# Patient Record
Sex: MALE | Race: WHITE | NOT HISPANIC OR LATINO | URBAN - METROPOLITAN AREA
[De-identification: names, ages, dates, MRNs, and addresses within clinical notes are randomized per-mention and may not be internally consistent; named-entity substitution may affect disease eponyms.]

---

## 2021-05-17 ENCOUNTER — EMERGENCY (EMERGENCY)
Facility: HOSPITAL | Age: 71
LOS: 1 days | Discharge: HOME | End: 2021-05-17
Attending: EMERGENCY MEDICINE
Payer: MEDICARE

## 2021-05-17 VITALS
SYSTOLIC BLOOD PRESSURE: 144 MMHG | RESPIRATION RATE: 18 BRPM | DIASTOLIC BLOOD PRESSURE: 81 MMHG | HEART RATE: 66 BPM | OXYGEN SATURATION: 99 %

## 2021-05-17 VITALS
WEIGHT: 195.11 LBS | HEART RATE: 66 BPM | HEIGHT: 71 IN | DIASTOLIC BLOOD PRESSURE: 112 MMHG | TEMPERATURE: 98 F | SYSTOLIC BLOOD PRESSURE: 183 MMHG | RESPIRATION RATE: 18 BRPM | OXYGEN SATURATION: 99 %

## 2021-05-17 PROBLEM — Z00.00 ENCOUNTER FOR PREVENTIVE HEALTH EXAMINATION: Status: ACTIVE | Noted: 2021-05-17

## 2021-05-17 LAB
ALBUMIN SERPL ELPH-MCNC: 4.3 G/DL — SIGNIFICANT CHANGE UP (ref 3.3–5.2)
ALP SERPL-CCNC: 50 U/L — SIGNIFICANT CHANGE UP (ref 40–120)
ALT FLD-CCNC: 31 U/L — SIGNIFICANT CHANGE UP
ANION GAP SERPL CALC-SCNC: 11 MMOL/L — SIGNIFICANT CHANGE UP (ref 5–17)
AST SERPL-CCNC: 22 U/L — SIGNIFICANT CHANGE UP
BASOPHILS # BLD AUTO: 0.02 K/UL — SIGNIFICANT CHANGE UP (ref 0–0.2)
BASOPHILS NFR BLD AUTO: 0.3 % — SIGNIFICANT CHANGE UP (ref 0–2)
BILIRUB SERPL-MCNC: 1.6 MG/DL — SIGNIFICANT CHANGE UP (ref 0.4–2)
BUN SERPL-MCNC: 17 MG/DL — SIGNIFICANT CHANGE UP (ref 8–20)
CALCIUM SERPL-MCNC: 9.3 MG/DL — SIGNIFICANT CHANGE UP (ref 8.6–10.2)
CHLORIDE SERPL-SCNC: 106 MMOL/L — SIGNIFICANT CHANGE UP (ref 98–107)
CO2 SERPL-SCNC: 24 MMOL/L — SIGNIFICANT CHANGE UP (ref 22–29)
CREAT SERPL-MCNC: 0.61 MG/DL — SIGNIFICANT CHANGE UP (ref 0.5–1.3)
EOSINOPHIL # BLD AUTO: 0.05 K/UL — SIGNIFICANT CHANGE UP (ref 0–0.5)
EOSINOPHIL NFR BLD AUTO: 0.7 % — SIGNIFICANT CHANGE UP (ref 0–6)
GLUCOSE SERPL-MCNC: 107 MG/DL — HIGH (ref 70–99)
HCT VFR BLD CALC: 44.7 % — SIGNIFICANT CHANGE UP (ref 39–50)
HGB BLD-MCNC: 14.8 G/DL — SIGNIFICANT CHANGE UP (ref 13–17)
IMM GRANULOCYTES NFR BLD AUTO: 0.4 % — SIGNIFICANT CHANGE UP (ref 0–1.5)
LYMPHOCYTES # BLD AUTO: 1.32 K/UL — SIGNIFICANT CHANGE UP (ref 1–3.3)
LYMPHOCYTES # BLD AUTO: 18.4 % — SIGNIFICANT CHANGE UP (ref 13–44)
MCHC RBC-ENTMCNC: 30.1 PG — SIGNIFICANT CHANGE UP (ref 27–34)
MCHC RBC-ENTMCNC: 33.1 GM/DL — SIGNIFICANT CHANGE UP (ref 32–36)
MCV RBC AUTO: 90.9 FL — SIGNIFICANT CHANGE UP (ref 80–100)
MONOCYTES # BLD AUTO: 0.41 K/UL — SIGNIFICANT CHANGE UP (ref 0–0.9)
MONOCYTES NFR BLD AUTO: 5.7 % — SIGNIFICANT CHANGE UP (ref 2–14)
NEUTROPHILS # BLD AUTO: 5.36 K/UL — SIGNIFICANT CHANGE UP (ref 1.8–7.4)
NEUTROPHILS NFR BLD AUTO: 74.5 % — SIGNIFICANT CHANGE UP (ref 43–77)
PLATELET # BLD AUTO: 169 K/UL — SIGNIFICANT CHANGE UP (ref 150–400)
POTASSIUM SERPL-MCNC: 4.1 MMOL/L — SIGNIFICANT CHANGE UP (ref 3.5–5.3)
POTASSIUM SERPL-SCNC: 4.1 MMOL/L — SIGNIFICANT CHANGE UP (ref 3.5–5.3)
PROT SERPL-MCNC: 7.1 G/DL — SIGNIFICANT CHANGE UP (ref 6.6–8.7)
RBC # BLD: 4.92 M/UL — SIGNIFICANT CHANGE UP (ref 4.2–5.8)
RBC # FLD: 12.5 % — SIGNIFICANT CHANGE UP (ref 10.3–14.5)
SODIUM SERPL-SCNC: 141 MMOL/L — SIGNIFICANT CHANGE UP (ref 135–145)
TROPONIN T SERPL-MCNC: <0.01 NG/ML — SIGNIFICANT CHANGE UP (ref 0–0.06)
WBC # BLD: 7.19 K/UL — SIGNIFICANT CHANGE UP (ref 3.8–10.5)
WBC # FLD AUTO: 7.19 K/UL — SIGNIFICANT CHANGE UP (ref 3.8–10.5)

## 2021-05-17 PROCEDURE — 93306 TTE W/DOPPLER COMPLETE: CPT

## 2021-05-17 PROCEDURE — 93306 TTE W/DOPPLER COMPLETE: CPT | Mod: 26

## 2021-05-17 PROCEDURE — G1004: CPT

## 2021-05-17 PROCEDURE — 96374 THER/PROPH/DIAG INJ IV PUSH: CPT

## 2021-05-17 PROCEDURE — 99285 EMERGENCY DEPT VISIT HI MDM: CPT | Mod: GC

## 2021-05-17 PROCEDURE — 85025 COMPLETE CBC W/AUTO DIFF WBC: CPT

## 2021-05-17 PROCEDURE — 71046 X-RAY EXAM CHEST 2 VIEWS: CPT

## 2021-05-17 PROCEDURE — 70450 CT HEAD/BRAIN W/O DYE: CPT

## 2021-05-17 PROCEDURE — 99284 EMERGENCY DEPT VISIT MOD MDM: CPT

## 2021-05-17 PROCEDURE — 99284 EMERGENCY DEPT VISIT MOD MDM: CPT | Mod: 25

## 2021-05-17 PROCEDURE — 93005 ELECTROCARDIOGRAM TRACING: CPT

## 2021-05-17 PROCEDURE — 84484 ASSAY OF TROPONIN QUANT: CPT

## 2021-05-17 PROCEDURE — 93010 ELECTROCARDIOGRAM REPORT: CPT

## 2021-05-17 PROCEDURE — 82962 GLUCOSE BLOOD TEST: CPT

## 2021-05-17 PROCEDURE — 71046 X-RAY EXAM CHEST 2 VIEWS: CPT | Mod: 26

## 2021-05-17 PROCEDURE — 80053 COMPREHEN METABOLIC PANEL: CPT

## 2021-05-17 PROCEDURE — 96361 HYDRATE IV INFUSION ADD-ON: CPT

## 2021-05-17 PROCEDURE — 36415 COLL VENOUS BLD VENIPUNCTURE: CPT

## 2021-05-17 PROCEDURE — 70450 CT HEAD/BRAIN W/O DYE: CPT | Mod: 26,MG

## 2021-05-17 RX ORDER — ASPIRIN/CALCIUM CARB/MAGNESIUM 324 MG
325 TABLET ORAL DAILY
Refills: 0 | Status: DISCONTINUED | OUTPATIENT
Start: 2021-05-17 | End: 2021-05-21

## 2021-05-17 RX ORDER — HYDRALAZINE HCL 50 MG
10 TABLET ORAL ONCE
Refills: 0 | Status: COMPLETED | OUTPATIENT
Start: 2021-05-17 | End: 2021-05-17

## 2021-05-17 RX ORDER — SODIUM CHLORIDE 9 MG/ML
1000 INJECTION INTRAMUSCULAR; INTRAVENOUS; SUBCUTANEOUS ONCE
Refills: 0 | Status: COMPLETED | OUTPATIENT
Start: 2021-05-17 | End: 2021-05-17

## 2021-05-17 RX ADMIN — SODIUM CHLORIDE 1000 MILLILITER(S): 9 INJECTION INTRAMUSCULAR; INTRAVENOUS; SUBCUTANEOUS at 13:45

## 2021-05-17 RX ADMIN — SODIUM CHLORIDE 1000 MILLILITER(S): 9 INJECTION INTRAMUSCULAR; INTRAVENOUS; SUBCUTANEOUS at 12:10

## 2021-05-17 RX ADMIN — Medication 325 MILLIGRAM(S): at 12:10

## 2021-05-17 RX ADMIN — Medication 10 MILLIGRAM(S): at 12:10

## 2021-05-17 NOTE — ED ADULT TRIAGE NOTE - CHIEF COMPLAINT QUOTE
Pt was outside doing yard work this morning. As he was bending down he became very lightheaded. Symptoms lasted approx 45mins. Denies any chest pain, palpitations, weakness or numbness. Pt is hypertensive in triage, denies any h/o HTN.

## 2021-05-17 NOTE — ED PROVIDER NOTE - NSFOLLOWUPCLINICS_GEN_ALL_ED_FT
[facility]  Cardiology  [address] [place] [address]  Phone: [phone]  Fax:   Follow Up Time: 1-3 Days

## 2021-05-17 NOTE — CONSULT NOTE ADULT - ASSESSMENT
70 y/o M with PMH HLD, colitis, presents to ED with c/o lightheaded. Pt states lasting about 45 minutes, without chest pain, shortness of breath. Pt heavy coffee drinker, dose not drink water, non smoker, works outside gardening, and house hold chores, without symptoms. Orthostatics normal, IVF infusing in ED. tele- SR. troponin neg. EKG with bifascicular block.     lightheaded  - TTE ordered   - EKG abnl  - troponin neg x 1  - orthostatics normal  - IVF given by ED  - tele monitoring x 24 hours.   - attempted to call PCP to obtain prior EKG- office is closed today    HTN  - hydralazine given in ED  - follow up with primary care       Preliminary evaluation, please await complete evaluation by attending   70 y/o M with PMH HLD, colitis, presents to ED with c/o lightheaded. Pt states lasting about 45 minutes, without chest pain, shortness of breath. Pt heavy coffee drinker, dose not drink water, non smoker, works outside gardening, and house hold chores, without symptoms. Orthostatics normal, IVF infusing in ED. tele- SR. troponin neg. EKG with bifascicular block.     lightheaded  - TTE ordered   - EKG abnl  - troponin neg x 1  - orthostatics normal  - IVF given by ED  - tele monitoring x 24 hours.   - attempted to call PCP to obtain prior EKG- office is closed today  - out patient MCOT and  ischemic work up     HTN  - hydralazine given in ED  - follow up with primary care       Preliminary evaluation, please await complete evaluation by attending   70 y/o M with PMH HLD, colitis, presents to ED with c/o lightheaded. Pt states lasting about 45 minutes, without chest pain, shortness of breath. Pt heavy coffee drinker, dose not drink water, non smoker, works outside gardening, and house hold chores, without symptoms. Orthostatics normal, IVF infusing in ED. tele- SR. troponin neg. EKG with bifascicular block.     lightheaded  - TTE ordered   - EKG abnl  - troponin neg x 1  - orthostatics normal  - IVF given by ED  - tele monitoring x 24 hours.   - attempted to call PCP to obtain prior EKG- office is closed today  - out patient MCOT and  ischemic work up     HTN  - hydralazine given in ED  - follow up with primary care

## 2021-05-17 NOTE — ED PROVIDER NOTE - CLINICAL SUMMARY MEDICAL DECISION MAKING FREE TEXT BOX
70 yo male presents s/p episode of lightheadedness earlier today. Will obtain basic labs, troponin, EKG, CXR, CT head (priority). Hydralazine 10 mg to treat BP. Orthostatic vitals. Monitor and reassess.

## 2021-05-17 NOTE — ED ADULT NURSE NOTE - OBJECTIVE STATEMENT
pt with dizziness complaint  was out gardening  washing 5 gal pots 'I was up and down and up and down'   pt didn't have breakfast "I normally don't until I do the gardening'   "I feel better now but I don't know what that was'  pt had both COVID vaccines.  wife at bedside

## 2021-05-17 NOTE — CONSULT NOTE ADULT - SUBJECTIVE AND OBJECTIVE BOX
[facility]                                                               Office:  [address] [place] [address]                                                              Telephone: [phone]. Fax:[phone]                                                                        CARDIOLOGY CONSULTATION NOTE     History obtained by: Patient and medical record   obtained: No  Cardiologist: none    Chief complaint:    Patient is a 71y old  Male who presents with a chief complaint of lightheaded       HPI: 72 y/o M with PMH HLD, colitis, presents to ED with c/o lightheaded. Pt states lasting about 45 minutes, without chest pain, shortness of breath. Pt heavy coffee drinker, dose not drink water, non smoker, works outside gardening, and house hold chores, without symptoms. Orthostatics normal, IVF infusing in ED. tele- SR. troponin neg. EKG with bifascicular block.       REVIEW OF SYMPTOMS:   CONSTITUTIONAL: No fever, no weight loss, no fatigue, no weight gain   ENMT:  No difficulty hearing, tinnitus, vertigo; No sinus or throat pain  NECK: No pain or stiffness  CARDIOVASCULAR: No chest pain, no dyspnea, no syncope, no palpitations, no dizziness, no Orthopnea, no Paroxsymal nocturnal dyspnea  RESPIRATORY: No Dyspnea on exertion, no Shortness of breath, no cough, no wheezing  : No dysuria, no hematuria   GI: No dark color stool, no melena, no diarrhea, no constipation, no abdominal pain   NEURO: No headache, no dizziness, no slurred speech   MUSCULOSKELETAL: No joint pain or swelling; No muscle, back, or extremity pain  PSYCH: No agitation, no anxiety.    ALL OTHER REVIEW OF SYSTEMS ARE NEGATIVE.       ALLERGIES: Allergies  No Known Allergies  Intolerances        PAST MEDICAL HISTORY  High cholesterol  Colitis  Vertigo      PAST SURGICAL HISTORY      FAMILY HISTORY:      SOCIAL HISTORY:  Denies smoking/alcohol/drugs      CURRENT MEDICATIONS:   aspirin        HOME MEDICATIONS:      Vital Signs Last 24 Hrs  T(C): 36.5 (17 May 2021 10:28), Max: 36.5 (17 May 2021 10:28)  T(F): 97.7 (17 May 2021 10:28), Max: 97.7 (17 May 2021 10:28)  HR: 67 (17 May 2021 12:21) (61 - 68)  BP: 167/86 (17 May 2021 12:21) (156/74 - 183/112)  RR: 18 (17 May 2021 11:48) (18 - 18)  SpO2: 99% (17 May 2021 12:02) (99% - 99%)      PHYSICAL EXAM:  Constitutional: Comfortable . No acute distress.   HEENT: Atraumatic and normocephalic , neck is supple . no JVD.   CNS: A&Ox3. No focal deficits. EOMI.   Respiratory: CTAB, unlabored   Cardiovascular: S1S2 RRR. No murmur/rubs or gallop.  Gastrointestinal: Soft non-tender and non distended . +Bowel sounds  Extremities: No edema.   Psychiatric: Calm . no agitation.  Skin: No skin rash/ulcers visualized to face, hands or feet.      LABS:                        14.8   7.19  )-----------( 169      ( 17 May 2021 11:41 )             44.7     05-17    141  |  106  |  17.0  ----------------------------<  107<H>  4.1   |  24.0  |  0.61    Ca    9.3      17 May 2021 11:41    TPro  7.1  /  Alb  4.3  /  TBili  1.6  /  DBili  x   /  AST  22  /  ALT  31  /  AlkPhos  50  05-17    CARDIAC MARKERS ( 17 May 2021 11:41 )  x     / <0.01 ng/mL / x     / x     / x          INTERPRETATION OF TELEMETRY: SR   ECG: SR, bifascicular block

## 2021-05-17 NOTE — ED PROVIDER NOTE - PHYSICAL EXAMINATION
Const: Awake, alert and oriented. In no acute distress. Well appearing.  HEENT: NC/AT. Moist mucous membranes.  Eyes: No scleral icterus. EOMI.  Neck:. Soft and supple. Full ROM without pain.  Cardiac: Regular rate and regular rhythm. +S1/S2. Peripheral pulses 2+ and symmetric. No LE edema.  Resp: Speaking in full sentences. No evidence of respiratory distress. No wheezes, rales or rhonchi.  Abd: Soft, non-tender, non-distended. Normal bowel sounds in all 4 quadrants. No guarding or rebound.  Back: Spine midline and non-tender. No CVAT.  Skin: No rashes, abrasions or lacerations.  Lymph: No cervical lymphadenopathy.  Neuro: Awake, alert & oriented x 3. Moves all extremities symmetrically. CN II-XII intact. No focal neurologic deficits on exam.

## 2021-05-17 NOTE — ED PROVIDER NOTE - ATTENDING CONTRIBUTION TO CARE
I personally saw the patient with the resident, and completed the key components of the history and physical exam. I then discussed the management plan with the resident.    70 y/o M with PMH colitis, HLD presents following an episode of lightheadedness when he was working in the garden around 7 am (presented to the ED around 1030 am), felt pressure/pain in his throat, symptoms lasted 45 mins and resolved. Fells nothing like his previous episodes of vertigo. He now feels fine. BP has been elevated since, he has no history of blood pressure.  AP - well appearing, NAD, RRR, lungs CTA B/L, abd soft NT/ND, CN II-XII symmetrically intact, normal coordination, no ataxia, abd soft NT/ND, symmetrical peripheral pulses 2+.     Cardiology evaluated the patient, ordered TTE for now, MCOT for patient, if echo normal, patient can be d/c'd and follow up as outpatient in the office.

## 2021-05-17 NOTE — CONSULT NOTE ADULT - ATTENDING COMMENTS
71M with pre-syncope     - orthostatic vitals negative  - continue IVF hydration  - abnormal EKG, but unchanged from 2019 per review of records from PMD  - echo to assess biventricular structure and function  - continue tele monitoring  - will try to arrange 7d ambulatory heart monitoring in office today  - if able to arrange monitor and no significant abnormalities on echo, no additional inpatient cardiac testing is recommended at this time   - start amlodipine if additional BP control needed     Cardiology follow in office in 2-4 weeks; routine PMD follow up

## 2021-05-17 NOTE — ED PROVIDER NOTE - PROGRESS NOTE DETAILS
Mike: Patient complaining of neck fullness" when he felt nauseous earlier. Last time he saw a cardiologist was ten years ago for a stress test. Will consult cardiology for further recommendations. Mike: Cardiology recommending ECHO, outpatient followup. Will obtain TTE. If TTE normal, plan for d/c. Mike: Echo negative. will d/c the patient.

## 2021-05-17 NOTE — ED PROVIDER NOTE - OBJECTIVE STATEMENT
72 yo male history of Colitis, HLD presents after a 45 minute episode of nausea, lightheadedness earlier today. Per the patient, he was outside earlier today gardening when he suddenly became lightheaded. He reports that he was nauseous during this time as well. Patient reports entire episode lasted 45 minutes, he did not throw up nor lose consciousness. No vision changes, blurry vision, double vision, loss of vision. He does have a history of vertigo, does not feel this earlier episode today reminds him of his vertigo. He denies any headache during the episode or now. No history of high blood pressure. He denies fever/chills, cp, sob, abd pain, n/v/d, dysuria, hematuria, focal weakness/numbness/tingling in his extremities.

## 2021-06-08 ENCOUNTER — APPOINTMENT (OUTPATIENT)
Dept: CARDIOLOGY | Facility: CLINIC | Age: 71
End: 2021-06-08
Payer: MEDICARE

## 2021-06-08 ENCOUNTER — NON-APPOINTMENT (OUTPATIENT)
Age: 71
End: 2021-06-08

## 2021-06-08 VITALS
TEMPERATURE: 98.1 F | HEIGHT: 71 IN | SYSTOLIC BLOOD PRESSURE: 122 MMHG | WEIGHT: 195 LBS | OXYGEN SATURATION: 96 % | BODY MASS INDEX: 27.3 KG/M2 | HEART RATE: 77 BPM | RESPIRATION RATE: 18 BRPM | DIASTOLIC BLOOD PRESSURE: 80 MMHG

## 2021-06-08 VITALS — DIASTOLIC BLOOD PRESSURE: 80 MMHG | SYSTOLIC BLOOD PRESSURE: 128 MMHG

## 2021-06-08 DIAGNOSIS — Z78.9 OTHER SPECIFIED HEALTH STATUS: ICD-10-CM

## 2021-06-08 DIAGNOSIS — R42 DIZZINESS AND GIDDINESS: ICD-10-CM

## 2021-06-08 DIAGNOSIS — Z82.0 FAMILY HISTORY OF EPILEPSY AND OTHER DISEASES OF THE NERVOUS SYSTEM: ICD-10-CM

## 2021-06-08 DIAGNOSIS — Z80.1 FAMILY HISTORY OF MALIGNANT NEOPLASM OF TRACHEA, BRONCHUS AND LUNG: ICD-10-CM

## 2021-06-08 DIAGNOSIS — K52.9 NONINFECTIVE GASTROENTERITIS AND COLITIS, UNSPECIFIED: ICD-10-CM

## 2021-06-08 DIAGNOSIS — Z86.79 PERSONAL HISTORY OF OTHER DISEASES OF THE CIRCULATORY SYSTEM: ICD-10-CM

## 2021-06-08 DIAGNOSIS — Z87.898 PERSONAL HISTORY OF OTHER SPECIFIED CONDITIONS: ICD-10-CM

## 2021-06-08 DIAGNOSIS — Z82.49 FAMILY HISTORY OF ISCHEMIC HEART DISEASE AND OTHER DISEASES OF THE CIRCULATORY SYSTEM: ICD-10-CM

## 2021-06-08 DIAGNOSIS — F17.290 NICOTINE DEPENDENCE, OTHER TOBACCO PRODUCT, UNCOMPLICATED: ICD-10-CM

## 2021-06-08 PROCEDURE — 99213 OFFICE O/P EST LOW 20 MIN: CPT

## 2021-06-08 PROCEDURE — 93000 ELECTROCARDIOGRAM COMPLETE: CPT

## 2021-06-08 RX ORDER — MESALAMINE 400 MG/1
400 CAPSULE, DELAYED RELEASE ORAL
Refills: 0 | Status: ACTIVE | COMMUNITY

## 2021-06-08 RX ORDER — SIMVASTATIN 10 MG/1
10 TABLET, FILM COATED ORAL
Qty: 90 | Refills: 3 | Status: ACTIVE | COMMUNITY

## 2021-06-08 NOTE — CARDIOLOGY SUMMARY
[de-identified] : 6/8/21: NSR, RBBB, unchanged from prior  [de-identified] : 5/17/21: EF65-70%, trace MR, mild AI

## 2021-06-08 NOTE — HISTORY OF PRESENT ILLNESS
[FreeTextEntry1] : Mr. DORIE OROSCO  is a 71 year male  who presents to Cardiology for follow up evaluation of a presyncopal episode one month ago that was evaluated in the [facility] ED.  At the time the etiology of his symptoms were unclear and cardiac testing was unrevealing.  He was discharged with a 7d heart monitor, which he did complete and mail back last week.\par \par Seen in office today, he  feels well. Specifically He denies chest pain, dyspnea, exertional symptoms, orthopnea, lower extremity edema, palpitations, or syncope. No new issues since leaving the ED.  Was not discharged with BP medications, and his BP at home has been -130s.

## 2021-10-06 ENCOUNTER — RESULT REVIEW (OUTPATIENT)
Age: 71
End: 2021-10-06

## 2021-10-06 ENCOUNTER — INPATIENT (INPATIENT)
Facility: HOSPITAL | Age: 71
LOS: 16 days | Discharge: HOME | DRG: 28 | End: 2021-10-23
Attending: NEUROLOGICAL SURGERY | Admitting: NEUROLOGICAL SURGERY
Payer: MEDICARE

## 2021-10-06 ENCOUNTER — TRANSCRIPTION ENCOUNTER (OUTPATIENT)
Age: 71
End: 2021-10-06

## 2021-10-06 VITALS
HEIGHT: 71 IN | HEART RATE: 64 BPM | DIASTOLIC BLOOD PRESSURE: 92 MMHG | OXYGEN SATURATION: 96 % | TEMPERATURE: 98 F | WEIGHT: 190.04 LBS | SYSTOLIC BLOOD PRESSURE: 183 MMHG | RESPIRATION RATE: 18 BRPM

## 2021-10-06 DIAGNOSIS — G95.89 OTHER SPECIFIED DISEASES OF SPINAL CORD: ICD-10-CM

## 2021-10-06 PROBLEM — K52.9 NONINFECTIVE GASTROENTERITIS AND COLITIS, UNSPECIFIED: Chronic | Status: ACTIVE | Noted: 2021-05-17

## 2021-10-06 PROBLEM — R42 DIZZINESS AND GIDDINESS: Chronic | Status: ACTIVE | Noted: 2021-05-17

## 2021-10-06 PROBLEM — E78.00 PURE HYPERCHOLESTEROLEMIA, UNSPECIFIED: Chronic | Status: ACTIVE | Noted: 2021-05-17

## 2021-10-06 LAB
ALBUMIN SERPL ELPH-MCNC: 4.7 G/DL — SIGNIFICANT CHANGE UP (ref 3.3–5.2)
ALP SERPL-CCNC: 60 U/L — SIGNIFICANT CHANGE UP (ref 40–120)
ALT FLD-CCNC: 32 U/L — SIGNIFICANT CHANGE UP
ANION GAP SERPL CALC-SCNC: 20 MMOL/L — HIGH (ref 5–17)
APTT BLD: 34.7 SEC — SIGNIFICANT CHANGE UP (ref 27.5–35.5)
AST SERPL-CCNC: 26 U/L — SIGNIFICANT CHANGE UP
BASOPHILS # BLD AUTO: 0.02 K/UL — SIGNIFICANT CHANGE UP (ref 0–0.2)
BASOPHILS NFR BLD AUTO: 0.2 % — SIGNIFICANT CHANGE UP (ref 0–2)
BILIRUB SERPL-MCNC: 2.1 MG/DL — HIGH (ref 0.4–2)
BUN SERPL-MCNC: 16.8 MG/DL — SIGNIFICANT CHANGE UP (ref 8–20)
CALCIUM SERPL-MCNC: 9.9 MG/DL — SIGNIFICANT CHANGE UP (ref 8.6–10.2)
CHLORIDE SERPL-SCNC: 100 MMOL/L — SIGNIFICANT CHANGE UP (ref 98–107)
CO2 SERPL-SCNC: 20 MMOL/L — LOW (ref 22–29)
CREAT SERPL-MCNC: 0.66 MG/DL — SIGNIFICANT CHANGE UP (ref 0.5–1.3)
EOSINOPHIL # BLD AUTO: 0 K/UL — SIGNIFICANT CHANGE UP (ref 0–0.5)
EOSINOPHIL NFR BLD AUTO: 0 % — SIGNIFICANT CHANGE UP (ref 0–6)
GLUCOSE SERPL-MCNC: 118 MG/DL — HIGH (ref 70–99)
HCT VFR BLD CALC: 44.6 % — SIGNIFICANT CHANGE UP (ref 39–50)
HGB BLD-MCNC: 15.5 G/DL — SIGNIFICANT CHANGE UP (ref 13–17)
IMM GRANULOCYTES NFR BLD AUTO: 0.5 % — SIGNIFICANT CHANGE UP (ref 0–1.5)
INR BLD: 1.1 RATIO — SIGNIFICANT CHANGE UP (ref 0.88–1.16)
LYMPHOCYTES # BLD AUTO: 1 K/UL — SIGNIFICANT CHANGE UP (ref 1–3.3)
LYMPHOCYTES # BLD AUTO: 9.9 % — LOW (ref 13–44)
MCHC RBC-ENTMCNC: 30.5 PG — SIGNIFICANT CHANGE UP (ref 27–34)
MCHC RBC-ENTMCNC: 34.8 GM/DL — SIGNIFICANT CHANGE UP (ref 32–36)
MCV RBC AUTO: 87.8 FL — SIGNIFICANT CHANGE UP (ref 80–100)
MONOCYTES # BLD AUTO: 0.46 K/UL — SIGNIFICANT CHANGE UP (ref 0–0.9)
MONOCYTES NFR BLD AUTO: 4.5 % — SIGNIFICANT CHANGE UP (ref 2–14)
NEUTROPHILS # BLD AUTO: 8.59 K/UL — HIGH (ref 1.8–7.4)
NEUTROPHILS NFR BLD AUTO: 84.9 % — HIGH (ref 43–77)
PLATELET # BLD AUTO: 185 K/UL — SIGNIFICANT CHANGE UP (ref 150–400)
POTASSIUM SERPL-MCNC: 3.9 MMOL/L — SIGNIFICANT CHANGE UP (ref 3.5–5.3)
POTASSIUM SERPL-SCNC: 3.9 MMOL/L — SIGNIFICANT CHANGE UP (ref 3.5–5.3)
PROT SERPL-MCNC: 7.5 G/DL — SIGNIFICANT CHANGE UP (ref 6.6–8.7)
PROTHROM AB SERPL-ACNC: 12.7 SEC — SIGNIFICANT CHANGE UP (ref 10.6–13.6)
RAPID RVP RESULT: SIGNIFICANT CHANGE UP
RBC # BLD: 5.08 M/UL — SIGNIFICANT CHANGE UP (ref 4.2–5.8)
RBC # FLD: 12.9 % — SIGNIFICANT CHANGE UP (ref 10.3–14.5)
SARS-COV-2 RNA SPEC QL NAA+PROBE: SIGNIFICANT CHANGE UP
SARS-COV-2 RNA SPEC QL NAA+PROBE: SIGNIFICANT CHANGE UP
SODIUM SERPL-SCNC: 140 MMOL/L — SIGNIFICANT CHANGE UP (ref 135–145)
WBC # BLD: 10.12 K/UL — SIGNIFICANT CHANGE UP (ref 3.8–10.5)
WBC # FLD AUTO: 10.12 K/UL — SIGNIFICANT CHANGE UP (ref 3.8–10.5)

## 2021-10-06 PROCEDURE — 99285 EMERGENCY DEPT VISIT HI MDM: CPT

## 2021-10-06 PROCEDURE — 72157 MRI CHEST SPINE W/O & W/DYE: CPT | Mod: 26,MG

## 2021-10-06 PROCEDURE — 72158 MRI LUMBAR SPINE W/O & W/DYE: CPT | Mod: 26,MG

## 2021-10-06 PROCEDURE — G1004: CPT

## 2021-10-06 PROCEDURE — 99024 POSTOP FOLLOW-UP VISIT: CPT

## 2021-10-06 PROCEDURE — 72156 MRI NECK SPINE W/O & W/DYE: CPT | Mod: 26,MG

## 2021-10-06 PROCEDURE — 72128 CT CHEST SPINE W/O DYE: CPT | Mod: 26,MG

## 2021-10-06 PROCEDURE — 88304 TISSUE EXAM BY PATHOLOGIST: CPT | Mod: 26

## 2021-10-06 PROCEDURE — 99221 1ST HOSP IP/OBS SF/LOW 40: CPT

## 2021-10-06 PROCEDURE — 72131 CT LUMBAR SPINE W/O DYE: CPT | Mod: 26,MG

## 2021-10-06 RX ORDER — DEXAMETHASONE 0.5 MG/5ML
10 ELIXIR ORAL ONCE
Refills: 0 | Status: COMPLETED | OUTPATIENT
Start: 2021-10-06 | End: 2021-10-06

## 2021-10-06 RX ORDER — HYDROMORPHONE HYDROCHLORIDE 2 MG/ML
1 INJECTION INTRAMUSCULAR; INTRAVENOUS; SUBCUTANEOUS ONCE
Refills: 0 | Status: DISCONTINUED | OUTPATIENT
Start: 2021-10-06 | End: 2021-10-06

## 2021-10-06 RX ORDER — MORPHINE SULFATE 50 MG/1
4 CAPSULE, EXTENDED RELEASE ORAL ONCE
Refills: 0 | Status: DISCONTINUED | OUTPATIENT
Start: 2021-10-06 | End: 2021-10-06

## 2021-10-06 RX ORDER — MORPHINE SULFATE 50 MG/1
2 CAPSULE, EXTENDED RELEASE ORAL ONCE
Refills: 0 | Status: DISCONTINUED | OUTPATIENT
Start: 2021-10-06 | End: 2021-10-06

## 2021-10-06 RX ADMIN — MORPHINE SULFATE 2 MILLIGRAM(S): 50 CAPSULE, EXTENDED RELEASE ORAL at 17:02

## 2021-10-06 RX ADMIN — MORPHINE SULFATE 4 MILLIGRAM(S): 50 CAPSULE, EXTENDED RELEASE ORAL at 17:17

## 2021-10-06 RX ADMIN — MORPHINE SULFATE 2 MILLIGRAM(S): 50 CAPSULE, EXTENDED RELEASE ORAL at 16:47

## 2021-10-06 RX ADMIN — HYDROMORPHONE HYDROCHLORIDE 1 MILLIGRAM(S): 2 INJECTION INTRAMUSCULAR; INTRAVENOUS; SUBCUTANEOUS at 17:46

## 2021-10-06 RX ADMIN — Medication 10 MILLIGRAM(S): at 16:47

## 2021-10-06 RX ADMIN — HYDROMORPHONE HYDROCHLORIDE 1 MILLIGRAM(S): 2 INJECTION INTRAMUSCULAR; INTRAVENOUS; SUBCUTANEOUS at 23:03

## 2021-10-06 NOTE — ED PROVIDER NOTE - SHIFT CHANGE DETAILS
pt with back pain , numbness to both legs and inability to move legs after sudden onset of what he felt was his usual back pain.   Eval by NS rec MR and will follow. R/O GB, transverse myelitis.   Needs LP after MRs  Monitor resp status. Pt and wife instructed to immediately report any increased weakness/numbness/ diff breathing

## 2021-10-06 NOTE — CHART NOTE - NSCHARTNOTEFT_GEN_A_CORE
NCCU Evaluation Note:    Called by the ER to evaluate a patient with possible GBS.  Patient seen and examined.  The history elicited: acute onset of low back pain with bilateral numbness and weakness starting in the upper thigh and radiating down the legs is atypical of luma barre syndrome, but does not rule it out.  The patient is having no bulbar symptoms, no dysautonomia (HTN on presentation easily explained by 10/10 back pain), the patient reports no short ness of breath- on room air, speaking full sentences and maintains his airway.    Constitutional: NAD    Neuro  * Mental Status:  GCS 15:  E(4), V(5), M(6).  Awake, alert, oriented to conversation.  * Cranial Nerves: Cnii-Cnxii grossly intact. PERRL, EOMI, tongue midline, no gaze deviation  * Motor: RUE 5/5, LUE 5/5, RLE 1/5, LLE 2/5  * Sensory: Right leg no sensation to light touch or to sharp, has cold temp sensation at midshin down to foot.  Left leg has intact light touch and sharp differentiation and temperature sensation  * Reflexes: A-reflexic on the right leg, 2+ plantar reflex.  No clonus, no babinksi bilateral  * Gait: Not assessed    Cardiovascular:  S1, S2 no murmurs appreciated.  Regular rate and rhythm.  Eyes: See neurologic examination with detailed examination of eyes.  ENT: No JVD, Trachea Midline.  No accessory muscle use  Respiratory: Clear to auscultation.  Can count to 30 out loud with a single breath  Gastrointestinal: Soft, nontender, nondistended.  Genitourinary: [ ] Vazquez, [ x ] No Vazquez.   Musculoskeletal: No muscle wasting noted, (See neuorlogic assessment for full muscle strength assessment) No pretibial edema appreciated, no appreciable calf tenderness.  Skin:  No pathologic skin lesions  Hematologic / Lymph / Immunologic: No bleeding from IV sites or wounds, No lymphadenopathy, No Hives or allergic type skin lesions      Vitals:  Vital Signs Last 24 Hrs  T(C): 36.6 (06 Oct 2021 15:43), Max: 36.6 (06 Oct 2021 15:43)  T(F): 97.9 (06 Oct 2021 15:43), Max: 97.9 (06 Oct 2021 15:43)  HR: 64 (06 Oct 2021 15:43) (64 - 64)  BP: 183/92 (06 Oct 2021 15:43) (183/92 - 183/92)  BP(mean): --  RR: 18 (06 Oct 2021 15:43) (18 - 18)  SpO2: 96% (06 Oct 2021 15:43) (96% - 96%)    I&O:  I&O's Summary      Labs:                         15.5   10.12 )-----------( 185      ( 06 Oct 2021 17:13 )             44.6       10-06    140  |  100  |  16.8  ----------------------------<  118<H>  3.9   |  20.0<L>  |  0.66    Ca    9.9      06 Oct 2021 17:13    TPro  7.5  /  Alb  4.7  /  TBili  2.1<H>  /  DBili  x   /  AST  26  /  ALT  32  /  AlkPhos  60  10-06      LIVER FUNCTIONS - ( 06 Oct 2021 17:13 )  Alb: 4.7 g/dL / Pro: 7.5 g/dL / ALK PHOS: 60 U/L / ALT: 32 U/L / AST: 26 U/L / GGT: x             PT/INR - ( 06 Oct 2021 17:13 )   PT: 12.7 sec;   INR: 1.10 ratio         PTT - ( 06 Oct 2021 17:13 )  PTT:34.7 sec        MRI Spine: Pending  LP: Pending        A/P:  71m 911  with chronic low back pain who developed acute worsening of low back pain followed by bilateral weakness and numbness.  Recently got flu shot on Monday.  NCCU called to evaluate the patient for higher level of care    - The patient at this time does not exhibit worrisome signs consistent with a rapidly deteriorating GBS: no bulbar weakness, no shortness of breath / dyspnea, rapidly progressing symptoms  - Should obtain baseline niff and vital capacity  - MRI pending, will be required as part of the workup  - Lumbar puncture is reasonable pending the results of the MRI  - We will follow up on the results  - Discussed with MD Ayala

## 2021-10-06 NOTE — CONSULT NOTE ADULT - ASSESSMENT
72 Y/O male w/ PMHx of HLD and chronic back pain p/w c/o lower back pain and B/L LE weakness. Pt states that he was fishing on a boat around 9AM when he began having progressive back pain and B/L LE weakness and numbness. Pt states that he required assistance off the boat and presently is unable to move B/L LE. Pt denies fall, trauma or recent injury. Pt reports having Flu shot 10/4/21. Pt denies loss of bowel or bladder function. Pt admits to decreased sensation in both LE R>L, denies HA, N/V, dizziness.        -Pt seen and case discussed w/ Dr. Uriarte  -images reviewed  -recommend MRI C/T/L spine  -recommend neurology evaluation  -recommend bladder scan  -pain control as needed, avoid over sedation  -recommend Q1hr neuro checks   -continue to follow  70 Y/O male w/ PMHx of HLD and chronic back pain p/w c/o lower back pain and B/L LE weakness. Pt states that he was fishing on a boat around 9AM when he began having progressive back pain and B/L LE weakness and numbness. Pt states that he required assistance off the boat and presently is unable to move B/L LE. Pt denies fall, trauma or recent injury. Pt reports having Flu shot 10/4/21. Pt denies loss of bowel or bladder function. Pt admits to decreased sensation in both LE R>L, denies HA, N/V, dizziness.        -Pt seen and case discussed w/ Dr. Uriarte  -images reviewed  -recommend MRI C/T/L spine STAT  -recommend neurology evaluation  -recommend bladder scan  -pain control as needed, avoid over sedation  -recommend Q1hr neuro checks   -continue to follow

## 2021-10-06 NOTE — ED ADULT NURSE NOTE - NSIMPLEMENTINTERV_GEN_ALL_ED
Implemented All Fall Risk Interventions:  Bend to call system. Call bell, personal items and telephone within reach. Instruct patient to call for assistance. Room bathroom lighting operational. Non-slip footwear when patient is off stretcher. Physically safe environment: no spills, clutter or unnecessary equipment. Stretcher in lowest position, wheels locked, appropriate side rails in place. Provide visual cue, wrist band, yellow gown, etc. Monitor gait and stability. Monitor for mental status changes and reorient to person, place, and time. Review medications for side effects contributing to fall risk. Reinforce activity limits and safety measures with patient and family.

## 2021-10-06 NOTE — ED ADULT NURSE REASSESSMENT NOTE - NS ED NURSE REASSESS COMMENT FT1
Neuro at bedside for pt. evaluation Neuro at bedside for pt. evaluation.  pt. endorsing relief of pain with dilaudid 1mg.  Pt. endorsing ability to move left foot and toes but still unable to lift LLE off bed. Neuro at bedside for pt. evaluation.  pt. endorsing relief of pain with dilaudid 1mg.  Pt. endorsing ability to move left foot and toes but still unable to lift LLE off bed.  Bladder scan performed with 606ml noted in the bladder; AARTI Ernst made aware

## 2021-10-06 NOTE — CONSULT NOTE ADULT - ATTENDING COMMENTS
I personally evaluated the pt. I recommended STAT MRI L/T/C SPINE to rule out spinal cord compression.

## 2021-10-06 NOTE — ED PROVIDER NOTE - ATTENDING CONTRIBUTION TO CARE
Pt with hx back pain - intermittent, also HLD.  Pt went out fishing early this morning around 2 am with friends. While out on water developed "his usual" back pain. Pain escalated and he developed numbness to the RLE. When they returned to shore, he was unable to get out of the boat by himself and needed an assist. His friend drove him home and when he got out of the car he collapsed. By that time he could not move RLE or LLE and both legs were numb. On PE pt had decreased sensation RLE compared to LLE. He is not able to move BLE. Rectal tone normal. No incontinence. Stat labs meds CT and MR ordered. CT non diagnostic. Neuro surg called. Concern expressed for GB /transverse myelitis. Pt had flu shot Monday. Neurology and Neurosurg icu ACP called to consult. MR C, T, LS +/- contrast ordered. Neuro wants LP after MR so there would not be dural enhancement from procedure. Agree with care/eval thus far

## 2021-10-06 NOTE — ED PROVIDER NOTE - PROGRESS NOTE DETAILS
Patient with improved pain. Still with b/l leg weakness and decrease b/l leg sensation. Neurosurgery consulted and MRI pending. Ct without evidence of cord compression. Neurosurgery noted patient received flu vaccine this past week. Neurology consulted for concern for Guillian [place] syndrome MRI reordered w/&w/o to also evaluate for transverse myelitis. Per Neuro, MRI to be done before LP if possible. Per Neurosurg and MRI tech, patient to go for MRI tonight. Consult placed with NSICU PA. Patient still with good respiratory effort and 5/5 strength in UEs.. Niurka Campbell. PGY-1 Patient with improved pain. Still with b/l leg weakness and decrease b/l leg sensation. Neurosurgery consulted and MRI pending. Ct without evidence of cord compression. Neurosurgery noted patient received flu vaccine this past week. Neurology consulted for concern for Guillian [place] syndrome Niurka Campbell. PGY-1

## 2021-10-06 NOTE — ED PROVIDER NOTE - PHYSICAL EXAMINATION
troponin 6.460 Gen: moving around in pain in bed  Head: normocephalic, atraumatic  EENT: EOMI  Lung: no increased work of breathing  CV: normal s1/s2, rrr, 2+ radial pulses b/l  Abd: soft, non-tender, non-distended, no rebound tenderness or guarding  MSK:   Neuro:  Skin: No rash   Psych: normal affect Gen: moving around in pain in bed  Head: normocephalic, atraumatic  EENT: EOMI  Lung: no increased work of breathing  CV: normal s1/s2, rrr, 2+ radial pulses b/l  Abd: soft, non-tender, non-distended, no rebound tenderness or guarding  MSK: 0/5 strength b/l lower extremities; no spina tenderness  Neuro: 0/5 strength b/l lower extremities; decreased sensation to pain R>L up to T10; achilles and patellar reflexes absent; good rectal tone  Skin: No rash   Psych: normal affect Gen: moving around in pain in bed  Head: normocephalic, atraumatic  EENT: EOMI  Lung: no increased work of breathing  CV: normal s1/s2, rrr, 2+ radial pulses b/l  Abd: soft, non-tender, non-distended, no rebound tenderness or guarding  MSK: 0/5 strength b/l lower extremities; no spinal tenderness  Neuro: 5/5 strength U/Es; general sensation intact in UEs b/l; 0/5 strength b/l lower extremities; decreased sensation to pain R>L up to T10; achilles and patellar reflexes absent; good rectal tone  Skin: No rash   Psych: normal affect

## 2021-10-06 NOTE — ED ADULT TRIAGE NOTE - CHIEF COMPLAINT QUOTE
pt was out fishing today and started having back pain and rt leg pain pt says he has back problems. c/o lower back pain and rt leg pain

## 2021-10-06 NOTE — ED PROVIDER NOTE - EMPLOYMENT
Patient left  regarding her Jardiance that just got sent in by her provider and how much that would cost her and how much would be taken out of the $600 she earned. Called back and left  for call back.        Triston Bridges, [address] [place]   Department, toll free: [phone], option 7 N/A

## 2021-10-06 NOTE — ED ADULT NURSE NOTE - OBJECTIVE STATEMENT
Pt. c/o low back pain and with right leg paralysis, lower left leg paralysis and diminished and absent sensation to Bilateral LE.  Pt. states he had a history of chronic lower back pain but nothing this severe.   Pt states that he was fishing on a boat around 9AM when he began having progressive back pain and B/L LE weakness and numbness. Pt states that he required assistance off the boat, got in the car and had 2 hour car ride from [place] where his bilateral LE became numb.  Pt. went to get out of car and immediately fell to knees r/t numbness and weakness.  Pt. presently is unable to move B/L LE, has absent sensation to RLE and LLE below the knee. Pt denies fall, trauma or recent injury besides fall out of car. Pt reports having Flu shot 10/4/21. Pt denies loss of bowel or bladder function; pt. does endorse lack of urination throughout the day and lack of sensation to urinate presently.  denies HA, N/V, dizziness.

## 2021-10-06 NOTE — CONSULT NOTE ADULT - SUBJECTIVE AND OBJECTIVE BOX
HPI:  72 Y/O male w/ PMHx of HLD and chronic back pain p/w c/o lower back pain and B/L LE weakness. Pt states that he was fishing on a boat around 9AM when he began having progressive back pain and B/L LE weakness and numbness. Pt states that he required assistance off the boat and presently is unable to move B/L LE. Pt denies fall, trauma or recent injury. Pt reports having Flu shot 10/4/21. Pt denies loss of bowel or bladder function. Pt admits to decreased sensation in both LE R>L, denies HA, N/V, dizziness.        Allergies    No Known Allergies    Intolerances      MEDICATIONS  (STANDING):    MEDICATIONS  (PRN):      Daily Height in cm: 180.34 (06 Oct 2021 15:43)    Daily     Drug Dosing Weight  Height (cm): 180.3 (06 Oct 2021 15:43)  Weight (kg): 86.2 (06 Oct 2021 15:43)  BMI (kg/m2): 26.5 (06 Oct 2021 15:43)  BSA (m2): 2.06 (06 Oct 2021 15:43)    PAST MEDICAL & SURGICAL HISTORY:  High cholesterol    Colitis    Vertigo    SOCIAL HISTORY:  Tobacco Use: denies  EtOH use: denies  Substance: denies    REVIEW OF SYSTEMS:    CONSTITUTIONAL: No fever, weight loss, or fatigue  EYES: No eye pain, visual disturbances, or discharge  ENMT:  No difficulty hearing, tinnitus, vertigo; No sinus or throat pain  NECK: No pain or stiffness  RESPIRATORY: No cough, wheezing, chills or hemoptysis; No shortness of breath  CARDIOVASCULAR: No chest pain, palpitations, dizziness, or leg swelling  GASTROINTESTINAL: No abdominal or epigastric pain. No nausea, vomiting, or hematemesis; No diarrhea or constipation. No melena or hematochezia.  GENITOURINARY: No dysuria, frequency, hematuria, or incontinence  NEUROLOGICAL: No headaches, memory loss, +loss of strength B/L LE, + numbness B/L LE, no tremors  SKIN: No itching, burning, rashes, or lesions   LYMPH NODES: No enlarged glands  ENDOCRINE: No heat or cold intolerance; No hair loss  MUSCULOSKELETAL: No joint pain or swelling; No muscle, extremity pain, +back pain  PSYCHIATRIC: No depression, anxiety, mood swings, or difficulty sleeping  HEME/LYMPH: No easy bruising, or bleeding gums  ALLERGY AND IMMUNOLOGIC: No hives or eczema      ICU Vital Signs Last 24 Hrs  T(C): 36.6 (06 Oct 2021 15:43), Max: 36.6 (06 Oct 2021 15:43)  T(F): 97.9 (06 Oct 2021 15:43), Max: 97.9 (06 Oct 2021 15:43)  HR: 64 (06 Oct 2021 15:43) (64 - 64)  BP: 183/92 (06 Oct 2021 15:43) (183/92 - 183/92)  BP(mean): --  ABP: --  ABP(mean): --  RR: 18 (06 Oct 2021 15:43) (18 - 18)  SpO2: 96% (06 Oct 2021 15:43) (96% - 96%)      I&O's Detail      PHYSICAL EXAM:  GENERAL: NAD, well-groomed, well-developed  HEAD:  Atraumatic, normocephalic  EYES: Conjunctiva and sclera clear  ENMT: No tonsillar erythema, exudates, or enlargement; moist mucous membranes  NECK: Supple, no JVD, Normal thyroid  TREVOR COMA SCORE: E-4 V-5 M-6 =15       E: 4= opens eyes spontaneously 3= to voice 2= to noxious 1= no opening       V: 5= oriented 4= confused 3= inappropriate words 2= incomprehensible sounds 1= nonverbal 1T= intubated       M: 6= follows commands 5= localizes 4= withdraws 3= flexor posturing 2= extensor posturing 1= no movement  MENTAL STATUS: AAO x3; Awake; Opens eyes spontaneously, Appropriately conversant without aphasia; following simple commands  CRANIAL NERVES: KAILASH. EOMI without nystagmus. Facial sensation intact V1-3 distribution b/l. Face symmetric w/ normal eye closure and smile, tongue midline. Hearing grossly intact. Speech clear  REFLEXES: No pronator drift; no upper extremity dysmetria, +rectal tone   MOTOR:   Uppers     Delt     Bicep     Tricep     HG  R                5/5       5/5         5/5         5/5  L                5/5       5/5         5/5         5/5  Lowers      HF     KF      KE     PF     DF     EHL  R             0/5     0/5     0/5    0/5   0/5    0/5  L              0/5    0/5      5/5    0/5   0/5    0/5  SENSATION: decreased sensation B/L LE R>L beginning ~LEVEL t12   CHEST/LUNG: + breath sounds bilaterally; no rales, rhonchi, wheezing, appreciated   HEART: +S1/+S2; Regular rate and rhythm; no murmurs, rubs  ABDOMEN: Soft, nontender, nondistended; bowel sounds present   EXTREMITIES:  no clubbing, cyanosis, or edema  LYMPH: No lymphadenopathy noted  SKIN: Warm, dry    LABS:  CBC Full  -  ( 06 Oct 2021 17:13 )  WBC Count : 10.12 K/uL  RBC Count : 5.08 M/uL  Hemoglobin : 15.5 g/dL  Hematocrit : 44.6 %  Platelet Count - Automated : 185 K/uL  Mean Cell Volume : 87.8 fl  Mean Cell Hemoglobin : 30.5 pg  Mean Cell Hemoglobin Concentration : 34.8 gm/dL  Auto Neutrophil # : 8.59 K/uL  Auto Lymphocyte # : 1.00 K/uL  Auto Monocyte # : 0.46 K/uL  Auto Eosinophil # : 0.00 K/uL  Auto Basophil # : 0.02 K/uL  Auto Neutrophil % : 84.9 %  Auto Lymphocyte % : 9.9 %  Auto Monocyte % : 4.5 %  Auto Eosinophil % : 0.0 %  Auto Basophil % : 0.2 %    10-06    140  |  100  |  16.8  ----------------------------<  118<H>  3.9   |  20.0<L>  |  0.66    Ca    9.9      06 Oct 2021 17:13    TPro  7.5  /  Alb  4.7  /  TBili  2.1<H>  /  DBili  x   /  AST  26  /  ALT  32  /  AlkPhos  60  10-06    CAPILLARY BLOOD GLUCOSE        PT/INR - ( 06 Oct 2021 17:13 )   PT: 12.7 sec;   INR: 1.10 ratio         PTT - ( 06 Oct 2021 17:13 )  PTT:34.7 sec      RADIOLOGY:     < from: CT Thoracic Spine No Cont (10.06.21 @ 16:47) >  EXAM:  CT THORACIC SPINE                         EXAM:  CT LUMBAR SPINE                          PROCEDURE DATE:  10/06/2021          INTERPRETATION:  STUDY: CT OF THE THORACIC AND LUMBAR SPINE WITHOUT CONTRAST.    CLINICAL INDICATION: Weakness and numbness in the legs    TECHNIQUE: Thin section CT images were obtained through the thoracic and lumbar spine with overlapping reconstructions. Sagittal and coronal reformats were then generated off this initial set. Dose optimization techniques were utilized including kVp/mA modulation (along with iterative reconstructions).    COMPARISON: None currently available.    FINDINGS:    There is maintenance of usual lumbar lordosis. There is maintenance of the usual thoracic kyphosis. There is anterolisthesis of L4 with respect to L5 measuring 6 mm. There is no scoliosis. Vertebral body height is preserved throughout the lumbar and thoracic spine. There is mild multilevel loss of intervertebral disc height throughout the lumbar spine with associated vacuum phenomenon. There is no significant loss of intervertebral disc height throughout the thoracic spine.    Streak artifact limits evaluation of the central canal.    There is no significant disc bulge or herniation throughout the thoracic spine. There is no definite significant central canal stenosis throughout the thoracic spine.    There are multilevel disc herniations and ligamentum flavum thickening throughout the lumbar spine which contribute to central canal narrowing to varying degrees. No definite high-grade central canal stenosis.    The paravertebral soft tissues are unremarkable.    IMPRESSION:  THORACIC SPINE: No fracture or acute traumatic malalignment. Streak artifact limits evaluation of the central canal. No definite high-grade central canal stenosis.  LUMBAR SPINE: No fracture or acute traumatic malalignment. Degenerative changes as described. Streak artifact limits evaluation of the central canal. No definite high-grade central canal stenosis.    --- End of Report ---          < end of copied text >

## 2021-10-06 NOTE — ED PROVIDER NOTE - OBJECTIVE STATEMENT
71yM with pmh hld and chronic back pain presenting with 1 day of back pain and lower leg weakness. Patient endorses gradual worsening back pan while on fishing trip in [place] around 9am this morning. Began to feel numbness and weakness in legs when coming from dock after cutting trip short. Currently with weakness and numbness in both legs with right worse then left. Denies leaking urine or stool. Last went to bathroom prior to fishing trip. Reports numbness in right groin area. Denies trauma, recent trauma, or anything that precipitated pain/numbness/weakness. Denies history of herniated discs. Denies chest pain, shortness of breath, blood thinner use, fevers, chills, or injections to the back.

## 2021-10-06 NOTE — ED PROVIDER NOTE - CLINICAL SUMMARY MEDICAL DECISION MAKING FREE TEXT BOX
71yM with pmh hld and chronic back pain presenting with 1 day of back pain and lower leg weakness. Concern for cord compression. CT spine, MRI spine, labs, Neurosurg consult 71yM with pmh hld and chronic back pain presenting with 1 day of back pain and lower leg weakness. Concern for cord compression. CT spine, MRI spine, labs, Neurosurg consult  Neuro and NS ICU consults  pain mngt

## 2021-10-07 ENCOUNTER — APPOINTMENT (OUTPATIENT)
Dept: NEUROSURGERY | Facility: HOSPITAL | Age: 71
End: 2021-10-07
Payer: MEDICARE

## 2021-10-07 LAB
ABO RH CONFIRMATION: SIGNIFICANT CHANGE UP
BLD GP AB SCN SERPL QL: SIGNIFICANT CHANGE UP
GAS PNL BLDA: SIGNIFICANT CHANGE UP
GAS PNL BLDA: SIGNIFICANT CHANGE UP
GLUCOSE BLDC GLUCOMTR-MCNC: 142 MG/DL — HIGH (ref 70–99)
GLUCOSE BLDC GLUCOMTR-MCNC: 143 MG/DL — HIGH (ref 70–99)

## 2021-10-07 PROCEDURE — 36215 PLACE CATHETER IN ARTERY: CPT | Mod: 59

## 2021-10-07 PROCEDURE — 36224 PLACE CATH CAROTD ART: CPT | Mod: 50

## 2021-10-07 PROCEDURE — 93010 ELECTROCARDIOGRAM REPORT: CPT

## 2021-10-07 PROCEDURE — 36245 INS CATH ABD/L-EXT ART 1ST: CPT

## 2021-10-07 PROCEDURE — 99232 SBSQ HOSP IP/OBS MODERATE 35: CPT

## 2021-10-07 PROCEDURE — 75705 ARTERY X-RAYS SPINE: CPT | Mod: 26

## 2021-10-07 PROCEDURE — 36227 PLACE CATH XTRNL CAROTID: CPT

## 2021-10-07 PROCEDURE — 63271 EXCISE INTRSPINL LESION THRC: CPT

## 2021-10-07 PROCEDURE — 99291 CRITICAL CARE FIRST HOUR: CPT

## 2021-10-07 PROCEDURE — 36226 PLACE CATH VERTEBRAL ART: CPT | Mod: 50

## 2021-10-07 RX ORDER — DEXTROSE 50 % IN WATER 50 %
12.5 SYRINGE (ML) INTRAVENOUS ONCE
Refills: 0 | Status: DISCONTINUED | OUTPATIENT
Start: 2021-10-07 | End: 2021-10-23

## 2021-10-07 RX ORDER — HYDROMORPHONE HYDROCHLORIDE 2 MG/ML
1 INJECTION INTRAMUSCULAR; INTRAVENOUS; SUBCUTANEOUS
Refills: 0 | Status: DISCONTINUED | OUTPATIENT
Start: 2021-10-07 | End: 2021-10-08

## 2021-10-07 RX ORDER — SODIUM CHLORIDE 9 MG/ML
1000 INJECTION INTRAMUSCULAR; INTRAVENOUS; SUBCUTANEOUS
Refills: 0 | Status: DISCONTINUED | OUTPATIENT
Start: 2021-10-07 | End: 2021-10-08

## 2021-10-07 RX ORDER — DEXAMETHASONE 0.5 MG/5ML
4 ELIXIR ORAL EVERY 6 HOURS
Refills: 0 | Status: DISCONTINUED | OUTPATIENT
Start: 2021-10-07 | End: 2021-10-10

## 2021-10-07 RX ORDER — DIAZEPAM 5 MG
5 TABLET ORAL ONCE
Refills: 0 | Status: DISCONTINUED | OUTPATIENT
Start: 2021-10-07 | End: 2021-10-08

## 2021-10-07 RX ORDER — GABAPENTIN 400 MG/1
300 CAPSULE ORAL EVERY 8 HOURS
Refills: 0 | Status: DISCONTINUED | OUTPATIENT
Start: 2021-10-07 | End: 2021-10-18

## 2021-10-07 RX ORDER — HYDROMORPHONE HYDROCHLORIDE 2 MG/ML
0.5 INJECTION INTRAMUSCULAR; INTRAVENOUS; SUBCUTANEOUS
Refills: 0 | Status: DISCONTINUED | OUTPATIENT
Start: 2021-10-07 | End: 2021-10-07

## 2021-10-07 RX ORDER — GLUCAGON INJECTION, SOLUTION 0.5 MG/.1ML
1 INJECTION, SOLUTION SUBCUTANEOUS ONCE
Refills: 0 | Status: DISCONTINUED | OUTPATIENT
Start: 2021-10-07 | End: 2021-10-23

## 2021-10-07 RX ORDER — METHOCARBAMOL 500 MG/1
500 TABLET, FILM COATED ORAL EVERY 6 HOURS
Refills: 0 | Status: DISCONTINUED | OUTPATIENT
Start: 2021-10-07 | End: 2021-10-08

## 2021-10-07 RX ORDER — DEXTROSE 50 % IN WATER 50 %
15 SYRINGE (ML) INTRAVENOUS ONCE
Refills: 0 | Status: DISCONTINUED | OUTPATIENT
Start: 2021-10-07 | End: 2021-10-23

## 2021-10-07 RX ORDER — DEXTROSE 50 % IN WATER 50 %
25 SYRINGE (ML) INTRAVENOUS ONCE
Refills: 0 | Status: DISCONTINUED | OUTPATIENT
Start: 2021-10-07 | End: 2021-10-23

## 2021-10-07 RX ORDER — HYDROMORPHONE HYDROCHLORIDE 2 MG/ML
1 INJECTION INTRAMUSCULAR; INTRAVENOUS; SUBCUTANEOUS ONCE
Refills: 0 | Status: DISCONTINUED | OUTPATIENT
Start: 2021-10-07 | End: 2021-10-08

## 2021-10-07 RX ORDER — ONDANSETRON 8 MG/1
4 TABLET, FILM COATED ORAL EVERY 6 HOURS
Refills: 0 | Status: DISCONTINUED | OUTPATIENT
Start: 2021-10-07 | End: 2021-10-23

## 2021-10-07 RX ORDER — SODIUM CHLORIDE 9 MG/ML
1000 INJECTION, SOLUTION INTRAVENOUS
Refills: 0 | Status: DISCONTINUED | OUTPATIENT
Start: 2021-10-07 | End: 2021-10-19

## 2021-10-07 RX ORDER — CEFAZOLIN SODIUM 1 G
1000 VIAL (EA) INJECTION EVERY 8 HOURS
Refills: 0 | Status: COMPLETED | OUTPATIENT
Start: 2021-10-07 | End: 2021-10-08

## 2021-10-07 RX ORDER — INSULIN LISPRO 100/ML
VIAL (ML) SUBCUTANEOUS EVERY 6 HOURS
Refills: 0 | Status: DISCONTINUED | OUTPATIENT
Start: 2021-10-07 | End: 2021-10-08

## 2021-10-07 RX ORDER — SODIUM CHLORIDE 9 MG/ML
1000 INJECTION INTRAMUSCULAR; INTRAVENOUS; SUBCUTANEOUS
Refills: 0 | Status: DISCONTINUED | OUTPATIENT
Start: 2021-10-07 | End: 2021-10-07

## 2021-10-07 RX ORDER — FENTANYL CITRATE 50 UG/ML
25 INJECTION INTRAVENOUS
Refills: 0 | Status: DISCONTINUED | OUTPATIENT
Start: 2021-10-07 | End: 2021-10-08

## 2021-10-07 RX ORDER — PANTOPRAZOLE SODIUM 20 MG/1
40 TABLET, DELAYED RELEASE ORAL
Refills: 0 | Status: DISCONTINUED | OUTPATIENT
Start: 2021-10-07 | End: 2021-10-23

## 2021-10-07 RX ORDER — FENTANYL CITRATE 50 UG/ML
25 INJECTION INTRAVENOUS ONCE
Refills: 0 | Status: DISCONTINUED | OUTPATIENT
Start: 2021-10-07 | End: 2021-10-07

## 2021-10-07 RX ORDER — ACETAMINOPHEN 500 MG
1000 TABLET ORAL EVERY 8 HOURS
Refills: 0 | Status: COMPLETED | OUTPATIENT
Start: 2021-10-07 | End: 2021-10-07

## 2021-10-07 RX ORDER — MORPHINE SULFATE 50 MG/1
2 CAPSULE, EXTENDED RELEASE ORAL ONCE
Refills: 0 | Status: DISCONTINUED | OUTPATIENT
Start: 2021-10-07 | End: 2021-10-07

## 2021-10-07 RX ORDER — FENTANYL CITRATE 50 UG/ML
50 INJECTION INTRAVENOUS
Refills: 0 | Status: DISCONTINUED | OUTPATIENT
Start: 2021-10-07 | End: 2021-10-08

## 2021-10-07 RX ADMIN — MORPHINE SULFATE 2 MILLIGRAM(S): 50 CAPSULE, EXTENDED RELEASE ORAL at 09:30

## 2021-10-07 RX ADMIN — Medication 4 MILLIGRAM(S): at 18:12

## 2021-10-07 RX ADMIN — FENTANYL CITRATE 25 MICROGRAM(S): 50 INJECTION INTRAVENOUS at 10:05

## 2021-10-07 RX ADMIN — Medication 1000 MILLIGRAM(S): at 07:10

## 2021-10-07 RX ADMIN — GABAPENTIN 300 MILLIGRAM(S): 400 CAPSULE ORAL at 21:47

## 2021-10-07 RX ADMIN — Medication 100 MILLIGRAM(S): at 21:46

## 2021-10-07 RX ADMIN — HYDROMORPHONE HYDROCHLORIDE 0.5 MILLIGRAM(S): 2 INJECTION INTRAMUSCULAR; INTRAVENOUS; SUBCUTANEOUS at 21:46

## 2021-10-07 RX ADMIN — Medication 100 MILLIGRAM(S): at 10:26

## 2021-10-07 RX ADMIN — FENTANYL CITRATE 25 MICROGRAM(S): 50 INJECTION INTRAVENOUS at 11:35

## 2021-10-07 RX ADMIN — PANTOPRAZOLE SODIUM 40 MILLIGRAM(S): 20 TABLET, DELAYED RELEASE ORAL at 06:34

## 2021-10-07 RX ADMIN — MORPHINE SULFATE 2 MILLIGRAM(S): 50 CAPSULE, EXTENDED RELEASE ORAL at 09:15

## 2021-10-07 RX ADMIN — METHOCARBAMOL 500 MILLIGRAM(S): 500 TABLET, FILM COATED ORAL at 06:34

## 2021-10-07 RX ADMIN — FENTANYL CITRATE 25 MICROGRAM(S): 50 INJECTION INTRAVENOUS at 20:00

## 2021-10-07 RX ADMIN — FENTANYL CITRATE 50 MICROGRAM(S): 50 INJECTION INTRAVENOUS at 22:05

## 2021-10-07 RX ADMIN — HYDROMORPHONE HYDROCHLORIDE 0.5 MILLIGRAM(S): 2 INJECTION INTRAMUSCULAR; INTRAVENOUS; SUBCUTANEOUS at 07:50

## 2021-10-07 RX ADMIN — FENTANYL CITRATE 25 MICROGRAM(S): 50 INJECTION INTRAVENOUS at 09:51

## 2021-10-07 RX ADMIN — Medication 400 MILLIGRAM(S): at 06:33

## 2021-10-07 RX ADMIN — Medication 4 MILLIGRAM(S): at 11:34

## 2021-10-07 RX ADMIN — FENTANYL CITRATE 50 MICROGRAM(S): 50 INJECTION INTRAVENOUS at 21:49

## 2021-10-07 RX ADMIN — FENTANYL CITRATE 25 MICROGRAM(S): 50 INJECTION INTRAVENOUS at 19:48

## 2021-10-07 RX ADMIN — SODIUM CHLORIDE 75 MILLILITER(S): 9 INJECTION INTRAMUSCULAR; INTRAVENOUS; SUBCUTANEOUS at 06:32

## 2021-10-07 RX ADMIN — HYDROMORPHONE HYDROCHLORIDE 0.5 MILLIGRAM(S): 2 INJECTION INTRAMUSCULAR; INTRAVENOUS; SUBCUTANEOUS at 21:33

## 2021-10-07 RX ADMIN — METHOCARBAMOL 500 MILLIGRAM(S): 500 TABLET, FILM COATED ORAL at 18:12

## 2021-10-07 RX ADMIN — Medication 4 MILLIGRAM(S): at 06:33

## 2021-10-07 RX ADMIN — HYDROMORPHONE HYDROCHLORIDE 0.5 MILLIGRAM(S): 2 INJECTION INTRAMUSCULAR; INTRAVENOUS; SUBCUTANEOUS at 08:08

## 2021-10-07 RX ADMIN — FENTANYL CITRATE 25 MICROGRAM(S): 50 INJECTION INTRAVENOUS at 11:50

## 2021-10-07 NOTE — H&P ADULT - NSHPLABSRESULTS_GEN_ALL_CORE
Vitals:  Vital Signs Last 24 Hrs  T(C): 36.6 (06 Oct 2021 23:04), Max: 36.6 (06 Oct 2021 15:43)  T(F): 97.8 (06 Oct 2021 23:04), Max: 97.9 (06 Oct 2021 15:43)  HR: 83 (06 Oct 2021 23:04) (64 - 83)  BP: 167/90 (06 Oct 2021 23:04) (167/90 - 183/92)  BP(mean): --  RR: 18 (06 Oct 2021 23:04) (18 - 18)  SpO2: 96% (06 Oct 2021 23:04) (96% - 96%)    I&O:  I&O's Summary      Labs:                         15.5   10.12 )-----------( 185      ( 06 Oct 2021 17:13 )             44.6       10-06    140  |  100  |  16.8  ----------------------------<  118<H>  3.9   |  20.0<L>  |  0.66    Ca    9.9      06 Oct 2021 17:13    TPro  7.5  /  Alb  4.7  /  TBili  2.1<H>  /  DBili  x   /  AST  26  /  ALT  32  /  AlkPhos  60  10-06      LIVER FUNCTIONS - ( 06 Oct 2021 17:13 )  Alb: 4.7 g/dL / Pro: 7.5 g/dL / ALK PHOS: 60 U/L / ALT: 32 U/L / AST: 26 U/L / GGT: x             PT/INR - ( 06 Oct 2021 17:13 )   PT: 12.7 sec;   INR: 1.10 ratio         PTT - ( 06 Oct 2021 17:13 )  PTT:34.7 sec      m< from: MR Thoracic Spine w/wo IV Cont (10.06.21 @ 22:36) >    Mixed signal intensity intradural, extramedullary lesion in the dorsal spinal canal extending from around T11 through the sacrum, with suggestion of T2 hypointense hemosiderin cap, most concerning for a hematoma comprised of blood of differing ages. This lesion exerts significant mass effect on the terminal spinal cord and conus medullaris, and although there is no evidence for solid or nodular masslike enhancement, there is a serpentine enhancing vessel situated within this lesion at the level of T12-L1, which appears to track into the right T12-L1 neural foramen and into the paravertebral space, most concerning for a spinal vascular malformation such as a dural arteriovenous fistula, especially given the location of findings described above.    Although no flow voids are seen in the cord surface, these may be compressed by mass effect due to hematoma and not detectable by MRI. Further workup with contrast-enhanced MRI versus CTA versus DSA of the spine is recommended to confirm diagnosis and identify site of fistula.    Less likely, these findings may represent atypical manifestation of a bleeding intradural neoplasm such as a cystic schwannoma.    On images through the cervical spine, there are thin subdural collections concerning for subdural effusions and/or subdural hematomas in the posterior fossa along the cerebellar hemispheres and tentorial leaflets, and associated dural enhancement, with dural enhancement extending into the cervical spine, concerning for intracranial hypotension. Additionally, there is suggestion of thin subdural collections/hematomas in the cervical spine with associateddiffuse dural enhancement.    Further workup with CT of the head followed by CTA of the head and neck is suggested.    Recommend emergent neuroIR consultation.    Full report to follow.    Findings discussed at length with AARTI Ernst and Dr. Uriarte on 10/6/2021 at 11:30 PM.    < end of copied text >

## 2021-10-07 NOTE — PROGRESS NOTE ADULT - ASSESSMENT
72 yo M with intradural extramedullary T1-Sacrum hematoma, s/p emergent decompressive thoracolumbar laminectomy 10/7/2021.  PMH of OVIDIO GATICA.    Plan:  neurochecks  MRI neuroaxis w/wo contrast + MRA  angio today  maintain MAP >65, -150  pain control: Dilaudid and Fentanyl PRN per regimen for now while NPO + muscle relaxants  cont Dexa per NSGy  NPO for angio   TTE, EKG  BM regimen as on opiates for pain  cont IV hydration while NPO  SCDs, hold chemoppx as fresh postop + intradural hematoma      Pt is critically ill and at high risk of rapid deterioration/death due to abovementioned conditions.   Still requires critical care interventions - ongoing frequent evaluations, interventions and management adjustment by the Attending and ICU team, - and included review of relevant history, clinical examination, review of data and images, discussion of treatment with the multidisciplinary team and any consultants involved in this patient’s care as well as family discussion.      72 yo M with intradural extramedullary T1-Sacrum hematoma, s/p emergent decompressive thoracolumbar laminectomy 10/7/2021.  PMH of .  GAVI.  H/o RBBB.    Plan:  neurochecks  MRI neuroaxis w/wo contrast + MRA  angio today  maintain MAP >65, -150  pain control: Dilaudid and Fentanyl PRN per regimen for now while NPO + muscle relaxants  cont Dexa per NSGy  NPO for angio   TTE, EKG  BM regimen as on opiates for pain  cont IV hydration while NPO  SCDs, hold chemoppx as fresh postop + intradural hematoma      Pt is critically ill and at high risk of rapid deterioration/death due to abovementioned conditions.   Still requires critical care interventions - ongoing frequent evaluations, interventions and management adjustment by the Attending and ICU team, - and included review of relevant history, clinical examination, review of data and images, discussion of treatment with the multidisciplinary team and any consultants involved in this patient’s care as well as family discussion.

## 2021-10-07 NOTE — H&P ADULT - ASSESSMENT
71m 911  with acute worsening of back pain while fishing.  Acute bilateral leg weakness and numbness.  RLE 1/5, LLE 2/5 after decadron in ER.  MRI revealed Intradural, extramedulary lesion T1-Sacrum concerning for hematoma causing spinal cord compression.    Serpentine vessel at T12-L1.  Concerning for AVM.      HPI:      Neuro:  	Q1 hour Neuro checks, Q1 hour Vitals  	HOB 30 degrees, Neck midline position  	Maintain normothermia, PO acetaminophen for temp>38 C or pain        	Neurosurgical Imaging Reviewed Requires angiogram of neuroaxis in Am.  Dr. Uriarte and Neurointerventional had a private conversation to discuss the patient  	Monitor wound  	Steroids: Consider steroids if concern for cord edema, will f/u with Nsx attending postoperatively   	Pain management & Sedation:  	Turn and Position Q2 - Bedrest  	  CV:  	SBP Goal to be established posteorpatively  	BP regimen: Labetalol PRN, Hydralazine PRN, nicardipine      Pulm:  	Supplemental O2 PRN to maintain Spo2>92%  	Chest PT, OOB, Pulmonary Toilet    GI:  	Nutrition: NPO for procedure  		  Gu:  	Vazquez   	I&O Q1 hour  	Monitor Electrolytes & Renal Function    Heme:  	Monitor H&H  	Chemical DVT prophylaxis:   		* Chemical DVT prophylaxis is contraindicated due to risk of bleeding  	Mechanical DVT Prophylaxis: Maintain B/L LE sequential compression devices  	  ID:  	Monitor WBC and Temperature      Endo  	Monitor BGL, maintain <180  	TIM   		100-150 no correction  		150-200  2units  		200-250  4units  		250-300	 6units  		300-350  8units  		350-400  10units  		400+	12units       Above plan to be discussed with Dr. Uriarte in Am      I have spent a total of [_60___] mins of nonconsecutive critical care time managing this patient with[ _Spinal Arteriovenous Malformation_____, _Spinal Cord Compression, _____, and ______].  This included review of relevant history, clinical examination, review of data and images, discussion of treatment with the multidisciplinary team and any consultants involved in this patient’s care as well as family discussion.     I affirm that this patient is critically ill and at high risk for sudden, fatal deterioration due to one or more of the above stated active issues. I managed/supervised life or organ support interventions that required frequent assessment. This time does not include bedside procedures that are documented separately.

## 2021-10-07 NOTE — H&P ADULT - NSHPPHYSICALEXAM_GEN_ALL_CORE
Neuro  * Mental Status:  GCS 15:  E(4), V(5), M(6).  Awake, alert, oriented to conversation.  * Cranial Nerves: Cnii-Cnxii grossly intact. PERRL, EOMI, tongue midline, no gaze deviation  * Motor: RUE 5/5, LUE 5/5, RLE 1/5, LLE 2/5  * Sensory: Right leg no sensation to light touch or to sharp, has cold temp sensation at midshin down to foot.  Left leg has intact light touch and sharp differentiation and temperature sensation  * Reflexes: A-reflexic on the right leg, 2+ plantar reflex.  No clonus, no babinksi bilateral  * Gait: Not assessed    Cardiovascular:  S1, S2 no murmurs appreciated.  Regular rate and rhythm.  Eyes: See neurologic examination with detailed examination of eyes.  ENT: No JVD, Trachea Midline.  No accessory muscle use  Respiratory: Clear to auscultation.  Can count to 30 out loud with a single breath  Gastrointestinal: Soft, nontender, nondistended.  Genitourinary: [ ] Vazquez, [ x ] No Vazquez.   Musculoskeletal: No muscle wasting noted, (See neuorlogic assessment for full muscle strength assessment) No pretibial edema appreciated, no appreciable calf tenderness.  Skin:  No pathologic skin lesions  Hematologic / Lymph / Immunologic: No bleeding from IV sites or wounds, No lymphadenopathy, No Hives or allergic type skin lesions

## 2021-10-07 NOTE — CHART NOTE - NSCHARTNOTEFT_GEN_A_CORE
Neurointerventional Surgery Post Procedure Note    Procedure: Selective Cerebral Angiography     Pre- Procedure Diagnosis: Post thoracolumbar laminectomy T10-L2 with evacuation of hematoma  Post- Procedure Diagnosis: Diagnostic Cerebral Angiogram and Spinal Angiogram from the level of T7-L2 with no vascular abnormalities    : Dr. Francis MD  Nurse Practitioner: Mirian Kevin AGACNP-BC  Nurse: CARRIE Carroll, CARRIE Oneal  Anesthesiologist: Dr. Leonel Garzon                                    Radiology Tech: Keanu Gonzalez RT, Lee Armenta RT    Sheath:  - 5 [place] long Sheath    I/Os: estimated blood loss less than 10cc,  IV fluids    1500cc,   Urine out put    cc,   Contrast: Omnipaque 240    366 cc    Vitals:BP  137/77 HR 68  Spo2 100 %    Preliminary Report:  Under  a  5 [place] long sheath via the right groin under MAC sedation  via left vertebral artery,  left interntal carotid artery, left external carotid artey, right vertebral artery, right internal carotid artery,  right external carotid artery, a selective cerebral angiography  was perofrmed and reveals  no vascular abnormality. A spinal angiogram was performed from the level of T7-L5 and revealed no vasular abnormalities . ( Official note to follow).    Patient tolerated procedure well.  Patient remains hemodynamically stable, no change in neurological status compared to baseline.  Results were discussed with patient, patient's family and Neurosurgery and neuro ICU.  Right groin sheath  was discontinued.   Hemostasis was obtained with approximately 15 minutes of manual compression.     No active bleeding, no hematoma, no ecchymosis.   DSD, quick clot and safeguard balloon dressing applied at 1625.  Patient transferred to ICU in stable condition.
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
## 2021-10-07 NOTE — H&P ADULT - NSHPREVIEWOFSYSTEMS_GEN_ALL_CORE
Constitutional:  Pain well controlled with PRNs, no fevers, chills or recent sick contact  Eyes: No double vision, no change in visual acuity, no blurry vision, no occular discharge  Neurologic:  no seizure reported, pain well controlled with PRN medications  Ears, nose, mouth throat:  No ottorrhea. No change in hearing, No anosmia, No oral lesions, No sore throat  Cardiovascular: No palpitations, no chest pain, no nocturnal or positional dyspnea.  Respiratory: No shortness of breath, No Cough  Gastrointestinal: No change in bowel habits, no change in appetite  Genitourinary: No Frequency, No Dysuria, no Hematuria  Musculoskeletal: No muscle wasting,   Psych: No changes in mood, Denies drug use, Denies history of psyciatric illness  Integumentary: Denies new skin lesions  Endocrine: Denies history of DM, denies history of thyroid disease, No heat or cold intolerance  Heme/Lymph: takes daily aspirin for heart health  Allergic / Immune: No active allergies unless otherwise specified in medical chart    All other systems reviewed and are negative

## 2021-10-07 NOTE — PROGRESS NOTE ADULT - SUBJECTIVE AND OBJECTIVE BOX
HPI:  71m 911  with acute worsening of back pain while fishing with friends this morning. Acute bilateral leg weakness and numbness.  RLE 1/5, LLE 2/5 after decadron in ER.  Vazquez placed in Er for urinary retention, 700cc in bladder  Initially there was concern for GBS given recent flu vaccination, but the clinical history of pain followed by acute weakness was not consistent.  MRI spine performed and evaluated while patient in Er- there was concern for intradural, extramedulary lesion from T1-Sacrum c/w hematoma.  Serpentine vessel seen at T12 to L1 concerning for vascular lesion.  Patient taken for emergent thoracolumbar laminectomy with Dr. Uriarte.  (07 Oct 2021 01:03)  PM of  on PO Mesalamine, HLD.    O/n events: as above. This am c/o severe back pain, dressing dry with minimal staining, no visible hematoma, acceptable drain output; responded to pain meds adjustment.  VS, labs, imaging reviewed.    Exam: cooperative, NAD.  AAOx4, FC, EO, face symmetric, EOMI, motor - BL UE 5/5, LLE 4/5 limited by back pain, RLE 1/5 - wiggles toes, sensation to LT and pain - hypesthesia below umbilical level on the R, anesthesia below groin level on the R.  CHest CTAB.  S1S2 present.  Abd soft, NT.  No peripheral swelling.  Back surgical wound under dressing - dry with minimal staining, no visible hematoma, acceptable drain output.71

## 2021-10-07 NOTE — BRIEF OPERATIVE NOTE - NSICDXBRIEFPROCEDURE_GEN_ALL_CORE_FT
PROCEDURES:  Laminectomy, spine, thoracolumbar, 3 or more levels 07-Oct-2021 05:58:02  Jailyn Shultz

## 2021-10-07 NOTE — H&P ADULT - HISTORY OF PRESENT ILLNESS
71m 911  with acute worsening of back pain while fishing with friends this morning. Acute bilateral leg weakness and numbness.  RLE 1/5, LLE 2/5 after decadron in ER.  Vazquez placed in Er for urinary retention, 700cc in bladder    Initially there was concern for GBS given recent flu vaccination, but the clinical history of pain followed by acute weakness was not consistent.  MRI spine performed and evaluated while patient in Er- there was concern for intradural, extramedulary lesion from T1-Sacrum c/w hematoma.  Serpentine vessel seen at T12 to L1 concerning for vascular lesion.  Patient taken for emergent thoracolumbar laminectomy with Dr. Uriarte.

## 2021-10-08 LAB
A1C WITH ESTIMATED AVERAGE GLUCOSE RESULT: 5.3 % — SIGNIFICANT CHANGE UP (ref 4–5.6)
ANION GAP SERPL CALC-SCNC: 14 MMOL/L — SIGNIFICANT CHANGE UP (ref 5–17)
BUN SERPL-MCNC: 23.7 MG/DL — HIGH (ref 8–20)
CALCIUM SERPL-MCNC: 8.1 MG/DL — LOW (ref 8.6–10.2)
CHLORIDE SERPL-SCNC: 105 MMOL/L — SIGNIFICANT CHANGE UP (ref 98–107)
CO2 SERPL-SCNC: 21 MMOL/L — LOW (ref 22–29)
COVID-19 SPIKE DOMAIN AB INTERP: POSITIVE
COVID-19 SPIKE DOMAIN ANTIBODY RESULT: >250 U/ML — HIGH
CREAT SERPL-MCNC: 0.9 MG/DL — SIGNIFICANT CHANGE UP (ref 0.5–1.3)
ESTIMATED AVERAGE GLUCOSE: 105 MG/DL — SIGNIFICANT CHANGE UP (ref 68–114)
GLUCOSE BLDC GLUCOMTR-MCNC: 145 MG/DL — HIGH (ref 70–99)
GLUCOSE BLDC GLUCOMTR-MCNC: 189 MG/DL — HIGH (ref 70–99)
GLUCOSE BLDC GLUCOMTR-MCNC: 192 MG/DL — HIGH (ref 70–99)
GLUCOSE SERPL-MCNC: 213 MG/DL — HIGH (ref 70–99)
HCT VFR BLD CALC: 35.1 % — LOW (ref 39–50)
HCV AB S/CO SERPL IA: 0.08 S/CO — SIGNIFICANT CHANGE UP (ref 0–0.99)
HCV AB SERPL-IMP: SIGNIFICANT CHANGE UP
HGB BLD-MCNC: 12 G/DL — LOW (ref 13–17)
MAGNESIUM SERPL-MCNC: 2.2 MG/DL — SIGNIFICANT CHANGE UP (ref 1.6–2.6)
MCHC RBC-ENTMCNC: 30.6 PG — SIGNIFICANT CHANGE UP (ref 27–34)
MCHC RBC-ENTMCNC: 34.2 GM/DL — SIGNIFICANT CHANGE UP (ref 32–36)
MCV RBC AUTO: 89.5 FL — SIGNIFICANT CHANGE UP (ref 80–100)
PHOSPHATE SERPL-MCNC: 2.6 MG/DL — SIGNIFICANT CHANGE UP (ref 2.4–4.7)
PLATELET # BLD AUTO: 267 K/UL — SIGNIFICANT CHANGE UP (ref 150–400)
POTASSIUM SERPL-MCNC: 4.1 MMOL/L — SIGNIFICANT CHANGE UP (ref 3.5–5.3)
POTASSIUM SERPL-SCNC: 4.1 MMOL/L — SIGNIFICANT CHANGE UP (ref 3.5–5.3)
RBC # BLD: 3.92 M/UL — LOW (ref 4.2–5.8)
RBC # FLD: 13.6 % — SIGNIFICANT CHANGE UP (ref 10.3–14.5)
SARS-COV-2 IGG+IGM SERPL QL IA: >250 U/ML — HIGH
SARS-COV-2 IGG+IGM SERPL QL IA: POSITIVE
SODIUM SERPL-SCNC: 140 MMOL/L — SIGNIFICANT CHANGE UP (ref 135–145)
SURGICAL PATHOLOGY STUDY: SIGNIFICANT CHANGE UP
WBC # BLD: 16.97 K/UL — HIGH (ref 3.8–10.5)
WBC # FLD AUTO: 16.97 K/UL — HIGH (ref 3.8–10.5)

## 2021-10-08 PROCEDURE — 72157 MRI CHEST SPINE W/O & W/DYE: CPT | Mod: 26

## 2021-10-08 PROCEDURE — 72158 MRI LUMBAR SPINE W/O & W/DYE: CPT | Mod: 26

## 2021-10-08 PROCEDURE — 70553 MRI BRAIN STEM W/O & W/DYE: CPT | Mod: 26

## 2021-10-08 PROCEDURE — 99291 CRITICAL CARE FIRST HOUR: CPT

## 2021-10-08 PROCEDURE — 70544 MR ANGIOGRAPHY HEAD W/O DYE: CPT | Mod: 26,59

## 2021-10-08 PROCEDURE — 70450 CT HEAD/BRAIN W/O DYE: CPT | Mod: 26

## 2021-10-08 RX ORDER — ENOXAPARIN SODIUM 100 MG/ML
40 INJECTION SUBCUTANEOUS DAILY
Refills: 0 | Status: DISCONTINUED | OUTPATIENT
Start: 2021-10-08 | End: 2021-10-08

## 2021-10-08 RX ORDER — ASCORBIC ACID 60 MG
500 TABLET,CHEWABLE ORAL DAILY
Refills: 0 | Status: DISCONTINUED | OUTPATIENT
Start: 2021-10-08 | End: 2021-10-23

## 2021-10-08 RX ORDER — INSULIN LISPRO 100/ML
VIAL (ML) SUBCUTANEOUS
Refills: 0 | Status: DISCONTINUED | OUTPATIENT
Start: 2021-10-08 | End: 2021-10-23

## 2021-10-08 RX ORDER — INFLUENZA VIRUS VACCINE 15; 15; 15; 15 UG/.5ML; UG/.5ML; UG/.5ML; UG/.5ML
0.5 SUSPENSION INTRAMUSCULAR ONCE
Refills: 0 | Status: COMPLETED | OUTPATIENT
Start: 2021-10-08 | End: 2021-10-08

## 2021-10-08 RX ORDER — DIPHENHYDRAMINE HCL 50 MG
25 CAPSULE ORAL EVERY 4 HOURS
Refills: 0 | Status: DISCONTINUED | OUTPATIENT
Start: 2021-10-08 | End: 2021-10-10

## 2021-10-08 RX ORDER — FENTANYL CITRATE 50 UG/ML
25 INJECTION INTRAVENOUS
Refills: 0 | Status: DISCONTINUED | OUTPATIENT
Start: 2021-10-08 | End: 2021-10-09

## 2021-10-08 RX ORDER — FENTANYL CITRATE 50 UG/ML
50 INJECTION INTRAVENOUS
Refills: 0 | Status: DISCONTINUED | OUTPATIENT
Start: 2021-10-08 | End: 2021-10-09

## 2021-10-08 RX ORDER — HYDROMORPHONE HYDROCHLORIDE 2 MG/ML
1 INJECTION INTRAMUSCULAR; INTRAVENOUS; SUBCUTANEOUS EVERY 4 HOURS
Refills: 0 | Status: DISCONTINUED | OUTPATIENT
Start: 2021-10-08 | End: 2021-10-09

## 2021-10-08 RX ORDER — METOPROLOL TARTRATE 50 MG
5 TABLET ORAL EVERY 4 HOURS
Refills: 0 | Status: DISCONTINUED | OUTPATIENT
Start: 2021-10-08 | End: 2021-10-08

## 2021-10-08 RX ORDER — SENNA PLUS 8.6 MG/1
2 TABLET ORAL AT BEDTIME
Refills: 0 | Status: DISCONTINUED | OUTPATIENT
Start: 2021-10-08 | End: 2021-10-14

## 2021-10-08 RX ORDER — DIAZEPAM 5 MG
5 TABLET ORAL ONCE
Refills: 0 | Status: DISCONTINUED | OUTPATIENT
Start: 2021-10-08 | End: 2021-10-08

## 2021-10-08 RX ORDER — METHOCARBAMOL 500 MG/1
750 TABLET, FILM COATED ORAL EVERY 8 HOURS
Refills: 0 | Status: DISCONTINUED | OUTPATIENT
Start: 2021-10-08 | End: 2021-10-23

## 2021-10-08 RX ORDER — DIAZEPAM 5 MG
2 TABLET ORAL EVERY 4 HOURS
Refills: 0 | Status: DISCONTINUED | OUTPATIENT
Start: 2021-10-08 | End: 2021-10-10

## 2021-10-08 RX ORDER — NICARDIPINE HYDROCHLORIDE 30 MG/1
5 CAPSULE, EXTENDED RELEASE ORAL
Qty: 40 | Refills: 0 | Status: DISCONTINUED | OUTPATIENT
Start: 2021-10-08 | End: 2021-10-10

## 2021-10-08 RX ADMIN — FENTANYL CITRATE 25 MICROGRAM(S): 50 INJECTION INTRAVENOUS at 10:55

## 2021-10-08 RX ADMIN — SENNA PLUS 2 TABLET(S): 8.6 TABLET ORAL at 21:48

## 2021-10-08 RX ADMIN — Medication 4 MILLIGRAM(S): at 00:12

## 2021-10-08 RX ADMIN — HYDROMORPHONE HYDROCHLORIDE 1 MILLIGRAM(S): 2 INJECTION INTRAMUSCULAR; INTRAVENOUS; SUBCUTANEOUS at 21:46

## 2021-10-08 RX ADMIN — HYDROMORPHONE HYDROCHLORIDE 1 MILLIGRAM(S): 2 INJECTION INTRAMUSCULAR; INTRAVENOUS; SUBCUTANEOUS at 05:05

## 2021-10-08 RX ADMIN — Medication 2: at 08:24

## 2021-10-08 RX ADMIN — FENTANYL CITRATE 25 MICROGRAM(S): 50 INJECTION INTRAVENOUS at 00:26

## 2021-10-08 RX ADMIN — METHOCARBAMOL 500 MILLIGRAM(S): 500 TABLET, FILM COATED ORAL at 06:10

## 2021-10-08 RX ADMIN — Medication 100 MILLIGRAM(S): at 06:10

## 2021-10-08 RX ADMIN — Medication 2: at 11:36

## 2021-10-08 RX ADMIN — METHOCARBAMOL 500 MILLIGRAM(S): 500 TABLET, FILM COATED ORAL at 11:36

## 2021-10-08 RX ADMIN — HYDROMORPHONE HYDROCHLORIDE 1 MILLIGRAM(S): 2 INJECTION INTRAMUSCULAR; INTRAVENOUS; SUBCUTANEOUS at 04:40

## 2021-10-08 RX ADMIN — METHOCARBAMOL 750 MILLIGRAM(S): 500 TABLET, FILM COATED ORAL at 21:45

## 2021-10-08 RX ADMIN — GABAPENTIN 300 MILLIGRAM(S): 400 CAPSULE ORAL at 21:45

## 2021-10-08 RX ADMIN — Medication 5 MILLIGRAM(S): at 04:27

## 2021-10-08 RX ADMIN — Medication 5 MILLIGRAM(S): at 13:24

## 2021-10-08 RX ADMIN — Medication 25 MILLIGRAM(S): at 21:46

## 2021-10-08 RX ADMIN — Medication 4 MILLIGRAM(S): at 17:57

## 2021-10-08 RX ADMIN — NICARDIPINE HYDROCHLORIDE 25 MG/HR: 30 CAPSULE, EXTENDED RELEASE ORAL at 02:36

## 2021-10-08 RX ADMIN — HYDROMORPHONE HYDROCHLORIDE 1 MILLIGRAM(S): 2 INJECTION INTRAMUSCULAR; INTRAVENOUS; SUBCUTANEOUS at 13:45

## 2021-10-08 RX ADMIN — Medication 4 MILLIGRAM(S): at 06:11

## 2021-10-08 RX ADMIN — ENOXAPARIN SODIUM 40 MILLIGRAM(S): 100 INJECTION SUBCUTANEOUS at 11:37

## 2021-10-08 RX ADMIN — PANTOPRAZOLE SODIUM 40 MILLIGRAM(S): 20 TABLET, DELAYED RELEASE ORAL at 06:09

## 2021-10-08 RX ADMIN — Medication 4 MILLIGRAM(S): at 11:38

## 2021-10-08 RX ADMIN — FENTANYL CITRATE 25 MICROGRAM(S): 50 INJECTION INTRAVENOUS at 10:40

## 2021-10-08 RX ADMIN — GABAPENTIN 300 MILLIGRAM(S): 400 CAPSULE ORAL at 06:09

## 2021-10-08 RX ADMIN — METHOCARBAMOL 500 MILLIGRAM(S): 500 TABLET, FILM COATED ORAL at 00:17

## 2021-10-08 RX ADMIN — SODIUM CHLORIDE 75 MILLILITER(S): 9 INJECTION INTRAMUSCULAR; INTRAVENOUS; SUBCUTANEOUS at 02:36

## 2021-10-08 RX ADMIN — Medication 25 MILLIGRAM(S): at 12:28

## 2021-10-08 RX ADMIN — FENTANYL CITRATE 25 MICROGRAM(S): 50 INJECTION INTRAVENOUS at 00:43

## 2021-10-08 RX ADMIN — HYDROMORPHONE HYDROCHLORIDE 1 MILLIGRAM(S): 2 INJECTION INTRAMUSCULAR; INTRAVENOUS; SUBCUTANEOUS at 13:25

## 2021-10-08 RX ADMIN — GABAPENTIN 300 MILLIGRAM(S): 400 CAPSULE ORAL at 12:03

## 2021-10-08 RX ADMIN — HYDROMORPHONE HYDROCHLORIDE 1 MILLIGRAM(S): 2 INJECTION INTRAMUSCULAR; INTRAVENOUS; SUBCUTANEOUS at 22:00

## 2021-10-08 NOTE — DIETITIAN INITIAL EVALUATION ADULT. - PERTINENT MEDS FT
MEDICATIONS  (STANDING):  dexAMETHasone  Injectable 4 milliGRAM(s) IV Push every 6 hours  dextrose 40% Gel 15 Gram(s) Oral once  dextrose 5%. 1000 milliLiter(s) (50 mL/Hr) IV Continuous <Continuous>  dextrose 5%. 1000 milliLiter(s) (100 mL/Hr) IV Continuous <Continuous>  dextrose 50% Injectable 25 Gram(s) IV Push once  dextrose 50% Injectable 12.5 Gram(s) IV Push once  dextrose 50% Injectable 25 Gram(s) IV Push once  enoxaparin Injectable 40 milliGRAM(s) SubCutaneous daily  gabapentin 300 milliGRAM(s) Oral every 8 hours  glucagon  Injectable 1 milliGRAM(s) IntraMuscular once  influenza   Vaccine 0.5 milliLiter(s) IntraMuscular once  insulin lispro (ADMELOG) corrective regimen sliding scale   SubCutaneous three times a day before meals  methocarbamol 750 milliGRAM(s) Oral every 8 hours  niCARdipine Infusion 5 mG/Hr (25 mL/Hr) IV Continuous <Continuous>  pantoprazole    Tablet 40 milliGRAM(s) Oral before breakfast  sodium chloride 0.9%. 1000 milliLiter(s) (75 mL/Hr) IV Continuous <Continuous>    MEDICATIONS  (PRN):  diazepam  Injectable 2 milliGRAM(s) IV Push every 4 hours PRN severe spasm  diphenhydrAMINE 25 milliGRAM(s) Oral every 4 hours PRN Rash and/or Itching  fentaNYL    Injectable 25 MICROGram(s) IV Push every 2 hours PRN Severe Pain (7 - 10)  fentaNYL    Injectable 50 MICROGram(s) IV Push every 2 hours PRN Breakthrough pain  HYDROmorphone  Injectable 1 milliGRAM(s) IV Push every 4 hours PRN Moderate Pain (4 - 6)  ondansetron Injectable 4 milliGRAM(s) IV Push every 6 hours PRN Nausea and/or Vomiting

## 2021-10-08 NOTE — PROGRESS NOTE ADULT - SUBJECTIVE AND OBJECTIVE BOX
HPI:  71m 911  with acute worsening of back pain while fishing with friends this morning. Acute bilateral leg weakness and numbness.  RLE 1/5, LLE 2/5 after decadron in ER.  Vazquez placed in Er for urinary retention, 700cc in bladder  Initially there was concern for GBS given recent flu vaccination, but the clinical history of pain followed by acute weakness was not consistent.  MRI spine performed and evaluated while patient in Er- there was concern for intradural, extramedulary lesion from T1-Sacrum c/w hematoma.  Serpentine vessel seen at T12 to L1 concerning for vascular lesion.  Patient taken for emergent thoracolumbar laminectomy with Dr. Uriarte.  (07 Oct 2021 01:03)  PM of  on PO Mesalamine, HLD.    O/n events: episode of severe back pain, responded to ordered pain regimen.  VS, labs, imaging reviewed.    Exam: cooperative, NAD.  AAOx4, FC, EO, face symmetric, EOMI, motor - BL UE 5/5, LLE 4/5 limited by back pain, improved in RLE 2/5 - minimally bends knee, sensation to LT and pain - hypesthesia below umbilical level on the R, anesthesia below groin level on the R.  CHest CTAB.  S1S2 present.  Abd soft, NT.  No peripheral swelling.  Back surgical wound under dressing - dry with minimal staining, no visible hematoma, acceptable drain output.

## 2021-10-08 NOTE — PROGRESS NOTE ADULT - ASSESSMENT
Assessment   72 y/o male found to have intradural hemorrhage s/p t10-l12 laminectomy, evac. of hemorrhage pod 2, s/p negative cerebral angiogram pod 1.    Plan  - primary care per NSICU team  - q1 neuro checks  - MAP >80  - SCDs for dvt ppx  - repeat spinal angiogram in 10-14 days.  - signing off for now

## 2021-10-08 NOTE — PROGRESS NOTE ADULT - ASSESSMENT
72 yo M with intradural extramedullary T1-Sacrum hematoma, s/p emergent decompressive thoracolumbar laminectomy 10/7/2021. DSA negative for vascular pathology.  PMH of .  GAVI.  H/o RBBB.    Plan:  neurochecks  MRI neuroaxis w/wo contrast + MRA, pending  maintain MAP  for 3 more days  pain control: Dilaudid and Fentanyl PRN per regimen + muscle relaxants; start switching to PO in am  cont Dexa per NSGy  diet as tolerated   BM regimen as on opiates for pain  cont IV hydration while poor PO intake  SCDs, start SQL  PT      Pt is critically ill and at high risk of rapid deterioration/death due to abovementioned conditions.   Still requires critical care interventions - ongoing frequent evaluations, interventions and management adjustment by the Attending and ICU team, - and included review of relevant history, clinical examination, review of data and images, discussion of treatment with the multidisciplinary team and any consultants involved in this patient’s care as well as family discussion.      72 yo M with intradural extramedullary T1-Sacrum hematoma, s/p emergent decompressive thoracolumbar laminectomy 10/7/2021. DSA negative for vascular pathology.  PMH of .  HLD.  H/o RBBB.    Plan:  neurochecks  MRI neuroaxis w/wo contrast + MRA, pending  maintain MAP  for 3 more days  pain control: Dilaudid and Fentanyl PRN per regimen + muscle relaxants; start switching to PO in am  cont Dexa per NSGy  diet as tolerated   BM regimen as on opiates for pain  GI involvement for UC maintenance therapy choice  cont IV hydration while poor PO intake  SCDs, start SQL  PT      Pt is critically ill and at high risk of rapid deterioration/death due to abovementioned conditions.   Still requires critical care interventions - ongoing frequent evaluations, interventions and management adjustment by the Attending and ICU team, - and included review of relevant history, clinical examination, review of data and images, discussion of treatment with the multidisciplinary team and any consultants involved in this patient’s care as well as family discussion.

## 2021-10-08 NOTE — DIETITIAN INITIAL EVALUATION ADULT. - PERTINENT LABORATORY DATA
10-08 Na140 mmol/L Glu 213 mg/dL<H> K+ 4.1 mmol/L Cr  0.90 mg/dL BUN 23.7 mg/dL<H> Phos 2.6 mg/dL Alb n/a   PAB n/a

## 2021-10-08 NOTE — DIETITIAN INITIAL EVALUATION ADULT. - OTHER INFO
71 year old male PMH of colitis, vertigo, and high cholesterol presents with acute worsening of back pain while fishing. Acute bilateral leg weakness and numbness. MRI revealed Intradural, extramedullary lesion T1-Sacrum concerning for hematoma. Now s/p T10-L2 laminectomy. Cerebral angiogram negative. Attempted to interview, pt sleeping. Currently on a regular diet, noted with fair po intake today. Aware multiple MRIs pending at this time. RD to follow up.

## 2021-10-08 NOTE — PROGRESS NOTE ADULT - SUBJECTIVE AND OBJECTIVE BOX
HPI:  71m 911  with acute worsening of back pain while fishing with friends this morning. Acute bilateral leg weakness and numbness.  RLE 1/5, LLE 2/5 after decadron in ER.  Vazquez placed in Er for urinary retention, 700cc in bladder    Initially there was concern for GBS given recent flu vaccination, but the clinical history of pain followed by acute weakness was not consistent.  MRI spine performed and evaluated while patient in Er- there was concern for intradural, extramedulary lesion from T1-Sacrum c/w hematoma.  Serpentine vessel seen at T12 to L1 concerning for vascular lesion.  Patient taken for emergent thoracolumbar laminectomy with Dr. Uriarte.   (07 Oct 2021 01:03)      Interval History:  24 Hour Events: Angio negative    1. Patient's Neurologic Exam improved.  Right leg 2/5 in all muscle groups,  Left leg 4/5 in all muscle groups.    2. Patient's Hemodynamic's maintained within stated goal from neurosurgery MAP >80, periods of htn which responded to nicardipine, tachycardia responded to pain medications and lopressor    3. Patient's Respiratory status is: adequate    4. Patient is pending: Patient is pending further neurologic recovery, further neurologic, respiratory, and hemodynamic monitoring & management in the ICU.     5. Dispo: ICU for now, downgrade when clinically stable.         Physical Exam:  Constitutional: NAD    * Mental Status:  GCS 15:  E(4), V(5), M(6).  Awake, alert, oriented to conversation.  * Cranial Nerves: Cnii-Cnxii grossly intact. PERRL, EOMI, tongue midline, no gaze deviation  * Motor: RUE 5/5, LUE 5/5, RLE 2/5, LLE 4/5  * Sensory: Right leg no sensation to light touch or to sharp, has cold temp sensation at midshin down to foot.  Left leg has intact light touch and sharp differentiation and temperature sensation  * Reflexes: A-reflexic on the right leg, 2+ plantar reflex.  No clonus, no babinksi bilateral  * Gait: Not assessed    Vitals:  Vital Signs Last 24 Hrs  T(C): 37.4 (08 Oct 2021 04:40), Max: 37.7 (07 Oct 2021 19:30)  T(F): 99.3 (08 Oct 2021 04:40), Max: 99.9 (07 Oct 2021 19:30)  HR: 120 (08 Oct 2021 03:15) (59 - 120)  BP: 153/72 (08 Oct 2021 03:15) (133/70 - 206/91)  BP(mean): 94 (08 Oct 2021 03:15) (86 - 124)  RR: 14 (08 Oct 2021 03:15) (6 - 25)  SpO2: 97% (08 Oct 2021 03:15) (94% - 100%)    I&O:  I&O's Summary    06 Oct 2021 07:01  -  07 Oct 2021 07:00  --------------------------------------------------------  IN: 250 mL / OUT: 150 mL / NET: 100 mL    07 Oct 2021 07:01  -  08 Oct 2021 05:30  --------------------------------------------------------  IN: 1337.5 mL / OUT: 1505 mL / NET: -167.5 mL        Labs & Radiology:                        15.5   10.12 )-----------( 185      ( 06 Oct 2021 17:13 )             44.6       10-08    140  |  105  |  23.7<H>  ----------------------------<  213<H>  4.1   |  21.0<L>  |  0.90    Ca    8.1<L>      08 Oct 2021 04:54  Phos  2.6     10-08  Mg     2.2     10-08    TPro  7.5  /  Alb  4.7  /  TBili  2.1<H>  /  DBili  x   /  AST  26  /  ALT  32  /  AlkPhos  60  10-06      LIVER FUNCTIONS - ( 06 Oct 2021 17:13 )  Alb: 4.7 g/dL / Pro: 7.5 g/dL / ALK PHOS: 60 U/L / ALT: 32 U/L / AST: 26 U/L / GGT: x                   PT/INR - ( 06 Oct 2021 17:13 )   PT: 12.7 sec;   INR: 1.10 ratio         PTT - ( 06 Oct 2021 17:13 )  PTT:34.7 sec        ABG - ( 07 Oct 2021 13:55 )  pH, Arterial: 7.330 pH, Blood: x     /  pCO2: 45    /  pO2: 396   / HCO3: 24    / Base Excess: -2.2  /  SaO2: 100.0               CAPILLARY BLOOD GLUCOSE      POCT Blood Glucose.: 145 mg/dL (08 Oct 2021 00:12)  POCT Blood Glucose.: 143 mg/dL (07 Oct 2021 11:27)  POCT Blood Glucose.: 142 mg/dL (07 Oct 2021 07:53)      Neurosurgery Imaging:      Medications:  MEDICATIONS  (STANDING):  ceFAZolin   IVPB 1000 milliGRAM(s) IV Intermittent every 8 hours  dexAMETHasone  Injectable 4 milliGRAM(s) IV Push every 6 hours  dextrose 40% Gel 15 Gram(s) Oral once  dextrose 5%. 1000 milliLiter(s) (50 mL/Hr) IV Continuous <Continuous>  dextrose 5%. 1000 milliLiter(s) (100 mL/Hr) IV Continuous <Continuous>  dextrose 50% Injectable 25 Gram(s) IV Push once  dextrose 50% Injectable 12.5 Gram(s) IV Push once  dextrose 50% Injectable 25 Gram(s) IV Push once  gabapentin 300 milliGRAM(s) Oral every 8 hours  glucagon  Injectable 1 milliGRAM(s) IntraMuscular once  insulin lispro (ADMELOG) corrective regimen sliding scale   SubCutaneous every 6 hours  methocarbamol 500 milliGRAM(s) Oral every 6 hours  niCARdipine Infusion 5 mG/Hr (25 mL/Hr) IV Continuous <Continuous>  pantoprazole    Tablet 40 milliGRAM(s) Oral before breakfast  sodium chloride 0.9%. 1000 milliLiter(s) (75 mL/Hr) IV Continuous <Continuous>    MEDICATIONS  (PRN):  fentaNYL    Injectable 25 MICROGram(s) IV Push every 2 hours PRN Severe Pain (7 - 10)  fentaNYL    Injectable 50 MICROGram(s) IV Push every 2 hours PRN breakthrough pain  HYDROmorphone  Injectable 1 milliGRAM(s) IV Push every 3 hours PRN Severe Pain (7 - 10)  metoprolol tartrate Injectable 5 milliGRAM(s) IV Push every 4 hours PRN Heart Rate >120  ondansetron Injectable 4 milliGRAM(s) IV Push every 6 hours PRN Nausea and/or Vomiting      Assessment:  71m 911  with acute worsening of back pain while fishing.  Acute bilateral leg weakness and numbness.  RLE 1/5, LLE 2/5 after decadron in ER.  MRI revealed Intradural, extramedulary lesion T1-Sacrum concerning for hematoma.  POD#1 s/p T10-L2 laminectomy.  Cerebral angiogram negative    Continue Neuro Checks  Maintain BP within desired range MAP>80, clarify with NCCU in AM- goals in documentation are discordant   MRI in AM  Continue diet  Medical care per Neuro ICU  No acute neurosurgical intervention is indicated at this time, will continue to follow

## 2021-10-08 NOTE — PROGRESS NOTE ADULT - SUBJECTIVE AND OBJECTIVE BOX
HPI:  71m 911  with acute worsening of back pain while fishing with friends this morning. Acute bilateral leg weakness and numbness.  RLE 1/5, LLE 2/5 after decadron in ER.  Vazquez placed in Er for urinary retention, 700cc in bladder    Initially there was concern for GBS given recent flu vaccination, but the clinical history of pain followed by acute weakness was not consistent.  MRI spine performed and evaluated while patient in Er- there was concern for intradural, extramedulary lesion from T1-Sacrum c/w hematoma.  Serpentine vessel seen at T12 to L1 concerning for vascular lesion.  Patient taken for emergent thoracolumbar laminectomy with Dr. Uriarte.   (07 Oct 2021 01:03)      INTERVAL HPI/OVERNIGHT EVENTS:  Patient in NSICU stable, complaining of back pain.    Vital Signs Last 24 Hrs  T(C): 36.9 (08 Oct 2021 15:39), Max: 37.7 (07 Oct 2021 19:30)  T(F): 98.4 (08 Oct 2021 15:39), Max: 99.9 (07 Oct 2021 19:30)  HR: 102 (08 Oct 2021 13:00) (76 - 127)  BP: 153/82 (08 Oct 2021 13:00) (124/67 - 206/91)  BP(mean): 101 (08 Oct 2021 13:00) (85 - 124)  RR: 17 (08 Oct 2021 13:00) (0 - 25)  SpO2: 97% (08 Oct 2021 13:00) (94% - 100%)    PHYSICAL EXAM:  GENERAL: NAD, well-groomed, well-developed  TREVOR COMA SCORE: E4 V5 M6 =15  MENTAL STATUS: AAO x3; Awake, Opens eyes spontaneously. Appropriately conversant without aphasia. following simple commands  CRANIAL NERVES: PEERL Visual acuity normal for age, visual fields full to confrontation, PERRL. EOMI without nystagmus.   MOTOR: RUE LUE 5/5, LLE 4/5 RLE 2/5  SENSATION: no sensation from R groin down.    LABS:                        12.0   16.97 )-----------( 267      ( 08 Oct 2021 04:54 )             35.1     10-08    140  |  105  |  23.7<H>  ----------------------------<  213<H>  4.1   |  21.0<L>  |  0.90    Ca    8.1<L>      08 Oct 2021 04:54  Phos  2.6     10-08  Mg     2.2     10-08    TPro  7.5  /  Alb  4.7  /  TBili  2.1<H>  /  DBili  x   /  AST  26  /  ALT  32  /  AlkPhos  60  10-06    PT/INR - ( 06 Oct 2021 17:13 )   PT: 12.7 sec;   INR: 1.10 ratio         PTT - ( 06 Oct 2021 17:13 )  PTT:34.7 sec      10-07 @ 07:01  -  10-08 @ 07:00  --------------------------------------------------------  IN: 1712.5 mL / OUT: 1665 mL / NET: 47.5 mL    10-08 @ 07:01  -  10-08 @ 15:57  --------------------------------------------------------  IN: 775 mL / OUT: 305 mL / NET: 470 mL        RADIOLOGY & ADDITIONAL TESTS:  < from: MR Cervical Spine w/wo IV Cont (10.06.21 @ 22:36) >    Impression:    1) Mixed signal intensity intradural, extramedullary lesion in the dorsal spinal canal extending from around T11 through the sacrum, with suggestion of T2 hypointense hemosiderin cap, most concerning for a hematoma comprised of blood of differing ages.    2) Diffuse subarachnoid hemorrhage throughout the thoracic spine.    3) Ventral epidural enhancement along the posterior clivus and upper cervical spine. Circumferential epidural enhancement throughout the cervical spine C1/C2-C7/T1. Subdural effusions and/or subdural hematomas in the posterior fossa along the cerebellar hemispheres and tentorial leaflets, and associated dural enhancement, with dural enhancement extending into the cervical spine, concerning for intracranial hypotension.    Preliminary report provided by MALIHA JARRELL MD; Attending Radiologist. who discussed at length with AARTI Ernst and Dr. Uriarte on 10/6/2021 at 11:30 PM.    --- End of Report ---    < end of copied text >          CAPRINI SCORE [CLOT]:  Patient has an estimated Caprini score of greater than 5.  However, the patient's unique clinical situation will be addressed in an individual manner to determine appropriate anticoagulation treatment, if any. HPI:  71m 911  with acute worsening of back pain while fishing with friends this morning. Acute bilateral leg weakness and numbness.  RLE 1/5, LLE 2/5 after decadron in ER.  Vazquez placed in Er for urinary retention, 700cc in bladder    Initially there was concern for GBS given recent flu vaccination, but the clinical history of pain followed by acute weakness was not consistent.  MRI spine performed and evaluated while patient in Er- there was concern for intradural, extramedulary lesion from T1-Sacrum c/w hematoma.  Serpentine vessel seen at T12 to L1 concerning for vascular lesion.  Patient taken for emergent thoracolumbar laminectomy with Dr. Uriarte.   (07 Oct 2021 01:03)      INTERVAL HPI/OVERNIGHT EVENTS:  Patient in NSICU stable, complaining of back pain.    Vital Signs Last 24 Hrs  T(C): 36.9 (08 Oct 2021 15:39), Max: 37.7 (07 Oct 2021 19:30)  T(F): 98.4 (08 Oct 2021 15:39), Max: 99.9 (07 Oct 2021 19:30)  HR: 102 (08 Oct 2021 13:00) (76 - 127)  BP: 153/82 (08 Oct 2021 13:00) (124/67 - 206/91)  BP(mean): 101 (08 Oct 2021 13:00) (85 - 124)  RR: 17 (08 Oct 2021 13:00) (0 - 25)  SpO2: 97% (08 Oct 2021 13:00) (94% - 100%)    PHYSICAL EXAM:  GENERAL: NAD, well-groomed, well-developed  WOUND: R groin access site C/D/I without hematoma, ecchymosis, tenderness on palpable  TREVOR COMA SCORE: E4 V5 M6 =15  MENTAL STATUS: AAO x3; Awake, Opens eyes spontaneously. Appropriately conversant without aphasia. following simple commands  CRANIAL NERVES: PEERL Visual acuity normal for age, visual fields full to confrontation, PERRL. EOMI without nystagmus.   MOTOR: RUE LUE 5/5, LLE 4/5 RLE 2/5  SENSATION: no sensation from R groin down.    LABS:                        12.0   16.97 )-----------( 267      ( 08 Oct 2021 04:54 )             35.1     10-08    140  |  105  |  23.7<H>  ----------------------------<  213<H>  4.1   |  21.0<L>  |  0.90    Ca    8.1<L>      08 Oct 2021 04:54  Phos  2.6     10-08  Mg     2.2     10-08    TPro  7.5  /  Alb  4.7  /  TBili  2.1<H>  /  DBili  x   /  AST  26  /  ALT  32  /  AlkPhos  60  10-06    PT/INR - ( 06 Oct 2021 17:13 )   PT: 12.7 sec;   INR: 1.10 ratio         PTT - ( 06 Oct 2021 17:13 )  PTT:34.7 sec      10-07 @ 07:01  -  10-08 @ 07:00  --------------------------------------------------------  IN: 1712.5 mL / OUT: 1665 mL / NET: 47.5 mL    10-08 @ 07:01  -  10-08 @ 15:57  --------------------------------------------------------  IN: 775 mL / OUT: 305 mL / NET: 470 mL        RADIOLOGY & ADDITIONAL TESTS:  < from: MR Cervical Spine w/wo IV Cont (10.06.21 @ 22:36) >    Impression:    1) Mixed signal intensity intradural, extramedullary lesion in the dorsal spinal canal extending from around T11 through the sacrum, with suggestion of T2 hypointense hemosiderin cap, most concerning for a hematoma comprised of blood of differing ages.    2) Diffuse subarachnoid hemorrhage throughout the thoracic spine.    3) Ventral epidural enhancement along the posterior clivus and upper cervical spine. Circumferential epidural enhancement throughout the cervical spine C1/C2-C7/T1. Subdural effusions and/or subdural hematomas in the posterior fossa along the cerebellar hemispheres and tentorial leaflets, and associated dural enhancement, with dural enhancement extending into the cervical spine, concerning for intracranial hypotension.    Preliminary report provided by MALIHA JARRELL MD; Attending Radiologist. who discussed at length with AARTI Ernst and Dr. Uriarte on 10/6/2021 at 11:30 PM.    --- End of Report ---    < end of copied text >          CAPRINI SCORE [CLOT]:  Patient has an estimated Caprini score of greater than 5.  However, the patient's unique clinical situation will be addressed in an individual manner to determine appropriate anticoagulation treatment, if any.

## 2021-10-09 LAB
ANION GAP SERPL CALC-SCNC: 15 MMOL/L — SIGNIFICANT CHANGE UP (ref 5–17)
BUN SERPL-MCNC: 29.3 MG/DL — HIGH (ref 8–20)
CALCIUM SERPL-MCNC: 8.4 MG/DL — LOW (ref 8.6–10.2)
CHLORIDE SERPL-SCNC: 104 MMOL/L — SIGNIFICANT CHANGE UP (ref 98–107)
CO2 SERPL-SCNC: 21 MMOL/L — LOW (ref 22–29)
CREAT SERPL-MCNC: 0.6 MG/DL — SIGNIFICANT CHANGE UP (ref 0.5–1.3)
CRP SERPL-MCNC: <4 MG/L — SIGNIFICANT CHANGE UP
ERYTHROCYTE [SEDIMENTATION RATE] IN BLOOD: 23 MM/HR — HIGH (ref 0–20)
GLUCOSE BLDC GLUCOMTR-MCNC: 162 MG/DL — HIGH (ref 70–99)
GLUCOSE BLDC GLUCOMTR-MCNC: 169 MG/DL — HIGH (ref 70–99)
GLUCOSE BLDC GLUCOMTR-MCNC: 173 MG/DL — HIGH (ref 70–99)
GLUCOSE BLDC GLUCOMTR-MCNC: 180 MG/DL — HIGH (ref 70–99)
GLUCOSE SERPL-MCNC: 181 MG/DL — HIGH (ref 70–99)
HAV IGM SER-ACNC: SIGNIFICANT CHANGE UP
HBV CORE IGM SER-ACNC: SIGNIFICANT CHANGE UP
HBV SURFACE AG SER-ACNC: SIGNIFICANT CHANGE UP
HCT VFR BLD CALC: 28.5 % — LOW (ref 39–50)
HCT VFR BLD CALC: 29.7 % — LOW (ref 39–50)
HCV AB S/CO SERPL IA: 0.07 S/CO — SIGNIFICANT CHANGE UP (ref 0–0.99)
HCV AB SERPL-IMP: SIGNIFICANT CHANGE UP
HCYS SERPL-MCNC: 12.2 UMOL/L — SIGNIFICANT CHANGE UP
HGB BLD-MCNC: 10 G/DL — LOW (ref 13–17)
HGB BLD-MCNC: 9.6 G/DL — LOW (ref 13–17)
MAGNESIUM SERPL-MCNC: 2.5 MG/DL — SIGNIFICANT CHANGE UP (ref 1.6–2.6)
MCHC RBC-ENTMCNC: 29.9 PG — SIGNIFICANT CHANGE UP (ref 27–34)
MCHC RBC-ENTMCNC: 30.3 PG — SIGNIFICANT CHANGE UP (ref 27–34)
MCHC RBC-ENTMCNC: 33.7 GM/DL — SIGNIFICANT CHANGE UP (ref 32–36)
MCHC RBC-ENTMCNC: 33.7 GM/DL — SIGNIFICANT CHANGE UP (ref 32–36)
MCV RBC AUTO: 88.7 FL — SIGNIFICANT CHANGE UP (ref 80–100)
MCV RBC AUTO: 89.9 FL — SIGNIFICANT CHANGE UP (ref 80–100)
PHOSPHATE SERPL-MCNC: 2.6 MG/DL — SIGNIFICANT CHANGE UP (ref 2.4–4.7)
PLATELET # BLD AUTO: 163 K/UL — SIGNIFICANT CHANGE UP (ref 150–400)
PLATELET # BLD AUTO: 201 K/UL — SIGNIFICANT CHANGE UP (ref 150–400)
POTASSIUM SERPL-MCNC: 4.4 MMOL/L — SIGNIFICANT CHANGE UP (ref 3.5–5.3)
POTASSIUM SERPL-SCNC: 4.4 MMOL/L — SIGNIFICANT CHANGE UP (ref 3.5–5.3)
RBC # BLD: 3.17 M/UL — LOW (ref 4.2–5.8)
RBC # BLD: 3.35 M/UL — LOW (ref 4.2–5.8)
RBC # FLD: 13.5 % — SIGNIFICANT CHANGE UP (ref 10.3–14.5)
RBC # FLD: 13.6 % — SIGNIFICANT CHANGE UP (ref 10.3–14.5)
SODIUM SERPL-SCNC: 140 MMOL/L — SIGNIFICANT CHANGE UP (ref 135–145)
WBC # BLD: 12.43 K/UL — HIGH (ref 3.8–10.5)
WBC # BLD: 14.73 K/UL — HIGH (ref 3.8–10.5)
WBC # FLD AUTO: 12.43 K/UL — HIGH (ref 3.8–10.5)
WBC # FLD AUTO: 14.73 K/UL — HIGH (ref 3.8–10.5)

## 2021-10-09 PROCEDURE — 93970 EXTREMITY STUDY: CPT | Mod: 26

## 2021-10-09 PROCEDURE — 99291 CRITICAL CARE FIRST HOUR: CPT

## 2021-10-09 RX ORDER — OXYCODONE HYDROCHLORIDE 5 MG/1
10 TABLET ORAL EVERY 4 HOURS
Refills: 0 | Status: DISCONTINUED | OUTPATIENT
Start: 2021-10-09 | End: 2021-10-16

## 2021-10-09 RX ORDER — OXYCODONE HYDROCHLORIDE 5 MG/1
5 TABLET ORAL EVERY 4 HOURS
Refills: 0 | Status: DISCONTINUED | OUTPATIENT
Start: 2021-10-09 | End: 2021-10-16

## 2021-10-09 RX ORDER — DOXAZOSIN MESYLATE 4 MG
4 TABLET ORAL AT BEDTIME
Refills: 0 | Status: DISCONTINUED | OUTPATIENT
Start: 2021-10-09 | End: 2021-10-23

## 2021-10-09 RX ORDER — ENOXAPARIN SODIUM 100 MG/ML
40 INJECTION SUBCUTANEOUS DAILY
Refills: 0 | Status: DISCONTINUED | OUTPATIENT
Start: 2021-10-09 | End: 2021-10-10

## 2021-10-09 RX ORDER — FENTANYL CITRATE 50 UG/ML
25 INJECTION INTRAVENOUS
Refills: 0 | Status: DISCONTINUED | OUTPATIENT
Start: 2021-10-09 | End: 2021-10-15

## 2021-10-09 RX ADMIN — Medication 4 MILLIGRAM(S): at 17:07

## 2021-10-09 RX ADMIN — PANTOPRAZOLE SODIUM 40 MILLIGRAM(S): 20 TABLET, DELAYED RELEASE ORAL at 06:22

## 2021-10-09 RX ADMIN — Medication 4 MILLIGRAM(S): at 00:18

## 2021-10-09 RX ADMIN — Medication 500 MILLIGRAM(S): at 11:16

## 2021-10-09 RX ADMIN — Medication 4 MILLIGRAM(S): at 15:55

## 2021-10-09 RX ADMIN — GABAPENTIN 300 MILLIGRAM(S): 400 CAPSULE ORAL at 22:03

## 2021-10-09 RX ADMIN — OXYCODONE HYDROCHLORIDE 5 MILLIGRAM(S): 5 TABLET ORAL at 20:15

## 2021-10-09 RX ADMIN — Medication 1 TABLET(S): at 11:16

## 2021-10-09 RX ADMIN — HYDROMORPHONE HYDROCHLORIDE 1 MILLIGRAM(S): 2 INJECTION INTRAMUSCULAR; INTRAVENOUS; SUBCUTANEOUS at 06:50

## 2021-10-09 RX ADMIN — METHOCARBAMOL 750 MILLIGRAM(S): 500 TABLET, FILM COATED ORAL at 13:05

## 2021-10-09 RX ADMIN — Medication 4 MILLIGRAM(S): at 06:23

## 2021-10-09 RX ADMIN — Medication 2: at 11:22

## 2021-10-09 RX ADMIN — METHOCARBAMOL 750 MILLIGRAM(S): 500 TABLET, FILM COATED ORAL at 22:03

## 2021-10-09 RX ADMIN — Medication 2: at 16:18

## 2021-10-09 RX ADMIN — Medication 4 MILLIGRAM(S): at 11:16

## 2021-10-09 RX ADMIN — GABAPENTIN 300 MILLIGRAM(S): 400 CAPSULE ORAL at 13:05

## 2021-10-09 RX ADMIN — Medication 2: at 08:12

## 2021-10-09 RX ADMIN — HYDROMORPHONE HYDROCHLORIDE 1 MILLIGRAM(S): 2 INJECTION INTRAMUSCULAR; INTRAVENOUS; SUBCUTANEOUS at 02:35

## 2021-10-09 RX ADMIN — GABAPENTIN 300 MILLIGRAM(S): 400 CAPSULE ORAL at 06:22

## 2021-10-09 RX ADMIN — SENNA PLUS 2 TABLET(S): 8.6 TABLET ORAL at 22:04

## 2021-10-09 RX ADMIN — METHOCARBAMOL 750 MILLIGRAM(S): 500 TABLET, FILM COATED ORAL at 06:22

## 2021-10-09 RX ADMIN — Medication 85 MILLIMOLE(S): at 09:12

## 2021-10-09 RX ADMIN — HYDROMORPHONE HYDROCHLORIDE 1 MILLIGRAM(S): 2 INJECTION INTRAMUSCULAR; INTRAVENOUS; SUBCUTANEOUS at 06:22

## 2021-10-09 RX ADMIN — ENOXAPARIN SODIUM 40 MILLIGRAM(S): 100 INJECTION SUBCUTANEOUS at 13:05

## 2021-10-09 RX ADMIN — HYDROMORPHONE HYDROCHLORIDE 1 MILLIGRAM(S): 2 INJECTION INTRAMUSCULAR; INTRAVENOUS; SUBCUTANEOUS at 02:50

## 2021-10-09 RX ADMIN — OXYCODONE HYDROCHLORIDE 5 MILLIGRAM(S): 5 TABLET ORAL at 19:44

## 2021-10-09 NOTE — PROGRESS NOTE ADULT - SUBJECTIVE AND OBJECTIVE BOX
HPI: 71m 911  with acute worsening of back pain while fishing with friends this morning. Acute bilateral leg weakness and numbness.  RLE 1/5, LLE 2/5 after decadron in ER.  Vazquez placed in Er for urinary retention, 700cc in bladder. Initially there was concern for GBS given recent flu vaccination, but the clinical history of pain followed by acute weakness was not consistent.  MRI spine performed and evaluated while patient in Er- there was concern for intradural, extramedulary lesion from T1-Sacrum c/w hematoma.  Serpentine vessel seen at T12 to L1 concerning for vascular lesion.  Patient taken for emergent thoracolumbar laminectomy with Dr. Uriarte.     INTERVAL HPI/OVERNIGHT EVENTS: No acute events overnight       Vital Signs Last 24 Hrs  T(C): 37.1 (09 Oct 2021 00:00), Max: 37.8 (08 Oct 2021 19:43)  T(F): 98.8 (09 Oct 2021 00:00), Max: 100 (08 Oct 2021 19:43)  HR: 67 (09 Oct 2021 01:00) (67 - 127)  BP: 143/77 (09 Oct 2021 01:00) (124/67 - 206/91)  BP(mean): 96 (09 Oct 2021 01:00) (85 - 124)  RR: 13 (09 Oct 2021 01:00) (0 - 23)  SpO2: 98% (09 Oct 2021 01:00) (91% - 100%)      PHYSICAL EXAM:  GENERAL: NAD, well-groomed, well-developed  HEAD:  Atraumatic, normocephalic  DRAINS: Interfacial dran to 1/2 suction from operative site   WOUND: Dressing clean dry intact  MENTAL STATUS: AAO x3; following commands and conversing appropriately   CRANIAL NERVES: PERRL; EOMI;  MOTOR: strength 5/5 B/L UE, RLE 3 to 4-/5; RLE decreased sensation   CHEST/LUNG: Symmetric chest rise, non-labored breathing   HEART: +S1/+S2; RRR  ABDOMEN: Soft, nontender, nondistended  EXTREMITIES:  no clubbing, cyanosis, or edema  SKIN: Warm, dry; no rashes or lesions      LABS:                        12.0   16.97 )-----------( 267      ( 08 Oct 2021 04:54 )             35.1     10-08    140  |  105  |  23.7<H>  ----------------------------<  213<H>  4.1   |  21.0<L>  |  0.90    Ca    8.1<L>      08 Oct 2021 04:54  Phos  2.6     10-08  Mg     2.2     10-08            10-07 @ 07:01  -  10-08 @ 07:00  --------------------------------------------------------  IN: 1712.5 mL / OUT: 1665 mL / NET: 47.5 mL    10-08 @ 07:01  -  10-09 @ 02:18  --------------------------------------------------------  IN: 1165 mL / OUT: 1050 mL / NET: 115 mL        RADIOLOGY & ADDITIONAL TESTS:  < from: CT Head No Cont (10.08.21 @ 22:16) >  IMPRESSION:    Redemonstration diffuse subarachnoid hemorrhage in cerebral and cerebellar hemispheres, volume loss, microvascular disease, maxillary periapical lucency with adjacent mucosal thickening maxillary sinuses concerning for odontogenic sinusitis (3, 4). No new midline shift. If symptoms persist consider follow-up head CT or MR if no contraindications.    --- End of Report ---    EBONY JANE MD; Attending Radiologist  This document has been electronically signed. Oct  8 2021 10:39PM    < end of copied text >    < from: MR Lumbar Spine w/wo IV Cont (10.08.21 @ 16:12) >  IMPRESSION:    -Postsurgical changes status post intraspinal hemorrhage evacuation. Residual subdural hemorrhage and postoperative air as described above.    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    -Previously seen linear enhancement within the lower thoracic levels is no longer seen. This may have represented active contrast extravasation.    --- End of Report ---      ROBERTA JOHNSON MD; Attending Radiologist  This document has been electronically signed. Oct  8 2021  7:20PM    < end of copied text >

## 2021-10-09 NOTE — PROGRESS NOTE ADULT - ASSESSMENT
72 yo M with intradural extramedullary T1-Sacrum hematoma, s/p emergent decompressive thoracolumbar laminectomy 10/7/2021. Etiology unclear, broad differential for now. MRI non-contributory, DSA negative for vascular pathology.  SAH, likely distribution from spine.  PMH of UC.  HLD.  H/o RBBB.  Acute postop anemia.  Leukocytosis, likely due to steroids.    Plan:  neurochecks  vasculitis/autoimmune and hypocoag w/up pending  maintain MAP  till Monday per NSGy  but avoid HTN, maintain -160; anti-hypertensives PRN  pain control: PO Oxy5 and 10 with Fentanyl IVP for breakthrough PRN per regimen + muscle relaxants  cont Dexa per NSGy, plan taper starting g 10/11  diet as tolerated   BM regimen as on opiates for pain  GI involvement for UC maintenance therapy choice  send hemolysis w/up, follow CBC in am  add CRP, ESR, C-ANCA  SCDs, re-start SQL  PT as tolerated      Pt is critically ill and at high risk of rapid deterioration/death due to abovementioned conditions.   Still requires critical care interventions - ongoing frequent evaluations, interventions and management adjustment by the Attending and ICU team, - and included review of relevant history, clinical examination, review of data and images, discussion of treatment with the multidisciplinary team and any consultants involved in this patient’s care as well as family discussion.

## 2021-10-09 NOTE — PROGRESS NOTE ADULT - SUBJECTIVE AND OBJECTIVE BOX
HPI:  71m 911  with acute worsening of back pain while fishing with friends this morning. Acute bilateral leg weakness and numbness.  RLE 1/5, LLE 2/5 after decadron in ER.  Vazquez placed in Er for urinary retention, 700cc in bladder  Initially there was concern for GBS given recent flu vaccination, but the clinical history of pain followed by acute weakness was not consistent.  MRI spine performed and evaluated while patient in Er- there was concern for intradural, extramedulary lesion from T1-Sacrum c/w hematoma.  Serpentine vessel seen at T12 to L1 concerning for vascular lesion.  Patient taken for emergent thoracolumbar laminectomy with Dr. Uriarte.  (07 Oct 2021 01:03)  PM of  on PO Mesalamine, HLD.    O/n events: MRI with SAH noted.  VS, labs, imaging reviewed.    Exam: cooperative, NAD.  AAOx4, FC, EO, face symmetric, EOMI, motor - BL UE 5/5, LLE 4/5 limited by back pain, improved in RLE 2/5 prox - better bends knee 3/5 with support, distally 4-/5, sensation to LT and pain - hypesthesia below umbilical level on the R and below the knee, anesthesia below groin level to the knee on the R.  Chest CTAB.  S1S2 present.  Abd soft, NT.  No peripheral swelling.  Back surgical wound under dressing - dry with minimal staining, no visible hematoma, acceptable drain output, serosang fluid.

## 2021-10-09 NOTE — PROGRESS NOTE ADULT - ASSESSMENT
Assessment:  74M post op day 2 from T10-L2 laminectomy for decompression of intradural/extramedullary hematoma. Patient has been clinically improving regarding sensation deficits and weakness of the RLE     Plan  - MRB and CTH showing diffuse SAH; findings are appreciated and there are no clinical symptoms associated with radiographic finding. Process thought to be secondary to spinal hematoma operation with blood circulating through CSF. Plan to be further discussed during morning rounds   - MRI thoracic and Lumbar spine showing post operative changes within normal limits   - Continue dexamethasone 4q6 for cord edema secondary to compression   - Pain control as needed, robaxin to reduce spasm of paraspinal muscles adjacent to incisional site   - Maintain MAPs above 80 to maximize spinal cord perfusion   - Diet as tolerated   - Monitor [place] drainage output and quality   - PT/OT   - Lovenox for dvt ppx  - medical management as per NSICU attending

## 2021-10-10 LAB
ALBUMIN SERPL ELPH-MCNC: 3.3 G/DL — SIGNIFICANT CHANGE UP (ref 3.3–5.2)
ALP SERPL-CCNC: 38 U/L — LOW (ref 40–120)
ALT FLD-CCNC: 21 U/L — SIGNIFICANT CHANGE UP
ANA TITR SER: NEGATIVE — SIGNIFICANT CHANGE UP
ANION GAP SERPL CALC-SCNC: 13 MMOL/L — SIGNIFICANT CHANGE UP (ref 5–17)
AST SERPL-CCNC: 19 U/L — SIGNIFICANT CHANGE UP
BILIRUB DIRECT SERPL-MCNC: 0.2 MG/DL — SIGNIFICANT CHANGE UP (ref 0–0.3)
BILIRUB INDIRECT FLD-MCNC: 0.6 MG/DL — SIGNIFICANT CHANGE UP (ref 0.2–1)
BILIRUB SERPL-MCNC: 0.8 MG/DL — SIGNIFICANT CHANGE UP (ref 0.4–2)
BUN SERPL-MCNC: 24.7 MG/DL — HIGH (ref 8–20)
C3 SERPL-MCNC: 111 MG/DL — SIGNIFICANT CHANGE UP (ref 81–157)
C4 SERPL-MCNC: 24 MG/DL — SIGNIFICANT CHANGE UP (ref 13–39)
CALCIUM SERPL-MCNC: 8.5 MG/DL — LOW (ref 8.6–10.2)
CHLORIDE SERPL-SCNC: 105 MMOL/L — SIGNIFICANT CHANGE UP (ref 98–107)
CO2 SERPL-SCNC: 24 MMOL/L — SIGNIFICANT CHANGE UP (ref 22–29)
CREAT SERPL-MCNC: 0.54 MG/DL — SIGNIFICANT CHANGE UP (ref 0.5–1.3)
GLUCOSE BLDC GLUCOMTR-MCNC: 136 MG/DL — HIGH (ref 70–99)
GLUCOSE BLDC GLUCOMTR-MCNC: 144 MG/DL — HIGH (ref 70–99)
GLUCOSE BLDC GLUCOMTR-MCNC: 168 MG/DL — HIGH (ref 70–99)
GLUCOSE SERPL-MCNC: 155 MG/DL — HIGH (ref 70–99)
HCT VFR BLD CALC: 26.3 % — LOW (ref 39–50)
HGB BLD-MCNC: 8.9 G/DL — LOW (ref 13–17)
LDH SERPL L TO P-CCNC: 202 U/L — HIGH (ref 98–192)
MAGNESIUM SERPL-MCNC: 2.4 MG/DL — SIGNIFICANT CHANGE UP (ref 1.6–2.6)
MCHC RBC-ENTMCNC: 30.5 PG — SIGNIFICANT CHANGE UP (ref 27–34)
MCHC RBC-ENTMCNC: 33.8 GM/DL — SIGNIFICANT CHANGE UP (ref 32–36)
MCV RBC AUTO: 90.1 FL — SIGNIFICANT CHANGE UP (ref 80–100)
PHOSPHATE SERPL-MCNC: 2.8 MG/DL — SIGNIFICANT CHANGE UP (ref 2.4–4.7)
PLATELET # BLD AUTO: 163 K/UL — SIGNIFICANT CHANGE UP (ref 150–400)
POTASSIUM SERPL-MCNC: 4.2 MMOL/L — SIGNIFICANT CHANGE UP (ref 3.5–5.3)
POTASSIUM SERPL-SCNC: 4.2 MMOL/L — SIGNIFICANT CHANGE UP (ref 3.5–5.3)
PROT SERPL-MCNC: 5.2 G/DL — LOW (ref 6.6–8.7)
RBC # BLD: 2.92 M/UL — LOW (ref 4.2–5.8)
RBC # FLD: 13.4 % — SIGNIFICANT CHANGE UP (ref 10.3–14.5)
SODIUM SERPL-SCNC: 142 MMOL/L — SIGNIFICANT CHANGE UP (ref 135–145)
WBC # BLD: 7.52 K/UL — SIGNIFICANT CHANGE UP (ref 3.8–10.5)
WBC # FLD AUTO: 7.52 K/UL — SIGNIFICANT CHANGE UP (ref 3.8–10.5)

## 2021-10-10 PROCEDURE — 99291 CRITICAL CARE FIRST HOUR: CPT

## 2021-10-10 RX ORDER — POLYETHYLENE GLYCOL 3350 17 G/17G
17 POWDER, FOR SOLUTION ORAL
Refills: 0 | Status: DISCONTINUED | OUTPATIENT
Start: 2021-10-10 | End: 2021-10-23

## 2021-10-10 RX ORDER — DEXAMETHASONE 0.5 MG/5ML
ELIXIR ORAL
Refills: 0 | Status: DISCONTINUED | OUTPATIENT
Start: 2021-10-10 | End: 2021-10-11

## 2021-10-10 RX ORDER — DEXAMETHASONE 0.5 MG/5ML
4 ELIXIR ORAL EVERY 8 HOURS
Refills: 0 | Status: DISCONTINUED | OUTPATIENT
Start: 2021-10-10 | End: 2021-10-10

## 2021-10-10 RX ORDER — DEXAMETHASONE 0.5 MG/5ML
4 ELIXIR ORAL EVERY 8 HOURS
Refills: 0 | Status: DISCONTINUED | OUTPATIENT
Start: 2021-10-10 | End: 2021-10-11

## 2021-10-10 RX ADMIN — SENNA PLUS 2 TABLET(S): 8.6 TABLET ORAL at 21:55

## 2021-10-10 RX ADMIN — Medication 4 MILLIGRAM(S): at 21:54

## 2021-10-10 RX ADMIN — Medication 4 MILLIGRAM(S): at 00:12

## 2021-10-10 RX ADMIN — Medication 2: at 12:36

## 2021-10-10 RX ADMIN — GABAPENTIN 300 MILLIGRAM(S): 400 CAPSULE ORAL at 06:11

## 2021-10-10 RX ADMIN — OXYCODONE HYDROCHLORIDE 5 MILLIGRAM(S): 5 TABLET ORAL at 18:14

## 2021-10-10 RX ADMIN — Medication 500 MILLIGRAM(S): at 12:38

## 2021-10-10 RX ADMIN — GABAPENTIN 300 MILLIGRAM(S): 400 CAPSULE ORAL at 21:54

## 2021-10-10 RX ADMIN — METHOCARBAMOL 750 MILLIGRAM(S): 500 TABLET, FILM COATED ORAL at 06:11

## 2021-10-10 RX ADMIN — OXYCODONE HYDROCHLORIDE 5 MILLIGRAM(S): 5 TABLET ORAL at 18:50

## 2021-10-10 RX ADMIN — Medication 4 MILLIGRAM(S): at 13:28

## 2021-10-10 RX ADMIN — METHOCARBAMOL 750 MILLIGRAM(S): 500 TABLET, FILM COATED ORAL at 21:54

## 2021-10-10 RX ADMIN — Medication 1 TABLET(S): at 12:37

## 2021-10-10 RX ADMIN — METHOCARBAMOL 750 MILLIGRAM(S): 500 TABLET, FILM COATED ORAL at 14:52

## 2021-10-10 RX ADMIN — PANTOPRAZOLE SODIUM 40 MILLIGRAM(S): 20 TABLET, DELAYED RELEASE ORAL at 06:11

## 2021-10-10 RX ADMIN — Medication 4 MILLIGRAM(S): at 06:11

## 2021-10-10 RX ADMIN — GABAPENTIN 300 MILLIGRAM(S): 400 CAPSULE ORAL at 13:28

## 2021-10-10 NOTE — PROGRESS NOTE ADULT - ASSESSMENT
70 yo M with intradural extramedullary T1-Sacrum hematoma, s/p emergent decompressive thoracolumbar laminectomy 10/7/2021. Etiology unclear, broad differential for now. MRI non-contributory, DSA negative for vascular pathology.  SAH, likely distribution from spine.  PMH of UC.  HLD.  H/o RBBB.  Acute postop anemia.  Leukocytosis, likely due to steroids.    Plan:  neurochecks  vasculitis/autoimmune and hypocoag w/up pending  maintain MAP  till Monday per NSGy  but avoid HTN, maintain -160; anti-hypertensives PRN  pain control: PO Oxy5 and 10 with Fentanyl IVP for breakthrough PRN per regimen + muscle relaxants  cont Dexa per NSGy, plan taper starting 10/11  diet as tolerated   BM regimen as on opiates for pain  GI involvement for UC maintenance therapy choice  send hemolysis w/up, follow CBC in am  follow up CRP, ESR, C-ANCA  SCDs, SQL  PT as tolerated      Pt is critically ill and at high risk of rapid deterioration/death due to abovementioned conditions.   Still requires critical care interventions - ongoing frequent evaluations, interventions and management adjustment by the Attending and ICU team, - and included review of relevant history, clinical examination, review of data and images, discussion of treatment with the multidisciplinary team and any consultants involved in this patient’s care as well as family discussion.      72 yo M with intradural extramedullary T1-Sacrum hematoma, s/p emergent decompressive thoracolumbar laminectomy 10/7/2021. Etiology unclear, broad differential for now. MRI non-contributory, DSA negative for vascular pathology.  SAH, likely distribution from spine.  PMH of UC.  HLD.  H/o RBBB.  Acute postop anemia.  Leukocytosis, likely due to steroids.    Plan: POD 3  neurochecks q2hr  vasculitis/autoimmune and hypocoag w/up pending  maintain MAP  till Monday per NSGy  but avoid HTN, maintain -160; anti-hypertensives PRN  pain control: PO Oxy5 and 10 with Fentanyl IVP for breakthrough PRN per regimen + muscle relaxants  cont Dexa per NSGy, start taper    diet as tolerated   BM regimen as on opiates for pain- add miralax  GI involvement for UC maintenance therapy choice  send hemolysis w/up  voiding trial, continue cardura  follow up CRP, ESR, C-ANCA  SCDs, SQL  PT as tolerated      Pt is critically ill and at high risk of rapid deterioration/death due to abovementioned conditions.   Still requires critical care interventions - ongoing frequent evaluations, interventions and management adjustment by the Attending and ICU team, - and included review of relevant history, clinical examination, review of data and images, discussion of treatment with the multidisciplinary team and any consultants involved in this patient’s care as well as family discussion.

## 2021-10-10 NOTE — PROGRESS NOTE ADULT - ASSESSMENT
Assessment:  74M POD 3 from T10-L2 laminectomy for decompression of intradural/extramedullary hematoma. Patient has been clinically improving regarding sensation deficits and weakness of the RLE     Plan  - MRB and CTH showing diffuse SAH; findings are appreciated and there are no clinical symptoms associated with radiographic finding. Process thought to be secondary to spinal hematoma operation with blood circulating through CSF  - MRI thoracic and Lumbar spine showing post operative changes within normal limits   - Continue dexamethasone 4q6 for cord edema secondary to compression   - Pain control as needed, robaxin to reduce spasm of paraspinal muscles adjacent to incisional site   - Maintain MAPs above 80 to maximize spinal cord perfusion   - Diet as tolerated   - Monitor [place] drainage output and quality   - PT/OT   - Lovenox for dvt ppx  - medical management as per NSICU attending   - Plan to be further discussed during morning rounds

## 2021-10-10 NOTE — PROGRESS NOTE ADULT - SUBJECTIVE AND OBJECTIVE BOX
Chief complaint:   Patient is a 71y old  Male who presents with a chief complaint of Back Pain, Leg Weakness (10 Oct 2021 00:12)    HPI:  71m 911  with acute worsening of back pain while fishing with friends this morning. Acute bilateral leg weakness and numbness.  RLE 1/5, LLE 2/5 after decadron in ER.  Vazquez placed in Er for urinary retention, 700cc in bladder    Initially there was concern for GBS given recent flu vaccination, but the clinical history of pain followed by acute weakness was not consistent.  MRI spine performed and evaluated while patient in Er- there was concern for intradural, extramedulary lesion from T1-Sacrum c/w hematoma.  Serpentine vessel seen at T12 to L1 concerning for vascular lesion.  Patient taken for emergent thoracolumbar laminectomy with Dr. Uriarte.   (07 Oct 2021 01:03)        24hr EVENTS:      ROS: [ ]  Unable to assess due to mental status   All other systems negative    -----------------------------------------------------------------------------------------------------------------------------------------------------------------------------------  ICU Vital Signs Last 24 Hrs  T(C): 36.8 (10 Oct 2021 07:21), Max: 37.7 (09 Oct 2021 16:15)  T(F): 98.2 (10 Oct 2021 07:21), Max: 99.8 (09 Oct 2021 16:15)  HR: 87 (10 Oct 2021 08:00) (63 - 102)  BP: 134/80 (10 Oct 2021 08:00) (133/74 - 168/95)  BP(mean): 95 (10 Oct 2021 08:00) (85 - 116)  ABP: 160/75 (10 Oct 2021 08:00) (74/69 - 198/67)  ABP(mean): 103 (10 Oct 2021 08:00) (71 - 138)  RR: 13 (10 Oct 2021 08:00) (11 - 27)  SpO2: 92% (10 Oct 2021 08:00) (91% - 97%)      I&O's Summary    09 Oct 2021 07:01  -  10 Oct 2021 07:00  --------------------------------------------------------  IN: 739.8 mL / OUT: 115 mL / NET: 624.8 mL        MEDICATIONS  (STANDING):  ascorbic acid 500 milliGRAM(s) Oral daily  dexAMETHasone  Injectable 4 milliGRAM(s) IV Push every 6 hours  dextrose 40% Gel 15 Gram(s) Oral once  dextrose 5%. 1000 milliLiter(s) (50 mL/Hr) IV Continuous <Continuous>  dextrose 5%. 1000 milliLiter(s) (100 mL/Hr) IV Continuous <Continuous>  dextrose 50% Injectable 25 Gram(s) IV Push once  dextrose 50% Injectable 12.5 Gram(s) IV Push once  dextrose 50% Injectable 25 Gram(s) IV Push once  doxazosin 4 milliGRAM(s) Oral at bedtime  gabapentin 300 milliGRAM(s) Oral every 8 hours  glucagon  Injectable 1 milliGRAM(s) IntraMuscular once  influenza   Vaccine 0.5 milliLiter(s) IntraMuscular once  insulin lispro (ADMELOG) corrective regimen sliding scale   SubCutaneous three times a day before meals  methocarbamol 750 milliGRAM(s) Oral every 8 hours  multivitamin 1 Tablet(s) Oral daily  niCARdipine Infusion 5 mG/Hr (25 mL/Hr) IV Continuous <Continuous>  pantoprazole    Tablet 40 milliGRAM(s) Oral before breakfast  senna 2 Tablet(s) Oral at bedtime        IMAGING:   Recent imaging studies were reviewed.    LAB RESULTS:                          8.9    7.52  )-----------( 163      ( 10 Oct 2021 06:26 )             26.3           10-10    142  |  105  |  24.7<H>  ----------------------------<  155<H>  4.2   |  24.0  |  0.54    Ca    8.5<L>      10 Oct 2021 06:26  Phos  2.8     10-10  Mg     2.4     10-10    TPro  5.2<L>  /  Alb  3.3  /  TBili  0.8  /  DBili  0.2  /  AST  19  /  ALT  21  /  AlkPhos  38<L>  10-10      -----------------------------------------------------------------------------------------------------------------------------------------------------------------------------------  PHYSICAL EXAM:  General: Calm, laying in bed  HEENT: MMM  Neuro:  -Mental status- No acute distress, AOx3, conversational, following commands  -CN- PERRL 3mm, EOMI, tongue midline, face symmetric  -Motor- full strength in all ext  -Sensation- intact to LT   -Coordination- no dysmetria noted    CV: RRR  Pulm: Clear to auscultation  Abd: Soft, nontender, nondistended  Ext: No edema  Skin: warm, dry     Chief complaint:   Patient is a 71y old  Male who presents with a chief complaint of Back Pain, Leg Weakness (10 Oct 2021 00:12)    HPI:  71m 911  with acute worsening of back pain while fishing with friends this morning. Acute bilateral leg weakness and numbness.  RLE 1/5, LLE 2/5 after decadron in ER.  Vazquez placed in Er for urinary retention, 700cc in bladder    Initially there was concern for GBS given recent flu vaccination, but the clinical history of pain followed by acute weakness was not consistent.  MRI spine performed and evaluated while patient in Er- there was concern for intradural, extramedulary lesion from T1-Sacrum c/w hematoma.  Serpentine vessel seen at T12 to L1 concerning for vascular lesion.  Patient taken for emergent thoracolumbar laminectomy with Dr. Uriarte.   (07 Oct 2021 01:03)    24hr EVENTS:  str cath yesterday    ROS:   All other systems negative    -----------------------------------------------------------------------------------------------------------------------------------------------------------------------------------  ICU Vital Signs Last 24 Hrs  T(C): 36.8 (10 Oct 2021 07:21), Max: 37.7 (09 Oct 2021 16:15)  T(F): 98.2 (10 Oct 2021 07:21), Max: 99.8 (09 Oct 2021 16:15)  HR: 87 (10 Oct 2021 08:00) (63 - 102)  BP: 134/80 (10 Oct 2021 08:00) (133/74 - 168/95)  BP(mean): 95 (10 Oct 2021 08:00) (85 - 116)  ABP: 160/75 (10 Oct 2021 08:00) (74/69 - 198/67)  ABP(mean): 103 (10 Oct 2021 08:00) (71 - 138)  RR: 13 (10 Oct 2021 08:00) (11 - 27)  SpO2: 92% (10 Oct 2021 08:00) (91% - 97%)      I&O's Summary    09 Oct 2021 07:01  -  10 Oct 2021 07:00  --------------------------------------------------------  IN: 739.8 mL / OUT: 115 mL / NET: 624.8 mL      MEDICATIONS  (STANDING):  ascorbic acid 500 milliGRAM(s) Oral daily  dexAMETHasone  Injectable 4 milliGRAM(s) IV Push every 6 hours  dextrose 40% Gel 15 Gram(s) Oral once  dextrose 5%. 1000 milliLiter(s) (50 mL/Hr) IV Continuous <Continuous>  dextrose 5%. 1000 milliLiter(s) (100 mL/Hr) IV Continuous <Continuous>  dextrose 50% Injectable 25 Gram(s) IV Push once  dextrose 50% Injectable 12.5 Gram(s) IV Push once  dextrose 50% Injectable 25 Gram(s) IV Push once  doxazosin 4 milliGRAM(s) Oral at bedtime  gabapentin 300 milliGRAM(s) Oral every 8 hours  glucagon  Injectable 1 milliGRAM(s) IntraMuscular once  influenza   Vaccine 0.5 milliLiter(s) IntraMuscular once  insulin lispro (ADMELOG) corrective regimen sliding scale   SubCutaneous three times a day before meals  methocarbamol 750 milliGRAM(s) Oral every 8 hours  multivitamin 1 Tablet(s) Oral daily  niCARdipine Infusion 5 mG/Hr (25 mL/Hr) IV Continuous <Continuous>  pantoprazole    Tablet 40 milliGRAM(s) Oral before breakfast  senna 2 Tablet(s) Oral at bedtime    IMAGING:   Recent imaging studies were reviewed.    LAB RESULTS:                          8.9    7.52  )-----------( 163      ( 10 Oct 2021 06:26 )             26.3       10-10    142  |  105  |  24.7<H>  ----------------------------<  155<H>  4.2   |  24.0  |  0.54    Ca    8.5<L>      10 Oct 2021 06:26  Phos  2.8     10-10  Mg     2.4     10-10    TPro  5.2<L>  /  Alb  3.3  /  TBili  0.8  /  DBili  0.2  /  AST  19  /  ALT  21  /  AlkPhos  38<L>  10-10      -----------------------------------------------------------------------------------------------------------------------------------------------------------------------------------  PHYSICAL EXAM:  General: Calm, laying in bed  HEENT: MMM  Neuro:  -Mental status- No acute distress, AOx3, conversational, following commands  -CN- PERRL 3mm, EOMI, tongue midline, face symmetric  -Motor- full strength in upper ext  RLE HF 2 KF/KE 3 PF/DF 4  LLE HF 3 KF/KE 4- PF/DF 4  -Sensation- decreased sensation T10-L2  paresthesias in bilateral distal LE  -Coordination- no dysmetria noted    CV: RRR  Pulm: Clear to auscultation  Abd: Soft, nontender, nondistended  Ext: No edema  Skin: warm, dry

## 2021-10-10 NOTE — PROGRESS NOTE ADULT - SUBJECTIVE AND OBJECTIVE BOX
HPI: 71m 911  with acute worsening of back pain while fishing with friends this morning. Acute bilateral leg weakness and numbness.  RLE 1/5, LLE 2/5 after decadron in ER.  Vazquez placed in Er for urinary retention, 700cc in bladder. Initially there was concern for GBS given recent flu vaccination, but the clinical history of pain followed by acute weakness was not consistent.  MRI spine performed and evaluated while patient in Er- there was concern for intradural, extramedulary lesion from T1-Sacrum c/w hematoma.  Serpentine vessel seen at T12 to L1 concerning for vascular lesion.  Patient taken for emergent thoracolumbar laminectomy with Dr. Uriarte. (07 Oct 2021 01:03)        INTERVAL HPI/OVERNIGHT EVENTS: No acute overnight events         Vital Signs Last 24 Hrs  T(C): 37 (10 Oct 2021 00:05), Max: 37.7 (09 Oct 2021 16:15)  T(F): 98.6 (10 Oct 2021 00:05), Max: 99.8 (09 Oct 2021 16:15)  HR: 89 (09 Oct 2021 23:00) (66 - 102)  BP: 143/82 (09 Oct 2021 23:00) (132/73 - 168/95)  BP(mean): 98 (09 Oct 2021 23:00) (85 - 116)  RR: 19 (09 Oct 2021 23:00) (11 - 27)  SpO2: 94% (09 Oct 2021 23:00) (91% - 98%)      PHYSICAL EXAM:  GENERAL: NAD, well-groomed, well-developed  HEAD:  Atraumatic, normocephalic  DRAINS: Interfacial dran to 1/2 suction from operative site   WOUND: Dressing clean dry intact  MENTAL STATUS: AAO x3; following commands and conversing appropriately   CRANIAL NERVES: PERRL; EOMI;  MOTOR: strength 5/5 B/L UE, RLE 3 to 4-/5; RLE decreased sensation   CHEST/LUNG: Symmetric chest rise, non-labored breathing   HEART: +S1/+S2; RRR  ABDOMEN: Soft, nontender, nondistended  EXTREMITIES:  no clubbing, cyanosis, or edema  SKIN: Warm, dry; no rashes or lesions        LABS:                        10.0   14.73 )-----------( 201      ( 09 Oct 2021 11:54 )             29.7     10-09    140  |  104  |  29.3<H>  ----------------------------<  181<H>  4.4   |  21.0<L>  |  0.60    Ca    8.4<L>      09 Oct 2021 05:02  Phos  2.6     10-09  Mg     2.5     10-09            10-08 @ 07:01  -  10-09 @ 07:00  --------------------------------------------------------  IN: 1165 mL / OUT: 1370 mL / NET: -205 mL    10-09 @ 07:01  -  10-10 @ 00:13  --------------------------------------------------------  IN: 499.8 mL / OUT: 85 mL / NET: 414.8 mL        RADIOLOGY & ADDITIONAL TESTS:  < from: MR Lumbar Spine w/wo IV Cont (10.08.21 @ 16:12) >  IMPRESSION:    -Postsurgical changes status post intraspinal hemorrhage evacuation. Residual subdural hemorrhage and postoperative air as described above.    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    -Previously seen linear enhancement within the lower thoracic levels is no longer seen. This may have represented active contrast extravasation.    --- End of Report ---            ROBERTA JOHNSON MD; Attending Radiologist  This document has been electronically signed. Oct  8 2021  7:20PM    < end of copied text >

## 2021-10-11 LAB
ANION GAP SERPL CALC-SCNC: 16 MMOL/L — SIGNIFICANT CHANGE UP (ref 5–17)
B2 GLYCOPROT1 AB SER QL: NEGATIVE — SIGNIFICANT CHANGE UP
BUN SERPL-MCNC: 26.6 MG/DL — HIGH (ref 8–20)
CALCIUM SERPL-MCNC: 9 MG/DL — SIGNIFICANT CHANGE UP (ref 8.6–10.2)
CHLORIDE SERPL-SCNC: 103 MMOL/L — SIGNIFICANT CHANGE UP (ref 98–107)
CO2 SERPL-SCNC: 22 MMOL/L — SIGNIFICANT CHANGE UP (ref 22–29)
CREAT SERPL-MCNC: 0.62 MG/DL — SIGNIFICANT CHANGE UP (ref 0.5–1.3)
GLUCOSE BLDC GLUCOMTR-MCNC: 156 MG/DL — HIGH (ref 70–99)
GLUCOSE BLDC GLUCOMTR-MCNC: 158 MG/DL — HIGH (ref 70–99)
GLUCOSE SERPL-MCNC: 149 MG/DL — HIGH (ref 70–99)
HCT VFR BLD CALC: 28.8 % — LOW (ref 39–50)
HGB BLD-MCNC: 9.9 G/DL — LOW (ref 13–17)
MAGNESIUM SERPL-MCNC: 2.2 MG/DL — SIGNIFICANT CHANGE UP (ref 1.6–2.6)
MCHC RBC-ENTMCNC: 30.8 PG — SIGNIFICANT CHANGE UP (ref 27–34)
MCHC RBC-ENTMCNC: 34.4 GM/DL — SIGNIFICANT CHANGE UP (ref 32–36)
MCV RBC AUTO: 89.7 FL — SIGNIFICANT CHANGE UP (ref 80–100)
PHOSPHATE SERPL-MCNC: 3.8 MG/DL — SIGNIFICANT CHANGE UP (ref 2.4–4.7)
PLATELET # BLD AUTO: 183 K/UL — SIGNIFICANT CHANGE UP (ref 150–400)
POTASSIUM SERPL-MCNC: 4.3 MMOL/L — SIGNIFICANT CHANGE UP (ref 3.5–5.3)
POTASSIUM SERPL-SCNC: 4.3 MMOL/L — SIGNIFICANT CHANGE UP (ref 3.5–5.3)
PROT C ACT/NOR PPP: 98 % — SIGNIFICANT CHANGE UP (ref 74–150)
PROT S FREE PPP-ACNC: 54 % — LOW (ref 63–140)
RBC # BLD: 3.21 M/UL — LOW (ref 4.2–5.8)
RBC # FLD: 13.2 % — SIGNIFICANT CHANGE UP (ref 10.3–14.5)
SODIUM SERPL-SCNC: 141 MMOL/L — SIGNIFICANT CHANGE UP (ref 135–145)
WBC # BLD: 8.09 K/UL — SIGNIFICANT CHANGE UP (ref 3.8–10.5)
WBC # FLD AUTO: 8.09 K/UL — SIGNIFICANT CHANGE UP (ref 3.8–10.5)

## 2021-10-11 PROCEDURE — 99233 SBSQ HOSP IP/OBS HIGH 50: CPT

## 2021-10-11 PROCEDURE — 99223 1ST HOSP IP/OBS HIGH 75: CPT

## 2021-10-11 RX ORDER — ACETAMINOPHEN 500 MG
650 TABLET ORAL EVERY 6 HOURS
Refills: 0 | Status: DISCONTINUED | OUTPATIENT
Start: 2021-10-11 | End: 2021-10-23

## 2021-10-11 RX ORDER — DEXAMETHASONE 0.5 MG/5ML
2 ELIXIR ORAL EVERY 12 HOURS
Refills: 0 | Status: COMPLETED | OUTPATIENT
Start: 2021-10-12 | End: 2021-10-13

## 2021-10-11 RX ORDER — ENOXAPARIN SODIUM 100 MG/ML
40 INJECTION SUBCUTANEOUS AT BEDTIME
Refills: 0 | Status: DISCONTINUED | OUTPATIENT
Start: 2021-10-11 | End: 2021-10-19

## 2021-10-11 RX ORDER — DEXAMETHASONE 0.5 MG/5ML
ELIXIR ORAL
Refills: 0 | Status: COMPLETED | OUTPATIENT
Start: 2021-10-11 | End: 2021-10-13

## 2021-10-11 RX ORDER — DEXAMETHASONE 0.5 MG/5ML
4 ELIXIR ORAL EVERY 12 HOURS
Refills: 0 | Status: COMPLETED | OUTPATIENT
Start: 2021-10-11 | End: 2021-10-12

## 2021-10-11 RX ADMIN — POLYETHYLENE GLYCOL 3350 17 GRAM(S): 17 POWDER, FOR SOLUTION ORAL at 17:04

## 2021-10-11 RX ADMIN — Medication 4 MILLIGRAM(S): at 06:13

## 2021-10-11 RX ADMIN — OXYCODONE HYDROCHLORIDE 5 MILLIGRAM(S): 5 TABLET ORAL at 04:19

## 2021-10-11 RX ADMIN — METHOCARBAMOL 750 MILLIGRAM(S): 500 TABLET, FILM COATED ORAL at 13:56

## 2021-10-11 RX ADMIN — GABAPENTIN 300 MILLIGRAM(S): 400 CAPSULE ORAL at 13:56

## 2021-10-11 RX ADMIN — METHOCARBAMOL 750 MILLIGRAM(S): 500 TABLET, FILM COATED ORAL at 06:14

## 2021-10-11 RX ADMIN — Medication 2: at 11:25

## 2021-10-11 RX ADMIN — GABAPENTIN 300 MILLIGRAM(S): 400 CAPSULE ORAL at 21:33

## 2021-10-11 RX ADMIN — Medication 2: at 17:04

## 2021-10-11 RX ADMIN — SENNA PLUS 2 TABLET(S): 8.6 TABLET ORAL at 21:33

## 2021-10-11 RX ADMIN — Medication 1 TABLET(S): at 11:25

## 2021-10-11 RX ADMIN — Medication 4 MILLIGRAM(S): at 21:34

## 2021-10-11 RX ADMIN — PANTOPRAZOLE SODIUM 40 MILLIGRAM(S): 20 TABLET, DELAYED RELEASE ORAL at 06:15

## 2021-10-11 RX ADMIN — ENOXAPARIN SODIUM 40 MILLIGRAM(S): 100 INJECTION SUBCUTANEOUS at 21:34

## 2021-10-11 RX ADMIN — OXYCODONE HYDROCHLORIDE 5 MILLIGRAM(S): 5 TABLET ORAL at 05:00

## 2021-10-11 RX ADMIN — Medication 500 MILLIGRAM(S): at 11:25

## 2021-10-11 RX ADMIN — Medication 4 MILLIGRAM(S): at 17:03

## 2021-10-11 RX ADMIN — FENTANYL CITRATE 25 MICROGRAM(S): 50 INJECTION INTRAVENOUS at 04:39

## 2021-10-11 RX ADMIN — METHOCARBAMOL 750 MILLIGRAM(S): 500 TABLET, FILM COATED ORAL at 21:34

## 2021-10-11 RX ADMIN — GABAPENTIN 300 MILLIGRAM(S): 400 CAPSULE ORAL at 06:14

## 2021-10-11 RX ADMIN — FENTANYL CITRATE 25 MICROGRAM(S): 50 INJECTION INTRAVENOUS at 05:00

## 2021-10-11 NOTE — PROGRESS NOTE ADULT - SUBJECTIVE AND OBJECTIVE BOX
Chief complaint:   Patient is a 71y old  Male who presents with a chief complaint of Back Pain, Leg Weakness (11 Oct 2021 00:18)    HPI:  71m 911  with acute worsening of back pain while fishing with friends this morning. Acute bilateral leg weakness and numbness.  RLE 1/5, LLE 2/5 after decadron in ER.  Vazquez placed in Er for urinary retention, 700cc in bladder    Initially there was concern for GBS given recent flu vaccination, but the clinical history of pain followed by acute weakness was not consistent.  MRI spine performed and evaluated while patient in Er- there was concern for intradural, extramedulary lesion from T1-Sacrum c/w hematoma.  Serpentine vessel seen at T12 to L1 concerning for vascular lesion.  Patient taken for emergent thoracolumbar laminectomy with Dr. Uriarte.   (07 Oct 2021 01:03)        24hr EVENTS:      ROS: [ ]  Unable to assess due to mental status   All other systems negative    -----------------------------------------------------------------------------------------------------------------------------------------------------------------------------------  ICU Vital Signs Last 24 Hrs  T(C): 37 (11 Oct 2021 07:37), Max: 37.6 (10 Oct 2021 16:55)  T(F): 98.6 (11 Oct 2021 07:37), Max: 99.6 (10 Oct 2021 16:55)  HR: 73 (11 Oct 2021 07:00) (61 - 98)  BP: 123/67 (11 Oct 2021 07:00) (115/76 - 157/124)  BP(mean): 82 (11 Oct 2021 07:00) (82 - 131)  ABP: 139/56 (11 Oct 2021 06:00) (116/49 - 180/72)  ABP(mean): 82 (11 Oct 2021 06:00) (70 - 107)  RR: 15 (11 Oct 2021 07:00) (14 - 22)  SpO2: 92% (11 Oct 2021 07:00) (91% - 100%)      I&O's Summary    10 Oct 2021 07:01  -  11 Oct 2021 07:00  --------------------------------------------------------  IN: 740 mL / OUT: 2400 mL / NET: -1660 mL        MEDICATIONS  (STANDING):  ascorbic acid 500 milliGRAM(s) Oral daily  dexAMETHasone     Tablet   Oral   dexAMETHasone     Tablet 4 milliGRAM(s) Oral every 8 hours  dextrose 40% Gel 15 Gram(s) Oral once  dextrose 5%. 1000 milliLiter(s) (50 mL/Hr) IV Continuous <Continuous>  dextrose 5%. 1000 milliLiter(s) (100 mL/Hr) IV Continuous <Continuous>  dextrose 50% Injectable 25 Gram(s) IV Push once  dextrose 50% Injectable 12.5 Gram(s) IV Push once  dextrose 50% Injectable 25 Gram(s) IV Push once  doxazosin 4 milliGRAM(s) Oral at bedtime  gabapentin 300 milliGRAM(s) Oral every 8 hours  glucagon  Injectable 1 milliGRAM(s) IntraMuscular once  insulin lispro (ADMELOG) corrective regimen sliding scale   SubCutaneous three times a day before meals  methocarbamol 750 milliGRAM(s) Oral every 8 hours  multivitamin 1 Tablet(s) Oral daily  pantoprazole    Tablet 40 milliGRAM(s) Oral before breakfast  polyethylene glycol 3350 17 Gram(s) Oral two times a day  senna 2 Tablet(s) Oral at bedtime      RESPIRATORY:        IMAGING:   Recent imaging studies were reviewed.    LAB RESULTS:                          9.9    8.09  )-----------( 183      ( 11 Oct 2021 05:05 )             28.8           10-11    141  |  103  |  26.6<H>  ----------------------------<  149<H>  4.3   |  22.0  |  0.62    Ca    9.0      11 Oct 2021 05:05  Phos  3.8     10-11  Mg     2.2     10-11    TPro  5.2<L>  /  Alb  3.3  /  TBili  0.8  /  DBili  0.2  /  AST  19  /  ALT  21  /  AlkPhos  38<L>  10-10                -----------------------------------------------------------------------------------------------------------------------------------------------------------------------------------    PHYSICAL EXAM:  General: Calm, intubated  HEENT: MMM  Neuro:  -Mental status- No acute distress  -CN- PERRL 3mm, EOMI, tongue midline, face symmetric    CV: RRR  Pulm: clear to auscultation  Abd: Soft, nontender, nondistended  Ext: no noted edema in lower ext  Skin: warm, dry       Chief complaint:   Patient is a 71y old  Male who presents with a chief complaint of Back Pain, Leg Weakness (11 Oct 2021 00:18)    HPI:  71m 911  with acute worsening of back pain while fishing with friends this morning. Acute bilateral leg weakness and numbness.  RLE 1/5, LLE 2/5 after decadron in ER.  Kate placed in Er for urinary retention, 700cc in bladder    Initially there was concern for GBS given recent flu vaccination, but the clinical history of pain followed by acute weakness was not consistent.  MRI spine performed and evaluated while patient in Er- there was concern for intradural, extramedulary lesion from T1-Sacrum c/w hematoma.  Serpentine vessel seen at T12 to L1 concerning for vascular lesion.  Patient taken for emergent thoracolumbar laminectomy with Dr. Uriarte.   (07 Oct 2021 01:03)      24hr EVENTS:  drain removed   kate reinserted 10/10 due to retention, cardura started    ROS: R leg pain  All other systems negative    -----------------------------------------------------------------------------------------------------------------------------------------------------------------------------------  ICU Vital Signs Last 24 Hrs  T(C): 37 (11 Oct 2021 07:37), Max: 37.6 (10 Oct 2021 16:55)  T(F): 98.6 (11 Oct 2021 07:37), Max: 99.6 (10 Oct 2021 16:55)  HR: 73 (11 Oct 2021 07:00) (61 - 98)  BP: 123/67 (11 Oct 2021 07:00) (115/76 - 157/124)  BP(mean): 82 (11 Oct 2021 07:00) (82 - 131)  ABP: 139/56 (11 Oct 2021 06:00) (116/49 - 180/72)  ABP(mean): 82 (11 Oct 2021 06:00) (70 - 107)  RR: 15 (11 Oct 2021 07:00) (14 - 22)  SpO2: 92% (11 Oct 2021 07:00) (91% - 100%)      I&O's Summary    10 Oct 2021 07:01  -  11 Oct 2021 07:00  --------------------------------------------------------  IN: 740 mL / OUT: 2400 mL / NET: -1660 mL        MEDICATIONS  (STANDING):  ascorbic acid 500 milliGRAM(s) Oral daily  dexAMETHasone     Tablet   Oral   dexAMETHasone     Tablet 4 milliGRAM(s) Oral every 8 hours  dextrose 40% Gel 15 Gram(s) Oral once  dextrose 5%. 1000 milliLiter(s) (50 mL/Hr) IV Continuous <Continuous>  dextrose 5%. 1000 milliLiter(s) (100 mL/Hr) IV Continuous <Continuous>  dextrose 50% Injectable 25 Gram(s) IV Push once  dextrose 50% Injectable 12.5 Gram(s) IV Push once  dextrose 50% Injectable 25 Gram(s) IV Push once  doxazosin 4 milliGRAM(s) Oral at bedtime  gabapentin 300 milliGRAM(s) Oral every 8 hours  glucagon  Injectable 1 milliGRAM(s) IntraMuscular once  insulin lispro (ADMELOG) corrective regimen sliding scale   SubCutaneous three times a day before meals  methocarbamol 750 milliGRAM(s) Oral every 8 hours  multivitamin 1 Tablet(s) Oral daily  pantoprazole    Tablet 40 milliGRAM(s) Oral before breakfast  polyethylene glycol 3350 17 Gram(s) Oral two times a day  senna 2 Tablet(s) Oral at bedtime    IMAGING:   Recent imaging studies were reviewed.    LAB RESULTS:                          9.9    8.09  )-----------( 183      ( 11 Oct 2021 05:05 )             28.8     10-11    141  |  103  |  26.6<H>  ----------------------------<  149<H>  4.3   |  22.0  |  0.62    Ca    9.0      11 Oct 2021 05:05  Phos  3.8     10-11  Mg     2.2     10-11    TPro  5.2<L>  /  Alb  3.3  /  TBili  0.8  /  DBili  0.2  /  AST  19  /  ALT  21  /  AlkPhos  38<L>  10-10                -----------------------------------------------------------------------------------------------------------------------------------------------------------------------------------    PHYSICAL EXAM:  General: Calm, laying in bed  HEENT: MMM  Neuro:  -Mental status- No acute distress, AOx3, conversational, following commands  -CN- PERRL 3mm, EOMI, tongue midline, face symmetric  -Motor- full strength in upper ext  RLE HF 2 KF/KE 3 PF/DF 4  LLE HF 3 KF/KE 4- PF/DF 4  -Sensation- decreased sensation T10-L2  paresthesias in bilateral distal LE  -Coordination- no dysmetria noted    CV: RRR  Pulm: Clear to auscultation  Abd: Soft, nontender, nondistended  Ext: No edema  Skin: warm, dry

## 2021-10-11 NOTE — OCCUPATIONAL THERAPY INITIAL EVALUATION ADULT - NS ASR OT EQUIP NEEDS DISCH
RW, commode, grab bar by toilet, shower seat, grab bar in shower, dressing devices pending functional progress.

## 2021-10-11 NOTE — OCCUPATIONAL THERAPY INITIAL EVALUATION ADULT - PERTINENT HX OF CURRENT PROBLEM, REHAB EVAL
bilateral leg weakness. Found to have an intradural extramedullary T1-Sacral hematoma s/p emergent decompressive thoracolumbar laminectomy on 10/7. Post-op course complicated by diffused SAH

## 2021-10-11 NOTE — CHART NOTE - NSCHARTNOTEFT_GEN_A_CORE
HPI:  71m 911  with acute worsening of back pain while fishing with friends this morning. Acute bilateral leg weakness and numbness.  RLE 1/5, LLE 2/5 after decadron in ER.  Vazquez placed in Er for urinary retention, 700cc in bladder. Initially there was concern for GBS given recent flu vaccination, but the clinical history of pain followed by acute weakness was not consistent.  MRI spine performed and evaluated while patient in Er- there was concern for intradural, extramedulary lesion from T1-Sacrum c/w hematoma.  Serpentine vessel seen at T12 to L1 concerning for vascular lesion.  Patient taken for emergent thoracolumbar laminectomy on 10/7.     INTERVAL HPI:  10/7/21: T10-L2 laminectomy with SDH evacuation  10/7/21: Angio negative  10/10/21: Vazquez replaced for retention   10/11/21: [place] drain removal, Lovenox to be started at bedtime, patient to be downgraded to NSG SDU    Vital Signs Last 24 Hrs  T(C): 37 (11 Oct 2021 07:37), Max: 37.6 (10 Oct 2021 16:55)  T(F): 98.6 (11 Oct 2021 07:37), Max: 99.6 (10 Oct 2021 16:55)  HR: 92 (11 Oct 2021 10:00) (61 - 100)  BP: 144/67 (11 Oct 2021 10:00) (123/67 - 159/84)  BP(mean): 87 (11 Oct 2021 10:00) (82 - 131)  RR: 24 (11 Oct 2021 10:00) (14 - 24)  SpO2: 96% (11 Oct 2021 10:00) (91% - 100%)    PHYSICAL EXAM:  Constitutional: WN/WD in NAD    Neuro  * Mental Status:  GCS 15: Awake, alert, oriented to conversation. Speech is clear, face symmetric.  * Cranial Nerves: Cnii-Cnxii grossly intact. PERRL, EOMI, tongue midline, no gaze deviation  * Motor: b/l UE 5/5, RLE HF 2/5 KE 3/5 PF/DF 4/5, LLE HF 3/5 KE 4/5 PF/DF 4/5  * Sensory: Sensory decrease in B/L LE, R>L, R lateral thigh extending through lower leg and B/L plantar surfaces.   * Reflexes: Not assessed  * Gait: Not assessed    Cardiovascular:  S1, S2 no murmurs appreciated.  Regular rate and rhythm.  Eyes: See neurologic examination with detailed examination of eyes.  ENT: No JVD, Trachea Midline.  Respiratory: Clear to auscultation.  Gastrointestinal: Soft, nontender, nondistended.  Genitourinary: [x ] Vazquez, [ ] No Vazuqez.   Musculoskeletal: No muscle wasting noted, (See neurologic assessment for full muscle strength assessment) No pretibial edema appreciated, no appreciable calf tenderness.  Skin:  Wound c/d/i  Musculoskeletal: See detailed muscle strength examination, listed under neurologic examination.  Hematologic / Lymph / Immunologic: No bleeding from IV sites or wounds, No lymphadenopathy, No hives or allergic type skin lesions    LABS:                        9.9    8.09  )-----------( 183      ( 11 Oct 2021 05:05 )             28.8     10-11    141  |  103  |  26.6<H>  ----------------------------<  149<H>  4.3   |  22.0  |  0.62    Ca    9.0      11 Oct 2021 05:05  Phos  3.8     10-11  Mg     2.2     10-11    TPro  5.2<L>  /  Alb  3.3  /  TBili  0.8  /  DBili  0.2  /  AST  19  /  ALT  21  /  AlkPhos  38<L>  10-10          10-10 @ 07:01  -  10-11 @ 07:00  --------------------------------------------------------  IN: 740 mL / OUT: 2400 mL / NET: -1660 mL        RADIOLOGY & ADDITIONAL TESTS:  < from: MR Lumbar Spine w/wo IV Cont (10.08.21 @ 16:12) >    IMPRESSION:    -Postsurgical changes status post intraspinal hemorrhage evacuation. Residual subdural hemorrhage and postoperative air as described above.    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    -Previously seen linear enhancement within the lower thoracic levels is no longer seen. This may have represented active contrast extravasation.    --- End of Report ---            ROBERTA JOHNSON MD; Attending Radiologist  This document has been electronically signed. Oct  8 2021  7:20PM    < end of copied text >    Assessment:  71m 911  with acute worsening of back pain while fishing with friends. MRI spine performed and evaluated while patient in Er- there was concern for intradural, extramedulary lesion from T1-Sacrum c/w hematoma.  Serpentine vessel seen at T12 to L1 concerning for vascular lesion.  Patient taken for emergent thoracolumbar laminectomy on 10/7.   10/7/21: T10-L2 laminectomy with SDH evacuation  10/7/21: Angio negative  10/10/21: Vazquez replaced for retention   10/11/21: [place] drain removal, Lovenox to start at bedtime    Plan:  -Patient to be downgraded to the neurosurgery SDU under Dr. Uriarte  -Q2h neuro checks  -Decadron taper ordered (to off over 2 days)  -Neurontin 300 TID  -Robaxin 750 q8h  -Pain control PRN; Tylenol, Oxycodone, Fentanyl for breakthrough  -Vazquez (placed on 10/10) & Cardura 4 mg for urinary retention  -Lovenox 40 QHS to start this evening  -PT/OT/PMNR recommending acute rehab

## 2021-10-11 NOTE — PHYSICAL THERAPY INITIAL EVALUATION ADULT - IMPAIRMENTS CONTRIBUTING TO GAIT DEVIATIONS, PT EVAL
right LE foot placement difficulty due to decreased sensation and coordination, max verbal cues for sequencing and quad contraction right LE to prevent knee buckling/impaired balance/impaired coordination/decreased flexibility/decreased strength

## 2021-10-11 NOTE — OCCUPATIONAL THERAPY INITIAL EVALUATION ADULT - GROSSLY INTACT, SENSORY
see PT eval for assessment of LEs.  Significant impairment right LE./Left UE/Right UE/Grossly Intact

## 2021-10-11 NOTE — CONSULT NOTE ADULT - SUBJECTIVE AND OBJECTIVE BOX
71yM was admitted on 10-06 with bilateral leg weakness. Found to have an intradural extramedullary T1-Sacral hematoma s/p emergent decompressive thoracolumbar laminectomy on 10/7. Post-op course complicated by diffused SAH.     Imaging performed:  THORACIC SPINE 10/6 -  No fracture or acute traumatic malalignment. Streak artifact limits evaluation of the central canal. No definite high-grade central canal stenosis.    LUMBAR SPINE 10/6 - No fracture or acute traumatic malalignment. Degenerative changes as described. Streak artifact limits evaluation of the central canal. No definite high-grade central canal stenosis.    MRI C, T, L SPINE 10/6 - 1) Mixed signal intensity intradural, extramedullary lesion in the dorsal spinal canal extending from around T11 through the sacrum, with suggestion of T2 hypointense hemosiderin cap, most concerning for a hematoma comprised of blood of differing ages. 2) Diffuse subarachnoid hemorrhage throughout the thoracic spine. 3) Ventral epidural enhancement along the posterior clivus and upper cervical spine. Circumferential epidural enhancement throughout the cervical spine C1/C2-C7/T1. Subdural effusions and/or subdural hematomas in the posterior fossa along the cerebellar hemispheres and tentorial leaflets, and associated dural enhancement, with dural enhancement extending into the cervical spine, concerning for intracranial hypotension.    Patient has noted improved strength and numbness.  Discussed options for DC and wants home.  Discussed that would benefit AR pending therapy evals.     REVIEW OF SYSTEMS  Constitutional - No fever, No weight loss, No fatigue  HEENT - No eye pain, No visual disturbances, No difficulty hearing, No tinnitus, No vertigo, No neck pain  Respiratory - No cough, No wheezing, No shortness of breath  Cardiovascular - No chest pain, No palpitations  Gastrointestinal - No abdominal pain, No nausea, No vomiting, No diarrhea, No constipation  Genitourinary - No dysuria, No frequency, No hematuria, No incontinence  Neurological - No headaches, No memory loss, +loss of strength, +numbness, No tremors  Skin - No itching, No rashes, No lesions   Endocrine - No temperature intolerance  Musculoskeletal - +joint pain, No joint swelling, +muscle pain  Psychiatric - No depression, No anxiety    VITALS  T(C): 37 (10-11-21 @ 07:37), Max: 37.6 (10-10-21 @ 16:55)  HR: 91 (10-11-21 @ 09:00) (61 - 100)  BP: 147/82 (10-11-21 @ 09:00) (123/67 - 159/84)  RR: 17 (10-11-21 @ 09:00) (14 - 22)  SpO2: 93% (10-11-21 @ 09:00) (91% - 100%)  Wt(kg): --    PAST MEDICAL & SURGICAL HISTORY  High cholesterol    Colitis    Vertigo        FUNCTIONAL HISTORY  Lives with wife, 4-5 NUNU  Independent    CURRENT FUNCTIONAL STATUS      SOCIAL HISTORY - as per documentation/history  Smoking - None  EtOH - None  Drugs - None    FAMILY HISTORY   None in primary    RECENT LABS - Reviewed  CBC Full  -  ( 11 Oct 2021 05:05 )  WBC Count : 8.09 K/uL  RBC Count : 3.21 M/uL  Hemoglobin : 9.9 g/dL  Hematocrit : 28.8 %  Platelet Count - Automated : 183 K/uL  Mean Cell Volume : 89.7 fl  Mean Cell Hemoglobin : 30.8 pg  Mean Cell Hemoglobin Concentration : 34.4 gm/dL  Auto Neutrophil # : x  Auto Lymphocyte # : x  Auto Monocyte # : x  Auto Eosinophil # : x  Auto Basophil # : x  Auto Neutrophil % : x  Auto Lymphocyte % : x  Auto Monocyte % : x  Auto Eosinophil % : x  Auto Basophil % : x    10-11    141  |  103  |  26.6<H>  ----------------------------<  149<H>  4.3   |  22.0  |  0.62    Ca    9.0      11 Oct 2021 05:05  Phos  3.8     10-11  Mg     2.2     10-11    TPro  5.2<L>  /  Alb  3.3  /  TBili  0.8  /  DBili  0.2  /  AST  19  /  ALT  21  /  AlkPhos  38<L>  10-10        ALLERGIES  No Known Allergies      MEDICATIONS   ascorbic acid 500 milliGRAM(s) Oral daily  dexAMETHasone     Tablet   Oral   dexAMETHasone     Tablet 4 milliGRAM(s) Oral every 8 hours  dextrose 40% Gel 15 Gram(s) Oral once  dextrose 5%. 1000 milliLiter(s) IV Continuous <Continuous>  dextrose 5%. 1000 milliLiter(s) IV Continuous <Continuous>  dextrose 50% Injectable 25 Gram(s) IV Push once  dextrose 50% Injectable 12.5 Gram(s) IV Push once  dextrose 50% Injectable 25 Gram(s) IV Push once  doxazosin 4 milliGRAM(s) Oral at bedtime  fentaNYL    Injectable 25 MICROGram(s) IV Push every 2 hours PRN  gabapentin 300 milliGRAM(s) Oral every 8 hours  glucagon  Injectable 1 milliGRAM(s) IntraMuscular once  insulin lispro (ADMELOG) corrective regimen sliding scale   SubCutaneous three times a day before meals  methocarbamol 750 milliGRAM(s) Oral every 8 hours  multivitamin 1 Tablet(s) Oral daily  ondansetron Injectable 4 milliGRAM(s) IV Push every 6 hours PRN  oxyCODONE    IR 5 milliGRAM(s) Oral every 4 hours PRN  oxyCODONE    IR 10 milliGRAM(s) Oral every 4 hours PRN  pantoprazole    Tablet 40 milliGRAM(s) Oral before breakfast  polyethylene glycol 3350 17 Gram(s) Oral two times a day  senna 2 Tablet(s) Oral at bedtime      ----------------------------------------------------------------------------------------  PHYSICAL EXAM  Constitutional - NAD, Comfortable  HEENT - NCAT, EOMI  Neck - Supple, No limited ROM  Chest - Breathing comfortably, No wheezing  Cardiovascular - S1S2   Abdomen - Soft   Extremities - Mild BLE swelling   Neurologic Exam -                    Cognitive - AAO to self, place, date, year, situation     Motor -                      LEFT    UE - C5 5/5, C6 5/5, C7 5/5, C8 5/5, T1 5/5                    RIGHT UE - C5 5/5, C6 5/5, C7 5/5, C8 5/5, T1 5/5                    LEFT    LE - L2 3/5, L3 2/5, L4 2/5, L5 2/5, S1 4/5                    RIGHT LE - L2 2/5, L3 2/5, L4 3/5, L5 3/5, S1 4/5      Sensory - Decreased to the right lateral thigh, lower leg and bilateral soles  Psychiatric - Mood stable, Affect WNL  ----------------------------------------------------------------------------------------  ASSESSMENT/PLAN  71yMale with functional deficits after having BLE weakness related to thoracolumbosacral hematoma  Intradural extramedullary T1-Sacral hematoma s/p thoracolumbar laminectomy - [place]  Pain - Fentanyl, Oxycodone, Robaxin, Neurontin  DVT PPX - SCDs  Rehab - PT/OR pending. Although patient may want home and has ramp planned to enter, recommend ACUTE inpatient rehabilitation for the functional deficits consisting of 3 hours of therapy/day & 24 hour RN/daily PMR physician for comorbid medical management. Will continue to follow for ongoing rehab needs and recommendations. Patient will be able to tolerate 3 hours a day.    Continue bedside therapy as well as OOB throughout the day with mobilization throughout the day with staff to maintain cardiopulmonary function and prevention of secondary complications related to debility.     Discussed with rehab clinical team.

## 2021-10-11 NOTE — OCCUPATIONAL THERAPY INITIAL EVALUATION ADULT - LEVEL OF INDEPENDENCE: BED TO CHAIR, REHAB EVAL
chair to chair, no turns (chair follow behind pt during mobility with RW)/minimum assist (75% patients effort)

## 2021-10-11 NOTE — OCCUPATIONAL THERAPY INITIAL EVALUATION ADULT - IMPAIRED TRANSFERS: SIT/STAND, REHAB EVAL
impaired balance/impaired coordination/impaired motor control/pain/decreased ROM/decreased sensation/impaired sensory feedback/decreased strength

## 2021-10-11 NOTE — PROGRESS NOTE ADULT - SUBJECTIVE AND OBJECTIVE BOX
HPI: 71m 911  with acute worsening of back pain while fishing with friends this morning. Acute bilateral leg weakness and numbness.  RLE 1/5, LLE 2/5 after decadron in ER.  Vazquez placed in Er for urinary retention, 700cc in bladder. Initially there was concern for GBS given recent flu vaccination, but the clinical history of pain followed by acute weakness was not consistent.  MRI spine performed and evaluated while patient in Er- there was concern for intradural, extramedulary lesion from T1-Sacrum c/w hematoma.  [place] vessel seen at T12 to L1 concerning for vascular lesion.  Patient taken for emergent thoracolumbar laminectomy on 10/7.     24hr Events: Patient had neg angio, MRI showed post-op change. vasculitis w/u neg.     Physical Exam  Constitutional: WN/WD in NAD    Neuro  * Mental Status:  GCS 15: Awake, alert, oriented to conversation. Speech is clear, face symmetric.  * Cranial Nerves: Cnii-Cnxii grossly intact. PERRL, EOMI, tongue midline, no gaze deviation  * Motor: b/l UE & Left LE 5/5, Right LE antigravity 3/5,   * Sensory: Sensory decrease on the right below umbilicus down.   * Reflexes: Not assessed  * Gait: Not assessed    Cardiovascular:  S1, S2 no murmurs appreciated.  Regular rate and rhythm.  Eyes: See neurologic examination with detailed examination of eyes.  ENT: No JVD, Trachea Midline.  Respiratory: Clear to auscultation.  Gastrointestinal: Soft, nontender, nondistended.  Genitourinary: [x ] Vazquez, [ ] No Vazquez.   Musculoskeletal: No muscle wasting noted, (See neuorlogic assessment for full muscle strength assessment) No pretibial edema appreciated, no appreciable calf tenderness.  Skin:  Wound c/d/i  Musculoskeletal: See detailed muscle strength examination, listed under neurologic examination.  Hematologic / Lymph / Immunologic: No bleeding from IV sites or wounds, No lymphadenopathy, No HIves or allergic type skin lesions    Vital Signs Last 24 Hrs  T(C): 37.4 (10 Oct 2021 20:41), Max: 37.6 (10 Oct 2021 16:55)  T(F): 99.3 (10 Oct 2021 20:41), Max: 99.6 (10 Oct 2021 16:55)  HR: 78 (10 Oct 2021 22:00) (63 - 98)  BP: 157/124 (10 Oct 2021 22:00) (115/76 - 157/124)  BP(mean): 131 (10 Oct 2021 22:00) (87 - 131)  RR: 18 (10 Oct 2021 22:00) (11 - 22)  SpO2: 96% (10 Oct 2021 22:00) (92% - 99%)    I&O's Summary    09 Oct 2021 07:01  -  10 Oct 2021 07:00  --------------------------------------------------------  IN: 739.8 mL / OUT: 115 mL / NET: 624.8 mL    10 Oct 2021 07:01  -  11 Oct 2021 00:24  --------------------------------------------------------  IN: 740 mL / OUT: 1625 mL / NET: -885 mL    Labs:                     8.9    7.52  )-----------( 163      ( 10 Oct 2021 06:26 )             26.3     142  |  105  |  24.7<H>  ----------------------------<  155<H>  4.2   |  24.0  |  0.54    Ca    8.5<L>      10 Oct 2021 06:26  Phos  2.8     10-10  Mg     2.4     10-10  TPro  5.2<L>  /  Alb  3.3  /  TBili  0.8  /  DBili  0.2  /  AST  19  /  ALT  21  /  AlkPhos  38<L>  10-10  LIVER FUNCTIONS - ( 10 Oct 2021 06:26 )  Alb: 3.3 g/dL / Pro: 5.2 g/dL / ALK PHOS: 38 U/L / ALT: 21 U/L / AST: 19 U/L / GGT: x           RADIOLOGY & ADDITIONAL TESTS:  MR Lumbar Spine w/wo IV Cont (10.08.21 @ 16:12) >  IMPRESSION:  -Postsurgical changes status post intraspinal hemorrhage evacuation. Residual subdural hemorrhage and postoperative air as described above.  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  -Previously seen linear enhancement within the lower thoracic levels is no longer seen. This may have represented active contrast extravasation.    MR Angio Head No Cont (10.08.21 @ 14:42) >  IMPRESSION:  MRI brain:  -Diffuse subarachnoid hemorrhage.  -No enhancing mass identified.    MRA BRAIN:  -No vaso-occlusive disease or vascular lesion identified.    MR Thoracic Spine w/wo IV Cont (10.06.21 @ 22:36) >  Impression:  1) Mixed signal intensity intradural, extramedullary lesion in the dorsal spinal canal extending from around T11 through the sacrum, with suggestion of T2 hypointense hemosiderin cap, most concerning for a hematoma comprised of blood of differing ages.  2) Diffuse subarachnoid hemorrhage throughout the thoracic spine.  3) Ventral epidural enhancement along the posterior clivus and upper cervical spine. Circumferential epidural enhancement throughout the cervical spine C1/C2-C7/T1. Subdural effusions and/or subdural hematomas in the posterior fossa along the cerebellar hemispheres and tentorial leaflets, and associated dural enhancement, with dural enhancement extending into the cervical spine, concerning for intracranial hypotension.    CT Thoracic Spine No Cont (10.06.21 @ 16:47) >  IMPRESSION:  THORACIC SPINE: No fracture or acute traumatic malalignment. Streak artifact limits evaluation of the central canal. No definite high-grade central canal stenosis.  LUMBAR SPINE: No fracture or acute traumatic malalignment. Degenerative changes as described. Streak artifact limits evaluation of the central canal. No definite high-grade central canal stenosis.

## 2021-10-11 NOTE — PROGRESS NOTE ADULT - ASSESSMENT
72 yo M with intradural extramedullary T1-Sacrum hematoma, s/p emergent decompressive thoracolumbar laminectomy 10/7/2021. Etiology unclear, broad differential for now. MRI non-contributory, DSA negative for vascular pathology.  SAH, likely distribution from spine.  PMH of UC.  HLD.  H/o RBBB.  Acute postop anemia.  Leukocytosis, likely due to steroids.    Plan: POD 4  DC drain today  neurochecks q2hr  vasculitis/autoimmune and hypocoag w/up pending  maintain MAP  till Monday per NSGy  but avoid HTN, maintain -160; anti-hypertensives PRN  pain control: PO Oxy5 and 10 with Fentanyl IVP for breakthrough PRN per regimen + muscle relaxants  cont Dexa per NSGy, start taper    diet as tolerated   BM regimen as on opiates for pain-  miralax  GI involvement for UC maintenance therapy choice  send hemolysis w/up  voiding trial, continue cardura  follow up CRP, ESR, C-ANCA  SCDs, SQL  PT as tolerated      Pt is critically ill and at high risk of rapid deterioration/death due to abovementioned conditions.   Still requires critical care interventions - ongoing frequent evaluations, interventions and management adjustment by the Attending and ICU team, - and included review of relevant history, clinical examination, review of data and images, discussion of treatment with the multidisciplinary team and any consultants involved in this patient’s care as well as family discussion.      72 yo M with intradural extramedullary T1-Sacrum hematoma, s/p emergent decompressive thoracolumbar laminectomy 10/7/2021. Etiology unclear, broad differential for now. MRI non-contributory, DSA negative for vascular pathology.  SAH, likely distribution from spine.  PMH of UC.  HLD.  H/o RBBB.  Acute postop anemia.  Leukocytosis, likely due to steroids.    Plan: POD 4  DC drain today  neurochecks q2hr  vasculitis/autoimmune and hypocoag w/up pending  maintain -160; anti-hypertensives PRN  pain control: PO Oxy5 and 10 with Fentanyl IVP for breakthrough PRN per regimen + muscle relaxants  cont Dexa per NSGy, taper    diet as tolerated   BM regimen as on opiates for pain-  miralax  GI involvement for UC maintenance therapy choice  voiding trial, continue cardura  SCDs, SQL held for drain removal  PT as tolerated  PMNR- acute rehab      Pt is critically ill and at high risk of rapid deterioration/death due to abovementioned conditions.   Still requires critical care interventions - ongoing frequent evaluations, interventions and management adjustment by the Attending and ICU team, - and included review of relevant history, clinical examination, review of data and images, discussion of treatment with the multidisciplinary team and any consultants involved in this patient’s care as well as family discussion.

## 2021-10-11 NOTE — OCCUPATIONAL THERAPY INITIAL EVALUATION ADULT - BATHING, PREVIOUS LEVEL OF FUNCTION, OT EVAL
Pt has tub on main level, shower stall on upper level.  Pt reports spouse is currently renovating home for easier accessible bathroom/shower./independent

## 2021-10-11 NOTE — PHYSICAL THERAPY INITIAL EVALUATION ADULT - ADDITIONAL COMMENTS
Pt. lives with his wife who is retired and able to assist as needed. 5 NUNU with rail and 0 stairs inside. Having ramp installed. Pt. was independent PTA and does not own DME.

## 2021-10-11 NOTE — PROGRESS NOTE ADULT - ASSESSMENT
Assessment:  74M s/p from T10-L2 laminectomy for decompression of intradural/extramedullary hematoma on 10/7. Patient has been clinically improving regarding sensation deficits and weakness of the RLE     Plan  - cont neuro checks q2hr with protective sleep  - Continue dexamethasone taper for cord edema secondary to compression   - Pain control with oxy, tylenol & robaxin for muscle spasm  - Maintain MAPs above 80 to maximize spinal cord perfusion until today. liberalize today  - Diet as tolerated   - Monitor [place] drainage output   - PT/OT & rehab consults. R LE brace ordered for ambulation  - Lovenox for dvt ppx on hold for drain removal today  - medical management as per NSICU attending   - Plan to be further discussed during morning rounds

## 2021-10-11 NOTE — OCCUPATIONAL THERAPY INITIAL EVALUATION ADULT - MODIFIED CLINICAL TEST OF SENSORY INTEGRATION IN BALANCE TEST
static standing balance F-.  Pt needs to lock right knee for support due to decreased MS and sensation right LE.  Verbal cues to adapt to sensation and MS impairment.

## 2021-10-11 NOTE — PHYSICAL THERAPY INITIAL EVALUATION ADULT - MANUAL MUSCLE TESTING RESULTS, REHAB EVAL
5/5 bilateral UEs throughout, right LE: hip flexion 1/5, knee ext 3/5, right ankle PF and DF 3/5, left LE: hip flexion 3/5, knee ext 4/5, ankle PF and DF 5/5

## 2021-10-11 NOTE — CHART NOTE - NSCHARTNOTEFT_GEN_A_CORE
Patient was laid flat. Drain was taken off suction. Sterile dressing removed with dried blood noted. Incision clean, dry, intact without drainage or dehiscence noted. [place] drain removed slowly with minimal drainage from site. 2 staples placed to close drain site. Patient tolerated procedure well. RN aware.

## 2021-10-11 NOTE — OCCUPATIONAL THERAPY INITIAL EVALUATION ADULT - ADDITIONAL COMMENTS
Pt lives in private home with  5 steps to enter, bilateral wideset HR.  Once inside he doesn't need to manage stairs unless he wants stall shower.  But spouse is planning renovations for easier accessiblity.    Pt was driving prior to admission.   He has no medical equipment, but spouse is in process of getting ramp to enter, easily accessible bathroom/shower.

## 2021-10-12 LAB
ANION GAP SERPL CALC-SCNC: 9 MMOL/L — SIGNIFICANT CHANGE UP (ref 5–17)
BUN SERPL-MCNC: 25.1 MG/DL — HIGH (ref 8–20)
CALCIUM SERPL-MCNC: 8.3 MG/DL — LOW (ref 8.6–10.2)
CARDIOLIPIN AB SER-ACNC: NEGATIVE — SIGNIFICANT CHANGE UP
CHLORIDE SERPL-SCNC: 101 MMOL/L — SIGNIFICANT CHANGE UP (ref 98–107)
CO2 SERPL-SCNC: 26 MMOL/L — SIGNIFICANT CHANGE UP (ref 22–29)
CREAT SERPL-MCNC: 0.56 MG/DL — SIGNIFICANT CHANGE UP (ref 0.5–1.3)
CRYOGLOB SERPL-MCNC: NEGATIVE — SIGNIFICANT CHANGE UP
GLUCOSE BLDC GLUCOMTR-MCNC: 112 MG/DL — HIGH (ref 70–99)
GLUCOSE BLDC GLUCOMTR-MCNC: 119 MG/DL — HIGH (ref 70–99)
GLUCOSE BLDC GLUCOMTR-MCNC: 134 MG/DL — HIGH (ref 70–99)
GLUCOSE BLDC GLUCOMTR-MCNC: 153 MG/DL — HIGH (ref 70–99)
GLUCOSE SERPL-MCNC: 116 MG/DL — HIGH (ref 70–99)
HCT VFR BLD CALC: 27.8 % — LOW (ref 39–50)
HGB BLD-MCNC: 9.5 G/DL — LOW (ref 13–17)
MAGNESIUM SERPL-MCNC: 2.1 MG/DL — SIGNIFICANT CHANGE UP (ref 1.6–2.6)
MCHC RBC-ENTMCNC: 30.9 PG — SIGNIFICANT CHANGE UP (ref 27–34)
MCHC RBC-ENTMCNC: 34.2 GM/DL — SIGNIFICANT CHANGE UP (ref 32–36)
MCV RBC AUTO: 90.6 FL — SIGNIFICANT CHANGE UP (ref 80–100)
PHOSPHATE SERPL-MCNC: 3.9 MG/DL — SIGNIFICANT CHANGE UP (ref 2.4–4.7)
PLATELET # BLD AUTO: 187 K/UL — SIGNIFICANT CHANGE UP (ref 150–400)
POTASSIUM SERPL-MCNC: 4.4 MMOL/L — SIGNIFICANT CHANGE UP (ref 3.5–5.3)
POTASSIUM SERPL-SCNC: 4.4 MMOL/L — SIGNIFICANT CHANGE UP (ref 3.5–5.3)
RBC # BLD: 3.07 M/UL — LOW (ref 4.2–5.8)
RBC # FLD: 13.2 % — SIGNIFICANT CHANGE UP (ref 10.3–14.5)
SODIUM SERPL-SCNC: 136 MMOL/L — SIGNIFICANT CHANGE UP (ref 135–145)
WBC # BLD: 7.2 K/UL — SIGNIFICANT CHANGE UP (ref 3.8–10.5)
WBC # FLD AUTO: 7.2 K/UL — SIGNIFICANT CHANGE UP (ref 3.8–10.5)

## 2021-10-12 PROCEDURE — 99233 SBSQ HOSP IP/OBS HIGH 50: CPT

## 2021-10-12 RX ADMIN — Medication 4 MILLIGRAM(S): at 22:01

## 2021-10-12 RX ADMIN — Medication 650 MILLIGRAM(S): at 15:35

## 2021-10-12 RX ADMIN — PANTOPRAZOLE SODIUM 40 MILLIGRAM(S): 20 TABLET, DELAYED RELEASE ORAL at 05:36

## 2021-10-12 RX ADMIN — Medication 4 MILLIGRAM(S): at 05:35

## 2021-10-12 RX ADMIN — SENNA PLUS 2 TABLET(S): 8.6 TABLET ORAL at 22:01

## 2021-10-12 RX ADMIN — METHOCARBAMOL 750 MILLIGRAM(S): 500 TABLET, FILM COATED ORAL at 05:35

## 2021-10-12 RX ADMIN — ENOXAPARIN SODIUM 40 MILLIGRAM(S): 100 INJECTION SUBCUTANEOUS at 22:02

## 2021-10-12 RX ADMIN — METHOCARBAMOL 750 MILLIGRAM(S): 500 TABLET, FILM COATED ORAL at 14:46

## 2021-10-12 RX ADMIN — Medication 2: at 08:47

## 2021-10-12 RX ADMIN — GABAPENTIN 300 MILLIGRAM(S): 400 CAPSULE ORAL at 14:46

## 2021-10-12 RX ADMIN — Medication 2 MILLIGRAM(S): at 17:53

## 2021-10-12 RX ADMIN — GABAPENTIN 300 MILLIGRAM(S): 400 CAPSULE ORAL at 22:02

## 2021-10-12 RX ADMIN — Medication 650 MILLIGRAM(S): at 14:45

## 2021-10-12 RX ADMIN — METHOCARBAMOL 750 MILLIGRAM(S): 500 TABLET, FILM COATED ORAL at 22:01

## 2021-10-12 RX ADMIN — Medication 1 TABLET(S): at 12:39

## 2021-10-12 RX ADMIN — Medication 500 MILLIGRAM(S): at 12:39

## 2021-10-12 RX ADMIN — GABAPENTIN 300 MILLIGRAM(S): 400 CAPSULE ORAL at 05:35

## 2021-10-12 RX ADMIN — Medication 650 MILLIGRAM(S): at 04:30

## 2021-10-12 RX ADMIN — POLYETHYLENE GLYCOL 3350 17 GRAM(S): 17 POWDER, FOR SOLUTION ORAL at 14:51

## 2021-10-12 RX ADMIN — Medication 650 MILLIGRAM(S): at 03:45

## 2021-10-12 NOTE — PROGRESS NOTE ADULT - SUBJECTIVE AND OBJECTIVE BOX
HPI: 71m 911  with acute worsening of back pain while fishing with friends this morning. Acute bilateral leg weakness and numbness.  RLE 1/5, LLE 2/5 after decadron in ER.  Vazquez placed in Er for urinary retention, 700cc in bladder    Initially there was concern for GBS given recent flu vaccination, but the clinical history of pain followed by acute weakness was not consistent.  MRI spine performed and evaluated while patient in Er- there was concern for intradural, extramedulary lesion from T1-Sacrum c/w hematoma.  [place] vessel seen at T12 to L1 concerning for vascular lesion.  Patient taken for emergent thoracolumbar laminectomy with Dr. Uriaret.   (07 Oct 2021 01:03)      INTERVAL OVERNIGHT EVENTS: 10/12 POD # 5 s/p T10-L2 lami/decompression  for spontaneous hemorrhage  71y Male seen lying comfortably in bed. Tolerating diet. OOB in recliner.     Vital Signs Last 24 Hrs  T(C): 37 (12 Oct 2021 15:39), Max: 37.2 (12 Oct 2021 00:00)  T(F): 98.6 (12 Oct 2021 15:39), Max: 98.9 (12 Oct 2021 00:00)  HR: 81 (12 Oct 2021 17:00) (65 - 82)  BP: 145/78 (12 Oct 2021 17:00) (108/81 - 167/87)  BP(mean): 96 (12 Oct 2021 17:00) (57 - 105)  RR: 21 (12 Oct 2021 17:00) (15 - 21)  SpO2: 98% (12 Oct 2021 17:00) (93% - 100%)    PHYSICAL EXAM:  GENERAL: NAD, well-groomed, well-developed  WOUND: Intact  MENTAL STATUS: AAO x3,  Appropriately conversant without aphasia,  following simple commands  CRANIAL NERVES:  EOMI without nystagmus. Facial sensation intact V1-3 distribution b/l. Face symmetric w/ normal eye closure and smile, tongue midline. Hearing grossly intact. Speech clear. Head turning and shoulder shrug intact.   MOTOR: 5/5 in bilateral upper extremities  Lowers      HF(L1/L2)       KE (L3)     DF (L4)     EHL (L5)     PF (S1)      R                     2/5             4/5           5/5           4/5            5/5  L                     4/5               5/5          5/5            4/5            5/5  SENSATION: + decreased sensation on right leg, down to the foot      LABS:                        9.5    7.20  )-----------( 187      ( 12 Oct 2021 06:06 )             27.8     10-12    136  |  101  |  25.1<H>  ----------------------------<  116<H>  4.4   |  26.0  |  0.56    Ca    8.3<L>      12 Oct 2021 06:06  Phos  3.9     10-12  Mg     2.1     10-12            10-11 @ 07:01  -  10-12 @ 07:00  --------------------------------------------------------  IN: 1440 mL / OUT: 2400 mL / NET: -960 mL        RADIOLOGY & ADDITIONAL TESTS:          CAPRINI SCORE [CLOT]:  Patient has an estimated Caprini score of greater than 5.  However, the patient's unique clinical situation will be addressed in an individual manner to determine appropriate anticoagulation treatment, if any.

## 2021-10-12 NOTE — PROGRESS NOTE ADULT - SUBJECTIVE AND OBJECTIVE BOX
Patient reports he continues to have right leg weakness.  Required Vazquez to go back in.  Reports that has been having difficulty with controlling bowel as well.    REVIEW OF SYSTEMS  Constitutional - No fever,  No fatigue  Neurological - No headaches, No memory loss, +loss of strength, +numbness, No tremors  Musculoskeletal - +joint pain, No joint swelling, +muscle pain  Psychiatric - No depression, No anxiety    FUNCTIONAL PROGRESS  10/12 PT  Bed Mobility  Bed Mobility Training Supine-to-Sit: minimum assist (75% patient effort);  1 person assist;  bed rails  Bed Mobility Training Limitations: decreased ability to use arms for pushing/pulling;  decreased ability to use legs for bridging/pushing;  decreased flexibility;  decreased ROM    Sit-Stand Transfer Training  Transfer Training Sit-to-Stand Transfer: minimum assist (75% patient effort);  1 person assist;  full weight-bearing   rolling walker  Transfer Training Stand-to-Sit Transfer: minimum assist (75% patient effort);  1 person assist;  full weight-bearing   rolling walker  Sit-to-Stand Transfer Training Transfer Safety Analysis: decreased balance;  decreased flexibility;  decreased strength;  pain;  impaired coordination    Gait Training  Gait Training: minimum assist (75% patient effort);  moderate assist (50% patient effort);  1 person assist;  chair follow;  full weight-bearing   rolling walker;  35 ft  Gait Analysis: decreased step length;  decreased stride length;  difficulty placing right foot due to decreased corrdination/motor control. verbal cues for right LE quad contraction to prevent buckling;  decreased ROM;  impaired coordination;  impaired motor control;  pain    10/11 OT  Bathing Training:     · Level of [place]	moderate assist (50% patients effort); sponge bathe supine    Upper Body Dressing Training:     · Level of [place]	minimum assist (75% patients effort)    Lower Body Dressing Training:     · Level of [place]	maximum assist (25% patients effort); supine    Toilet Hygiene Training:     · Level of [place]	moderate assist (50% patients effort)  · Physical Assist/Nonphysical Assist	2 person assist  · Assistive Device	bedside commode, RW    Grooming Training:     · Level of [place]	supervision  · Physical Assist/Nonphysical Assist	set-up required    Eating/Self-Feeding Training:     · Level of [place]	independent      VITALS  T(C): 36.4 (10-12-21 @ 07:40), Max: 37.2 (10-12-21 @ 00:00)  HR: 66 (10-12-21 @ 11:00) (65 - 90)  BP: 142/80 (10-12-21 @ 11:00) (108/81 - 167/87)  RR: 15 (10-12-21 @ 11:00) (14 - 25)  SpO2: 98% (10-12-21 @ 11:00) (92% - 98%)  Wt(kg): --    MEDICATIONS   acetaminophen   Tablet .. 650 milliGRAM(s) every 6 hours PRN  ascorbic acid 500 milliGRAM(s) daily  dexAMETHasone     Tablet     dexAMETHasone     Tablet 2 milliGRAM(s) every 12 hours  dextrose 40% Gel 15 Gram(s) once  dextrose 5%. 1000 milliLiter(s) <Continuous>  dextrose 5%. 1000 milliLiter(s) <Continuous>  dextrose 50% Injectable 25 Gram(s) once  dextrose 50% Injectable 12.5 Gram(s) once  dextrose 50% Injectable 25 Gram(s) once  doxazosin 4 milliGRAM(s) at bedtime  enoxaparin Injectable 40 milliGRAM(s) at bedtime  fentaNYL    Injectable 25 MICROGram(s) every 2 hours PRN  gabapentin 300 milliGRAM(s) every 8 hours  glucagon  Injectable 1 milliGRAM(s) once  insulin lispro (ADMELOG) corrective regimen sliding scale   three times a day before meals  methocarbamol 750 milliGRAM(s) every 8 hours  multivitamin 1 Tablet(s) daily  ondansetron Injectable 4 milliGRAM(s) every 6 hours PRN  oxyCODONE    IR 5 milliGRAM(s) every 4 hours PRN  oxyCODONE    IR 10 milliGRAM(s) every 4 hours PRN  pantoprazole    Tablet 40 milliGRAM(s) before breakfast  polyethylene glycol 3350 17 Gram(s) two times a day  senna 2 Tablet(s) at bedtime      RECENT LABS/IMAGING                          9.5    7.20  )-----------( 187      ( 12 Oct 2021 06:06 )             27.8     10-12    136  |  101  |  25.1<H>  ----------------------------<  116<H>  4.4   |  26.0  |  0.56    Ca    8.3<L>      12 Oct 2021 06:06  Phos  3.9     10-12  Mg     2.1     10-12                  THORACIC SPINE 10/6 -  No fracture or acute traumatic malalignment. Streak artifact limits evaluation of the central canal. No definite high-grade central canal stenosis.    LUMBAR SPINE 10/6 - No fracture or acute traumatic malalignment. Degenerative changes as described. Streak artifact limits evaluation of the central canal. No definite high-grade central canal stenosis.    MRI C, T, L SPINE 10/6 - 1) Mixed signal intensity intradural, extramedullary lesion in the dorsal spinal canal extending from around T11 through the sacrum, with suggestion of T2 hypointense hemosiderin cap, most concerning for a hematoma comprised of blood of differing ages. 2) Diffuse subarachnoid hemorrhage throughout the thoracic spine. 3) Ventral epidural enhancement along the posterior clivus and upper cervical spine. Circumferential epidural enhancement throughout the cervical spine C1/C2-C7/T1. Subdural effusions and/or subdural hematomas in the posterior fossa along the cerebellar hemispheres and tentorial leaflets, and associated dural enhancement, with dural enhancement extending into the cervical spine, concerning for intracranial hypotension.      ----------------------------------------------------------------------------------------  PHYSICAL EXAM  Constitutional - NAD, Comfortable  Extremities - Mild BLE swelling   Neurologic Exam -                    Cognitive - AAO to self, place, date, year, situation     Motor -                      LEFT    UE - C5 5/5, C6 5/5, C7 5/5, C8 5/5, T1 5/5                    RIGHT UE - C5 5/5, C6 5/5, C7 5/5, C8 5/5, T1 5/5                    LEFT    LE - L2 3/5, L3 2/5, L4 2/5, L5 2/5, S1 4/5                    RIGHT LE - L2 2/5, L3 2/5, L4 3/5, L5 3/5, S1 4/5      Sensory - Decreased to the right lateral thigh, lower leg and bilateral soles  Psychiatric - Mood stable, Affect WNL  ----------------------------------------------------------------------------------------  ASSESSMENT/PLAN  71yMale with functional deficits after having BLE weakness related to thoracolumbosacral hematoma  Intradural extramedullary T1-Sacral hematoma s/p thoracolumbar laminectomy - [place]  Pain - Fentanyl, Oxycodone, Robaxin, Neurontin  Neurogenic Bladder - Vazquez  Neurogenic Bowel - Miralax BID  DVT PPX - SCDs, Lovenox  Rehab - Although patient may want home and has ramp planned to enter, given his SCI, recommend ACUTE inpatient rehabilitation for the functional deficits consisting of 3 hours of therapy/day & 24 hour RN/daily PMR physician for comorbid medical management. Will continue to follow for ongoing rehab needs and recommendations. Patient will be able to tolerate 3 hours a day.    Continue bedside therapy as well as OOB throughout the day with mobilization throughout the day with staff to maintain cardiopulmonary function and prevention of secondary complications related to debility.     Discussed with rehab clinical team.

## 2021-10-12 NOTE — PROGRESS NOTE ADULT - ASSESSMENT
71yMale with BL lower extremity  weakness found to have a thoracolumbosacral hematoma, he is now POD # 5 from a T10 - L2 lami/decpompression     1. Downgraded to neurosurgery on 10/11  2. Cont physical therapy  3. PM&R following & recommends AR  4. Pain control as needed  5. Incentive spirometer  6. Mechanical DVT prophylaxis

## 2021-10-13 LAB
ANION GAP SERPL CALC-SCNC: 13 MMOL/L — SIGNIFICANT CHANGE UP (ref 5–17)
BUN SERPL-MCNC: 24.7 MG/DL — HIGH (ref 8–20)
CALCIUM SERPL-MCNC: 8.7 MG/DL — SIGNIFICANT CHANGE UP (ref 8.6–10.2)
CHLORIDE SERPL-SCNC: 99 MMOL/L — SIGNIFICANT CHANGE UP (ref 98–107)
CO2 SERPL-SCNC: 25 MMOL/L — SIGNIFICANT CHANGE UP (ref 22–29)
CREAT SERPL-MCNC: 0.62 MG/DL — SIGNIFICANT CHANGE UP (ref 0.5–1.3)
GLUCOSE BLDC GLUCOMTR-MCNC: 103 MG/DL — HIGH (ref 70–99)
GLUCOSE BLDC GLUCOMTR-MCNC: 110 MG/DL — HIGH (ref 70–99)
GLUCOSE BLDC GLUCOMTR-MCNC: 115 MG/DL — HIGH (ref 70–99)
GLUCOSE BLDC GLUCOMTR-MCNC: 158 MG/DL — HIGH (ref 70–99)
GLUCOSE SERPL-MCNC: 101 MG/DL — HIGH (ref 70–99)
HCT VFR BLD CALC: 29 % — LOW (ref 39–50)
HGB BLD-MCNC: 9.9 G/DL — LOW (ref 13–17)
MAGNESIUM SERPL-MCNC: 2.1 MG/DL — SIGNIFICANT CHANGE UP (ref 1.6–2.6)
MCHC RBC-ENTMCNC: 30.7 PG — SIGNIFICANT CHANGE UP (ref 27–34)
MCHC RBC-ENTMCNC: 34.1 GM/DL — SIGNIFICANT CHANGE UP (ref 32–36)
MCV RBC AUTO: 90.1 FL — SIGNIFICANT CHANGE UP (ref 80–100)
PHOSPHATE SERPL-MCNC: 4.5 MG/DL — SIGNIFICANT CHANGE UP (ref 2.4–4.7)
PLATELET # BLD AUTO: 187 K/UL — SIGNIFICANT CHANGE UP (ref 150–400)
POTASSIUM SERPL-MCNC: 4.3 MMOL/L — SIGNIFICANT CHANGE UP (ref 3.5–5.3)
POTASSIUM SERPL-SCNC: 4.3 MMOL/L — SIGNIFICANT CHANGE UP (ref 3.5–5.3)
RBC # BLD: 3.22 M/UL — LOW (ref 4.2–5.8)
RBC # FLD: 13.3 % — SIGNIFICANT CHANGE UP (ref 10.3–14.5)
SARS-COV-2 RNA SPEC QL NAA+PROBE: SIGNIFICANT CHANGE UP
SODIUM SERPL-SCNC: 137 MMOL/L — SIGNIFICANT CHANGE UP (ref 135–145)
WBC # BLD: 8.23 K/UL — SIGNIFICANT CHANGE UP (ref 3.8–10.5)
WBC # FLD AUTO: 8.23 K/UL — SIGNIFICANT CHANGE UP (ref 3.8–10.5)

## 2021-10-13 PROCEDURE — 99233 SBSQ HOSP IP/OBS HIGH 50: CPT

## 2021-10-13 RX ADMIN — PANTOPRAZOLE SODIUM 40 MILLIGRAM(S): 20 TABLET, DELAYED RELEASE ORAL at 05:57

## 2021-10-13 RX ADMIN — METHOCARBAMOL 750 MILLIGRAM(S): 500 TABLET, FILM COATED ORAL at 05:56

## 2021-10-13 RX ADMIN — Medication 650 MILLIGRAM(S): at 03:46

## 2021-10-13 RX ADMIN — METHOCARBAMOL 750 MILLIGRAM(S): 500 TABLET, FILM COATED ORAL at 13:38

## 2021-10-13 RX ADMIN — OXYCODONE HYDROCHLORIDE 5 MILLIGRAM(S): 5 TABLET ORAL at 23:41

## 2021-10-13 RX ADMIN — OXYCODONE HYDROCHLORIDE 5 MILLIGRAM(S): 5 TABLET ORAL at 22:41

## 2021-10-13 RX ADMIN — Medication 650 MILLIGRAM(S): at 12:20

## 2021-10-13 RX ADMIN — Medication 1 TABLET(S): at 12:18

## 2021-10-13 RX ADMIN — Medication 4 MILLIGRAM(S): at 21:13

## 2021-10-13 RX ADMIN — GABAPENTIN 300 MILLIGRAM(S): 400 CAPSULE ORAL at 05:56

## 2021-10-13 RX ADMIN — ENOXAPARIN SODIUM 40 MILLIGRAM(S): 100 INJECTION SUBCUTANEOUS at 21:13

## 2021-10-13 RX ADMIN — Medication 650 MILLIGRAM(S): at 12:40

## 2021-10-13 RX ADMIN — Medication 650 MILLIGRAM(S): at 04:17

## 2021-10-13 RX ADMIN — GABAPENTIN 300 MILLIGRAM(S): 400 CAPSULE ORAL at 21:12

## 2021-10-13 RX ADMIN — METHOCARBAMOL 750 MILLIGRAM(S): 500 TABLET, FILM COATED ORAL at 21:12

## 2021-10-13 RX ADMIN — GABAPENTIN 300 MILLIGRAM(S): 400 CAPSULE ORAL at 13:37

## 2021-10-13 RX ADMIN — Medication 650 MILLIGRAM(S): at 20:25

## 2021-10-13 RX ADMIN — POLYETHYLENE GLYCOL 3350 17 GRAM(S): 17 POWDER, FOR SOLUTION ORAL at 05:57

## 2021-10-13 RX ADMIN — Medication 650 MILLIGRAM(S): at 19:25

## 2021-10-13 RX ADMIN — Medication 2 MILLIGRAM(S): at 05:56

## 2021-10-13 RX ADMIN — Medication 2: at 12:21

## 2021-10-13 RX ADMIN — Medication 500 MILLIGRAM(S): at 12:18

## 2021-10-13 NOTE — PROGRESS NOTE ADULT - ASSESSMENT
71m 911  with acute worsening of back pain while fishing with friends. MRI spine performed and evaluated while patient in Er- there was concern for intradural, extramedulary lesion from T1-Sacrum c/w hematoma.  Serpentine vessel seen at T12 to L1 concerning for vascular lesion.  Patient taken for emergent thoracolumbar laminectomy on 10/7.   10/7/21: T10-L2 laminectomy with SDH evacuation  10/7/21: Angio negative  10/10/21: Kate replaced for retention   10/11/21: [place] drain removal, Lovenox started    Plan:  -D/w Dr. Uriarte  -Ok to downgrade to floor with q4h neuro checks  -Pain control PRN; Neurontin, Robaxin, Tylenol, Oxycodone, Fentanyl for breakthrough  -Continue Lovenox for DVT PPX  -D/C kate for Trial of void today  -Plan for repeat angiogram on 10/18 with IR   -PT/OT/OOB encouraged   -Dispo planning; PMNR recommending AR

## 2021-10-13 NOTE — PROGRESS NOTE ADULT - SUBJECTIVE AND OBJECTIVE BOX
Patient feels well.  Pain is controlled.  Does report ongoing difficulty with controlling BM.     REVIEW OF SYSTEMS  Constitutional - No fever,  No fatigue  HEENT - No vertigo, No neck pain  Neurological - No headaches, No memory loss, +loss of strength, +numbness, No tremors  Musculoskeletal - +joint pain, No joint swelling, +muscle pain  Psychiatric - No depression, No anxiety    FUNCTIONAL PROGRESS  10/12 PT  Bed Mobility  Bed Mobility Training Supine-to-Sit: minimum assist (75% patient effort);  1 person assist;  bed rails  Bed Mobility Training Limitations: decreased ability to use arms for pushing/pulling;  decreased ability to use legs for bridging/pushing;  decreased flexibility;  decreased ROM    Sit-Stand Transfer Training  Transfer Training Sit-to-Stand Transfer: minimum assist (75% patient effort);  1 person assist;  full weight-bearing   rolling walker  Transfer Training Stand-to-Sit Transfer: minimum assist (75% patient effort);  1 person assist;  full weight-bearing   rolling walker  Sit-to-Stand Transfer Training Transfer Safety Analysis: decreased balance;  decreased flexibility;  decreased strength;  pain;  impaired coordination    Gait Training  Gait Training: minimum assist (75% patient effort);  moderate assist (50% patient effort);  1 person assist;  chair follow;  full weight-bearing   rolling walker;  35 ft  Gait Analysis: decreased step length;  decreased stride length;  difficulty placing right foot due to decreased corrdination/motor control. verbal cues for right LE quad contraction to prevent buckling;  decreased ROM;  impaired coordination;  impaired motor control;  pain    Therapeutic Exercise  Therapeutic Exercise Detail: Patient educated on bilateral lower extremity therex to perform after rest period (ankle pumps, LAQ, and seated marching 3x10). Patient provided return demonstration.           VITALS  T(C): 36.8 (10-13-21 @ 08:00), Max: 37 (10-12-21 @ 11:57)  HR: 74 (10-13-21 @ 08:00) (64 - 87)  BP: 134/77 (10-13-21 @ 08:00) (120/49 - 166/83)  RR: 20 (10-13-21 @ 08:00) (14 - 24)  SpO2: 97% (10-13-21 @ 08:00) (96% - 100%)  Wt(kg): --    MEDICATIONS   acetaminophen   Tablet .. 650 milliGRAM(s) every 6 hours PRN  ascorbic acid 500 milliGRAM(s) daily  dextrose 40% Gel 15 Gram(s) once  dextrose 5%. 1000 milliLiter(s) <Continuous>  dextrose 5%. 1000 milliLiter(s) <Continuous>  dextrose 50% Injectable 25 Gram(s) once  dextrose 50% Injectable 12.5 Gram(s) once  dextrose 50% Injectable 25 Gram(s) once  doxazosin 4 milliGRAM(s) at bedtime  enoxaparin Injectable 40 milliGRAM(s) at bedtime  fentaNYL    Injectable 25 MICROGram(s) every 2 hours PRN  gabapentin 300 milliGRAM(s) every 8 hours  glucagon  Injectable 1 milliGRAM(s) once  insulin lispro (ADMELOG) corrective regimen sliding scale   three times a day before meals  methocarbamol 750 milliGRAM(s) every 8 hours  multivitamin 1 Tablet(s) daily  ondansetron Injectable 4 milliGRAM(s) every 6 hours PRN  oxyCODONE    IR 5 milliGRAM(s) every 4 hours PRN  oxyCODONE    IR 10 milliGRAM(s) every 4 hours PRN  pantoprazole    Tablet 40 milliGRAM(s) before breakfast  polyethylene glycol 3350 17 Gram(s) two times a day  senna 2 Tablet(s) at bedtime      RECENT LABS/IMAGING                          9.9    8.23  )-----------( 187      ( 13 Oct 2021 06:30 )             29.0     10-13    137  |  99  |  24.7<H>  ----------------------------<  101<H>  4.3   |  25.0  |  0.62    Ca    8.7      13 Oct 2021 06:30  Phos  4.5     10-13  Mg     2.1     10-13                  THORACIC SPINE 10/6 -  No fracture or acute traumatic malalignment. Streak artifact limits evaluation of the central canal. No definite high-grade central canal stenosis.    LUMBAR SPINE 10/6 - No fracture or acute traumatic malalignment. Degenerative changes as described. Streak artifact limits evaluation of the central canal. No definite high-grade central canal stenosis.    MRI C, T, L SPINE 10/6 - 1) Mixed signal intensity intradural, extramedullary lesion in the dorsal spinal canal extending from around T11 through the sacrum, with suggestion of T2 hypointense hemosiderin cap, most concerning for a hematoma comprised of blood of differing ages. 2) Diffuse subarachnoid hemorrhage throughout the thoracic spine. 3) Ventral epidural enhancement along the posterior clivus and upper cervical spine. Circumferential epidural enhancement throughout the cervical spine C1/C2-C7/T1. Subdural effusions and/or subdural hematomas in the posterior fossa along the cerebellar hemispheres and tentorial leaflets, and associated dural enhancement, with dural enhancement extending into the cervical spine, concerning for intracranial hypotension.      ----------------------------------------------------------------------------------------  PHYSICAL EXAM  Constitutional - NAD, Comfortable  Extremities - Mild BLE swelling   Neurologic Exam -                    Cognitive - AAO to self, place, date, year, situation     Motor -                      LEFT    UE - C5 5/5, C6 5/5, C7 5/5, C8 5/5, T1 5/5                    RIGHT UE - C5 5/5, C6 5/5, C7 5/5, C8 5/5, T1 5/5                    LEFT    LE - L2 3/5, L3 2/5, L4 2/5, L5 2/5, S1 4/5                    RIGHT LE - L2 3/5, L3 2/5, L4 3/5, L5 3/5, S1 4/5      Sensory - Decreased to the right lateral thigh, lower leg and bilateral soles  Psychiatric - Mood stable, Affect WNL  ----------------------------------------------------------------------------------------  ASSESSMENT/PLAN  71yMale with functional deficits after having BLE weakness related to thoracolumbosacral hematoma  Intradural extramedullary T1-Sacral hematoma s/p thoracolumbar laminectomy - [place]  Pain - Recommend DC Fentanyl IV, Oxycodone, Robaxin, Neurontin  Neurogenic Bladder - Vazquez  Neurogenic Bowel - Senna, Miralax BID  DVT PPX - SCDs, Lovenox  Rehab - Given SCI, recommend ACUTE inpatient rehabilitation for the functional deficits consisting of 3 hours of therapy/day & 24 hour RN/daily PMR physician for comorbid medical management. Will continue to follow for ongoing rehab needs and recommendations. Patient will be able to tolerate 3 hours a day.    Continue bedside therapy as well as OOB throughout the day with mobilization throughout the day with staff to maintain cardiopulmonary function and prevention of secondary complications related to debility.     Discussed with rehab clinical team.

## 2021-10-13 NOTE — PROGRESS NOTE ADULT - SUBJECTIVE AND OBJECTIVE BOX
HPI:  71m 911  with acute worsening of back pain while fishing with friends this morning. Acute bilateral leg weakness and numbness.  RLE 1/5, LLE 2/5 after decadron in ER.  Vazquez placed in Er for urinary retention, 700cc in bladder. Initially there was concern for GBS given recent flu vaccination, but the clinical history of pain followed by acute weakness was not consistent.  MRI spine performed and evaluated while patient in Er- there was concern for intradural, extramedulary lesion from T1-Sacrum c/w hematoma.  Serpentine vessel seen at T12 to L1 concerning for vascular lesion.  Patient taken for emergent thoracolumbar laminectomy on 10/7.     INTERVAL HPI:  10/7/21: T10-L2 laminectomy with SDH evacuation  10/7/21: Angio negative  10/10/21: Vazquez replaced for retention   10/11/21: [place] drain removal, Lovenox to be started at bedtime, patient to be downgraded to NSG SDU    INTERVAL HPI/OVERNIGHT EVENTS:  71y Male seen lying comfortably in bed. Patient continues to complain of numbness in RLE with R knee pain. States that he was able to walk yesterday with      Vital Signs Last 24 Hrs  T(C): 36.8 (13 Oct 2021 08:00), Max: 37 (12 Oct 2021 11:57)  T(F): 98.3 (13 Oct 2021 08:00), Max: 98.6 (12 Oct 2021 11:57)  HR: 74 (13 Oct 2021 08:00) (64 - 87)  BP: 134/77 (13 Oct 2021 08:00) (120/49 - 166/83)  BP(mean): 95 (13 Oct 2021 04:00) (57 - 114)  RR: 20 (13 Oct 2021 08:00) (14 - 24)  SpO2: 97% (13 Oct 2021 08:00) (97% - 100%)    PHYSICAL EXAM:  GENERAL: NAD, well-groomed, well-developed  HEAD:  Atraumatic, normocephalic  DRAINS:   WOUND: Dressing clean dry intact  TREVOR COMA SCORE: E- V- M- =       E: 4= opens eyes spontaneously 3= to voice 2= to noxious 1= no opening       V: 5= oriented 4= confused 3= inappropriate words 2= incomprehensible sounds 1= nonverbal 1T= intubated       M: 6= follows commands 5= localizes 4= withdraws 3= flexor posturing 2= extensor posturing 1= no movement  MENTAL STATUS: AAO x3; Awake/Comatose; Opens eyes spontaneously/to voice/to light touch/to noxious stimuli; Appropriately conversant without aphasia/Nonverbal; following simple commands/mimicking/not following commands  CRANIAL NERVES: Visual acuity normal for age, visual fields full to confrontation, PERRL. EOMI without nystagmus. Facial sensation intact V1-3 distribution b/l. Face symmetric w/ normal eye closure and smile, tongue midline. Hearing grossly intact. Speech clear. Head turning and shoulder shrug intact.   REFLEXES: PERRL. Corneals intact b/l. Gag intact. Cough intact. Oculocephalic reflex intact (Doll's eye). Negative De La Torre's b/l. Negative clonus b/l  MOTOR: strength 5/5 b/l upper and lower extremities  Uppers     Delt (C5/6)     Bicep (C5/6)     Wrist Extend (C6)     Tricep (C7)     HG (C8/T1)  R                     5/5                 5/5                         5/5                           5/5                   5/5  L                      5/5                 5/5                         5/5                           5/5                   5/5  Lowers      HF(L1/L2)     KE (L3)     DF (L4)     EHL (L5)     PF (S1)      R                     5/5              5/5           5/5           5/5            5/5  L                     5/5               5/5          5/5            5/5            5/5  SENSATION: grossly intact to light touch all extremities  COORDINATION: Gait intact; rapid alternating movements intact; heel to shin intact; no upper extremity dysmetria  CHEST/LUNG: Clear to auscultation bilaterally; no rales, rhonchi, wheezing, or rubs  HEART: +S1/+S2; Regular rate and rhythm; no murmurs, rubs, or gallops  ABDOMEN: Soft, nontender, nondistended; bowel sounds present all four quadrants  EXTREMITIES:  2+ peripheral pulses, no clubbing, cyanosis, or edema  SKIN: Warm, dry; no rashes or lesions    LABS:                        9.9    8.23  )-----------( 187      ( 13 Oct 2021 06:30 )             29.0     10-13    137  |  99  |  24.7<H>  ----------------------------<  101<H>  4.3   |  25.0  |  0.62    Ca    8.7      13 Oct 2021 06:30  Phos  4.5     10-13  Mg     2.1     10-13            10-12 @ 07:01  -  10-13 @ 07:00  --------------------------------------------------------  IN: 0 mL / OUT: 950 mL / NET: -950 mL        RADIOLOGY & ADDITIONAL TESTS:          CAPRINI SCORE [CLOT]:  Patient has an estimated Caprini score of greater than 5.  However, the patient's unique clinical situation will be addressed in an individual manner to determine appropriate anticoagulation treatment, if any. HPI:  71m 911  with acute worsening of back pain while fishing with friends this morning. Acute bilateral leg weakness and numbness.  RLE 1/5, LLE 2/5 after decadron in ER.  Vazquez placed in Er for urinary retention, 700cc in bladder. Initially there was concern for GBS given recent flu vaccination, but the clinical history of pain followed by acute weakness was not consistent.  MRI spine performed and evaluated while patient in Er- there was concern for intradural, extramedulary lesion from T1-Sacrum c/w hematoma.  Serpentine vessel seen at T12 to L1 concerning for vascular lesion.  Patient taken for emergent thoracolumbar laminectomy on 10/7.     INTERVAL HPI:  10/7/21: T10-L2 laminectomy with SDH evacuation  10/7/21: Angio negative  10/10/21: Vazquez replaced for retention   10/11/21: [place] drain removal, Lovenox to be started at bedtime, patient to be downgraded to NSG SDU    INTERVAL HPI/OVERNIGHT EVENTS:  71y Male seen lying comfortably in bed. Patient continues to complain of numbness in RLE with R knee pain. States that he was able to walk yesterday with PT. No overnight events reported.       Vital Signs Last 24 Hrs  T(C): 36.8 (13 Oct 2021 08:00), Max: 37 (12 Oct 2021 11:57)  T(F): 98.3 (13 Oct 2021 08:00), Max: 98.6 (12 Oct 2021 11:57)  HR: 74 (13 Oct 2021 08:00) (64 - 87)  BP: 134/77 (13 Oct 2021 08:00) (120/49 - 166/83)  BP(mean): 95 (13 Oct 2021 04:00) (57 - 114)  RR: 20 (13 Oct 2021 08:00) (14 - 24)  SpO2: 97% (13 Oct 2021 08:00) (97% - 100%)    PHYSICAL EXAM:  Constitutional: WN/WD in NAD    Neuro  * Mental Status:  GCS 15: Awake, alert, oriented to conversation. Speech is clear, face symmetric.  * Cranial Nerves: Cnii-Cnxii grossly intact. PERRL, EOMI, tongue midline, no gaze deviation  * Motor: b/l UE 5/5, RLE HF 2/5 KE 3/5 PF/DF 4/5, LLE HF 3/5 KE 4/5 PF/DF 4/5  * Sensory: Sensory decrease in B/L LE, R>L, R lateral thigh extending through lower leg and B/L plantar surfaces.   * Reflexes: Not assessed  * Gait: Not assessed    Cardiovascular:  S1, S2 no murmurs appreciated.  Regular rate and rhythm.  Eyes: See neurologic examination with detailed examination of eyes.  ENT: No JVD, Trachea Midline.  Respiratory: Clear to auscultation.  Gastrointestinal: Soft, nontender, nondistended.  Genitourinary: [x ] Vazquez, [ ] No Vazquez.   Musculoskeletal: No muscle wasting noted, (See neurologic assessment for full muscle strength assessment) No pretibial edema appreciated, no appreciable calf tenderness.  Skin:  Wound c/d/i  Musculoskeletal: See detailed muscle strength examination, listed under neurologic examination.  Hematologic / Lymph / Immunologic: No bleeding from IV sites or wounds, No lymphadenopathy, No hives or allergic type skin lesions    LABS:                        9.9    8.23  )-----------( 187      ( 13 Oct 2021 06:30 )             29.0     10-13    137  |  99  |  24.7<H>  ----------------------------<  101<H>  4.3   |  25.0  |  0.62    Ca    8.7      13 Oct 2021 06:30  Phos  4.5     10-13  Mg     2.1     10-13            10-12 @ 07:01  -  10-13 @ 07:00  --------------------------------------------------------  IN: 0 mL / OUT: 950 mL / NET: -950 mL        RADIOLOGY & ADDITIONAL TESTS:  < from: MR Lumbar Spine w/wo IV Cont (10.08.21 @ 16:12) >  IMPRESSION:    -Postsurgical changes status post intraspinal hemorrhage evacuation. Residual subdural hemorrhage and postoperative air as described above.    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    -Previously seen linear enhancement within the lower thoracic levels is no longer seen. This may have represented active contrast extravasation.    --- End of Report ---            ROBERTA JOHNSON MD; Attending Radiologist  This document has been electronically signed. Oct  8 2021  7:20PM    < end of copied text >

## 2021-10-14 LAB
ANION GAP SERPL CALC-SCNC: 8 MMOL/L — SIGNIFICANT CHANGE UP (ref 5–17)
BUN SERPL-MCNC: 26.5 MG/DL — HIGH (ref 8–20)
CALCIUM SERPL-MCNC: 7.8 MG/DL — LOW (ref 8.6–10.2)
CHLORIDE SERPL-SCNC: 100 MMOL/L — SIGNIFICANT CHANGE UP (ref 98–107)
CO2 SERPL-SCNC: 28 MMOL/L — SIGNIFICANT CHANGE UP (ref 22–29)
CREAT SERPL-MCNC: 0.67 MG/DL — SIGNIFICANT CHANGE UP (ref 0.5–1.3)
GLUCOSE BLDC GLUCOMTR-MCNC: 113 MG/DL — HIGH (ref 70–99)
GLUCOSE BLDC GLUCOMTR-MCNC: 128 MG/DL — HIGH (ref 70–99)
GLUCOSE BLDC GLUCOMTR-MCNC: 138 MG/DL — HIGH (ref 70–99)
GLUCOSE BLDC GLUCOMTR-MCNC: 174 MG/DL — HIGH (ref 70–99)
GLUCOSE SERPL-MCNC: 108 MG/DL — HIGH (ref 70–99)
HCT VFR BLD CALC: 28.9 % — LOW (ref 39–50)
HGB BLD-MCNC: 9.6 G/DL — LOW (ref 13–17)
MAGNESIUM SERPL-MCNC: 2.1 MG/DL — SIGNIFICANT CHANGE UP (ref 1.6–2.6)
MCHC RBC-ENTMCNC: 30.2 PG — SIGNIFICANT CHANGE UP (ref 27–34)
MCHC RBC-ENTMCNC: 33.2 GM/DL — SIGNIFICANT CHANGE UP (ref 32–36)
MCV RBC AUTO: 90.9 FL — SIGNIFICANT CHANGE UP (ref 80–100)
PHOSPHATE SERPL-MCNC: 3.6 MG/DL — SIGNIFICANT CHANGE UP (ref 2.4–4.7)
PLATELET # BLD AUTO: 190 K/UL — SIGNIFICANT CHANGE UP (ref 150–400)
POTASSIUM SERPL-MCNC: 4.1 MMOL/L — SIGNIFICANT CHANGE UP (ref 3.5–5.3)
POTASSIUM SERPL-SCNC: 4.1 MMOL/L — SIGNIFICANT CHANGE UP (ref 3.5–5.3)
RBC # BLD: 3.18 M/UL — LOW (ref 4.2–5.8)
RBC # FLD: 13.5 % — SIGNIFICANT CHANGE UP (ref 10.3–14.5)
SODIUM SERPL-SCNC: 136 MMOL/L — SIGNIFICANT CHANGE UP (ref 135–145)
WBC # BLD: 7.67 K/UL — SIGNIFICANT CHANGE UP (ref 3.8–10.5)
WBC # FLD AUTO: 7.67 K/UL — SIGNIFICANT CHANGE UP (ref 3.8–10.5)

## 2021-10-14 PROCEDURE — 99233 SBSQ HOSP IP/OBS HIGH 50: CPT

## 2021-10-14 RX ORDER — SENNA PLUS 8.6 MG/1
2 TABLET ORAL EVERY 12 HOURS
Refills: 0 | Status: DISCONTINUED | OUTPATIENT
Start: 2021-10-14 | End: 2021-10-23

## 2021-10-14 RX ADMIN — OXYCODONE HYDROCHLORIDE 10 MILLIGRAM(S): 5 TABLET ORAL at 09:30

## 2021-10-14 RX ADMIN — POLYETHYLENE GLYCOL 3350 17 GRAM(S): 17 POWDER, FOR SOLUTION ORAL at 17:24

## 2021-10-14 RX ADMIN — METHOCARBAMOL 750 MILLIGRAM(S): 500 TABLET, FILM COATED ORAL at 23:47

## 2021-10-14 RX ADMIN — OXYCODONE HYDROCHLORIDE 10 MILLIGRAM(S): 5 TABLET ORAL at 09:05

## 2021-10-14 RX ADMIN — GABAPENTIN 300 MILLIGRAM(S): 400 CAPSULE ORAL at 21:08

## 2021-10-14 RX ADMIN — Medication 500 MILLIGRAM(S): at 14:14

## 2021-10-14 RX ADMIN — Medication 1 TABLET(S): at 14:14

## 2021-10-14 RX ADMIN — GABAPENTIN 300 MILLIGRAM(S): 400 CAPSULE ORAL at 05:11

## 2021-10-14 RX ADMIN — Medication 650 MILLIGRAM(S): at 13:25

## 2021-10-14 RX ADMIN — GABAPENTIN 300 MILLIGRAM(S): 400 CAPSULE ORAL at 14:14

## 2021-10-14 RX ADMIN — SENNA PLUS 2 TABLET(S): 8.6 TABLET ORAL at 17:24

## 2021-10-14 RX ADMIN — Medication 4 MILLIGRAM(S): at 21:08

## 2021-10-14 RX ADMIN — OXYCODONE HYDROCHLORIDE 10 MILLIGRAM(S): 5 TABLET ORAL at 14:23

## 2021-10-14 RX ADMIN — PANTOPRAZOLE SODIUM 40 MILLIGRAM(S): 20 TABLET, DELAYED RELEASE ORAL at 05:15

## 2021-10-14 RX ADMIN — OXYCODONE HYDROCHLORIDE 10 MILLIGRAM(S): 5 TABLET ORAL at 21:03

## 2021-10-14 RX ADMIN — METHOCARBAMOL 750 MILLIGRAM(S): 500 TABLET, FILM COATED ORAL at 05:11

## 2021-10-14 RX ADMIN — Medication 650 MILLIGRAM(S): at 04:07

## 2021-10-14 RX ADMIN — METHOCARBAMOL 750 MILLIGRAM(S): 500 TABLET, FILM COATED ORAL at 17:24

## 2021-10-14 RX ADMIN — OXYCODONE HYDROCHLORIDE 10 MILLIGRAM(S): 5 TABLET ORAL at 14:13

## 2021-10-14 RX ADMIN — Medication 650 MILLIGRAM(S): at 14:15

## 2021-10-14 RX ADMIN — ENOXAPARIN SODIUM 40 MILLIGRAM(S): 100 INJECTION SUBCUTANEOUS at 21:08

## 2021-10-14 NOTE — PROGRESS NOTE ADULT - ASSESSMENT
72 Y/O male admitted w/ c/o lower back pain, B/L LE weakness and numbness which began while fishing on a boat, MRI concerning for T11 extramedullary hematoma, s/p B/L decompression laminectomies T10-L2, dural opening and evacuation of the intradural hematoma POD#7.   10/7/21 Diagnostic Cerebral Angiogram and Spinal Angiogram from the level of T7-L2 with no vascular abnormalities      -Pt seen and case discussed w/ Dr. Uriarte   -PT/OT, OOB  -pain control as needed, avoid over sedation  -Pt last BM documented as 10/11/21, senna increased to BID from daily  -plan for f/u angiogram to r/o spinal anomoly   -lovenox and SCDs for DVT prophylaxis   -PM&R following, recommends ACUTE inpatient rehabilitation  -STAT repeat imaging if change in neuro exam  -continue to follow

## 2021-10-14 NOTE — PROGRESS NOTE ADULT - SUBJECTIVE AND OBJECTIVE BOX
INTERVAL HPI/OVERNIGHT EVENTS:  72 Y/O male w/ PMHx of HLD and chronic back pain admitted w/ c/o lower back pain, B/L LE weakness and numbness which began while fishing on a boat, MRI concerning for T11 extramedullary hematoma, exam findings significant for 0/5 motor B/L LE and decreased sensation B/L LE R>L beginning ~LEVEL t12. Pt taken to the OR 10/7/21 for B/L decompression laminectomies T10-L2, dural opening and evacuation of the intradural hematoma. Pt seen and examined this morning w/ attending at bedside. Pt continues to report numbness in RLE, improvement w/ mobilization, +RLE pain however improved from admission, reports inability to sense when has to urinate, denies decreased sensation w/ BM.     Vital Signs Last 24 Hrs  T(C): 36.7 (14 Oct 2021 04:00), Max: 37.2 (13 Oct 2021 15:22)  T(F): 98.1 (14 Oct 2021 04:00), Max: 98.9 (13 Oct 2021 15:22)  HR: 78 (14 Oct 2021 04:00) (71 - 82)  BP: 118/70 (14 Oct 2021 04:00) (118/70 - 143/69)  BP(mean): 80 (14 Oct 2021 04:00) (78 - 86)  RR: 18 (14 Oct 2021 04:00) (18 - 18)  SpO2: 99% (14 Oct 2021 04:00) (96% - 99%)      PHYSICAL EXAM:  GENERAL: NAD, well-groomed, well-developed  HEAD:  Atraumatic, normocephalic  WOUND: incision clean dry intact, open to air, no erythema, no drainage   MENTAL STATUS: AAO x3; Awake, Opens eyes spontaneously; Appropriately conversant without aphasia; following simple commands  CRANIAL NERVES: PERRL; EOMI; no facial asymmetry; facial sensation grossly intact to light touch b/l;  tongue midline; palate rises symmetrically, decreased rectal tone  MOTOR: strength 5/5 B/L upper extremities; LLE 5/5, RLE HF 4/5  KF 4+/5   KE 4/5  PF/DF 4/5  EHL 3/5, decreased sensation R>L, denies saddle anesthesia   CHEST/LUNG: Clear to auscultation bilaterally; no rales, rhonchi, wheezing, or rubs  HEART: +S1/+S2; Regular rate and rhythm; no murmurs, rubs, or gallops  ABDOMEN: Soft, nontender, nondistended; bowel sounds present all four quadrants  EXTREMITIES:  2+ peripheral pulses, no clubbing, cyanosis, or edema  SKIN: Warm, dry; no rashes or lesions      LABS:                        9.6    7.67  )-----------( 190      ( 14 Oct 2021 06:44 )             28.9     10-14    136  |  100  |  26.5<H>  ----------------------------<  108<H>  4.1   |  28.0  |  0.67    Ca    7.8<L>      14 Oct 2021 06:44  Phos  3.6     10-14  Mg     2.1     10-14        10-13 @ 07:01  -  10-14 @ 07:00  --------------------------------------------------------  IN: 1130 mL / OUT: 1770 mL / NET: -640 mL        RADIOLOGY & ADDITIONAL TESTS:    EXAM:  MR SPINE LUMBAR WAW IC                        EXAM:  MR SPINE THORACIC WAW IC                        EXAM:  MR SPINE CERVICAL WAW IC                        PROCEDURE DATE:  10/06/2021    Impression:  1) Mixed signal intensity intradural, extramedullary lesion in the dorsal spinal canal extending from around T11 through the sacrum, with suggestion of T2 hypointense hemosiderin cap, most concerning for a hematoma comprised of blood of differing ages.  2) Diffuse subarachnoid hemorrhage throughout the thoracic spine.  3) Ventral epidural enhancement along the posterior clivus and upper cervical spine. Circumferential epidural enhancement throughout the cervical spine C1/C2-C7/T1. Subdural effusions and/or subdural hematomas in the posterior fossa along the cerebellar hemispheres and tentorial leaflets, and associated dural enhancement, with dural enhancement extending into the cervical spine, concerning for intracranial hypotension.      EXAM:  MR SPINE LUMBAR WAW IC                        EXAM:  MR SPINE THORACIC WAW IC                        PROCEDURE DATE:  10/08/2021    IMPRESSION:  -Postsurgical changes status post intraspinal hemorrhage evacuation. Residual subdural hemorrhage and postoperative air as described above.  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  -Previously seen linear enhancement within the lower thoracic levels is no longer seen. This may have represented active contrast extravasation.      EXAM:  CT BRAIN                        PROCEDURE DATE:  10/08/2021   IMPRESSION:  Redemonstration diffuse subarachnoid hemorrhage in cerebral and cerebellar hemispheres, volume loss, microvascular disease, maxillary periapical lucency with adjacent mucosal thickening maxillary sinuses concerning for odontogenic sinusitis (3, 4). No new midline shift. If symptoms persist consider follow-up head CT or MR if no contraindications.

## 2021-10-14 NOTE — PROGRESS NOTE ADULT - SUBJECTIVE AND OBJECTIVE BOX
Patient not feeling well and is requesting assist of aide.    REVIEW OF SYSTEMS  Constitutional - No fever,  +fatigue  Neurological - +loss of strength    FUNCTIONAL PROGRESS  10/12 PT  Bed Mobility  Bed Mobility Training Supine-to-Sit: minimum assist (75% patient effort);  1 person assist;  bed rails  Bed Mobility Training Limitations: decreased ability to use arms for pushing/pulling;  decreased ability to use legs for bridging/pushing;  decreased flexibility;  decreased ROM    Sit-Stand Transfer Training  Transfer Training Sit-to-Stand Transfer: minimum assist (75% patient effort);  1 person assist;  full weight-bearing   rolling walker  Transfer Training Stand-to-Sit Transfer: minimum assist (75% patient effort);  1 person assist;  full weight-bearing   rolling walker  Sit-to-Stand Transfer Training Transfer Safety Analysis: decreased balance;  decreased flexibility;  decreased strength;  pain;  impaired coordination    Gait Training  Gait Training: minimum assist (75% patient effort);  moderate assist (50% patient effort);  1 person assist;  chair follow;  full weight-bearing   rolling walker;  35 ft  Gait Analysis: decreased step length;  decreased stride length;  difficulty placing right foot due to decreased corrdination/motor control. verbal cues for right LE quad contraction to prevent buckling;  decreased ROM;  impaired coordination;  impaired motor control;  pain    Therapeutic Exercise  Therapeutic Exercise Detail: Patient educated on bilateral lower extremity therex to perform after rest period (ankle pumps, LAQ, and seated marching 3x10). Patient provided return demonstration.         VITALS  T(C): 36.7 (10-14-21 @ 04:00), Max: 37.2 (10-13-21 @ 15:22)  HR: 78 (10-14-21 @ 04:00) (71 - 82)  BP: 118/70 (10-14-21 @ 04:00) (118/70 - 143/69)  RR: 18 (10-14-21 @ 04:00) (18 - 18)  SpO2: 99% (10-14-21 @ 04:00) (96% - 99%)  Wt(kg): --    MEDICATIONS   acetaminophen   Tablet .. 650 milliGRAM(s) every 6 hours PRN  ascorbic acid 500 milliGRAM(s) daily  dextrose 40% Gel 15 Gram(s) once  dextrose 5%. 1000 milliLiter(s) <Continuous>  dextrose 5%. 1000 milliLiter(s) <Continuous>  dextrose 50% Injectable 25 Gram(s) once  dextrose 50% Injectable 12.5 Gram(s) once  dextrose 50% Injectable 25 Gram(s) once  doxazosin 4 milliGRAM(s) at bedtime  enoxaparin Injectable 40 milliGRAM(s) at bedtime  fentaNYL    Injectable 25 MICROGram(s) every 2 hours PRN  gabapentin 300 milliGRAM(s) every 8 hours  glucagon  Injectable 1 milliGRAM(s) once  insulin lispro (ADMELOG) corrective regimen sliding scale   three times a day before meals  methocarbamol 750 milliGRAM(s) every 8 hours  multivitamin 1 Tablet(s) daily  ondansetron Injectable 4 milliGRAM(s) every 6 hours PRN  oxyCODONE    IR 5 milliGRAM(s) every 4 hours PRN  oxyCODONE    IR 10 milliGRAM(s) every 4 hours PRN  pantoprazole    Tablet 40 milliGRAM(s) before breakfast  polyethylene glycol 3350 17 Gram(s) two times a day  senna 2 Tablet(s) at bedtime      RECENT LABS/IMAGING                          9.6    7.67  )-----------( 190      ( 14 Oct 2021 06:44 )             28.9     10-14    136  |  100  |  26.5<H>  ----------------------------<  108<H>  4.1   |  28.0  |  0.67    Ca    7.8<L>      14 Oct 2021 06:44  Phos  3.6     10-14  Mg     2.1     10-14                    THORACIC SPINE 10/6 -  No fracture or acute traumatic malalignment. Streak artifact limits evaluation of the central canal. No definite high-grade central canal stenosis.    LUMBAR SPINE 10/6 - No fracture or acute traumatic malalignment. Degenerative changes as described. Streak artifact limits evaluation of the central canal. No definite high-grade central canal stenosis.    MRI C, T, L SPINE 10/6 - 1) Mixed signal intensity intradural, extramedullary lesion in the dorsal spinal canal extending from around T11 through the sacrum, with suggestion of T2 hypointense hemosiderin cap, most concerning for a hematoma comprised of blood of differing ages. 2) Diffuse subarachnoid hemorrhage throughout the thoracic spine. 3) Ventral epidural enhancement along the posterior clivus and upper cervical spine. Circumferential epidural enhancement throughout the cervical spine C1/C2-C7/T1. Subdural effusions and/or subdural hematomas in the posterior fossa along the cerebellar hemispheres and tentorial leaflets, and associated dural enhancement, with dural enhancement extending into the cervical spine, concerning for intracranial hypotension.      ----------------------------------------------------------------------------------------  PHYSICAL EXAM  Constitutional - NAD, Comfortable  Extremities - Mild BLE swelling   Neurologic Exam -                    Cognitive - AAO to self, place, date, year, situation     Motor -                      LEFT    UE - C5 5/5, C6 5/5, C7 5/5, C8 5/5, T1 5/5                    RIGHT UE - C5 5/5, C6 5/5, C7 5/5, C8 5/5, T1 5/5                    LEFT    LE - L2 3/5, L3 2/5, L4 2/5, L5 2/5, S1 4/5                    RIGHT LE - L2 3/5, L3 2/5, L4 3/5, L5 3/5, S1 4/5      Sensory - Decreased to the right lateral thigh, lower leg and bilateral soles  Psychiatric - Mood stable, Affect WNL  ----------------------------------------------------------------------------------------  ASSESSMENT/PLAN  71yMale with functional deficits after having BLE weakness related to thoracolumbosacral hematoma  Intradural extramedullary T1-Sacral hematoma s/p thoracolumbar laminectomy - [place]  Pain - Recommend DC Fentanyl IV - will delay DC if administered, Oxycodone, Robaxin, Neurontin  Neurogenic Bladder - Vazquez  Neurogenic Bowel - Senna, Miralax BID  DVT PPX - SCDs, Lovenox  Rehab - Given SCI, continue to recommend ACUTE inpatient rehabilitation for the functional deficits consisting of 3 hours of therapy/day & 24 hour RN/daily PMR physician for comorbid medical management. Will continue to follow for ongoing rehab needs and recommendations. Patient will be able to tolerate 3 hours a day.    Continue bedside therapy as well as OOB throughout the day with mobilization throughout the day with staff to maintain cardiopulmonary function and prevention of secondary complications related to debility.     Discussed with rehab clinical team.

## 2021-10-15 LAB
ALBUMIN SERPL ELPH-MCNC: 3.4 G/DL — SIGNIFICANT CHANGE UP (ref 3.3–5.2)
ALP SERPL-CCNC: 47 U/L — SIGNIFICANT CHANGE UP (ref 40–120)
ALT FLD-CCNC: 31 U/L — SIGNIFICANT CHANGE UP
ANION GAP SERPL CALC-SCNC: 11 MMOL/L — SIGNIFICANT CHANGE UP (ref 5–17)
AST SERPL-CCNC: 14 U/L — SIGNIFICANT CHANGE UP
BASOPHILS # BLD AUTO: 0.02 K/UL — SIGNIFICANT CHANGE UP (ref 0–0.2)
BASOPHILS NFR BLD AUTO: 0.2 % — SIGNIFICANT CHANGE UP (ref 0–2)
BILIRUB SERPL-MCNC: 1.5 MG/DL — SIGNIFICANT CHANGE UP (ref 0.4–2)
BUN SERPL-MCNC: 20.8 MG/DL — HIGH (ref 8–20)
CALCIUM SERPL-MCNC: 8.4 MG/DL — LOW (ref 8.6–10.2)
CHLORIDE SERPL-SCNC: 95 MMOL/L — LOW (ref 98–107)
CO2 SERPL-SCNC: 27 MMOL/L — SIGNIFICANT CHANGE UP (ref 22–29)
CREAT SERPL-MCNC: 0.7 MG/DL — SIGNIFICANT CHANGE UP (ref 0.5–1.3)
EOSINOPHIL # BLD AUTO: 0.12 K/UL — SIGNIFICANT CHANGE UP (ref 0–0.5)
EOSINOPHIL NFR BLD AUTO: 1.5 % — SIGNIFICANT CHANGE UP (ref 0–6)
GLUCOSE BLDC GLUCOMTR-MCNC: 124 MG/DL — HIGH (ref 70–99)
GLUCOSE BLDC GLUCOMTR-MCNC: 124 MG/DL — HIGH (ref 70–99)
GLUCOSE BLDC GLUCOMTR-MCNC: 134 MG/DL — HIGH (ref 70–99)
GLUCOSE BLDC GLUCOMTR-MCNC: 194 MG/DL — HIGH (ref 70–99)
GLUCOSE SERPL-MCNC: 146 MG/DL — HIGH (ref 70–99)
HCT VFR BLD CALC: 28.8 % — LOW (ref 39–50)
HGB BLD-MCNC: 9.6 G/DL — LOW (ref 13–17)
IMM GRANULOCYTES NFR BLD AUTO: 4.3 % — HIGH (ref 0–1.5)
LYMPHOCYTES # BLD AUTO: 0.98 K/UL — LOW (ref 1–3.3)
LYMPHOCYTES # BLD AUTO: 12 % — LOW (ref 13–44)
MAGNESIUM SERPL-MCNC: 2.3 MG/DL — SIGNIFICANT CHANGE UP (ref 1.6–2.6)
MCHC RBC-ENTMCNC: 30.3 PG — SIGNIFICANT CHANGE UP (ref 27–34)
MCHC RBC-ENTMCNC: 33.3 GM/DL — SIGNIFICANT CHANGE UP (ref 32–36)
MCV RBC AUTO: 90.9 FL — SIGNIFICANT CHANGE UP (ref 80–100)
MONOCYTES # BLD AUTO: 0.73 K/UL — SIGNIFICANT CHANGE UP (ref 0–0.9)
MONOCYTES NFR BLD AUTO: 8.9 % — SIGNIFICANT CHANGE UP (ref 2–14)
NEUTROPHILS # BLD AUTO: 5.97 K/UL — SIGNIFICANT CHANGE UP (ref 1.8–7.4)
NEUTROPHILS NFR BLD AUTO: 73.1 % — SIGNIFICANT CHANGE UP (ref 43–77)
PHOSPHATE SERPL-MCNC: 2.8 MG/DL — SIGNIFICANT CHANGE UP (ref 2.4–4.7)
PLATELET # BLD AUTO: 202 K/UL — SIGNIFICANT CHANGE UP (ref 150–400)
POTASSIUM SERPL-MCNC: 4.9 MMOL/L — SIGNIFICANT CHANGE UP (ref 3.5–5.3)
POTASSIUM SERPL-SCNC: 4.9 MMOL/L — SIGNIFICANT CHANGE UP (ref 3.5–5.3)
PROT SERPL-MCNC: 5.3 G/DL — LOW (ref 6.6–8.7)
RBC # BLD: 3.17 M/UL — LOW (ref 4.2–5.8)
RBC # FLD: 13.6 % — SIGNIFICANT CHANGE UP (ref 10.3–14.5)
SODIUM SERPL-SCNC: 133 MMOL/L — LOW (ref 135–145)
WBC # BLD: 8.17 K/UL — SIGNIFICANT CHANGE UP (ref 3.8–10.5)
WBC # FLD AUTO: 8.17 K/UL — SIGNIFICANT CHANGE UP (ref 3.8–10.5)

## 2021-10-15 PROCEDURE — 99233 SBSQ HOSP IP/OBS HIGH 50: CPT

## 2021-10-15 RX ORDER — LACTULOSE 10 G/15ML
10 SOLUTION ORAL DAILY
Refills: 0 | Status: DISCONTINUED | OUTPATIENT
Start: 2021-10-15 | End: 2021-10-23

## 2021-10-15 RX ADMIN — OXYCODONE HYDROCHLORIDE 10 MILLIGRAM(S): 5 TABLET ORAL at 20:36

## 2021-10-15 RX ADMIN — METHOCARBAMOL 750 MILLIGRAM(S): 500 TABLET, FILM COATED ORAL at 21:58

## 2021-10-15 RX ADMIN — OXYCODONE HYDROCHLORIDE 10 MILLIGRAM(S): 5 TABLET ORAL at 05:49

## 2021-10-15 RX ADMIN — POLYETHYLENE GLYCOL 3350 17 GRAM(S): 17 POWDER, FOR SOLUTION ORAL at 05:45

## 2021-10-15 RX ADMIN — GABAPENTIN 300 MILLIGRAM(S): 400 CAPSULE ORAL at 05:44

## 2021-10-15 RX ADMIN — Medication 4 MILLIGRAM(S): at 21:58

## 2021-10-15 RX ADMIN — OXYCODONE HYDROCHLORIDE 10 MILLIGRAM(S): 5 TABLET ORAL at 21:25

## 2021-10-15 RX ADMIN — METHOCARBAMOL 750 MILLIGRAM(S): 500 TABLET, FILM COATED ORAL at 05:43

## 2021-10-15 RX ADMIN — POLYETHYLENE GLYCOL 3350 17 GRAM(S): 17 POWDER, FOR SOLUTION ORAL at 17:20

## 2021-10-15 RX ADMIN — SENNA PLUS 2 TABLET(S): 8.6 TABLET ORAL at 05:45

## 2021-10-15 RX ADMIN — LACTULOSE 10 GRAM(S): 10 SOLUTION ORAL at 13:29

## 2021-10-15 RX ADMIN — Medication 650 MILLIGRAM(S): at 23:45

## 2021-10-15 RX ADMIN — OXYCODONE HYDROCHLORIDE 10 MILLIGRAM(S): 5 TABLET ORAL at 14:44

## 2021-10-15 RX ADMIN — PANTOPRAZOLE SODIUM 40 MILLIGRAM(S): 20 TABLET, DELAYED RELEASE ORAL at 05:50

## 2021-10-15 RX ADMIN — Medication 500 MILLIGRAM(S): at 13:22

## 2021-10-15 RX ADMIN — Medication 1 TABLET(S): at 13:25

## 2021-10-15 RX ADMIN — SENNA PLUS 2 TABLET(S): 8.6 TABLET ORAL at 17:20

## 2021-10-15 RX ADMIN — OXYCODONE HYDROCHLORIDE 10 MILLIGRAM(S): 5 TABLET ORAL at 06:15

## 2021-10-15 RX ADMIN — OXYCODONE HYDROCHLORIDE 10 MILLIGRAM(S): 5 TABLET ORAL at 13:22

## 2021-10-15 RX ADMIN — ENOXAPARIN SODIUM 40 MILLIGRAM(S): 100 INJECTION SUBCUTANEOUS at 21:59

## 2021-10-15 RX ADMIN — Medication 650 MILLIGRAM(S): at 21:59

## 2021-10-15 RX ADMIN — GABAPENTIN 300 MILLIGRAM(S): 400 CAPSULE ORAL at 21:59

## 2021-10-15 RX ADMIN — Medication 2: at 13:25

## 2021-10-15 RX ADMIN — METHOCARBAMOL 750 MILLIGRAM(S): 500 TABLET, FILM COATED ORAL at 13:23

## 2021-10-15 RX ADMIN — GABAPENTIN 300 MILLIGRAM(S): 400 CAPSULE ORAL at 13:23

## 2021-10-15 NOTE — PROGRESS NOTE ADULT - SUBJECTIVE AND OBJECTIVE BOX
INTERVAL HPI/OVERNIGHT EVENTS:  70 Y/O male w/ PMHx of HLD and chronic back pain admitted w/ c/o lower back pain, B/L LE weakness and numbness which began while fishing on a boat, MRI concerning for T11 extramedullary hematoma, exam findings significant for 0/5 motor B/L LE and decreased sensation B/L LE R>L beginning ~LEVEL t12. Pt taken to the OR 10/7/21 for B/L decompression laminectomies T10-L2, dural opening and evacuation of the intradural hematoma. Pt seen and examined this morning w/ attending at bedside.    Vital Signs Last 24 Hrs  T(C): 37.7 (15 Oct 2021 17:30), Max: 37.7 (15 Oct 2021 17:30)  T(F): 99.9 (15 Oct 2021 17:30), Max: 99.9 (15 Oct 2021 17:30)  HR: 92 (15 Oct 2021 17:30) (75 - 96)  BP: 147/75 (15 Oct 2021 17:30) (128/68 - 147/75)  BP(mean): --  RR: 18 (15 Oct 2021 17:30) (18 - 18)  SpO2: 96% (15 Oct 2021 17:30) (92% - 99%)      PHYSICAL EXAM:  GENERAL: NAD, well-groomed, well-developed  HEAD:  Atraumatic, normocephalic  WOUND: incision clean dry intact, open to air, no erythema, no drainage   MENTAL STATUS: AAO x3; Awake, Opens eyes spontaneously; Appropriately conversant without aphasia; following simple commands  CRANIAL NERVES: PERRL; EOMI; no facial asymmetry; facial sensation grossly intact to light touch b/l;  tongue midline; palate rises symmetrically, decreased rectal tone  MOTOR: strength 5/5 B/L upper extremities; LLE 5/5, RLE HF 4/5  KF 4+/5   KE 4/5  PF/DF 4/5  EHL 3/5, decreased sensation R>L, denies saddle anesthesia   CHEST/LUNG: Clear to auscultation bilaterally; no rales, rhonchi, wheezing, or rubs  HEART: +S1/+S2; Regular rate and rhythm; no murmurs, rubs, or gallops  ABDOMEN: Soft, nontender, nondistended; bowel sounds present all four quadrants  EXTREMITIES:  2+ peripheral pulses, no clubbing, cyanosis, or edema  SKIN: Warm, dry; no rashes or lesions      LABS:                        9.6    8.17  )-----------( 202      ( 15 Oct 2021 07:25 )             28.8     10-15    133<L>  |  95<L>  |  20.8<H>  ----------------------------<  146<H>  4.9   |  27.0  |  0.70    Ca    8.4<L>      15 Oct 2021 07:25  Phos  2.8     10-15  Mg     2.3     10-15    TPro  5.3<L>  /  Alb  3.4  /  TBili  1.5  /  DBili  x   /  AST  14  /  ALT  31  /  AlkPhos  47  10-15          10-14 @ 07:01  -  10-15 @ 07:00  --------------------------------------------------------  IN: 360 mL / OUT: 1500 mL / NET: -1140 mL

## 2021-10-15 NOTE — PROGRESS NOTE ADULT - ASSESSMENT
70 Y/O male admitted w/ c/o lower back pain, B/L LE weakness and numbness which began while fishing on a boat, MRI concerning for T11 extramedullary hematoma, s/p B/L decompression laminectomies T10-L2, dural opening and evacuation of the intradural hematoma POD#8.   10/7/21 Diagnostic Cerebral Angiogram and Spinal Angiogram from the level of T7-L2 with no vascular abnormalities      -Pt seen and case discussed w/ Dr. Uriarte   -PT/OT, OOB  -pain control as needed, avoid over sedation  -plan for f/u angiogram to r/o spinal anomoly   -lovenox and SCDs for DVT prophylaxis   -PM&R following, recommends ACUTE inpatient rehabilitation  -STAT repeat imaging if change in neuro exam  -continue to follow

## 2021-10-15 NOTE — CHART NOTE - NSCHARTNOTEFT_GEN_A_CORE
Source: Patient [ ]  Family [ ]   other [x ]EMR    Current Diet: Regular with Ensure TID    PO intake:  < 50% [ ]   50-75%  [x ]   %  [ ]  other :    Source for PO intake [ ] Patient [ ] family [ x] chart [ ] staff [ ] other    Enteral /Parenteral Nutrition:     Current Weight: 10/6 190#, 188.4# 10/13  generalized right foot 1 +    % Weight Change     Pertinent Medications: MEDICATIONS  (STANDING):  ascorbic acid 500 milliGRAM(s) Oral daily  dextrose 40% Gel 15 Gram(s) Oral once  dextrose 5%. 1000 milliLiter(s) (50 mL/Hr) IV Continuous <Continuous>  dextrose 5%. 1000 milliLiter(s) (100 mL/Hr) IV Continuous <Continuous>  dextrose 50% Injectable 25 Gram(s) IV Push once  dextrose 50% Injectable 12.5 Gram(s) IV Push once  dextrose 50% Injectable 25 Gram(s) IV Push once  doxazosin 4 milliGRAM(s) Oral at bedtime  enoxaparin Injectable 40 milliGRAM(s) SubCutaneous at bedtime  gabapentin 300 milliGRAM(s) Oral every 8 hours  glucagon  Injectable 1 milliGRAM(s) IntraMuscular once  insulin lispro (ADMELOG) corrective regimen sliding scale   SubCutaneous three times a day before meals  methocarbamol 750 milliGRAM(s) Oral every 8 hours  multivitamin 1 Tablet(s) Oral daily  pantoprazole    Tablet 40 milliGRAM(s) Oral before breakfast  polyethylene glycol 3350 17 Gram(s) Oral two times a day  senna 2 Tablet(s) Oral every 12 hours    MEDICATIONS  (PRN):  acetaminophen   Tablet .. 650 milliGRAM(s) Oral every 6 hours PRN Mild Pain (1 - 3)  fentaNYL    Injectable 25 MICROGram(s) IV Push every 2 hours PRN Breakthrough pain  ondansetron Injectable 4 milliGRAM(s) IV Push every 6 hours PRN Nausea and/or Vomiting  oxyCODONE    IR 5 milliGRAM(s) Oral every 4 hours PRN Moderate Pain (4 - 6)  oxyCODONE    IR 10 milliGRAM(s) Oral every 4 hours PRN Severe Pain (7 - 10)    Pertinent Labs: CBC Full  -  ( 15 Oct 2021 07:25 )  WBC Count : 8.17 K/uL  RBC Count : 3.17 M/uL  Hemoglobin : 9.6 g/dL  Hematocrit : 28.8 %  Platelet Count - Automated : 202 K/uL  Mean Cell Volume : 90.9 fl  Mean Cell Hemoglobin : 30.3 pg  Mean Cell Hemoglobin Concentration : 33.3 gm/dL  Auto Neutrophil # : 5.97 K/uL  Auto Lymphocyte # : 0.98 K/uL  Auto Monocyte # : 0.73 K/uL  Auto Eosinophil # : 0.12 K/uL  Auto Basophil # : 0.02 K/uL  Auto Neutrophil % : 73.1 %  Auto Lymphocyte % : 12.0 %  Auto Monocyte % : 8.9 %  Auto Eosinophil % : 1.5 %  Auto Basophil % : 0.2 %    10-15 Na133 mmol/L<L> Glu 146 mg/dL<H> K+ 4.9 mmol/L Cr  0.70 mg/dL BUN 20.8 mg/dL<H> Phos 2.8 mg/dL Alb 3.4 g/dL PAB n/a             Skin:     Nutrition focused physical exam not conducted - found signs of malnutrition [ ]absent [ ]present    Subcutaneous fat loss: [ ] Orbital fat pads region, [ ]Buccal fat region, [ ]Triceps region,  [ ]Ribs region    Muscle wasting: [ ]Temples region, [ ]Clavicle region, [ ]Shoulder region, [ ]Scapula region, [ ]Interosseous region,  [ ]thigh region, [ ]Calf region    Estimated Needs:   [x ] no change since previous assessment  [ ] recalculated:     Current Nutrition Diagnosis: Increased Nutrient Needs related to increased physiological demand for nutrient as evidenced by T1 sacrum lesion concerning for hematoma now s/p T10-L2 laminectomy. Recommendation is acute rehab.       Recommendations: Continue Ensure supplement, MVI, Vit C    Monitoring and Evaluation:   [x ] PO intake [ x] Tolerance to diet prescription [X] Weights  [X] Follow up per protocol [X] Labs:

## 2021-10-15 NOTE — PROGRESS NOTE ADULT - SUBJECTIVE AND OBJECTIVE BOX
Patient continues to have numbness of the right leg.   Having constipation. No abdominal pain.   Having +flatus.     REVIEW OF SYSTEMS  Constitutional - No fever,  +fatigue  Neurological - No headaches, No memory loss, +loss of strength, +numbness, No tremors  Musculoskeletal - +joint pain, No joint swelling, +muscle pain  Psychiatric - No depression, No anxiety    FUNCTIONAL PROGRESS  10/12 PT  Bed Mobility  Bed Mobility Training Supine-to-Sit: minimum assist (75% patient effort);  1 person assist;  bed rails  Bed Mobility Training Limitations: decreased ability to use arms for pushing/pulling;  decreased ability to use legs for bridging/pushing;  decreased flexibility;  decreased ROM    Sit-Stand Transfer Training  Transfer Training Sit-to-Stand Transfer: minimum assist (75% patient effort);  1 person assist;  full weight-bearing   rolling walker  Transfer Training Stand-to-Sit Transfer: minimum assist (75% patient effort);  1 person assist;  full weight-bearing   rolling walker  Sit-to-Stand Transfer Training Transfer Safety Analysis: decreased balance;  decreased flexibility;  decreased strength;  pain;  impaired coordination    Gait Training  Gait Training: minimum assist (75% patient effort);  moderate assist (50% patient effort);  1 person assist;  chair follow;  full weight-bearing   rolling walker;  35 ft  Gait Analysis: decreased step length;  decreased stride length;  difficulty placing right foot due to decreased corrdination/motor control. verbal cues for right LE quad contraction to prevent buckling;  decreased ROM;  impaired coordination;  impaired motor control;  pain    Therapeutic Exercise  Therapeutic Exercise Detail: Patient educated on bilateral lower extremity therex to perform after rest period (ankle pumps, LAQ, and seated marching 3x10). Patient provided return demonstration.       VITALS  T(C): 36.8 (10-15-21 @ 10:23), Max: 37.2 (10-15-21 @ 04:14)  HR: 85 (10-15-21 @ 10:23) (72 - 96)  BP: 128/68 (10-15-21 @ 10:23) (128/68 - 136/76)  RR: 18 (10-15-21 @ 10:23) (15 - 18)  SpO2: 99% (10-15-21 @ 10:23) (92% - 99%)  Wt(kg): --    MEDICATIONS   acetaminophen   Tablet .. 650 milliGRAM(s) every 6 hours PRN  ascorbic acid 500 milliGRAM(s) daily  dextrose 40% Gel 15 Gram(s) once  dextrose 5%. 1000 milliLiter(s) <Continuous>  dextrose 5%. 1000 milliLiter(s) <Continuous>  dextrose 50% Injectable 25 Gram(s) once  dextrose 50% Injectable 12.5 Gram(s) once  dextrose 50% Injectable 25 Gram(s) once  doxazosin 4 milliGRAM(s) at bedtime  enoxaparin Injectable 40 milliGRAM(s) at bedtime  fentaNYL    Injectable 25 MICROGram(s) every 2 hours PRN  gabapentin 300 milliGRAM(s) every 8 hours  glucagon  Injectable 1 milliGRAM(s) once  insulin lispro (ADMELOG) corrective regimen sliding scale   three times a day before meals  methocarbamol 750 milliGRAM(s) every 8 hours  multivitamin 1 Tablet(s) daily  ondansetron Injectable 4 milliGRAM(s) every 6 hours PRN  oxyCODONE    IR 5 milliGRAM(s) every 4 hours PRN  oxyCODONE    IR 10 milliGRAM(s) every 4 hours PRN  pantoprazole    Tablet 40 milliGRAM(s) before breakfast  polyethylene glycol 3350 17 Gram(s) two times a day  senna 2 Tablet(s) every 12 hours      RECENT LABS/IMAGING                          9.6    8.17  )-----------( 202      ( 15 Oct 2021 07:25 )             28.8     10-15    133<L>  |  95<L>  |  20.8<H>  ----------------------------<  146<H>  4.9   |  27.0  |  0.70    Ca    8.4<L>      15 Oct 2021 07:25  Phos  2.8     10-15  Mg     2.3     10-15    TPro  5.3<L>  /  Alb  3.4  /  TBili  1.5  /  DBili  x   /  AST  14  /  ALT  31  /  AlkPhos  47  10-15                    THORACIC SPINE 10/6 -  No fracture or acute traumatic malalignment. Streak artifact limits evaluation of the central canal. No definite high-grade central canal stenosis.    LUMBAR SPINE 10/6 - No fracture or acute traumatic malalignment. Degenerative changes as described. Streak artifact limits evaluation of the central canal. No definite high-grade central canal stenosis.    MRI C, T, L SPINE 10/6 - 1) Mixed signal intensity intradural, extramedullary lesion in the dorsal spinal canal extending from around T11 through the sacrum, with suggestion of T2 hypointense hemosiderin cap, most concerning for a hematoma comprised of blood of differing ages. 2) Diffuse subarachnoid hemorrhage throughout the thoracic spine. 3) Ventral epidural enhancement along the posterior clivus and upper cervical spine. Circumferential epidural enhancement throughout the cervical spine C1/C2-C7/T1. Subdural effusions and/or subdural hematomas in the posterior fossa along the cerebellar hemispheres and tentorial leaflets, and associated dural enhancement, with dural enhancement extending into the cervical spine, concerning for intracranial hypotension.      ----------------------------------------------------------------------------------------  PHYSICAL EXAM  Constitutional - NAD, Comfortable  Extremities - Mild BLE swelling   Neurologic Exam -                    Cognitive - AAO to self, place, date, year, situation     Motor -                      LEFT    UE - C5 5/5, C6 5/5, C7 5/5, C8 5/5, T1 5/5                    RIGHT UE - C5 5/5, C6 5/5, C7 5/5, C8 5/5, T1 5/5                    LEFT    LE - L2 3/5, L3 2/5, L4 2/5, L5 3/5, S1 4/5                    RIGHT LE - L2 3/5, L3 2/5, L4 3/5, L5 2/5, S1 4/5      Sensory - Decreased to the right lateral thigh, lower leg and bilateral soles  Psychiatric - Mood stable, Affect WNL  ----------------------------------------------------------------------------------------  ASSESSMENT/PLAN  71yMale with functional deficits after having BLE weakness related to thoracolumbosacral hematoma  Intradural extramedullary T1-Sacral hematoma s/p thoracolumbar laminectomy - Decadron  Pain - Recommend DC Fentanyl IV - will delay DC if administered, Oxycodone, Robaxin, Neurontin  Neurogenic Bladder - Vazquez  Neurogenic Bowel - Senna, Miralax BID, Lactulose daily PRN (10/15)  DVT PPX - SCDs, Lovenox  Rehab - Given SCI, continue to recommend ACUTE inpatient rehabilitation for the functional deficits consisting of 3 hours of therapy/day & 24 hour RN/daily PMR physician for comorbid medical management. Will continue to follow for ongoing rehab needs and recommendations. Patient will be able to tolerate 3 hours a day.    Continue bedside therapy as well as OOB throughout the day with mobilization throughout the day with staff to maintain cardiopulmonary function and prevention of secondary complications related to debility.     Discussed with rehab clinical team.

## 2021-10-16 LAB
ANION GAP SERPL CALC-SCNC: 11 MMOL/L — SIGNIFICANT CHANGE UP (ref 5–17)
BUN SERPL-MCNC: 18.5 MG/DL — SIGNIFICANT CHANGE UP (ref 8–20)
CALCIUM SERPL-MCNC: 8.3 MG/DL — LOW (ref 8.6–10.2)
CHLORIDE SERPL-SCNC: 95 MMOL/L — LOW (ref 98–107)
CO2 SERPL-SCNC: 26 MMOL/L — SIGNIFICANT CHANGE UP (ref 22–29)
CREAT SERPL-MCNC: 0.59 MG/DL — SIGNIFICANT CHANGE UP (ref 0.5–1.3)
GLUCOSE BLDC GLUCOMTR-MCNC: 121 MG/DL — HIGH (ref 70–99)
GLUCOSE BLDC GLUCOMTR-MCNC: 126 MG/DL — HIGH (ref 70–99)
GLUCOSE BLDC GLUCOMTR-MCNC: 128 MG/DL — HIGH (ref 70–99)
GLUCOSE BLDC GLUCOMTR-MCNC: 146 MG/DL — HIGH (ref 70–99)
GLUCOSE SERPL-MCNC: 115 MG/DL — HIGH (ref 70–99)
HCT VFR BLD CALC: 28.1 % — LOW (ref 39–50)
HGB BLD-MCNC: 9.6 G/DL — LOW (ref 13–17)
MAGNESIUM SERPL-MCNC: 2.2 MG/DL — SIGNIFICANT CHANGE UP (ref 1.8–2.6)
MCHC RBC-ENTMCNC: 30.8 PG — SIGNIFICANT CHANGE UP (ref 27–34)
MCHC RBC-ENTMCNC: 34.2 GM/DL — SIGNIFICANT CHANGE UP (ref 32–36)
MCV RBC AUTO: 90.1 FL — SIGNIFICANT CHANGE UP (ref 80–100)
PHOSPHATE SERPL-MCNC: 3.4 MG/DL — SIGNIFICANT CHANGE UP (ref 2.4–4.7)
PLATELET # BLD AUTO: 202 K/UL — SIGNIFICANT CHANGE UP (ref 150–400)
POTASSIUM SERPL-MCNC: 4.4 MMOL/L — SIGNIFICANT CHANGE UP (ref 3.5–5.3)
POTASSIUM SERPL-SCNC: 4.4 MMOL/L — SIGNIFICANT CHANGE UP (ref 3.5–5.3)
RBC # BLD: 3.12 M/UL — LOW (ref 4.2–5.8)
RBC # FLD: 13.4 % — SIGNIFICANT CHANGE UP (ref 10.3–14.5)
SODIUM SERPL-SCNC: 132 MMOL/L — LOW (ref 135–145)
WBC # BLD: 6.74 K/UL — SIGNIFICANT CHANGE UP (ref 3.8–10.5)
WBC # FLD AUTO: 6.74 K/UL — SIGNIFICANT CHANGE UP (ref 3.8–10.5)

## 2021-10-16 RX ORDER — OXYCODONE HYDROCHLORIDE 5 MG/1
10 TABLET ORAL ONCE
Refills: 0 | Status: DISCONTINUED | OUTPATIENT
Start: 2021-10-16 | End: 2021-10-17

## 2021-10-16 RX ADMIN — SENNA PLUS 2 TABLET(S): 8.6 TABLET ORAL at 05:43

## 2021-10-16 RX ADMIN — POLYETHYLENE GLYCOL 3350 17 GRAM(S): 17 POWDER, FOR SOLUTION ORAL at 05:42

## 2021-10-16 RX ADMIN — GABAPENTIN 300 MILLIGRAM(S): 400 CAPSULE ORAL at 22:20

## 2021-10-16 RX ADMIN — LACTULOSE 10 GRAM(S): 10 SOLUTION ORAL at 14:32

## 2021-10-16 RX ADMIN — ENOXAPARIN SODIUM 40 MILLIGRAM(S): 100 INJECTION SUBCUTANEOUS at 22:20

## 2021-10-16 RX ADMIN — METHOCARBAMOL 750 MILLIGRAM(S): 500 TABLET, FILM COATED ORAL at 05:43

## 2021-10-16 RX ADMIN — SENNA PLUS 2 TABLET(S): 8.6 TABLET ORAL at 17:56

## 2021-10-16 RX ADMIN — OXYCODONE HYDROCHLORIDE 10 MILLIGRAM(S): 5 TABLET ORAL at 09:39

## 2021-10-16 RX ADMIN — OXYCODONE HYDROCHLORIDE 10 MILLIGRAM(S): 5 TABLET ORAL at 18:13

## 2021-10-16 RX ADMIN — Medication 4 MILLIGRAM(S): at 22:20

## 2021-10-16 RX ADMIN — OXYCODONE HYDROCHLORIDE 10 MILLIGRAM(S): 5 TABLET ORAL at 05:41

## 2021-10-16 RX ADMIN — OXYCODONE HYDROCHLORIDE 10 MILLIGRAM(S): 5 TABLET ORAL at 22:28

## 2021-10-16 RX ADMIN — Medication 500 MILLIGRAM(S): at 12:21

## 2021-10-16 RX ADMIN — Medication 1 TABLET(S): at 12:21

## 2021-10-16 RX ADMIN — METHOCARBAMOL 750 MILLIGRAM(S): 500 TABLET, FILM COATED ORAL at 22:20

## 2021-10-16 RX ADMIN — GABAPENTIN 300 MILLIGRAM(S): 400 CAPSULE ORAL at 12:38

## 2021-10-16 RX ADMIN — PANTOPRAZOLE SODIUM 40 MILLIGRAM(S): 20 TABLET, DELAYED RELEASE ORAL at 05:43

## 2021-10-16 RX ADMIN — METHOCARBAMOL 750 MILLIGRAM(S): 500 TABLET, FILM COATED ORAL at 12:22

## 2021-10-16 RX ADMIN — OXYCODONE HYDROCHLORIDE 10 MILLIGRAM(S): 5 TABLET ORAL at 10:23

## 2021-10-16 RX ADMIN — OXYCODONE HYDROCHLORIDE 5 MILLIGRAM(S): 5 TABLET ORAL at 15:53

## 2021-10-16 RX ADMIN — POLYETHYLENE GLYCOL 3350 17 GRAM(S): 17 POWDER, FOR SOLUTION ORAL at 17:57

## 2021-10-16 RX ADMIN — OXYCODONE HYDROCHLORIDE 5 MILLIGRAM(S): 5 TABLET ORAL at 14:31

## 2021-10-16 RX ADMIN — GABAPENTIN 300 MILLIGRAM(S): 400 CAPSULE ORAL at 05:43

## 2021-10-16 RX ADMIN — OXYCODONE HYDROCHLORIDE 10 MILLIGRAM(S): 5 TABLET ORAL at 04:21

## 2021-10-16 NOTE — CONSULT NOTE ADULT - SUBJECTIVE AND OBJECTIVE BOX
HPI: 71M PMHx HLD, Colitis, Vertigo who originally presented to the ED with b/l LE weakness found to have an MRI concerning for T11 extramedullary hematoma, exam findings significant for 0/5 motor B/L LE and decreased sensation B/L LE R>L beginning ~LEVEL t12. Pt taken to the OR 10/7/21 for B/L decompression laminectomies T10-L2, dural opening and evacuation of the intradural hematoma.  Post op course with slow recovery to normal function.  Unknown at this time if pt. will regain full function.  TOV done x 2 since being here and p. failed both times.  Claims no urge or sensation to urinate with catheter out.          MEDICATIONS  (STANDING):  ascorbic acid 500 milliGRAM(s) Oral daily  dextrose 40% Gel 15 Gram(s) Oral once  dextrose 5%. 1000 milliLiter(s) (50 mL/Hr) IV Continuous <Continuous>  dextrose 5%. 1000 milliLiter(s) (100 mL/Hr) IV Continuous <Continuous>  dextrose 50% Injectable 25 Gram(s) IV Push once  dextrose 50% Injectable 12.5 Gram(s) IV Push once  dextrose 50% Injectable 25 Gram(s) IV Push once  doxazosin 4 milliGRAM(s) Oral at bedtime  enoxaparin Injectable 40 milliGRAM(s) SubCutaneous at bedtime  gabapentin 300 milliGRAM(s) Oral every 8 hours  glucagon  Injectable 1 milliGRAM(s) IntraMuscular once  insulin lispro (ADMELOG) corrective regimen sliding scale   SubCutaneous three times a day before meals  methocarbamol 750 milliGRAM(s) Oral every 8 hours  multivitamin 1 Tablet(s) Oral daily  pantoprazole    Tablet 40 milliGRAM(s) Oral before breakfast  polyethylene glycol 3350 17 Gram(s) Oral two times a day  senna 2 Tablet(s) Oral every 12 hours    MEDICATIONS  (PRN):  acetaminophen   Tablet .. 650 milliGRAM(s) Oral every 6 hours PRN Mild Pain (1 - 3)  lactulose Syrup 10 Gram(s) Oral daily PRN no BM>1 day  ondansetron Injectable 4 milliGRAM(s) IV Push every 6 hours PRN Nausea and/or Vomiting  oxyCODONE    IR 5 milliGRAM(s) Oral every 4 hours PRN Moderate Pain (4 - 6)  oxyCODONE    IR 10 milliGRAM(s) Oral every 4 hours PRN Severe Pain (7 - 10)      Vital Signs Last 24 Hrs  T(C): 37 (16 Oct 2021 16:00), Max: 37.7 (15 Oct 2021 17:30)  T(F): 98.6 (16 Oct 2021 16:00), Max: 99.9 (15 Oct 2021 17:30)  HR: 82 (16 Oct 2021 16:00) (81 - 101)  BP: 173/82 (16 Oct 2021 16:00) (124/85 - 173/82)  BP(mean): --  RR: 18 (16 Oct 2021 16:00) (18 - 18)  SpO2: 95% (16 Oct 2021 16:00) (93% - 96%)    PHYSICAL EXAM:      Constitutional:    Eyes:    ENMT:    Neck:    Breasts:    Back:    Respiratory:    Cardiovascular:    Gastrointestinal:    Genitourinary:    Rectal:    Extremities:    Vascular:    Neurological:    Skin:    Lymph Nodes:    Musculoskeletal:    Psychiatric:        I&O's Detail    15 Oct 2021 07:01  -  16 Oct 2021 07:00  --------------------------------------------------------  IN:  Total IN: 0 mL    OUT:    Indwelling Catheter - Urethral (mL): 800 mL    Voided (mL): 1100 mL  Total OUT: 1900 mL    Total NET: -1900 mL          LABS:                        9.6    6.74  )-----------( 202      ( 16 Oct 2021 07:35 )             28.1     10-16    132<L>  |  95<L>  |  18.5  ----------------------------<  115<H>  4.4   |  26.0  |  0.59    Ca    8.3<L>      16 Oct 2021 07:35  Phos  3.4     10-16  Mg     2.2     10-16    TPro  5.3<L>  /  Alb  3.4  /  TBili  1.5  /  DBili  x   /  AST  14  /  ALT  31  /  AlkPhos  47  10-15          RADIOLOGY & ADDITIONAL STUDIES: HPI: 71M PMHx HLD, Colitis, Vertigo who originally presented to the ED with b/l LE weakness found to have an MRI concerning for T11 extramedullary hematoma, exam findings significant for 0/5 motor B/L LE and decreased sensation B/L LE R>L beginning ~LEVEL t12. Pt taken to the OR 10/7/21 for B/L decompression laminectomies T10-L2, dural opening and evacuation of the intradural hematoma.  Post op course with slow recovery to normal function in b/l LE.  Unknown at this time if pt. will regain full function.  TOV done x 2 since being here and pt. failed both times.  Lacks both sensation and ability to urinate.  No prior urological issues, no BPH, no nocturia, no urgency, no frequency, no UTI's, no hematuria, no force of stream issues.  Currently only c/o pain to R leg/knee and severe constipation, no BM in almost 1 week.  Also not ambulatory and "hasn't seen PT in 1 week".    ROS: negative except for HPI    PMHx: HLD, Colitis, Vertigo  PSHx: denies  FamHx: non-contributory  Allergies: denies        MEDICATIONS  (STANDING):  ascorbic acid 500 milliGRAM(s) Oral daily  dextrose 40% Gel 15 Gram(s) Oral once  dextrose 5%. 1000 milliLiter(s) (50 mL/Hr) IV Continuous <Continuous>  dextrose 5%. 1000 milliLiter(s) (100 mL/Hr) IV Continuous <Continuous>  dextrose 50% Injectable 25 Gram(s) IV Push once  dextrose 50% Injectable 12.5 Gram(s) IV Push once  dextrose 50% Injectable 25 Gram(s) IV Push once  doxazosin 4 milliGRAM(s) Oral at bedtime  enoxaparin Injectable 40 milliGRAM(s) SubCutaneous at bedtime  gabapentin 300 milliGRAM(s) Oral every 8 hours  glucagon  Injectable 1 milliGRAM(s) IntraMuscular once  insulin lispro (ADMELOG) corrective regimen sliding scale   SubCutaneous three times a day before meals  methocarbamol 750 milliGRAM(s) Oral every 8 hours  multivitamin 1 Tablet(s) Oral daily  pantoprazole    Tablet 40 milliGRAM(s) Oral before breakfast  polyethylene glycol 3350 17 Gram(s) Oral two times a day  senna 2 Tablet(s) Oral every 12 hours    MEDICATIONS  (PRN):  acetaminophen   Tablet .. 650 milliGRAM(s) Oral every 6 hours PRN Mild Pain (1 - 3)  lactulose Syrup 10 Gram(s) Oral daily PRN no BM>1 day  ondansetron Injectable 4 milliGRAM(s) IV Push every 6 hours PRN Nausea and/or Vomiting  oxyCODONE    IR 5 milliGRAM(s) Oral every 4 hours PRN Moderate Pain (4 - 6)  oxyCODONE    IR 10 milliGRAM(s) Oral every 4 hours PRN Severe Pain (7 - 10)      Vital Signs Last 24 Hrs  T(C): 37 (16 Oct 2021 16:00), Max: 37.7 (15 Oct 2021 17:30)  T(F): 98.6 (16 Oct 2021 16:00), Max: 99.9 (15 Oct 2021 17:30)  HR: 82 (16 Oct 2021 16:00) (81 - 101)  BP: 173/82 (16 Oct 2021 16:00) (124/85 - 173/82)  BP(mean): --  RR: 18 (16 Oct 2021 16:00) (18 - 18)  SpO2: 95% (16 Oct 2021 16:00) (93% - 96%)    PHYSICAL EXAM:      Constitutional: NAD    Respiratory: no accessory muscle use or conversational dyspnea    Cardiovascular: RRR    Gastrointestinal: soft, NT/ND    Genitourinary: kate in place draining clear, yellow urine          I&O's Detail    15 Oct 2021 07:01  -  16 Oct 2021 07:00  --------------------------------------------------------  IN:  Total IN: 0 mL    OUT:    Indwelling Catheter - Urethral (mL): 800 mL    Voided (mL): 1100 mL  Total OUT: 1900 mL    Total NET: -1900 mL          LABS:                        9.6    6.74  )-----------( 202      ( 16 Oct 2021 07:35 )             28.1     10-16    132<L>  |  95<L>  |  18.5  ----------------------------<  115<H>  4.4   |  26.0  |  0.59    Ca    8.3<L>      16 Oct 2021 07:35  Phos  3.4     10-16  Mg     2.2     10-16    TPro  5.3<L>  /  Alb  3.4  /  TBili  1.5  /  DBili  x   /  AST  14  /  ALT  31  /  AlkPhos  47  10-15          RADIOLOGY & ADDITIONAL STUDIES:

## 2021-10-16 NOTE — CONSULT NOTE ADULT - ASSESSMENT
71M s/p intradural hematoma s/p evacuation now unable to void on his own    - Retention most likely multifactorial: non-ambulatory, constipation and spinal cord disruption  - bowel regimen, amenable to suppository at this time  - PT to work with pt. to regain ambulation  - given he is still reduced overall function to LE, bladder is most likely affected as well.  As per PA who I spoke with, recovery is ongoing and slow at this point.  Would not push to remove kate as he is likely to fail again if no significant neurologic improvement is noticed in other areas currently lacking.  - If significant neurologic improvement noted, may try TOV the day before planned discharge otherwise can go home with leg bag and follow up with Dr. Gotti  as outpatient for possible urodynamics

## 2021-10-16 NOTE — PROGRESS NOTE ADULT - ASSESSMENT
70 Y/O male admitted w/ c/o lower back pain, B/L LE weakness and numbness which began while fishing on a boat, MRI concerning for T11 extramedullary hematoma, s/p B/L decompression laminectomies T10-L2, dural opening and evacuation of the intradural hematoma POD#9.   10/7/21 Diagnostic Cerebral Angiogram and Spinal Angiogram from the level of T7-L2 with no vascular abnormalities      -Pt seen and case discussed w/ Dr. Uriarte   -PT/OT, OOB  -pain control as needed, avoid over sedation  -plan for f/u angiogram to r/o spinal anomaly  -Urology evaluation appreciated, pt failed TOV, difficulty sensing urge to urinate   -lovenox and SCDs for DVT prophylaxis   -PM&R following, recommends ACUTE inpatient rehabilitation  -STAT repeat imaging if change in neuro exam  -continue to follow

## 2021-10-16 NOTE — PROGRESS NOTE ADULT - SUBJECTIVE AND OBJECTIVE BOX
INTERVAL HPI/OVERNIGHT EVENTS:  72 Y/O male w/ PMHx of HLD and chronic back pain admitted w/ c/o lower back pain, B/L LE weakness and numbness which began while fishing on a boat, MRI concerning for T11 extramedullary hematoma, exam findings significant for 0/5 motor B/L LE and decreased sensation B/L LE R>L beginning ~LEVEL t12. Pt taken to the OR 10/7/21 for B/L decompression laminectomies T10-L2, dural opening and evacuation of the intradural hematoma. Pt seen and examined this morning w/ attending at bedside.      Vital Signs Last 24 Hrs  T(C): 36.3 (16 Oct 2021 10:24), Max: 37.7 (15 Oct 2021 17:30)  T(F): 97.4 (16 Oct 2021 10:24), Max: 99.9 (15 Oct 2021 17:30)  HR: 88 (16 Oct 2021 10:24) (81 - 101)  BP: 130/72 (16 Oct 2021 10:24) (124/85 - 147/75)  BP(mean): --  RR: 18 (16 Oct 2021 10:24) (18 - 18)  SpO2: 93% (16 Oct 2021 10:24) (93% - 96%)      PHYSICAL EXAM:  GENERAL: NAD, well-groomed, well-developed  HEAD:  Atraumatic, normocephalic  WOUND: incision clean dry intact, open to air, no erythema, no drainage   MENTAL STATUS: AAO x3; Awake, Opens eyes spontaneously; Appropriately conversant without aphasia; following simple commands  CRANIAL NERVES: PERRL; EOMI; no facial asymmetry; facial sensation grossly intact to light touch b/l;  tongue midline; palate rises symmetrically, decreased rectal tone  MOTOR: strength 5/5 B/L upper extremities; LLE 5/5, RLE HF 4/5  KF 4+/5   KE 4/5  PF/DF 4/5  EHL 3/5, decreased sensation R>L, denies saddle anesthesia   CHEST/LUNG: Clear to auscultation bilaterally; no rales, rhonchi, wheezing, or rubs  HEART: +S1/+S2; Regular rate and rhythm; no murmurs, rubs, or gallops  ABDOMEN: Soft, nontender, nondistended; bowel sounds present all four quadrants  EXTREMITIES:  2+ peripheral pulses, no clubbing, cyanosis, or edema  SKIN: Warm, dry; no rashes or lesions      LABS:                        9.6    6.74  )-----------( 202      ( 16 Oct 2021 07:35 )             28.1     10-16    132<L>  |  95<L>  |  18.5  ----------------------------<  115<H>  4.4   |  26.0  |  0.59    Ca    8.3<L>      16 Oct 2021 07:35  Phos  3.4     10-16  Mg     2.2     10-16    TPro  5.3<L>  /  Alb  3.4  /  TBili  1.5  /  DBili  x   /  AST  14  /  ALT  31  /  AlkPhos  47  10-15          10-15 @ 07:01  -  10-16 @ 07:00  --------------------------------------------------------  IN: 0 mL / OUT: 1900 mL / NET: -1900 mL

## 2021-10-17 LAB
BLD GP AB SCN SERPL QL: SIGNIFICANT CHANGE UP
GLUCOSE BLDC GLUCOMTR-MCNC: 141 MG/DL — HIGH (ref 70–99)
GLUCOSE BLDC GLUCOMTR-MCNC: 150 MG/DL — HIGH (ref 70–99)
GLUCOSE BLDC GLUCOMTR-MCNC: 154 MG/DL — HIGH (ref 70–99)
GLUCOSE BLDC GLUCOMTR-MCNC: 156 MG/DL — HIGH (ref 70–99)
SARS-COV-2 RNA SPEC QL NAA+PROBE: SIGNIFICANT CHANGE UP

## 2021-10-17 RX ORDER — OXYCODONE HYDROCHLORIDE 5 MG/1
10 TABLET ORAL EVERY 4 HOURS
Refills: 0 | Status: DISCONTINUED | OUTPATIENT
Start: 2021-10-17 | End: 2021-10-23

## 2021-10-17 RX ADMIN — Medication 500 MILLIGRAM(S): at 11:35

## 2021-10-17 RX ADMIN — OXYCODONE HYDROCHLORIDE 10 MILLIGRAM(S): 5 TABLET ORAL at 02:41

## 2021-10-17 RX ADMIN — GABAPENTIN 300 MILLIGRAM(S): 400 CAPSULE ORAL at 14:01

## 2021-10-17 RX ADMIN — Medication 650 MILLIGRAM(S): at 11:34

## 2021-10-17 RX ADMIN — GABAPENTIN 300 MILLIGRAM(S): 400 CAPSULE ORAL at 21:31

## 2021-10-17 RX ADMIN — OXYCODONE HYDROCHLORIDE 10 MILLIGRAM(S): 5 TABLET ORAL at 17:44

## 2021-10-17 RX ADMIN — POLYETHYLENE GLYCOL 3350 17 GRAM(S): 17 POWDER, FOR SOLUTION ORAL at 17:44

## 2021-10-17 RX ADMIN — PANTOPRAZOLE SODIUM 40 MILLIGRAM(S): 20 TABLET, DELAYED RELEASE ORAL at 05:58

## 2021-10-17 RX ADMIN — Medication 650 MILLIGRAM(S): at 20:51

## 2021-10-17 RX ADMIN — Medication 1 TABLET(S): at 11:36

## 2021-10-17 RX ADMIN — LACTULOSE 10 GRAM(S): 10 SOLUTION ORAL at 14:00

## 2021-10-17 RX ADMIN — OXYCODONE HYDROCHLORIDE 10 MILLIGRAM(S): 5 TABLET ORAL at 03:16

## 2021-10-17 RX ADMIN — SENNA PLUS 2 TABLET(S): 8.6 TABLET ORAL at 17:44

## 2021-10-17 RX ADMIN — Medication 650 MILLIGRAM(S): at 19:51

## 2021-10-17 RX ADMIN — OXYCODONE HYDROCHLORIDE 10 MILLIGRAM(S): 5 TABLET ORAL at 12:21

## 2021-10-17 RX ADMIN — POLYETHYLENE GLYCOL 3350 17 GRAM(S): 17 POWDER, FOR SOLUTION ORAL at 06:14

## 2021-10-17 RX ADMIN — ENOXAPARIN SODIUM 40 MILLIGRAM(S): 100 INJECTION SUBCUTANEOUS at 21:31

## 2021-10-17 RX ADMIN — Medication 4 MILLIGRAM(S): at 21:31

## 2021-10-17 RX ADMIN — OXYCODONE HYDROCHLORIDE 10 MILLIGRAM(S): 5 TABLET ORAL at 06:51

## 2021-10-17 RX ADMIN — SENNA PLUS 2 TABLET(S): 8.6 TABLET ORAL at 05:58

## 2021-10-17 RX ADMIN — GABAPENTIN 300 MILLIGRAM(S): 400 CAPSULE ORAL at 05:58

## 2021-10-17 RX ADMIN — OXYCODONE HYDROCHLORIDE 10 MILLIGRAM(S): 5 TABLET ORAL at 23:44

## 2021-10-17 RX ADMIN — METHOCARBAMOL 750 MILLIGRAM(S): 500 TABLET, FILM COATED ORAL at 14:00

## 2021-10-17 RX ADMIN — METHOCARBAMOL 750 MILLIGRAM(S): 500 TABLET, FILM COATED ORAL at 05:58

## 2021-10-17 RX ADMIN — OXYCODONE HYDROCHLORIDE 10 MILLIGRAM(S): 5 TABLET ORAL at 00:05

## 2021-10-17 RX ADMIN — Medication 2: at 11:42

## 2021-10-17 RX ADMIN — METHOCARBAMOL 750 MILLIGRAM(S): 500 TABLET, FILM COATED ORAL at 21:31

## 2021-10-17 NOTE — PROGRESS NOTE ADULT - SUBJECTIVE AND OBJECTIVE BOX
HPI:  71m 911  with acute worsening of back pain while fishing with friends this morning. Acute bilateral leg weakness and numbness.  RLE 1/5, LLE 2/5 after decadron in ER.  Vazquez placed in Er for urinary retention, 700cc in bladder. Initially there was concern for GBS given recent flu vaccination, but the clinical history of pain followed by acute weakness was not consistent.  MRI spine performed and evaluated while patient in Er- there was concern for intradural, extramedulary lesion from T1-Sacrum c/w hematoma.  Serpentine vessel seen at T12 to L1 concerning for vascular lesion.  Patient taken for emergent thoracolumbar laminectomy on 10/7.     INTERVAL HPI:  10/7/21: T10-L2 laminectomy with SDH evacuation  10/7/21: Angio negative  10/10/21: Vazquez replaced for retention   10/11/21: [place] drain removal, Lovenox to be started at bedtime, patient to be downgraded to NSG SDU    INTERVAL HPI/OVERNIGHT EVENTS:  71y Male seen lying comfortably in bed. Patient with continued back pain and RLE pain. No acute overnight events reported. Patient states that PT did not come to see him yesterday so he did not walk.      Vital Signs Last 24 Hrs  T(C): 36.8 (17 Oct 2021 10:22), Max: 37.5 (16 Oct 2021 20:53)  T(F): 98.3 (17 Oct 2021 10:22), Max: 99.5 (16 Oct 2021 20:53)  HR: 87 (17 Oct 2021 10:22) (82 - 96)  BP: 126/76 (17 Oct 2021 10:22) (126/76 - 173/82)  BP(mean): --  RR: 18 (17 Oct 2021 10:22) (18 - 18)  SpO2: 94% (17 Oct 2021 10:22) (92% - 96%)    PHYSICAL EXAM:  GENERAL: NAD, well-groomed, well-developed  HEAD:  Atraumatic, normocephalic  WOUND: incision clean dry intact, open to air, no erythema, no drainage   MENTAL STATUS: AAO x3; Awake, Opens eyes spontaneously; Appropriately conversant without aphasia; following simple commands  CRANIAL NERVES: PERRL; EOMI; no facial asymmetry; facial sensation grossly intact to light touch b/l;  tongue midline; palate rises symmetrically, decreased rectal tone  MOTOR: strength 5/5 B/L upper extremities; LLE 5/5, RLE HF 4/5  KF 4+/5   KE 4/5  PF/DF 4/5  EHL 3/5, decreased sensation R>L, denies saddle anesthesia   CHEST/LUNG: Clear to auscultation bilaterally; no rales, rhonchi, wheezing, or rubs  HEART: +S1/+S2; Regular rate and rhythm; no murmurs, rubs, or gallops  ABDOMEN: Soft, nontender, nondistended; bowel sounds present all four quadrants  EXTREMITIES:  2+ peripheral pulses, no clubbing, cyanosis, or edema  SKIN: Warm, dry; no rashes or lesions    LABS:                        9.6    6.74  )-----------( 202      ( 16 Oct 2021 07:35 )             28.1     10-16    132<L>  |  95<L>  |  18.5  ----------------------------<  115<H>  4.4   |  26.0  |  0.59    Ca    8.3<L>      16 Oct 2021 07:35  Phos  3.4     10-16  Mg     2.2     10-16            10-16 @ 07:01  -  10-17 @ 07:00  --------------------------------------------------------  IN: 0 mL / OUT: 3100 mL / NET: -3100 mL        RADIOLOGY & ADDITIONAL TESTS:  < from: MR Lumbar Spine w/wo IV Cont (10.08.21 @ 16:12) >  IMPRESSION:    -Postsurgical changes status post intraspinal hemorrhage evacuation. Residual subdural hemorrhage and postoperative air as described above.    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    -Previously seen linear enhancement within the lower thoracic levels is no longer seen. This may have represented active contrast extravasation.    --- End of Report ---            ROBERTA JOHNSON MD; Attending Radiologist  This document has been electronically signed. Oct  8 2021  7:20PM    < end of copied text >

## 2021-10-17 NOTE — PROGRESS NOTE ADULT - ASSESSMENT
71m 911  with acute worsening of back pain while fishing with friends. MRI spine performed and evaluated while patient in Er- there was concern for intradural, extramedulary lesion from T1-Sacrum c/w hematoma.  Serpentine vessel seen at T12 to L1 concerning for vascular lesion.  Patient taken for emergent thoracolumbar laminectomy on 10/7.   10/7/21: T10-L2 laminectomy with SDH evacuation  10/7/21: Angio negative  10/10/21: Kate replaced for retention   10/11/21: [place] drain removal, Lovenox started    Plan:  -D/w Dr. Uriarte  -Continue q4h neuro checks   -Pain control PRN  -Continue Lovenox for DVT PPX  -Urology following for kate management; continue with Kate at present time   -Plan for repeat angiogram tomorrow AM to R/O spinal anomaly   -NPO after midnight; f/u COVID PCR and AM labs   -PT/OT/OOB encouraged   -Dispo planning; PMNR recommending [place]   71m 911  with acute worsening of back pain while fishing with friends. MRI spine performed and evaluated while patient in Er- there was concern for intradural, extramedulary lesion from T1-Sacrum c/w hematoma.  Serpentine vessel seen at T12 to L1 concerning for vascular lesion.  Patient taken for emergent thoracolumbar laminectomy on 10/7.   10/7/21: T10-L2 laminectomy with SDH evacuation  10/7/21: Angio negative  10/10/21: Kate replaced for retention   10/11/21: [place] drain removal, Lovenox started    Plan:  -D/w Dr. Uriarte  -Continue q4h neuro checks   -MRI T/L spine +/-   -Pain control PRN  -Continue Lovenox for DVT PPX  -Urology following for kate management; continue with Kate at present time   -Plan for repeat angiogram tomorrow AM to R/O spinal anomaly   -NPO after midnight; f/u COVID PCR and AM labs   -PT/OT/OOB encouraged   -Dispo planning; PMNR recommending [place]

## 2021-10-18 LAB
ANION GAP SERPL CALC-SCNC: 10 MMOL/L — SIGNIFICANT CHANGE UP (ref 5–17)
APTT BLD: 33.5 SEC — SIGNIFICANT CHANGE UP (ref 27.5–35.5)
BUN SERPL-MCNC: 15.9 MG/DL — SIGNIFICANT CHANGE UP (ref 8–20)
CALCIUM SERPL-MCNC: 8.5 MG/DL — LOW (ref 8.6–10.2)
CHLORIDE SERPL-SCNC: 92 MMOL/L — LOW (ref 98–107)
CO2 SERPL-SCNC: 27 MMOL/L — SIGNIFICANT CHANGE UP (ref 22–29)
CREAT SERPL-MCNC: 0.57 MG/DL — SIGNIFICANT CHANGE UP (ref 0.5–1.3)
GLUCOSE BLDC GLUCOMTR-MCNC: 132 MG/DL — HIGH (ref 70–99)
GLUCOSE BLDC GLUCOMTR-MCNC: 144 MG/DL — HIGH (ref 70–99)
GLUCOSE BLDC GLUCOMTR-MCNC: 150 MG/DL — HIGH (ref 70–99)
GLUCOSE BLDC GLUCOMTR-MCNC: 190 MG/DL — HIGH (ref 70–99)
GLUCOSE SERPL-MCNC: 124 MG/DL — HIGH (ref 70–99)
HCT VFR BLD CALC: 29.4 % — LOW (ref 39–50)
HGB BLD-MCNC: 10.2 G/DL — LOW (ref 13–17)
INR BLD: 1.07 RATIO — SIGNIFICANT CHANGE UP (ref 0.88–1.16)
MCHC RBC-ENTMCNC: 31.1 PG — SIGNIFICANT CHANGE UP (ref 27–34)
MCHC RBC-ENTMCNC: 34.7 GM/DL — SIGNIFICANT CHANGE UP (ref 32–36)
MCV RBC AUTO: 89.6 FL — SIGNIFICANT CHANGE UP (ref 80–100)
PLATELET # BLD AUTO: 214 K/UL — SIGNIFICANT CHANGE UP (ref 150–400)
POTASSIUM SERPL-MCNC: 4.6 MMOL/L — SIGNIFICANT CHANGE UP (ref 3.5–5.3)
POTASSIUM SERPL-SCNC: 4.6 MMOL/L — SIGNIFICANT CHANGE UP (ref 3.5–5.3)
PROTHROM AB SERPL-ACNC: 12.4 SEC — SIGNIFICANT CHANGE UP (ref 10.6–13.6)
RBC # BLD: 3.28 M/UL — LOW (ref 4.2–5.8)
RBC # FLD: 13.4 % — SIGNIFICANT CHANGE UP (ref 10.3–14.5)
SODIUM SERPL-SCNC: 129 MMOL/L — LOW (ref 135–145)
WBC # BLD: 8.52 K/UL — SIGNIFICANT CHANGE UP (ref 3.8–10.5)
WBC # FLD AUTO: 8.52 K/UL — SIGNIFICANT CHANGE UP (ref 3.8–10.5)

## 2021-10-18 PROCEDURE — 72157 MRI CHEST SPINE W/O & W/DYE: CPT | Mod: 26

## 2021-10-18 PROCEDURE — 99233 SBSQ HOSP IP/OBS HIGH 50: CPT

## 2021-10-18 PROCEDURE — 72158 MRI LUMBAR SPINE W/O & W/DYE: CPT | Mod: 26

## 2021-10-18 RX ORDER — GABAPENTIN 400 MG/1
600 CAPSULE ORAL EVERY 8 HOURS
Refills: 0 | Status: DISCONTINUED | OUTPATIENT
Start: 2021-10-18 | End: 2021-10-23

## 2021-10-18 RX ORDER — DULOXETINE HYDROCHLORIDE 30 MG/1
30 CAPSULE, DELAYED RELEASE ORAL AT BEDTIME
Refills: 0 | Status: DISCONTINUED | OUTPATIENT
Start: 2021-10-18 | End: 2021-10-23

## 2021-10-18 RX ORDER — GABAPENTIN 400 MG/1
600 CAPSULE ORAL EVERY 8 HOURS
Refills: 0 | Status: DISCONTINUED | OUTPATIENT
Start: 2021-10-18 | End: 2021-10-18

## 2021-10-18 RX ADMIN — OXYCODONE HYDROCHLORIDE 10 MILLIGRAM(S): 5 TABLET ORAL at 00:14

## 2021-10-18 RX ADMIN — ENOXAPARIN SODIUM 40 MILLIGRAM(S): 100 INJECTION SUBCUTANEOUS at 22:20

## 2021-10-18 RX ADMIN — Medication 2: at 18:04

## 2021-10-18 RX ADMIN — POLYETHYLENE GLYCOL 3350 17 GRAM(S): 17 POWDER, FOR SOLUTION ORAL at 05:28

## 2021-10-18 RX ADMIN — OXYCODONE HYDROCHLORIDE 10 MILLIGRAM(S): 5 TABLET ORAL at 06:13

## 2021-10-18 RX ADMIN — METHOCARBAMOL 750 MILLIGRAM(S): 500 TABLET, FILM COATED ORAL at 05:28

## 2021-10-18 RX ADMIN — OXYCODONE HYDROCHLORIDE 10 MILLIGRAM(S): 5 TABLET ORAL at 11:55

## 2021-10-18 RX ADMIN — GABAPENTIN 600 MILLIGRAM(S): 400 CAPSULE ORAL at 22:20

## 2021-10-18 RX ADMIN — Medication 650 MILLIGRAM(S): at 15:27

## 2021-10-18 RX ADMIN — Medication 4 MILLIGRAM(S): at 22:14

## 2021-10-18 RX ADMIN — POLYETHYLENE GLYCOL 3350 17 GRAM(S): 17 POWDER, FOR SOLUTION ORAL at 18:01

## 2021-10-18 RX ADMIN — METHOCARBAMOL 750 MILLIGRAM(S): 500 TABLET, FILM COATED ORAL at 22:13

## 2021-10-18 RX ADMIN — METHOCARBAMOL 750 MILLIGRAM(S): 500 TABLET, FILM COATED ORAL at 11:55

## 2021-10-18 RX ADMIN — GABAPENTIN 300 MILLIGRAM(S): 400 CAPSULE ORAL at 05:27

## 2021-10-18 RX ADMIN — PANTOPRAZOLE SODIUM 40 MILLIGRAM(S): 20 TABLET, DELAYED RELEASE ORAL at 05:28

## 2021-10-18 RX ADMIN — Medication 1 TABLET(S): at 11:56

## 2021-10-18 RX ADMIN — GABAPENTIN 300 MILLIGRAM(S): 400 CAPSULE ORAL at 11:55

## 2021-10-18 RX ADMIN — DULOXETINE HYDROCHLORIDE 30 MILLIGRAM(S): 30 CAPSULE, DELAYED RELEASE ORAL at 22:13

## 2021-10-18 RX ADMIN — SENNA PLUS 2 TABLET(S): 8.6 TABLET ORAL at 05:28

## 2021-10-18 RX ADMIN — OXYCODONE HYDROCHLORIDE 10 MILLIGRAM(S): 5 TABLET ORAL at 18:01

## 2021-10-18 RX ADMIN — Medication 500 MILLIGRAM(S): at 11:55

## 2021-10-18 RX ADMIN — SENNA PLUS 2 TABLET(S): 8.6 TABLET ORAL at 18:01

## 2021-10-18 RX ADMIN — Medication 650 MILLIGRAM(S): at 04:19

## 2021-10-18 RX ADMIN — Medication 650 MILLIGRAM(S): at 05:19

## 2021-10-18 RX ADMIN — Medication 650 MILLIGRAM(S): at 22:14

## 2021-10-18 NOTE — CONSULT NOTE ADULT - ASSESSMENT
71m 911  with acute worsening of back pain while fishing with friends. MRI spine performed and evaluated while patient in Er- there was concern for intradural, extramedulary lesion from T1-Sacrum c/w hematoma.  Serpentine vessel seen at T12 to L1 concerning for vascular lesion.  Patient taken for emergent thoracolumbar laminectomy on 10/7.     med Recs:    - Increase gabapentin to 600mg TID HOLD for sedation   - Recommend starting cymbalta 30mg qhs. Hold if pt taking any other serotonergic meds - none seen in list. This medication will help with muscloskeletal pain.

## 2021-10-18 NOTE — CONSULT NOTE ADULT - SUBJECTIVE AND OBJECTIVE BOX
CC: back pain     HPI: per chart review:   71m 911  with acute worsening of back pain while fishing with friends. MRI spine performed and evaluated while patient in Er- there was concern for intradural, extramedulary lesion from T1-Sacrum c/w hematoma.  Serpentine vessel seen at T12 to L1 concerning for vascular lesion.  Patient taken for emergent thoracolumbar laminectomy on 10/7.     .seen   T(C): 36.9 (10-18-21 @ 04:14), Max: 37.3 (10-17-21 @ 21:30)  HR: 82 (10-18-21 @ 04:14) (82 - 95)  BP: 149/68 (10-18-21 @ 04:14) (126/76 - 151/77)  RR: 18 (10-18-21 @ 04:14) (18 - 18)  SpO2: 94% (10-18-21 @ 04:14) (94% - 97%)      10-17-21 @ 07:01  -  10-18-21 @ 07:00  --------------------------------------------------------  IN: 0 mL / OUT: 2100 mL / NET: -2100 mL        acetaminophen   Tablet .. 650 milliGRAM(s) Oral every 6 hours PRN  ascorbic acid 500 milliGRAM(s) Oral daily  dextrose 40% Gel 15 Gram(s) Oral once  dextrose 5%. 1000 milliLiter(s) IV Continuous <Continuous>  dextrose 5%. 1000 milliLiter(s) IV Continuous <Continuous>  dextrose 50% Injectable 25 Gram(s) IV Push once  dextrose 50% Injectable 25 Gram(s) IV Push once  dextrose 50% Injectable 12.5 Gram(s) IV Push once  doxazosin 4 milliGRAM(s) Oral at bedtime  enoxaparin Injectable 40 milliGRAM(s) SubCutaneous at bedtime  gabapentin 300 milliGRAM(s) Oral every 8 hours  glucagon  Injectable 1 milliGRAM(s) IntraMuscular once  insulin lispro (ADMELOG) corrective regimen sliding scale   SubCutaneous three times a day before meals  lactulose Syrup 10 Gram(s) Oral daily PRN  methocarbamol 750 milliGRAM(s) Oral every 8 hours  multivitamin 1 Tablet(s) Oral daily  ondansetron Injectable 4 milliGRAM(s) IV Push every 6 hours PRN  oxyCODONE    IR 10 milliGRAM(s) Oral every 4 hours PRN  pantoprazole    Tablet 40 milliGRAM(s) Oral before breakfast  polyethylene glycol 3350 17 Gram(s) Oral two times a day  saline laxative (FLEET) Rectal Enema 1 Enema Rectal once  senna 2 Tablet(s) Oral every 12 hours                          9.6    6.74  )-----------( 202      ( 16 Oct 2021 07:35 )             28.1     10-16    132<L>  |  95<L>  |  18.5  ----------------------------<  115<H>  4.4   |  26.0  |  0.59    Ca    8.3<L>      16 Oct 2021 07:35  Phos  3.4     10-16  Mg     2.2     10-16            Pain Service   [phone] CC: leg pain     HPI: per chart review:   71m 911  with acute worsening of back pain while fishing with friends. MRI spine performed and evaluated while patient in Er- there was concern for intradural, extramedulary lesion from T1-Sacrum c/w hematoma.  Serpentine vessel seen at T12 to L1 concerning for vascular lesion.  Patient taken for emergent thoracolumbar laminectomy on 10/7.     Interval Hx:  Patient seen during rounds  c/o leg pain  states there is a burning/electrical/shooting quality to the pain   will recommend increasing neuropathic pain meds  Patient denies sedation with medications     T(C): 36.9 (10-18-21 @ 04:14), Max: 37.3 (10-17-21 @ 21:30)  HR: 82 (10-18-21 @ 04:14) (82 - 95)  BP: 149/68 (10-18-21 @ 04:14) (126/76 - 151/77)  RR: 18 (10-18-21 @ 04:14) (18 - 18)  SpO2: 94% (10-18-21 @ 04:14) (94% - 97%)      10-17-21 @ 07:01  -  10-18-21 @ 07:00  --------------------------------------------------------  IN: 0 mL / OUT: 2100 mL / NET: -2100 mL        acetaminophen   Tablet .. 650 milliGRAM(s) Oral every 6 hours PRN  ascorbic acid 500 milliGRAM(s) Oral daily  dextrose 40% Gel 15 Gram(s) Oral once  dextrose 5%. 1000 milliLiter(s) IV Continuous <Continuous>  dextrose 5%. 1000 milliLiter(s) IV Continuous <Continuous>  dextrose 50% Injectable 25 Gram(s) IV Push once  dextrose 50% Injectable 25 Gram(s) IV Push once  dextrose 50% Injectable 12.5 Gram(s) IV Push once  doxazosin 4 milliGRAM(s) Oral at bedtime  enoxaparin Injectable 40 milliGRAM(s) SubCutaneous at bedtime  gabapentin 300 milliGRAM(s) Oral every 8 hours  glucagon  Injectable 1 milliGRAM(s) IntraMuscular once  insulin lispro (ADMELOG) corrective regimen sliding scale   SubCutaneous three times a day before meals  lactulose Syrup 10 Gram(s) Oral daily PRN  methocarbamol 750 milliGRAM(s) Oral every 8 hours  multivitamin 1 Tablet(s) Oral daily  ondansetron Injectable 4 milliGRAM(s) IV Push every 6 hours PRN  oxyCODONE    IR 10 milliGRAM(s) Oral every 4 hours PRN  pantoprazole    Tablet 40 milliGRAM(s) Oral before breakfast  polyethylene glycol 3350 17 Gram(s) Oral two times a day  saline laxative (FLEET) Rectal Enema 1 Enema Rectal once  senna 2 Tablet(s) Oral every 12 hours                          9.6    6.74  )-----------( 202      ( 16 Oct 2021 07:35 )             28.1     10-16    132<L>  |  95<L>  |  18.5  ----------------------------<  115<H>  4.4   |  26.0  |  0.59    Ca    8.3<L>      16 Oct 2021 07:35  Phos  3.4     10-16  Mg     2.2     10-16            Pain Service   [phone]

## 2021-10-18 NOTE — PROGRESS NOTE ADULT - SUBJECTIVE AND OBJECTIVE BOX
HPI:  71m 911  with acute worsening of back pain while fishing with friends this morning. Acute bilateral leg weakness and numbness.  RLE 1/5, LLE 2/5 after decadron in ER.  Vazquez placed in Er for urinary retention, 700cc in bladder. Initially there was concern for GBS given recent flu vaccination, but the clinical history of pain followed by acute weakness was not consistent.  MRI spine performed and evaluated while patient in Er- there was concern for intradural, extramedulary lesion from T1-Sacrum c/w hematoma.  Serpentine vessel seen at T12 to L1 concerning for vascular lesion.  Patient taken for emergent thoracolumbar laminectomy on 10/7.     INTERVAL HPI:  10/7/21: T10-L2 laminectomy with SDH evacuation  10/7/21: Angio negative  10/10/21: Vazquez replaced for retention   10/11/21: [place] drain removal, Lovenox to be started at bedtime, patient to be downgraded to NSG SDU    INTERVAL HPI/OVERNIGHT EVENTS:  71y Male seen lying in bed. Patient states that he was not up to working with PT this AM d/t RLE pain after going down for his MRI.     Vital Signs Last 24 Hrs  T(C): 36.7 (18 Oct 2021 10:29), Max: 37.3 (17 Oct 2021 21:30)  T(F): 98 (18 Oct 2021 10:29), Max: 99.1 (17 Oct 2021 21:30)  HR: 82 (18 Oct 2021 10:29) (82 - 95)  BP: 141/78 (18 Oct 2021 10:29) (129/75 - 151/77)  BP(mean): --  RR: 18 (18 Oct 2021 04:14) (18 - 18)  SpO2: 96% (18 Oct 2021 10:29) (94% - 97%)    PHYSICAL EXAM:  GENERAL: NAD, well-groomed, well-developed  HEAD:  Atraumatic, normocephalic  WOUND: incision clean dry intact, open to air, no erythema, no drainage   MENTAL STATUS: AAO x3; Awake, Opens eyes spontaneously; Appropriately conversant without aphasia; following simple commands  CRANIAL NERVES: PERRL; EOMI; no facial asymmetry; facial sensation grossly intact to light touch b/l;  tongue midline; palate rises symmetrically, decreased rectal tone  MOTOR: strength 5/5 B/L upper extremities; LLE 5/5, RLE HF 4/5  KF 4+/5   KE 4/5  PF/DF 4/5  EHL 3/5, decreased sensation R>L, denies saddle anesthesia   CHEST/LUNG: Clear to auscultation bilaterally; no rales, rhonchi, wheezing, or rubs  HEART: +S1/+S2; Regular rate and rhythm; no murmurs, rubs, or gallops  ABDOMEN: Soft, nontender, nondistended; bowel sounds present all four quadrants  EXTREMITIES:  2+ peripheral pulses, no clubbing, cyanosis, or edema  SKIN: Warm, dry; no rashes or lesions    LABS:                        10.2   8.52  )-----------( 214      ( 18 Oct 2021 09:12 )             29.4     10-18    129<L>  |  92<L>  |  15.9  ----------------------------<  124<H>  4.6   |  27.0  |  0.57    Ca    8.5<L>      18 Oct 2021 09:12      PT/INR - ( 18 Oct 2021 09:12 )   PT: 12.4 sec;   INR: 1.07 ratio         PTT - ( 18 Oct 2021 09:12 )  PTT:33.5 sec      10-17 @ 07:01  -  10-18 @ 07:00  --------------------------------------------------------  IN: 0 mL / OUT: 2100 mL / NET: -2100 mL    10-18 @ 07:01  -  10-18 @ 13:24  --------------------------------------------------------  IN: 0 mL / OUT: 400 mL / NET: -400 mL        RADIOLOGY & ADDITIONAL TESTS:  < from: MR Lumbar Spine w/wo IV Cont (10.18.21 @ 09:07) >  IMPRESSION:    Compared to previous studies,    There are postsurgical changes related to T10-L2 laminectomy with interval removal of the epidural catheter as described.    Therehas been interval decrease in T2/STIR hyperintensity at T10-T11 consistent with interval improvement of distal cord edema. No new cord edema is seen.    Layering of ventral subdural fluid collection from T4 to T10 may represent residual subdural hematoma, conspicuous in this examination may be secondary to expected evolution of blood products now in the early subacute stage.          --- End of Report ---            LISA DALAL MD; Attending Radiologist  This document has been electronically signed. Oct 18 2021 10:07AM    < end of copied text >

## 2021-10-18 NOTE — PROGRESS NOTE ADULT - SUBJECTIVE AND OBJECTIVE BOX
Patient reports returned from MRI due to ongoing right hip/leg pain for which he is immobilized by.    REVIEW OF SYSTEMS  Constitutional - No fever,  +fatigue  HEENT - No vertigo, No neck pain  Neurological - No headaches, No memory loss, +loss of strength, +numbness, No tremors  Musculoskeletal - No joint pain, No joint swelling, +muscle pain  Psychiatric - No depression, No anxiety    FUNCTIONAL PROGRESS  10/12 PT  Bed Mobility  Bed Mobility Training Supine-to-Sit: minimum assist (75% patient effort);  1 person assist;  bed rails  Bed Mobility Training Limitations: decreased ability to use arms for pushing/pulling;  decreased ability to use legs for bridging/pushing;  decreased flexibility;  decreased ROM    Sit-Stand Transfer Training  Transfer Training Sit-to-Stand Transfer: minimum assist (75% patient effort);  1 person assist;  full weight-bearing   rolling walker  Transfer Training Stand-to-Sit Transfer: minimum assist (75% patient effort);  1 person assist;  full weight-bearing   rolling walker  Sit-to-Stand Transfer Training Transfer Safety Analysis: decreased balance;  decreased flexibility;  decreased strength;  pain;  impaired coordination    Gait Training  Gait Training: minimum assist (75% patient effort);  moderate assist (50% patient effort);  1 person assist;  chair follow;  full weight-bearing   rolling walker;  35 ft  Gait Analysis: decreased step length;  decreased stride length;  difficulty placing right foot due to decreased corrdination/motor control. verbal cues for right LE quad contraction to prevent buckling;  decreased ROM;  impaired coordination;  impaired motor control;  pain    Therapeutic Exercise  Therapeutic Exercise Detail: Patient educated on bilateral lower extremity therex to perform after rest period (ankle pumps, LAQ, and seated marching 3x10). Patient provided return demonstration.       VITALS  T(C): 36.7 (10-18-21 @ 10:29), Max: 37.3 (10-17-21 @ 21:30)  HR: 82 (10-18-21 @ 10:29) (82 - 95)  BP: 141/78 (10-18-21 @ 10:29) (129/75 - 151/77)  RR: 18 (10-18-21 @ 04:14) (18 - 18)  SpO2: 96% (10-18-21 @ 10:29) (94% - 97%)  Wt(kg): --    MEDICATIONS   acetaminophen   Tablet .. 650 milliGRAM(s) every 6 hours PRN  ascorbic acid 500 milliGRAM(s) daily  dextrose 40% Gel 15 Gram(s) once  dextrose 5%. 1000 milliLiter(s) <Continuous>  dextrose 5%. 1000 milliLiter(s) <Continuous>  dextrose 50% Injectable 25 Gram(s) once  dextrose 50% Injectable 25 Gram(s) once  dextrose 50% Injectable 12.5 Gram(s) once  doxazosin 4 milliGRAM(s) at bedtime  enoxaparin Injectable 40 milliGRAM(s) at bedtime  gabapentin 300 milliGRAM(s) every 8 hours  glucagon  Injectable 1 milliGRAM(s) once  insulin lispro (ADMELOG) corrective regimen sliding scale   three times a day before meals  lactulose Syrup 10 Gram(s) daily PRN  methocarbamol 750 milliGRAM(s) every 8 hours  multivitamin 1 Tablet(s) daily  ondansetron Injectable 4 milliGRAM(s) every 6 hours PRN  oxyCODONE    IR 10 milliGRAM(s) every 4 hours PRN  pantoprazole    Tablet 40 milliGRAM(s) before breakfast  polyethylene glycol 3350 17 Gram(s) two times a day  saline laxative (FLEET) Rectal Enema 1 Enema once  senna 2 Tablet(s) every 12 hours      RECENT LABS/IMAGING                          10.2   8.52  )-----------( 214      ( 18 Oct 2021 09:12 )             29.4     10-18    129<L>  |  92<L>  |  15.9  ----------------------------<  124<H>  4.6   |  27.0  |  0.57    Ca    8.5<L>      18 Oct 2021 09:12      PT/INR - ( 18 Oct 2021 09:12 )   PT: 12.4 sec;   INR: 1.07 ratio         PTT - ( 18 Oct 2021 09:12 )  PTT:33.5 sec              THORACIC SPINE 10/6 -  No fracture or acute traumatic malalignment. Streak artifact limits evaluation of the central canal. No definite high-grade central canal stenosis.    LUMBAR SPINE 10/6 - No fracture or acute traumatic malalignment. Degenerative changes as described. Streak artifact limits evaluation of the central canal. No definite high-grade central canal stenosis.    MRI C, T, L SPINE 10/6 - 1) Mixed signal intensity intradural, extramedullary lesion in the dorsal spinal canal extending from around T11 through the sacrum, with suggestion of T2 hypointense hemosiderin cap, most concerning for a hematoma comprised of blood of differing ages. 2) Diffuse subarachnoid hemorrhage throughout the thoracic spine. 3) Ventral epidural enhancement along the posterior clivus and upper cervical spine. Circumferential epidural enhancement throughout the cervical spine C1/C2-C7/T1. Subdural effusions and/or subdural hematomas in the posterior fossa along the cerebellar hemispheres and tentorial leaflets, and associated dural enhancement, with dural enhancement extending into the cervical spine, concerning for intracranial hypotension.    MRI T & L SPINE 10/18 - Compared to previous studies, There are postsurgical changes related to T10-L2 laminectomy with interval removal of the epidural catheter as described. There has been interval decrease in T2/STIR hyperintensity at T10-T11 consistent with interval improvement of distal cord edema. No new cord edema is seen. Layering of ventral subdural fluid collection from T4 to T10 may represent residual subdural hematoma, conspicuous in this examination may be secondary to expected evolution of blood products now in the early subacute stage.  ----------------------------------------------------------------------------------------  PHYSICAL EXAM  Constitutional - NAD, Comfortable  Extremities - Mild BLE swelling   Neurologic Exam -                    Cognitive - AAO to self, place, date, year, situation     Motor -                      LEFT    UE - C5 5/5, C6 5/5, C7 5/5, C8 5/5, T1 5/5                    RIGHT UE - C5 5/5, C6 5/5, C7 5/5, C8 5/5, T1 5/5                    LEFT    LE - L2 3/5, L3 2/5, L4 2/5, L5 2/5, S1 4/5                    RIGHT LE - L2 -/5, L3 -/5, L4 3/5, L5 3/5, S1 4/5 - refuses to move leg      Sensory - Decreased to the right lateral thigh, lower leg and bilateral soles  Psychiatric - Mood stable, Affect WNL  ----------------------------------------------------------------------------------------  ASSESSMENT/PLAN  71yMale with functional deficits after having BLE weakness related to thoracolumbosacral hematoma  Intradural extramedullary T1-Sacral hematoma s/p thoracolumbar laminectomy - [place]  Pain - As per pain management  Neurogenic Bladder - Vazquez  Neurogenic Bowel - Senna, Miralax BID  DVT PPX - SCDs, Lovenox  Rehab - Medically being optimized. Given SCI, continue to recommend ACUTE inpatient rehabilitation for the functional deficits consisting of 3 hours of therapy/day & 24 hour RN/daily PMR physician for comorbid medical management. Will continue to follow for ongoing rehab needs and recommendations. Patient will be able to tolerate 3 hours a day.    Continue bedside therapy as well as OOB throughout the day with mobilization throughout the day with staff to maintain cardiopulmonary function and prevention of secondary complications related to debility.     Discussed with rehab clinical team.

## 2021-10-18 NOTE — PROGRESS NOTE ADULT - ASSESSMENT
71m 911  with acute worsening of back pain while fishing with friends. MRI spine performed and evaluated while patient in Er- there was concern for intradural, extramedulary lesion from T1-Sacrum c/w hematoma.  Serpentine vessel seen at T12 to L1 concerning for vascular lesion.  Patient taken for emergent thoracolumbar laminectomy on 10/7.   10/7/21: T10-L2 laminectomy with SDH evacuation  10/7/21: Angio negative  10/10/21: Kate replaced for retention   10/11/21: [place] drain removal, Lovenox started    Plan:  -D/w Dr. Uriarte  -Continue q4h neuro checks   -Imaging reviewed  -Pain control PRN  -Pain management following; Increased Gabapentin to 600 mg TID and added Cymbalta 30 QHS   -Continue Lovenox for DVT PPX  -Urology following for kate management; continue with Kate at present time   -Plan for repeat angiogram this Thursday (10/21)  -PT/OT/OOB encouraged   -Dispo planning; PMNR recommending [facility]

## 2021-10-19 LAB
GLUCOSE BLDC GLUCOMTR-MCNC: 122 MG/DL — HIGH (ref 70–99)
GLUCOSE BLDC GLUCOMTR-MCNC: 124 MG/DL — HIGH (ref 70–99)
GLUCOSE BLDC GLUCOMTR-MCNC: 132 MG/DL — HIGH (ref 70–99)
GLUCOSE BLDC GLUCOMTR-MCNC: 137 MG/DL — HIGH (ref 70–99)

## 2021-10-19 PROCEDURE — 99233 SBSQ HOSP IP/OBS HIGH 50: CPT

## 2021-10-19 RX ORDER — SODIUM CHLORIDE 5 G/100ML
1000 INJECTION, SOLUTION INTRAVENOUS
Refills: 0 | Status: DISCONTINUED | OUTPATIENT
Start: 2021-10-19 | End: 2021-10-20

## 2021-10-19 RX ADMIN — Medication 650 MILLIGRAM(S): at 00:23

## 2021-10-19 RX ADMIN — SENNA PLUS 2 TABLET(S): 8.6 TABLET ORAL at 17:06

## 2021-10-19 RX ADMIN — OXYCODONE HYDROCHLORIDE 10 MILLIGRAM(S): 5 TABLET ORAL at 10:20

## 2021-10-19 RX ADMIN — OXYCODONE HYDROCHLORIDE 10 MILLIGRAM(S): 5 TABLET ORAL at 21:56

## 2021-10-19 RX ADMIN — OXYCODONE HYDROCHLORIDE 10 MILLIGRAM(S): 5 TABLET ORAL at 02:30

## 2021-10-19 RX ADMIN — SODIUM CHLORIDE 30 MILLILITER(S): 5 INJECTION, SOLUTION INTRAVENOUS at 17:05

## 2021-10-19 RX ADMIN — Medication 1 TABLET(S): at 13:38

## 2021-10-19 RX ADMIN — Medication 650 MILLIGRAM(S): at 13:41

## 2021-10-19 RX ADMIN — POLYETHYLENE GLYCOL 3350 17 GRAM(S): 17 POWDER, FOR SOLUTION ORAL at 17:06

## 2021-10-19 RX ADMIN — GABAPENTIN 600 MILLIGRAM(S): 400 CAPSULE ORAL at 13:39

## 2021-10-19 RX ADMIN — Medication 650 MILLIGRAM(S): at 14:41

## 2021-10-19 RX ADMIN — OXYCODONE HYDROCHLORIDE 10 MILLIGRAM(S): 5 TABLET ORAL at 18:05

## 2021-10-19 RX ADMIN — PANTOPRAZOLE SODIUM 40 MILLIGRAM(S): 20 TABLET, DELAYED RELEASE ORAL at 05:54

## 2021-10-19 RX ADMIN — OXYCODONE HYDROCHLORIDE 10 MILLIGRAM(S): 5 TABLET ORAL at 09:20

## 2021-10-19 RX ADMIN — OXYCODONE HYDROCHLORIDE 10 MILLIGRAM(S): 5 TABLET ORAL at 17:05

## 2021-10-19 RX ADMIN — METHOCARBAMOL 750 MILLIGRAM(S): 500 TABLET, FILM COATED ORAL at 05:54

## 2021-10-19 RX ADMIN — SENNA PLUS 2 TABLET(S): 8.6 TABLET ORAL at 05:54

## 2021-10-19 RX ADMIN — POLYETHYLENE GLYCOL 3350 17 GRAM(S): 17 POWDER, FOR SOLUTION ORAL at 05:55

## 2021-10-19 RX ADMIN — GABAPENTIN 600 MILLIGRAM(S): 400 CAPSULE ORAL at 21:12

## 2021-10-19 RX ADMIN — OXYCODONE HYDROCHLORIDE 10 MILLIGRAM(S): 5 TABLET ORAL at 23:03

## 2021-10-19 RX ADMIN — Medication 4 MILLIGRAM(S): at 21:12

## 2021-10-19 RX ADMIN — METHOCARBAMOL 750 MILLIGRAM(S): 500 TABLET, FILM COATED ORAL at 21:12

## 2021-10-19 RX ADMIN — Medication 650 MILLIGRAM(S): at 05:54

## 2021-10-19 RX ADMIN — METHOCARBAMOL 750 MILLIGRAM(S): 500 TABLET, FILM COATED ORAL at 13:39

## 2021-10-19 RX ADMIN — DULOXETINE HYDROCHLORIDE 30 MILLIGRAM(S): 30 CAPSULE, DELAYED RELEASE ORAL at 21:12

## 2021-10-19 RX ADMIN — GABAPENTIN 600 MILLIGRAM(S): 400 CAPSULE ORAL at 05:54

## 2021-10-19 RX ADMIN — OXYCODONE HYDROCHLORIDE 10 MILLIGRAM(S): 5 TABLET ORAL at 01:40

## 2021-10-19 RX ADMIN — Medication 500 MILLIGRAM(S): at 13:38

## 2021-10-19 NOTE — PROGRESS NOTE ADULT - ASSESSMENT
71m 911  with acute worsening of back pain while fishing with friends. MRI spine performed and evaluated while patient in Er- there was concern for intradural, extramedulary lesion from T1-Sacrum c/w hematoma.  Serpentine vessel seen at T12 to L1 concerning for vascular lesion.  Patient taken for emergent thoracolumbar laminectomy on 10/7.     Pain controlled  Pain service will sign off  Please reconsult as needed

## 2021-10-19 NOTE — PROGRESS NOTE ADULT - SUBJECTIVE AND OBJECTIVE BOX
INTERVAL HPI/OVERNIGHT EVENTS:  71m 911  with acute worsening of back pain while fishing with friends this morning. Acute bilateral leg weakness and numbness.  RLE 1/5, LLE 2/5 after decadron in ER.  Vazquez placed in Er for urinary retention, 700cc in bladder. Initially there was concern for GBS given recent flu vaccination, but the clinical history of pain followed by acute weakness was not consistent.  MRI spine performed and evaluated while patient in Er- there was concern for intradural, extramedulary lesion from T1-Sacrum c/w hematoma.  Serpentine vessel seen at T12 to L1 concerning for vascular lesion.  Patient taken for emergent thoracolumbar laminectomy on 10/7.   Pt seen this am pt states he has been moving well today the right lower extrem seems a little slower.     MEDICATIONS  (STANDING):  ascorbic acid 500 milliGRAM(s) Oral daily  dextrose 40% Gel 15 Gram(s) Oral once  dextrose 5%. 1000 milliLiter(s) (50 mL/Hr) IV Continuous <Continuous>  dextrose 5%. 1000 milliLiter(s) (100 mL/Hr) IV Continuous <Continuous>  dextrose 50% Injectable 12.5 Gram(s) IV Push once  dextrose 50% Injectable 25 Gram(s) IV Push once  dextrose 50% Injectable 25 Gram(s) IV Push once  doxazosin 4 milliGRAM(s) Oral at bedtime  DULoxetine 30 milliGRAM(s) Oral at bedtime  enoxaparin Injectable 40 milliGRAM(s) SubCutaneous at bedtime  gabapentin 600 milliGRAM(s) Oral every 8 hours  glucagon  Injectable 1 milliGRAM(s) IntraMuscular once  insulin lispro (ADMELOG) corrective regimen sliding scale   SubCutaneous three times a day before meals  methocarbamol 750 milliGRAM(s) Oral every 8 hours  multivitamin 1 Tablet(s) Oral daily  pantoprazole    Tablet 40 milliGRAM(s) Oral before breakfast  polyethylene glycol 3350 17 Gram(s) Oral two times a day  saline laxative (FLEET) Rectal Enema 1 Enema Rectal once  senna 2 Tablet(s) Oral every 12 hours    MEDICATIONS  (PRN):  acetaminophen   Tablet .. 650 milliGRAM(s) Oral every 6 hours PRN Mild Pain (1 - 3)  lactulose Syrup 10 Gram(s) Oral daily PRN no BM>1 day  ondansetron Injectable 4 milliGRAM(s) IV Push every 6 hours PRN Nausea and/or Vomiting  oxyCODONE    IR 10 milliGRAM(s) Oral every 4 hours PRN Severe Pain (7 - 10)      Allergies  No Known Allergies  Intolerances      Vital Signs Last 24 Hrs  T(C): 36.8 (19 Oct 2021 10:34), Max: 36.8 (18 Oct 2021 16:52)  T(F): 98.3 (19 Oct 2021 10:34), Max: 98.3 (18 Oct 2021 16:52)  HR: 82 (19 Oct 2021 10:34) (82 - 100)  BP: 124/79 (19 Oct 2021 10:34) (124/79 - 155/77)  BP(mean): --  RR: 18 (19 Oct 2021 10:34) (18 - 18)  SpO2: 97% (19 Oct 2021 10:34) (94% - 97%) BMI (kg/m2): 26.5 (10-06-21 @ 15:43)      PHYSICAL EXAM  GENERAL: NAD, well-groomed, well-developed  HEAD:  Atraumatic, Normocephalic  EYES: EOMI, PERRLA, conjunctiva and sclera clear  ENMT: No tonsillar erythema, exudates, or enlargement; Moist mucous membranes, Good dentition, No lesions  NECK: Supple,   NERVOUS SYSTEM:  Alert & Oriented X3, Motor Strength right hip 4/5 knee 5/5 df/pf/ehl 4/5, left sided 5/5 B/L upper and lower extremities; DTRs 2+ intact and symmetric  EXTREMITIES:  2+ Peripheral Pulses, No edema  Incision sutures intact and clean, dry    LABS:                          10.2   8.52  )-----------( 214      ( 18 Oct 2021 09:12 )             29.4     10-18    129<L>  |  92<L>  |  15.9  ----------------------------<  124<H>  4.6   |  27.0  |  0.57    Ca    8.5<L>      18 Oct 2021 09:12      PT/INR - ( 18 Oct 2021 09:12 )   PT: 12.4 sec;   INR: 1.07 ratio         PTT - ( 18 Oct 2021 09:12 )  PTT:33.5 sec    I&O's Detail    18 Oct 2021 07:01  -  19 Oct 2021 07:00  --------------------------------------------------------  IN:  Total IN: 0 mL    OUT:    Indwelling Catheter - Urethral (mL): 900 mL    Voided (mL): 400 mL  Total OUT: 1300 mL    Total NET: -1300 mL      19 Oct 2021 07:01  -  19 Oct 2021 14:29  --------------------------------------------------------  IN:  Total IN: 0 mL    OUT:    Indwelling Catheter - Urethral (mL): 450 mL  Total OUT: 450 mL    Total NET: -450 mL      RADIOLOGY & ADDITIONAL TESTS:  < from: MR Lumbar Spine w/wo IV Cont (10.18.21 @ 09:07) >   EXAM:  MR SPINE LUMBAR WAW IC                         EXAM:  MR SPINE THORACIC WAW IC                        PROCEDURE DATE:  10/18/2021    IMPRESSION:  Compared to previous studies,  There are postsurgical changes related to T10-L2 laminectomy with interval removal of the epidural catheter as described.  Therehas been interval decrease in T2/STIR hyperintensity at T10-T11 consistent with interval improvement of distal cord edema. No new cord edema is seen.    Layering of ventral subdural fluid collection from T4 to T10 may represent residual subdural hematoma, conspicuous in this examination may be secondary to expected evolution of blood products now in the early subacute stage.  --- End of Report ---  < end of copied text >   INTERVAL HPI/OVERNIGHT EVENTS:  71m 911  with acute worsening of back pain while fishing with friends this morning. Acute bilateral leg weakness and numbness.  RLE 1/5, LLE 2/5 after decadron in ER.  Vazquez placed in Er for urinary retention, 700cc in bladder. Initially there was concern for GBS given recent flu vaccination, but the clinical history of pain followed by acute weakness was not consistent.  MRI spine performed and evaluated while patient in Er- there was concern for intradural, extramedulary lesion from T1-Sacrum c/w hematoma.  Serpentine vessel seen at T12 to L1 concerning for vascular lesion.  Patient taken for emergent thoracolumbar laminectomy on 10/7.   Pt seen this am pt states he has been moving well today the right lower extrem seems a little slower.     MEDICATIONS  (STANDING):  ascorbic acid 500 milliGRAM(s) Oral daily  dextrose 40% Gel 15 Gram(s) Oral once  dextrose 5%. 1000 milliLiter(s) (50 mL/Hr) IV Continuous <Continuous>  dextrose 5%. 1000 milliLiter(s) (100 mL/Hr) IV Continuous <Continuous>  dextrose 50% Injectable 12.5 Gram(s) IV Push once  dextrose 50% Injectable 25 Gram(s) IV Push once  dextrose 50% Injectable 25 Gram(s) IV Push once  doxazosin 4 milliGRAM(s) Oral at bedtime  DULoxetine 30 milliGRAM(s) Oral at bedtime  enoxaparin Injectable 40 milliGRAM(s) SubCutaneous at bedtime  gabapentin 600 milliGRAM(s) Oral every 8 hours  glucagon  Injectable 1 milliGRAM(s) IntraMuscular once  insulin lispro (ADMELOG) corrective regimen sliding scale   SubCutaneous three times a day before meals  methocarbamol 750 milliGRAM(s) Oral every 8 hours  multivitamin 1 Tablet(s) Oral daily  pantoprazole    Tablet 40 milliGRAM(s) Oral before breakfast  polyethylene glycol 3350 17 Gram(s) Oral two times a day  saline laxative (FLEET) Rectal Enema 1 Enema Rectal once  senna 2 Tablet(s) Oral every 12 hours    MEDICATIONS  (PRN):  acetaminophen   Tablet .. 650 milliGRAM(s) Oral every 6 hours PRN Mild Pain (1 - 3)  lactulose Syrup 10 Gram(s) Oral daily PRN no BM>1 day  ondansetron Injectable 4 milliGRAM(s) IV Push every 6 hours PRN Nausea and/or Vomiting  oxyCODONE    IR 10 milliGRAM(s) Oral every 4 hours PRN Severe Pain (7 - 10)      Allergies  No Known Allergies  Intolerances      Vital Signs Last 24 Hrs  T(C): 36.8 (19 Oct 2021 10:34), Max: 36.8 (18 Oct 2021 16:52)  T(F): 98.3 (19 Oct 2021 10:34), Max: 98.3 (18 Oct 2021 16:52)  HR: 82 (19 Oct 2021 10:34) (82 - 100)  BP: 124/79 (19 Oct 2021 10:34) (124/79 - 155/77)  BP(mean): --  RR: 18 (19 Oct 2021 10:34) (18 - 18)  SpO2: 97% (19 Oct 2021 10:34) (94% - 97%) BMI (kg/m2): 26.5 (10-06-21 @ 15:43)      PHYSICAL EXAM  GENERAL: NAD, well-groomed, well-developed  HEAD:  Atraumatic, Normocephalic  EYES: EOMI, PERRLA, conjunctiva and sclera clear  ENMT: No tonsillar erythema, exudates, or enlargement; Moist mucous membranes, Good dentition, No lesions  NECK: Supple,   NERVOUS SYSTEM:  Alert & Oriented X3, Motor Strength right hip 4/5 knee 5/5 df/pf/ehl 4/5, left sided 5/5 B/L upper and lower extremities; DTRs 2+ intact and symmetric  EXTREMITIES:  2+ Peripheral Pulses, No edema  Incision sutures intact and clean, dry    LABS:                          10.2   8.52  )-----------( 214      ( 18 Oct 2021 09:12 )             29.4     10-18    129<L>  |  92<L>  |  15.9  ----------------------------<  124<H>  4.6   |  27.0  |  0.57    Ca    8.5<L>      18 Oct 2021 09:12      PT/INR - ( 18 Oct 2021 09:12 )   PT: 12.4 sec;   INR: 1.07 ratio         PTT - ( 18 Oct 2021 09:12 )  PTT:33.5 sec    I&O's Detail    18 Oct 2021 07:01  -  19 Oct 2021 07:00  --------------------------------------------------------  IN:  Total IN: 0 mL    OUT:    Indwelling Catheter - Urethral (mL): 900 mL    Voided (mL): 400 mL  Total OUT: 1300 mL    Total NET: -1300 mL      19 Oct 2021 07:01  -  19 Oct 2021 14:29  --------------------------------------------------------  IN:  Total IN: 0 mL    OUT:    Indwelling Catheter - Urethral (mL): 450 mL  Total OUT: 450 mL    Total NET: -450 mL      RADIOLOGY & ADDITIONAL TESTS:  < from: MR Lumbar Spine w/wo IV Cont (10.18.21 @ 09:07) >   EXAM:  MR SPINE LUMBAR WAW IC                         EXAM:  MR SPINE THORACIC WAW IC                        PROCEDURE DATE:  10/18/2021    IMPRESSION:  Compared to previous studies,  There are postsurgical changes related to T10-L2 laminectomy with interval removal of the epidural catheter as described.  Therehas been interval decrease in T2/STIR hyperintensity at T10-T11 consistent with interval improvement of distal cord edema. No new cord edema is seen.    Layering of ventral subdural fluid collection from T4 to T10 may represent residual subdural hematoma, conspicuous in this examination may be secondary to expected evolution of blood products now in the early subacute stage.  --- End of Report ---  < end of copied text >    < from: MR Lumbar Spine w/wo IV Cont (10.06.21 @ 22:20) >   EXAM:  MR SPINE LUMBAR WAW IC                         EXAM:  MR SPINE THORACIC WAW IC                         EXAM:  MR SPINE CERVICAL WAW IC                        PROCEDURE DATE:  10/06/2021    Impression:  1) Mixed signal intensity intradural, extramedullary lesion in the dorsal spinal canal extending from around T11 through the sacrum, with suggestion of T2 hypointense hemosiderin cap, most concerning for a hematoma comprised of blood of differing ages.  2) Diffuse subarachnoid hemorrhage throughout the thoracic spine.  3) Ventral epidural enhancement along the posterior clivus and upper cervical spine. Circumferential epidural enhancement throughout the cervical spine C1/C2-C7/T1. Subdural effusions and/or subdural hematomas in the posterior fossa along the cerebellar hemispheres and tentorial leaflets, and associated dural enhancement, with dural enhancement extending into the cervical spine, concerning for intracranial hypotension.  < end of copied text >

## 2021-10-19 NOTE — PROGRESS NOTE ADULT - SUBJECTIVE AND OBJECTIVE BOX
Patient's pain is improved in the right leg.  Now can move it significantly more.    REVIEW OF SYSTEMS  Constitutional - No fever,  No fatigue  HEENT - No vertigo, No neck pain  Neurological - No headaches, No memory loss, +loss of strength, +numbness, No tremors  Musculoskeletal - No joint pain, No joint swelling, +muscle pain  Psychiatric - No depression, No anxiety    FUNCTIONAL PROGRESS  10/18 PT  Bed Mobility  Bed Mobility Training Supine-to-Sit: minimum assist (75% patient effort);  1 person assist;  verbal cues;  assist for right LE  Bed Mobility Training Limitations: decreased strength;  pain;  impaired motor control;  impaired coordination    Sit-Stand Transfer Training  Transfer Training Sit-to-Stand Transfer: moderate assist (50% patient effort);  1 person assist;  verbal cues;  full weight-bearing   rolling walker  Transfer Training Stand-to-Sit Transfer: moderate assist (50% patient effort);  1 person assist;  verbal cues;  full weight-bearing   rolling walker  Sit-to-Stand Transfer Training Transfer Safety Analysis: decreased strength;  impaired coordination;  impaired motor control;  pain;  rolling walker    Gait Training  Gait Training: moderate assist (50% patient effort);  1 person assist;  verbal cues;  full weight-bearing   rolling walker;  5 feet  Gait Analysis: 3-point gait   decreased heavenly;  increased time in double stance;  decreased strength;  impaired balance;  impaired coordination;  impaired motor control;  5 feet;  rolling walker          VITALS  T(C): 36.6 (10-19-21 @ 05:51), Max: 36.8 (10-18-21 @ 16:52)  HR: 84 (10-19-21 @ 05:51) (82 - 100)  BP: 130/70 (10-19-21 @ 05:51) (129/72 - 155/77)  RR: 18 (10-19-21 @ 05:51) (18 - 18)  SpO2: 94% (10-19-21 @ 05:51) (94% - 97%)  Wt(kg): --    MEDICATIONS   acetaminophen   Tablet .. 650 milliGRAM(s) every 6 hours PRN  ascorbic acid 500 milliGRAM(s) daily  dextrose 40% Gel 15 Gram(s) once  dextrose 5%. 1000 milliLiter(s) <Continuous>  dextrose 5%. 1000 milliLiter(s) <Continuous>  dextrose 50% Injectable 12.5 Gram(s) once  dextrose 50% Injectable 25 Gram(s) once  dextrose 50% Injectable 25 Gram(s) once  doxazosin 4 milliGRAM(s) at bedtime  DULoxetine 30 milliGRAM(s) at bedtime  enoxaparin Injectable 40 milliGRAM(s) at bedtime  gabapentin 600 milliGRAM(s) every 8 hours  glucagon  Injectable 1 milliGRAM(s) once  insulin lispro (ADMELOG) corrective regimen sliding scale   three times a day before meals  lactulose Syrup 10 Gram(s) daily PRN  methocarbamol 750 milliGRAM(s) every 8 hours  multivitamin 1 Tablet(s) daily  ondansetron Injectable 4 milliGRAM(s) every 6 hours PRN  oxyCODONE    IR 10 milliGRAM(s) every 4 hours PRN  pantoprazole    Tablet 40 milliGRAM(s) before breakfast  polyethylene glycol 3350 17 Gram(s) two times a day  saline laxative (FLEET) Rectal Enema 1 Enema once  senna 2 Tablet(s) every 12 hours      RECENT LABS/IMAGING                          10.2   8.52  )-----------( 214      ( 18 Oct 2021 09:12 )             29.4     10-18    129<L>  |  92<L>  |  15.9  ----------------------------<  124<H>  4.6   |  27.0  |  0.57    Ca    8.5<L>      18 Oct 2021 09:12      PT/INR - ( 18 Oct 2021 09:12 )   PT: 12.4 sec;   INR: 1.07 ratio         PTT - ( 18 Oct 2021 09:12 )  PTT:33.5 sec              THORACIC SPINE 10/6 -  No fracture or acute traumatic malalignment. Streak artifact limits evaluation of the central canal. No definite high-grade central canal stenosis.    LUMBAR SPINE 10/6 - No fracture or acute traumatic malalignment. Degenerative changes as described. Streak artifact limits evaluation of the central canal. No definite high-grade central canal stenosis.    MRI C, T, L SPINE 10/6 - 1) Mixed signal intensity intradural, extramedullary lesion in the dorsal spinal canal extending from around T11 through the sacrum, with suggestion of T2 hypointense hemosiderin cap, most concerning for a hematoma comprised of blood of differing ages. 2) Diffuse subarachnoid hemorrhage throughout the thoracic spine. 3) Ventral epidural enhancement along the posterior clivus and upper cervical spine. Circumferential epidural enhancement throughout the cervical spine C1/C2-C7/T1. Subdural effusions and/or subdural hematomas in the posterior fossa along the cerebellar hemispheres and tentorial leaflets, and associated dural enhancement, with dural enhancement extending into the cervical spine, concerning for intracranial hypotension.    MRI T & L SPINE 10/18 - Compared to previous studies, There are postsurgical changes related to T10-L2 laminectomy with interval removal of the epidural catheter as described. There has been interval decrease in T2/STIR hyperintensity at T10-T11 consistent with interval improvement of distal cord edema. No new cord edema is seen. Layering of ventral subdural fluid collection from T4 to T10 may represent residual subdural hematoma, conspicuous in this examination may be secondary to expected evolution of blood products now in the early subacute stage.  ----------------------------------------------------------------------------------------  PHYSICAL EXAM  Constitutional - NAD, Comfortable  Extremities - Mild BLE swelling   Neurologic Exam -                    Cognitive - AAO to self, place, date, year, situation     Motor -                      LEFT    UE - C5 5/5, C6 5/5, C7 5/5, C8 5/5, T1 5/5                    RIGHT UE - C5 5/5, C6 5/5, C7 5/5, C8 5/5, T1 5/5                    LEFT    LE - L2 3/5, L3 2/5, L4 2/5, L5 2/5, S1 4/5                    RIGHT LE - L2 4/5, L3 5/5, L4 2/5, L5 3/5, S1 4/5       Sensory - Decreased to the right lateral thigh, lower leg and bilateral soles  Psychiatric - Mood stable, Affect WNL  ----------------------------------------------------------------------------------------  ASSESSMENT/PLAN  71yMale with functional deficits after having BLE weakness related to thoracolumbosacral hematoma  Intradural extramedullary T1-Sacral hematoma s/p thoracolumbar laminectomy - [facility]  Pain - As per pain management  Neurogenic Bladder - Vazquez, Cardura  Neurogenic Bowel - Lactulose (10/15), Senna, Miralax BID  DVT PPX - SCDs, Lovenox  Rehab - Continue to recommend ACUTE inpatient rehabilitation for the functional deficits consisting of 3 hours of therapy/day & 24 hour RN/daily PMR physician for comorbid medical management. Will continue to follow for ongoing rehab needs and recommendations. Patient will be able to tolerate 3 hours a day.    Continue bedside therapy as well as OOB throughout the day with mobilization throughout the day with staff to maintain cardiopulmonary function and prevention of secondary complications related to debility.     Discussed with rehab clinical team.

## 2021-10-19 NOTE — PROGRESS NOTE ADULT - ASSESSMENT
71ym s/p thoracic lami for evacuation of subdural hematoma POD # 7    Plan  1 pt noted to be hyponatremia- pt  ivf switched to nacl 2% @30cc/hr rpt labs in the am.   2. Pt is scheduled for a spinal angiogram in the am 11 am on 10/20  3. Pt is recommendation to be a rehab candidate. Continue PT.   71ym s/p thoracic lami for evacuation of subdural hematoma POD # 7 Pt presented with  acute worsening of back pain while fishing with friends.   Serpentine vessel seen at T12 to L1 concerning for vascular lesion.     Plan  1 pt noted to be hyponatremia- pt  ivf switched to nacl 2% @30cc/hr rpt labs in the am.   2. Pt is scheduled for a spinal angiogram in the am 11 am on 10/20  3. Pt is recommendation to be a rehab candidate. Continue PT.  4. Lovenox will be held tonight due to the preop spinal angio

## 2021-10-19 NOTE — PROGRESS NOTE ADULT - SUBJECTIVE AND OBJECTIVE BOX
Interval Hx:  Patient seen during rounds  pain controlled today  Patient denies sedation with medications       T(C): 36.6 (10-19-21 @ 05:51), Max: 36.8 (10-18-21 @ 16:52)  HR: 84 (10-19-21 @ 05:51) (84 - 100)  BP: 130/70 (10-19-21 @ 05:51) (129/72 - 155/77)  RR: 18 (10-19-21 @ 05:51) (18 - 18)  SpO2: 94% (10-19-21 @ 05:51) (94% - 97%)      10-18-21 @ 07:01  -  10-19-21 @ 07:00  --------------------------------------------------------  IN: 0 mL / OUT: 1300 mL / NET: -1300 mL        acetaminophen   Tablet .. 650 milliGRAM(s) Oral every 6 hours PRN  ascorbic acid 500 milliGRAM(s) Oral daily  dextrose 40% Gel 15 Gram(s) Oral once  dextrose 5%. 1000 milliLiter(s) IV Continuous <Continuous>  dextrose 5%. 1000 milliLiter(s) IV Continuous <Continuous>  dextrose 50% Injectable 12.5 Gram(s) IV Push once  dextrose 50% Injectable 25 Gram(s) IV Push once  dextrose 50% Injectable 25 Gram(s) IV Push once  doxazosin 4 milliGRAM(s) Oral at bedtime  DULoxetine 30 milliGRAM(s) Oral at bedtime  enoxaparin Injectable 40 milliGRAM(s) SubCutaneous at bedtime  gabapentin 600 milliGRAM(s) Oral every 8 hours  glucagon  Injectable 1 milliGRAM(s) IntraMuscular once  insulin lispro (ADMELOG) corrective regimen sliding scale   SubCutaneous three times a day before meals  lactulose Syrup 10 Gram(s) Oral daily PRN  methocarbamol 750 milliGRAM(s) Oral every 8 hours  multivitamin 1 Tablet(s) Oral daily  ondansetron Injectable 4 milliGRAM(s) IV Push every 6 hours PRN  oxyCODONE    IR 10 milliGRAM(s) Oral every 4 hours PRN  pantoprazole    Tablet 40 milliGRAM(s) Oral before breakfast  polyethylene glycol 3350 17 Gram(s) Oral two times a day  saline laxative (FLEET) Rectal Enema 1 Enema Rectal once  senna 2 Tablet(s) Oral every 12 hours                          10.2   8.52  )-----------( 214      ( 18 Oct 2021 09:12 )             29.4     10-18    129<L>  |  92<L>  |  15.9  ----------------------------<  124<H>  4.6   |  27.0  |  0.57    Ca    8.5<L>      18 Oct 2021 09:12      PT/INR - ( 18 Oct 2021 09:12 )   PT: 12.4 sec;   INR: 1.07 ratio         PTT - ( 18 Oct 2021 09:12 )  PTT:33.5 sec      Pain Service   [phone]

## 2021-10-20 LAB
ALBUMIN SERPL ELPH-MCNC: 3.3 G/DL — SIGNIFICANT CHANGE UP (ref 3.3–5.2)
ALP SERPL-CCNC: 58 U/L — SIGNIFICANT CHANGE UP (ref 40–120)
ALT FLD-CCNC: 25 U/L — SIGNIFICANT CHANGE UP
ANION GAP SERPL CALC-SCNC: 13 MMOL/L — SIGNIFICANT CHANGE UP (ref 5–17)
APTT BLD: 33.9 SEC — SIGNIFICANT CHANGE UP (ref 27.5–35.5)
AST SERPL-CCNC: 16 U/L — SIGNIFICANT CHANGE UP
BILIRUB SERPL-MCNC: 0.8 MG/DL — SIGNIFICANT CHANGE UP (ref 0.4–2)
BUN SERPL-MCNC: 17 MG/DL — SIGNIFICANT CHANGE UP (ref 8–20)
CALCIUM SERPL-MCNC: 7.9 MG/DL — LOW (ref 8.6–10.2)
CHLORIDE SERPL-SCNC: 94 MMOL/L — LOW (ref 98–107)
CO2 SERPL-SCNC: 25 MMOL/L — SIGNIFICANT CHANGE UP (ref 22–29)
CREAT SERPL-MCNC: 0.47 MG/DL — LOW (ref 0.5–1.3)
GLUCOSE BLDC GLUCOMTR-MCNC: 125 MG/DL — HIGH (ref 70–99)
GLUCOSE BLDC GLUCOMTR-MCNC: 133 MG/DL — HIGH (ref 70–99)
GLUCOSE BLDC GLUCOMTR-MCNC: 162 MG/DL — HIGH (ref 70–99)
GLUCOSE BLDC GLUCOMTR-MCNC: 184 MG/DL — HIGH (ref 70–99)
GLUCOSE SERPL-MCNC: 123 MG/DL — HIGH (ref 70–99)
HCT VFR BLD CALC: 28 % — LOW (ref 39–50)
HGB BLD-MCNC: 9.6 G/DL — LOW (ref 13–17)
INR BLD: 1.07 RATIO — SIGNIFICANT CHANGE UP (ref 0.88–1.16)
MCHC RBC-ENTMCNC: 30.4 PG — SIGNIFICANT CHANGE UP (ref 27–34)
MCHC RBC-ENTMCNC: 34.3 GM/DL — SIGNIFICANT CHANGE UP (ref 32–36)
MCV RBC AUTO: 88.6 FL — SIGNIFICANT CHANGE UP (ref 80–100)
PLATELET # BLD AUTO: 231 K/UL — SIGNIFICANT CHANGE UP (ref 150–400)
POTASSIUM SERPL-MCNC: 4.6 MMOL/L — SIGNIFICANT CHANGE UP (ref 3.5–5.3)
POTASSIUM SERPL-SCNC: 4.6 MMOL/L — SIGNIFICANT CHANGE UP (ref 3.5–5.3)
PROT SERPL-MCNC: 5.4 G/DL — LOW (ref 6.6–8.7)
PROTHROM AB SERPL-ACNC: 12.4 SEC — SIGNIFICANT CHANGE UP (ref 10.6–13.6)
RBC # BLD: 3.16 M/UL — LOW (ref 4.2–5.8)
RBC # FLD: 13.4 % — SIGNIFICANT CHANGE UP (ref 10.3–14.5)
SARS-COV-2 RNA SPEC QL NAA+PROBE: SIGNIFICANT CHANGE UP
SODIUM SERPL-SCNC: 132 MMOL/L — LOW (ref 135–145)
WBC # BLD: 7.28 K/UL — SIGNIFICANT CHANGE UP (ref 3.8–10.5)
WBC # FLD AUTO: 7.28 K/UL — SIGNIFICANT CHANGE UP (ref 3.8–10.5)

## 2021-10-20 PROCEDURE — 99233 SBSQ HOSP IP/OBS HIGH 50: CPT

## 2021-10-20 RX ADMIN — Medication 2: at 12:34

## 2021-10-20 RX ADMIN — OXYCODONE HYDROCHLORIDE 10 MILLIGRAM(S): 5 TABLET ORAL at 17:16

## 2021-10-20 RX ADMIN — Medication 650 MILLIGRAM(S): at 22:23

## 2021-10-20 RX ADMIN — Medication 650 MILLIGRAM(S): at 13:31

## 2021-10-20 RX ADMIN — Medication 2: at 16:21

## 2021-10-20 RX ADMIN — Medication 650 MILLIGRAM(S): at 12:31

## 2021-10-20 RX ADMIN — OXYCODONE HYDROCHLORIDE 10 MILLIGRAM(S): 5 TABLET ORAL at 16:16

## 2021-10-20 RX ADMIN — Medication 4 MILLIGRAM(S): at 21:24

## 2021-10-20 RX ADMIN — SENNA PLUS 2 TABLET(S): 8.6 TABLET ORAL at 16:21

## 2021-10-20 RX ADMIN — POLYETHYLENE GLYCOL 3350 17 GRAM(S): 17 POWDER, FOR SOLUTION ORAL at 16:20

## 2021-10-20 RX ADMIN — Medication 1 TABLET(S): at 12:31

## 2021-10-20 RX ADMIN — OXYCODONE HYDROCHLORIDE 10 MILLIGRAM(S): 5 TABLET ORAL at 06:39

## 2021-10-20 RX ADMIN — GABAPENTIN 600 MILLIGRAM(S): 400 CAPSULE ORAL at 21:24

## 2021-10-20 RX ADMIN — METHOCARBAMOL 750 MILLIGRAM(S): 500 TABLET, FILM COATED ORAL at 21:24

## 2021-10-20 RX ADMIN — METHOCARBAMOL 750 MILLIGRAM(S): 500 TABLET, FILM COATED ORAL at 06:38

## 2021-10-20 RX ADMIN — PANTOPRAZOLE SODIUM 40 MILLIGRAM(S): 20 TABLET, DELAYED RELEASE ORAL at 06:39

## 2021-10-20 RX ADMIN — Medication 650 MILLIGRAM(S): at 01:04

## 2021-10-20 RX ADMIN — GABAPENTIN 600 MILLIGRAM(S): 400 CAPSULE ORAL at 06:38

## 2021-10-20 RX ADMIN — OXYCODONE HYDROCHLORIDE 10 MILLIGRAM(S): 5 TABLET ORAL at 07:39

## 2021-10-20 RX ADMIN — Medication 650 MILLIGRAM(S): at 21:24

## 2021-10-20 RX ADMIN — GABAPENTIN 600 MILLIGRAM(S): 400 CAPSULE ORAL at 12:30

## 2021-10-20 RX ADMIN — Medication 500 MILLIGRAM(S): at 12:31

## 2021-10-20 RX ADMIN — DULOXETINE HYDROCHLORIDE 30 MILLIGRAM(S): 30 CAPSULE, DELAYED RELEASE ORAL at 21:24

## 2021-10-20 RX ADMIN — METHOCARBAMOL 750 MILLIGRAM(S): 500 TABLET, FILM COATED ORAL at 12:31

## 2021-10-20 NOTE — PROGRESS NOTE ADULT - SUBJECTIVE AND OBJECTIVE BOX
INTERVAL HPI/OVERNIGHT EVENTS:  71y Male s/p s/p lT10-L2 lami for SDH w/o any new complaints   Reports improvement in sensation to lower abdomen; still with significant numbness in genital/perianal numbness  Radicular pain controlled     Vital Signs Last 24 Hrs  T(C): 36.7 (20 Oct 2021 05:19), Max: 36.8 (19 Oct 2021 10:34)  T(F): 98.1 (20 Oct 2021 05:19), Max: 98.3 (19 Oct 2021 10:34)  HR: 85 (20 Oct 2021 05:19) (80 - 89)  BP: 103/63 (20 Oct 2021 05:19) (103/63 - 139/71)  BP(mean): --  RR: 17 (20 Oct 2021 05:19) (17 - 18)  SpO2: 95% (20 Oct 2021 05:19) (94% - 97%)    PHYSICAL EXAM:  Awake, alert and oriented x3   HOWARD x4   LLE Psoas 4+/5, Quad 4+/5, AT 4+/5, EHL/Gastroc 4/5  RLE Psoas 4/5; Quad 2/5; AT 4-/5; EHL/Gastroc 4-/5   Sensory level L1; No clonus   following commands briskly   Incision C/D/I with sutures in place     LABS:                        9.6    7.28  )-----------( 231      ( 20 Oct 2021 08:13 )             28.0     10-20    132<L>  |  94<L>  |  17.0  ----------------------------<  123<H>  4.6   |  25.0  |  0.47<L>    Ca    7.9<L>      20 Oct 2021 08:13    TPro  5.4<L>  /  Alb  3.3  /  TBili  0.8  /  DBili  x   /  AST  16  /  ALT  25  /  AlkPhos  58  10-20    PT/INR - ( 20 Oct 2021 08:13 )   PT: 12.4 sec;   INR: 1.07 ratio         PTT - ( 20 Oct 2021 08:13 )  PTT:33.9 sec      10-19 @ 07:01  -  10-20 @ 07:00  --------------------------------------------------------  IN: 0 mL / OUT: 1450 mL / NET: -1450 mL      CAPRINI SCORE [CLOT]:  Patient has an estimated Caprini score of greater than 5.  However, the patient's unique clinical situation will be addressed in an individual manner to determine appropriate anticoagulation treatment, if any.

## 2021-10-20 NOTE — PROGRESS NOTE ADULT - SUBJECTIVE AND OBJECTIVE BOX
Right leg a bit worse than yesterday, but not as bad as Monday.  Planned for angio today.   Eager to get to rehab.     REVIEW OF SYSTEMS  Constitutional - No fever,  +fatigue  HEENT - No vertigo, No neck pain  Neurological - No headaches, No memory loss, +loss of strength, +numbness, No tremors  Musculoskeletal - +joint pain, No joint swelling, +muscle pain  Psychiatric - No depression, +anxiety    FUNCTIONAL PROGRESS  10/18 PT  Bed Mobility  Bed Mobility Training Supine-to-Sit: minimum assist (75% patient effort);  1 person assist;  verbal cues;  assist for right LE  Bed Mobility Training Limitations: decreased strength;  pain;  impaired motor control;  impaired coordination    Sit-Stand Transfer Training  Transfer Training Sit-to-Stand Transfer: moderate assist (50% patient effort);  1 person assist;  verbal cues;  full weight-bearing   rolling walker  Transfer Training Stand-to-Sit Transfer: moderate assist (50% patient effort);  1 person assist;  verbal cues;  full weight-bearing   rolling walker  Sit-to-Stand Transfer Training Transfer Safety Analysis: decreased strength;  impaired coordination;  impaired motor control;  pain;  rolling walker    Gait Training  Gait Training: moderate assist (50% patient effort);  1 person assist;  verbal cues;  full weight-bearing   rolling walker;  5 feet  Gait Analysis: 3-point gait   decreased heavenly;  increased time in double stance;  decreased strength;  impaired balance;  impaired coordination;  impaired motor control;  5 feet;  rolling walker        VITALS  T(C): 36.4 (10-20-21 @ 09:54), Max: 36.8 (10-19-21 @ 10:34)  HR: 84 (10-20-21 @ 09:54) (80 - 89)  BP: 114/64 (10-20-21 @ 09:54) (103/63 - 139/71)  RR: 18 (10-20-21 @ 09:54) (17 - 18)  SpO2: 96% (10-20-21 @ 09:54) (94% - 97%)  Wt(kg): --    MEDICATIONS   acetaminophen   Tablet .. 650 milliGRAM(s) every 6 hours PRN  ascorbic acid 500 milliGRAM(s) daily  dextrose 40% Gel 15 Gram(s) once  dextrose 50% Injectable 25 Gram(s) once  dextrose 50% Injectable 12.5 Gram(s) once  dextrose 50% Injectable 25 Gram(s) once  doxazosin 4 milliGRAM(s) at bedtime  DULoxetine 30 milliGRAM(s) at bedtime  gabapentin 600 milliGRAM(s) every 8 hours  glucagon  Injectable 1 milliGRAM(s) once  insulin lispro (ADMELOG) corrective regimen sliding scale   three times a day before meals  lactulose Syrup 10 Gram(s) daily PRN  methocarbamol 750 milliGRAM(s) every 8 hours  multivitamin 1 Tablet(s) daily  ondansetron Injectable 4 milliGRAM(s) every 6 hours PRN  oxyCODONE    IR 10 milliGRAM(s) every 4 hours PRN  pantoprazole    Tablet 40 milliGRAM(s) before breakfast  polyethylene glycol 3350 17 Gram(s) two times a day  saline laxative (FLEET) Rectal Enema 1 Enema once  senna 2 Tablet(s) every 12 hours      RECENT LABS/IMAGING                          9.6    7.28  )-----------( 231      ( 20 Oct 2021 08:13 )             28.0     10-20    132<L>  |  94<L>  |  17.0  ----------------------------<  123<H>  4.6   |  25.0  |  0.47<L>    Ca    7.9<L>      20 Oct 2021 08:13    TPro  5.4<L>  /  Alb  3.3  /  TBili  0.8  /  DBili  x   /  AST  16  /  ALT  25  /  AlkPhos  58  10-20    PT/INR - ( 20 Oct 2021 08:13 )   PT: 12.4 sec;   INR: 1.07 ratio         PTT - ( 20 Oct 2021 08:13 )  PTT:33.9 sec                THORACIC SPINE 10/6 -  No fracture or acute traumatic malalignment. Streak artifact limits evaluation of the central canal. No definite high-grade central canal stenosis.    LUMBAR SPINE 10/6 - No fracture or acute traumatic malalignment. Degenerative changes as described. Streak artifact limits evaluation of the central canal. No definite high-grade central canal stenosis.    MRI C, T, L SPINE 10/6 - 1) Mixed signal intensity intradural, extramedullary lesion in the dorsal spinal canal extending from around T11 through the sacrum, with suggestion of T2 hypointense hemosiderin cap, most concerning for a hematoma comprised of blood of differing ages. 2) Diffuse subarachnoid hemorrhage throughout the thoracic spine. 3) Ventral epidural enhancement along the posterior clivus and upper cervical spine. Circumferential epidural enhancement throughout the cervical spine C1/C2-C7/T1. Subdural effusions and/or subdural hematomas in the posterior fossa along the cerebellar hemispheres and tentorial leaflets, and associated dural enhancement, with dural enhancement extending into the cervical spine, concerning for intracranial hypotension.    MRI T & L SPINE 10/18 - Compared to previous studies, There are postsurgical changes related to T10-L2 laminectomy with interval removal of the epidural catheter as described. There has been interval decrease in T2/STIR hyperintensity at T10-T11 consistent with interval improvement of distal cord edema. No new cord edema is seen. Layering of ventral subdural fluid collection from T4 to T10 may represent residual subdural hematoma, conspicuous in this examination may be secondary to expected evolution of blood products now in the early subacute stage.  ----------------------------------------------------------------------------------------  PHYSICAL EXAM  Constitutional - NAD, Comfortable  Extremities - Mild BLE swelling   Neurologic Exam -                    Cognitive - AAO to self, place, date, year, situation     Motor -                      LEFT    UE - C5 5/5, C6 5/5, C7 5/5, C8 5/5, T1 5/5                    RIGHT UE - C5 5/5, C6 5/5, C7 5/5, C8 5/5, T1 5/5                    LEFT    LE - L2 3/5, L3 2/5, L4 2/5, L5 2/5, S1 4/5                    RIGHT LE - L2 3/5, L3 5/5, L4 2/5, L5 3/5, S1 4/5       Sensory - Decreased to the right lateral thigh, lower leg and bilateral soles  Psychiatric - Mood stable, Affect WNL  ----------------------------------------------------------------------------------------  ASSESSMENT/PLAN  71yMale with functional deficits after having BLE weakness related to thoracolumbosacral hematoma  Intradural extramedullary T1-Sacral hematoma s/p thoracolumbar laminectomy - [place]  Pain - As per pain management  Neurogenic Bladder - Vazquez, Cardura  Neurogenic Bowel - Lactulose (10/15), Senna, Miralax BID, Fleet  DVT PPX - SCDs  Rehab - Medically being optimized/workup pending for eventual AR.     Continue bedside therapy as well as OOB throughout the day with mobilization throughout the day with staff to maintain cardiopulmonary function and prevention of secondary complications related to debility.     Discussed with rehab clinical team.

## 2021-10-20 NOTE — PROGRESS NOTE ADULT - ASSESSMENT
70 y/o male POD#13 T10-L2 lami/ decompression   - OOB/PT  - d/c sutures tomorrow   - rehab planning   - NPO after midnight for spinal angio in am

## 2021-10-21 ENCOUNTER — APPOINTMENT (OUTPATIENT)
Dept: NEUROSURGERY | Facility: HOSPITAL | Age: 71
End: 2021-10-21
Payer: MEDICARE

## 2021-10-21 LAB
BLD GP AB SCN SERPL QL: SIGNIFICANT CHANGE UP
GLUCOSE BLDC GLUCOMTR-MCNC: 123 MG/DL — HIGH (ref 70–99)
GLUCOSE BLDC GLUCOMTR-MCNC: 125 MG/DL — HIGH (ref 70–99)
GLUCOSE BLDC GLUCOMTR-MCNC: 130 MG/DL — HIGH (ref 70–99)
GLUCOSE BLDC GLUCOMTR-MCNC: 131 MG/DL — HIGH (ref 70–99)
GLUCOSE BLDC GLUCOMTR-MCNC: 161 MG/DL — HIGH (ref 70–99)

## 2021-10-21 PROCEDURE — 75625 CONTRAST EXAM ABDOMINL AORTA: CPT | Mod: 26

## 2021-10-21 PROCEDURE — 36215 PLACE CATHETER IN ARTERY: CPT | Mod: 58

## 2021-10-21 PROCEDURE — 99233 SBSQ HOSP IP/OBS HIGH 50: CPT

## 2021-10-21 PROCEDURE — 36245 INS CATH ABD/L-EXT ART 1ST: CPT | Mod: 58

## 2021-10-21 PROCEDURE — 75705 ARTERY X-RAYS SPINE: CPT | Mod: 26

## 2021-10-21 RX ORDER — SODIUM CHLORIDE 9 MG/ML
1000 INJECTION INTRAMUSCULAR; INTRAVENOUS; SUBCUTANEOUS
Refills: 0 | Status: DISCONTINUED | OUTPATIENT
Start: 2021-10-21 | End: 2021-10-22

## 2021-10-21 RX ADMIN — DULOXETINE HYDROCHLORIDE 30 MILLIGRAM(S): 30 CAPSULE, DELAYED RELEASE ORAL at 22:27

## 2021-10-21 RX ADMIN — PANTOPRAZOLE SODIUM 40 MILLIGRAM(S): 20 TABLET, DELAYED RELEASE ORAL at 05:12

## 2021-10-21 RX ADMIN — Medication 1 TABLET(S): at 17:55

## 2021-10-21 RX ADMIN — OXYCODONE HYDROCHLORIDE 10 MILLIGRAM(S): 5 TABLET ORAL at 08:02

## 2021-10-21 RX ADMIN — METHOCARBAMOL 750 MILLIGRAM(S): 500 TABLET, FILM COATED ORAL at 14:35

## 2021-10-21 RX ADMIN — Medication 650 MILLIGRAM(S): at 22:28

## 2021-10-21 RX ADMIN — GABAPENTIN 600 MILLIGRAM(S): 400 CAPSULE ORAL at 05:12

## 2021-10-21 RX ADMIN — OXYCODONE HYDROCHLORIDE 10 MILLIGRAM(S): 5 TABLET ORAL at 00:28

## 2021-10-21 RX ADMIN — Medication 4 MILLIGRAM(S): at 22:31

## 2021-10-21 RX ADMIN — Medication 650 MILLIGRAM(S): at 23:17

## 2021-10-21 RX ADMIN — GABAPENTIN 600 MILLIGRAM(S): 400 CAPSULE ORAL at 22:32

## 2021-10-21 RX ADMIN — POLYETHYLENE GLYCOL 3350 17 GRAM(S): 17 POWDER, FOR SOLUTION ORAL at 17:57

## 2021-10-21 RX ADMIN — SODIUM CHLORIDE 100 MILLILITER(S): 9 INJECTION INTRAMUSCULAR; INTRAVENOUS; SUBCUTANEOUS at 22:27

## 2021-10-21 RX ADMIN — GABAPENTIN 600 MILLIGRAM(S): 400 CAPSULE ORAL at 14:35

## 2021-10-21 RX ADMIN — METHOCARBAMOL 750 MILLIGRAM(S): 500 TABLET, FILM COATED ORAL at 22:32

## 2021-10-21 RX ADMIN — METHOCARBAMOL 750 MILLIGRAM(S): 500 TABLET, FILM COATED ORAL at 05:37

## 2021-10-21 RX ADMIN — SODIUM CHLORIDE 100 MILLILITER(S): 9 INJECTION INTRAMUSCULAR; INTRAVENOUS; SUBCUTANEOUS at 12:00

## 2021-10-21 RX ADMIN — SENNA PLUS 2 TABLET(S): 8.6 TABLET ORAL at 05:12

## 2021-10-21 RX ADMIN — OXYCODONE HYDROCHLORIDE 10 MILLIGRAM(S): 5 TABLET ORAL at 01:27

## 2021-10-21 RX ADMIN — Medication 2: at 17:54

## 2021-10-21 RX ADMIN — SENNA PLUS 2 TABLET(S): 8.6 TABLET ORAL at 17:58

## 2021-10-21 RX ADMIN — Medication 500 MILLIGRAM(S): at 14:35

## 2021-10-21 NOTE — CHART NOTE - NSCHARTNOTEFT_GEN_A_CORE
Neurointerventional Surgery Post Procedure Note    Procedure: Spinal Angiogram    Pre- Procedure Diagnosis: Intradural Extramedullary spinal hematoma  Post- Procedure Diagnosis: Normal Spinal angiogram      : Dr. Francis MD  Physician Assistant: Liliane BROUSSARD, Rachna BROUSSARD  Nurse: Sheree Beverly RN  Anesthesiologist: Dr. Esthela BISHOP                                           Radiology Tech: Adonay Childs RT, Mayo Gonzalez RT    Sheath: 5 Iranian Short Sheath 10cm  I/Os: estimated blood loss less than 10cc, IV fluids 600cc, Urine output 0cc, Contrast: Omnipaque 240 229cc    Vitals: BP 99/69  HR 71   Spo2 100%    Preliminary Report:  Under a 5 Iranian short sheath via the right groin under MAC sedation via T6-T12 b/l, L1 b/l, L3-5 b/l.    Patient tolerated procedure well. Patient remains hemodynamically stable, no change in neurological status compared to baseline.  Results were discussed with patient, patient's family and Neurosurgery.  Right groin sheath was discontinued. Hemostasis was obtained with approximately 20 minutes of manual compression.     No active bleeding, no hematoma, no ecchymosis. Quick clot and safeguard balloon dressing applied at 10:56am.  Patient transferred to recovery room in stable condition.

## 2021-10-21 NOTE — PROGRESS NOTE ADULT - SUBJECTIVE AND OBJECTIVE BOX
Patient does not feel well with the pain, not sleeping and waiting for tests.  Emotional support provided.  He does feel hopeful of getting to rehab soon.     REVIEW OF SYSTEMS  Constitutional - No fever,  +fatigue  Neurological - No headaches, No memory loss, +loss of strength, +numbness, No tremors  Musculoskeletal - +joint pain, +muscle pain  Psychiatric - +depression, No anxiety    FUNCTIONAL PROGRESS  10/20 PT  Bed Mobility  Bed Mobility Training Supine-to-Sit: minimum assist (75% patient effort);  1 person assist  Bed Mobility Training Limitations: decreased ability to use legs for bridging/pushing;  decreased strength;  impaired balance;  decreased sensation;  impaired motor control    Sit-Stand Transfer Training  Transfer Training Sit-to-Stand Transfer: moderate assist (50% patient effort);  1 person assist;  full weight-bearing   rolling walker  Transfer Training Stand-to-Sit Transfer: moderate assist (50% patient effort);  1 person assist;  full weight-bearing   rolling walker  Sit-to-Stand Transfer Training Transfer Safety Analysis: decreased balance;  decreased weight-shifting ability;  decreased sensation;  decreased strength;  impaired balance;  impaired motor control;  impaired postural control;  rolling walker    Gait Training  Gait Training: moderate assist (50% patient effort);  1 person assist;  full weight-bearing   rolling walker;  5 feet;  bed to chair  Gait Analysis: 3-point gait   decreased heavenly;  decreased step length;  decreased stride length;  decreased weight-shifting ability;  decreased sensation;  decreased strength;  impaired balance;  impaired motor control;  Bed to Chair;  5 feet;  rolling walker  Gait Number of Times:: x 2  Type of Rest Type of Rest: sitting  Duration of Rest Duration of Rest: approx 5 mins     Stair Training  Physical Assist/Nonphysical Assist: pt to have ramp installed      VITALS  T(C): 36.7 (10-21-21 @ 04:00), Max: 36.8 (10-20-21 @ 21:21)  HR: 88 (10-21-21 @ 04:00) (76 - 88)  BP: 145/83 (10-21-21 @ 04:00) (114/64 - 148/83)  RR: 18 (10-21-21 @ 04:00) (18 - 18)  SpO2: 96% (10-21-21 @ 04:00) (96% - 98%)  Wt(kg): --    MEDICATIONS   acetaminophen   Tablet .. 650 milliGRAM(s) every 6 hours PRN  ascorbic acid 500 milliGRAM(s) daily  dextrose 40% Gel 15 Gram(s) once  dextrose 50% Injectable 25 Gram(s) once  dextrose 50% Injectable 12.5 Gram(s) once  dextrose 50% Injectable 25 Gram(s) once  doxazosin 4 milliGRAM(s) at bedtime  DULoxetine 30 milliGRAM(s) at bedtime  gabapentin 600 milliGRAM(s) every 8 hours  glucagon  Injectable 1 milliGRAM(s) once  insulin lispro (ADMELOG) corrective regimen sliding scale   three times a day before meals  lactulose Syrup 10 Gram(s) daily PRN  methocarbamol 750 milliGRAM(s) every 8 hours  multivitamin 1 Tablet(s) daily  ondansetron Injectable 4 milliGRAM(s) every 6 hours PRN  oxyCODONE    IR 10 milliGRAM(s) every 4 hours PRN  pantoprazole    Tablet 40 milliGRAM(s) before breakfast  polyethylene glycol 3350 17 Gram(s) two times a day  saline laxative (FLEET) Rectal Enema 1 Enema once  senna 2 Tablet(s) every 12 hours      RECENT LABS/IMAGING                          9.6    7.28  )-----------( 231      ( 20 Oct 2021 08:13 )             28.0     10-20    132<L>  |  94<L>  |  17.0  ----------------------------<  123<H>  4.6   |  25.0  |  0.47<L>    Ca    7.9<L>      20 Oct 2021 08:13    TPro  5.4<L>  /  Alb  3.3  /  TBili  0.8  /  DBili  x   /  AST  16  /  ALT  25  /  AlkPhos  58  10-20    PT/INR - ( 20 Oct 2021 08:13 )   PT: 12.4 sec;   INR: 1.07 ratio         PTT - ( 20 Oct 2021 08:13 )  PTT:33.9 sec              THORACIC SPINE 10/6 -  No fracture or acute traumatic malalignment. Streak artifact limits evaluation of the central canal. No definite high-grade central canal stenosis.    LUMBAR SPINE 10/6 - No fracture or acute traumatic malalignment. Degenerative changes as described. Streak artifact limits evaluation of the central canal. No definite high-grade central canal stenosis.    MRI C, T, L SPINE 10/6 - 1) Mixed signal intensity intradural, extramedullary lesion in the dorsal spinal canal extending from around T11 through the sacrum, with suggestion of T2 hypointense hemosiderin cap, most concerning for a hematoma comprised of blood of differing ages. 2) Diffuse subarachnoid hemorrhage throughout the thoracic spine. 3) Ventral epidural enhancement along the posterior clivus and upper cervical spine. Circumferential epidural enhancement throughout the cervical spine C1/C2-C7/T1. Subdural effusions and/or subdural hematomas in the posterior fossa along the cerebellar hemispheres and tentorial leaflets, and associated dural enhancement, with dural enhancement extending into the cervical spine, concerning for intracranial hypotension.    MRI T & L SPINE 10/18 - Compared to previous studies, There are postsurgical changes related to T10-L2 laminectomy with interval removal of the epidural catheter as described. There has been interval decrease in T2/STIR hyperintensity at T10-T11 consistent with interval improvement of distal cord edema. No new cord edema is seen. Layering of ventral subdural fluid collection from T4 to T10 may represent residual subdural hematoma, conspicuous in this examination may be secondary to expected evolution of blood products now in the early subacute stage.  ----------------------------------------------------------------------------------------  PHYSICAL EXAM  Constitutional - NAD, Comfortable  Extremities - Mild BLE swelling   Neurologic Exam -                    Cognitive - AAO to self, place, date, year, situation     Motor -                      LEFT    UE - C5 5/5, C6 5/5, C7 5/5, C8 5/5, T1 5/5                    RIGHT UE - C5 5/5, C6 5/5, C7 5/5, C8 5/5, T1 5/5                    LEFT    LE - L2 3/5, L3 2/5, L4 2/5, L5 2/5, S1 4/5                    RIGHT LE - L2 4/5, L3 5/5, L4 2/5, L5 3/5, S1 4/5       Sensory - Decreased to the right lateral thigh, lower leg and bilateral soles  Psychiatric - Mood stable, Affect WNL  ----------------------------------------------------------------------------------------  ASSESSMENT/PLAN  71yMale with functional deficits after having BLE weakness related to thoracolumbosacral hematoma  Intradural extramedullary T1-Sacral hematoma s/p thoracolumbar laminectomy - Decadron, Spinal angiogram 10/21  Pain - As per pain management  Neurogenic Bladder - Vazquez, Cardura  Neurogenic Bowel - Lactulose (10/15), Senna, Miralax BID  DVT PPX - SCDs  Rehab - Pending spinal angio today and eventual DC to AR.     Continue bedside therapy as well as OOB throughout the day with mobilization throughout the day with staff to maintain cardiopulmonary function and prevention of secondary complications related to debility.     Discussed with rehab clinical team.

## 2021-10-21 NOTE — CHART NOTE - NSCHARTNOTEFT_GEN_A_CORE
Neurointerventional Surgery  Pre-Procedure Note     HPI:  71m 911  with acute worsening of back pain while fishing with friends this morning. Acute bilateral leg weakness and numbness. RLE 1/5, LLE 2/5 after decadron in ER.  Vazquez placed in ER for urinary retention, 700cc in bladder    Initially there was concern for GBS given recent flu vaccination, but the clinical history of pain followed by acute weakness was not consistent.  MRI spine performed and evaluated while patient in ER- there was concern for intradural, extramedulary lesion from T1-Sacrum c/w hematoma.  Serpentine vessel seen at T12 to L1 concerning for vascular lesion.  Patient taken for emergent thoracolumbar laminectomy with Dr. Uriarte.   (07 Oct 2021 01:03)      Interval:     Allergies: No Known Allergies      PMH/PSH:  PAST MEDICAL & SURGICAL HISTORY:  High cholesterol    Colitis    Vertigo      Social History:  Lives at home with wife in [place].  Is a retired  and a  at the [employer] national tragedy.  He is baseline mRS-0.  Nonsmoker nondrinker and denies toxic habits (07 Oct 2021 01:03)    FAMILY HISTORY:      Current Medications:   acetaminophen   Tablet .. 650 milliGRAM(s) Oral every 6 hours PRN  ascorbic acid 500 milliGRAM(s) Oral daily  dextrose 40% Gel 15 Gram(s) Oral once  dextrose 50% Injectable 25 Gram(s) IV Push once  dextrose 50% Injectable 12.5 Gram(s) IV Push once  dextrose 50% Injectable 25 Gram(s) IV Push once  doxazosin 4 milliGRAM(s) Oral at bedtime  DULoxetine 30 milliGRAM(s) Oral at bedtime  gabapentin 600 milliGRAM(s) Oral every 8 hours  glucagon  Injectable 1 milliGRAM(s) IntraMuscular once  insulin lispro (ADMELOG) corrective regimen sliding scale   SubCutaneous three times a day before meals  lactulose Syrup 10 Gram(s) Oral daily PRN  methocarbamol 750 milliGRAM(s) Oral every 8 hours  multivitamin 1 Tablet(s) Oral daily  ondansetron Injectable 4 milliGRAM(s) IV Push every 6 hours PRN  oxyCODONE    IR 10 milliGRAM(s) Oral every 4 hours PRN  pantoprazole    Tablet 40 milliGRAM(s) Oral before breakfast  polyethylene glycol 3350 17 Gram(s) Oral two times a day  saline laxative (FLEET) Rectal Enema 1 Enema Rectal once  senna 2 Tablet(s) Oral every 12 hours      Physical Exam:  Constitutional: NAD    Neuro  * Mental Status:  GCS 15:  E(4), V(5), M(6).  Awake, alert, oriented to conversation.  * Cranial Nerves: Cnii-Cnxii grossly intact. PERRL, EOMI, tongue midline, no gaze deviation  * Motor: RUE 5/5, LUE 5/5, RLE 5/5, LLE 5/5  * Sensory: Sensation intact to light touch  * Reflexes: Not assessed  * Gait: Not assessed    Cardiovascular:  S1, S2 no murmurs appreciated.  Regular rate and rhythm.  Eyes: See neurologic examination with detailed examination of eyes.  ENT: No JVD, Trachea Midline.  Respiratory: Clear to auscultation.  Gastrointestinal: Soft, nontender, nondistended.  Genitourinary: [ ] Vazquez, [ x ] No Vazquez.   Musculoskeletal: No muscle wasting noted, (See neuorlogic assessment for full muscle strength assessment) No pretibial edema appreciated, no appreciable calf tenderness.  Skin:  Wound inspected, no redness, bleeding or drainage noted.    Hematologic / Lymph / Immunologic: No bleeding from IV sites or wounds, No lymphadenopathy, No Hives or allergic type skin lesions      [facility] SS:   DATE:  TIME:  1A: Level of consciousness (0-3): 0  1B: Questions (0-2): 0    1C: Commands (0-2): 0  2: Gaze (0-2): 0  3: Visual fields (0-3): 0  4: Facial palsy (0-3): 0  MOTOR:  5A: Left arm motor drift (0-4): 0  5B: Right arm motor drift (0-4): 0  6A: Left leg motor drift (0-4): 0  6B: Right leg motor drift (0-4): 0  7: Limb ataxia (0-2): 0  SENSORY:  8: Sensation (0-2): 0  SPEECH:  9: Language (0-3): 0  10: Dysarthria (0-2): 0  EXTINCTION:  11: Extinction/inattention (0-2): 0    TOTAL SCORE:     Labs:                         9.6    7.28  )-----------( 231      ( 20 Oct 2021 08:13 )             28.0       10-20    132<L>  |  94<L>  |  17.0  ----------------------------<  123<H>  4.6   |  25.0  |  0.47<L>    Ca    7.9<L>      20 Oct 2021 08:13    TPro  5.4<L>  /  Alb  3.3  /  TBili  0.8  /  DBili  x   /  AST  16  /  ALT  25  /  AlkPhos  58  10-20          PT/INR - ( 20 Oct 2021 08:13 )   PT: 12.4 sec;   INR: 1.07 ratio         PTT - ( 20 Oct 2021 08:13 )  PTT:33.9 sec    Blood Bank:         Assessment/Plan:   This is a 71y  year old right / left hand dominant Male  presents with   Patient presents to neuro-IR for selective cerebral angiography.     Procedure, goals, risks, benefits and alternatives  were discussed with patient and (patient's family).  All questions were answered.  Risks include but are not limited to stroke, vessel injury, hemorrhage, and or groin hematoma.  Patient demonstrates understanding  of all risks involved with this procedure and wishes to continue.   Appropriate  content was obtained from patient and consent is in the patient's chart. Neurointerventional Surgery  Pre-Procedure Note     HPI:  71m 911  with acute worsening of back pain while fishing with friends this morning. Acute bilateral leg weakness and numbness. RLE 1/5, LLE 2/5 after decadron in ER.  Vazquez placed in ER for urinary retention, 700cc in bladder    Initially there was concern for GBS given recent flu vaccination, but the clinical history of pain followed by acute weakness was not consistent.  MRI spine performed and evaluated while patient in ER- there was concern for intradural, extramedulary lesion from T1-Sacrum c/w hematoma.  Serpentine vessel seen at T12 to L1 concerning for vascular lesion.  Patient taken for emergent thoracolumbar laminectomy with Dr. Uriarte.   (07 Oct 2021 01:03)      Interval: Patient is now POD#14 T10-L2 lami/ decompression. Patient reports he is feeling better each day, feels his strength and numbness are improving.        Allergies: No Known Allergies      PMH/PSH:  PAST MEDICAL & SURGICAL HISTORY:  High cholesterol    Colitis    Vertigo      Social History:  Lives at home with wife in [place].  Is a retired  and a  at the [employer].  He is baseline mRS-0.  Nonsmoker nondrinker and denies toxic habits (07 Oct 2021 01:03)        Current Medications:   acetaminophen   Tablet .. 650 milliGRAM(s) Oral every 6 hours PRN  ascorbic acid 500 milliGRAM(s) Oral daily  dextrose 40% Gel 15 Gram(s) Oral once  dextrose 50% Injectable 25 Gram(s) IV Push once  dextrose 50% Injectable 12.5 Gram(s) IV Push once  dextrose 50% Injectable 25 Gram(s) IV Push once  doxazosin 4 milliGRAM(s) Oral at bedtime  DULoxetine 30 milliGRAM(s) Oral at bedtime  gabapentin 600 milliGRAM(s) Oral every 8 hours  glucagon  Injectable 1 milliGRAM(s) IntraMuscular once  insulin lispro (ADMELOG) corrective regimen sliding scale   SubCutaneous three times a day before meals  lactulose Syrup 10 Gram(s) Oral daily PRN  methocarbamol 750 milliGRAM(s) Oral every 8 hours  multivitamin 1 Tablet(s) Oral daily  ondansetron Injectable 4 milliGRAM(s) IV Push every 6 hours PRN  oxyCODONE    IR 10 milliGRAM(s) Oral every 4 hours PRN  pantoprazole    Tablet 40 milliGRAM(s) Oral before breakfast  polyethylene glycol 3350 17 Gram(s) Oral two times a day  saline laxative (FLEET) Rectal Enema 1 Enema Rectal once  senna 2 Tablet(s) Oral every 12 hours      Physical Exam:  Constitutional: NAD    Neuro  * Mental Status:  GCS 15:  E(4), V(5), M(6).  Awake, alert, oriented to conversation.  * Cranial Nerves: Cnii-Cnxii grossly intact. PERRL, EOMI, tongue midline, no gaze deviation  * Motor: RUE 5/5, LUE 5/5, RLE 2/5, LLE 4/5  * Sensory: Sensation intact to light touch other than LLE which has sensory loss  * Reflexes: Not assessed  * Gait: Not assessed    Cardiovascular:  S1, S2 no murmurs appreciated.  Regular rate and rhythm.  Eyes: See neurologic examination with detailed examination of eyes.  ENT: No JVD, Trachea Midline  Respiratory: Clear to auscultation.  Gastrointestinal: Soft, nontender, nondistended.  Genitourinary: [ ] Vazquez, [ x ] No Vazquez.   Musculoskeletal: No muscle wasting noted, (See neurologic assessment for full muscle strength assessment) No pretibial edema appreciated, no appreciable calf tenderness.  Skin:  Wound inspected, no redness, bleeding or drainage noted.    Hematologic / Lymph / Immunologic: No bleeding from IV sites or wounds, No lymphadenopathy, No Hives or allergic type skin lesions      [facility] SS:   DATE: 10/21/21  TIME: 8:30  1A: Level of consciousness (0-3): 0  1B: Questions (0-2): 0    1C: Commands (0-2): 0  2: Gaze (0-2): 0  3: Visual fields (0-3): 0  4: Facial palsy (0-3): 0  MOTOR:  5A: Left arm motor drift (0-4): 0  5B: Right arm motor drift (0-4): 0  6A: Left leg motor drift (0-4): 0  6B: Right leg motor drift (0-4): 2  7: Limb ataxia (0-2): 0  SENSORY:  8: Sensation (0-2): 1  SPEECH:  9: Language (0-3): 0  10: Dysarthria (0-2): 0  EXTINCTION:  11: Extinction/inattention (0-2): 0    TOTAL SCORE: 3    Labs:                         9.6    7.28  )-----------( 231      ( 20 Oct 2021 08:13 )             28.0       10-20    132<L>  |  94<L>  |  17.0  ----------------------------<  123<H>  4.6   |  25.0  |  0.47<L>    Ca    7.9<L>      20 Oct 2021 08:13    TPro  5.4<L>  /  Alb  3.3  /  TBili  0.8  /  DBili  x   /  AST  16  /  ALT  25  /  AlkPhos  58  10-20      PT/INR - ( 20 Oct 2021 08:13 )   PT: 12.4 sec;   INR: 1.07 ratio         PTT - ( 20 Oct 2021 08:13 )  PTT:33.9 sec    Assessment/Plan:   Patient presents to neuro-IR for selective spinal angiography.     Procedure, goals, risks, benefits and alternatives  were discussed with patient and (patient's family).  All questions were answered.  Risks include but are not limited to stroke, vessel injury, hemorrhage, and or groin hematoma. Patient demonstrates understanding of all risks involved with this procedure and wishes to continue.  Appropriate consent was obtained from patient and consent is in the patient's chart. Neurointerventional Surgery  Pre-Procedure Note     HPI:  71m 911  with acute worsening of back pain while fishing with friends this morning. Acute bilateral leg weakness and numbness. RLE 1/5, LLE 2/5 after decadron in ER.  Vazqeuz placed in ER for urinary retention, 700cc in bladder    Initially there was concern for GBS given recent flu vaccination, but the clinical history of pain followed by acute weakness was not consistent.  MRI spine performed and evaluated while patient in ER- there was concern for intradural, extramedulary lesion from T1-Sacrum c/w hematoma.  Serpentine vessel seen at T12 to L1 concerning for vascular lesion.  Patient taken for emergent thoracolumbar laminectomy with Dr. Uriarte.   (07 Oct 2021 01:03)      Interval: Patient is now POD#14 T10-L2 lami/ decompression. Patient reports he is feeling better each day, feels his strength and numbness are improving.        Allergies: No Known Allergies      PMH/PSH:  PAST MEDICAL & SURGICAL HISTORY:  High cholesterol    Colitis    Vertigo      Social History:  Lives at home with wife in [place].  Is a retired  and a  at the [employer] tragedy.  He is baseline mRS-0.  Nonsmoker nondrinker and denies toxic habits (07 Oct 2021 01:03)        Current Medications:   acetaminophen   Tablet .. 650 milliGRAM(s) Oral every 6 hours PRN  ascorbic acid 500 milliGRAM(s) Oral daily  dextrose 40% Gel 15 Gram(s) Oral once  dextrose 50% Injectable 25 Gram(s) IV Push once  dextrose 50% Injectable 12.5 Gram(s) IV Push once  dextrose 50% Injectable 25 Gram(s) IV Push once  doxazosin 4 milliGRAM(s) Oral at bedtime  DULoxetine 30 milliGRAM(s) Oral at bedtime  gabapentin 600 milliGRAM(s) Oral every 8 hours  glucagon  Injectable 1 milliGRAM(s) IntraMuscular once  insulin lispro (ADMELOG) corrective regimen sliding scale   SubCutaneous three times a day before meals  lactulose Syrup 10 Gram(s) Oral daily PRN  methocarbamol 750 milliGRAM(s) Oral every 8 hours  multivitamin 1 Tablet(s) Oral daily  ondansetron Injectable 4 milliGRAM(s) IV Push every 6 hours PRN  oxyCODONE    IR 10 milliGRAM(s) Oral every 4 hours PRN  pantoprazole    Tablet 40 milliGRAM(s) Oral before breakfast  polyethylene glycol 3350 17 Gram(s) Oral two times a day  saline laxative (FLEET) Rectal Enema 1 Enema Rectal once  senna 2 Tablet(s) Oral every 12 hours      Physical Exam:  Constitutional: NAD    Neuro  * Mental Status:  GCS 15:  E(4), V(5), M(6).  Awake, alert, oriented to conversation.  * Cranial Nerves: Cnii-Cnxii grossly intact. PERRL, EOMI, tongue midline, no gaze deviation  * Motor: RUE 5/5, LUE 5/5, RLE 2/5, LLE 4/5  * Sensory: Sensation intact to light touch other than LLE which has sensory loss  * Reflexes: Not assessed  * Gait: Not assessed    Cardiovascular:  S1, S2 no murmurs appreciated.  Regular rate and rhythm.  Eyes: See neurologic examination with detailed examination of eyes.  ENT: No JVD, Trachea Midline  Respiratory: Clear to auscultation.  Gastrointestinal: Soft, nontender, nondistended.  Genitourinary: [ ] Vazquez, [ x ] No Vazquez.   Musculoskeletal: No muscle wasting noted, (See neurologic assessment for full muscle strength assessment) No pretibial edema appreciated, no appreciable calf tenderness.  Skin:  Wound inspected, no redness, bleeding or drainage noted.    Hematologic / Lymph / Immunologic: No bleeding from IV sites or wounds, No lymphadenopathy, No Hives or allergic type skin lesions      [facility] SS:   DATE: 10/21/21  TIME: 8:30  1A: Level of consciousness (0-3): 0  1B: Questions (0-2): 0    1C: Commands (0-2): 0  2: Gaze (0-2): 0  3: Visual fields (0-3): 0  4: Facial palsy (0-3): 0  MOTOR:  5A: Left arm motor drift (0-4): 0  5B: Right arm motor drift (0-4): 0  6A: Left leg motor drift (0-4): 0  6B: Right leg motor drift (0-4): 2  7: Limb ataxia (0-2): 0  SENSORY:  8: Sensation (0-2): 1  SPEECH:  9: Language (0-3): 0  10: Dysarthria (0-2): 0  EXTINCTION:  11: Extinction/inattention (0-2): 0    TOTAL SCORE: 3    Labs:                         9.6    7.28  )-----------( 231      ( 20 Oct 2021 08:13 )             28.0       10-20    132<L>  |  94<L>  |  17.0  ----------------------------<  123<H>  4.6   |  25.0  |  0.47<L>    Ca    7.9<L>      20 Oct 2021 08:13    TPro  5.4<L>  /  Alb  3.3  /  TBili  0.8  /  DBili  x   /  AST  16  /  ALT  25  /  AlkPhos  58  10-20      PT/INR - ( 20 Oct 2021 08:13 )   PT: 12.4 sec;   INR: 1.07 ratio         PTT - ( 20 Oct 2021 08:13 )  PTT:33.9 sec    Assessment/Plan:   Patient presents to neuro-IR for selective spinal angiography.     Procedure, goals, risks, benefits and alternatives  were discussed with patient. All questions were answered.  Risks include but are not limited to stroke, vessel injury, hemorrhage, and or groin hematoma. Patient demonstrates understanding of all risks involved with this procedure and wishes to continue. Appropriate consent was obtained from patient and consent is in the patient's chart. No

## 2021-10-21 NOTE — PROGRESS NOTE ADULT - SUBJECTIVE AND OBJECTIVE BOX
71y Male s/p s/p lT10-L2 lami for SDH w/o any new complaints   Reports improvement in sensation to lower abdomen; still with significant numbness in genital/perianal numbness  Radicular pain controlled     INTERVAL HPI/OVERNIGHT EVENTS:  Angio done negative for AVF     Vital Signs Last 24 Hrs  T(C): 36.5 (21 Oct 2021 08:30), Max: 36.8 (20 Oct 2021 21:21)  T(F): 97.7 (21 Oct 2021 08:30), Max: 98.3 (20 Oct 2021 21:21)  HR: 89 (21 Oct 2021 13:50) (76 - 94)  BP: 140/73 (21 Oct 2021 13:50) (140/73 - 173/88)  BP(mean): --  RR: 19 (21 Oct 2021 13:50) (16 - 20)  SpO2: 94% (21 Oct 2021 13:50) (92% - 98%)    PHYSICAL EXAM:  PHYSICAL EXAM:  Awake, alert and oriented x3   HOWARD x4   LLE Psoas 4+/5, Quad 4+/5, AT 4+/5, EHL/Gastroc 4/5  RLE Psoas 4/5; Quad 2/5; AT 4-/5; EHL/Gastroc 4-/5   Sensory level L1; No clonus   following commands briskly   Incision C/D/I with sutures in place       LABS:                        9.6    7.28  )-----------( 231      ( 20 Oct 2021 08:13 )             28.0     10-20    132<L>  |  94<L>  |  17.0  ----------------------------<  123<H>  4.6   |  25.0  |  0.47<L>    Ca    7.9<L>      20 Oct 2021 08:13    TPro  5.4<L>  /  Alb  3.3  /  TBili  0.8  /  DBili  x   /  AST  16  /  ALT  25  /  AlkPhos  58  10-20    PT/INR - ( 20 Oct 2021 08:13 )   PT: 12.4 sec;   INR: 1.07 ratio         PTT - ( 20 Oct 2021 08:13 )  PTT:33.9 sec      10-20 @ 07:01  -  10-21 @ 07:00  --------------------------------------------------------  IN: 0 mL / OUT: 550 mL / NET: -550 mL    10-21 @ 07:01  -  10-21 @ 14:57  --------------------------------------------------------  IN: 540 mL / OUT: 850 mL / NET: -310 mL      CAPRINI SCORE [CLOT]:  Patient has an estimated Caprini score of greater than 5.  However, the patient's unique clinical situation will be addressed in an individual manner to determine appropriate anticoagulation treatment, if any.

## 2021-10-22 LAB
ANION GAP SERPL CALC-SCNC: 7 MMOL/L — SIGNIFICANT CHANGE UP (ref 5–17)
BUN SERPL-MCNC: 11.5 MG/DL — SIGNIFICANT CHANGE UP (ref 8–20)
CALCIUM SERPL-MCNC: 7.8 MG/DL — LOW (ref 8.6–10.2)
CHLORIDE SERPL-SCNC: 97 MMOL/L — LOW (ref 98–107)
CO2 SERPL-SCNC: 27 MMOL/L — SIGNIFICANT CHANGE UP (ref 22–29)
CREAT SERPL-MCNC: 0.41 MG/DL — LOW (ref 0.5–1.3)
GLUCOSE BLDC GLUCOMTR-MCNC: 121 MG/DL — HIGH (ref 70–99)
GLUCOSE BLDC GLUCOMTR-MCNC: 121 MG/DL — HIGH (ref 70–99)
GLUCOSE BLDC GLUCOMTR-MCNC: 204 MG/DL — HIGH (ref 70–99)
GLUCOSE BLDC GLUCOMTR-MCNC: 97 MG/DL — SIGNIFICANT CHANGE UP (ref 70–99)
GLUCOSE SERPL-MCNC: 124 MG/DL — HIGH (ref 70–99)
HCT VFR BLD CALC: 26.5 % — LOW (ref 39–50)
HGB BLD-MCNC: 9.1 G/DL — LOW (ref 13–17)
MAGNESIUM SERPL-MCNC: 2.1 MG/DL — SIGNIFICANT CHANGE UP (ref 1.6–2.6)
MCHC RBC-ENTMCNC: 30.6 PG — SIGNIFICANT CHANGE UP (ref 27–34)
MCHC RBC-ENTMCNC: 34.3 GM/DL — SIGNIFICANT CHANGE UP (ref 32–36)
MCV RBC AUTO: 89.2 FL — SIGNIFICANT CHANGE UP (ref 80–100)
PHOSPHATE SERPL-MCNC: 2.7 MG/DL — SIGNIFICANT CHANGE UP (ref 2.4–4.7)
PLATELET # BLD AUTO: 199 K/UL — SIGNIFICANT CHANGE UP (ref 150–400)
POTASSIUM SERPL-MCNC: 4.4 MMOL/L — SIGNIFICANT CHANGE UP (ref 3.5–5.3)
POTASSIUM SERPL-SCNC: 4.4 MMOL/L — SIGNIFICANT CHANGE UP (ref 3.5–5.3)
RBC # BLD: 2.97 M/UL — LOW (ref 4.2–5.8)
RBC # FLD: 13.6 % — SIGNIFICANT CHANGE UP (ref 10.3–14.5)
SODIUM SERPL-SCNC: 131 MMOL/L — LOW (ref 135–145)
WBC # BLD: 5.73 K/UL — SIGNIFICANT CHANGE UP (ref 3.8–10.5)
WBC # FLD AUTO: 5.73 K/UL — SIGNIFICANT CHANGE UP (ref 3.8–10.5)

## 2021-10-22 PROCEDURE — 99233 SBSQ HOSP IP/OBS HIGH 50: CPT

## 2021-10-22 RX ADMIN — METHOCARBAMOL 750 MILLIGRAM(S): 500 TABLET, FILM COATED ORAL at 22:28

## 2021-10-22 RX ADMIN — Medication 500 MILLIGRAM(S): at 12:17

## 2021-10-22 RX ADMIN — OXYCODONE HYDROCHLORIDE 10 MILLIGRAM(S): 5 TABLET ORAL at 20:20

## 2021-10-22 RX ADMIN — METHOCARBAMOL 750 MILLIGRAM(S): 500 TABLET, FILM COATED ORAL at 17:37

## 2021-10-22 RX ADMIN — Medication 4 MILLIGRAM(S): at 22:28

## 2021-10-22 RX ADMIN — GABAPENTIN 600 MILLIGRAM(S): 400 CAPSULE ORAL at 06:17

## 2021-10-22 RX ADMIN — SENNA PLUS 2 TABLET(S): 8.6 TABLET ORAL at 17:37

## 2021-10-22 RX ADMIN — POLYETHYLENE GLYCOL 3350 17 GRAM(S): 17 POWDER, FOR SOLUTION ORAL at 12:17

## 2021-10-22 RX ADMIN — POLYETHYLENE GLYCOL 3350 17 GRAM(S): 17 POWDER, FOR SOLUTION ORAL at 17:34

## 2021-10-22 RX ADMIN — Medication 650 MILLIGRAM(S): at 16:20

## 2021-10-22 RX ADMIN — OXYCODONE HYDROCHLORIDE 10 MILLIGRAM(S): 5 TABLET ORAL at 20:08

## 2021-10-22 RX ADMIN — PANTOPRAZOLE SODIUM 40 MILLIGRAM(S): 20 TABLET, DELAYED RELEASE ORAL at 06:20

## 2021-10-22 RX ADMIN — GABAPENTIN 600 MILLIGRAM(S): 400 CAPSULE ORAL at 22:28

## 2021-10-22 RX ADMIN — METHOCARBAMOL 750 MILLIGRAM(S): 500 TABLET, FILM COATED ORAL at 06:17

## 2021-10-22 RX ADMIN — OXYCODONE HYDROCHLORIDE 10 MILLIGRAM(S): 5 TABLET ORAL at 12:21

## 2021-10-22 RX ADMIN — Medication 650 MILLIGRAM(S): at 07:44

## 2021-10-22 RX ADMIN — Medication 650 MILLIGRAM(S): at 04:11

## 2021-10-22 RX ADMIN — SODIUM CHLORIDE 100 MILLILITER(S): 9 INJECTION INTRAMUSCULAR; INTRAVENOUS; SUBCUTANEOUS at 06:18

## 2021-10-22 RX ADMIN — Medication 4: at 10:42

## 2021-10-22 RX ADMIN — GABAPENTIN 600 MILLIGRAM(S): 400 CAPSULE ORAL at 16:23

## 2021-10-22 RX ADMIN — DULOXETINE HYDROCHLORIDE 30 MILLIGRAM(S): 30 CAPSULE, DELAYED RELEASE ORAL at 22:28

## 2021-10-22 RX ADMIN — SENNA PLUS 2 TABLET(S): 8.6 TABLET ORAL at 06:18

## 2021-10-22 RX ADMIN — Medication 1 TABLET(S): at 12:17

## 2021-10-22 NOTE — CHART NOTE - NSCHARTNOTEFT_GEN_A_CORE
Post angio groin check performed. Right groin is noted without hematoma, swelling, drainage, bleeding. There is mild ecchymosis, the area is soft without induration. Pedal pulses intact. Post angio groin check performed. Right groin is noted without hematoma, swelling, drainage, bleeding. There is mild ecchymosis, the area is soft without induration. Pedal pulses intact. There are no further neurointerventional needs, please recall as needed.

## 2021-10-22 NOTE — PROGRESS NOTE ADULT - SUBJECTIVE AND OBJECTIVE BOX
Patient feels fatigued.  Developing back pain from being in bed.  Has therapy planned.   AS per note, spinal angiogram was normal.     REVIEW OF SYSTEMS  Constitutional - No fever,  No fatigue  HEENT - No vertigo, No neck pain  Neurological - No headaches, No memory loss, No loss of strength, No numbness, No tremors  Musculoskeletal - No joint pain, No joint swelling, No muscle pain  Psychiatric - No depression, No anxiety    FUNCTIONAL PROGRESS  10/20 PT  Bed Mobility  Bed Mobility Training Supine-to-Sit: minimum assist (75% patient effort);  1 person assist  Bed Mobility Training Limitations: decreased ability to use legs for bridging/pushing;  decreased strength;  impaired balance;  decreased sensation;  impaired motor control    Sit-Stand Transfer Training  Transfer Training Sit-to-Stand Transfer: moderate assist (50% patient effort);  1 person assist;  full weight-bearing   rolling walker  Transfer Training Stand-to-Sit Transfer: moderate assist (50% patient effort);  1 person assist;  full weight-bearing   rolling walker  Sit-to-Stand Transfer Training Transfer Safety Analysis: decreased balance;  decreased weight-shifting ability;  decreased sensation;  decreased strength;  impaired balance;  impaired motor control;  impaired postural control;  rolling walker    Gait Training  Gait Training: moderate assist (50% patient effort);  1 person assist;  full weight-bearing   rolling walker;  5 feet;  bed to chair  Gait Analysis: 3-point gait   decreased heavenly;  decreased step length;  decreased stride length;  decreased weight-shifting ability;  decreased sensation;  decreased strength;  impaired balance;  impaired motor control;  Bed to Chair;  5 feet;  rolling walker  Gait Number of Times:: x 2  Type of Rest Type of Rest: sitting  Duration of Rest Duration of Rest: approx 5 mins     Stair Training  Physical Assist/Nonphysical Assist: pt to have ramp installed      VITALS  T(C): 36.5 (10-22-21 @ 04:09), Max: 36.9 (10-21-21 @ 16:26)  HR: 97 (10-22-21 @ 04:09) (77 - 97)  BP: 119/65 (10-22-21 @ 04:09) (119/65 - 166/88)  RR: 18 (10-22-21 @ 04:09) (16 - 19)  SpO2: 94% (10-22-21 @ 04:09) (92% - 97%)  Wt(kg): --    MEDICATIONS   acetaminophen   Tablet .. 650 milliGRAM(s) every 6 hours PRN  ascorbic acid 500 milliGRAM(s) daily  dextrose 40% Gel 15 Gram(s) once  dextrose 50% Injectable 25 Gram(s) once  dextrose 50% Injectable 12.5 Gram(s) once  dextrose 50% Injectable 25 Gram(s) once  doxazosin 4 milliGRAM(s) at bedtime  DULoxetine 30 milliGRAM(s) at bedtime  gabapentin 600 milliGRAM(s) every 8 hours  glucagon  Injectable 1 milliGRAM(s) once  insulin lispro (ADMELOG) corrective regimen sliding scale   three times a day before meals  lactulose Syrup 10 Gram(s) daily PRN  methocarbamol 750 milliGRAM(s) every 8 hours  multivitamin 1 Tablet(s) daily  ondansetron Injectable 4 milliGRAM(s) every 6 hours PRN  oxyCODONE    IR 10 milliGRAM(s) every 4 hours PRN  pantoprazole    Tablet 40 milliGRAM(s) before breakfast  polyethylene glycol 3350 17 Gram(s) two times a day  saline laxative (FLEET) Rectal Enema 1 Enema once  senna 2 Tablet(s) every 12 hours  sodium chloride 0.9%. 1000 milliLiter(s) <Continuous>      RECENT LABS/IMAGING                          9.6    7.28  )-----------( 231      ( 20 Oct 2021 08:13 )             28.0     10-20    132<L>  |  94<L>  |  17.0  ----------------------------<  123<H>  4.6   |  25.0  |  0.47<L>    Ca    7.9<L>      20 Oct 2021 08:13    TPro  5.4<L>  /  Alb  3.3  /  TBili  0.8  /  DBili  x   /  AST  16  /  ALT  25  /  AlkPhos  58  10-20    PT/INR - ( 20 Oct 2021 08:13 )   PT: 12.4 sec;   INR: 1.07 ratio         PTT - ( 20 Oct 2021 08:13 )  PTT:33.9 sec              THORACIC SPINE 10/6 -  No fracture or acute traumatic malalignment. Streak artifact limits evaluation of the central canal. No definite high-grade central canal stenosis.    LUMBAR SPINE 10/6 - No fracture or acute traumatic malalignment. Degenerative changes as described. Streak artifact limits evaluation of the central canal. No definite high-grade central canal stenosis.    MRI C, T, L SPINE 10/6 - 1) Mixed signal intensity intradural, extramedullary lesion in the dorsal spinal canal extending from around T11 through the sacrum, with suggestion of T2 hypointense hemosiderin cap, most concerning for a hematoma comprised of blood of differing ages. 2) Diffuse subarachnoid hemorrhage throughout the thoracic spine. 3) Ventral epidural enhancement along the posterior clivus and upper cervical spine. Circumferential epidural enhancement throughout the cervical spine C1/C2-C7/T1. Subdural effusions and/or subdural hematomas in the posterior fossa along the cerebellar hemispheres and tentorial leaflets, and associated dural enhancement, with dural enhancement extending into the cervical spine, concerning for intracranial hypotension.    MRI T & L SPINE 10/18 - Compared to previous studies, There are postsurgical changes related to T10-L2 laminectomy with interval removal of the epidural catheter as described. There has been interval decrease in T2/STIR hyperintensity at T10-T11 consistent with interval improvement of distal cord edema. No new cord edema is seen. Layering of ventral subdural fluid collection from T4 to T10 may represent residual subdural hematoma, conspicuous in this examination may be secondary to expected evolution of blood products now in the early subacute stage.  ----------------------------------------------------------------------------------------  PHYSICAL EXAM  Constitutional - NAD, Comfortable  Extremities - Mild BLE swelling   Neurologic Exam -                    Cognitive - AAO to self, place, date, year, situation     Motor -                      LEFT    UE - C5 5/5, C6 5/5, C7 5/5, C8 5/5, T1 5/5                    RIGHT UE - C5 5/5, C6 5/5, C7 5/5, C8 5/5, T1 5/5                    LEFT    LE - L2 4/5, L3 4/5, L4 4/5, L5 4/5, S1 4/5                    RIGHT LE - L2 4/5, L3 5/5, L4 2/5, L5 3/5, S1 4/5       Sensory - Decreased to the right lateral thigh, lower leg and bilateral soles  Psychiatric - Mood stable, Affect WNL  ----------------------------------------------------------------------------------------  ASSESSMENT/PLAN  71yMale with functional deficits after having BLE weakness related to thoracolumbosacral hematoma  Intradural extramedullary T1-Sacral hematoma s/p thoracolumbar laminectomy - Decadron, Spinal angiogram 10/21  Pain - As per pain management  Neurogenic Bladder - Vazquez Cardura  Neurogenic Bowel - Lactulose (10/15), Senna, Miralax BID  DVT PPX - SCDs  Rehab - PT plans to see patient today. Patient provided with bedside exercises and stretching techniques. Continue to recommend ACUTE inpatient rehabilitation for the functional deficits consisting of 3 hours of therapy/day & 24 hour RN/daily PMR physician for comorbid medical management. Will continue to follow for ongoing rehab needs and recommendations. Patient will be able to tolerate 3 hours a day.    Continue bedside therapy as well as OOB throughout the day with mobilization throughout the day with staff to maintain cardiopulmonary function and prevention of secondary complications related to debility.     Discussed with rehab clinical team.

## 2021-10-22 NOTE — PROGRESS NOTE ADULT - ASSESSMENT
71m 911  with acute worsening of back pain while fishing with friends. MRI spine performed and evaluated while patient in Er- there was concern for intradural, extramedulary lesion from T1-Sacrum c/w hematoma.  Serpentine vessel seen at T12 to L1 concerning for vascular lesion.  Patient taken for emergent thoracolumbar laminectomy on 10/7.   10/7/21: T10-L2 laminectomy with SDH evacuation  10/7/21: Angio negative  10/10/21: Kate replaced for retention   10/11/21: [place] drain removal, Lovenox started  10/21/21: Spinal angio negative     Plan:  -D/w Dr. Uriarte  -Continue q4h neuro checks   -Pain control PRN  -Mild hyponatremia; pt. asymptomatic; ok to monitor; fluids D/C'd; will avoid overhydration  -Continue Lovenox for DVT PPX  -Urology following for kate management; continue with Kate at present time   -PT/OT/OOB encouraged   -Dispo planning; Rehab placement pending [Annual Wellness Visit] : an annual wellness visit

## 2021-10-22 NOTE — PROGRESS NOTE ADULT - SUBJECTIVE AND OBJECTIVE BOX
HPI:  71m 911  with acute worsening of back pain while fishing with friends this morning. Acute bilateral leg weakness and numbness.  RLE 1/5, LLE 2/5 after decadron in ER.  Vazquez placed in Er for urinary retention, 700cc in bladder. Initially there was concern for GBS given recent flu vaccination, but the clinical history of pain followed by acute weakness was not consistent.  MRI spine performed and evaluated while patient in Er- there was concern for intradural, extramedulary lesion from T1-Sacrum c/w hematoma.  Serpentine vessel seen at T12 to L1 concerning for vascular lesion.  Patient taken for emergent thoracolumbar laminectomy on 10/7.     INTERVAL HPI:  10/7/21: T10-L2 laminectomy with SDH evacuation  10/7/21: Angio negative  10/10/21: Vazquez replaced for retention   10/11/21: [place] drain removal, Lovenox to be started at bedtime, patient to be downgraded to NSG SDU  10/21/21: Spinal angio negative     INTERVAL HPI/OVERNIGHT EVENTS:  71y Male seen lying comfortably in bed. Patient continues to complain of RLE pain.     Vital Signs Last 24 Hrs  T(C): 36.4 (22 Oct 2021 17:13), Max: 36.8 (21 Oct 2021 20:30)  T(F): 97.6 (22 Oct 2021 17:13), Max: 98.3 (21 Oct 2021 20:30)  HR: 87 (22 Oct 2021 17:13) (75 - 97)  BP: 131/79 (22 Oct 2021 17:13) (109/64 - 148/85)  BP(mean): --  RR: 18 (22 Oct 2021 17:13) (18 - 18)  SpO2: 98% (22 Oct 2021 17:13) (94% - 98%)    PHYSICAL EXAM:  GENERAL: NAD, well-groomed, well-developed  HEAD:  Atraumatic, normocephalic  WOUND: incision clean dry intact, open to air, no erythema, no drainage   MENTAL STATUS: AAO x3; Awake, Opens eyes spontaneously; Appropriately conversant without aphasia; following simple commands  CRANIAL NERVES: PERRL; EOMI; no facial asymmetry; facial sensation grossly intact to light touch b/l;  tongue midline; palate rises symmetrically, decreased rectal tone  MOTOR: strength 5/5 B/L upper extremities; LLE 5/5, RLE HF 4/5  KF 4+/5   KE 4/5  PF/DF 4/5  EHL 4/5, decreased sensation R>L, denies saddle anesthesia   CHEST/LUNG: Clear to auscultation bilaterally; no rales, rhonchi, wheezing, or rubs  HEART: +S1/+S2; Regular rate and rhythm; no murmurs, rubs, or gallops  ABDOMEN: Soft, nontender, nondistended; bowel sounds present all four quadrants  EXTREMITIES:  2+ peripheral pulses, no clubbing, cyanosis, or edema  SKIN: Warm, dry; no rashes or lesions    LABS:                        9.1    5.73  )-----------( 199      ( 22 Oct 2021 09:39 )             26.5     10-22    131<L>  |  97<L>  |  11.5  ----------------------------<  124<H>  4.4   |  27.0  |  0.41<L>    Ca    7.8<L>      22 Oct 2021 09:39  Phos  2.7     10-22  Mg     2.1     10-22            10-21 @ 07:01  -  10-22 @ 07:00  --------------------------------------------------------  IN: 660 mL / OUT: 2375 mL / NET: -1715 mL        RADIOLOGY & ADDITIONAL TESTS:  < from: MR Lumbar Spine w/wo IV Cont (10.18.21 @ 09:07) >    IMPRESSION:    Compared to previous studies,    There are postsurgical changes related to T10-L2 laminectomy with interval removal of the epidural catheter as described.    Therehas been interval decrease in T2/STIR hyperintensity at T10-T11 consistent with interval improvement of distal cord edema. No new cord edema is seen.    Layering of ventral subdural fluid collection from T4 to T10 may represent residual subdural hematoma, conspicuous in this examination may be secondary to expected evolution of blood products now in the early subacute stage.          --- End of Report ---            LISA DALAL MD; Attending Radiologist  This document has been electronically signed. Oct 18 2021 10:07AM    < end of copied text >

## 2021-10-22 NOTE — PROGRESS NOTE ADULT - PROVIDER SPECIALTY LIST ADULT
Intervent Radiology
Neurosurgery
Rehab Medicine
NSICU
Neurosurgery
Rehab Medicine
Neurosurgery
Pain Medicine
NSICU
Neurosurgery
NSICU

## 2021-10-22 NOTE — CHART NOTE - NSCHARTNOTESELECT_GEN_ALL_CORE
Groin Check/Event Note
Nutrition Services
[place] drain removal/Event Note
Neuro Interventional Surgery/Event Note
Neuro Interventional Surgery/Event Note
NeuroInterventional Surgery/Event Note
Neurocritical Care Evaluation/Event Note
Neurointerventional Surgery/Event Note
Transfer Note/Event Note

## 2021-10-22 NOTE — PROGRESS NOTE ADULT - REASON FOR ADMISSION
Back Pain, Leg Weakness

## 2021-10-23 ENCOUNTER — TRANSCRIPTION ENCOUNTER (OUTPATIENT)
Age: 71
End: 2021-10-23

## 2021-10-23 ENCOUNTER — INPATIENT (INPATIENT)
Facility: HOSPITAL | Age: 71
LOS: 17 days | Discharge: HOME HEALTH | DRG: 949 | End: 2021-11-10
Attending: INTERNAL MEDICINE | Admitting: PHYSICAL MEDICINE & REHABILITATION
Payer: MEDICARE

## 2021-10-23 VITALS
TEMPERATURE: 98 F | OXYGEN SATURATION: 97 % | DIASTOLIC BLOOD PRESSURE: 62 MMHG | SYSTOLIC BLOOD PRESSURE: 145 MMHG | RESPIRATION RATE: 16 BRPM | HEART RATE: 84 BPM

## 2021-10-23 VITALS
RESPIRATION RATE: 16 BRPM | DIASTOLIC BLOOD PRESSURE: 103 MMHG | HEART RATE: 88 BPM | TEMPERATURE: 99 F | WEIGHT: 177.25 LBS | HEIGHT: 71 IN | SYSTOLIC BLOOD PRESSURE: 154 MMHG | OXYGEN SATURATION: 100 %

## 2021-10-23 DIAGNOSIS — M79.661 PAIN IN RIGHT LOWER LEG: ICD-10-CM

## 2021-10-23 DIAGNOSIS — E22.2 SYNDROME OF INAPPROPRIATE SECRETION OF ANTIDIURETIC HORMONE: ICD-10-CM

## 2021-10-23 DIAGNOSIS — Z51.89 ENCOUNTER FOR OTHER SPECIFIED AFTERCARE: ICD-10-CM

## 2021-10-23 DIAGNOSIS — E87.6 HYPOKALEMIA: ICD-10-CM

## 2021-10-23 DIAGNOSIS — R33.9 RETENTION OF URINE, UNSPECIFIED: ICD-10-CM

## 2021-10-23 DIAGNOSIS — G95.89 OTHER SPECIFIED DISEASES OF SPINAL CORD: ICD-10-CM

## 2021-10-23 DIAGNOSIS — K59.2 NEUROGENIC BOWEL, NOT ELSEWHERE CLASSIFIED: ICD-10-CM

## 2021-10-23 DIAGNOSIS — R63.0 ANOREXIA: ICD-10-CM

## 2021-10-23 DIAGNOSIS — E78.5 HYPERLIPIDEMIA, UNSPECIFIED: ICD-10-CM

## 2021-10-23 DIAGNOSIS — D64.9 ANEMIA, UNSPECIFIED: ICD-10-CM

## 2021-10-23 DIAGNOSIS — G82.20 PARAPLEGIA, UNSPECIFIED: ICD-10-CM

## 2021-10-23 DIAGNOSIS — R11.2 NAUSEA WITH VOMITING, UNSPECIFIED: ICD-10-CM

## 2021-10-23 DIAGNOSIS — K59.00 CONSTIPATION, UNSPECIFIED: ICD-10-CM

## 2021-10-23 DIAGNOSIS — E88.09 OTHER DISORDERS OF PLASMA-PROTEIN METABOLISM, NOT ELSEWHERE CLASSIFIED: ICD-10-CM

## 2021-10-23 DIAGNOSIS — R31.21 ASYMPTOMATIC MICROSCOPIC HEMATURIA: ICD-10-CM

## 2021-10-23 DIAGNOSIS — N31.9 NEUROMUSCULAR DYSFUNCTION OF BLADDER, UNSPECIFIED: ICD-10-CM

## 2021-10-23 DIAGNOSIS — G82.22 PARAPLEGIA, INCOMPLETE: ICD-10-CM

## 2021-10-23 DIAGNOSIS — Z47.89 ENCOUNTER FOR OTHER ORTHOPEDIC AFTERCARE: ICD-10-CM

## 2021-10-23 DIAGNOSIS — E43 UNSPECIFIED SEVERE PROTEIN-CALORIE MALNUTRITION: ICD-10-CM

## 2021-10-23 DIAGNOSIS — T38.0X5D ADVERSE EFFECT OF GLUCOCORTICOIDS AND SYNTHETIC ANALOGUES, SUBSEQUENT ENCOUNTER: ICD-10-CM

## 2021-10-23 DIAGNOSIS — R73.9 HYPERGLYCEMIA, UNSPECIFIED: ICD-10-CM

## 2021-10-23 LAB
ANION GAP SERPL CALC-SCNC: 7 MMOL/L — SIGNIFICANT CHANGE UP (ref 5–17)
APPEARANCE UR: CLEAR — SIGNIFICANT CHANGE UP
BILIRUB UR-MCNC: NEGATIVE — SIGNIFICANT CHANGE UP
BUN SERPL-MCNC: 8.8 MG/DL — SIGNIFICANT CHANGE UP (ref 8–20)
CALCIUM SERPL-MCNC: 8.3 MG/DL — LOW (ref 8.6–10.2)
CHLORIDE SERPL-SCNC: 94 MMOL/L — LOW (ref 98–107)
CO2 SERPL-SCNC: 28 MMOL/L — SIGNIFICANT CHANGE UP (ref 22–29)
COLOR SPEC: YELLOW — SIGNIFICANT CHANGE UP
CREAT SERPL-MCNC: 0.41 MG/DL — LOW (ref 0.5–1.3)
DIFF PNL FLD: ABNORMAL
GLUCOSE BLDC GLUCOMTR-MCNC: 106 MG/DL — HIGH (ref 70–99)
GLUCOSE BLDC GLUCOMTR-MCNC: 127 MG/DL — HIGH (ref 70–99)
GLUCOSE BLDC GLUCOMTR-MCNC: 140 MG/DL — HIGH (ref 70–99)
GLUCOSE SERPL-MCNC: 119 MG/DL — HIGH (ref 70–99)
GLUCOSE UR QL: NEGATIVE — SIGNIFICANT CHANGE UP
HCT VFR BLD CALC: 28.5 % — LOW (ref 39–50)
HGB BLD-MCNC: 10.1 G/DL — LOW (ref 13–17)
KETONES UR-MCNC: NEGATIVE — SIGNIFICANT CHANGE UP
LEUKOCYTE ESTERASE UR-ACNC: ABNORMAL
MAGNESIUM SERPL-MCNC: 2 MG/DL — SIGNIFICANT CHANGE UP (ref 1.6–2.6)
MCHC RBC-ENTMCNC: 31.4 PG — SIGNIFICANT CHANGE UP (ref 27–34)
MCHC RBC-ENTMCNC: 35.4 GM/DL — SIGNIFICANT CHANGE UP (ref 32–36)
MCV RBC AUTO: 88.5 FL — SIGNIFICANT CHANGE UP (ref 80–100)
NITRITE UR-MCNC: NEGATIVE — SIGNIFICANT CHANGE UP
PH UR: 6 — SIGNIFICANT CHANGE UP (ref 5–8)
PHOSPHATE SERPL-MCNC: 2.9 MG/DL — SIGNIFICANT CHANGE UP (ref 2.4–4.7)
PLATELET # BLD AUTO: 202 K/UL — SIGNIFICANT CHANGE UP (ref 150–400)
POTASSIUM SERPL-MCNC: 4.1 MMOL/L — SIGNIFICANT CHANGE UP (ref 3.5–5.3)
POTASSIUM SERPL-SCNC: 4.1 MMOL/L — SIGNIFICANT CHANGE UP (ref 3.5–5.3)
PROT UR-MCNC: 15
RBC # BLD: 3.22 M/UL — LOW (ref 4.2–5.8)
RBC # FLD: 13.4 % — SIGNIFICANT CHANGE UP (ref 10.3–14.5)
SODIUM SERPL-SCNC: 129 MMOL/L — LOW (ref 135–145)
SP GR SPEC: 1.02 — SIGNIFICANT CHANGE UP (ref 1.01–1.02)
UROBILINOGEN FLD QL: 1
WBC # BLD: 5.3 K/UL — SIGNIFICANT CHANGE UP (ref 3.8–10.5)
WBC # FLD AUTO: 5.3 K/UL — SIGNIFICANT CHANGE UP (ref 3.8–10.5)

## 2021-10-23 RX ORDER — DOXAZOSIN MESYLATE 4 MG
4 TABLET ORAL AT BEDTIME
Refills: 0 | Status: DISCONTINUED | OUTPATIENT
Start: 2021-10-23 | End: 2021-10-23

## 2021-10-23 RX ORDER — DEXTROSE 50 % IN WATER 50 %
25 SYRINGE (ML) INTRAVENOUS ONCE
Refills: 0 | Status: DISCONTINUED | OUTPATIENT
Start: 2021-10-23 | End: 2021-11-10

## 2021-10-23 RX ORDER — DEXTROSE 50 % IN WATER 50 %
12.5 SYRINGE (ML) INTRAVENOUS ONCE
Refills: 0 | Status: DISCONTINUED | OUTPATIENT
Start: 2021-10-23 | End: 2021-11-10

## 2021-10-23 RX ORDER — DOXAZOSIN MESYLATE 4 MG
1 TABLET ORAL
Qty: 0 | Refills: 0 | DISCHARGE
Start: 2021-10-23

## 2021-10-23 RX ORDER — METHOCARBAMOL 500 MG/1
750 TABLET, FILM COATED ORAL EVERY 8 HOURS
Refills: 0 | Status: DISCONTINUED | OUTPATIENT
Start: 2021-10-23 | End: 2021-11-10

## 2021-10-23 RX ORDER — ASCORBIC ACID 60 MG
500 TABLET,CHEWABLE ORAL DAILY
Refills: 0 | Status: DISCONTINUED | OUTPATIENT
Start: 2021-10-23 | End: 2021-11-10

## 2021-10-23 RX ORDER — OXYCODONE HYDROCHLORIDE 5 MG/1
10 TABLET ORAL EVERY 4 HOURS
Refills: 0 | Status: DISCONTINUED | OUTPATIENT
Start: 2021-10-23 | End: 2021-10-29

## 2021-10-23 RX ORDER — SODIUM CHLORIDE 9 MG/ML
1000 INJECTION, SOLUTION INTRAVENOUS
Refills: 0 | Status: DISCONTINUED | OUTPATIENT
Start: 2021-10-23 | End: 2021-11-10

## 2021-10-23 RX ORDER — ACETAMINOPHEN 500 MG
650 TABLET ORAL EVERY 6 HOURS
Refills: 0 | Status: DISCONTINUED | OUTPATIENT
Start: 2021-10-23 | End: 2021-11-10

## 2021-10-23 RX ORDER — METHOCARBAMOL 500 MG/1
1 TABLET, FILM COATED ORAL
Qty: 0 | Refills: 0 | DISCHARGE
Start: 2021-10-23

## 2021-10-23 RX ORDER — DULOXETINE HYDROCHLORIDE 30 MG/1
30 CAPSULE, DELAYED RELEASE ORAL AT BEDTIME
Refills: 0 | Status: DISCONTINUED | OUTPATIENT
Start: 2021-10-23 | End: 2021-11-10

## 2021-10-23 RX ORDER — SENNA PLUS 8.6 MG/1
2 TABLET ORAL
Qty: 0 | Refills: 0 | DISCHARGE
Start: 2021-10-23

## 2021-10-23 RX ORDER — ASCORBIC ACID 60 MG
1 TABLET,CHEWABLE ORAL
Qty: 0 | Refills: 0 | DISCHARGE
Start: 2021-10-23

## 2021-10-23 RX ORDER — DOXAZOSIN MESYLATE 4 MG
4 TABLET ORAL AT BEDTIME
Refills: 0 | Status: DISCONTINUED | OUTPATIENT
Start: 2021-10-23 | End: 2021-10-29

## 2021-10-23 RX ORDER — ENOXAPARIN SODIUM 100 MG/ML
40 INJECTION SUBCUTANEOUS
Refills: 0 | Status: DISCONTINUED | OUTPATIENT
Start: 2021-10-23 | End: 2021-11-10

## 2021-10-23 RX ORDER — INSULIN LISPRO 100/ML
VIAL (ML) SUBCUTANEOUS AT BEDTIME
Refills: 0 | Status: DISCONTINUED | OUTPATIENT
Start: 2021-10-23 | End: 2021-10-24

## 2021-10-23 RX ORDER — SENNA PLUS 8.6 MG/1
2 TABLET ORAL EVERY 12 HOURS
Refills: 0 | Status: DISCONTINUED | OUTPATIENT
Start: 2021-10-23 | End: 2021-11-10

## 2021-10-23 RX ORDER — INFLUENZA VIRUS VACCINE 15; 15; 15; 15 UG/.5ML; UG/.5ML; UG/.5ML; UG/.5ML
0.5 SUSPENSION INTRAMUSCULAR ONCE
Refills: 0 | Status: COMPLETED | OUTPATIENT
Start: 2021-10-23 | End: 2021-10-23

## 2021-10-23 RX ORDER — POLYETHYLENE GLYCOL 3350 17 G/17G
17 POWDER, FOR SOLUTION ORAL
Refills: 0 | Status: DISCONTINUED | OUTPATIENT
Start: 2021-10-23 | End: 2021-11-03

## 2021-10-23 RX ORDER — LACTULOSE 10 G/15ML
15 SOLUTION ORAL
Qty: 0 | Refills: 0 | DISCHARGE
Start: 2021-10-23

## 2021-10-23 RX ORDER — GLUCAGON INJECTION, SOLUTION 0.5 MG/.1ML
1 INJECTION, SOLUTION SUBCUTANEOUS ONCE
Refills: 0 | Status: DISCONTINUED | OUTPATIENT
Start: 2021-10-23 | End: 2021-11-10

## 2021-10-23 RX ORDER — ONDANSETRON 8 MG/1
4 TABLET, FILM COATED ORAL EVERY 6 HOURS
Refills: 0 | Status: DISCONTINUED | OUTPATIENT
Start: 2021-10-23 | End: 2021-10-23

## 2021-10-23 RX ORDER — LACTULOSE 10 G/15ML
10 SOLUTION ORAL DAILY
Refills: 0 | Status: DISCONTINUED | OUTPATIENT
Start: 2021-10-23 | End: 2021-11-10

## 2021-10-23 RX ORDER — POLYETHYLENE GLYCOL 3350 17 G/17G
17 POWDER, FOR SOLUTION ORAL
Qty: 0 | Refills: 0 | DISCHARGE
Start: 2021-10-23

## 2021-10-23 RX ORDER — ACETAMINOPHEN 500 MG
2 TABLET ORAL
Qty: 0 | Refills: 0 | DISCHARGE
Start: 2021-10-23

## 2021-10-23 RX ORDER — DEXTROSE 50 % IN WATER 50 %
15 SYRINGE (ML) INTRAVENOUS ONCE
Refills: 0 | Status: DISCONTINUED | OUTPATIENT
Start: 2021-10-23 | End: 2021-11-10

## 2021-10-23 RX ORDER — PANTOPRAZOLE SODIUM 20 MG/1
1 TABLET, DELAYED RELEASE ORAL
Qty: 0 | Refills: 0 | DISCHARGE
Start: 2021-10-23

## 2021-10-23 RX ORDER — MESALAMINE 400 MG
2 TABLET, DELAYED RELEASE (ENTERIC COATED) ORAL
Qty: 0 | Refills: 0 | DISCHARGE

## 2021-10-23 RX ORDER — ENOXAPARIN SODIUM 100 MG/ML
0 INJECTION SUBCUTANEOUS
Qty: 0 | Refills: 0 | DISCHARGE
Start: 2021-10-23

## 2021-10-23 RX ORDER — INSULIN LISPRO 100/ML
VIAL (ML) SUBCUTANEOUS
Refills: 0 | Status: DISCONTINUED | OUTPATIENT
Start: 2021-10-23 | End: 2021-10-24

## 2021-10-23 RX ORDER — OXYCODONE HYDROCHLORIDE 5 MG/1
5 TABLET ORAL EVERY 4 HOURS
Refills: 0 | Status: DISCONTINUED | OUTPATIENT
Start: 2021-10-23 | End: 2021-10-29

## 2021-10-23 RX ORDER — GABAPENTIN 400 MG/1
600 CAPSULE ORAL EVERY 8 HOURS
Refills: 0 | Status: DISCONTINUED | OUTPATIENT
Start: 2021-10-23 | End: 2021-11-10

## 2021-10-23 RX ORDER — GABAPENTIN 400 MG/1
2 CAPSULE ORAL
Qty: 0 | Refills: 0 | DISCHARGE
Start: 2021-10-23

## 2021-10-23 RX ORDER — PANTOPRAZOLE SODIUM 20 MG/1
40 TABLET, DELAYED RELEASE ORAL
Refills: 0 | Status: DISCONTINUED | OUTPATIENT
Start: 2021-10-23 | End: 2021-11-10

## 2021-10-23 RX ORDER — ENOXAPARIN SODIUM 100 MG/ML
40 INJECTION SUBCUTANEOUS
Refills: 0 | Status: DISCONTINUED | OUTPATIENT
Start: 2021-10-23 | End: 2021-10-23

## 2021-10-23 RX ORDER — OXYCODONE HYDROCHLORIDE 5 MG/1
1 TABLET ORAL
Qty: 0 | Refills: 0 | DISCHARGE
Start: 2021-10-23

## 2021-10-23 RX ORDER — DULOXETINE HYDROCHLORIDE 30 MG/1
1 CAPSULE, DELAYED RELEASE ORAL
Qty: 0 | Refills: 0 | DISCHARGE
Start: 2021-10-23

## 2021-10-23 RX ADMIN — SENNA PLUS 2 TABLET(S): 8.6 TABLET ORAL at 18:23

## 2021-10-23 RX ADMIN — Medication 650 MILLIGRAM(S): at 09:15

## 2021-10-23 RX ADMIN — OXYCODONE HYDROCHLORIDE 10 MILLIGRAM(S): 5 TABLET ORAL at 13:52

## 2021-10-23 RX ADMIN — OXYCODONE HYDROCHLORIDE 10 MILLIGRAM(S): 5 TABLET ORAL at 02:06

## 2021-10-23 RX ADMIN — METHOCARBAMOL 750 MILLIGRAM(S): 500 TABLET, FILM COATED ORAL at 06:05

## 2021-10-23 RX ADMIN — POLYETHYLENE GLYCOL 3350 17 GRAM(S): 17 POWDER, FOR SOLUTION ORAL at 18:23

## 2021-10-23 RX ADMIN — Medication 4 MILLIGRAM(S): at 22:10

## 2021-10-23 RX ADMIN — Medication 650 MILLIGRAM(S): at 00:36

## 2021-10-23 RX ADMIN — METHOCARBAMOL 750 MILLIGRAM(S): 500 TABLET, FILM COATED ORAL at 22:10

## 2021-10-23 RX ADMIN — POLYETHYLENE GLYCOL 3350 17 GRAM(S): 17 POWDER, FOR SOLUTION ORAL at 06:06

## 2021-10-23 RX ADMIN — Medication 500 MILLIGRAM(S): at 12:25

## 2021-10-23 RX ADMIN — OXYCODONE HYDROCHLORIDE 10 MILLIGRAM(S): 5 TABLET ORAL at 20:15

## 2021-10-23 RX ADMIN — ENOXAPARIN SODIUM 40 MILLIGRAM(S): 100 INJECTION SUBCUTANEOUS at 18:23

## 2021-10-23 RX ADMIN — SENNA PLUS 2 TABLET(S): 8.6 TABLET ORAL at 06:05

## 2021-10-23 RX ADMIN — Medication 650 MILLIGRAM(S): at 02:27

## 2021-10-23 RX ADMIN — GABAPENTIN 600 MILLIGRAM(S): 400 CAPSULE ORAL at 22:10

## 2021-10-23 RX ADMIN — GABAPENTIN 600 MILLIGRAM(S): 400 CAPSULE ORAL at 13:52

## 2021-10-23 RX ADMIN — METHOCARBAMOL 750 MILLIGRAM(S): 500 TABLET, FILM COATED ORAL at 13:52

## 2021-10-23 RX ADMIN — PANTOPRAZOLE SODIUM 40 MILLIGRAM(S): 20 TABLET, DELAYED RELEASE ORAL at 06:05

## 2021-10-23 RX ADMIN — OXYCODONE HYDROCHLORIDE 10 MILLIGRAM(S): 5 TABLET ORAL at 02:28

## 2021-10-23 RX ADMIN — DULOXETINE HYDROCHLORIDE 30 MILLIGRAM(S): 30 CAPSULE, DELAYED RELEASE ORAL at 22:10

## 2021-10-23 RX ADMIN — GABAPENTIN 600 MILLIGRAM(S): 400 CAPSULE ORAL at 06:05

## 2021-10-23 RX ADMIN — Medication 1 TABLET(S): at 12:21

## 2021-10-23 RX ADMIN — OXYCODONE HYDROCHLORIDE 10 MILLIGRAM(S): 5 TABLET ORAL at 19:29

## 2021-10-23 NOTE — PATIENT PROFILE ADULT - TOBACCO USE
Never smoker Griseofulvin Pregnancy And Lactation Text: This medication is Pregnancy Category X and is known to cause serious birth defects. It is unknown if this medication is excreted in breast milk but breast feeding should be avoided.

## 2021-10-23 NOTE — H&P ADULT - NSHPPHYSICALEXAM_GEN_ALL_CORE
Vital Signs  T(C): 37 (10-23-21 @ 17:01), Max: 37 (10-23-21 @ 17:01)  HR: 88 (10-23-21 @ 17:01) (76 - 88)  BP: 154/103 (10-23-21 @ 17:01) (129/70 - 158/88)  RR: 16 (10-23-21 @ 17:01) (16 - 18)  SpO2: 100% (10-23-21 @ 17:01) (95% - 100%)    Gen - NAD, Comfortable  HEENT - NCAT, EOMI, MMM  Neck - Supple, No limited ROM  Pulm - CTAB, No wheeze, No rhonchi, No crackles  Cardiovascular - RRR, S1S2, No m/r/g  Abdomen - Soft, NT/ND, +BS  Extremities - No C/C/E, No calf tenderness  Neuro-     Cognitive - AAOx3     Communication - Fluent, No dysarthria     Attention: Intact      Judgement: Good evidence of judgement     Memory: Recall 3 objects immediate and 3 min later         Cranial Nerves - CN 2-12 intact     Motor -                    LEFT    UE - ShAB 5/5, EF 5/5, EE 5/5, WE 5/5,  5/5                    RIGHT UE - ShAB 5/5, EF 5/5, EE 5/5, WE 5/5,  5/5                    LEFT    LE - HF 5/5, KE 5/5, DF 5/5, PF 5/5                    RIGHT LE - HF 5/5, KE 5/5, DF 5/5, PF 5/5        Sensory - Intact to LT     Reflexes - DTR Intact, No primitive reflex     Coordination - FTN intact     Tone - normal  Psychiatric - Mood stable, Affect WNL  Skin: Intact  Wounds: None Present Vital Signs  T(C): 37 (10-23-21 @ 17:01), Max: 37 (10-23-21 @ 17:01)  HR: 88 (10-23-21 @ 17:01) (76 - 88)  BP: 154/103 (10-23-21 @ 17:01) (129/70 - 158/88)  RR: 16 (10-23-21 @ 17:01) (16 - 18)  SpO2: 100% (10-23-21 @ 17:01) (95% - 100%)    Gen - NAD, Comfortable  HEENT - NCAT, EOMI, MMM  Neck - Supple, No limited ROM  Pulm - CTAB, No wheeze, No rhonchi, No crackles  Cardiovascular - RRR, S1S2, No m/r/g  Abdomen - Soft, NT/ND, +BS  Extremities - No C/C/E, No calf tenderness  Neuro-     Cognitive - AAOx3     Communication - Fluent, No dysarthria     Attention: Intact      Judgement: Good evidence of judgement     Cranial Nerves - CN 2-12 intact     Motor -                    LEFT    UE - ShAB 5/5, EF 5/5, EE 5/5, WE 5/5,  5/5                    RIGHT UE - ShAB 5/5, EF 5/5, EE 5/5, WE 5/5,  5/5                    LEFT    LE - HF 5/5, KE 5/5, DF 5/5, PF 5/5                    RIGHT LE - HF 4/5, KE 2/5, DF 3/5, PF 4/5        Sensory - Intact to LT     Reflexes - DTR Intact     Tone - normal  Psychiatric - Mood stable, Affect WNL  Skin: spine incision c/d/i Vital Signs  T(C): 37 (10-23-21 @ 17:01), Max: 37 (10-23-21 @ 17:01)  HR: 88 (10-23-21 @ 17:01) (76 - 88)  BP: 154/103 (10-23-21 @ 17:01) (129/70 - 158/88)  RR: 16 (10-23-21 @ 17:01) (16 - 18)  SpO2: 100% (10-23-21 @ 17:01) (95% - 100%)    Gen - NAD, Comfortable  HEENT - NCAT, EOMI, MMM  Neck - Supple, No limited ROM  Pulm - CTAB, No wheeze, No rhonchi, No crackles  Cardiovascular - RRR, S1S2, No m/r/g  Abdomen - Soft, NT/ND, +BS  Extremities - No C/C/E, No calf tenderness  Neuro-     Cognitive - AAOx3     Communication - Fluent, No dysarthria     Attention: Intact      Judgement: Good evidence of judgement     Cranial Nerves - CN 2-12 intact     Motor -                    LEFT    UE - ShAB 5/5, EF 5/5, EE 5/5, WE 5/5,  5/5                    RIGHT UE - ShAB 5/5, EF 5/5, EE 5/5, WE 5/5,  5/5                    LEFT    LE - HF 5/5, KE 5/5, DF 5/5, PF 5/5                    RIGHT LE - HF 4/5, KE 2/5, DF 3/5, PF 4/5        Sensory - decreased sensation in the right leg     Reflexes - DTR Intact     Tone - normal  Psychiatric - Mood stable, Affect WNL  Skin: spine incision c/d/i

## 2021-10-23 NOTE — DISCHARGE NOTE PROVIDER - CARE PROVIDERS DIRECT ADDRESSES
,DirectAddress_Unknown,[email or web address]

## 2021-10-23 NOTE — DISCHARGE NOTE PROVIDER - NSDCMRMEDTOKEN_GEN_ALL_CORE_FT
acetaminophen 325 mg oral tablet: 2 tab(s) orally every 6 hours, As needed, Mild Pain (1 - 3)  ascorbic acid 500 mg oral tablet: 1 tab(s) orally once a day  doxazosin 4 mg oral tablet: 1 tab(s) orally once a day (at bedtime)  DULoxetine 30 mg oral delayed release capsule: 1 cap(s) orally once a day (at bedtime)  enoxaparin:   gabapentin 300 mg oral capsule: 2 cap(s) orally every 8 hours  lactulose 10 g/15 mL oral syrup: 15 milliliter(s) orally once a day, As needed, no BM&gt;1 day  methocarbamol 750 mg oral tablet: 1 tab(s) orally every 8 hours  Multiple Vitamins oral tablet: 1 tab(s) orally once a day  oxyCODONE 10 mg oral tablet: 1 tab(s) orally every 4 hours, As needed, Severe Pain (7 - 10)  pantoprazole 40 mg oral delayed release tablet: 1 tab(s) orally once a day (before a meal)  polyethylene glycol 3350 oral powder for reconstitution: 17 gram(s) orally 2 times a day  senna oral tablet: 2 tab(s) orally every 12 hours  simvastatin 10 mg oral tablet: tab(s) orally once a day

## 2021-10-23 NOTE — DISCHARGE NOTE PROVIDER - CARE PROVIDER_API CALL
Horacio Uriarte; PhD)  Neurosurgery  [address] [place] [address]  Phone: [phone]  Fax: [phone]  Follow Up Time:     Meng Gotti  UROLOGY  [address] [place] [address]  Phone: [phone]  Fax: [phone]  Follow Up Time:

## 2021-10-23 NOTE — H&P ADULT - NSHPLABSRESULTS_GEN_ALL_CORE
RECENT LABS                        10.1   5.30  )-----------( 202      ( 23 Oct 2021 11:27 )             28.5     10-23    129<L>  |  94<L>  |  8.8  ----------------------------<  119<H>  4.1   |  28.0  |  0.41<L>    Ca    8.3<L>      23 Oct 2021 11:27  Phos  2.9     10-23  Mg     2.0     10-23          CAPILLARY BLOOD GLUCOSE      POCT Blood Glucose.: 140 mg/dL (23 Oct 2021 12:18)  POCT Blood Glucose.: 127 mg/dL (23 Oct 2021 08:43)  POCT Blood Glucose.: 121 mg/dL (22 Oct 2021 22:26)      IMAGING    < from: MR Lumbar Spine w/wo IV Cont (10.06.21 @ 22:20) >    Impression:    1) Mixed signal intensity intradural, extramedullary lesion in the dorsal spinal canal extending from around T11 through the sacrum, with suggestion of T2 hypointense hemosiderin cap, most concerning for a hematoma comprised of blood of differing ages.    2) Diffuse subarachnoid hemorrhage throughout the thoracic spine.    3) Ventral epidural enhancement along the posterior clivus and upper cervical spine. Circumferential epidural enhancement throughout the cervical spine C1/C2-C7/T1. Subdural effusions and/or subdural hematomas in the posterior fossa along the cerebellar hemispheres and tentorial leaflets, and associated dural enhancement, with dural enhancement extending into the cervical spine, concerning for intracranial hypotension.    < end of copied text >    < from: MR Angio Head No Cont (10.08.21 @ 14:42) >    IMPRESSION:    MRI brain:  -Diffuse subarachnoid hemorrhage.  -No enhancing mass identified.    MRA BRAIN:  -No vaso-occlusive disease or vascular lesion identified.    < end of copied text >    < from: US Duplex Venous Lower Ext Complete, Bilateral (10.09.21 @ 12:04) >      IMPRESSION:  No evidence of deep venous thrombosis in either lower extremity.    < end of copied text >    < from: MR Lumbar Spine w/wo IV Cont (10.18.21 @ 09:07) >    IMPRESSION:    Compared to previous studies,    There are postsurgical changes related to T10-L2 laminectomy with interval removal of the epidural catheter as described.    Therehas been interval decrease in T2/STIR hyperintensity at T10-T11 consistent with interval improvement of distal cord edema. No new cord edema is seen.    Layering of ventral subdural fluid collection from T4 to T10 may represent residual subdural hematoma, conspicuous in this examination may be secondary to expected evolution of blood products now in the early subacute stage.    < end of copied text >

## 2021-10-23 NOTE — H&P ADULT - NSHPSOCIALHISTORY_GEN_ALL_CORE
SOCIAL HISTORY  Smoking - Denied  EtOH - Denied   Drugs - Denied    FUNCTIONAL HISTORY  Pt. lives with his wife who is retired and able to assist as needed. 5 NUNU with rail and 0 stairs inside. Having ramp installed.  But spouse is planning renovations for easier accessiblity. Pt was driving prior to admission. He has no medical equipment, but spouse is in process of getting ramp to enter, easily accessible bathroom/shower. Pt. was independent PTA and does not own DME.    CURRENT FUNCTIONAL STATUS  - Bed Mobility: modA  - Transfers: modA  - Gait: modA, 5' RW  - Eating: independent  - Grooming: supervision  - UBD: [place]  - LBD: maxA  - Toilet: modA

## 2021-10-23 NOTE — DISCHARGE NOTE PROVIDER - NSDCCPCAREPLAN_GEN_ALL_CORE_FT
PRINCIPAL DISCHARGE DIAGNOSIS  Diagnosis: Subdural hematoma  Assessment and Plan of Treatment: Intradurally in spine      SECONDARY DISCHARGE DIAGNOSES  Diagnosis: Lower back pain  Assessment and Plan of Treatment:

## 2021-10-23 NOTE — DISCHARGE NOTE PROVIDER - HOSPITAL COURSE
71 year old male with past medical history of hyperlipidemia, chronic back pain, presented to [facility] with acute onset back pain and progressively worsening bilateral lower extremity weakness 10/6/21. Urgent MR imaging of the spine revealed intradural extramedullary hematoma T12-L1  - 10/6/21 into 10/7/21 patient brought to the operating room for T10 through L2 laminectomy with hematoma evacuation. Estimated blood loss was 400 cc and 1 unit of platelets was given intraoperatively. Postoperatively patient was transferred to the neurosurgical intensive care unit with MAP goals greater than 80 for optimal spinal perfusion. Lower extremity weakness was improved immediately postoperatively although right lower extremity remained weaker than left lower extremity.   - 10/7/21 Spinal angiogram performed with neurointerventional radiology and no vascular abnormalities were found.   - 10/8/21--10/10/21 Remained stable in ICU and pain control was optimized. Repeat MRI of the brain and spine without abnormal enhancement or vascular abnormality. MRI brain found with diffuse subarachnoid hemorrhage, thought to be secondary to spinal hematoma. Failed trial of void, kate replaced   - 10/11/21 Epidural Eliseo Tapia drain discontinued. Lovenox started for DVT prophylaxis. Patient downgraded to step down unit to the neurosurgical service.  - 10/12/21 Stable  - 10/13/21 Downgraded to floor. Failed trial of void  - 10/14/21-10/15/21: stable  - 10/16/21 Urology evaluated for urinary retention- likely multifactorial due to limited mobilization, constipation, spinal cord compression from hematoma. Plan for outpatient follow up with Dr. Gotti for possible urodynamics  - 10/17/21-10/20/21 Repeat MRIs performed, found with no new cord edema, mild residual subdural fluid collection, no enhancement. Stable  - 10/21/21 Repeat spinal angiogram performed, without vascular abnormalities. Staples discontinued.       Patient evaluated the PT/OT and physiatry and is a candidate for acute rehab. Patient is stable for discharge.

## 2021-10-23 NOTE — H&P ADULT - ASSESSMENT
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 MRI brain showed diffuse SAH likely 2/2 spinal hematoma. Hospital course c/b urinary retention, failed TOV, with kate.      Intradural Extramedullary Hematoma T12-L1  - S/P T10-L2 laminectomy  - Start comprehensive rehab program, PT/OT 3 hours a day, 5 days a week.  - Continue Gabapentin 600mg Q8hr, Methocarbamol 750mg Q8hr, Oxycodone PRN for pain, Duloxetine  - Precautions: falls, spine  - F/u neurosurgery outpatient    #Neurogenic Bowel  - Continue Lactulose, Senna, Miralax    #Neurogenic Bladder  - Continue kate  - Continue Cardura    Skin/Pressure Injury:   - At risk for pressure injury due to neurologic diagnosis and relative immobility.  - Skin assessment on admission: ***  - Monitor Incisions: T12-L2 laminectomy  - Turn every 2 hours while in bed, air mattress  - Soft heel protectors  - Skin barrier cream as needed  - Nursing to monitor skin Qshift    DVT ppx:  - Lovenox  - SCDs  - Last Doppler on 10/9/21  ---------------  Outpatient Follow-up (Specialty/Name of physician):  Horacio Uriarte; PhD)  Neurosurgery  [address] [place] [address]  Phone: [phone]  Fax: [phone]  Follow Up Time:     Meng Gotti  UROLOGY  [address] [place] [address]  Phone: [phone]  Fax: [phone]  --------------  Goals: Safe discharge to home  Estimated Length of Stay: 10-14 days  Rehab Potential: Good  Medical Prognosis: Good  Estimated Disposition: Home with Home Care  ---------------    PRESCREEN COMPARISON:  I have reviewed the prescreen information and I have found no relevant changes between the preadmission screening and my post admission evaluation.    RATIONALE FOR INPATIENT ADMISSION: Patient demonstrates the following:  [X] Medically appropriate for rehabilitation admission  [X] Has attainable rehab goals with an appropriate initial discharge plan  [X]Has rehabilitation potential (expected to make a significant improvement within a reasonable period of time)  [X] Requires close medical management by a rehab physician, rehab nursing care, Hospitalist and comprehensive interdisciplinary team (including PT, OT and/or SLP, Prosthetics and Orthotics)       Assessment/Plan:  DORIE OROSCO is a 71y with PMHx hyperlipidemia, chronic back pain, presented to [facility] 10/6/21 with acute onset back pain and progressively worsening bilateral lower extremity weakness, 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 MRI brain showed diffuse SAH likely 2/2 spinal hematoma. Hospital course c/b urinary retention, failed TOV, with kate.      Intradural Extramedullary Hematoma T12-L1  - S/P T10-L2 laminectomy  - Start comprehensive rehab program, PT/OT 3 hours a day, 5 days a week.  - Continue Gabapentin 600mg Q8hr, Methocarbamol 750mg Q8hr, Oxycodone PRN for pain, Duloxetine  - Precautions: falls, spine  - Can consider decreasing FS and ISS if blood sugars remain well controlled. Patient has not been on Decadron since 10/12/21  - F/u neurosurgery outpatient    #Neurogenic Bowel  - Continue Lactulose, Senna, Miralax    #Neurogenic Bladder  - Continue kate  - Continue Cardura    Skin/Pressure Injury:   - At risk for pressure injury due to neurologic diagnosis and relative immobility.  - Monitor Incisions: T12-L2 laminectomy  - Turn every 2 hours while in bed, air mattress  - Soft heel protectors  - Skin barrier cream as needed  - Nursing to monitor skin Qshift    DVT ppx:  - Lovenox  - SCDs  - Last Doppler on 10/9/21  ---------------  Outpatient Follow-up (Specialty/Name of physician):  Horacio Uriarte; PhD)  Neurosurgery  [address] [place] [address]  Phone: [phone]  Fax: [phone]  Follow Up Time:     Meng Gotti  UROLOGY  [address] [place] [address]  Phone: [phone]  Fax: [phone]  --------------  Goals: Safe discharge to home  Estimated Length of Stay: 10-14 days  Rehab Potential: Good  Medical Prognosis: Good  Estimated Disposition: Home with Home Care  ---------------    PRESCREEN COMPARISON:  I have reviewed the prescreen information and I have found no relevant changes between the preadmission screening and my post admission evaluation.    RATIONALE FOR INPATIENT ADMISSION: Patient demonstrates the following:  [X] Medically appropriate for rehabilitation admission  [X] Has attainable rehab goals with an appropriate initial discharge plan  [X]Has rehabilitation potential (expected to make a significant improvement within a reasonable period of time)  [X] Requires close medical management by a rehab physician, rehab nursing care, Hospitalist and comprehensive interdisciplinary team (including PT, OT and/or SLP, Prosthetics and Orthotics)

## 2021-10-23 NOTE — DISCHARGE NOTE PROVIDER - NSDCCPTREATMENT_GEN_ALL_CORE_FT
PRINCIPAL PROCEDURE  Procedure: Laminectomy, spine, thoracolumbar, 3 or more levels  Findings and Treatment:

## 2021-10-23 NOTE — H&P ADULT - HISTORY OF PRESENT ILLNESS
71 year old male with past medical history of hyperlipidemia, chronic back pain, presented to [facility] 10/6/21 with acute onset back pain and progressively worsening bilateral lower extremity weakness. Urgent MR imaging of the spine revealed intradural extramedullary hematoma T12-L1, S/P T10-L2 laminectomy with 1U pRBCs given intraop 10/6/21. Spinal angiogram 10/7/21 showed no vascular abnormalities. MRI brain showed diffuse SAH thought to be secondary to spinal hematoma. Vazquez was placed for urinary retention, failed TOV x2. Epidural Eliseo Tapia drain was removed 10/11/21. Repeat MRI showed stable spine and repeat spinal angiogram was without vascular abnormalities. Patient is medically stable for discharge to  for AR 10/23/21. 71 year old male with past medical history of hyperlipidemia, chronic back pain, presented to [facility] 10/6/21 with acute onset back pain and progressively worsening bilateral lower extremity weakness. Urgent MR imaging of the spine revealed intradural extramedullary hematoma T12-L1, S/P T10-L2 laminectomy with 1U pRBCs given intraop 10/6/21. Spinal angiogram 10/7/21 showed no vascular abnormalities. MRI brain showed diffuse SAH thought to be secondary to spinal hematoma. Vazquez was placed for urinary retention, failed TOV x2. Epidural Eliseo Tapia drain was removed 10/11/21. Repeat MRI showed stable spine and repeat spinal angiogram was without vascular abnormalities. Spine sutures and right groin suture removed 10/22/21. Patient is medically stable for discharge to  for AR 10/23/21.

## 2021-10-23 NOTE — DISCHARGE NOTE NURSING/CASE MANAGEMENT/SOCIAL WORK - PATIENT PORTAL LINK FT
You can access the FollowMyHealth Patient Portal offered by [facility] by registering at the following website: [email or web address]. By joining [employer]’s FollowMyHealth portal, you will also be able to view your health information using other applications (apps) compatible with our system.

## 2021-10-23 NOTE — H&P ADULT - NSHPREVIEWOFSYSTEMS_GEN_ALL_CORE
REVIEW OF SYSTEMS  Constitutional: No fever, No Chills, No fatigue  HEENT: No eye pain, No visual disturbances, No difficulty hearing, No Dysphagia   Pulm: No cough,  No shortness of breath  Cardio: No chest pain, No palpitations  GI:  No abdominal pain, No nausea, No vomiting, No diarrhea, No constipation, No incontinence   : No dysuria, No frequency, No hematuria, No incontinence  Neuro: No headaches, No memory loss, +loss of strength, No numbness, No tremors  Skin: No itching, No rashes, No lesions   Endo: No temperature intolerance  MSK: +back pain  Psych:  No depression, No anxiety

## 2021-10-24 LAB
ALBUMIN SERPL ELPH-MCNC: 2.9 G/DL — LOW (ref 3.3–5)
ALP SERPL-CCNC: 63 U/L — SIGNIFICANT CHANGE UP (ref 40–120)
ALT FLD-CCNC: 28 U/L — SIGNIFICANT CHANGE UP (ref 10–45)
ANION GAP SERPL CALC-SCNC: 7 MMOL/L — SIGNIFICANT CHANGE UP (ref 5–17)
AST SERPL-CCNC: 11 U/L — SIGNIFICANT CHANGE UP (ref 10–40)
BASOPHILS # BLD AUTO: 0.03 K/UL — SIGNIFICANT CHANGE UP (ref 0–0.2)
BASOPHILS NFR BLD AUTO: 0.5 % — SIGNIFICANT CHANGE UP (ref 0–2)
BILIRUB SERPL-MCNC: 1 MG/DL — SIGNIFICANT CHANGE UP (ref 0.2–1.2)
BUN SERPL-MCNC: 13 MG/DL — SIGNIFICANT CHANGE UP (ref 7–23)
CALCIUM SERPL-MCNC: 8.7 MG/DL — SIGNIFICANT CHANGE UP (ref 8.4–10.5)
CHLORIDE SERPL-SCNC: 96 MMOL/L — SIGNIFICANT CHANGE UP (ref 96–108)
CO2 SERPL-SCNC: 28 MMOL/L — SIGNIFICANT CHANGE UP (ref 22–31)
COVID-19 SPIKE DOMAIN AB INTERP: POSITIVE
COVID-19 SPIKE DOMAIN ANTIBODY RESULT: >250 U/ML — HIGH
CREAT SERPL-MCNC: 0.71 MG/DL — SIGNIFICANT CHANGE UP (ref 0.5–1.3)
EOSINOPHIL # BLD AUTO: 0.06 K/UL — SIGNIFICANT CHANGE UP (ref 0–0.5)
EOSINOPHIL NFR BLD AUTO: 1 % — SIGNIFICANT CHANGE UP (ref 0–6)
GLUCOSE BLDC GLUCOMTR-MCNC: 118 MG/DL — HIGH (ref 70–99)
GLUCOSE BLDC GLUCOMTR-MCNC: 128 MG/DL — HIGH (ref 70–99)
GLUCOSE SERPL-MCNC: 126 MG/DL — HIGH (ref 70–99)
HCT VFR BLD CALC: 31.8 % — LOW (ref 39–50)
HGB BLD-MCNC: 10.8 G/DL — LOW (ref 13–17)
IMM GRANULOCYTES NFR BLD AUTO: 0.7 % — SIGNIFICANT CHANGE UP (ref 0–1.5)
LYMPHOCYTES # BLD AUTO: 0.62 K/UL — LOW (ref 1–3.3)
LYMPHOCYTES # BLD AUTO: 10.6 % — LOW (ref 13–44)
MCHC RBC-ENTMCNC: 30.5 PG — SIGNIFICANT CHANGE UP (ref 27–34)
MCHC RBC-ENTMCNC: 34 GM/DL — SIGNIFICANT CHANGE UP (ref 32–36)
MCV RBC AUTO: 89.8 FL — SIGNIFICANT CHANGE UP (ref 80–100)
MONOCYTES # BLD AUTO: 0.5 K/UL — SIGNIFICANT CHANGE UP (ref 0–0.9)
MONOCYTES NFR BLD AUTO: 8.5 % — SIGNIFICANT CHANGE UP (ref 2–14)
NEUTROPHILS # BLD AUTO: 4.61 K/UL — SIGNIFICANT CHANGE UP (ref 1.8–7.4)
NEUTROPHILS NFR BLD AUTO: 78.7 % — HIGH (ref 43–77)
NRBC # BLD: 0 /100 WBCS — SIGNIFICANT CHANGE UP (ref 0–0)
PLATELET # BLD AUTO: 220 K/UL — SIGNIFICANT CHANGE UP (ref 150–400)
POTASSIUM SERPL-MCNC: 4.2 MMOL/L — SIGNIFICANT CHANGE UP (ref 3.5–5.3)
POTASSIUM SERPL-SCNC: 4.2 MMOL/L — SIGNIFICANT CHANGE UP (ref 3.5–5.3)
PROT SERPL-MCNC: 6.4 G/DL — SIGNIFICANT CHANGE UP (ref 6–8.3)
RBC # BLD: 3.54 M/UL — LOW (ref 4.2–5.8)
RBC # FLD: 13.3 % — SIGNIFICANT CHANGE UP (ref 10.3–14.5)
SARS-COV-2 IGG+IGM SERPL QL IA: >250 U/ML — HIGH
SARS-COV-2 IGG+IGM SERPL QL IA: POSITIVE
SARS-COV-2 RNA SPEC QL NAA+PROBE: SIGNIFICANT CHANGE UP
SODIUM SERPL-SCNC: 131 MMOL/L — LOW (ref 135–145)
WBC # BLD: 5.86 K/UL — SIGNIFICANT CHANGE UP (ref 3.8–10.5)
WBC # FLD AUTO: 5.86 K/UL — SIGNIFICANT CHANGE UP (ref 3.8–10.5)

## 2021-10-24 PROCEDURE — 99232 SBSQ HOSP IP/OBS MODERATE 35: CPT

## 2021-10-24 PROCEDURE — 99222 1ST HOSP IP/OBS MODERATE 55: CPT

## 2021-10-24 RX ORDER — ONDANSETRON 8 MG/1
4 TABLET, FILM COATED ORAL EVERY 8 HOURS
Refills: 0 | Status: DISCONTINUED | OUTPATIENT
Start: 2021-10-24 | End: 2021-11-10

## 2021-10-24 RX ADMIN — SENNA PLUS 2 TABLET(S): 8.6 TABLET ORAL at 05:39

## 2021-10-24 RX ADMIN — Medication 500 MILLIGRAM(S): at 11:35

## 2021-10-24 RX ADMIN — Medication 1 TABLET(S): at 11:35

## 2021-10-24 RX ADMIN — METHOCARBAMOL 750 MILLIGRAM(S): 500 TABLET, FILM COATED ORAL at 21:12

## 2021-10-24 RX ADMIN — PANTOPRAZOLE SODIUM 40 MILLIGRAM(S): 20 TABLET, DELAYED RELEASE ORAL at 05:39

## 2021-10-24 RX ADMIN — OXYCODONE HYDROCHLORIDE 5 MILLIGRAM(S): 5 TABLET ORAL at 22:00

## 2021-10-24 RX ADMIN — POLYETHYLENE GLYCOL 3350 17 GRAM(S): 17 POWDER, FOR SOLUTION ORAL at 17:33

## 2021-10-24 RX ADMIN — SENNA PLUS 2 TABLET(S): 8.6 TABLET ORAL at 21:12

## 2021-10-24 RX ADMIN — Medication 650 MILLIGRAM(S): at 14:43

## 2021-10-24 RX ADMIN — OXYCODONE HYDROCHLORIDE 10 MILLIGRAM(S): 5 TABLET ORAL at 02:56

## 2021-10-24 RX ADMIN — GABAPENTIN 600 MILLIGRAM(S): 400 CAPSULE ORAL at 13:53

## 2021-10-24 RX ADMIN — OXYCODONE HYDROCHLORIDE 10 MILLIGRAM(S): 5 TABLET ORAL at 03:50

## 2021-10-24 RX ADMIN — OXYCODONE HYDROCHLORIDE 5 MILLIGRAM(S): 5 TABLET ORAL at 21:12

## 2021-10-24 RX ADMIN — GABAPENTIN 600 MILLIGRAM(S): 400 CAPSULE ORAL at 05:39

## 2021-10-24 RX ADMIN — POLYETHYLENE GLYCOL 3350 17 GRAM(S): 17 POWDER, FOR SOLUTION ORAL at 05:39

## 2021-10-24 RX ADMIN — DULOXETINE HYDROCHLORIDE 30 MILLIGRAM(S): 30 CAPSULE, DELAYED RELEASE ORAL at 21:12

## 2021-10-24 RX ADMIN — GABAPENTIN 600 MILLIGRAM(S): 400 CAPSULE ORAL at 21:12

## 2021-10-24 RX ADMIN — ONDANSETRON 4 MILLIGRAM(S): 8 TABLET, FILM COATED ORAL at 17:33

## 2021-10-24 RX ADMIN — Medication 4 MILLIGRAM(S): at 21:12

## 2021-10-24 RX ADMIN — METHOCARBAMOL 750 MILLIGRAM(S): 500 TABLET, FILM COATED ORAL at 13:53

## 2021-10-24 RX ADMIN — Medication 650 MILLIGRAM(S): at 15:10

## 2021-10-24 RX ADMIN — METHOCARBAMOL 750 MILLIGRAM(S): 500 TABLET, FILM COATED ORAL at 05:39

## 2021-10-24 RX ADMIN — ENOXAPARIN SODIUM 40 MILLIGRAM(S): 100 INJECTION SUBCUTANEOUS at 17:33

## 2021-10-24 NOTE — CONSULT NOTE ADULT - SUBJECTIVE AND OBJECTIVE BOX
HPI:  71 year old male with past medical history of hyperlipidemia, chronic back pain, presented to [facility] 10/6/21 with acute onset back pain and progressively worsening bilateral lower extremity weakness. Urgent MR imaging of the spine revealed intradural extramedullary hematoma T12-L1, S/P T10-L2 laminectomy with 1U pRBCs given intraop 10/6/21. Spinal angiogram 10/7/21 showed no vascular abnormalities. MRI brain showed diffuse SAH thought to be secondary to spinal hematoma. Vazquez was placed for urinary retention, failed TOV x2. Epidural Eliseo Tapia drain was removed 10/11/21. Repeat MRI showed stable spine and repeat spinal angiogram was without vascular abnormalities. Spine sutures and right groin suture removed 10/22/21. Patient is medically stable for discharge to  for AR 10/23/21. (23 Oct 2021 17:44)    CC: No events overnight.     PAST MEDICAL & SURGICAL HISTORY:  High cholesterol    Colitis    Vertigo        FAMILY HISTORY:  Non pertinent   SOCIAL HISTORY  Smoking - Denied  EtOH - Denied   Drugs - Denied      MEDICATIONS  (STANDING):  ascorbic acid 500 milliGRAM(s) Oral daily  dextrose 40% Gel 15 Gram(s) Oral once  dextrose 5%. 1000 milliLiter(s) (50 mL/Hr) IV Continuous <Continuous>  dextrose 5%. 1000 milliLiter(s) (100 mL/Hr) IV Continuous <Continuous>  dextrose 50% Injectable 25 Gram(s) IV Push once  dextrose 50% Injectable 12.5 Gram(s) IV Push once  dextrose 50% Injectable 25 Gram(s) IV Push once  doxazosin 4 milliGRAM(s) Oral at bedtime  DULoxetine 30 milliGRAM(s) Oral at bedtime  enoxaparin Injectable 40 milliGRAM(s) SubCutaneous <User Schedule>  gabapentin 600 milliGRAM(s) Oral every 8 hours  glucagon  Injectable 1 milliGRAM(s) IntraMuscular once  influenza   Vaccine 0.5 milliLiter(s) IntraMuscular once  insulin lispro (ADMELOG) corrective regimen sliding scale   SubCutaneous three times a day before meals  insulin lispro (ADMELOG) corrective regimen sliding scale   SubCutaneous at bedtime  methocarbamol 750 milliGRAM(s) Oral every 8 hours  multivitamin 1 Tablet(s) Oral daily  pantoprazole    Tablet 40 milliGRAM(s) Oral before breakfast  polyethylene glycol 3350 17 Gram(s) Oral two times a day  senna 2 Tablet(s) Oral every 12 hours    MEDICATIONS  (PRN):  acetaminophen     Tablet .. 650 milliGRAM(s) Oral every 6 hours PRN Mild Pain (1 - 3)  lactulose Syrup 10 Gram(s) Oral daily PRN no BM>1 day  oxyCODONE    IR 5 milliGRAM(s) Oral every 4 hours PRN Moderate Pain (4 - 6)  oxyCODONE    IR 10 milliGRAM(s) Oral every 4 hours PRN Severe Pain (7 - 10)    ROS:  Constitutional: No fever, No Chills, No fatigue  HEENT: No eye pain, No visual disturbances, No difficulty hearing, No Dysphagia   Pulm: No cough,  No shortness of breath  Cardio: No chest pain, No palpitations  GI:  No abdominal pain, No nausea, No vomiting, No diarrhea, No constipation, No incontinence   : No dysuria, No frequency, No hematuria, No incontinence  Neuro: No headaches, No memory loss, +loss of strength, No numbness, No tremors  Skin: No itching, No rashes, No lesions   Endo: No temperature intolerance  MSK: +back pain  Psych:  No depression, No anxiety    Vital Signs Last 24 Hrs  T(C): 36.6 (24 Oct 2021 07:51), Max: 37.2 (23 Oct 2021 20:10)  T(F): 97.9 (24 Oct 2021 07:51), Max: 99 (23 Oct 2021 20:10)  HR: 88 (24 Oct 2021 07:51) (84 - 92)  BP: 159/86 (24 Oct 2021 07:51) (145/62 - 159/86)  BP(mean): --  RR: 16 (24 Oct 2021 07:51) (15 - 16)  SpO2: 97% (24 Oct 2021 07:51) (96% - 100%)    PHYSICAL EXAM:  GENERAL: NAD  NECK: Supple  NERVOUS SYSTEM:  awake  HEART: S1s2 NL , RRR  CHEST/LUNG: Clear to percussion bilaterally  ABDOMEN: Soft, Nontender, Nondistended; Bowel sounds present  EXTREMITIES:  No edema    CBC Full  -  ( 24 Oct 2021 05:11 )  WBC Count : 5.86 K/uL  RBC Count : 3.54 M/uL  Hemoglobin : 10.8 g/dL  Hematocrit : 31.8 %  Platelet Count - Automated : 220 K/uL  Mean Cell Volume : 89.8 fl  Mean Cell Hemoglobin : 30.5 pg  Mean Cell Hemoglobin Concentration : 34.0 gm/dL  Auto Neutrophil # : 4.61 K/uL  Auto Lymphocyte # : 0.62 K/uL  Auto Monocyte # : 0.50 K/uL  Auto Eosinophil # : 0.06 K/uL  Auto Basophil # : 0.03 K/uL  Auto Neutrophil % : 78.7 %  Auto Lymphocyte % : 10.6 %  Auto Monocyte % : 8.5 %  Auto Eosinophil % : 1.0 %  Auto Basophil % : 0.5 %    10-24    131<L>  |  96  |  13  ----------------------------<  126<H>  4.2   |  28  |  0.71    Ca    8.7      24 Oct 2021 05:11  Phos  2.9     10-23  Mg     2.0     10-23    TPro  6.4  /  Alb  2.9<L>  /  TBili  1.0  /  DBili  x   /  AST  11  /  ALT  28  /  AlkPhos  63  10-24    CAPILLARY BLOOD GLUCOSE      POCT Blood Glucose.: 118 mg/dL (24 Oct 2021 11:32)  POCT Blood Glucose.: 128 mg/dL (24 Oct 2021 07:38)  POCT Blood Glucose.: 106 mg/dL (23 Oct 2021 22:09)    LIVER FUNCTIONS - ( 24 Oct 2021 05:11 )  Alb: 2.9 g/dL / Pro: 6.4 g/dL / ALK PHOS: 63 U/L / ALT: 28 U/L / AST: 11 U/L / GGT: x             Urinalysis Basic - ( 23 Oct 2021 19:15 )    Color: Yellow / Appearance: Clear / S.020 / pH: x  Gluc: x / Ketone: Negative  / Bili: Negative / Urobili: 1   Blood: x / Protein: 15 / Nitrite: Negative   Leuk Esterase: Small / RBC: 26-50 /HPF / WBC 6-10 /HPF   Sq Epi: x / Non Sq Epi: Neg.-Few / Bacteria: Many /HPF        HEALTH ISSUES - PROBLEM Dx:

## 2021-10-24 NOTE — PROGRESS NOTE ADULT - SUBJECTIVE AND OBJECTIVE BOX
Pt. seen and examined at bedside.  No overnight events.      REVIEW OF SYSTEMS  Constitutional - No fever,  No fatigue  Neurological - No headaches, ++ loss of strength  Musculoskeletal - No joint pain, No joint swelling, ++ muscle pain    VITALS  T(C): 36.6 (10-24-21 @ 07:51), Max: 37.2 (10-23-21 @ 20:10)  HR: 88 (10-24-21 @ 07:51) (84 - 92)  BP: 159/86 (10-24-21 @ 07:51) (145/62 - 159/86)  RR: 16 (10-24-21 @ 07:51) (15 - 18)  SpO2: 97% (10-24-21 @ 07:51) (95% - 100%)  Wt(kg): --       MEDICATIONS   acetaminophen     Tablet .. 650 milliGRAM(s) every 6 hours PRN  ascorbic acid 500 milliGRAM(s) daily  dextrose 40% Gel 15 Gram(s) once  dextrose 5%. 1000 milliLiter(s) <Continuous>  dextrose 5%. 1000 milliLiter(s) <Continuous>  dextrose 50% Injectable 25 Gram(s) once  dextrose 50% Injectable 12.5 Gram(s) once  dextrose 50% Injectable 25 Gram(s) once  doxazosin 4 milliGRAM(s) at bedtime  DULoxetine 30 milliGRAM(s) at bedtime  enoxaparin Injectable 40 milliGRAM(s) <User Schedule>  gabapentin 600 milliGRAM(s) every 8 hours  glucagon  Injectable 1 milliGRAM(s) once  influenza   Vaccine 0.5 milliLiter(s) once  insulin lispro (ADMELOG) corrective regimen sliding scale   three times a day before meals  insulin lispro (ADMELOG) corrective regimen sliding scale   at bedtime  lactulose Syrup 10 Gram(s) daily PRN  methocarbamol 750 milliGRAM(s) every 8 hours  multivitamin 1 Tablet(s) daily  oxyCODONE    IR 5 milliGRAM(s) every 4 hours PRN  oxyCODONE    IR 10 milliGRAM(s) every 4 hours PRN  pantoprazole    Tablet 40 milliGRAM(s) before breakfast  polyethylene glycol 3350 17 Gram(s) two times a day  senna 2 Tablet(s) every 12 hours      RECENT LABS/IMAGING                        10.8   5.86  )-----------( 220      ( 24 Oct 2021 05:11 )             31.8     10    131<L>  |  96  |  13  ----------------------------<  126<H>  4.2   |  28  |  0.71    Ca    8.7      24 Oct 2021 05:11  Phos  2.9     10-23  Mg     2.0     10-23    TPro  6.4  /  Alb  2.9<L>  /  TBili  1.0  /  DBili  x   /  AST  11  /  ALT  28  /  AlkPhos  63  10-24      Urinalysis Basic - ( 23 Oct 2021 19:15 )    Color: Yellow / Appearance: Clear / S.020 / pH: x  Gluc: x / Ketone: Negative  / Bili: Negative / Urobili: 1   Blood: x / Protein: 15 / Nitrite: Negative   Leuk Esterase: Small / RBC: 26-50 /HPF / WBC 6-10 /HPF   Sq Epi: x / Non Sq Epi: Neg.-Few / Bacteria: Many /HPF              POCT Blood Glucose.: 128 mg/dL (10-24-21 @ 07:38)  POCT Blood Glucose.: 106 mg/dL (10-23-21 @ 22:09)  POCT Blood Glucose.: 140 mg/dL (10-23-21 @ 12:18)    ---------  PHYSICAL EXAM  Constitutional - NAD, Comfortable  Pulm - Breathing comfortably, No wheezing  Abd - Soft, NTND  Extremities - No edema, No calf tenderness  Neurologic Exam -                    Cognitive - Awake, Alert     Communication - Fluent     Motor - RIGHT LEG WEAK     Sensory - Intact to LT  Psychiatric - Mood WNL, Affect WNL    ASSESSMENT/PLAN  71y Male with functional deficits s/p INTRADURAL/EXTRAMEDULLARY HEMATOMA EVACUATION -> RIGHT LE WEAKNESS  Continue current medical management  Pain - Tylenol PRN; DULOXETINE; GABAPENTIN; OXYCODONE; METHOCARBAMOL  DVT PPX - enoxaparin Injectable 40 milliGRAM(s)  Active issues - LABS REVIEWED  Continue 3hrs a day of comprehensive rehab program.

## 2021-10-25 LAB
ALBUMIN SERPL ELPH-MCNC: 2.9 G/DL — LOW (ref 3.3–5)
ALP SERPL-CCNC: 68 U/L — SIGNIFICANT CHANGE UP (ref 40–120)
ALT FLD-CCNC: 29 U/L — SIGNIFICANT CHANGE UP (ref 10–45)
ANION GAP SERPL CALC-SCNC: 7 MMOL/L — SIGNIFICANT CHANGE UP (ref 5–17)
AST SERPL-CCNC: 13 U/L — SIGNIFICANT CHANGE UP (ref 10–40)
BASOPHILS # BLD AUTO: 0.01 K/UL — SIGNIFICANT CHANGE UP (ref 0–0.2)
BASOPHILS NFR BLD AUTO: 0.2 % — SIGNIFICANT CHANGE UP (ref 0–2)
BILIRUB SERPL-MCNC: 1.3 MG/DL — HIGH (ref 0.2–1.2)
BUN SERPL-MCNC: 17 MG/DL — SIGNIFICANT CHANGE UP (ref 7–23)
CALCIUM SERPL-MCNC: 8.8 MG/DL — SIGNIFICANT CHANGE UP (ref 8.4–10.5)
CHLORIDE SERPL-SCNC: 96 MMOL/L — SIGNIFICANT CHANGE UP (ref 96–108)
CO2 SERPL-SCNC: 29 MMOL/L — SIGNIFICANT CHANGE UP (ref 22–31)
CREAT SERPL-MCNC: 0.64 MG/DL — SIGNIFICANT CHANGE UP (ref 0.5–1.3)
EOSINOPHIL # BLD AUTO: 0.06 K/UL — SIGNIFICANT CHANGE UP (ref 0–0.5)
EOSINOPHIL NFR BLD AUTO: 0.9 % — SIGNIFICANT CHANGE UP (ref 0–6)
GLUCOSE SERPL-MCNC: 116 MG/DL — HIGH (ref 70–99)
HCT VFR BLD CALC: 31.4 % — LOW (ref 39–50)
HGB BLD-MCNC: 10.7 G/DL — LOW (ref 13–17)
IMM GRANULOCYTES NFR BLD AUTO: 0.5 % — SIGNIFICANT CHANGE UP (ref 0–1.5)
LYMPHOCYTES # BLD AUTO: 0.5 K/UL — LOW (ref 1–3.3)
LYMPHOCYTES # BLD AUTO: 7.8 % — LOW (ref 13–44)
MCHC RBC-ENTMCNC: 30.9 PG — SIGNIFICANT CHANGE UP (ref 27–34)
MCHC RBC-ENTMCNC: 34.1 GM/DL — SIGNIFICANT CHANGE UP (ref 32–36)
MCV RBC AUTO: 90.8 FL — SIGNIFICANT CHANGE UP (ref 80–100)
MONOCYTES # BLD AUTO: 0.47 K/UL — SIGNIFICANT CHANGE UP (ref 0–0.9)
MONOCYTES NFR BLD AUTO: 7.3 % — SIGNIFICANT CHANGE UP (ref 2–14)
NEUTROPHILS # BLD AUTO: 5.33 K/UL — SIGNIFICANT CHANGE UP (ref 1.8–7.4)
NEUTROPHILS NFR BLD AUTO: 83.3 % — HIGH (ref 43–77)
NRBC # BLD: 0 /100 WBCS — SIGNIFICANT CHANGE UP (ref 0–0)
PLATELET # BLD AUTO: 199 K/UL — SIGNIFICANT CHANGE UP (ref 150–400)
POTASSIUM SERPL-MCNC: 4.1 MMOL/L — SIGNIFICANT CHANGE UP (ref 3.5–5.3)
POTASSIUM SERPL-SCNC: 4.1 MMOL/L — SIGNIFICANT CHANGE UP (ref 3.5–5.3)
PROT SERPL-MCNC: 6.3 G/DL — SIGNIFICANT CHANGE UP (ref 6–8.3)
RBC # BLD: 3.46 M/UL — LOW (ref 4.2–5.8)
RBC # FLD: 13.9 % — SIGNIFICANT CHANGE UP (ref 10.3–14.5)
SODIUM SERPL-SCNC: 132 MMOL/L — LOW (ref 135–145)
WBC # BLD: 6.4 K/UL — SIGNIFICANT CHANGE UP (ref 3.8–10.5)
WBC # FLD AUTO: 6.4 K/UL — SIGNIFICANT CHANGE UP (ref 3.8–10.5)

## 2021-10-25 PROCEDURE — 99232 SBSQ HOSP IP/OBS MODERATE 35: CPT | Mod: GC

## 2021-10-25 PROCEDURE — 99232 SBSQ HOSP IP/OBS MODERATE 35: CPT

## 2021-10-25 RX ORDER — LANOLIN ALCOHOL/MO/W.PET/CERES
6 CREAM (GRAM) TOPICAL AT BEDTIME
Refills: 0 | Status: DISCONTINUED | OUTPATIENT
Start: 2021-10-25 | End: 2021-11-10

## 2021-10-25 RX ADMIN — Medication 650 MILLIGRAM(S): at 02:01

## 2021-10-25 RX ADMIN — OXYCODONE HYDROCHLORIDE 10 MILLIGRAM(S): 5 TABLET ORAL at 07:25

## 2021-10-25 RX ADMIN — Medication 500 MILLIGRAM(S): at 12:11

## 2021-10-25 RX ADMIN — GABAPENTIN 600 MILLIGRAM(S): 400 CAPSULE ORAL at 05:23

## 2021-10-25 RX ADMIN — DULOXETINE HYDROCHLORIDE 30 MILLIGRAM(S): 30 CAPSULE, DELAYED RELEASE ORAL at 21:40

## 2021-10-25 RX ADMIN — Medication 4 MILLIGRAM(S): at 21:40

## 2021-10-25 RX ADMIN — GABAPENTIN 600 MILLIGRAM(S): 400 CAPSULE ORAL at 21:41

## 2021-10-25 RX ADMIN — Medication 650 MILLIGRAM(S): at 16:17

## 2021-10-25 RX ADMIN — POLYETHYLENE GLYCOL 3350 17 GRAM(S): 17 POWDER, FOR SOLUTION ORAL at 05:23

## 2021-10-25 RX ADMIN — SENNA PLUS 2 TABLET(S): 8.6 TABLET ORAL at 05:23

## 2021-10-25 RX ADMIN — OXYCODONE HYDROCHLORIDE 10 MILLIGRAM(S): 5 TABLET ORAL at 08:30

## 2021-10-25 RX ADMIN — METHOCARBAMOL 750 MILLIGRAM(S): 500 TABLET, FILM COATED ORAL at 13:17

## 2021-10-25 RX ADMIN — ONDANSETRON 4 MILLIGRAM(S): 8 TABLET, FILM COATED ORAL at 16:13

## 2021-10-25 RX ADMIN — PANTOPRAZOLE SODIUM 40 MILLIGRAM(S): 20 TABLET, DELAYED RELEASE ORAL at 05:23

## 2021-10-25 RX ADMIN — Medication 650 MILLIGRAM(S): at 03:00

## 2021-10-25 RX ADMIN — Medication 1 TABLET(S): at 12:11

## 2021-10-25 RX ADMIN — METHOCARBAMOL 750 MILLIGRAM(S): 500 TABLET, FILM COATED ORAL at 21:40

## 2021-10-25 RX ADMIN — ENOXAPARIN SODIUM 40 MILLIGRAM(S): 100 INJECTION SUBCUTANEOUS at 17:56

## 2021-10-25 RX ADMIN — METHOCARBAMOL 750 MILLIGRAM(S): 500 TABLET, FILM COATED ORAL at 05:23

## 2021-10-25 RX ADMIN — GABAPENTIN 600 MILLIGRAM(S): 400 CAPSULE ORAL at 13:17

## 2021-10-25 NOTE — PROGRESS NOTE ADULT - SUBJECTIVE AND OBJECTIVE BOX
CHIEF COMPLAINT:    Patient is a 71y old  Male who presents with a chief complaint of B/L LE weakness after intradural extramedullary hematoma S/P evacuation (25 Oct 2021 08:09)    HPI:  71 year old male with past medical history of hyperlipidemia, chronic back pain, presented to [facility] 10/6/21 with acute onset back pain and progressively worsening bilateral lower extremity weakness. Urgent MR imaging of the spine revealed intradural extramedullary hematoma T12-L1, S/P T10-L2 laminectomy with 1U pRBCs given intraop 10/6/21. Spinal angiogram 10/7/21 showed no vascular abnormalities. MRI brain showed diffuse SAH thought to be secondary to spinal hematoma. Vazquez was placed for urinary retention, failed TOV x2. Epidural Eliseo Tapia drain was removed 10/11/21. Repeat MRI showed stable spine and repeat spinal angiogram was without vascular abnormalities. Spine sutures and right groin suture removed 10/22/21. Patient is medically stable for discharge to  for AR 10/23/21. (23 Oct 2021 17:44)      PAST MEDICAL & SURGICAL HISTORY:  High cholesterol    Colitis    Vertigo        Allergies    Celebrex (Other)    Intolerances        PHYSICAL EXAM  71y  Vital Signs Last 24 Hrs  T(C): 37.2 (24 Oct 2021 20:49), Max: 37.2 (24 Oct 2021 20:49)  T(F): 98.9 (24 Oct 2021 20:49), Max: 98.9 (24 Oct 2021 20:49)  HR: 92 (25 Oct 2021 07:26) (84 - 92)  BP: 107/85 (25 Oct 2021 07:26) (107/85 - 153/90)  BP(mean): --  RR: 14 (25 Oct 2021 07:26) (14 - 15)  SpO2: 97% (25 Oct 2021 07:26) (97% - 98%)  Daily       Gen - NAD, Comfortable  HEENT - NCAT, EOMI, MMM  Neck - Supple, No limited ROM  Pulm - CTAB, No wheeze, No rhonchi, No crackles  Cardiovascular - RRR, S1S2, No m/r/g  Abdomen - Soft, NT/ND, +BS  Extremities - No C/C/E, No calf tenderness  Neuro     Motor -                    LEFT    UE - ShAB 5/5, EF 5/5, EE 5/5, WE 5/5,  5/5                    RIGHT UE - ShAB 5/5, EF 5/5, EE 5/5, WE 5/5,  5/5                    LEFT    LE - HF 5/5, KE 5/5, DF 5/5, PF 5/5                    RIGHT LE - HF 4/5, KE 2/5, DF 3/5, PF 4/5        Sensory - decreased sensation/tingling in the right leg     Reflexes - DTR Intact  Psychiatric - Mood stable, Affect WNL  Skin: spine incision c/d/i    ---------------------------------------------------------------------------------------------------------------------    RECENT LABS:                          10.7   6.40  )-----------( 199      ( 25 Oct 2021 07:39 )             31.4     10-25    132<L>  |  96  |  17  ----------------------------<  116<H>  4.1   |  29  |  0.64    Ca    8.8      25 Oct 2021 07:39  Phos  2.9     10-23  Mg     2.0     10-23    TPro  6.3  /  Alb  2.9<L>  /  TBili  1.3<H>  /  DBili  x   /  AST  13  /  ALT  29  /  AlkPhos  68  10-25    LIVER FUNCTIONS - ( 25 Oct 2021 07:39 )  Alb: 2.9 g/dL / Pro: 6.3 g/dL / ALK PHOS: 68 U/L / ALT: 29 U/L / AST: 13 U/L / GGT: x             Urinalysis Basic - ( 23 Oct 2021 19:15 )    Color: Yellow / Appearance: Clear / S.020 / pH: x  Gluc: x / Ketone: Negative  / Bili: Negative / Urobili: 1   Blood: x / Protein: 15 / Nitrite: Negative   Leuk Esterase: Small / RBC: 26-50 /HPF / WBC 6-10 /HPF   Sq Epi: x / Non Sq Epi: Neg.-Few / Bacteria: Many /HPF    CAPILLARY BLOOD GLUCOSE  POCT Blood Glucose.: 118 mg/dL (24 Oct 2021 11:32)    ---------------------------------------------------------------------------------------------------------------  MEDICATIONS  (STANDING):  ascorbic acid 500 milliGRAM(s) Oral daily  dextrose 40% Gel 15 Gram(s) Oral once  dextrose 5%. 1000 milliLiter(s) (50 mL/Hr) IV Continuous <Continuous>  dextrose 5%. 1000 milliLiter(s) (100 mL/Hr) IV Continuous <Continuous>  dextrose 50% Injectable 25 Gram(s) IV Push once  dextrose 50% Injectable 12.5 Gram(s) IV Push once  dextrose 50% Injectable 25 Gram(s) IV Push once  doxazosin 4 milliGRAM(s) Oral at bedtime  DULoxetine 30 milliGRAM(s) Oral at bedtime  enoxaparin Injectable 40 milliGRAM(s) SubCutaneous <User Schedule>  gabapentin 600 milliGRAM(s) Oral every 8 hours  glucagon  Injectable 1 milliGRAM(s) IntraMuscular once  influenza   Vaccine 0.5 milliLiter(s) IntraMuscular once  methocarbamol 750 milliGRAM(s) Oral every 8 hours  multivitamin 1 Tablet(s) Oral daily  pantoprazole    Tablet 40 milliGRAM(s) Oral before breakfast  polyethylene glycol 3350 17 Gram(s) Oral two times a day  senna 2 Tablet(s) Oral every 12 hours    MEDICATIONS  (PRN):  acetaminophen     Tablet .. 650 milliGRAM(s) Oral every 6 hours PRN Mild Pain (1 - 3)  lactulose Syrup 10 Gram(s) Oral daily PRN no BM>1 day  ondansetron   Disintegrating Tablet 4 milliGRAM(s) Oral every 8 hours PRN Nausea and/or Vomiting  oxyCODONE    IR 5 milliGRAM(s) Oral every 4 hours PRN Moderate Pain (4 - 6)  oxyCODONE    IR 10 milliGRAM(s) Oral every 4 hours PRN Severe Pain (7 - 10)

## 2021-10-25 NOTE — DIETITIAN INITIAL EVALUATION ADULT. - ADD RECOMMEND
1) Monitor Weights, Intake, Tolerance, Skin & Labwork   2) Ensure Clear 8oz PO TID 3) Education Provided on Proper Nutrition 4) Continue Nutrition Plan of Care

## 2021-10-25 NOTE — DIETITIAN INITIAL EVALUATION ADULT. - OTHER INFO
Initial Nutrition Assessment   71yr Old Male   Dx: Incomplete Paraplegia  Diet - Regular Diet w/ Thin Liquids & Ensure Enlive 8oz PO TID  (Recommend Change Supplement to Ensure Clear 8oz PO TID)  Denies Food Allergy/Intolerance - State Can Eat Cooked Shrimp  Tolerates Diet Well - No Chewing/Swallowing Complications (Per Patient)  Consumed 75% of 1st Meals (as Per Documentation)  No Pressure Ulcers (as Per Nursing Flow Sheets)  No Edema Noted (as Per Nursing Flow Sheets)  No Recent Nausea/Vomiting/Diarrhea/Constipation (as Per Patient)

## 2021-10-25 NOTE — DIETITIAN INITIAL EVALUATION ADULT. - PERTINENT LABORATORY DATA
10/24  Na-131L  K-4.2  BUN-13  Creat-0.71  Gluc-  126H  Hemoglobin A1c - 5.8(on 10/8)  POCT (over Last 3 Days) - Ranging from

## 2021-10-25 NOTE — DIETITIAN INITIAL EVALUATION ADULT. - ORAL INTAKE PTA/DIET HISTORY
Patient Does Not Follow Diet @Home, Consumes 3 Meals a Day & Does Take Vitamin/Supplements @Home (Vitamin C, Vitamin D)    on Regular Diet w/ Thin Liquids & Ensure Enlive 8oz PO TID  Recommend Change Supplement to Ensure Clear 8oz PO TID (Previous Supplement Not Available) - Education Provided on Proper Nutrition

## 2021-10-25 NOTE — DIETITIAN NUTRITION RISK NOTIFICATION - TREATMENT: THE FOLLOWING DIET HAS BEEN RECOMMENDED
Recommend Change Supplement to Ensure Clear 8oz PO TID (Provides 720kcal & 24grams of Protein) - Due to Availability of Previous Supplement

## 2021-10-25 NOTE — PROGRESS NOTE ADULT - SUBJECTIVE AND OBJECTIVE BOX
Patient is a 71y old  Male who presents with a chief complaint of B/L LE weakness after intradural extramedullary hematoma S/P evacuation (24 Oct 2021 12:23)      24 HOUR EVENTS:  No overnight events reported.     SUBJECTIVE:  Patient seen and examined at bedside.     ALLERGIES:  Celebrex (Other)    MEDICATIONS  (STANDING):  ascorbic acid 500 milliGRAM(s) Oral daily  dextrose 40% Gel 15 Gram(s) Oral once  dextrose 5%. 1000 milliLiter(s) (50 mL/Hr) IV Continuous <Continuous>  dextrose 5%. 1000 milliLiter(s) (100 mL/Hr) IV Continuous <Continuous>  dextrose 50% Injectable 25 Gram(s) IV Push once  dextrose 50% Injectable 12.5 Gram(s) IV Push once  dextrose 50% Injectable 25 Gram(s) IV Push once  doxazosin 4 milliGRAM(s) Oral at bedtime  DULoxetine 30 milliGRAM(s) Oral at bedtime  enoxaparin Injectable 40 milliGRAM(s) SubCutaneous <User Schedule>  gabapentin 600 milliGRAM(s) Oral every 8 hours  glucagon  Injectable 1 milliGRAM(s) IntraMuscular once  influenza   Vaccine 0.5 milliLiter(s) IntraMuscular once  methocarbamol 750 milliGRAM(s) Oral every 8 hours  multivitamin 1 Tablet(s) Oral daily  pantoprazole    Tablet 40 milliGRAM(s) Oral before breakfast  polyethylene glycol 3350 17 Gram(s) Oral two times a day  senna 2 Tablet(s) Oral every 12 hours    MEDICATIONS  (PRN):  acetaminophen     Tablet .. 650 milliGRAM(s) Oral every 6 hours PRN Mild Pain (1 - 3)  lactulose Syrup 10 Gram(s) Oral daily PRN no BM>1 day  ondansetron   Disintegrating Tablet 4 milliGRAM(s) Oral every 8 hours PRN Nausea and/or Vomiting  oxyCODONE    IR 5 milliGRAM(s) Oral every 4 hours PRN Moderate Pain (4 - 6)  oxyCODONE    IR 10 milliGRAM(s) Oral every 4 hours PRN Severe Pain (7 - 10)    Vital Signs Last 24 Hrs  T(F): 98.9 (24 Oct 2021 20:49), Max: 98.9 (24 Oct 2021 20:49)  HR: 92 (25 Oct 2021 07:26) (84 - 92)  BP: 107/85 (25 Oct 2021 07:26) (107/85 - 153/90)  RR: 14 (25 Oct 2021 07:26) (14 - 15)  SpO2: 97% (25 Oct 2021 07:26) (97% - 98%)  I&O's Summary    24 Oct 2021 07:01  -  25 Oct 2021 07:00  --------------------------------------------------------  IN: 0 mL / OUT: 2330 mL / NET: -2330 mL      PHYSICAL EXAM:  General: NAD, A/O x 3  ENT: Moist mucous membranes, no thrush  Neck: Supple, No JVD  Lungs: Clear to auscultation bilaterally, good air entry, non-labored breathing  Cardio: RRR, S1/S2, No murmur  Abdomen: Soft, Nontender, Nondistended; Bowel sounds present  Extremities: No calf tenderness, No pitting edema    LABS:                        10.7   6.40  )-----------( 199      ( 25 Oct 2021 07:39 )             31.4     10-24    131  |  96  |  13  ----------------------------<  126  4.2   |  28  |  0.71    Ca    8.7      24 Oct 2021 05:11  Phos  2.9     10-23  Mg     2.0     10-23    TPro  6.4  /  Alb  2.9  /  TBili  1.0  /  DBili  x   /  AST  11  /  ALT  28  /  AlkPhos  63  10-        eGFR if Non African American: 94 mL/min/1.73M2 (10-24-21 @ 05:11)  eGFR if : 108 mL/min/1.73M2 (10-24-21 @ 05:11)                            POCT Blood Glucose.: 118 mg/dL (24 Oct 2021 11:32)      Urinalysis Basic - ( 23 Oct 2021 19:15 )    Color: Yellow / Appearance: Clear / S.020 / pH: x  Gluc: x / Ketone: Negative  / Bili: Negative / Urobili: 1   Blood: x / Protein: 15 / Nitrite: Negative   Leuk Esterase: Small / RBC: 26-50 /HPF / WBC 6-10 /HPF   Sq Epi: x / Non Sq Epi: Neg.-Few / Bacteria: Many /HPF        COVID-19 PCR: NotDetec (10-23-21 @ 19:45)  COVID-19 PCR: NotDetec (10-20-21 @ 07:11)  COVID-19 PCR: NotDetec (10-17-21 @ 12:40)  COVID-19 PCR: NotDetec (10-13-21 @ 04:16)  COVID-19 PCR: NotDetec (10-06-21 @ 17:15)    RADIOLOGY & ADDITIONAL TESTS:    Care Discussed with Consultants/Other Providers:    Patient is a 71y old  Male who presents with a chief complaint of B/L LE weakness after intradural extramedullary hematoma S/P evacuation (24 Oct 2021 12:23)      24 HOUR EVENTS:  No overnight events reported.     SUBJECTIVE:  Patient seen and examined at bedside.   He endorses left leg pain.   Denies having any f/c/cp/palpitations/sob/cough/n/v/d.     ALLERGIES:  Celebrex (Other)    MEDICATIONS  (STANDING):  ascorbic acid 500 milliGRAM(s) Oral daily  dextrose 40% Gel 15 Gram(s) Oral once  dextrose 5%. 1000 milliLiter(s) (50 mL/Hr) IV Continuous <Continuous>  dextrose 5%. 1000 milliLiter(s) (100 mL/Hr) IV Continuous <Continuous>  dextrose 50% Injectable 25 Gram(s) IV Push once  dextrose 50% Injectable 12.5 Gram(s) IV Push once  dextrose 50% Injectable 25 Gram(s) IV Push once  doxazosin 4 milliGRAM(s) Oral at bedtime  DULoxetine 30 milliGRAM(s) Oral at bedtime  enoxaparin Injectable 40 milliGRAM(s) SubCutaneous <User Schedule>  gabapentin 600 milliGRAM(s) Oral every 8 hours  glucagon  Injectable 1 milliGRAM(s) IntraMuscular once  influenza   Vaccine 0.5 milliLiter(s) IntraMuscular once  methocarbamol 750 milliGRAM(s) Oral every 8 hours  multivitamin 1 Tablet(s) Oral daily  pantoprazole    Tablet 40 milliGRAM(s) Oral before breakfast  polyethylene glycol 3350 17 Gram(s) Oral two times a day  senna 2 Tablet(s) Oral every 12 hours    MEDICATIONS  (PRN):  acetaminophen     Tablet .. 650 milliGRAM(s) Oral every 6 hours PRN Mild Pain (1 - 3)  lactulose Syrup 10 Gram(s) Oral daily PRN no BM>1 day  ondansetron   Disintegrating Tablet 4 milliGRAM(s) Oral every 8 hours PRN Nausea and/or Vomiting  oxyCODONE    IR 5 milliGRAM(s) Oral every 4 hours PRN Moderate Pain (4 - 6)  oxyCODONE    IR 10 milliGRAM(s) Oral every 4 hours PRN Severe Pain (7 - 10)    Vital Signs Last 24 Hrs  T(F): 98.9 (24 Oct 2021 20:49), Max: 98.9 (24 Oct 2021 20:49)  HR: 92 (25 Oct 2021 07:26) (84 - 92)  BP: 107/85 (25 Oct 2021 07:26) (107/85 - 153/90)  RR: 14 (25 Oct 2021 07:26) (14 - 15)  SpO2: 97% (25 Oct 2021 07:26) (97% - 98%)  I&O's Summary    24 Oct 2021 07:01  -  25 Oct 2021 07:00  --------------------------------------------------------  IN: 0 mL / OUT: 2330 mL / NET: -2330 mL      PHYSICAL EXAM:  General: NAD, A/O   ENT: Moist mucous membranes, no thrush  Neck: Supple, No JVD  Lungs: Clear to auscultation bilaterally, good air entry, non-labored breathing  Cardio: RRR, S1/S2, No murmur  Abdomen: Soft, Nontender, Nondistended; Bowel sounds present  Extremities: No calf tenderness, No pitting edema    LABS:                        10.7   6.40  )-----------( 199      ( 25 Oct 2021 07:39 )             31.4     10-24    131  |  96  |  13  ----------------------------<  126  4.2   |  28  |  0.71    Ca    8.7      24 Oct 2021 05:11  Phos  2.9     10-23  Mg     2.0     10-23    TPro  6.4  /  Alb  2.9  /  TBili  1.0  /  DBili  x   /  AST  11  /  ALT  28  /  AlkPhos  63  10-        eGFR if Non African American: 94 mL/min/1.73M2 (10-24-21 @ 05:11)  eGFR if : 108 mL/min/1.73M2 (10-24-21 @ 05:11)          POCT Blood Glucose.: 118 mg/dL (24 Oct 2021 11:32)      Urinalysis Basic - ( 23 Oct 2021 19:15 )    Color: Yellow / Appearance: Clear / S.020 / pH: x  Gluc: x / Ketone: Negative  / Bili: Negative / Urobili: 1   Blood: x / Protein: 15 / Nitrite: Negative   Leuk Esterase: Small / RBC: 26-50 /HPF / WBC 6-10 /HPF   Sq Epi: x / Non Sq Epi: Neg.-Few / Bacteria: Many /HPF        COVID-19 PCR: NotDetec (10-23-21 @ 19:45)  COVID-19 PCR: NotDetec (10-20-21 @ 07:11)  COVID-19 PCR: NotDetec (10-17-21 @ 12:40)  COVID-19 PCR: NotDetec (10-13-21 @ 04:16)  COVID-19 PCR: NotDetec (10-06-21 @ 17:15)    RADIOLOGY & ADDITIONAL TESTS:  < from: MR Lumbar Spine w/wo IV Cont (10.18.21 @ 09:07) >  IMPRESSION:  Compared to previous studies,  There are postsurgical changes related to T10-L2 laminectomy with interval removal of the epidural catheter as described.  Therehas been interval decrease in T2/STIR hyperintensity at T10-T11 consistent with interval improvement of distal cord edema. No new cord edema is seen.  Layering of ventral subdural fluid collection from T4 to T10 may represent residual subdural hematoma, conspicuous in this examination may be secondary to expected evolution of blood products now in the early subacute stage.  < end of copied text >      Care Discussed with Consultants/Other Providers:   PMR, Dr. Sagastume   Patient is a 71y old  Male who presents with a chief complaint of B/L LE weakness after intradural extramedullary hematoma S/P evacuation (24 Oct 2021 12:23)      24 HOUR EVENTS:  No overnight events reported.     SUBJECTIVE:  Patient seen and examined at bedside.   He endorses left leg pain.   Denies having any f/c/cp/palpitations/sob/cough/n/v/d.     ALLERGIES:  Celebrex (Other)    MEDICATIONS  (STANDING):  ascorbic acid 500 milliGRAM(s) Oral daily  dextrose 40% Gel 15 Gram(s) Oral once  dextrose 5%. 1000 milliLiter(s) (50 mL/Hr) IV Continuous <Continuous>  dextrose 5%. 1000 milliLiter(s) (100 mL/Hr) IV Continuous <Continuous>  dextrose 50% Injectable 25 Gram(s) IV Push once  dextrose 50% Injectable 12.5 Gram(s) IV Push once  dextrose 50% Injectable 25 Gram(s) IV Push once  doxazosin 4 milliGRAM(s) Oral at bedtime  DULoxetine 30 milliGRAM(s) Oral at bedtime  enoxaparin Injectable 40 milliGRAM(s) SubCutaneous <User Schedule>  gabapentin 600 milliGRAM(s) Oral every 8 hours  glucagon  Injectable 1 milliGRAM(s) IntraMuscular once  influenza   Vaccine 0.5 milliLiter(s) IntraMuscular once  methocarbamol 750 milliGRAM(s) Oral every 8 hours  multivitamin 1 Tablet(s) Oral daily  pantoprazole    Tablet 40 milliGRAM(s) Oral before breakfast  polyethylene glycol 3350 17 Gram(s) Oral two times a day  senna 2 Tablet(s) Oral every 12 hours    MEDICATIONS  (PRN):  acetaminophen     Tablet .. 650 milliGRAM(s) Oral every 6 hours PRN Mild Pain (1 - 3)  lactulose Syrup 10 Gram(s) Oral daily PRN no BM>1 day  ondansetron   Disintegrating Tablet 4 milliGRAM(s) Oral every 8 hours PRN Nausea and/or Vomiting  oxyCODONE    IR 5 milliGRAM(s) Oral every 4 hours PRN Moderate Pain (4 - 6)  oxyCODONE    IR 10 milliGRAM(s) Oral every 4 hours PRN Severe Pain (7 - 10)    Vital Signs Last 24 Hrs  T(F): 98.9 (24 Oct 2021 20:49), Max: 98.9 (24 Oct 2021 20:49)  HR: 92 (25 Oct 2021 07:26) (84 - 92)  BP: 107/85 (25 Oct 2021 07:26) (107/85 - 153/90)  RR: 14 (25 Oct 2021 07:26) (14 - 15)  SpO2: 97% (25 Oct 2021 07:26) (97% - 98%)  I&O's Summary    24 Oct 2021 07:01  -  25 Oct 2021 07:00  --------------------------------------------------------  IN: 0 mL / OUT: 2330 mL / NET: -2330 mL      PHYSICAL EXAM:  General: NAD, A/O   ENT: Moist mucous membranes, no thrush  Neck: Supple, No JVD  Lungs: Clear to auscultation bilaterally, good air entry, non-labored breathing  Cardio: RRR, S1/S2, No murmur  Abdomen: Soft, Nontender, Nondistended; Bowel sounds present  Extremities: No calf tenderness, No pitting edema    LABS:                        10.7   6.40  )-----------( 199      ( 25 Oct 2021 07:39 )             31.4     10-24    131  |  96  |  13  ----------------------------<  126  4.2   |  28  |  0.71    Ca    8.7      24 Oct 2021 05:11  Phos  2.9     10-23  Mg     2.0     10-23    TPro  6.4  /  Alb  2.9  /  TBili  1.0  /  DBili  x   /  AST  11  /  ALT  28  /  AlkPhos  63  10-        eGFR if Non African American: 94 mL/min/1.73M2 (10-24-21 @ 05:11)  eGFR if : 108 mL/min/1.73M2 (10-24-21 @ 05:11)      POCT Blood Glucose.: 118 mg/dL (24 Oct 2021 11:32)      Urinalysis Basic - ( 23 Oct 2021 19:15 )    Color: Yellow / Appearance: Clear / S.020 / pH: x  Gluc: x / Ketone: Negative  / Bili: Negative / Urobili: 1   Blood: x / Protein: 15 / Nitrite: Negative   Leuk Esterase: Small / RBC: 26-50 /HPF / WBC 6-10 /HPF   Sq Epi: x / Non Sq Epi: Neg.-Few / Bacteria: Many /HPF        COVID-19 PCR: NotDetec (10-23-21 @ 19:45)  COVID-19 PCR: NotDetec (10-20-21 @ 07:11)  COVID-19 PCR: NotDetec (10-17-21 @ 12:40)  COVID-19 PCR: NotDetec (10-13-21 @ 04:16)  COVID-19 PCR: NotDetec (10-06-21 @ 17:15)    RADIOLOGY & ADDITIONAL TESTS:  < from: MR Lumbar Spine w/wo IV Cont (10.18.21 @ 09:07) >  IMPRESSION:  Compared to previous studies,  There are postsurgical changes related to T10-L2 laminectomy with interval removal of the epidural catheter as described.  Therehas been interval decrease in T2/STIR hyperintensity at T10-T11 consistent with interval improvement of distal cord edema. No new cord edema is seen.  Layering of ventral subdural fluid collection from T4 to T10 may represent residual subdural hematoma, conspicuous in this examination may be secondary to expected evolution of blood products now in the early subacute stage.  < end of copied text >      Care Discussed with Consultants/Other Providers:   PMR, Dr. Sagastume   Patient is a 71y old  Male who presents with a chief complaint of B/L LE weakness after intradural extramedullary hematoma S/P evacuation (24 Oct 2021 12:23)      24 HOUR EVENTS:  No overnight events reported.     SUBJECTIVE:  Patient seen and examined at bedside.   He endorses left leg pain.   Denies having any f/c/cp/palpitations/sob/cough/n/v/d.     ALLERGIES:  Celebrex (Other)    MEDICATIONS  (STANDING):  ascorbic acid 500 milliGRAM(s) Oral daily  dextrose 40% Gel 15 Gram(s) Oral once  dextrose 5%. 1000 milliLiter(s) (50 mL/Hr) IV Continuous <Continuous>  dextrose 5%. 1000 milliLiter(s) (100 mL/Hr) IV Continuous <Continuous>  dextrose 50% Injectable 25 Gram(s) IV Push once  dextrose 50% Injectable 12.5 Gram(s) IV Push once  dextrose 50% Injectable 25 Gram(s) IV Push once  doxazosin 4 milliGRAM(s) Oral at bedtime  DULoxetine 30 milliGRAM(s) Oral at bedtime  enoxaparin Injectable 40 milliGRAM(s) SubCutaneous <User Schedule>  gabapentin 600 milliGRAM(s) Oral every 8 hours  glucagon  Injectable 1 milliGRAM(s) IntraMuscular once  influenza   Vaccine 0.5 milliLiter(s) IntraMuscular once  methocarbamol 750 milliGRAM(s) Oral every 8 hours  multivitamin 1 Tablet(s) Oral daily  pantoprazole    Tablet 40 milliGRAM(s) Oral before breakfast  polyethylene glycol 3350 17 Gram(s) Oral two times a day  senna 2 Tablet(s) Oral every 12 hours    MEDICATIONS  (PRN):  acetaminophen     Tablet .. 650 milliGRAM(s) Oral every 6 hours PRN Mild Pain (1 - 3)  lactulose Syrup 10 Gram(s) Oral daily PRN no BM>1 day  ondansetron   Disintegrating Tablet 4 milliGRAM(s) Oral every 8 hours PRN Nausea and/or Vomiting  oxyCODONE    IR 5 milliGRAM(s) Oral every 4 hours PRN Moderate Pain (4 - 6)  oxyCODONE    IR 10 milliGRAM(s) Oral every 4 hours PRN Severe Pain (7 - 10)    Vital Signs Last 24 Hrs  T(F): 98.9 (24 Oct 2021 20:49), Max: 98.9 (24 Oct 2021 20:49)  HR: 92 (25 Oct 2021 07:26) (84 - 92)  BP: 107/85 (25 Oct 2021 07:26) (107/85 - 153/90)  RR: 14 (25 Oct 2021 07:26) (14 - 15)  SpO2: 97% (25 Oct 2021 07:26) (97% - 98%)  I&O's Summary    24 Oct 2021 07:01  -  25 Oct 2021 07:00  --------------------------------------------------------  IN: 0 mL / OUT: 2330 mL / NET: -2330 mL      PHYSICAL EXAM:  General: NAD, A/O   ENT: Moist mucous membranes, no thrush  Neck: Supple, No JVD  Lungs: Clear to auscultation bilaterally, good air entry, non-labored breathing  Cardio: RRR, S1/S2, No murmur  Abdomen: Soft, Nontender, Nondistended; Bowel sounds present  : indwelling catheter, no hematuria.   Extremities: No calf tenderness, No pitting edema    LABS:                        10.7   6.40  )-----------( 199      ( 25 Oct 2021 07:39 )             31.4     10-24    131  |  96  |  13  ----------------------------<  126  4.2   |  28  |  0.71    Ca    8.7      24 Oct 2021 05:11  Phos  2.9     10-23  Mg     2.0     10-23    TPro  6.4  /  Alb  2.9  /  TBili  1.0  /  DBili  x   /  AST  11  /  ALT  28  /  AlkPhos  63  10-24        eGFR if Non African American: 94 mL/min/1.73M2 (10-24-21 @ 05:11)  eGFR if : 108 mL/min/1.73M2 (10-24-21 @ 05:11)      POCT Blood Glucose.: 118 mg/dL (24 Oct 2021 11:32)      Urinalysis Basic - ( 23 Oct 2021 19:15 )    Color: Yellow / Appearance: Clear / S.020 / pH: x  Gluc: x / Ketone: Negative  / Bili: Negative / Urobili: 1   Blood: x / Protein: 15 / Nitrite: Negative   Leuk Esterase: Small / RBC: 26-50 /HPF / WBC 6-10 /HPF   Sq Epi: x / Non Sq Epi: Neg.-Few / Bacteria: Many /HPF        COVID-19 PCR: NotDetec (10-23-21 @ 19:45)  COVID-19 PCR: NotDetec (10-20-21 @ 07:11)  COVID-19 PCR: NotDetec (10-17-21 @ 12:40)  COVID-19 PCR: NotDetec (10-13-21 @ 04:16)  COVID- PCR: NotDetec (10-06-21 @ 17:15)    RADIOLOGY & ADDITIONAL TESTS:  < from: MR Lumbar Spine w/wo IV Cont (10.18.21 @ 09:07) >  IMPRESSION:  Compared to previous studies,  There are postsurgical changes related to T10-L2 laminectomy with interval removal of the epidural catheter as described.  Therehas been interval decrease in T2/STIR hyperintensity at T10-T11 consistent with interval improvement of distal cord edema. No new cord edema is seen.  Layering of ventral subdural fluid collection from T4 to T10 may represent residual subdural hematoma, conspicuous in this examination may be secondary to expected evolution of blood products now in the early subacute stage.  < end of copied text >      Care Discussed with Consultants/Other Providers:   PMR, Dr. Sagastume

## 2021-10-25 NOTE — PROGRESS NOTE ADULT - ASSESSMENT
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 MRI brain w/ diffuse SAH thought to be 2/2 spinal hematoma.     Now admitted to [facility] after for initiation of a multidisciplinary rehab program consisting focused on functional mobility, transfers and ADLs - PT/OT/DVT PPX PAIN MEDS    #B/L LE weakness 2/2  #Intradural extramedullary hematoma T12-L1 s/p T10-L2 laminectomy  - PT/OT per PMR  - bowel regimen and pain control per PMR.   - OP Neurosurgery f/u    # Neurogenic bladder w/ urinary retention  seen by urology at OSH. Likely multifactorial due to limited mobilization, constipation, spinal cord compression from hematoma.  - maintain kate catheter  - cont doxazosin  - OP f/u with Urology    # Hyponatremia     # Anemia     dvt ppx: lovenox 72 yo M with HLD, chronic back pain, presented to [facility] 10/6/2021 w/ acute onset back pain and progressively worsening b/l LE weakness, found to have intradural extramedullary thoracolumbar hematoma T12-L1, S/P T10-L2 laminectomy w/ hematoma evacuation (10/7/2021). MRI brain w/ diffuse SAH thought to be 2/2 spinal hematoma.     Now admitted to [facility] after for initiation of a multidisciplinary rehab program consisting focused on functional mobility, transfers and ADLs - PT/OT/DVT PPX PAIN MEDS    #B/L LE weakness 2/2  #Intradural extramedullary hematoma T12-L1 s/p T10-L2 laminectomy and decompression  # SAH 2/2 above.   - PT/OT per PMR  - bowel regimen and pain control per PMR.   - OP Neurosurgery f/u    # Right groin wound   Following spinal angiography. s/p suture removal  - will do regular wound checks.     # Neurogenic bladder w/ urinary retention  PT failed TOV and now has kate. He was seen by urology at OSH. Likely multifactorial due to limited mobilization, constipation, spinal cord compression from hematoma.  - maintain akte catheter. May attempt TOV at rehab if pt has significant neurologic improvement.   - cont doxazosin  - OP f/u with Urology, Dr. Boswell    # Hyponatremia - improving  - trend Na on BMP.     # Normocytic Anemia   -h/h stable  -will obtain iron studies, b12, folic acid w/ next labwork    dvt ppx: lovenox

## 2021-10-25 NOTE — PROGRESS NOTE ADULT - ASSESSMENT
71y with PMHx hyperlipidemia, chronic back pain, presented to [facility] 10/6/21 with acute onset back pain and progressively worsening bilateral lower extremity weakness, 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 MRI brain showed diffuse SAH likely 2/2 spinal hematoma. Hospital course c/b urinary retention, failed TOV, with kate.      Intradural Extramedullary Hematoma T12-L1  - S/P T10-L2 laminectomy  - Start comprehensive rehab program, PT/OT 3 hours a day, 5 days a week.  - Continue Gabapentin 600mg Q8hr, Methocarbamol 750mg Q8hr, Oxycodone PRN for pain, Duloxetine  - Precautions: falls, spine  - FS well controlled + off Decadron since 10/12/21 - FS d/c  - F/u neurosurgery outpatient    #Neurogenic Bowel  - Continue Lactulose, Senna, Miralax    #Neurogenic Bladder  - Continue kate  - Continue Cardura    Skin/Pressure Injury:   - At risk for pressure injury due to neurologic diagnosis and relative immobility.  - Monitor Incisions: T12-L2 laminectomy  - Turn every 2 hours while in bed, air mattress  - Soft heel protectors  - Skin barrier cream as needed  - Nursing to monitor skin Qshift    DVT ppx:  - Lovenox  - SCDs  - Last Doppler on 10/9/21 NEG  ---------------  Outpatient Follow-up (Specialty/Name of physician):  Horacio Uriarte (MD; PhD)  Neurosurgery  [address] [place] [address]  Phone: [phone]  Fax: [phone]  Follow Up Time:     Meng Gotti  UROLOGY  [address] [place] [id number]  Phone: [phone]  Fax: [phone]  --------------  Goals: Safe discharge to home  Estimated Length of Stay: 10-14 days  Rehab Potential: Good  Medical Prognosis: Good  Estimated Disposition: Home with Home Care  ---------------

## 2021-10-26 PROCEDURE — 99232 SBSQ HOSP IP/OBS MODERATE 35: CPT

## 2021-10-26 PROCEDURE — 99232 SBSQ HOSP IP/OBS MODERATE 35: CPT | Mod: GC

## 2021-10-26 RX ADMIN — Medication 6 MILLIGRAM(S): at 21:19

## 2021-10-26 RX ADMIN — Medication 6 MILLIGRAM(S): at 00:00

## 2021-10-26 RX ADMIN — ONDANSETRON 4 MILLIGRAM(S): 8 TABLET, FILM COATED ORAL at 16:53

## 2021-10-26 RX ADMIN — GABAPENTIN 600 MILLIGRAM(S): 400 CAPSULE ORAL at 21:18

## 2021-10-26 RX ADMIN — GABAPENTIN 600 MILLIGRAM(S): 400 CAPSULE ORAL at 05:40

## 2021-10-26 RX ADMIN — Medication 4 MILLIGRAM(S): at 21:18

## 2021-10-26 RX ADMIN — OXYCODONE HYDROCHLORIDE 10 MILLIGRAM(S): 5 TABLET ORAL at 08:45

## 2021-10-26 RX ADMIN — ENOXAPARIN SODIUM 40 MILLIGRAM(S): 100 INJECTION SUBCUTANEOUS at 16:53

## 2021-10-26 RX ADMIN — PANTOPRAZOLE SODIUM 40 MILLIGRAM(S): 20 TABLET, DELAYED RELEASE ORAL at 05:41

## 2021-10-26 RX ADMIN — METHOCARBAMOL 750 MILLIGRAM(S): 500 TABLET, FILM COATED ORAL at 21:18

## 2021-10-26 RX ADMIN — OXYCODONE HYDROCHLORIDE 10 MILLIGRAM(S): 5 TABLET ORAL at 15:22

## 2021-10-26 RX ADMIN — GABAPENTIN 600 MILLIGRAM(S): 400 CAPSULE ORAL at 13:36

## 2021-10-26 RX ADMIN — DULOXETINE HYDROCHLORIDE 30 MILLIGRAM(S): 30 CAPSULE, DELAYED RELEASE ORAL at 21:19

## 2021-10-26 RX ADMIN — METHOCARBAMOL 750 MILLIGRAM(S): 500 TABLET, FILM COATED ORAL at 13:37

## 2021-10-26 RX ADMIN — Medication 500 MILLIGRAM(S): at 12:10

## 2021-10-26 RX ADMIN — Medication 1 TABLET(S): at 12:10

## 2021-10-26 RX ADMIN — OXYCODONE HYDROCHLORIDE 10 MILLIGRAM(S): 5 TABLET ORAL at 07:53

## 2021-10-26 RX ADMIN — METHOCARBAMOL 750 MILLIGRAM(S): 500 TABLET, FILM COATED ORAL at 05:40

## 2021-10-26 RX ADMIN — OXYCODONE HYDROCHLORIDE 10 MILLIGRAM(S): 5 TABLET ORAL at 16:00

## 2021-10-26 NOTE — PROGRESS NOTE ADULT - ASSESSMENT
72 yo M with HLD, chronic back pain, presented to [facility] 10/6/2021 w/ acute onset back pain and progressively worsening b/l LE weakness, found to have intradural extramedullary thoracolumbar hematoma T12-L1, S/P T10-L2 laminectomy w/ hematoma evacuation (10/7/2021). MRI brain w/ diffuse SAH thought to be 2/2 spinal hematoma.     Now admitted to [facility] after for initiation of a multidisciplinary rehab program consisting focused on functional mobility, transfers and ADLs - PT/OT/DVT PPX PAIN MEDS    #B/L LE weakness 2/2  #Intradural extramedullary hematoma T12-L1 s/p T10-L2 laminectomy and decompression  # SAH 2/2 above.   - PT/OT per PMR  - bowel regimen and pain control per PMR.   - OP Neurosurgery f/u    # Right groin wound   Following spinal angiography. s/p suture removal  - will do regular wound checks - unable to see today because pt was in therapy    # Neurogenic bladder w/ urinary retention  PT failed TOV and now has kate. He was seen by urology at OSH. Likely multifactorial due to limited mobilization, constipation, spinal cord compression from hematoma.  - maintain kate catheter. May attempt TOV at rehab if pt has significant neurologic improvement.   - cont doxazosin  - OP f/u with Urology, Dr. 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   - obtain urine lytes and serum osm     # Normocytic Anemia   -h/h stable  -will obtain iron studies, b12, folic acid w/ next labwork    dvt ppx: lovenox

## 2021-10-26 NOTE — PROGRESS NOTE ADULT - SUBJECTIVE AND OBJECTIVE BOX
CHIEF COMPLAINT:    Patient is a 71y old  Male who presents with a chief complaint of B/L LE weakness after intradural extramedullary hematoma S/P evacuation (25 Oct 2021 08:09)    HPI:  71 year old male with past medical history of hyperlipidemia, chronic back pain, presented to [facility] 10/6/21 with acute onset back pain and progressively worsening bilateral lower extremity weakness. Urgent MR imaging of the spine revealed intradural extramedullary hematoma T12-L1, S/P T10-L2 laminectomy with 1U pRBCs given intraop 10/6/21. Spinal angiogram 10/7/21 showed no vascular abnormalities. MRI brain showed diffuse SAH thought to be secondary to spinal hematoma. Vazquez was placed for urinary retention, failed TOV x2. Epidural Eliseo Tapia drain was removed 10/11/21. Repeat MRI showed stable spine and repeat spinal angiogram was without vascular abnormalities. Spine sutures and right groin suture removed 10/22/21. Patient is medically stable for discharge to  for AR 10/23/21. (23 Oct 2021 17:44)    SUBJECTIVE:  Patient seen and evaluated this morning. No acute issues noted overnight. Plan to do TOV today.     PAST MEDICAL & SURGICAL HISTORY:  High cholesterol    Colitis    Vertigo        Allergies    Celebrex (Other)    Intolerances      Vital Signs Last 24 Hrs  T(C): 36.9 (26 Oct 2021 07:52), Max: 36.9 (26 Oct 2021 07:52)  T(F): 98.4 (26 Oct 2021 07:52), Max: 98.4 (26 Oct 2021 07:52)  HR: 91 (26 Oct 2021 07:52) (91 - 91)  BP: 129/76 (26 Oct 2021 07:52) (129/76 - 129/76)  BP(mean): --  RR: 14 (26 Oct 2021 07:52) (14 - 14)  SpO2: 97% (26 Oct 2021 07:52) (97% - 97%)    PHYSICAL EXAM  Gen - NAD, Comfortable  HEENT - NCAT, EOMI, MMM  Neck - Supple, No limited ROM  Pulm - CTAB  Cardiovascular - RRR, S1S2, No m/r/g  Abdomen - Soft, NT/ND, +BS  Extremities - No C/C/E, No calf tenderness  Neuro     Motor -    BUE 5/5                     LEFT    LE - HF 5/5, KE 5/5, DF 5/5, PF 5/5                    RIGHT LE - HF 4/5, KE 2/5, DF 3/5, PF 4/5        Sensory - decreased sensation/tingling in the right leg     Reflexes - DTR Intact  Psychiatric - Mood stable, Affect WNL  Skin: spine incision c/d/i    ---------------------------------------------------------------------------------------------------------------------    CAPILLARY BLOOD GLUCOSE    ---------------------------------------------------------------------------------------------------------------  MEDICATIONS  (STANDING):  ascorbic acid 500 milliGRAM(s) Oral daily  dextrose 40% Gel 15 Gram(s) Oral once  dextrose 5%. 1000 milliLiter(s) (50 mL/Hr) IV Continuous <Continuous>  dextrose 5%. 1000 milliLiter(s) (100 mL/Hr) IV Continuous <Continuous>  dextrose 50% Injectable 25 Gram(s) IV Push once  dextrose 50% Injectable 12.5 Gram(s) IV Push once  dextrose 50% Injectable 25 Gram(s) IV Push once  doxazosin 4 milliGRAM(s) Oral at bedtime  DULoxetine 30 milliGRAM(s) Oral at bedtime  enoxaparin Injectable 40 milliGRAM(s) SubCutaneous <User Schedule>  gabapentin 600 milliGRAM(s) Oral every 8 hours  glucagon  Injectable 1 milliGRAM(s) IntraMuscular once  influenza   Vaccine 0.5 milliLiter(s) IntraMuscular once  melatonin 6 milliGRAM(s) Oral at bedtime  methocarbamol 750 milliGRAM(s) Oral every 8 hours  multivitamin 1 Tablet(s) Oral daily  pantoprazole    Tablet 40 milliGRAM(s) Oral before breakfast  polyethylene glycol 3350 17 Gram(s) Oral two times a day  senna 2 Tablet(s) Oral every 12 hours    MEDICATIONS  (PRN):  acetaminophen     Tablet .. 650 milliGRAM(s) Oral every 6 hours PRN Mild Pain (1 - 3)  lactulose Syrup 10 Gram(s) Oral daily PRN no BM>1 day  ondansetron   Disintegrating Tablet 4 milliGRAM(s) Oral every 8 hours PRN Nausea and/or Vomiting  oxyCODONE    IR 5 milliGRAM(s) Oral every 4 hours PRN Moderate Pain (4 - 6)  oxyCODONE    IR 10 milliGRAM(s) Oral every 4 hours PRN Severe Pain (7 - 10)

## 2021-10-26 NOTE — PROGRESS NOTE ADULT - ASSESSMENT
71y with PMHx hyperlipidemia, chronic back pain, presented to [facility] 10/6/21 with acute onset back pain and progressively worsening bilateral lower extremity weakness, 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 MRI brain showed diffuse SAH likely 2/2 spinal hematoma. Hospital course c/b urinary retention, failed TOV, with kate.    Intradural Extramedullary Hematoma T12-L1  - S/P T10-L2 laminectomy  - Continue comprehensive rehab program, PT/OT 3 hours a day, 5 days a week.  - Continue Gabapentin 600mg Q8hr, Methocarbamol 750mg Q8hr, Oxycodone PRN for pain, Duloxetine  - Precautions: falls, spine  - FS well controlled + off Decadron since 10/12/21 - FS d/c  - F/u neurosurgery outpatient    #Neurogenic Bowel  - Continue Lactulose, Senna, Miralax    #Neurogenic Bladder  - Will dc kate today and do TOV 10/26   - Bladder scan q6hr   - Continue Cardura    Skin/Pressure Injury:   - At risk for pressure injury due to neurologic diagnosis and relative immobility.  - Monitor Incisions: T12-L2 laminectomy  - Turn every 2 hours while in bed, air mattress  - Soft heel protectors  - Skin barrier cream as needed  - Nursing to monitor skin Qshift    DVT ppx:  - Lovenox  - SCDs  - Last Doppler on 10/9/21 NEG  ---------------  Outpatient Follow-up (Specialty/Name of physician):  Horacio Uriarte; PhD)  Neurosurgery  [address] [place] [address]  Phone: [phone]  Fax: [phone]  Follow Up Time:     Meng Gotti  UROLOGY  [address] [place] [address]  Phone: [phone]  Fax: [phone]  --------------  Goals: Safe discharge to home  Estimated Length of Stay: 10-14 days  Rehab Potential: Good  Medical Prognosis: Good  Estimated Disposition: Home with Home Care  ---------------    Team meeting 10/26:  OT: goals: Mod I ADLs, supervision transfers, currently mod A for transfers and Mod A for LBD   PT: Goals: supervision transfers and TBD for ambulation. Currently Mod A for transfers   TDD: 11/10  71y with PMHx hyperlipidemia, chronic back pain, presented to [facility] 10/6/21 with acute onset back pain and progressively worsening bilateral lower extremity weakness, 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 MRI brain showed diffuse SAH likely 2/2 spinal hematoma and paraplegia. Hospital course c/b urinary retention, failed TOV, with kate.    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  - Continue Gabapentin 600mg Q8hr, Methocarbamol 750mg Q8hr, Oxycodone PRN for pain, Duloxetine  - Precautions: falls, spine  - FS well controlled + off Decadron since 10/12/21 - FS d/c  - F/u neurosurgery outpatient    #Neurogenic Bowel  - Continue Lactulose, Senna, Miralax    #Neurogenic Bladder  - Will dc kate today and do TOV 10/26   - Bladder scan q6hr   - Continue Cardura    Skin/Pressure Injury:   - At risk for pressure injury due to neurologic diagnosis and relative immobility.  - Monitor Incisions: T12-L2 laminectomy  - Turn every 2 hours while in bed, air mattress  - Soft heel protectors  - Skin barrier cream as needed  - Nursing to monitor skin Qshift    DVT ppx:  - Lovenox  - SCDs  - Last Doppler on 10/9/21 NEG  ---------------  Outpatient Follow-up (Specialty/Name of physician):  Horacio Uriarte (MD; PhD)  Neurosurgery  [address] [place] [address]  Phone: [phone]  Fax: [phone]  Follow Up Time:     Meng Gotti  UROLOGY  [address] [place] [address]  Phone: [phone]  Fax: [phone]  --------------  Goals: Safe discharge to home  Estimated Length of Stay: 10-14 days  Rehab Potential: Good  Medical Prognosis: Good  Estimated Disposition: Home with Home Care  ---------------    Team meeting 10/26:  OT: goals: Mod I ADLs, supervision transfers, currently mod A for transfers and Mod A for LBD   PT: Goals: supervision transfers and TBD for ambulation. Currently Mod A for transfers   TDD: 11/10

## 2021-10-26 NOTE — PROGRESS NOTE ADULT - SUBJECTIVE AND OBJECTIVE BOX
Patient is a 71y old  Male who presents with a chief complaint of B/L LE weakness after intradural extramedullary hematoma S/P evacuation (25 Oct 2021 10:59)      24 HOUR EVENTS:  No overnight events reported.     SUBJECTIVE:  Patient seen and examined while at therapy.   He reports feeling lightheaded w/ standing. No other acute complaints.     ALLERGIES:  Celebrex (Other)    MEDICATIONS  (STANDING):  ascorbic acid 500 milliGRAM(s) Oral daily  dextrose 40% Gel 15 Gram(s) Oral once  dextrose 5%. 1000 milliLiter(s) (50 mL/Hr) IV Continuous <Continuous>  dextrose 5%. 1000 milliLiter(s) (100 mL/Hr) IV Continuous <Continuous>  dextrose 50% Injectable 25 Gram(s) IV Push once  dextrose 50% Injectable 12.5 Gram(s) IV Push once  dextrose 50% Injectable 25 Gram(s) IV Push once  doxazosin 4 milliGRAM(s) Oral at bedtime  DULoxetine 30 milliGRAM(s) Oral at bedtime  enoxaparin Injectable 40 milliGRAM(s) SubCutaneous <User Schedule>  gabapentin 600 milliGRAM(s) Oral every 8 hours  glucagon  Injectable 1 milliGRAM(s) IntraMuscular once  influenza   Vaccine 0.5 milliLiter(s) IntraMuscular once  melatonin 6 milliGRAM(s) Oral at bedtime  methocarbamol 750 milliGRAM(s) Oral every 8 hours  multivitamin 1 Tablet(s) Oral daily  pantoprazole    Tablet 40 milliGRAM(s) Oral before breakfast  polyethylene glycol 3350 17 Gram(s) Oral two times a day  senna 2 Tablet(s) Oral every 12 hours    MEDICATIONS  (PRN):  acetaminophen     Tablet .. 650 milliGRAM(s) Oral every 6 hours PRN Mild Pain (1 - 3)  lactulose Syrup 10 Gram(s) Oral daily PRN no BM>1 day  ondansetron   Disintegrating Tablet 4 milliGRAM(s) Oral every 8 hours PRN Nausea and/or Vomiting  oxyCODONE    IR 5 milliGRAM(s) Oral every 4 hours PRN Moderate Pain (4 - 6)  oxyCODONE    IR 10 milliGRAM(s) Oral every 4 hours PRN Severe Pain (7 - 10)    Vital Signs Last 24 Hrs  T(F): 98.4 (26 Oct 2021 07:52), Max: 98.4 (26 Oct 2021 07:52)  HR: 91 (26 Oct 2021 07:52) (91 - 91)  BP: 129/76 (26 Oct 2021 07:52) (129/76 - 129/76)  RR: 14 (26 Oct 2021 07:52) (14 - 14)  SpO2: 97% (26 Oct 2021 07:52) (97% - 97%)  I&O's Summary    25 Oct 2021 07:01  -  26 Oct 2021 07:00  --------------------------------------------------------  IN: 0 mL / OUT: 2400 mL / NET: -2400 mL      PHYSICAL EXAM:  General: NAD, A/O x 3  ENT: Moist mucous membranes, no thrush  Neck: Supple, No JVD  Lungs: Clear to auscultation bilaterally, good air entry, non-labored breathing  Cardio: RRR, S1/S2, No murmur  Abdomen: Soft, Nontender, Nondistended; Bowel sounds present  Extremities: No calf tenderness, No pitting edema    LABS:                        10.7   6.40  )-----------( 199      ( 25 Oct 2021 07:39 )             31.4     10    132  |  96  |  17  ----------------------------<  116  4.1   |  29  |  0.64    Ca    8.8      25 Oct 2021 07:39    TPro  6.3  /  Alb  2.9  /  TBili  1.3  /  DBili  x   /  AST  13  /  ALT  29  /  AlkPhos  68  10-        eGFR if Non African American: 98 mL/min/1.73M2 (10-25-21 @ 07:39)  eGFR if : 113 mL/min/1.73M2 (10-25-21 @ 07:39)  Urinalysis Basic - ( 23 Oct 2021 19:15 )    Color: Yellow / Appearance: Clear / S.020 / pH: x  Gluc: x / Ketone: Negative  / Bili: Negative / Urobili: 1   Blood: x / Protein: 15 / Nitrite: Negative   Leuk Esterase: Small / RBC: 26-50 /HPF / WBC 6-10 /HPF   Sq Epi: x / Non Sq Epi: Neg.-Few / Bacteria: Many /HPF        COVID-19 PCR: NotDetec (10-23-21 @ 19:45)  COVID-19 PCR: NotDetec (10-20-21 @ 07:11)  COVID-19 PCR: NotDetec (10-17-21 @ 12:40)  COVID-19 PCR: NotDetec (10-13-21 @ 04:16)  COVID-19 PCR: NotDetec (10-06-21 @ 17:15)    RADIOLOGY & ADDITIONAL TESTS:    Care Discussed with Consultants/Other Providers: Dr. Sagastume PMR

## 2021-10-27 LAB
ANION GAP SERPL CALC-SCNC: 5 MMOL/L — SIGNIFICANT CHANGE UP (ref 5–17)
BUN SERPL-MCNC: 14 MG/DL — SIGNIFICANT CHANGE UP (ref 7–23)
CALCIUM SERPL-MCNC: 8.6 MG/DL — SIGNIFICANT CHANGE UP (ref 8.4–10.5)
CHLORIDE SERPL-SCNC: 97 MMOL/L — SIGNIFICANT CHANGE UP (ref 96–108)
CO2 SERPL-SCNC: 30 MMOL/L — SIGNIFICANT CHANGE UP (ref 22–31)
CREAT SERPL-MCNC: 0.62 MG/DL — SIGNIFICANT CHANGE UP (ref 0.5–1.3)
FERRITIN SERPL-MCNC: 351 NG/ML — SIGNIFICANT CHANGE UP (ref 30–400)
FOLATE SERPL-MCNC: 15.1 NG/ML — SIGNIFICANT CHANGE UP
GLUCOSE SERPL-MCNC: 121 MG/DL — HIGH (ref 70–99)
IRON SATN MFR SERPL: 15 % — LOW (ref 16–55)
IRON SATN MFR SERPL: 30 UG/DL — LOW (ref 45–165)
OSMOLALITY SERPL: 272 MOSMOL/KG — LOW (ref 280–301)
POTASSIUM SERPL-MCNC: 4 MMOL/L — SIGNIFICANT CHANGE UP (ref 3.5–5.3)
POTASSIUM SERPL-SCNC: 4 MMOL/L — SIGNIFICANT CHANGE UP (ref 3.5–5.3)
SODIUM SERPL-SCNC: 132 MMOL/L — LOW (ref 135–145)
TIBC SERPL-MCNC: 202 UG/DL — LOW (ref 220–430)
UIBC SERPL-MCNC: 172 UG/DL — SIGNIFICANT CHANGE UP (ref 110–370)
VIT B12 SERPL-MCNC: 341 PG/ML — SIGNIFICANT CHANGE UP (ref 232–1245)

## 2021-10-27 PROCEDURE — 99232 SBSQ HOSP IP/OBS MODERATE 35: CPT

## 2021-10-27 PROCEDURE — 99232 SBSQ HOSP IP/OBS MODERATE 35: CPT | Mod: GC

## 2021-10-27 RX ADMIN — GABAPENTIN 600 MILLIGRAM(S): 400 CAPSULE ORAL at 21:05

## 2021-10-27 RX ADMIN — METHOCARBAMOL 750 MILLIGRAM(S): 500 TABLET, FILM COATED ORAL at 05:38

## 2021-10-27 RX ADMIN — SENNA PLUS 2 TABLET(S): 8.6 TABLET ORAL at 05:39

## 2021-10-27 RX ADMIN — Medication 6 MILLIGRAM(S): at 21:05

## 2021-10-27 RX ADMIN — OXYCODONE HYDROCHLORIDE 10 MILLIGRAM(S): 5 TABLET ORAL at 12:51

## 2021-10-27 RX ADMIN — Medication 650 MILLIGRAM(S): at 21:06

## 2021-10-27 RX ADMIN — Medication 4 MILLIGRAM(S): at 21:05

## 2021-10-27 RX ADMIN — SENNA PLUS 2 TABLET(S): 8.6 TABLET ORAL at 18:20

## 2021-10-27 RX ADMIN — METHOCARBAMOL 750 MILLIGRAM(S): 500 TABLET, FILM COATED ORAL at 15:28

## 2021-10-27 RX ADMIN — Medication 1 TABLET(S): at 12:00

## 2021-10-27 RX ADMIN — POLYETHYLENE GLYCOL 3350 17 GRAM(S): 17 POWDER, FOR SOLUTION ORAL at 05:39

## 2021-10-27 RX ADMIN — POLYETHYLENE GLYCOL 3350 17 GRAM(S): 17 POWDER, FOR SOLUTION ORAL at 18:20

## 2021-10-27 RX ADMIN — DULOXETINE HYDROCHLORIDE 30 MILLIGRAM(S): 30 CAPSULE, DELAYED RELEASE ORAL at 21:05

## 2021-10-27 RX ADMIN — ENOXAPARIN SODIUM 40 MILLIGRAM(S): 100 INJECTION SUBCUTANEOUS at 18:20

## 2021-10-27 RX ADMIN — ONDANSETRON 4 MILLIGRAM(S): 8 TABLET, FILM COATED ORAL at 16:03

## 2021-10-27 RX ADMIN — GABAPENTIN 600 MILLIGRAM(S): 400 CAPSULE ORAL at 05:38

## 2021-10-27 RX ADMIN — Medication 650 MILLIGRAM(S): at 22:33

## 2021-10-27 RX ADMIN — PANTOPRAZOLE SODIUM 40 MILLIGRAM(S): 20 TABLET, DELAYED RELEASE ORAL at 06:28

## 2021-10-27 RX ADMIN — Medication 500 MILLIGRAM(S): at 12:00

## 2021-10-27 RX ADMIN — OXYCODONE HYDROCHLORIDE 10 MILLIGRAM(S): 5 TABLET ORAL at 11:57

## 2021-10-27 RX ADMIN — METHOCARBAMOL 750 MILLIGRAM(S): 500 TABLET, FILM COATED ORAL at 21:05

## 2021-10-27 RX ADMIN — GABAPENTIN 600 MILLIGRAM(S): 400 CAPSULE ORAL at 15:28

## 2021-10-27 NOTE — PROGRESS NOTE ADULT - ASSESSMENT
71y with PMHx hyperlipidemia, chronic back pain, presented to [facility] 10/6/21 with acute onset back pain and progressively worsening bilateral lower extremity weakness, 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 MRI brain showed diffuse SAH likely 2/2 spinal hematoma and paraplegia. Hospital course c/b urinary retention, failed TOV, with kate.    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  - Continue Gabapentin 600mg Q8hr, Methocarbamol 750mg Q8hr, Oxycodone PRN for pain, Duloxetine  - Precautions: falls, spine  - FS well controlled + off Decadron since 10/12/21 - FS d/c  - F/u neurosurgery outpatient    #Neurogenic Bowel  - Continue Lactulose, Senna, Miralax    #Neurogenic Bladder   TOV 10/26   - Bladder scan q6hr   - Continue Cardura    Skin/Pressure Injury:   - At risk for pressure injury due to neurologic diagnosis and relative immobility.  - Monitor Incisions: T12-L2 laminectomy  - Turn every 2 hours while in bed, air mattress  - Soft heel protectors  - Skin barrier cream as needed  - Nursing to monitor skin Qshift    DVT ppx:  - Lovenox  - SCDs  - Last Doppler on 10/9/21 NEG  ---------------  Outpatient Follow-up (Specialty/Name of physician):  Horacio Uriarte; PhD)  Neurosurgery  [address] [place] [address]  Phone: [phone]  Fax: [phone]  Follow Up Time:     Meng Gotti  UROLOGY  [address] [place] [address]  Phone: [phone]  Fax: [phone]  --------------  Goals: Safe discharge to home  Estimated Length of Stay: 10-14 days  Rehab Potential: Good  Medical Prognosis: Good  Estimated Disposition: Home with Home Care  ---------------    Team meeting 10/26:  OT: goals: Mod I ADLs, supervision transfers, currently mod A for transfers and Mod A for LBD   PT: Goals: supervision transfers and TBD for ambulation. Currently Mod A for transfers   TDD: 11/10

## 2021-10-27 NOTE — PROGRESS NOTE ADULT - ASSESSMENT
72 yo M with HLD, chronic back pain, presented to [facility] 10/6/2021 w/ acute onset back pain and progressively worsening b/l LE weakness, found to have intradural extramedullary thoracolumbar hematoma T12-L1, S/P T10-L2 laminectomy w/ hematoma evacuation (10/7/2021). MRI brain w/ diffuse SAH thought to be 2/2 spinal hematoma.     Now admitted to [facility] after for initiation of a multidisciplinary rehab program consisting focused on functional mobility, transfers and ADLs - PT/OT/DVT PPX PAIN MEDS    #B/L LE weakness 2/2  #Intradural extramedullary hematoma T12-L1 s/p T10-L2 laminectomy and decompression  # SAH 2/2 above.   - PT/OT per PMR  - bowel regimen and pain control per PMR.   - OP Neurosurgery f/u    # Right groin wound   Following spinal angiography. s/p suture removal  - healing well.     # Neurogenic bladder w/ urinary retention  PT failed TOV and now has kate. He was seen by urology at OSH. Likely multifactorial due to limited mobilization, constipation, spinal cord compression from hematoma.  -  TOV at rehab if pt has significant neurologic improvement. --> Kate catheter now removed.   - cont doxazosin  - OP f/u with Urology, Dr. 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   - obtain urine lytes and serum osm     # Normocytic Anemia   -h/h stable  -b12 and folate levels nl.  - will obtain retic count in AM.     dvt ppx: lovenox

## 2021-10-27 NOTE — PROGRESS NOTE ADULT - SUBJECTIVE AND OBJECTIVE BOX
Patient is a 71y old  Male who presents with a chief complaint of B/L LE weakness after intradural extramedullary hematoma S/P evacuation (26 Oct 2021 13:29)      24 HOUR EVENTS:  No overnight events reported.     SUBJECTIVE:  Patient seen and examined at bedside.   No complaints.   Feels like he does not have a good appetite.     ALLERGIES:  Celebrex (Other)    MEDICATIONS  (STANDING):  ascorbic acid 500 milliGRAM(s) Oral daily  dextrose 40% Gel 15 Gram(s) Oral once  dextrose 5%. 1000 milliLiter(s) (50 mL/Hr) IV Continuous <Continuous>  dextrose 5%. 1000 milliLiter(s) (100 mL/Hr) IV Continuous <Continuous>  dextrose 50% Injectable 25 Gram(s) IV Push once  dextrose 50% Injectable 12.5 Gram(s) IV Push once  dextrose 50% Injectable 25 Gram(s) IV Push once  doxazosin 4 milliGRAM(s) Oral at bedtime  DULoxetine 30 milliGRAM(s) Oral at bedtime  enoxaparin Injectable 40 milliGRAM(s) SubCutaneous <User Schedule>  gabapentin 600 milliGRAM(s) Oral every 8 hours  glucagon  Injectable 1 milliGRAM(s) IntraMuscular once  influenza   Vaccine 0.5 milliLiter(s) IntraMuscular once  melatonin 6 milliGRAM(s) Oral at bedtime  methocarbamol 750 milliGRAM(s) Oral every 8 hours  multivitamin 1 Tablet(s) Oral daily  pantoprazole    Tablet 40 milliGRAM(s) Oral before breakfast  polyethylene glycol 3350 17 Gram(s) Oral two times a day  senna 2 Tablet(s) Oral every 12 hours    MEDICATIONS  (PRN):  acetaminophen     Tablet .. 650 milliGRAM(s) Oral every 6 hours PRN Mild Pain (1 - 3)  lactulose Syrup 10 Gram(s) Oral daily PRN no BM>1 day  ondansetron   Disintegrating Tablet 4 milliGRAM(s) Oral every 8 hours PRN Nausea and/or Vomiting  oxyCODONE    IR 5 milliGRAM(s) Oral every 4 hours PRN Moderate Pain (4 - 6)  oxyCODONE    IR 10 milliGRAM(s) Oral every 4 hours PRN Severe Pain (7 - 10)    Vital Signs Last 24 Hrs  T(F): 97.8 (27 Oct 2021 08:23), Max: 98.6 (26 Oct 2021 20:04)  HR: 97 (27 Oct 2021 08:23) (97 - 100)  BP: 110/67 (27 Oct 2021 08:23) (110/67 - 145/78)  RR: 16 (27 Oct 2021 08:23) (15 - 16)  SpO2: 97% (27 Oct 2021 08:23) (97% - 97%)  I&O's Summary    26 Oct 2021 07:01  -  27 Oct 2021 07:00  --------------------------------------------------------  IN: 2 mL / OUT: 1150 mL / NET: -1148 mL      PHYSICAL EXAM:  General: NAD, A/O x 3  ENT: Moist mucous membranes, no thrush  Neck: Supple, No JVD  Lungs: Clear to auscultation bilaterally, good air entry, non-labored breathing  Cardio: RRR, S1/S2, No murmur  Abdomen: Soft, Nontender, Nondistended; Bowel sounds present  Extremities: No calf tenderness, No pitting edema.   Skin: right groin site w/ ecchymosis, in stage of healing.     LABS:                        10.7   6.40  )-----------( 199      ( 25 Oct 2021 07:39 )             31.4     10-27    132  |  97  |  14  ----------------------------<  121  4.0   |  30  |  0.62    Ca    8.6      27 Oct 2021 05:12    TPro  6.3  /  Alb  2.9  /  TBili  1.3  /  DBili  x   /  AST  13  /  ALT  29  /  AlkPhos  68  10-25        eGFR if Non African American: 99 mL/min/1.73M2 (10-27-21 @ 05:12)  eGFR if African American: 115 mL/min/1.73M2 (10-27-21 @ 05:12)        COVID-19 PCR: NotDetec (10-23-21 @ 19:45)  COVID-19 PCR: NotDetec (10-20-21 @ 07:11)  COVID-19 PCR: NotDetec (10-17-21 @ 12:40)  COVID-19 PCR: NotDetec (10-13-21 @ 04:16)  COVID-19 PCR: NotDetec (10-06-21 @ 17:15)    RADIOLOGY & ADDITIONAL TESTS:    Care Discussed with Consultants/Other Providers:   DR. Sagastume

## 2021-10-27 NOTE — PROGRESS NOTE ADULT - SUBJECTIVE AND OBJECTIVE BOX
CHIEF COMPLAINT:    Patient is a 71y old  Male who presents with a chief complaint of B/L LE weakness after intradural extramedullary hematoma S/P evacuation (25 Oct 2021 08:09)    HPI:  71 year old male with past medical history of hyperlipidemia, chronic back pain, presented to [facility] 10/6/21 with acute onset back pain and progressively worsening bilateral lower extremity weakness. Urgent MR imaging of the spine revealed intradural extramedullary hematoma T12-L1, S/P T10-L2 laminectomy with 1U pRBCs given intraop 10/6/21. Spinal angiogram 10/7/21 showed no vascular abnormalities. MRI brain showed diffuse SAH thought to be secondary to spinal hematoma. Vazquez was placed for urinary retention, failed TOV x2. Epidural Eliseo Tapia drain was removed 10/11/21. Repeat MRI showed stable spine and repeat spinal angiogram was without vascular abnormalities. Spine sutures and right groin suture removed 10/22/21. Patient is medically stable for discharge to  for AR 10/23/21. (23 Oct 2021 17:44)    SUBJECTIVE:  Patient seen and evaluated this morning. No acute issues noted overnight. TOV- was straight cathed .         ICU Vital Signs Last 24 Hrs  T(C): 36.6 (27 Oct 2021 08:23), Max: 37 (26 Oct 2021 20:04)  T(F): 97.8 (27 Oct 2021 08:23), Max: 98.6 (26 Oct 2021 20:04)  HR: 97 (27 Oct 2021 08:23) (97 - 100)  BP: 110/67 (27 Oct 2021 08:23) (110/67 - 145/78)  BP(mean): --  ABP: --  ABP(mean): --  RR: 16 (27 Oct 2021 08:23) (15 - 16)  SpO2: 97% (27 Oct 2021 08:23) (97% - 97%)    PHYSICAL EXAM  Gen - NAD, Comfortable  HEENT - NCAT, EOMI, MMM  Neck - Supple, No limited ROM  Pulm - CTAB  Cardiovascular - RRR, S1S2, No m/r/g  Abdomen - Soft, NT/ND, +BS  Extremities - No C/C/E, No calf tenderness  Neuro     Motor -    BUE 5/5                     LEFT    LE - HF 5/5, KE 5/5, DF 5/5, PF 5/5                    RIGHT LE - HF 4/5, KE 2/5, DF 3/5, PF 4/5        Sensory - decreased sensation/tingling in the right leg     Reflexes - DTR Intact  Psychiatric - Mood stable, Affect WNL      ---------------------------------------------------------------------------------------------------------------------    CAPILLARY BLOOD GLUCOSE    ---------------------------------------------------------------------------------------------------------------  MEDICATIONS  (STANDING):  ascorbic acid 500 milliGRAM(s) Oral daily  dextrose 40% Gel 15 Gram(s) Oral once  dextrose 5%. 1000 milliLiter(s) (50 mL/Hr) IV Continuous <Continuous>  dextrose 5%. 1000 milliLiter(s) (100 mL/Hr) IV Continuous <Continuous>  dextrose 50% Injectable 25 Gram(s) IV Push once  dextrose 50% Injectable 12.5 Gram(s) IV Push once  dextrose 50% Injectable 25 Gram(s) IV Push once  doxazosin 4 milliGRAM(s) Oral at bedtime  DULoxetine 30 milliGRAM(s) Oral at bedtime  enoxaparin Injectable 40 milliGRAM(s) SubCutaneous <User Schedule>  gabapentin 600 milliGRAM(s) Oral every 8 hours  glucagon  Injectable 1 milliGRAM(s) IntraMuscular once  influenza   Vaccine 0.5 milliLiter(s) IntraMuscular once  melatonin 6 milliGRAM(s) Oral at bedtime  methocarbamol 750 milliGRAM(s) Oral every 8 hours  multivitamin 1 Tablet(s) Oral daily  pantoprazole    Tablet 40 milliGRAM(s) Oral before breakfast  polyethylene glycol 3350 17 Gram(s) Oral two times a day  senna 2 Tablet(s) Oral every 12 hours    MEDICATIONS  (PRN):  acetaminophen     Tablet .. 650 milliGRAM(s) Oral every 6 hours PRN Mild Pain (1 - 3)  lactulose Syrup 10 Gram(s) Oral daily PRN no BM>1 day  ondansetron   Disintegrating Tablet 4 milliGRAM(s) Oral every 8 hours PRN Nausea and/or Vomiting  oxyCODONE    IR 5 milliGRAM(s) Oral every 4 hours PRN Moderate Pain (4 - 6)  oxyCODONE    IR 10 milliGRAM(s) Oral every 4 hours PRN Severe Pain (7 - 10)

## 2021-10-27 NOTE — CHART NOTE - NSCHARTNOTEFT_GEN_A_CORE
NUTRITION FOLLOW UP    SOURCE: Patient [X)     Medical Record (X)    DIET: Regular, Ensure Clear TID  Pt is tolerating diet well. Pt complains of fair appetite. Educated on importance of Nutrition Supplement. No chewing/swallowing difficulties reported. Pt offered foods high in fiber to decrease instances of diarrhea.     PATIENT REPORT: [X] diarrhea    PO INTAKE:  < 50% [X]       Enteral Nutrition : Not Applicable    CURRENT WEIGHT: Weight (lbs): 176 (10-23 @ 17:01)   168 lbs - (10/24)   Obtain new weight to confirm change.     PERTINENT MEDS:   Pertinent Medications: MEDICATIONS  (STANDING):  ascorbic acid 500 milliGRAM(s) Oral daily  dextrose 40% Gel 15 Gram(s) Oral once  dextrose 5%. 1000 milliLiter(s) (50 mL/Hr) IV Continuous <Continuous>  dextrose 5%. 1000 milliLiter(s) (100 mL/Hr) IV Continuous <Continuous>  dextrose 50% Injectable 25 Gram(s) IV Push once  dextrose 50% Injectable 12.5 Gram(s) IV Push once  dextrose 50% Injectable 25 Gram(s) IV Push once  doxazosin 4 milliGRAM(s) Oral at bedtime  DULoxetine 30 milliGRAM(s) Oral at bedtime  enoxaparin Injectable 40 milliGRAM(s) SubCutaneous <User Schedule>  gabapentin 600 milliGRAM(s) Oral every 8 hours  glucagon  Injectable 1 milliGRAM(s) IntraMuscular once  influenza   Vaccine 0.5 milliLiter(s) IntraMuscular once  melatonin 6 milliGRAM(s) Oral at bedtime  methocarbamol 750 milliGRAM(s) Oral every 8 hours  multivitamin 1 Tablet(s) Oral daily  pantoprazole    Tablet 40 milliGRAM(s) Oral before breakfast  polyethylene glycol 3350 17 Gram(s) Oral two times a day  senna 2 Tablet(s) Oral every 12 hours    MEDICATIONS  (PRN):  acetaminophen     Tablet .. 650 milliGRAM(s) Oral every 6 hours PRN Mild Pain (1 - 3)  lactulose Syrup 10 Gram(s) Oral daily PRN no BM>1 day  ondansetron   Disintegrating Tablet 4 milliGRAM(s) Oral every 8 hours PRN Nausea and/or Vomiting  oxyCODONE    IR 5 milliGRAM(s) Oral every 4 hours PRN Moderate Pain (4 - 6)  oxyCODONE    IR 10 milliGRAM(s) Oral every 4 hours PRN Severe Pain (7 - 10)      PERTINENT LABS:  10-27 Na132 mmol/L<L> Glu 121 mg/dL<H> K+ 4.0 mmol/L Cr  0.62 mg/dL BUN 14 mg/dL 10-23 Phos 2.9 mg/dL 10-25 Alb 2.9 g/dL<L>    SKIN: Surgical Incision     EDEMA: None noted.    LAST BM: on 10/27    ESTIMATED NEEDS:   [X] no change since previous assessment    PREVIOUS NUTRITION DIAGNOSIS:    Severe Malnutrition    NUTRITION DIAGNOSIS is :  (X)  Ongoing       NEW NUTRITION DIAGNOSIS: N/A     NUTRITION RECOMMENDATIONS:  Continue current Nutrition Care Plan     MONITORING AND EVALUATION:   1. Tolerance to diet prescription   2. PO intake  3. Weights  4. Labs  5. Follow Up per protocol     Kathie Sims  Dietetic Intern

## 2021-10-28 DIAGNOSIS — R33.9 RETENTION OF URINE, UNSPECIFIED: ICD-10-CM

## 2021-10-28 LAB
ALBUMIN SERPL ELPH-MCNC: 2.8 G/DL — LOW (ref 3.3–5)
ALP SERPL-CCNC: 66 U/L — SIGNIFICANT CHANGE UP (ref 40–120)
ALT FLD-CCNC: 30 U/L — SIGNIFICANT CHANGE UP (ref 10–45)
ANION GAP SERPL CALC-SCNC: 6 MMOL/L — SIGNIFICANT CHANGE UP (ref 5–17)
AST SERPL-CCNC: 14 U/L — SIGNIFICANT CHANGE UP (ref 10–40)
BASOPHILS # BLD AUTO: 0.02 K/UL — SIGNIFICANT CHANGE UP (ref 0–0.2)
BASOPHILS NFR BLD AUTO: 0.4 % — SIGNIFICANT CHANGE UP (ref 0–2)
BILIRUB SERPL-MCNC: 0.8 MG/DL — SIGNIFICANT CHANGE UP (ref 0.2–1.2)
BUN SERPL-MCNC: 13 MG/DL — SIGNIFICANT CHANGE UP (ref 7–23)
CALCIUM SERPL-MCNC: 9 MG/DL — SIGNIFICANT CHANGE UP (ref 8.4–10.5)
CHLORIDE SERPL-SCNC: 96 MMOL/L — SIGNIFICANT CHANGE UP (ref 96–108)
CO2 SERPL-SCNC: 31 MMOL/L — SIGNIFICANT CHANGE UP (ref 22–31)
CREAT SERPL-MCNC: 0.71 MG/DL — SIGNIFICANT CHANGE UP (ref 0.5–1.3)
EOSINOPHIL # BLD AUTO: 0.08 K/UL — SIGNIFICANT CHANGE UP (ref 0–0.5)
EOSINOPHIL NFR BLD AUTO: 1.7 % — SIGNIFICANT CHANGE UP (ref 0–6)
GLUCOSE SERPL-MCNC: 127 MG/DL — HIGH (ref 70–99)
HCT VFR BLD CALC: 32.7 % — LOW (ref 39–50)
HGB BLD-MCNC: 11.1 G/DL — LOW (ref 13–17)
IMM GRANULOCYTES NFR BLD AUTO: 0.4 % — SIGNIFICANT CHANGE UP (ref 0–1.5)
LYMPHOCYTES # BLD AUTO: 0.66 K/UL — LOW (ref 1–3.3)
LYMPHOCYTES # BLD AUTO: 14.3 % — SIGNIFICANT CHANGE UP (ref 13–44)
MCHC RBC-ENTMCNC: 30.7 PG — SIGNIFICANT CHANGE UP (ref 27–34)
MCHC RBC-ENTMCNC: 33.9 GM/DL — SIGNIFICANT CHANGE UP (ref 32–36)
MCV RBC AUTO: 90.3 FL — SIGNIFICANT CHANGE UP (ref 80–100)
MONOCYTES # BLD AUTO: 0.54 K/UL — SIGNIFICANT CHANGE UP (ref 0–0.9)
MONOCYTES NFR BLD AUTO: 11.7 % — SIGNIFICANT CHANGE UP (ref 2–14)
NEUTROPHILS # BLD AUTO: 3.29 K/UL — SIGNIFICANT CHANGE UP (ref 1.8–7.4)
NEUTROPHILS NFR BLD AUTO: 71.5 % — SIGNIFICANT CHANGE UP (ref 43–77)
NRBC # BLD: 0 /100 WBCS — SIGNIFICANT CHANGE UP (ref 0–0)
PLATELET # BLD AUTO: 210 K/UL — SIGNIFICANT CHANGE UP (ref 150–400)
POTASSIUM SERPL-MCNC: 4.3 MMOL/L — SIGNIFICANT CHANGE UP (ref 3.5–5.3)
POTASSIUM SERPL-SCNC: 4.3 MMOL/L — SIGNIFICANT CHANGE UP (ref 3.5–5.3)
PROT SERPL-MCNC: 6.4 G/DL — SIGNIFICANT CHANGE UP (ref 6–8.3)
RBC # BLD: 3.62 M/UL — LOW (ref 4.2–5.8)
RBC # BLD: 3.62 M/UL — LOW (ref 4.2–5.8)
RBC # FLD: 13.5 % — SIGNIFICANT CHANGE UP (ref 10.3–14.5)
RETICS #: 135 K/UL — HIGH (ref 25–125)
RETICS/RBC NFR: 3.7 % — HIGH (ref 0.5–2.5)
SODIUM SERPL-SCNC: 133 MMOL/L — LOW (ref 135–145)
SODIUM UR-SCNC: <20 MMOL/L — SIGNIFICANT CHANGE UP
WBC # BLD: 4.61 K/UL — SIGNIFICANT CHANGE UP (ref 3.8–10.5)
WBC # FLD AUTO: 4.61 K/UL — SIGNIFICANT CHANGE UP (ref 3.8–10.5)

## 2021-10-28 PROCEDURE — 99232 SBSQ HOSP IP/OBS MODERATE 35: CPT | Mod: GC

## 2021-10-28 PROCEDURE — 99232 SBSQ HOSP IP/OBS MODERATE 35: CPT

## 2021-10-28 RX ADMIN — Medication 6 MILLIGRAM(S): at 21:32

## 2021-10-28 RX ADMIN — METHOCARBAMOL 750 MILLIGRAM(S): 500 TABLET, FILM COATED ORAL at 05:30

## 2021-10-28 RX ADMIN — SENNA PLUS 2 TABLET(S): 8.6 TABLET ORAL at 17:22

## 2021-10-28 RX ADMIN — Medication 4 MILLIGRAM(S): at 21:32

## 2021-10-28 RX ADMIN — METHOCARBAMOL 750 MILLIGRAM(S): 500 TABLET, FILM COATED ORAL at 21:32

## 2021-10-28 RX ADMIN — OXYCODONE HYDROCHLORIDE 10 MILLIGRAM(S): 5 TABLET ORAL at 08:57

## 2021-10-28 RX ADMIN — PANTOPRAZOLE SODIUM 40 MILLIGRAM(S): 20 TABLET, DELAYED RELEASE ORAL at 05:30

## 2021-10-28 RX ADMIN — DULOXETINE HYDROCHLORIDE 30 MILLIGRAM(S): 30 CAPSULE, DELAYED RELEASE ORAL at 21:33

## 2021-10-28 RX ADMIN — GABAPENTIN 600 MILLIGRAM(S): 400 CAPSULE ORAL at 21:32

## 2021-10-28 RX ADMIN — OXYCODONE HYDROCHLORIDE 10 MILLIGRAM(S): 5 TABLET ORAL at 12:16

## 2021-10-28 RX ADMIN — METHOCARBAMOL 750 MILLIGRAM(S): 500 TABLET, FILM COATED ORAL at 13:39

## 2021-10-28 RX ADMIN — ENOXAPARIN SODIUM 40 MILLIGRAM(S): 100 INJECTION SUBCUTANEOUS at 17:22

## 2021-10-28 RX ADMIN — GABAPENTIN 600 MILLIGRAM(S): 400 CAPSULE ORAL at 13:39

## 2021-10-28 RX ADMIN — POLYETHYLENE GLYCOL 3350 17 GRAM(S): 17 POWDER, FOR SOLUTION ORAL at 17:22

## 2021-10-28 RX ADMIN — OXYCODONE HYDROCHLORIDE 10 MILLIGRAM(S): 5 TABLET ORAL at 07:33

## 2021-10-28 RX ADMIN — POLYETHYLENE GLYCOL 3350 17 GRAM(S): 17 POWDER, FOR SOLUTION ORAL at 05:30

## 2021-10-28 RX ADMIN — Medication 1 TABLET(S): at 11:51

## 2021-10-28 RX ADMIN — OXYCODONE HYDROCHLORIDE 10 MILLIGRAM(S): 5 TABLET ORAL at 11:55

## 2021-10-28 RX ADMIN — GABAPENTIN 600 MILLIGRAM(S): 400 CAPSULE ORAL at 05:29

## 2021-10-28 RX ADMIN — SENNA PLUS 2 TABLET(S): 8.6 TABLET ORAL at 05:30

## 2021-10-28 RX ADMIN — Medication 500 MILLIGRAM(S): at 11:51

## 2021-10-28 NOTE — PROGRESS NOTE ADULT - ATTENDING COMMENTS
Patient seen in therapy  He continues to have bowel incontinence. Will attempt bowel suppository tomorrow am to see if can decrease daytime accidents  Continue ISC

## 2021-10-28 NOTE — PROGRESS NOTE ADULT - ASSESSMENT
72 yo M with HLD, chronic back pain, presented to [facility] 10/6/2021 w/ acute onset back pain and progressively worsening b/l LE weakness, found to have intradural extramedullary thoracolumbar hematoma T12-L1, S/P T10-L2 laminectomy w/ hematoma evacuation (10/7/2021). MRI brain w/ diffuse SAH thought to be 2/2 spinal hematoma.     Now admitted to [facility] after for initiation of a multidisciplinary rehab program consisting focused on functional mobility, transfers and ADLs - PT/OT/DVT PPX PAIN MEDS    #B/L LE weakness 2/2  #Intradural extramedullary hematoma T12-L1 s/p T10-L2 laminectomy and decompression  # SAH 2/2 above.   - PT/OT per PMR  - bowel regimen and pain control per PMR.   - OP Neurosurgery f/u    # Right groin wound   Following spinal angiography. s/p suture removal  - healing well.     # Neurogenic bladder w/ urinary retention  PT failed TOV and now has kate. He was seen by urology at OSH. Likely multifactorial due to limited mobilization, constipation, spinal cord compression from hematoma.  -  TOV at rehab if pt has significant neurologic improvement. --> Kate catheter now removed.   - cont doxazosin  - OP f/u with Urology, Dr. 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   - obtain urine lytes and serum osm     # Normocytic Anemia   -h/h stable  -b12 and folate levels nl.  - will obtain retic count in AM.     dvt ppx: lovenox 72 yo M with HLD, chronic back pain, presented to [facility] 10/6/2021 w/ acute onset back pain and progressively worsening b/l LE weakness, found to have intradural extramedullary thoracolumbar hematoma T12-L1, S/P T10-L2 laminectomy w/ hematoma evacuation (10/7/2021). MRI brain w/ diffuse SAH thought to be 2/2 spinal hematoma.     Now admitted to [facility] after for initiation of a multidisciplinary rehab program consisting focused on functional mobility, transfers and ADLs - PT/OT/DVT PPX PAIN MEDS    #B/L LE weakness 2/2  #Intradural extramedullary hematoma T12-L1 s/p T10-L2 laminectomy and decompression  # SAH 2/2 above.   - PT/OT per PMR  - bowel regimen and pain control per PMR.   - OP Neurosurgery f/u    # Right groin wound   Following spinal angiography. s/p suture removal  - healing well.     # N/V/anorexia  - possibly d/t robaxin side effect? Consider wean.     # Neurogenic bladder w/ urinary retention  PT failed TOV and now has kate. He was seen by urology at OSH. Likely multifactorial due to limited mobilization, constipation, spinal cord compression from hematoma.  -  TOV at rehab if pt has significant neurologic improvement. --> Kate catheter now removed.   - cont doxazosin  - OP f/u with Urology, Dr. 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   - f/u urine lytes and serum osm     # Normocytic Anemia   -h/h stable  -b12 and folate levels nl.  - inc retic count.     dvt ppx: lovenox 70 yo M with HLD, chronic back pain, presented to [facility] 10/6/2021 w/ acute onset back pain and progressively worsening b/l LE weakness, found to have intradural extramedullary thoracolumbar hematoma T12-L1, S/P T10-L2 laminectomy w/ hematoma evacuation (10/7/2021). MRI brain w/ diffuse SAH thought to be 2/2 spinal hematoma.     Now admitted to [facility] after for initiation of a multidisciplinary rehab program consisting focused on functional mobility, transfers and ADLs - PT/OT/DVT PPX PAIN MEDS    #B/L LE weakness 2/2  #Intradural extramedullary hematoma T12-L1 s/p T10-L2 laminectomy and decompression  # SAH 2/2 above.   - PT/OT per PMR  - bowel regimen and pain control per PMR.   - OP Neurosurgery f/u    # Right groin wound   Following spinal angiography. s/p suture removal  - healing well.     # N/V/anorexia  - possibly d/t robaxin side effect? Consider wean as reports no longer having spasms.     # Neurogenic bladder w/ urinary retention  PT failed TOV and now has kate. He was seen by urology at OSH. Likely multifactorial due to limited mobilization, constipation, spinal cord compression from hematoma.  -  TOV at rehab if pt has significant neurologic improvement. --> Kate catheter now removed.   - cont doxazosin  - OP f/u with Urology, Dr. 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   - f/u urine lytes and serum osm     # Normocytic Anemia   -h/h stable  -b12 and folate levels nl.  - inc retic count.     dvt ppx: lovenox

## 2021-10-28 NOTE — CONSULT NOTE ADULT - ASSESSMENT
urinary retention    sterile technique  16 Fr 5ml coude cath placed    difficult due to anatomy  800 ml yellow urine drained, placed to BSB
Intradural Extramedullary Hematoma T12-L1, S/P T10-L2 laminectomy  PT/OT per rehab  Pain control per rehab    Hyperglycemia sec to steroids at previous hospital  pt not on steroids anymore  A1c 5.3  glucose 106-128  DC FGS and lispro    Neurogenic bladder  kate  Cardura    Anemia  h/h stable  Monitor for now    Hyponatremia  Monitor for now    DVT ppx  Lovenox

## 2021-10-28 NOTE — PROGRESS NOTE ADULT - ASSESSMENT
71y with PMHx hyperlipidemia, chronic back pain, presented to [facility] 10/6/21 with acute onset back pain and progressively worsening bilateral lower extremity weakness, 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 MRI brain showed diffuse SAH likely 2/2 spinal hematoma and paraplegia. Hospital course c/b urinary retention, failed TOV, with kate.    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  - Continue Gabapentin 600mg Q8hr, Methocarbamol 750mg Q8hr, Oxycodone PRN for pain, Duloxetine  - Precautions: falls, spine  - FS well controlled + off Decadron since 10/12/21 - FS d/c  - F/u neurosurgery outpatient    #Neurogenic Bowel  - Continue Lactulose, Senna, Miralax  - Dulcolax suppos at 6am daily to be started 10/29     #Neurogenic Bladder   TOV 10/26 -- has needed SC -- 800cc 12am, 400 cc 5am on 10/28   - Bladder scan q6hr   - Continue Cardura    Skin/Pressure Injury:   - At risk for pressure injury due to neurologic diagnosis and relative immobility.  - Monitor Incisions: T12-L2 laminectomy  - Turn every 2 hours while in bed, air mattress  - Soft heel protectors  - Skin barrier cream as needed  - Nursing to monitor skin Qshift    DVT ppx:  - Lovenox  - SCDs  - Last Doppler on 10/9/21 NEG  ---------------  Outpatient Follow-up (Specialty/Name of physician):  Horacio Uriarte; PhD)  Neurosurgery  [address] [place] [address]  Phone: [phone]  Fax: [phone]  Follow Up Time:     Meng Gotti  UROLOGY  [id number] [place] [id number]  Phone: [phone]  Fax: [phone]  --------------  Goals: Safe discharge to home  Estimated Length of Stay: 10-14 days  Rehab Potential: Good  Medical Prognosis: Good  Estimated Disposition: Home with Home Care  ---------------    Team meeting 10/26:  OT: goals: Mod I ADLs, supervision transfers, currently mod A for transfers and Mod A for LBD   PT: Goals: supervision transfers and TBD for ambulation. Currently Mod A for transfers   TDD: 11/10  71y with PMHx hyperlipidemia, chronic back pain, presented to [facility] 10/6/21 with acute onset back pain and progressively worsening bilateral lower extremity weakness, 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 MRI brain showed diffuse SAH likely 2/2 spinal hematoma and paraplegia. Hospital course c/b urinary retention, failed TOV, with kate.    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   - Na 133 (10/28)    # Normocytic Anemia   -h/h stable  -b12 and folate levels nl.  - Hb 11.1 (10/28)     Skin/Pressure Injury:   - At risk for pressure injury due to neurologic diagnosis and relative immobility.  - Monitor Incisions: T12-L2 laminectomy  - Turn every 2 hours while in bed, air mattress  - Soft heel protectors  - Skin barrier cream as needed  - Nursing to monitor skin Qshift    DVT ppx:  - Lovenox  - SCDs  - Last Doppler on 10/9/21 NEG  ---------------  Outpatient Follow-up (Specialty/Name of physician):  Horacio Uriarte; PhD)  Neurosurgery  [address] [place] [address]  Phone: [phone]  Fax: [phone]  Follow Up Time:     Meng Gotti  UROLOGY  [address] [address] [place] [address]  Phone: [phone]  Fax: [phone]  --------------  Goals: Safe discharge to home  Estimated Length of Stay: 10-14 days  Rehab Potential: Good  Medical Prognosis: Good  Estimated Disposition: Home with Home Care  ---------------    Team meeting 10/26:  OT: goals: Mod I ADLs, supervision transfers, currently mod A for transfers and Mod A for LBD   PT: Goals: supervision transfers and TBD for ambulation. Currently Mod A for transfers   TDD: 11/10

## 2021-10-28 NOTE — CONSULT NOTE ADULT - PROBLEM SELECTOR RECOMMENDATION 9
keep kate    increase cardura to 8mg if tolerated    bowel care, mobilize    ? alternative to cymbalta, delayed void trial

## 2021-10-28 NOTE — PROGRESS NOTE ADULT - SUBJECTIVE AND OBJECTIVE BOX
CHIEF COMPLAINT:    Patient is a 71y old  Male who presents with a chief complaint of B/L LE weakness after intradural extramedullary hematoma S/P evacuation (25 Oct 2021 08:09)    HPI:  71 year old male with past medical history of hyperlipidemia, chronic back pain, presented to [facility] 10/6/21 with acute onset back pain and progressively worsening bilateral lower extremity weakness. Urgent MR imaging of the spine revealed intradural extramedullary hematoma T12-L1, S/P T10-L2 laminectomy with 1U pRBCs given intraop 10/6/21. Spinal angiogram 10/7/21 showed no vascular abnormalities. MRI brain showed diffuse SAH thought to be secondary to spinal hematoma. Vazquez was placed for urinary retention, failed TOV x2. Epidural Eliseo Tapia drain was removed 10/11/21. Repeat MRI showed stable spine and repeat spinal angiogram was without vascular abnormalities. Spine sutures and right groin suture removed 10/22/21. Patient is medically stable for discharge to  for AR 10/23/21. (23 Oct 2021 17:44)    SUBJECTIVE:  Patient seen and evaluated this morning. Needed to be SC this morning 800cc at 12Am and 400cc at 5AM. No acute issues noted overnight.       Vital Signs Last 24 Hrs  T(C): 36.4 (28 Oct 2021 07:32), Max: 37.2 (27 Oct 2021 21:02)  T(F): 97.6 (28 Oct 2021 07:32), Max: 99 (27 Oct 2021 21:02)  HR: 96 (28 Oct 2021 07:32) (94 - 96)  BP: 103/69 (28 Oct 2021 07:32) (103/69 - 157/92)  BP(mean): --  RR: 16 (28 Oct 2021 07:32) (16 - 17)  SpO2: 100% (28 Oct 2021 07:32) (96% - 100%)    PHYSICAL EXAM  Gen - NAD, Comfortable  HEENT - NCAT, EOMI, MMM  Neck - Supple, No limited ROM  Pulm - CTAB  Cardiovascular - RRR, S1S2, No m/r/g  Abdomen - Soft, NT/ND, +BS  Extremities - No C/C/E, No calf tenderness  Neuro     Motor -    BUE 5/5                     LEFT    LE - HF 5/5, KE 5/5, DF 5/5, PF 5/5                    RIGHT LE - HF 4/5, KE 2/5, DF 3/5, PF 4/5        Sensory - decreased sensation/tingling in the right leg     Reflexes - DTR Intact  Psychiatric - Mood stable, Affect WNL      ---------------------------------------------------------------------------------------------------------------------  RECENT LABS/IMAGING                        11.1   4.61  )-----------( 210      ( 28 Oct 2021 06:22 )             32.7     10-28    133<L>  |  96  |  13  ----------------------------<  127<H>  4.3   |  31  |  0.71    Ca    9.0      28 Oct 2021 06:22    TPro  6.4  /  Alb  2.8<L>  /  TBili  0.8  /  DBili  x   /  AST  14  /  ALT  30  /  AlkPhos  66  10-28      ---------------------------------------------------------------------------------------------------------------  MEDICATIONS  (STANDING):  ascorbic acid 500 milliGRAM(s) Oral daily  dextrose 40% Gel 15 Gram(s) Oral once  dextrose 5%. 1000 milliLiter(s) (50 mL/Hr) IV Continuous <Continuous>  dextrose 5%. 1000 milliLiter(s) (100 mL/Hr) IV Continuous <Continuous>  dextrose 50% Injectable 25 Gram(s) IV Push once  dextrose 50% Injectable 12.5 Gram(s) IV Push once  dextrose 50% Injectable 25 Gram(s) IV Push once  doxazosin 4 milliGRAM(s) Oral at bedtime  DULoxetine 30 milliGRAM(s) Oral at bedtime  enoxaparin Injectable 40 milliGRAM(s) SubCutaneous <User Schedule>  gabapentin 600 milliGRAM(s) Oral every 8 hours  glucagon  Injectable 1 milliGRAM(s) IntraMuscular once  influenza   Vaccine 0.5 milliLiter(s) IntraMuscular once  melatonin 6 milliGRAM(s) Oral at bedtime  methocarbamol 750 milliGRAM(s) Oral every 8 hours  multivitamin 1 Tablet(s) Oral daily  pantoprazole    Tablet 40 milliGRAM(s) Oral before breakfast  polyethylene glycol 3350 17 Gram(s) Oral two times a day  senna 2 Tablet(s) Oral every 12 hours    MEDICATIONS  (PRN):  acetaminophen     Tablet .. 650 milliGRAM(s) Oral every 6 hours PRN Mild Pain (1 - 3)  lactulose Syrup 10 Gram(s) Oral daily PRN no BM>1 day  ondansetron   Disintegrating Tablet 4 milliGRAM(s) Oral every 8 hours PRN Nausea and/or Vomiting  oxyCODONE    IR 5 milliGRAM(s) Oral every 4 hours PRN Moderate Pain (4 - 6)  oxyCODONE    IR 10 milliGRAM(s) Oral every 4 hours PRN Severe Pain (7 - 10)

## 2021-10-28 NOTE — PROGRESS NOTE ADULT - SUBJECTIVE AND OBJECTIVE BOX
Patient is a 71y old  Male who presents with a chief complaint of B/L LE weakness after intradural extramedullary hematoma S/P evacuation (28 Oct 2021 11:18)      24 HOUR EVENTS:  No overnight events reported.     SUBJECTIVE:  Patient seen and examined at bedside.     ALLERGIES:  Celebrex (Other)    MEDICATIONS  (STANDING):  ascorbic acid 500 milliGRAM(s) Oral daily  bisacodyl Suppository 10 milliGRAM(s) Rectal <User Schedule>  dextrose 40% Gel 15 Gram(s) Oral once  dextrose 5%. 1000 milliLiter(s) (50 mL/Hr) IV Continuous <Continuous>  dextrose 5%. 1000 milliLiter(s) (100 mL/Hr) IV Continuous <Continuous>  dextrose 50% Injectable 25 Gram(s) IV Push once  dextrose 50% Injectable 12.5 Gram(s) IV Push once  dextrose 50% Injectable 25 Gram(s) IV Push once  doxazosin 4 milliGRAM(s) Oral at bedtime  DULoxetine 30 milliGRAM(s) Oral at bedtime  enoxaparin Injectable 40 milliGRAM(s) SubCutaneous <User Schedule>  gabapentin 600 milliGRAM(s) Oral every 8 hours  glucagon  Injectable 1 milliGRAM(s) IntraMuscular once  influenza   Vaccine 0.5 milliLiter(s) IntraMuscular once  melatonin 6 milliGRAM(s) Oral at bedtime  methocarbamol 750 milliGRAM(s) Oral every 8 hours  multivitamin 1 Tablet(s) Oral daily  pantoprazole    Tablet 40 milliGRAM(s) Oral before breakfast  polyethylene glycol 3350 17 Gram(s) Oral two times a day  senna 2 Tablet(s) Oral every 12 hours    MEDICATIONS  (PRN):  acetaminophen     Tablet .. 650 milliGRAM(s) Oral every 6 hours PRN Mild Pain (1 - 3)  lactulose Syrup 10 Gram(s) Oral daily PRN no BM>1 day  ondansetron   Disintegrating Tablet 4 milliGRAM(s) Oral every 8 hours PRN Nausea and/or Vomiting  oxyCODONE    IR 10 milliGRAM(s) Oral every 4 hours PRN Severe Pain (7 - 10)  oxyCODONE    IR 5 milliGRAM(s) Oral every 4 hours PRN Moderate Pain (4 - 6)    Vital Signs Last 24 Hrs  T(F): 97.6 (28 Oct 2021 07:32), Max: 99 (27 Oct 2021 21:02)  HR: 96 (28 Oct 2021 07:32) (94 - 96)  BP: 103/69 (28 Oct 2021 07:32) (103/69 - 157/92)  RR: 16 (28 Oct 2021 07:32) (16 - 17)  SpO2: 100% (28 Oct 2021 07:32) (96% - 100%)  I&O's Summary    27 Oct 2021 07:01  -  28 Oct 2021 07:00  --------------------------------------------------------  IN: 0 mL / OUT: 1900 mL / NET: -1900 mL      PHYSICAL EXAM:  General: NAD, A/O x 3  ENT: Moist mucous membranes, no thrush  Neck: Supple, No JVD  Lungs: Clear to auscultation bilaterally, good air entry, non-labored breathing  Cardio: RRR, S1/S2, No murmur  Abdomen: Soft, Nontender, Nondistended; Bowel sounds present  Extremities: No calf tenderness, No pitting edema    LABS:                        11.1   4.61  )-----------( 210      ( 28 Oct 2021 06:22 )             32.7     10-28    133  |  96  |  13  ----------------------------<  127  4.3   |  31  |  0.71    Ca    9.0      28 Oct 2021 06:22    TPro  6.4  /  Alb  2.8  /  TBili  0.8  /  DBili  x   /  AST  14  /  ALT  30  /  AlkPhos  66  10-28        eGFR if Non African American: 94 mL/min/1.73M2 (10-28-21 @ 06:22)  eGFR if : 109 mL/min/1.73M2 (10-28-21 @ 06:22)      COVID-19 PCR: NotDetec (10-23-21 @ 19:45)  COVID-19 PCR: NotDetec (10-20-21 @ 07:11)  COVID-19 PCR: NotDetec (10-17-21 @ 12:40)  COVID-19 PCR: NotDetec (10-13-21 @ 04:16)  COVID-19 PCR: NotDetec (10-06-21 @ 17:15)    RADIOLOGY & ADDITIONAL TESTS:    Care Discussed with Consultants/Other Providers:   DR. Yosvany RYAN   Patient is a 71y old  Male who presents with a chief complaint of B/L LE weakness after intradural extramedullary hematoma S/P evacuation (28 Oct 2021 11:18)      24 HOUR EVENTS:  No overnight events reported.     SUBJECTIVE:  Patient seen and examined at bedside.   Spoke to pt with wife on the phone.  He reports having nausea and no appetite. He thinks it could be medication related. He reports having less muscle spasms now. He reports stools have been less formed.     ALLERGIES:  Celebrex (Other)    MEDICATIONS  (STANDING):  ascorbic acid 500 milliGRAM(s) Oral daily  bisacodyl Suppository 10 milliGRAM(s) Rectal <User Schedule>  dextrose 40% Gel 15 Gram(s) Oral once  dextrose 5%. 1000 milliLiter(s) (50 mL/Hr) IV Continuous <Continuous>  dextrose 5%. 1000 milliLiter(s) (100 mL/Hr) IV Continuous <Continuous>  dextrose 50% Injectable 25 Gram(s) IV Push once  dextrose 50% Injectable 12.5 Gram(s) IV Push once  dextrose 50% Injectable 25 Gram(s) IV Push once  doxazosin 4 milliGRAM(s) Oral at bedtime  DULoxetine 30 milliGRAM(s) Oral at bedtime  enoxaparin Injectable 40 milliGRAM(s) SubCutaneous <User Schedule>  gabapentin 600 milliGRAM(s) Oral every 8 hours  glucagon  Injectable 1 milliGRAM(s) IntraMuscular once  influenza   Vaccine 0.5 milliLiter(s) IntraMuscular once  melatonin 6 milliGRAM(s) Oral at bedtime  methocarbamol 750 milliGRAM(s) Oral every 8 hours  multivitamin 1 Tablet(s) Oral daily  pantoprazole    Tablet 40 milliGRAM(s) Oral before breakfast  polyethylene glycol 3350 17 Gram(s) Oral two times a day  senna 2 Tablet(s) Oral every 12 hours    MEDICATIONS  (PRN):  acetaminophen     Tablet .. 650 milliGRAM(s) Oral every 6 hours PRN Mild Pain (1 - 3)  lactulose Syrup 10 Gram(s) Oral daily PRN no BM>1 day  ondansetron   Disintegrating Tablet 4 milliGRAM(s) Oral every 8 hours PRN Nausea and/or Vomiting  oxyCODONE    IR 10 milliGRAM(s) Oral every 4 hours PRN Severe Pain (7 - 10)  oxyCODONE    IR 5 milliGRAM(s) Oral every 4 hours PRN Moderate Pain (4 - 6)    Vital Signs Last 24 Hrs  T(F): 97.6 (28 Oct 2021 07:32), Max: 99 (27 Oct 2021 21:02)  HR: 96 (28 Oct 2021 07:32) (94 - 96)  BP: 103/69 (28 Oct 2021 07:32) (103/69 - 157/92)  RR: 16 (28 Oct 2021 07:32) (16 - 17)  SpO2: 100% (28 Oct 2021 07:32) (96% - 100%)  I&O's Summary    27 Oct 2021 07:01  -  28 Oct 2021 07:00  --------------------------------------------------------  IN: 0 mL / OUT: 1900 mL / NET: -1900 mL      PHYSICAL EXAM:  General: NAD, A/O x 3  ENT: Moist mucous membranes, no thrush  Neck: Supple, No JVD  Lungs: Clear to auscultation bilaterally, good air entry, non-labored breathing  Cardio: RRR, S1/S2, No murmur  Abdomen: Soft, Nontender, Nondistended; Bowel sounds present  Extremities: No calf tenderness, No pitting edema    LABS:                        11.1   4.61  )-----------( 210      ( 28 Oct 2021 06:22 )             32.7     10-28    133  |  96  |  13  ----------------------------<  127  4.3   |  31  |  0.71    Ca    9.0      28 Oct 2021 06:22    TPro  6.4  /  Alb  2.8  /  TBili  0.8  /  DBili  x   /  AST  14  /  ALT  30  /  AlkPhos  66  10-28        eGFR if Non African American: 94 mL/min/1.73M2 (10-28-21 @ 06:22)  eGFR if : 109 mL/min/1.73M2 (10-28-21 @ 06:22)      COVID-19 PCR: NotDetec (10-23-21 @ 19:45)  COVID-19 PCR: NotDetec (10-20-21 @ 07:11)  COVID-19 PCR: NotDetec (10-17-21 @ 12:40)  COVID-19 PCR: NotDetec (10-13-21 @ 04:16)  COVID-19 PCR: NotDetec (10-06-21 @ 17:15)    RADIOLOGY & ADDITIONAL TESTS:    Care Discussed with Consultants/Other Providers:   PMR, DR. Bar and DR. Sagastume.    Patient is a 71y old  Male who presents with a chief complaint of B/L LE weakness after intradural extramedullary hematoma S/P evacuation (28 Oct 2021 11:18)      24 HOUR EVENTS:  No overnight events reported.     SUBJECTIVE:  Patient seen and examined at bedside.   Spoke to pt with wife on the phone.  He reports having nausea and no appetite. He thinks it could be medication related. He reports having less muscle spasms now. His stools have been less formed. Wants his bowel regimen adjusted    ALLERGIES:  Celebrex (Other)    MEDICATIONS  (STANDING):  ascorbic acid 500 milliGRAM(s) Oral daily  bisacodyl Suppository 10 milliGRAM(s) Rectal <User Schedule>  dextrose 40% Gel 15 Gram(s) Oral once  dextrose 5%. 1000 milliLiter(s) (50 mL/Hr) IV Continuous <Continuous>  dextrose 5%. 1000 milliLiter(s) (100 mL/Hr) IV Continuous <Continuous>  dextrose 50% Injectable 25 Gram(s) IV Push once  dextrose 50% Injectable 12.5 Gram(s) IV Push once  dextrose 50% Injectable 25 Gram(s) IV Push once  doxazosin 4 milliGRAM(s) Oral at bedtime  DULoxetine 30 milliGRAM(s) Oral at bedtime  enoxaparin Injectable 40 milliGRAM(s) SubCutaneous <User Schedule>  gabapentin 600 milliGRAM(s) Oral every 8 hours  glucagon  Injectable 1 milliGRAM(s) IntraMuscular once  influenza   Vaccine 0.5 milliLiter(s) IntraMuscular once  melatonin 6 milliGRAM(s) Oral at bedtime  methocarbamol 750 milliGRAM(s) Oral every 8 hours  multivitamin 1 Tablet(s) Oral daily  pantoprazole    Tablet 40 milliGRAM(s) Oral before breakfast  polyethylene glycol 3350 17 Gram(s) Oral two times a day  senna 2 Tablet(s) Oral every 12 hours    MEDICATIONS  (PRN):  acetaminophen     Tablet .. 650 milliGRAM(s) Oral every 6 hours PRN Mild Pain (1 - 3)  lactulose Syrup 10 Gram(s) Oral daily PRN no BM>1 day  ondansetron   Disintegrating Tablet 4 milliGRAM(s) Oral every 8 hours PRN Nausea and/or Vomiting  oxyCODONE    IR 10 milliGRAM(s) Oral every 4 hours PRN Severe Pain (7 - 10)  oxyCODONE    IR 5 milliGRAM(s) Oral every 4 hours PRN Moderate Pain (4 - 6)    Vital Signs Last 24 Hrs  T(F): 97.6 (28 Oct 2021 07:32), Max: 99 (27 Oct 2021 21:02)  HR: 96 (28 Oct 2021 07:32) (94 - 96)  BP: 103/69 (28 Oct 2021 07:32) (103/69 - 157/92)  RR: 16 (28 Oct 2021 07:32) (16 - 17)  SpO2: 100% (28 Oct 2021 07:32) (96% - 100%)  I&O's Summary    27 Oct 2021 07:01  -  28 Oct 2021 07:00  --------------------------------------------------------  IN: 0 mL / OUT: 1900 mL / NET: -1900 mL      PHYSICAL EXAM:  General: NAD, A/O x 3  ENT: Moist mucous membranes, no thrush  Neck: Supple, No JVD  Lungs: Clear to auscultation bilaterally, good air entry, non-labored breathing  Cardio: RRR, S1/S2, No murmur  Abdomen: Soft, Nontender, Nondistended; Bowel sounds present  Extremities: No calf tenderness, No pitting edema    LABS:                        11.1   4.61  )-----------( 210      ( 28 Oct 2021 06:22 )             32.7     10-28    133  |  96  |  13  ----------------------------<  127  4.3   |  31  |  0.71    Ca    9.0      28 Oct 2021 06:22    TPro  6.4  /  Alb  2.8  /  TBili  0.8  /  DBili  x   /  AST  14  /  ALT  30  /  AlkPhos  66  10-28        eGFR if Non African American: 94 mL/min/1.73M2 (10-28-21 @ 06:22)  eGFR if : 109 mL/min/1.73M2 (10-28-21 @ 06:22)      COVID-19 PCR: NotDetec (10-23-21 @ 19:45)  COVID-19 PCR: NotDetec (10-20-21 @ 07:11)  COVID-19 PCR: NotDetec (10-17-21 @ 12:40)  COVID-19 PCR: NotDetec (10-13-21 @ 04:16)  COVID-19 PCR: NotDetec (10-06-21 @ 17:15)    RADIOLOGY & ADDITIONAL TESTS:    Care Discussed with Consultants/Other Providers:   Dr. maldonado, PMR  PMR, DR. Bar and DR. Maldonado.

## 2021-10-28 NOTE — CONSULT NOTE ADULT - SUBJECTIVE AND OBJECTIVE BOX
Patient is a 71y old  Male who presents with a chief complaint of B/L LE weakness after intradural extramedullary hematoma S/P evacuation (28 Oct 2021 14:51)      HPI:  71 year old male with past medical history of hyperlipidemia, chronic back pain, presented to [facility] 10/6/21 with acute onset back pain and progressively worsening bilateral lower extremity weakness. Urgent MR imaging of the spine revealed intradural extramedullary hematoma T12-L1, S/P T10-L2 laminectomy with 1U pRBCs given intraop 10/6/21. Spinal angiogram 10/7/21 showed no vascular abnormalities. MRI brain showed diffuse SAH thought to be secondary to spinal hematoma. Vazquez was placed for urinary retention, failed TOV x2. Epidural Eliseo Tapia drain was removed 10/11/21. Repeat MRI showed stable spine and repeat spinal angiogram was without vascular abnormalities. Spine sutures and right groin suture removed 10/22/21. Patient is medically stable for discharge to  for AR 10/23/21. (23 Oct 2021 17:44)    no prior  history, was on IC, multiple attempts at cath unsuccessful, bladder scan 800ml      PAST MEDICAL & SURGICAL HISTORY:  High cholesterol    Colitis    Vertigo        REVIEW OF SYSTEMS:    CONSTITUTIONAL:  no fever or chills  HEENT: No visual changes  ENDO: No sweating  NECK: No pain or stiffness  MUSCULOSKELETAL: No back pain, no joint pain  RESPIRATORY: No shortness of breath  CARDIOVASCULAR: No chest pain  GASTROINTESTINAL: No abdominal or epigastric pain. No nausea, vomiting,  No diarrhea or constipation.   NEUROLOGICAL: No mental status changes  PSYCH: No depression, no mood changes  SKIN: No itching      MEDICATIONS  (STANDING):  ascorbic acid 500 milliGRAM(s) Oral daily  bisacodyl Suppository 10 milliGRAM(s) Rectal <User Schedule>  dextrose 40% Gel 15 Gram(s) Oral once  dextrose 5%. 1000 milliLiter(s) (50 mL/Hr) IV Continuous <Continuous>  dextrose 5%. 1000 milliLiter(s) (100 mL/Hr) IV Continuous <Continuous>  dextrose 50% Injectable 25 Gram(s) IV Push once  dextrose 50% Injectable 12.5 Gram(s) IV Push once  dextrose 50% Injectable 25 Gram(s) IV Push once  doxazosin 4 milliGRAM(s) Oral at bedtime  DULoxetine 30 milliGRAM(s) Oral at bedtime  enoxaparin Injectable 40 milliGRAM(s) SubCutaneous <User Schedule>  gabapentin 600 milliGRAM(s) Oral every 8 hours  glucagon  Injectable 1 milliGRAM(s) IntraMuscular once  influenza   Vaccine 0.5 milliLiter(s) IntraMuscular once  melatonin 6 milliGRAM(s) Oral at bedtime  methocarbamol 750 milliGRAM(s) Oral every 8 hours  multivitamin 1 Tablet(s) Oral daily  pantoprazole    Tablet 40 milliGRAM(s) Oral before breakfast  polyethylene glycol 3350 17 Gram(s) Oral two times a day  senna 2 Tablet(s) Oral every 12 hours    MEDICATIONS  (PRN):  acetaminophen     Tablet .. 650 milliGRAM(s) Oral every 6 hours PRN Mild Pain (1 - 3)  lactulose Syrup 10 Gram(s) Oral daily PRN no BM>1 day  ondansetron   Disintegrating Tablet 4 milliGRAM(s) Oral every 8 hours PRN Nausea and/or Vomiting  oxyCODONE    IR 10 milliGRAM(s) Oral every 4 hours PRN Severe Pain (7 - 10)  oxyCODONE    IR 5 milliGRAM(s) Oral every 4 hours PRN Moderate Pain (4 - 6)      Allergies    Celebrex (Other)    Intolerances        SOCIAL HISTORY: No illicit drug use    FAMILY HISTORY:        T(C): 37.2 (10-28-21 @ 20:33), Max: 37.2 (10-27-21 @ 21:02)  T(F): 99 (10-28-21 @ 20:33), Max: 99 (10-27-21 @ 21:02)  HR: 99 (10-28-21 @ 20:33) (94 - 99)  BP: 135/80 (10-28-21 @ 20:33) (103/69 - 157/92)  RR: 17 (10-28-21 @ 20:33) (16 - 17)  SpO2: 98% (10-28-21 @ 20:33) (96% - 100%)      PHYSICAL EXAM:    Constitutional: alert, no acute distress  Back: No CVA tenderness  Respiratory: No accessory respiratory muscle use  Abd: Soft, NT/ND  no organomegaly  no hernia  : yes bladder distention, blood at meatus, testes benign  Extremities: no edema  Neurological: A/O x 3  Psychiatric: Normal mood, normal affect  Skin: No rashes      10-25-21 @ 07:01  -  10-26-21 @ 07:00  --------------------------------------------------------  IN:  Total IN: 0 mL    OUT:    Indwelling Catheter - Urethral (mL): 2400 mL  Total OUT: 2400 mL    Total NET: -2400 mL      10-26-21 @ 07:01  -  10-27-21 @ 07:00  --------------------------------------------------------  IN:  Total IN: 0 mL    OUT:    Intermittent Catheterization - Urethral (mL): 950 mL  Total OUT: 950 mL    Total NET: -950 mL      10-27-21 @ 07:01  -  10-28-21 @ 07:00  --------------------------------------------------------  IN:  Total IN: 0 mL    OUT:    Intermittent Catheterization - Urethral (mL): 1900 mL  Total OUT: 1900 mL    Total NET: -1900 mL        10-27-21 @ 07:01  -  10-28-21 @ 07:00  --------------------------------------------------------  IN: 0 mL / OUT: 1900 mL / NET: -1900 mL            LABS:                        11.1   4.61  )-----------( 210      ( 28 Oct 2021 06:22 )             32.7                 RADIOLOGY & ADDITIONAL STUDIES:

## 2021-10-29 LAB
GLUCOSE BLDC GLUCOMTR-MCNC: 142 MG/DL — HIGH (ref 70–99)
TSH SERPL-MCNC: 1.51 UIU/ML — SIGNIFICANT CHANGE UP (ref 0.36–3.74)

## 2021-10-29 PROCEDURE — 99232 SBSQ HOSP IP/OBS MODERATE 35: CPT | Mod: GC

## 2021-10-29 PROCEDURE — 99233 SBSQ HOSP IP/OBS HIGH 50: CPT

## 2021-10-29 RX ORDER — OXYCODONE HYDROCHLORIDE 5 MG/1
5 TABLET ORAL EVERY 4 HOURS
Refills: 0 | Status: DISCONTINUED | OUTPATIENT
Start: 2021-10-29 | End: 2021-11-03

## 2021-10-29 RX ORDER — DOXAZOSIN MESYLATE 4 MG
8 TABLET ORAL AT BEDTIME
Refills: 0 | Status: DISCONTINUED | OUTPATIENT
Start: 2021-10-29 | End: 2021-11-10

## 2021-10-29 RX ORDER — FINASTERIDE 5 MG/1
5 TABLET, FILM COATED ORAL DAILY
Refills: 0 | Status: DISCONTINUED | OUTPATIENT
Start: 2021-10-29 | End: 2021-11-10

## 2021-10-29 RX ORDER — OXYCODONE HYDROCHLORIDE 5 MG/1
10 TABLET ORAL EVERY 4 HOURS
Refills: 0 | Status: DISCONTINUED | OUTPATIENT
Start: 2021-10-29 | End: 2021-11-03

## 2021-10-29 RX ADMIN — Medication 1 TABLET(S): at 14:57

## 2021-10-29 RX ADMIN — METHOCARBAMOL 750 MILLIGRAM(S): 500 TABLET, FILM COATED ORAL at 21:51

## 2021-10-29 RX ADMIN — Medication 650 MILLIGRAM(S): at 16:01

## 2021-10-29 RX ADMIN — GABAPENTIN 600 MILLIGRAM(S): 400 CAPSULE ORAL at 15:08

## 2021-10-29 RX ADMIN — Medication 650 MILLIGRAM(S): at 17:01

## 2021-10-29 RX ADMIN — POLYETHYLENE GLYCOL 3350 17 GRAM(S): 17 POWDER, FOR SOLUTION ORAL at 17:37

## 2021-10-29 RX ADMIN — SENNA PLUS 2 TABLET(S): 8.6 TABLET ORAL at 06:15

## 2021-10-29 RX ADMIN — POLYETHYLENE GLYCOL 3350 17 GRAM(S): 17 POWDER, FOR SOLUTION ORAL at 06:15

## 2021-10-29 RX ADMIN — ENOXAPARIN SODIUM 40 MILLIGRAM(S): 100 INJECTION SUBCUTANEOUS at 17:37

## 2021-10-29 RX ADMIN — METHOCARBAMOL 750 MILLIGRAM(S): 500 TABLET, FILM COATED ORAL at 06:15

## 2021-10-29 RX ADMIN — OXYCODONE HYDROCHLORIDE 10 MILLIGRAM(S): 5 TABLET ORAL at 09:09

## 2021-10-29 RX ADMIN — Medication 10 MILLIGRAM(S): at 06:15

## 2021-10-29 RX ADMIN — PANTOPRAZOLE SODIUM 40 MILLIGRAM(S): 20 TABLET, DELAYED RELEASE ORAL at 06:15

## 2021-10-29 RX ADMIN — OXYCODONE HYDROCHLORIDE 10 MILLIGRAM(S): 5 TABLET ORAL at 08:07

## 2021-10-29 RX ADMIN — Medication 6 MILLIGRAM(S): at 21:51

## 2021-10-29 RX ADMIN — Medication 8 MILLIGRAM(S): at 21:51

## 2021-10-29 RX ADMIN — Medication 500 MILLIGRAM(S): at 14:57

## 2021-10-29 RX ADMIN — GABAPENTIN 600 MILLIGRAM(S): 400 CAPSULE ORAL at 06:15

## 2021-10-29 RX ADMIN — METHOCARBAMOL 750 MILLIGRAM(S): 500 TABLET, FILM COATED ORAL at 14:58

## 2021-10-29 RX ADMIN — GABAPENTIN 600 MILLIGRAM(S): 400 CAPSULE ORAL at 21:51

## 2021-10-29 RX ADMIN — SENNA PLUS 2 TABLET(S): 8.6 TABLET ORAL at 17:38

## 2021-10-29 RX ADMIN — DULOXETINE HYDROCHLORIDE 30 MILLIGRAM(S): 30 CAPSULE, DELAYED RELEASE ORAL at 21:51

## 2021-10-29 NOTE — PROGRESS NOTE ADULT - SUBJECTIVE AND OBJECTIVE BOX
Patient is a 71y old  Male who presents with a chief complaint of B/L LE weakness after intradural extramedullary hematoma S/P evacuation (29 Oct 2021 09:51)      24 HOUR EVENTS:    kate placed by urology for failed attempt to place kate on the floor by nursing staff.      SUBJECTIVE:  Patient seen and examined at bedside.   he reports having no appetite. No nausea or vomiting.   he reports marked improvement in LE strength.     ALLERGIES:  Celebrex (Other)    MEDICATIONS  (STANDING):  ascorbic acid 500 milliGRAM(s) Oral daily  bisacodyl Suppository 10 milliGRAM(s) Rectal <User Schedule>  dextrose 40% Gel 15 Gram(s) Oral once  dextrose 5%. 1000 milliLiter(s) (50 mL/Hr) IV Continuous <Continuous>  dextrose 5%. 1000 milliLiter(s) (100 mL/Hr) IV Continuous <Continuous>  dextrose 50% Injectable 25 Gram(s) IV Push once  dextrose 50% Injectable 12.5 Gram(s) IV Push once  dextrose 50% Injectable 25 Gram(s) IV Push once  doxazosin 8 milliGRAM(s) Oral at bedtime  DULoxetine 30 milliGRAM(s) Oral at bedtime  enoxaparin Injectable 40 milliGRAM(s) SubCutaneous <User Schedule>  gabapentin 600 milliGRAM(s) Oral every 8 hours  glucagon  Injectable 1 milliGRAM(s) IntraMuscular once  influenza   Vaccine 0.5 milliLiter(s) IntraMuscular once  melatonin 6 milliGRAM(s) Oral at bedtime  methocarbamol 750 milliGRAM(s) Oral every 8 hours  multivitamin 1 Tablet(s) Oral daily  pantoprazole    Tablet 40 milliGRAM(s) Oral before breakfast  polyethylene glycol 3350 17 Gram(s) Oral two times a day  senna 2 Tablet(s) Oral every 12 hours    MEDICATIONS  (PRN):  acetaminophen     Tablet .. 650 milliGRAM(s) Oral every 6 hours PRN Mild Pain (1 - 3)  lactulose Syrup 10 Gram(s) Oral daily PRN no BM>1 day  ondansetron   Disintegrating Tablet 4 milliGRAM(s) Oral every 8 hours PRN Nausea and/or Vomiting  oxyCODONE    IR 5 milliGRAM(s) Oral every 4 hours PRN Moderate Pain (4 - 6)  oxyCODONE    IR 10 milliGRAM(s) Oral every 4 hours PRN Severe Pain (7 - 10)    Vital Signs Last 24 Hrs  T(F): 97.4 (29 Oct 2021 10:26), Max: 99 (28 Oct 2021 20:33)  HR: 94 (29 Oct 2021 10:26) (94 - 99)  BP: 121/79 (29 Oct 2021 10:26) (121/79 - 135/80)  RR: 18 (29 Oct 2021 10:26) (17 - 18)  SpO2: 98% (29 Oct 2021 10:26) (98% - 98%)  I&O's Summary    PHYSICAL EXAM:  General: NAD, A/O x 3  ENT: Moist mucous membranes, no thrush  Neck: Supple, No JVD  Lungs: Clear to auscultation bilaterally, good air entry, non-labored breathing  Cardio: RRR, S1/S2, No murmur  Abdomen: Soft, Nontender, Nondistended; Bowel sounds present  Extremities: No calf tenderness, No pitting edema, improved strength in legs    LABS:                        11.1   4.61  )-----------( 210      ( 28 Oct 2021 06:22 )             32.7     10-28    133  |  96  |  13  ----------------------------<  127  4.3   |  31  |  0.71    Ca    9.0      28 Oct 2021 06:22    TPro  6.4  /  Alb  2.8  /  TBili  0.8  /  DBili  x   /  AST  14  /  ALT  30  /  AlkPhos  66  10-28        eGFR if Non African American: 94 mL/min/1.73M2 (10-28-21 @ 06:22)  eGFR if : 109 mL/min/1.73M2 (10-28-21 @ 06:22)                                    COVID-19 PCR: NotDetec (10-23-21 @ 19:45)  COVID-19 PCR: NotDetec (10-20-21 @ 07:11)  COVID-19 PCR: NotDetec (10-17-21 @ 12:40)  COVID-19 PCR: NotDetec (10-13-21 @ 04:16)  COVID-19 PCR: NotDetec (10-06-21 @ 17:15)    RADIOLOGY & ADDITIONAL TESTS:    Care Discussed with Consultants/Other Providers:   DR. maldonado, PMR

## 2021-10-29 NOTE — PROGRESS NOTE ADULT - ASSESSMENT
71y with PMHx hyperlipidemia, chronic back pain, presented to [facility] 10/6/21 with acute onset back pain and progressively worsening bilateral lower extremity weakness, 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 MRI brain showed diffuse SAH likely 2/2 spinal hematoma and paraplegia. Hospital course c/b urinary retention, failed TOV, with kate.    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  - Continue Gabapentin 600mg Q8hr, Methocarbamol 750mg Q8hr, Oxycodone PRN for pain, Duloxetine  - Precautions: falls, spine  - FS well controlled + off Decadron since 10/12/21 - FS d/c  - F/u neurosurgery outpatient    #Neurogenic Bowel  - Continue Lactulose PRN, Senna, Miralax  - Dulcolax suppository at 6am daily  started 10/29     #Neurogenic Bladder   TOV 10/26 --failed   -Was retaining overnight and could not be SC due to difficult anatomy -- urology was consulted and placed 16 Fr 5ml coude cath on 10/28.   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   - Na 133 (10/28)    # Normocytic Anemia   -h/h stable  -b12 and folate levels nl.  - Hb 11.1 (10/28)     Skin/Pressure Injury:   - At risk for pressure injury due to neurologic diagnosis and relative immobility.  - Monitor Incisions: T12-L2 laminectomy  - Turn every 2 hours while in bed, air mattress  - Soft heel protectors  - Skin barrier cream as needed  - Nursing to monitor skin Qshift    DVT ppx:  - Lovenox  - SCDs  - Last Doppler on 10/9/21 NEG  ---------------  Outpatient Follow-up (Specialty/Name of physician):  Horacio Uriarte (MD; PhD)  Neurosurgery  [address] [place] [id number]  Phone: [phone]  Fax: [phone]  Follow Up Time:     Meng Gotti  UROLOGY  [id number] [address] [place] [address]  Phone: [phone]  Fax: [phone]  --------------  Goals: Safe discharge to home  Estimated Length of Stay: 10-14 days  Rehab Potential: Good  Medical Prognosis: Good  Estimated Disposition: Home with Home Care  ---------------    Team meeting 10/26:  OT: goals: Mod I ADLs, supervision transfers, currently mod A for transfers and Mod A for LBD   PT: Goals: supervision transfers and TBD for ambulation. Currently Mod A for transfers   TDD: 11/10

## 2021-10-29 NOTE — PROGRESS NOTE ADULT - ASSESSMENT
Right knee with patella, 4 views, 3/20/2017: Fever



History: Fall, pain



No acute fracture or dislocation is identified. There are cystic and sclerotic

changes in the lateral femoral condyle with irregularity of its articular

surface. The appearance suggests an old osteochondral injury with secondary

degenerative change. There is mild spurring laterally at the knee joint.

Minimal degenerative change is present the patellofemoral articulation. No

joint effusion is evident.



IMPRESSION:

1. Moderate degenerative change.

2. No acute bony abnormality is detected. 70 yo M with HLD, chronic back pain, presented to [facility] 10/6/2021 w/ acute onset back pain and progressively worsening b/l LE weakness, found to have intradural extramedullary thoracolumbar hematoma T12-L1, S/P T10-L2 laminectomy w/ hematoma evacuation (10/7/2021). MRI brain w/ diffuse SAH thought to be 2/2 spinal hematoma.     Now admitted to [facility] after for initiation of a multidisciplinary rehab program consisting focused on functional mobility, transfers and ADLs - PT/OT/DVT PPX PAIN MEDS.    He continues to have urinary retention - indwelling kate catheter replaced on 10/28 overnight. His LE weakness is improving. Awaiting urine osm and TSH to finish working up his hyponatremia    #B/L LE weakness 2/2  #Intradural extramedullary hematoma T12-L1 s/p T10-L2 laminectomy and decompression  # SAH 2/2 above.   - PT/OT per PMR  - bowel regimen and pain control per PMR.   - OP Neurosurgery f/u    # Neurogenic bladder w/ urinary retention  PT failed TOV and had a kate upon admission to rehab. He was seen by urology at OSH. Likely multifactorial due to limited mobilization, constipation, spinal cord compression from hematoma.  -  He was been requiring Intermittent cath at rehab. Kate placed overnight by Urology for difficulty w/ catheter placement (10/28). maintain kate catheter. May attempt TOV prior to discharge or discharge w/ kate catheter w/ OP Urology f/u (Dr. Boswell).  - cont doxazosin. will start finasteride.     # Hyponatremia  - trend Na on BMP.   - urine osmolality is pending. Urine sodium <20 (low). Serum osm is 272.   - TSH ordered.     # Normocytic Anemia   -h/h stable  -b12 and folate levels nl.  - inc retic count.     dvt ppx: lovenox 70 yo M with HLD, chronic back pain, presented to [facility] 10/6/2021 w/ acute onset back pain and progressively worsening b/l LE weakness, found to have intradural extramedullary thoracolumbar hematoma T12-L1, S/P T10-L2 laminectomy w/ hematoma evacuation (10/7/2021). MRI brain w/ diffuse SAH thought to be 2/2 spinal hematoma.     Now admitted to [facility] after for initiation of a multidisciplinary rehab program consisting focused on functional mobility, transfers and ADLs - PT/OT/DVT PPX PAIN MEDS.    He continues to have urinary retention - indwelling kate catheter replaced on 10/28 overnight. His LE weakness is improving. Awaiting urine osm and TSH to finish working up his hyponatremia    #B/L LE weakness 2/2  #Intradural extramedullary hematoma T12-L1 s/p T10-L2 laminectomy and decompression  # SAH 2/2 above.   - PT/OT per PMR  - bowel regimen and pain control per PMR.   - OP Neurosurgery f/u    # Neurogenic bladder w/ urinary retention  PT failed TOV and had a kate upon admission to rehab. He was seen by urology at OSH. Likely multifactorial due to limited mobilization, constipation, spinal cord compression from hematoma.  -  He was been requiring Intermittent cath at rehab. Kate placed overnight by Urology for difficulty w/ catheter placement (10/28). maintain kate catheter. May attempt TOV prior to discharge or discharge w/ kate catheter w/ OP Urology f/u (Dr. Boswell).  - cont doxazosin,  now on inc dose. will start finasteride.     # Hyponatremia  - trend Na on BMP.   - urine osmolality is pending. Urine sodium <20 (low). Serum osm is 272.   - TSH ordered.     # Normocytic Anemia   -h/h stable  -b12 and folate levels nl.  - inc retic count.     dvt ppx: lovenox 70 yo M with HLD, chronic back pain, presented to [facility] 10/6/2021 w/ acute onset back pain and progressively worsening b/l LE weakness, found to have intradural extramedullary thoracolumbar hematoma T12-L1, S/P T10-L2 laminectomy w/ hematoma evacuation (10/7/2021). MRI brain w/ diffuse SAH thought to be 2/2 spinal hematoma.     Now admitted to [facility] after for initiation of a multidisciplinary rehab program consisting focused on functional mobility, transfers and ADLs - PT/OT/DVT PPX PAIN MEDS.    He continues to have urinary retention - indwelling kate catheter replaced on 10/28 overnight. His LE weakness is improving. Awaiting urine osm and TSH to finish working up his hyponatremia    #B/L LE weakness 2/2  #Intradural extramedullary hematoma T12-L1 s/p T10-L2 laminectomy and decompression  # SAH 2/2 above.   - PT/OT per PMR  - bowel regimen and pain control per PMR.   - OP Neurosurgery f/u    # Neurogenic bladder w/ urinary retention  PT failed TOV and had a kate upon admission to rehab. He was seen by urology at OSH. Likely multifactorial due to limited mobilization, constipation, spinal cord compression from hematoma.  -  He was been requiring Intermittent cath at rehab. Kate placed overnight by Urology for difficulty w/ catheter placement (10/28). maintain kate catheter. May attempt TOV prior to discharge or discharge w/ kate catheter w/ OP Urology f/u (Dr. Boswell).  - cont doxazosin,  now on inc dose. will start finasteride.     # Hyponatremia  - trend Na on BMP.   - urine osmolality is pending. Urine sodium <20 (low). Serum osm is 272.   - TSH ordered.     # Normocytic Anemia   -h/h stable  -b12 lower limits of normal.   - inc retic count.     dvt ppx: lovenox

## 2021-10-29 NOTE — PROGRESS NOTE ADULT - SUBJECTIVE AND OBJECTIVE BOX
CHIEF COMPLAINT:    Patient is a 71y old  Male who presents with a chief complaint of B/L LE weakness after intradural extramedullary hematoma S/P evacuation (25 Oct 2021 08:09)    HPI:  71 year old male with past medical history of hyperlipidemia, chronic back pain, presented to [facility] 10/6/21 with acute onset back pain and progressively worsening bilateral lower extremity weakness. Urgent MR imaging of the spine revealed intradural extramedullary hematoma T12-L1, S/P T10-L2 laminectomy with 1U pRBCs given intraop 10/6/21. Spinal angiogram 10/7/21 showed no vascular abnormalities. MRI brain showed diffuse SAH thought to be secondary to spinal hematoma. Vazquez was placed for urinary retention, failed TOV x2. Epidural Eliseo Tapia drain was removed 10/11/21. Repeat MRI showed stable spine and repeat spinal angiogram was without vascular abnormalities. Spine sutures and right groin suture removed 10/22/21. Patient is medically stable for discharge to  for AR 10/23/21. (23 Oct 2021 17:44)    SUBJECTIVE:  Patient seen and evaluated this morning. Overnight patient was retaining and was unable to be SC due to difficulty. Urology was consulted and placed 16 Fr 5ml coude cath with 800mL drained. Patient endorsed to immediate relief afterwards. He stated that he wa able to have a small BM this AM after given his suppository. Endorsed to decreased appetite -- was told to eat in some portions if possible. No issues at the moment.       Vital Signs Last 24 Hrs  T(C): 37.2 (28 Oct 2021 20:33), Max: 37.2 (28 Oct 2021 20:33)  T(F): 99 (28 Oct 2021 20:33), Max: 99 (28 Oct 2021 20:33)  HR: 99 (28 Oct 2021 20:33) (99 - 99)  BP: 135/80 (28 Oct 2021 20:33) (135/80 - 135/80)  BP(mean): --  RR: 17 (28 Oct 2021 20:33) (17 - 17)  SpO2: 98% (28 Oct 2021 20:33) (98% - 98%)    PHYSICAL EXAM  Gen - NAD, Comfortable  HEENT - NCAT, EOMI, MMM  Neck - Supple, No limited ROM  Pulm - CTAB  Cardiovascular - RRR, S1S2, No m/r/g  Abdomen - Soft, NT/ND, +BS  Extremities - No C/C/E, No calf tenderness, + Vazquez to leg bag   Neuro     Motor -    BUE 5/5                     LEFT    LE - 4/5                     RIGHT LE - HF 4/5, KE 3/5, DF 3+/5, PF 4/5        Sensory - decreased sensation/tingling in the right leg     Reflexes - DTR Intact  Psychiatric - Mood stable, Affect WNL      ---------------------------------------------------------------------------------------------------------------------  RECENT LABS/IMAGING      ---------------------------------------------------------------------------------------------------------------  MEDICATIONS  (STANDING):  ascorbic acid 500 milliGRAM(s) Oral daily  bisacodyl Suppository 10 milliGRAM(s) Rectal <User Schedule>  dextrose 40% Gel 15 Gram(s) Oral once  dextrose 5%. 1000 milliLiter(s) (50 mL/Hr) IV Continuous <Continuous>  dextrose 5%. 1000 milliLiter(s) (100 mL/Hr) IV Continuous <Continuous>  dextrose 50% Injectable 25 Gram(s) IV Push once  dextrose 50% Injectable 12.5 Gram(s) IV Push once  dextrose 50% Injectable 25 Gram(s) IV Push once  doxazosin 8 milliGRAM(s) Oral at bedtime  DULoxetine 30 milliGRAM(s) Oral at bedtime  enoxaparin Injectable 40 milliGRAM(s) SubCutaneous <User Schedule>  gabapentin 600 milliGRAM(s) Oral every 8 hours  glucagon  Injectable 1 milliGRAM(s) IntraMuscular once  influenza   Vaccine 0.5 milliLiter(s) IntraMuscular once  melatonin 6 milliGRAM(s) Oral at bedtime  methocarbamol 750 milliGRAM(s) Oral every 8 hours  multivitamin 1 Tablet(s) Oral daily  pantoprazole    Tablet 40 milliGRAM(s) Oral before breakfast  polyethylene glycol 3350 17 Gram(s) Oral two times a day  senna 2 Tablet(s) Oral every 12 hours    MEDICATIONS  (PRN):  acetaminophen     Tablet .. 650 milliGRAM(s) Oral every 6 hours PRN Mild Pain (1 - 3)  lactulose Syrup 10 Gram(s) Oral daily PRN no BM>1 day  ondansetron   Disintegrating Tablet 4 milliGRAM(s) Oral every 8 hours PRN Nausea and/or Vomiting  oxyCODONE    IR 5 milliGRAM(s) Oral every 4 hours PRN Moderate Pain (4 - 6)  oxyCODONE    IR 10 milliGRAM(s) Oral every 4 hours PRN Severe Pain (7 - 10)

## 2021-10-29 NOTE — PROGRESS NOTE ADULT - ATTENDING COMMENTS
Overnight events noted- currently with urinary retention due to neurogenic bladder. Vazquez catheter due to an anatomic anomaly.

## 2021-10-30 ENCOUNTER — TRANSCRIPTION ENCOUNTER (OUTPATIENT)
Age: 71
End: 2021-10-30

## 2021-10-30 LAB
OSMOLALITY UR: 187 MOSM/KG — SIGNIFICANT CHANGE UP (ref 50–1200)
SARS-COV-2 RNA SPEC QL NAA+PROBE: SIGNIFICANT CHANGE UP

## 2021-10-30 PROCEDURE — 99232 SBSQ HOSP IP/OBS MODERATE 35: CPT

## 2021-10-30 RX ADMIN — METHOCARBAMOL 750 MILLIGRAM(S): 500 TABLET, FILM COATED ORAL at 13:02

## 2021-10-30 RX ADMIN — POLYETHYLENE GLYCOL 3350 17 GRAM(S): 17 POWDER, FOR SOLUTION ORAL at 17:14

## 2021-10-30 RX ADMIN — Medication 8 MILLIGRAM(S): at 21:17

## 2021-10-30 RX ADMIN — SENNA PLUS 2 TABLET(S): 8.6 TABLET ORAL at 17:13

## 2021-10-30 RX ADMIN — ENOXAPARIN SODIUM 40 MILLIGRAM(S): 100 INJECTION SUBCUTANEOUS at 17:13

## 2021-10-30 RX ADMIN — FINASTERIDE 5 MILLIGRAM(S): 5 TABLET, FILM COATED ORAL at 11:18

## 2021-10-30 RX ADMIN — METHOCARBAMOL 750 MILLIGRAM(S): 500 TABLET, FILM COATED ORAL at 05:42

## 2021-10-30 RX ADMIN — PANTOPRAZOLE SODIUM 40 MILLIGRAM(S): 20 TABLET, DELAYED RELEASE ORAL at 05:42

## 2021-10-30 RX ADMIN — Medication 6 MILLIGRAM(S): at 21:17

## 2021-10-30 RX ADMIN — Medication 650 MILLIGRAM(S): at 16:07

## 2021-10-30 RX ADMIN — METHOCARBAMOL 750 MILLIGRAM(S): 500 TABLET, FILM COATED ORAL at 21:17

## 2021-10-30 RX ADMIN — Medication 1 TABLET(S): at 11:18

## 2021-10-30 RX ADMIN — GABAPENTIN 600 MILLIGRAM(S): 400 CAPSULE ORAL at 13:02

## 2021-10-30 RX ADMIN — GABAPENTIN 600 MILLIGRAM(S): 400 CAPSULE ORAL at 05:42

## 2021-10-30 RX ADMIN — GABAPENTIN 600 MILLIGRAM(S): 400 CAPSULE ORAL at 21:17

## 2021-10-30 RX ADMIN — Medication 650 MILLIGRAM(S): at 17:10

## 2021-10-30 RX ADMIN — Medication 10 MILLIGRAM(S): at 05:41

## 2021-10-30 RX ADMIN — Medication 500 MILLIGRAM(S): at 11:18

## 2021-10-30 RX ADMIN — DULOXETINE HYDROCHLORIDE 30 MILLIGRAM(S): 30 CAPSULE, DELAYED RELEASE ORAL at 21:17

## 2021-10-30 RX ADMIN — SENNA PLUS 2 TABLET(S): 8.6 TABLET ORAL at 05:42

## 2021-10-30 NOTE — DISCHARGE NOTE PROVIDER - PROVIDER TOKENS
PROVIDER:[TOKEN:[[id number]]],PROVIDER:[TOKEN:[[id number]]] PROVIDER:[TOKEN:[[id number]]],PROVIDER:[TOKEN:[[id number]]],PROVIDER:[TOKEN:[[id number]],FOLLOWUP:[1 month]]

## 2021-10-30 NOTE — PROGRESS NOTE ADULT - ASSESSMENT
Intradural Extramedullary Hematoma T12-L1, S/P T10-L2 laminectomy  PT/OT per rehab  Pain control per rehab    Neurogenic bladder  kate  Cardura/Finateride    Anemia  h/h stable  Monitor for now    Hyponatremia  slowly improving  monitor for now    DVT ppx  Lovenox

## 2021-10-30 NOTE — DISCHARGE NOTE PROVIDER - CARE PROVIDERS DIRECT ADDRESSES
,DirectAddress_Unknown,[email or web address] ,DirectAddress_Unknown,[email or web address],DirectAddress_Unknown

## 2021-10-30 NOTE — DISCHARGE NOTE PROVIDER - CARE PROVIDER_API CALL
Horacio Uriarte; PhD)  Neurosurgery  [address] [place] [address]  Phone: [phone]  Fax: [phone]  Follow Up Time:     Meng Gotti  UROLOGY  [address] [place] [address]  Phone: [phone]  Fax: [phone]  Follow Up Time:    Horacio Uriarte; PhD)  Neurosurgery  [address] [place] [address]  Phone: [phone]  Fax: [phone]  Follow Up Time:     Meng Gotti  UROLOGY  [address] [place] [id number]  Phone: [phone]  Fax: [phone]  Follow Up Time:     Mariam Mcknight; MPH)  Surgery  [facility]  [facility] [address] [place] [address]  Phone: [phone]  Fax: [phone]  Follow Up Time: 1 month

## 2021-10-30 NOTE — DISCHARGE NOTE PROVIDER - NSDCMRMEDTOKEN_GEN_ALL_CORE_FT
acetaminophen 325 mg oral tablet: 2 tab(s) orally every 6 hours, As needed, Mild Pain (1 - 3)  ascorbic acid 500 mg oral tablet: 1 tab(s) orally once a day  doxazosin 4 mg oral tablet: 1 tab(s) orally once a day (at bedtime)  DULoxetine 30 mg oral delayed release capsule: 1 cap(s) orally once a day (at bedtime)  enoxaparin:   gabapentin 300 mg oral capsule: 2 cap(s) orally every 8 hours  lactulose 10 g/15 mL oral syrup: 15 milliliter(s) orally once a day, As needed, no BM&gt;1 day  methocarbamol 750 mg oral tablet: 1 tab(s) orally every 8 hours  Multiple Vitamins oral tablet: 1 tab(s) orally once a day  oxyCODONE 10 mg oral tablet: 1 tab(s) orally every 4 hours, As needed, Severe Pain (7 - 10)  pantoprazole 40 mg oral delayed release tablet: 1 tab(s) orally once a day (before a meal)  polyethylene glycol 3350 oral powder for reconstitution: 17 gram(s) orally 2 times a day  senna oral tablet: 2 tab(s) orally every 12 hours  simvastatin 10 mg oral tablet: tab(s) orally once a day   acetaminophen 325 mg oral tablet: 2 tab(s) orally every 6 hours, As needed, Mild Pain (1 - 3)  ascorbic acid 500 mg oral tablet: 1 tab(s) orally once a day  docusate 283 mg rectal enema: 1 each rectal once a day  doxazosin 8 mg oral tablet: 1 tab(s) orally once a day (at bedtime)  DULoxetine 30 mg oral delayed release capsule: 1 cap(s) orally once a day (at bedtime)  finasteride 5 mg oral tablet: 1 tab(s) orally once a day  gabapentin 300 mg oral capsule: 2 cap(s) orally every 8 hours. Do not drive or operate heavy machinery  melatonin 3 mg oral tablet: 2 tab(s) orally once a day (at bedtime)  methocarbamol 750 mg oral tablet: 1 tab(s) orally every 8 hours  Multiple Vitamins oral tablet: 1 tab(s) orally once a day  pantoprazole 40 mg oral delayed release tablet: 1 tab(s) orally once a day (before a meal)  senna oral tablet: 2 tab(s) orally every 12 hours

## 2021-10-30 NOTE — DISCHARGE NOTE PROVIDER - NSDCCPCAREPLAN_GEN_ALL_CORE_FT
PRINCIPAL DISCHARGE DIAGNOSIS  Diagnosis: Subdural hematoma  Assessment and Plan of Treatment: You were found to have Intradural Extramedullary Hematoma T12-L1   and had T10-L2 laminectomy done at the prior hospital.  Continue your pain meds and follow up with your neurosurgeon after discharge         SECONDARY DISCHARGE DIAGNOSES  Diagnosis: Urinary retention  Assessment and Plan of Treatment: You failed voiding trial after your kate was removed. Urology had to place another kate due to multiple failed straight cath attempts.   Continue your  current prescribed meds  Follow up with your urologist after your discharge.

## 2021-10-30 NOTE — DISCHARGE NOTE PROVIDER - DETAILS OF MALNUTRITION DIAGNOSIS/DIAGNOSES
This patient has been assessed with a concern for Malnutrition and was treated during this hospitalization for the following Nutrition diagnosis/diagnoses:     -  10/25/2021: Severe protein-calorie malnutrition

## 2021-10-30 NOTE — PROGRESS NOTE ADULT - SUBJECTIVE AND OBJECTIVE BOX
HPI:  71 year old male with past medical history of hyperlipidemia, chronic back pain, presented to [facility] 10/6/21 with acute onset back pain and progressively worsening bilateral lower extremity weakness. Urgent MR imaging of the spine revealed intradural extramedullary hematoma T12-L1, S/P T10-L2 laminectomy with 1U pRBCs given intraop 10/6/21. Spinal angiogram 10/7/21 showed no vascular abnormalities. MRI brain showed diffuse SAH thought to be secondary to spinal hematoma. Vazquez was placed for urinary retention, failed TOV x2. Epidural Eliseo Tapia drain was removed 10/11/21. Repeat MRI showed stable spine and repeat spinal angiogram was without vascular abnormalities. Spine sutures and right groin suture removed 10/22/21. Patient is medically stable for discharge to  for AR 10/23/21. (23 Oct 2021 17:44)      Subjective    no new complaints.         PAST MEDICAL & SURGICAL HISTORY:  High cholesterol    Colitis    Vertigo        MedsMEDICATIONS  (STANDING):  ascorbic acid 500 milliGRAM(s) Oral daily  bisacodyl Suppository 10 milliGRAM(s) Rectal <User Schedule>  dextrose 40% Gel 15 Gram(s) Oral once  dextrose 5%. 1000 milliLiter(s) (50 mL/Hr) IV Continuous <Continuous>  dextrose 5%. 1000 milliLiter(s) (100 mL/Hr) IV Continuous <Continuous>  dextrose 50% Injectable 25 Gram(s) IV Push once  dextrose 50% Injectable 12.5 Gram(s) IV Push once  dextrose 50% Injectable 25 Gram(s) IV Push once  doxazosin 8 milliGRAM(s) Oral at bedtime  DULoxetine 30 milliGRAM(s) Oral at bedtime  enoxaparin Injectable 40 milliGRAM(s) SubCutaneous <User Schedule>  finasteride 5 milliGRAM(s) Oral daily  gabapentin 600 milliGRAM(s) Oral every 8 hours  glucagon  Injectable 1 milliGRAM(s) IntraMuscular once  influenza   Vaccine 0.5 milliLiter(s) IntraMuscular once  melatonin 6 milliGRAM(s) Oral at bedtime  methocarbamol 750 milliGRAM(s) Oral every 8 hours  multivitamin 1 Tablet(s) Oral daily  pantoprazole    Tablet 40 milliGRAM(s) Oral before breakfast  polyethylene glycol 3350 17 Gram(s) Oral two times a day  senna 2 Tablet(s) Oral every 12 hours    MEDICATIONS  (PRN):  acetaminophen     Tablet .. 650 milliGRAM(s) Oral every 6 hours PRN Mild Pain (1 - 3)  lactulose Syrup 10 Gram(s) Oral daily PRN no BM>1 day  ondansetron   Disintegrating Tablet 4 milliGRAM(s) Oral every 8 hours PRN Nausea and/or Vomiting  oxyCODONE    IR 10 milliGRAM(s) Oral every 4 hours PRN Severe Pain (7 - 10)  oxyCODONE    IR 5 milliGRAM(s) Oral every 4 hours PRN Moderate Pain (4 - 6)      Vital Signs Last 24 Hrs  T(C): 36.4 (30 Oct 2021 08:42), Max: 37.1 (29 Oct 2021 21:49)  T(F): 97.6 (30 Oct 2021 08:42), Max: 98.7 (29 Oct 2021 21:49)  HR: 92 (30 Oct 2021 08:42) (92 - 93)  BP: 144/90 (30 Oct 2021 08:42) (144/90 - 159/91)  BP(mean): --  RR: 15 (30 Oct 2021 08:42) (15 - 17)  SpO2: 99% (30 Oct 2021 08:42) (99% - 99%)  I&O's Summary    29 Oct 2021 07:01  -  30 Oct 2021 07:00  --------------------------------------------------------  IN: 0 mL / OUT: 2500 mL / NET: -2500 mL    30 Oct 2021 07:01  -  30 Oct 2021 14:46  --------------------------------------------------------  IN: 0 mL / OUT: 350 mL / NET: -350 mL        PHYSICAL EXAM:  GENERAL: NAD  NECK: Supple  NERVOUS SYSTEM:  awake  HEART: S1s2 NL , RRR  CHEST/LUNG: Clear to percussion bilaterally  ABDOMEN: Soft, Nontender, Nondistended; Bowel sounds present  EXTREMITIES:  No edema      LABS:              RVP:          Tox:           CAPILLARY BLOOD GLUCOSE          Imaging Personally Reviewed:  [ ] YES  [ ] NO        Care Discussed with Consultants/Other Providers [ x] YES  [ ] NO

## 2021-10-30 NOTE — DISCHARGE NOTE PROVIDER - HOSPITAL COURSE
71 year old male with past medical history of hyperlipidemia, chronic back pain, presented to [facility] 10/6/21 with acute onset back pain and progressively worsening bilateral lower extremity weakness. Urgent MR imaging of the spine revealed intradural extramedullary hematoma T12-L1, S/P T10-L2 laminectomy with 1U pRBCs given intraop 10/6/21. Spinal angiogram 10/7/21 showed no vascular abnormalities. MRI brain showed diffuse SAH thought to be secondary to spinal hematoma. Kate was placed for urinary retention, failed TOV x2. Epidural Eliseo Tapia drain was removed 10/11/21. Repeat MRI showed stable spine and repeat spinal angiogram was without vascular abnormalities. Spine sutures and right groin suture removed 10/22/21. Patient is medically stable for discharge to  for AR     While in the inpatient rehab unit patient was started on TOV but failed due to persistent urinary retention. Urology was consulted due to multiple failed straight cath attempts and kate was placed by urology. His cardura was increased and he was started on finasteride. 71 year old male with past medical history of hyperlipidemia, chronic back pain, presented to [facility] 10/6/21 with acute onset back pain and progressively worsening bilateral lower extremity weakness. Urgent MR imaging of the spine revealed intradural extramedullary hematoma T12-L1, S/P T10-L2 laminectomy with 1U pRBCs given intraop 10/6/21. Spinal angiogram 10/7/21 showed no vascular abnormalities. MRI brain showed diffuse SAH thought to be secondary to spinal hematoma. Kate was placed for urinary retention, failed TOV x2. Epidural Eliseo Tapia drain was removed 10/11/21. Repeat MRI showed stable spine and repeat spinal angiogram was without vascular abnormalities. Spine sutures and right groin suture removed 10/22/21. Patient is medically stable for discharge to  for AR     While in the inpatient rehab unit patient was started on TOV but failed due to persistent urinary retention. Urology was consulted due to multiple failed straight cath attempts and kate was placed by urology. His cardura was increased and he was started on finasteride. Patient had BM on enemeez. TOV 71 year old male with past medical history of hyperlipidemia, chronic back pain, presented to [facility] 10/6/21 with acute onset back pain and progressively worsening bilateral lower extremity weakness. Urgent MR imaging of the spine revealed intradural extramedullary hematoma T12-L1, S/P T10-L2 laminectomy with 1U pRBCs given intraop 10/6/21. Spinal angiogram 10/7/21 showed no vascular abnormalities. MRI brain showed diffuse SAH thought to be secondary to spinal hematoma. Kate was placed for urinary retention, failed TOV x2. Epidural Eliseo Tapia drain was removed 10/11/21. Repeat MRI showed stable spine and repeat spinal angiogram was without vascular abnormalities. Spine sutures and right groin suture removed 10/22/21. Patient is medically stable for discharge to  for AR     While in the inpatient rehab unit patient was started on TOV but failed due to persistent urinary retention. Urology was consulted due to multiple failed straight cath attempts and kate was placed by urology. His cardura was increased and he was started on finasteride. Patient had BM on enemeez. TOV passed by Urology. PAtient cleared for d/c on 11/10/21. 71 year old male with past medical history of hyperlipidemia, chronic back pain, presented to [facility] 10/6/21 with acute onset back pain and progressively worsening bilateral lower extremity weakness. Urgent MR imaging of the spine revealed intradural extramedullary hematoma T12-L1, S/P T10-L2 laminectomy with 1U pRBCs given intraop 10/6/21. Spinal angiogram 10/7/21 showed no vascular abnormalities. MRI brain showed diffuse SAH thought to be secondary to spinal hematoma. Kate was placed for urinary retention, failed TOV x2. Epidural Eliseo Tapia drain was removed 10/11/21. Repeat MRI showed stable spine and repeat spinal angiogram was without vascular abnormalities. Spine sutures and right groin suture removed 10/22/21. Patient is medically stable for discharge to  for AR     While in the inpatient rehab unit patient was started on TOV but failed due to persistent urinary retention. Urology was consulted due to multiple failed straight cath attempts and kate was placed by urology.  They managed it till successful removal on 11/9 and patient voided appropriately with post void residuals below 350cc, for which Urology agreed for DC and f/u with his Private Urologist   His cardura was increased and he was started on finasteride. Patient had BM on enemeez. He made functional gains with therapy.

## 2021-10-30 NOTE — PROGRESS NOTE ADULT - SUBJECTIVE AND OBJECTIVE BOX
Cc: Gait dysfunction    HPI: Patient with no new medical issues today.  Pain controlled, no chest pain, no N/V, no Fevers/Chills. No other new ROS  Has been tolerating rehabilitation program.    acetaminophen     Tablet .. 650 milliGRAM(s) Oral every 6 hours PRN  ascorbic acid 500 milliGRAM(s) Oral daily  bisacodyl Suppository 10 milliGRAM(s) Rectal <User Schedule>  dextrose 40% Gel 15 Gram(s) Oral once  dextrose 5%. 1000 milliLiter(s) IV Continuous <Continuous>  dextrose 5%. 1000 milliLiter(s) IV Continuous <Continuous>  dextrose 50% Injectable 25 Gram(s) IV Push once  dextrose 50% Injectable 12.5 Gram(s) IV Push once  dextrose 50% Injectable 25 Gram(s) IV Push once  doxazosin 8 milliGRAM(s) Oral at bedtime  DULoxetine 30 milliGRAM(s) Oral at bedtime  enoxaparin Injectable 40 milliGRAM(s) SubCutaneous <User Schedule>  finasteride 5 milliGRAM(s) Oral daily  gabapentin 600 milliGRAM(s) Oral every 8 hours  glucagon  Injectable 1 milliGRAM(s) IntraMuscular once  influenza   Vaccine 0.5 milliLiter(s) IntraMuscular once  lactulose Syrup 10 Gram(s) Oral daily PRN  melatonin 6 milliGRAM(s) Oral at bedtime  methocarbamol 750 milliGRAM(s) Oral every 8 hours  multivitamin 1 Tablet(s) Oral daily  ondansetron   Disintegrating Tablet 4 milliGRAM(s) Oral every 8 hours PRN  oxyCODONE    IR 10 milliGRAM(s) Oral every 4 hours PRN  oxyCODONE    IR 5 milliGRAM(s) Oral every 4 hours PRN  pantoprazole    Tablet 40 milliGRAM(s) Oral before breakfast  polyethylene glycol 3350 17 Gram(s) Oral two times a day  senna 2 Tablet(s) Oral every 12 hours      T(C): 36.4 (10-30-21 @ 08:42), Max: 37.1 (10-29-21 @ 21:49)  HR: 92 (10-30-21 @ 08:42) (92 - 93)  BP: 144/90 (10-30-21 @ 08:42) (144/90 - 159/91)  RR: 15 (10-30-21 @ 08:42) (15 - 17)  SpO2: 99% (10-30-21 @ 08:42) (99% - 99%)    In NAD  HEENT- EOMI  Heart- RRR, S1S2  Lungs- CTA bl.  Abd- + BS, NT  Ext- No calf pain  Neuro- Exam unchanged    Imp: Patient with diagnosis of extramedullary hematoma s/p T10-L2 laminectomy admitted for comprehensive acute rehabilitation.    Plan:  - Continue PT/OT/SLP  - DVT prophylaxis - Lovenox   - Skin- Turn q2h, check skin daily  - Continue current medications; patient medically stable.   -Active issues- Continue miralax and senna for bowel program.    - Patient is stable to continue current rehabilitation program.

## 2021-10-30 NOTE — DISCHARGE NOTE PROVIDER - NSDCACTIVITY_GEN_ALL_CORE
Sex allowed/Do not drive or operate machinery/Showering allowed/No heavy lifting/straining Do not drive or operate machinery/Showering allowed/No heavy lifting/straining

## 2021-10-31 PROCEDURE — 99232 SBSQ HOSP IP/OBS MODERATE 35: CPT

## 2021-10-31 RX ADMIN — GABAPENTIN 600 MILLIGRAM(S): 400 CAPSULE ORAL at 21:58

## 2021-10-31 RX ADMIN — Medication 500 MILLIGRAM(S): at 11:52

## 2021-10-31 RX ADMIN — GABAPENTIN 600 MILLIGRAM(S): 400 CAPSULE ORAL at 13:02

## 2021-10-31 RX ADMIN — Medication 6 MILLIGRAM(S): at 21:58

## 2021-10-31 RX ADMIN — OXYCODONE HYDROCHLORIDE 5 MILLIGRAM(S): 5 TABLET ORAL at 08:14

## 2021-10-31 RX ADMIN — SENNA PLUS 2 TABLET(S): 8.6 TABLET ORAL at 06:35

## 2021-10-31 RX ADMIN — Medication 8 MILLIGRAM(S): at 21:57

## 2021-10-31 RX ADMIN — PANTOPRAZOLE SODIUM 40 MILLIGRAM(S): 20 TABLET, DELAYED RELEASE ORAL at 06:36

## 2021-10-31 RX ADMIN — METHOCARBAMOL 750 MILLIGRAM(S): 500 TABLET, FILM COATED ORAL at 13:02

## 2021-10-31 RX ADMIN — Medication 10 MILLIGRAM(S): at 06:35

## 2021-10-31 RX ADMIN — METHOCARBAMOL 750 MILLIGRAM(S): 500 TABLET, FILM COATED ORAL at 21:57

## 2021-10-31 RX ADMIN — FINASTERIDE 5 MILLIGRAM(S): 5 TABLET, FILM COATED ORAL at 11:52

## 2021-10-31 RX ADMIN — ONDANSETRON 4 MILLIGRAM(S): 8 TABLET, FILM COATED ORAL at 16:26

## 2021-10-31 RX ADMIN — METHOCARBAMOL 750 MILLIGRAM(S): 500 TABLET, FILM COATED ORAL at 06:36

## 2021-10-31 RX ADMIN — DULOXETINE HYDROCHLORIDE 30 MILLIGRAM(S): 30 CAPSULE, DELAYED RELEASE ORAL at 21:57

## 2021-10-31 RX ADMIN — Medication 1 TABLET(S): at 11:52

## 2021-10-31 RX ADMIN — ENOXAPARIN SODIUM 40 MILLIGRAM(S): 100 INJECTION SUBCUTANEOUS at 17:16

## 2021-10-31 RX ADMIN — GABAPENTIN 600 MILLIGRAM(S): 400 CAPSULE ORAL at 06:35

## 2021-10-31 NOTE — PROGRESS NOTE ADULT - ASSESSMENT
Intradural Extramedullary Hematoma T12-L1, S/P T10-L2 laminectomy  PT/OT per rehab  Pain control per rehab    Neurogenic bladder  kate  Cardura/Finasteride    Anemia  h/h stable  Monitor for now  CBC in AM    Hyponatremia  slowly improving  monitor for now  Na in AM    DVT ppx  Lovenox

## 2021-10-31 NOTE — PROGRESS NOTE ADULT - SUBJECTIVE AND OBJECTIVE BOX
Cc: Gait dysfunction    HPI: Patient with no new medical issues today.  Pain controlled, no chest pain, no N/V, no Fevers/Chills. No other new ROS  Has been tolerating rehabilitation program.    acetaminophen     Tablet .. 650 milliGRAM(s) Oral every 6 hours PRN  ascorbic acid 500 milliGRAM(s) Oral daily  bisacodyl Suppository 10 milliGRAM(s) Rectal <User Schedule>  dextrose 40% Gel 15 Gram(s) Oral once  dextrose 5%. 1000 milliLiter(s) IV Continuous <Continuous>  dextrose 5%. 1000 milliLiter(s) IV Continuous <Continuous>  dextrose 50% Injectable 25 Gram(s) IV Push once  dextrose 50% Injectable 12.5 Gram(s) IV Push once  dextrose 50% Injectable 25 Gram(s) IV Push once  doxazosin 8 milliGRAM(s) Oral at bedtime  DULoxetine 30 milliGRAM(s) Oral at bedtime  enoxaparin Injectable 40 milliGRAM(s) SubCutaneous <User Schedule>  finasteride 5 milliGRAM(s) Oral daily  gabapentin 600 milliGRAM(s) Oral every 8 hours  glucagon  Injectable 1 milliGRAM(s) IntraMuscular once  influenza   Vaccine 0.5 milliLiter(s) IntraMuscular once  lactulose Syrup 10 Gram(s) Oral daily PRN  melatonin 6 milliGRAM(s) Oral at bedtime  methocarbamol 750 milliGRAM(s) Oral every 8 hours  multivitamin 1 Tablet(s) Oral daily  ondansetron   Disintegrating Tablet 4 milliGRAM(s) Oral every 8 hours PRN  oxyCODONE    IR 10 milliGRAM(s) Oral every 4 hours PRN  oxyCODONE    IR 5 milliGRAM(s) Oral every 4 hours PRN  pantoprazole    Tablet 40 milliGRAM(s) Oral before breakfast  polyethylene glycol 3350 17 Gram(s) Oral two times a day  senna 2 Tablet(s) Oral every 12 hours      T(C): 37.6 (10-31-21 @ 08:10), Max: 37.6 (10-31-21 @ 08:10)  HR: 97 (10-31-21 @ 08:10) (96 - 97)  BP: 116/68 (10-31-21 @ 08:10) (116/68 - 123/80)  RR: 16 (10-31-21 @ 08:10) (16 - 17)  SpO2: 95% (10-31-21 @ 08:10) (95% - 97%)    In NAD  HEENT- EOMI  Heart- RRR, S1S2  Lungs- CTA bl.  Abd- + BS, NT  Ext- No calf pain  Neuro- Exam unchanged      Imp: Patient with diagnosis of extramedullary hematoma s/p T10-L2 laminectomy admitted for comprehensive acute rehabilitation.    Plan:  - Continue PT/OT/SLP  - DVT prophylaxis - Lovenox   - Skin- Turn q2h, check skin daily  - Continue current medications; patient medically stable.   -Active issues- Continue miralax, senna and bisacodyl for bowel program.  Reports having bowel movement today.     - Patient is stable to continue current rehabilitation program.

## 2021-10-31 NOTE — PROGRESS NOTE ADULT - SUBJECTIVE AND OBJECTIVE BOX
HPI:  71 year old male with past medical history of hyperlipidemia, chronic back pain, presented to [facility] 10/6/21 with acute onset back pain and progressively worsening bilateral lower extremity weakness. Urgent MR imaging of the spine revealed intradural extramedullary hematoma T12-L1, S/P T10-L2 laminectomy with 1U pRBCs given intraop 10/6/21. Spinal angiogram 10/7/21 showed no vascular abnormalities. MRI brain showed diffuse SAH thought to be secondary to spinal hematoma. Vazquez was placed for urinary retention, failed TOV x2. Epidural Eliseo Tapia drain was removed 10/11/21. Repeat MRI showed stable spine and repeat spinal angiogram was without vascular abnormalities. Spine sutures and right groin suture removed 10/22/21. Patient is medically stable for discharge to  for AR 10/23/21. (23 Oct 2021 17:44)      Subjective    no new complaints.         PAST MEDICAL & SURGICAL HISTORY:  High cholesterol    Colitis    Vertigo        MedsMEDICATIONS  (STANDING):  ascorbic acid 500 milliGRAM(s) Oral daily  bisacodyl Suppository 10 milliGRAM(s) Rectal <User Schedule>  dextrose 40% Gel 15 Gram(s) Oral once  dextrose 5%. 1000 milliLiter(s) (50 mL/Hr) IV Continuous <Continuous>  dextrose 5%. 1000 milliLiter(s) (100 mL/Hr) IV Continuous <Continuous>  dextrose 50% Injectable 25 Gram(s) IV Push once  dextrose 50% Injectable 12.5 Gram(s) IV Push once  dextrose 50% Injectable 25 Gram(s) IV Push once  doxazosin 8 milliGRAM(s) Oral at bedtime  DULoxetine 30 milliGRAM(s) Oral at bedtime  enoxaparin Injectable 40 milliGRAM(s) SubCutaneous <User Schedule>  finasteride 5 milliGRAM(s) Oral daily  gabapentin 600 milliGRAM(s) Oral every 8 hours  glucagon  Injectable 1 milliGRAM(s) IntraMuscular once  influenza   Vaccine 0.5 milliLiter(s) IntraMuscular once  melatonin 6 milliGRAM(s) Oral at bedtime  methocarbamol 750 milliGRAM(s) Oral every 8 hours  multivitamin 1 Tablet(s) Oral daily  pantoprazole    Tablet 40 milliGRAM(s) Oral before breakfast  polyethylene glycol 3350 17 Gram(s) Oral two times a day  senna 2 Tablet(s) Oral every 12 hours    MEDICATIONS  (PRN):  acetaminophen     Tablet .. 650 milliGRAM(s) Oral every 6 hours PRN Mild Pain (1 - 3)  lactulose Syrup 10 Gram(s) Oral daily PRN no BM>1 day  ondansetron   Disintegrating Tablet 4 milliGRAM(s) Oral every 8 hours PRN Nausea and/or Vomiting  oxyCODONE    IR 10 milliGRAM(s) Oral every 4 hours PRN Severe Pain (7 - 10)  oxyCODONE    IR 5 milliGRAM(s) Oral every 4 hours PRN Moderate Pain (4 - 6)      Vital Signs Last 24 Hrs  T(C): 37.6 (31 Oct 2021 08:10), Max: 37.6 (31 Oct 2021 08:10)  T(F): 99.6 (31 Oct 2021 08:10), Max: 99.6 (31 Oct 2021 08:10)  HR: 97 (31 Oct 2021 08:10) (96 - 97)  BP: 116/68 (31 Oct 2021 08:10) (116/68 - 123/80)  BP(mean): --  RR: 16 (31 Oct 2021 08:10) (16 - 17)  SpO2: 95% (31 Oct 2021 08:10) (95% - 97%)  I&O's Summary    30 Oct 2021 07:01  -  31 Oct 2021 07:00  --------------------------------------------------------  IN: 0 mL / OUT: 1050 mL / NET: -1050 mL        PHYSICAL EXAM:  GENERAL: NAD  NECK: Supple  NERVOUS SYSTEM:  awake and alert  HEART: S1s2 NL , RRR  CHEST/LUNG: Clear to percussion bilaterally  ABDOMEN: Soft, Nontender, Nondistended; Bowel sounds present  EXTREMITIES:  No edema      LABS:              RVP:          Tox:           CAPILLARY BLOOD GLUCOSE          Imaging Personally Reviewed:  [ ] YES  [ ] NO        Care Discussed with Consultants/Other Providers [ x] YES  [ ] NO

## 2021-11-01 LAB
ALBUMIN SERPL ELPH-MCNC: 2.6 G/DL — LOW (ref 3.3–5)
ALP SERPL-CCNC: 67 U/L — SIGNIFICANT CHANGE UP (ref 40–120)
ALT FLD-CCNC: 41 U/L — SIGNIFICANT CHANGE UP (ref 10–45)
ANION GAP SERPL CALC-SCNC: 7 MMOL/L — SIGNIFICANT CHANGE UP (ref 5–17)
AST SERPL-CCNC: 21 U/L — SIGNIFICANT CHANGE UP (ref 10–40)
BASOPHILS # BLD AUTO: 0.04 K/UL — SIGNIFICANT CHANGE UP (ref 0–0.2)
BASOPHILS NFR BLD AUTO: 0.9 % — SIGNIFICANT CHANGE UP (ref 0–2)
BILIRUB SERPL-MCNC: 0.6 MG/DL — SIGNIFICANT CHANGE UP (ref 0.2–1.2)
BUN SERPL-MCNC: 15 MG/DL — SIGNIFICANT CHANGE UP (ref 7–23)
CALCIUM SERPL-MCNC: 8.6 MG/DL — SIGNIFICANT CHANGE UP (ref 8.4–10.5)
CHLORIDE SERPL-SCNC: 98 MMOL/L — SIGNIFICANT CHANGE UP (ref 96–108)
CO2 SERPL-SCNC: 29 MMOL/L — SIGNIFICANT CHANGE UP (ref 22–31)
CREAT SERPL-MCNC: 0.7 MG/DL — SIGNIFICANT CHANGE UP (ref 0.5–1.3)
EOSINOPHIL # BLD AUTO: 0.19 K/UL — SIGNIFICANT CHANGE UP (ref 0–0.5)
EOSINOPHIL NFR BLD AUTO: 4.1 % — SIGNIFICANT CHANGE UP (ref 0–6)
GLUCOSE SERPL-MCNC: 133 MG/DL — HIGH (ref 70–99)
HCT VFR BLD CALC: 30.8 % — LOW (ref 39–50)
HGB BLD-MCNC: 10.3 G/DL — LOW (ref 13–17)
IMM GRANULOCYTES NFR BLD AUTO: 1.1 % — SIGNIFICANT CHANGE UP (ref 0–1.5)
LYMPHOCYTES # BLD AUTO: 0.93 K/UL — LOW (ref 1–3.3)
LYMPHOCYTES # BLD AUTO: 20 % — SIGNIFICANT CHANGE UP (ref 13–44)
MCHC RBC-ENTMCNC: 29.9 PG — SIGNIFICANT CHANGE UP (ref 27–34)
MCHC RBC-ENTMCNC: 33.4 GM/DL — SIGNIFICANT CHANGE UP (ref 32–36)
MCV RBC AUTO: 89.3 FL — SIGNIFICANT CHANGE UP (ref 80–100)
MONOCYTES # BLD AUTO: 0.59 K/UL — SIGNIFICANT CHANGE UP (ref 0–0.9)
MONOCYTES NFR BLD AUTO: 12.7 % — SIGNIFICANT CHANGE UP (ref 2–14)
NEUTROPHILS # BLD AUTO: 2.86 K/UL — SIGNIFICANT CHANGE UP (ref 1.8–7.4)
NEUTROPHILS NFR BLD AUTO: 61.2 % — SIGNIFICANT CHANGE UP (ref 43–77)
NRBC # BLD: 0 /100 WBCS — SIGNIFICANT CHANGE UP (ref 0–0)
PLATELET # BLD AUTO: 259 K/UL — SIGNIFICANT CHANGE UP (ref 150–400)
POTASSIUM SERPL-MCNC: 3.4 MMOL/L — LOW (ref 3.5–5.3)
POTASSIUM SERPL-SCNC: 3.4 MMOL/L — LOW (ref 3.5–5.3)
PROT SERPL-MCNC: 5.9 G/DL — LOW (ref 6–8.3)
RBC # BLD: 3.45 M/UL — LOW (ref 4.2–5.8)
RBC # FLD: 13.5 % — SIGNIFICANT CHANGE UP (ref 10.3–14.5)
SODIUM SERPL-SCNC: 134 MMOL/L — LOW (ref 135–145)
WBC # BLD: 4.66 K/UL — SIGNIFICANT CHANGE UP (ref 3.8–10.5)
WBC # FLD AUTO: 4.66 K/UL — SIGNIFICANT CHANGE UP (ref 3.8–10.5)

## 2021-11-01 PROCEDURE — 99232 SBSQ HOSP IP/OBS MODERATE 35: CPT | Mod: GC

## 2021-11-01 RX ORDER — POTASSIUM CHLORIDE 20 MEQ
20 PACKET (EA) ORAL
Refills: 0 | Status: COMPLETED | OUTPATIENT
Start: 2021-11-01 | End: 2021-11-01

## 2021-11-01 RX ADMIN — Medication 6 MILLIGRAM(S): at 21:22

## 2021-11-01 RX ADMIN — Medication 20 MILLIEQUIVALENT(S): at 12:19

## 2021-11-01 RX ADMIN — OXYCODONE HYDROCHLORIDE 5 MILLIGRAM(S): 5 TABLET ORAL at 09:36

## 2021-11-01 RX ADMIN — DULOXETINE HYDROCHLORIDE 30 MILLIGRAM(S): 30 CAPSULE, DELAYED RELEASE ORAL at 21:22

## 2021-11-01 RX ADMIN — PANTOPRAZOLE SODIUM 40 MILLIGRAM(S): 20 TABLET, DELAYED RELEASE ORAL at 05:30

## 2021-11-01 RX ADMIN — METHOCARBAMOL 750 MILLIGRAM(S): 500 TABLET, FILM COATED ORAL at 21:22

## 2021-11-01 RX ADMIN — Medication 1 TABLET(S): at 11:33

## 2021-11-01 RX ADMIN — FINASTERIDE 5 MILLIGRAM(S): 5 TABLET, FILM COATED ORAL at 11:33

## 2021-11-01 RX ADMIN — GABAPENTIN 600 MILLIGRAM(S): 400 CAPSULE ORAL at 13:23

## 2021-11-01 RX ADMIN — ENOXAPARIN SODIUM 40 MILLIGRAM(S): 100 INJECTION SUBCUTANEOUS at 17:06

## 2021-11-01 RX ADMIN — OXYCODONE HYDROCHLORIDE 5 MILLIGRAM(S): 5 TABLET ORAL at 08:32

## 2021-11-01 RX ADMIN — OXYCODONE HYDROCHLORIDE 5 MILLIGRAM(S): 5 TABLET ORAL at 13:37

## 2021-11-01 RX ADMIN — Medication 8 MILLIGRAM(S): at 21:23

## 2021-11-01 RX ADMIN — GABAPENTIN 600 MILLIGRAM(S): 400 CAPSULE ORAL at 21:22

## 2021-11-01 RX ADMIN — METHOCARBAMOL 750 MILLIGRAM(S): 500 TABLET, FILM COATED ORAL at 05:31

## 2021-11-01 RX ADMIN — METHOCARBAMOL 750 MILLIGRAM(S): 500 TABLET, FILM COATED ORAL at 13:23

## 2021-11-01 RX ADMIN — GABAPENTIN 600 MILLIGRAM(S): 400 CAPSULE ORAL at 05:31

## 2021-11-01 RX ADMIN — Medication 20 MILLIEQUIVALENT(S): at 15:34

## 2021-11-01 RX ADMIN — Medication 10 MILLIGRAM(S): at 05:30

## 2021-11-01 RX ADMIN — Medication 500 MILLIGRAM(S): at 11:33

## 2021-11-01 RX ADMIN — ONDANSETRON 4 MILLIGRAM(S): 8 TABLET, FILM COATED ORAL at 16:40

## 2021-11-01 NOTE — PROGRESS NOTE ADULT - SUBJECTIVE AND OBJECTIVE BOX
CHIEF COMPLAINT:    Patient is a 71y old  Male who presents with a chief complaint of B/L LE weakness after intradural extramedullary hematoma S/P evacuation (25 Oct 2021 08:09)    HPI:  71 year old male with past medical history of hyperlipidemia, chronic back pain, presented to [facility] 10/6/21 with acute onset back pain and progressively worsening bilateral lower extremity weakness. Urgent MR imaging of the spine revealed intradural extramedullary hematoma T12-L1, S/P T10-L2 laminectomy with 1U pRBCs given intraop 10/6/21. Spinal angiogram 10/7/21 showed no vascular abnormalities. MRI brain showed diffuse SAH thought to be secondary to spinal hematoma. Vazquez was placed for urinary retention, failed TOV x2. Epidural Eliseo Tapia drain was removed 10/11/21. Repeat MRI showed stable spine and repeat spinal angiogram was without vascular abnormalities. Spine sutures and right groin suture removed 10/22/21. Patient is medically stable for discharge to  for AR 10/23/21. (23 Oct 2021 17:44)    SUBJECTIVE:  Patient seen and evaluated this morning.  bowel accident 2 hours after suppository     Vital Signs Last 24 Hrs  T(C): 37 (01 Nov 2021 08:13), Max: 37.1 (31 Oct 2021 20:01)  T(F): 98.6 (01 Nov 2021 08:13), Max: 98.7 (31 Oct 2021 20:01)  HR: 98 (01 Nov 2021 08:13) (98 - 98)  BP: 117/79 (01 Nov 2021 08:13) (117/79 - 145/85)  BP(mean): --  RR: 17 (01 Nov 2021 08:13) (17 - 17)  SpO2: 96% (01 Nov 2021 08:13) (96% - 97%)    PHYSICAL EXAM  Gen - NAD, Comfortable  HEENT - NCAT, EOMI, MMM  Neck - Supple, No limited ROM  Pulm - CTAB  Cardiovascular - RRR, S1S2, No m/r/g  Abdomen - Soft, NT/ND, +BS  Extremities - No C/C/E, No calf tenderness, + Vazquez to leg bag   Neuro     Motor -    BUE 5/5                     LEFT    LE - 4/5                     RIGHT LE - HF 4/5, KE 3/5, DF 3+/5, PF 4/5        Sensory - decreased sensation/tingling in the right leg     Reflexes - DTR Intact  Psychiatric - Mood stable, Affect WNL      ---------------------------------------------------------------------------------------------------------------------  RECENT LABS/IMAGING      ---------------------------------------------------------------------------------------------------------------  MEDICATIONS  (STANDING):  ascorbic acid 500 milliGRAM(s) Oral daily  bisacodyl Suppository 10 milliGRAM(s) Rectal <User Schedule>  dextrose 40% Gel 15 Gram(s) Oral once  dextrose 5%. 1000 milliLiter(s) (50 mL/Hr) IV Continuous <Continuous>  dextrose 5%. 1000 milliLiter(s) (100 mL/Hr) IV Continuous <Continuous>  dextrose 50% Injectable 25 Gram(s) IV Push once  dextrose 50% Injectable 12.5 Gram(s) IV Push once  dextrose 50% Injectable 25 Gram(s) IV Push once  doxazosin 8 milliGRAM(s) Oral at bedtime  DULoxetine 30 milliGRAM(s) Oral at bedtime  enoxaparin Injectable 40 milliGRAM(s) SubCutaneous <User Schedule>  gabapentin 600 milliGRAM(s) Oral every 8 hours  glucagon  Injectable 1 milliGRAM(s) IntraMuscular once  influenza   Vaccine 0.5 milliLiter(s) IntraMuscular once  melatonin 6 milliGRAM(s) Oral at bedtime  methocarbamol 750 milliGRAM(s) Oral every 8 hours  multivitamin 1 Tablet(s) Oral daily  pantoprazole    Tablet 40 milliGRAM(s) Oral before breakfast  polyethylene glycol 3350 17 Gram(s) Oral two times a day  senna 2 Tablet(s) Oral every 12 hours    MEDICATIONS  (PRN):  acetaminophen     Tablet .. 650 milliGRAM(s) Oral every 6 hours PRN Mild Pain (1 - 3)  lactulose Syrup 10 Gram(s) Oral daily PRN no BM>1 day  ondansetron   Disintegrating Tablet 4 milliGRAM(s) Oral every 8 hours PRN Nausea and/or Vomiting  oxyCODONE    IR 5 milliGRAM(s) Oral every 4 hours PRN Moderate Pain (4 - 6)  oxyCODONE    IR 10 milliGRAM(s) Oral every 4 hours PRN Severe Pain (7 - 10)

## 2021-11-01 NOTE — CHART NOTE - NSCHARTNOTEFT_GEN_A_CORE
Nutrition Follow Up Note  Hospital Course   (Per Electronic Medical Record)    Source:  Patient [X]  Medical Record [X]      Diet:   Regular Diet w/ Thin Liquids  Tolerates Diet Consistency Well  No Chewing/Swallowing Difficulties  No Recent Nausea, Vomiting, Diarrhea or Constipation (as Per Patient)  Consumes % of Meals (as Per Documentation) - Intake Improving (Per Patient)   on Ensure Clear 8oz PO TID (Provides 720kcal & 24grams of Protein) - Patient Takes Nutrition Supplement   Education Provided on Proper Nutrition   Obtained Food Preferences from Patient    Enteral/Parenteral Nutrition: Not Applicable    Current Weight: 181.6lb on 10/31  Obtain New Weight  Obtain Weights Weekly     Pertinent Medications: MEDICATIONS  (STANDING):  ascorbic acid 500 milliGRAM(s) Oral daily  bisacodyl Suppository 10 milliGRAM(s) Rectal <User Schedule>  dextrose 40% Gel 15 Gram(s) Oral once  dextrose 5%. 1000 milliLiter(s) (50 mL/Hr) IV Continuous <Continuous>  dextrose 5%. 1000 milliLiter(s) (100 mL/Hr) IV Continuous <Continuous>  dextrose 50% Injectable 25 Gram(s) IV Push once  dextrose 50% Injectable 25 Gram(s) IV Push once  dextrose 50% Injectable 12.5 Gram(s) IV Push once  doxazosin 8 milliGRAM(s) Oral at bedtime  DULoxetine 30 milliGRAM(s) Oral at bedtime  enoxaparin Injectable 40 milliGRAM(s) SubCutaneous <User Schedule>  finasteride 5 milliGRAM(s) Oral daily  gabapentin 600 milliGRAM(s) Oral every 8 hours  glucagon  Injectable 1 milliGRAM(s) IntraMuscular once  influenza   Vaccine 0.5 milliLiter(s) IntraMuscular once  melatonin 6 milliGRAM(s) Oral at bedtime  methocarbamol 750 milliGRAM(s) Oral every 8 hours  multivitamin 1 Tablet(s) Oral daily  pantoprazole    Tablet 40 milliGRAM(s) Oral before breakfast  polyethylene glycol 3350 17 Gram(s) Oral two times a day  senna 2 Tablet(s) Oral every 12 hours    MEDICATIONS  (PRN):  acetaminophen     Tablet .. 650 milliGRAM(s) Oral every 6 hours PRN Mild Pain (1 - 3)  lactulose Syrup 10 Gram(s) Oral daily PRN no BM>1 day  ondansetron   Disintegrating Tablet 4 milliGRAM(s) Oral every 8 hours PRN Nausea and/or Vomiting  oxyCODONE    IR 10 milliGRAM(s) Oral every 4 hours PRN Severe Pain (7 - 10)  oxyCODONE    IR 5 milliGRAM(s) Oral every 4 hours PRN Moderate Pain (4 - 6)    Pertinent Labs:  11-01 Na134 mmol/L<L> Glu 133 mg/dL<H> K+ 3.4 mmol/L<L> Cr  0.70 mg/dL BUN 15 mg/dL 11-01 Alb 2.6 g/dL<L>    Skin: No Pressure Ulcers     Edema: None Noted (as Per Documentation)     Last Bowel Movement: on 10/31    Estimated Needs:   [X] No Change Since Previous Assessment    Previous Nutrition Diagnosis:   Severe Malnutrition     Nutrition Diagnosis is [X] Ongoing - Continues on Nutrition Supplement & Patient Takes Nutrition Supplement; Education Provided on Proper Nutrition     New Nutrition Diagnosis: [X] Not Applicable    Interventions:   1. Education Provided on Proper Nutrition   2. Recommend Continue Nutrition Plan of Care     Monitoring & Evaluation:   [X] Weights   [X] PO Intake   [X] Skin Integrity   [X] Follow Up (Per Protocol)  [X] Tolerance to Diet Prescription   [X] Other: Labs     Registered Dietitian/Nutritionist Remains Available.  Rakesh Wall RDN    Pager #[phone]  Phone# [phone]

## 2021-11-01 NOTE — PROGRESS NOTE ADULT - ASSESSMENT
71y with PMHx hyperlipidemia, chronic back pain, presented to [facility] 10/6/21 with acute onset back pain and progressively worsening bilateral lower extremity weakness, 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 MRI brain showed diffuse SAH likely 2/2 spinal hematoma and paraplegia. Hospital course c/b urinary retention, failed TOV, with kate.    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  - Continue Gabapentin 600mg Q8hr, Methocarbamol 750mg Q8hr, Oxycodone PRN for pain, Duloxetine  - Precautions: falls, spine  - FS well controlled + off Decadron since 10/12/21 - FS d/c  - F/u neurosurgery outpatient    #Neurogenic Bowel  - Continue Lactulose PRN, Senna, Miralax  - Dulcolax suppository at 6am daily  started 10/29, follow up how soon he had a bm     #Neurogenic Bladder   TOV 10/26 --failed   -Was retaining overnight and could not be SC due to difficult anatomy -- urology was consulted and placed 16 Fr 5ml coude cath on 10/28.   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   - Na 134 (11/1)  -replete K     # Normocytic Anemia   -h/h stable  -b12 and folate levels nl.  - Hb 10.3 11/1, asymptomatic. Can consider GI consult     Skin/Pressure Injury:   - At risk for pressure injury due to neurologic diagnosis and relative immobility.  - Monitor Incisions: T12-L2 laminectomy  - Turn every 2 hours while in bed, air mattress  - Soft heel protectors  - Skin barrier cream as needed  - Nursing to monitor skin Qshift    DVT ppx:  - Lovenox  - SCDs  - Last Doppler on 10/9/21 NEG  ---------------  Outpatient Follow-up (Specialty/Name of physician):  Horacio Uriarte; PhD)  Neurosurgery  [address] [place] [id number]  Phone: [phone]  Fax: [phone]  Follow Up Time:     Meng Gotti  UROLOGY  [address] [place] [address]  Phone: [phone]  Fax: [phone]  --------------  Goals: Safe discharge to home  Estimated Length of Stay: 10-14 days  Rehab Potential: Good  Medical Prognosis: Good  Estimated Disposition: Home with Home Care  ---------------    Team meeting 10/26:  OT: goals: Mod I ADLs, supervision transfers, currently mod A for transfers and Mod A for LBD   PT: Goals: supervision transfers and TBD for ambulation. Currently Mod A for transfers   TDD: 11/10

## 2021-11-02 DIAGNOSIS — R31.29 OTHER MICROSCOPIC HEMATURIA: ICD-10-CM

## 2021-11-02 PROCEDURE — 99232 SBSQ HOSP IP/OBS MODERATE 35: CPT

## 2021-11-02 PROCEDURE — 99232 SBSQ HOSP IP/OBS MODERATE 35: CPT | Mod: GC

## 2021-11-02 RX ORDER — DOCUSATE SODIUM 100 MG
283 CAPSULE ORAL DAILY
Refills: 0 | Status: DISCONTINUED | OUTPATIENT
Start: 2021-11-02 | End: 2021-11-10

## 2021-11-02 RX ADMIN — METHOCARBAMOL 750 MILLIGRAM(S): 500 TABLET, FILM COATED ORAL at 05:02

## 2021-11-02 RX ADMIN — OXYCODONE HYDROCHLORIDE 5 MILLIGRAM(S): 5 TABLET ORAL at 09:10

## 2021-11-02 RX ADMIN — FINASTERIDE 5 MILLIGRAM(S): 5 TABLET, FILM COATED ORAL at 12:11

## 2021-11-02 RX ADMIN — Medication 8 MILLIGRAM(S): at 22:13

## 2021-11-02 RX ADMIN — Medication 6 MILLIGRAM(S): at 22:13

## 2021-11-02 RX ADMIN — Medication 1 TABLET(S): at 12:11

## 2021-11-02 RX ADMIN — ENOXAPARIN SODIUM 40 MILLIGRAM(S): 100 INJECTION SUBCUTANEOUS at 17:02

## 2021-11-02 RX ADMIN — METHOCARBAMOL 750 MILLIGRAM(S): 500 TABLET, FILM COATED ORAL at 14:06

## 2021-11-02 RX ADMIN — OXYCODONE HYDROCHLORIDE 5 MILLIGRAM(S): 5 TABLET ORAL at 08:30

## 2021-11-02 RX ADMIN — PANTOPRAZOLE SODIUM 40 MILLIGRAM(S): 20 TABLET, DELAYED RELEASE ORAL at 05:02

## 2021-11-02 RX ADMIN — OXYCODONE HYDROCHLORIDE 5 MILLIGRAM(S): 5 TABLET ORAL at 13:44

## 2021-11-02 RX ADMIN — Medication 10 MILLIGRAM(S): at 05:03

## 2021-11-02 RX ADMIN — METHOCARBAMOL 750 MILLIGRAM(S): 500 TABLET, FILM COATED ORAL at 22:13

## 2021-11-02 RX ADMIN — Medication 500 MILLIGRAM(S): at 12:11

## 2021-11-02 RX ADMIN — OXYCODONE HYDROCHLORIDE 5 MILLIGRAM(S): 5 TABLET ORAL at 12:36

## 2021-11-02 RX ADMIN — GABAPENTIN 600 MILLIGRAM(S): 400 CAPSULE ORAL at 14:06

## 2021-11-02 RX ADMIN — GABAPENTIN 600 MILLIGRAM(S): 400 CAPSULE ORAL at 05:02

## 2021-11-02 RX ADMIN — GABAPENTIN 600 MILLIGRAM(S): 400 CAPSULE ORAL at 22:13

## 2021-11-02 RX ADMIN — DULOXETINE HYDROCHLORIDE 30 MILLIGRAM(S): 30 CAPSULE, DELAYED RELEASE ORAL at 22:13

## 2021-11-02 NOTE — PROGRESS NOTE ADULT - ASSESSMENT
70 yo M with HLD, chronic back pain, presented to [facility] 10/6/2021 w/ acute onset back pain and progressively worsening b/l LE weakness, found to have intradural extramedullary thoracolumbar hematoma T12-L1, S/P T10-L2 laminectomy w/ hematoma evacuation (10/7/2021). MRI brain w/ diffuse SAH thought to be 2/2 spinal hematoma.     #B/L LE weakness 2/2 Intradural extramedullary hematoma T12-L1 s/p T10-L2 laminectomy and decompression  # SAH 2/2 above.   - PT/OT per PMR  - bowel regimen and pain control per PMR.   - OP Neurosurgery f/u    # Neurogenic bladder w/ urinary retention  - PT failed TOV and had a kate upon admission to rehab. He was seen by urology at OSH. Likely multifactorial due to limited mobilization, constipation, spinal cord compression from hematoma.  - He was been requiring Intermittent cath at rehab. Kate placed overnight by Urology for difficulty w/ catheter placement (10/28). Maintain kate catheter. May attempt TOV prior to discharge or discharge w/ kate catheter w/ OP Urology f/u (Dr. Boswell).  - Cont doxazosin, and finasteride.     # Hyponatremia - resolving  - trend Na on BMP.   - Urine osmolality 187 Urine sodium <20 (low). Serum osm is 272.   - TSH WNL     # Normocytic Anemia   -h/h stable  -b12 lower limits of normal.     #Hypokalemia  - Repleted. Monitor electrolytes as necessry    #Hypoalbuminemia  - Nutrition eval on going    DVT ppx: lovenox

## 2021-11-02 NOTE — CHART NOTE - NSCHARTNOTEFT_GEN_A_CORE
Planned D/C Date: 11/10  Barriers: LE weakness  goal: W/C level ambulation; mod I    OT  LBD: min A  Tub/shower: supervision w/ tub transfers    PT  ambulation: min a mod a 50ft with assit x2  Transfers: min A

## 2021-11-02 NOTE — PROGRESS NOTE ADULT - SUBJECTIVE AND OBJECTIVE BOX
Patient is a 71y old  Male who presents with a chief complaint of B/L LE weakness after intradural extramedullary hematoma S/P evacuation (01 Nov 2021 11:21)    HPI:  71 year old male with past medical history of hyperlipidemia, chronic back pain, presented to [facility] 10/6/21 with acute onset back pain and progressively worsening bilateral lower extremity weakness. Urgent MR imaging of the spine revealed intradural extramedullary hematoma T12-L1, S/P T10-L2 laminectomy with 1U pRBCs given intraop 10/6/21. Spinal angiogram 10/7/21 showed no vascular abnormalities. MRI brain showed diffuse SAH thought to be secondary to spinal hematoma. Kate was placed for urinary retention, failed TOV x2. Epidural Eliseo Tapia drain was removed 10/11/21. Repeat MRI showed stable spine and repeat spinal angiogram was without vascular abnormalities. Spine sutures and right groin suture removed 10/22/21. Patient is medically stable for discharge to  for AR 10/23/21. (23 Oct 2021 17:44)    kate draining yellow    Interval Events:  Patient seen and examined at bedside.    MEDICATIONS:  MEDICATIONS  (STANDING):  ascorbic acid 500 milliGRAM(s) Oral daily  bisacodyl Suppository 10 milliGRAM(s) Rectal <User Schedule>  dextrose 40% Gel 15 Gram(s) Oral once  dextrose 5%. 1000 milliLiter(s) (50 mL/Hr) IV Continuous <Continuous>  dextrose 5%. 1000 milliLiter(s) (100 mL/Hr) IV Continuous <Continuous>  dextrose 50% Injectable 25 Gram(s) IV Push once  dextrose 50% Injectable 12.5 Gram(s) IV Push once  dextrose 50% Injectable 25 Gram(s) IV Push once  doxazosin 8 milliGRAM(s) Oral at bedtime  DULoxetine 30 milliGRAM(s) Oral at bedtime  enoxaparin Injectable 40 milliGRAM(s) SubCutaneous <User Schedule>  finasteride 5 milliGRAM(s) Oral daily  gabapentin 600 milliGRAM(s) Oral every 8 hours  glucagon  Injectable 1 milliGRAM(s) IntraMuscular once  influenza   Vaccine 0.5 milliLiter(s) IntraMuscular once  melatonin 6 milliGRAM(s) Oral at bedtime  methocarbamol 750 milliGRAM(s) Oral every 8 hours  multivitamin 1 Tablet(s) Oral daily  pantoprazole    Tablet 40 milliGRAM(s) Oral before breakfast  polyethylene glycol 3350 17 Gram(s) Oral two times a day  senna 2 Tablet(s) Oral every 12 hours    MEDICATIONS  (PRN):  acetaminophen     Tablet .. 650 milliGRAM(s) Oral every 6 hours PRN Mild Pain (1 - 3)  lactulose Syrup 10 Gram(s) Oral daily PRN no BM>1 day  ondansetron   Disintegrating Tablet 4 milliGRAM(s) Oral every 8 hours PRN Nausea and/or Vomiting  oxyCODONE    IR 5 milliGRAM(s) Oral every 4 hours PRN Moderate Pain (4 - 6)  oxyCODONE    IR 10 milliGRAM(s) Oral every 4 hours PRN Severe Pain (7 - 10)      Allergies    Celebrex (Other)    Intolerances        T(C): 36.9 (11-01-21 @ 20:43), Max: 37.1 (10-31-21 @ 20:01)  T(F): 98.4 (11-01-21 @ 20:43), Max: 98.7 (10-31-21 @ 20:01)  HR: 96 (11-01-21 @ 20:43) (96 - 98)  BP: 141/83 (11-01-21 @ 20:43) (117/79 - 145/85)  RR: 16 (11-01-21 @ 20:43) (16 - 17)  SpO2: 96% (11-01-21 @ 20:43) (96% - 97%)          10-31-21 @ 07:01  -  11-01-21 @ 07:00  --------------------------------------------------------  IN:  Total IN: 0 mL    OUT:    Indwelling Catheter - Urethral (mL): 2150 mL  Total OUT: 2150 mL    Total NET: -2150 mL      11-01-21 @ 07:01  -  11-02-21 @ 07:00  --------------------------------------------------------  IN:  Total IN: 0 mL    OUT:    Indwelling Catheter - Urethral (mL): 675 mL  Total OUT: 675 mL    Total NET: -675 mL          LABS:      CBC Full  -  ( 01 Nov 2021 06:15 )  WBC Count : 4.66 K/uL  RBC Count : 3.45 M/uL  Hemoglobin : 10.3 g/dL  Hematocrit : 30.8 %  Platelet Count - Automated : 259 K/uL  Mean Cell Volume : 89.3 fl  Mean Cell Hemoglobin : 29.9 pg  Mean Cell Hemoglobin Concentration : 33.4 gm/dL  Auto Neutrophil # : 2.86 K/uL  Auto Lymphocyte # : 0.93 K/uL  Auto Monocyte # : 0.59 K/uL  Auto Eosinophil # : 0.19 K/uL  Auto Basophil # : 0.04 K/uL  Auto Neutrophil % : 61.2 %  Auto Lymphocyte % : 20.0 %  Auto Monocyte % : 12.7 %  Auto Eosinophil % : 4.1 %  Auto Basophil % : 0.9 %    11-01    134<L>  |  98  |  15  ----------------------------<  133<H>  3.4<L>   |  29  |  0.70    Ca    8.6      01 Nov 2021 06:15    TPro  5.9<L>  /  Alb  2.6<L>  /  TBili  0.6  /  DBili  x   /  AST  21  /  ALT  41  /  AlkPhos  67  11-01                  Physical Exam    Constitutional: alert, no acute distress    Abdomen: soft, nontender, nondistended, no HSM    Genitourinary: no bladder distention, kate

## 2021-11-02 NOTE — PROGRESS NOTE ADULT - SUBJECTIVE AND OBJECTIVE BOX
Patient is a 71y old  Male who presents with a chief complaint of B/L LE weakness after intradural extramedullary hematoma S/P evacuation (01 Nov 2021 11:21)      Patient seen and examined at bedside.  No overnight events  No complaints this morning. Feels good this AM    ALLERGIES:  Celebrex (Other)    MEDICATIONS  (STANDING):  ascorbic acid 500 milliGRAM(s) Oral daily  bisacodyl Suppository 10 milliGRAM(s) Rectal <User Schedule>  dextrose 40% Gel 15 Gram(s) Oral once  dextrose 5%. 1000 milliLiter(s) (50 mL/Hr) IV Continuous <Continuous>  dextrose 5%. 1000 milliLiter(s) (100 mL/Hr) IV Continuous <Continuous>  dextrose 50% Injectable 25 Gram(s) IV Push once  dextrose 50% Injectable 12.5 Gram(s) IV Push once  dextrose 50% Injectable 25 Gram(s) IV Push once  doxazosin 8 milliGRAM(s) Oral at bedtime  DULoxetine 30 milliGRAM(s) Oral at bedtime  enoxaparin Injectable 40 milliGRAM(s) SubCutaneous <User Schedule>  finasteride 5 milliGRAM(s) Oral daily  gabapentin 600 milliGRAM(s) Oral every 8 hours  glucagon  Injectable 1 milliGRAM(s) IntraMuscular once  influenza   Vaccine 0.5 milliLiter(s) IntraMuscular once  melatonin 6 milliGRAM(s) Oral at bedtime  methocarbamol 750 milliGRAM(s) Oral every 8 hours  multivitamin 1 Tablet(s) Oral daily  pantoprazole    Tablet 40 milliGRAM(s) Oral before breakfast  polyethylene glycol 3350 17 Gram(s) Oral two times a day  senna 2 Tablet(s) Oral every 12 hours    MEDICATIONS  (PRN):  acetaminophen     Tablet .. 650 milliGRAM(s) Oral every 6 hours PRN Mild Pain (1 - 3)  lactulose Syrup 10 Gram(s) Oral daily PRN no BM>1 day  ondansetron   Disintegrating Tablet 4 milliGRAM(s) Oral every 8 hours PRN Nausea and/or Vomiting  oxyCODONE    IR 5 milliGRAM(s) Oral every 4 hours PRN Moderate Pain (4 - 6)  oxyCODONE    IR 10 milliGRAM(s) Oral every 4 hours PRN Severe Pain (7 - 10)    Vital Signs Last 24 Hrs  T(F): 98.4 (01 Nov 2021 20:43), Max: 98.4 (01 Nov 2021 20:43)  HR: 96 (01 Nov 2021 20:43) (96 - 96)  BP: 141/83 (01 Nov 2021 20:43) (141/83 - 141/83)  RR: 16 (01 Nov 2021 20:43) (16 - 16)  SpO2: 96% (01 Nov 2021 20:43) (96% - 96%)  I&O's Summary    01 Nov 2021 07:01  -  02 Nov 2021 07:00  --------------------------------------------------------  IN: 0 mL / OUT: 675 mL / NET: -675 mL    PHYSICAL EXAM:  GENERAL: NAD  HENT:  Atraumatic, Normocephalic; No tonsillar erythema, exudates, or enlargement; Moist mucous membranes;   EYES: EOMI, PERRLA, conjunctiva and sclera clear, no lid-lag  NECK: Supple, No JVD, Normal thyroid  CHEST/LUNG: Clear to percussion bilaterally; No rales, rhonchi, wheezing, or rubs; normal respiratory effort, no intercostal retractions  HEART: Regular rate and rhythm; No murmurs, rubs, or gallops; No pitting edema  ABDOMEN: Soft, Nontender, Nondistended; Bowel sounds present; No HSM  MUSCULOSKELETAL/EXTREMITIES:  2+ Peripheral Pulses, No clubbing or digital cyanosis  PSYCH: Appropriate affect, Alert & Awake    LABS:                        10.3   4.66  )-----------( 259      ( 01 Nov 2021 06:15 )             30.8       11-01    134  |  98  |  15  ----------------------------<  133  3.4   |  29  |  0.70    Ca    8.6      01 Nov 2021 06:15    TPro  5.9  /  Alb  2.6  /  TBili  0.6  /  DBili  x   /  AST  21  /  ALT  41  /  AlkPhos  67  11-01     eGFR if Non African American: 94 mL/min/1.73M2 (11-01-21 @ 06:15)  eGFR if African American: 110 mL/min/1.73M2 (11-01-21 @ 06:15)    COVID-19 PCR: NotDetec (10-30-21 @ 05:45)  COVID-19 PCR: NotDetec (10-23-21 @ 19:45)  COVID-19 PCR: NotDetec (10-20-21 @ 07:11)  COVID-19 PCR: NotDetec (10-17-21 @ 12:40)  COVID-19 PCR: NotDetec (10-13-21 @ 04:16)      Care Discussed with Rehab Attending and Other Providers

## 2021-11-02 NOTE — PROGRESS NOTE ADULT - ASSESSMENT
71y with PMHx hyperlipidemia, chronic back pain, presented to [facility] 10/6/21 with acute onset back pain and progressively worsening bilateral lower extremity weakness, 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 MRI brain showed diffuse SAH likely 2/2 spinal hematoma and paraplegia. Hospital course c/b urinary retention, failed TOV, with kate.    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  - Continue Gabapentin 600mg Q8hr, Methocarbamol 750mg Q8hr, Oxycodone PRN for pain, Duloxetine  - Precautions: falls, spine  - FS well controlled + off Decadron since 10/12/21 - FS d/c  - F/u neurosurgery outpatient    #Neurogenic Bowel  - Continue Lactulose PRN, Senna, Miralax  - Dulcolax suppository at 6am daily  changed to enemeez    #Neurogenic Bladder   TOV 10/26 --failed   -Was retaining overnight and could not be SC due to difficult anatomy -- urology was consulted and placed 16 Fr 5ml coude cath on 10/28.   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   - Na 134 (11/1)    # Normocytic Anemia   -h/h stable  -b12 and folate levels nl.  - Hb 10.3 11/1, asymptomatic.     Skin/Pressure Injury:   - At risk for pressure injury due to neurologic diagnosis and relative immobility.  - Monitor Incisions: T12-L2 laminectomy  - Turn every 2 hours while in bed, air mattress  - Soft heel protectors  - Skin barrier cream as needed  - Nursing to monitor skin Qshift    DVT ppx:  - Lovenox  - SCDs  - Last Doppler on 10/9/21 NEG  ---------------  Outpatient Follow-up (Specialty/Name of physician):  Horacio Uriarte; PhD)  Neurosurgery  [address] [place] [id number]  Phone: [phone]  Fax: [phone]  Follow Up Time:     Meng Gotti S  UROLOGY  [address] [place] [address]  Phone: [phone]  Fax: [phone]  --------------  Goals: Safe discharge to home  Estimated Length of Stay: 10-14 days  Rehab Potential: Good  Medical Prognosis: Good  Estimated Disposition: Home with Home Care  ---------------    Team meeting 10/26:  OT: goals: Mod I ADLs, supervision transfers, currently mod A for transfers and Mod A for LBD   PT: Goals: supervision transfers and TBD for ambulation. Currently Mod A for transfers   TDD: 11/10      71y with PMHx hyperlipidemia, chronic back pain, presented to [facility] 10/6/21 with acute onset back pain and progressively worsening bilateral lower extremity weakness, 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 MRI brain showed diffuse SAH likely 2/2 spinal hematoma and paraplegia. Hospital course c/b urinary retention, failed TOV, with kate.    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  - Continue Gabapentin 600mg Q8hr, Methocarbamol 750mg Q8hr, Oxycodone PRN for pain, Duloxetine  - Precautions: falls, spine  - FS well controlled + off Decadron since 10/12/21 - FS d/c  - F/u neurosurgery outpatient    #Neurogenic Bowel  - Continue Lactulose PRN, Senna, Miralax  - Dulcolax suppository at 6am daily  changed to enemeez    #Neurogenic Bladder   TOV 10/26 --failed   -Was retaining overnight and could not be SC due to difficult anatomy -- urology was consulted and placed 16 Fr 5ml coude cath on 10/28.   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   - Na 134 (11/1)    # Normocytic Anemia   -h/h stable  -b12 and folate levels nl.  - Hb 10.3 11/1, asymptomatic.     Skin/Pressure Injury:   - At risk for pressure injury due to neurologic diagnosis and relative immobility.  - Monitor Incisions: T12-L2 laminectomy  - Turn every 2 hours while in bed, air mattress  - Soft heel protectors  - Skin barrier cream as needed  - Nursing to monitor skin Qshift    DVT ppx:  - Lovenox  - SCDs  - Last Doppler on 10/9/21 NEG  ---------------  Outpatient Follow-up (Specialty/Name of physician):  Horacio Uriarte; PhD)  Neurosurgery  [address] [place] [address]  Phone: [phone]  Fax: [phone]  Follow Up Time:     ShenMeng  UROLOGY  [address] [place] [address]  Phone: [phone]  Fax: [phone]  --------------  Goals: Safe discharge to home  Estimated Length of Stay: 10-14 days  Rehab Potential: Good  Medical Prognosis: Good  Estimated Disposition: Home with Home Care  ---------------    Team meeting 11/2  Planned D/C Date: 11/10  Barriers: LE weakness  goal: W/C level ambulation; mod I/superviison  OT  LBD: min A  Tub/shower: supervision w/ tub transfers  PT  ambulation: min a mod a 50ft with assit x2  Transfers: min A.

## 2021-11-02 NOTE — PROGRESS NOTE ADULT - SUBJECTIVE AND OBJECTIVE BOX
HPI:  71 year old male with past medical history of hyperlipidemia, chronic back pain, presented to [facility] 10/6/21 with acute onset back pain and progressively worsening bilateral lower extremity weakness. Urgent MR imaging of the spine revealed intradural extramedullary hematoma T12-L1, S/P T10-L2 laminectomy with 1U pRBCs given intraop 10/6/21. Spinal angiogram 10/7/21 showed no vascular abnormalities. MRI brain showed diffuse SAH thought to be secondary to spinal hematoma. Vazquez was placed for urinary retention, failed TOV x2. Epidural Eliseo Tapia drain was removed 10/11/21. Repeat MRI showed stable spine and repeat spinal angiogram was without vascular abnormalities. Spine sutures and right groin suture removed 10/22/21. Patient is medically stable for discharge to  for AR 10/23/21. (23 Oct 2021 17:44)      INTERVAL HPI/OVERNIGHT EVENTS:  Chart Reviewed and patient seen at bedside.        ROS:  Denies fevers, chills, chest pain, shortness of breath, abdominal pain, headaches, nausea/vomiting    Vital Signs Last 24 Hrs  T(C): 36.9 (01 Nov 2021 20:43), Max: 36.9 (01 Nov 2021 20:43)  T(F): 98.4 (01 Nov 2021 20:43), Max: 98.4 (01 Nov 2021 20:43)  HR: 96 (01 Nov 2021 20:43) (96 - 96)  BP: 141/83 (01 Nov 2021 20:43) (141/83 - 141/83)  BP(mean): --  RR: 16 (01 Nov 2021 20:43) (16 - 16)  SpO2: 96% (01 Nov 2021 20:43) (96% - 96%)    Physical Exam:      LABS:  11-01    134<L>  |  98  |  15  ----------------------------<  133<H>  3.4<L>   |  29  |  0.70    Ca    8.6      01 Nov 2021 06:15    TPro  5.9<L>  /  Alb  2.6<L>  /  TBili  0.6  /  DBili  x   /  AST  21  /  ALT  41  /  AlkPhos  67  11-01                                     10.3   4.66  )-----------( 259      ( 01 Nov 2021 06:15 )             30.8     CAPILLARY BLOOD GLUCOSE          MEDICATIONS:  MEDICATIONS  (STANDING):  ascorbic acid 500 milliGRAM(s) Oral daily  dextrose 40% Gel 15 Gram(s) Oral once  dextrose 5%. 1000 milliLiter(s) (50 mL/Hr) IV Continuous <Continuous>  dextrose 5%. 1000 milliLiter(s) (100 mL/Hr) IV Continuous <Continuous>  dextrose 50% Injectable 25 Gram(s) IV Push once  dextrose 50% Injectable 12.5 Gram(s) IV Push once  dextrose 50% Injectable 25 Gram(s) IV Push once  docusate sodium Enema 283 milliGRAM(s) Rectal daily  doxazosin 8 milliGRAM(s) Oral at bedtime  DULoxetine 30 milliGRAM(s) Oral at bedtime  enoxaparin Injectable 40 milliGRAM(s) SubCutaneous <User Schedule>  finasteride 5 milliGRAM(s) Oral daily  gabapentin 600 milliGRAM(s) Oral every 8 hours  glucagon  Injectable 1 milliGRAM(s) IntraMuscular once  influenza   Vaccine 0.5 milliLiter(s) IntraMuscular once  melatonin 6 milliGRAM(s) Oral at bedtime  methocarbamol 750 milliGRAM(s) Oral every 8 hours  multivitamin 1 Tablet(s) Oral daily  pantoprazole    Tablet 40 milliGRAM(s) Oral before breakfast  polyethylene glycol 3350 17 Gram(s) Oral two times a day  senna 2 Tablet(s) Oral every 12 hours    MEDICATIONS  (PRN):  acetaminophen     Tablet .. 650 milliGRAM(s) Oral every 6 hours PRN Mild Pain (1 - 3)  lactulose Syrup 10 Gram(s) Oral daily PRN no BM>1 day  ondansetron   Disintegrating Tablet 4 milliGRAM(s) Oral every 8 hours PRN Nausea and/or Vomiting  oxyCODONE    IR 5 milliGRAM(s) Oral every 4 hours PRN Moderate Pain (4 - 6)  oxyCODONE    IR 10 milliGRAM(s) Oral every 4 hours PRN Severe Pain (7 - 10)         HPI:  71 year old male with past medical history of hyperlipidemia, chronic back pain, presented to [facility] 10/6/21 with acute onset back pain and progressively worsening bilateral lower extremity weakness. Urgent MR imaging of the spine revealed intradural extramedullary hematoma T12-L1, S/P T10-L2 laminectomy with 1U pRBCs given intraop 10/6/21. Spinal angiogram 10/7/21 showed no vascular abnormalities. MRI brain showed diffuse SAH thought to be secondary to spinal hematoma. Vazquez was placed for urinary retention, failed TOV x2. Epidural Eliseo Tapia drain was removed 10/11/21. Repeat MRI showed stable spine and repeat spinal angiogram was without vascular abnormalities. Spine sutures and right groin suture removed 10/22/21. Patient is medically stable for discharge to  for AR 10/23/21. (23 Oct 2021 17:44)      INTERVAL HPI/OVERNIGHT EVENTS:  Chart Reviewed and patient seen at bedside.  Patient still w/ difficulty moving bowel.   Also with increased RLE knee flexor tone      ROS:  Denies fevers, chills, chest pain, shortness of breath, abdominal pain, headaches, nausea/vomiting    Vital Signs Last 24 Hrs  T(C): 36.9 (01 Nov 2021 20:43), Max: 36.9 (01 Nov 2021 20:43)  T(F): 98.4 (01 Nov 2021 20:43), Max: 98.4 (01 Nov 2021 20:43)  HR: 96 (01 Nov 2021 20:43) (96 - 96)  BP: 141/83 (01 Nov 2021 20:43) (141/83 - 141/83)  BP(mean): --  RR: 16 (01 Nov 2021 20:43) (16 - 16)  SpO2: 96% (01 Nov 2021 20:43) (96% - 96%)    Physical Exam:    Gen - NAD, Comfortable  HEENT - NCAT, EOMI, MMM  Neck - Supple, No limited ROM  Pulm - CTAB  Cardiovascular - RRR, S1S2, No m/r/g  Abdomen - Soft, NT/ND, +BS  Extremities - No C/C/E, No calf tenderness, + Vazquez to leg bag   Neuro     Motor -    BUE 5/5                     LEFT    LE - 4/5                     RIGHT LE - HF 4/5, KE 3/5, DF 3+/5, PF 4/5        Sensory - decreased sensation/tingling in the right leg     Reflexes - DTR Intact  Psychiatric - Mood stable, Affect WNL      LABS:  11-01    134<L>  |  98  |  15  ----------------------------<  133<H>  3.4<L>   |  29  |  0.70    Ca    8.6      01 Nov 2021 06:15    TPro  5.9<L>  /  Alb  2.6<L>  /  TBili  0.6  /  DBili  x   /  AST  21  /  ALT  41  /  AlkPhos  67  11-01                                     10.3   4.66  )-----------( 259      ( 01 Nov 2021 06:15 )             30.8     CAPILLARY BLOOD GLUCOSE          MEDICATIONS:  MEDICATIONS  (STANDING):  ascorbic acid 500 milliGRAM(s) Oral daily  dextrose 40% Gel 15 Gram(s) Oral once  dextrose 5%. 1000 milliLiter(s) (50 mL/Hr) IV Continuous <Continuous>  dextrose 5%. 1000 milliLiter(s) (100 mL/Hr) IV Continuous <Continuous>  dextrose 50% Injectable 25 Gram(s) IV Push once  dextrose 50% Injectable 12.5 Gram(s) IV Push once  dextrose 50% Injectable 25 Gram(s) IV Push once  docusate sodium Enema 283 milliGRAM(s) Rectal daily  doxazosin 8 milliGRAM(s) Oral at bedtime  DULoxetine 30 milliGRAM(s) Oral at bedtime  enoxaparin Injectable 40 milliGRAM(s) SubCutaneous <User Schedule>  finasteride 5 milliGRAM(s) Oral daily  gabapentin 600 milliGRAM(s) Oral every 8 hours  glucagon  Injectable 1 milliGRAM(s) IntraMuscular once  influenza   Vaccine 0.5 milliLiter(s) IntraMuscular once  melatonin 6 milliGRAM(s) Oral at bedtime  methocarbamol 750 milliGRAM(s) Oral every 8 hours  multivitamin 1 Tablet(s) Oral daily  pantoprazole    Tablet 40 milliGRAM(s) Oral before breakfast  polyethylene glycol 3350 17 Gram(s) Oral two times a day  senna 2 Tablet(s) Oral every 12 hours    MEDICATIONS  (PRN):  acetaminophen     Tablet .. 650 milliGRAM(s) Oral every 6 hours PRN Mild Pain (1 - 3)  lactulose Syrup 10 Gram(s) Oral daily PRN no BM>1 day  ondansetron   Disintegrating Tablet 4 milliGRAM(s) Oral every 8 hours PRN Nausea and/or Vomiting  oxyCODONE    IR 5 milliGRAM(s) Oral every 4 hours PRN Moderate Pain (4 - 6)  oxyCODONE    IR 10 milliGRAM(s) Oral every 4 hours PRN Severe Pain (7 - 10)

## 2021-11-02 NOTE — PROGRESS NOTE ADULT - PROBLEM SELECTOR PROBLEM 2
LA ppwr has been completed and signed by Marisol Onofre. Forms have also have been faxed over to [facility] along with patient progress notes , per patient request. Received confirmation. FMLA forms placed in Dr.Varma murdock upcoming appt for the moment.  Patient Microhematuria

## 2021-11-03 PROCEDURE — 99232 SBSQ HOSP IP/OBS MODERATE 35: CPT

## 2021-11-03 PROCEDURE — 99232 SBSQ HOSP IP/OBS MODERATE 35: CPT | Mod: GC

## 2021-11-03 RX ORDER — OXYCODONE HYDROCHLORIDE 5 MG/1
5 TABLET ORAL EVERY 4 HOURS
Refills: 0 | Status: DISCONTINUED | OUTPATIENT
Start: 2021-11-03 | End: 2021-11-08

## 2021-11-03 RX ORDER — OXYCODONE HYDROCHLORIDE 5 MG/1
10 TABLET ORAL EVERY 4 HOURS
Refills: 0 | Status: DISCONTINUED | OUTPATIENT
Start: 2021-11-03 | End: 2021-11-08

## 2021-11-03 RX ADMIN — Medication 10 MILLIGRAM(S): at 09:23

## 2021-11-03 RX ADMIN — GABAPENTIN 600 MILLIGRAM(S): 400 CAPSULE ORAL at 05:15

## 2021-11-03 RX ADMIN — SENNA PLUS 2 TABLET(S): 8.6 TABLET ORAL at 05:14

## 2021-11-03 RX ADMIN — METHOCARBAMOL 750 MILLIGRAM(S): 500 TABLET, FILM COATED ORAL at 21:10

## 2021-11-03 RX ADMIN — METHOCARBAMOL 750 MILLIGRAM(S): 500 TABLET, FILM COATED ORAL at 05:15

## 2021-11-03 RX ADMIN — OXYCODONE HYDROCHLORIDE 5 MILLIGRAM(S): 5 TABLET ORAL at 09:33

## 2021-11-03 RX ADMIN — GABAPENTIN 600 MILLIGRAM(S): 400 CAPSULE ORAL at 21:11

## 2021-11-03 RX ADMIN — OXYCODONE HYDROCHLORIDE 5 MILLIGRAM(S): 5 TABLET ORAL at 15:15

## 2021-11-03 RX ADMIN — Medication 6 MILLIGRAM(S): at 21:11

## 2021-11-03 RX ADMIN — Medication 500 MILLIGRAM(S): at 12:03

## 2021-11-03 RX ADMIN — METHOCARBAMOL 750 MILLIGRAM(S): 500 TABLET, FILM COATED ORAL at 14:01

## 2021-11-03 RX ADMIN — DULOXETINE HYDROCHLORIDE 30 MILLIGRAM(S): 30 CAPSULE, DELAYED RELEASE ORAL at 21:10

## 2021-11-03 RX ADMIN — Medication 283 MILLIGRAM(S): at 05:14

## 2021-11-03 RX ADMIN — Medication 650 MILLIGRAM(S): at 05:15

## 2021-11-03 RX ADMIN — GABAPENTIN 600 MILLIGRAM(S): 400 CAPSULE ORAL at 14:01

## 2021-11-03 RX ADMIN — Medication 8 MILLIGRAM(S): at 21:10

## 2021-11-03 RX ADMIN — PANTOPRAZOLE SODIUM 40 MILLIGRAM(S): 20 TABLET, DELAYED RELEASE ORAL at 05:15

## 2021-11-03 RX ADMIN — SENNA PLUS 2 TABLET(S): 8.6 TABLET ORAL at 17:13

## 2021-11-03 RX ADMIN — ENOXAPARIN SODIUM 40 MILLIGRAM(S): 100 INJECTION SUBCUTANEOUS at 17:14

## 2021-11-03 RX ADMIN — Medication 1 TABLET(S): at 12:03

## 2021-11-03 RX ADMIN — Medication 650 MILLIGRAM(S): at 06:24

## 2021-11-03 RX ADMIN — OXYCODONE HYDROCHLORIDE 5 MILLIGRAM(S): 5 TABLET ORAL at 08:35

## 2021-11-03 RX ADMIN — FINASTERIDE 5 MILLIGRAM(S): 5 TABLET, FILM COATED ORAL at 12:04

## 2021-11-03 RX ADMIN — POLYETHYLENE GLYCOL 3350 17 GRAM(S): 17 POWDER, FOR SOLUTION ORAL at 05:15

## 2021-11-03 NOTE — PROGRESS NOTE ADULT - SUBJECTIVE AND OBJECTIVE BOX
HPI:  71 year old male with past medical history of hyperlipidemia, chronic back pain, presented to [facility] 10/6/21 with acute onset back pain and progressively worsening bilateral lower extremity weakness. Urgent MR imaging of the spine revealed intradural extramedullary hematoma T12-L1, S/P T10-L2 laminectomy with 1U pRBCs given intraop 10/6/21. Spinal angiogram 10/7/21 showed no vascular abnormalities. MRI brain showed diffuse SAH thought to be secondary to spinal hematoma. Vazquez was placed for urinary retention, failed TOV x2. Epidural Eliseo Tapia drain was removed 10/11/21. Repeat MRI showed stable spine and repeat spinal angiogram was without vascular abnormalities. Spine sutures and right groin suture removed 10/22/21. Patient is medically stable for discharge to  for AR 10/23/21. (23 Oct 2021 17:44)      INTERVAL HPI/OVERNIGHT EVENTS:  Patient seen at bedside  No success of BM with enemeez.  Open to try suppository today  Tolerating therapy and is on track to meet goals for discharge on 11/10 to home    ROS:  Denies fevers, chills, chest pain, shortness of breath, abdominal pain, headaches, nausea/vomiting    Vital Signs Last 24 Hrs  T(C): 36.4 (03 Nov 2021 07:38), Max: 36.9 (02 Nov 2021 20:45)  T(F): 97.6 (03 Nov 2021 07:38), Max: 98.5 (02 Nov 2021 20:45)  HR: 90 (03 Nov 2021 07:38) (90 - 101)  BP: 115/79 (03 Nov 2021 07:38) (110/65 - 153/76)  BP(mean): --  RR: 16 (03 Nov 2021 07:38) (16 - 17)  SpO2: 95% (03 Nov 2021 07:38) (95% - 98%)    Physical Exam:  Gen - NAD, Comfortable  HEENT - NCAT, EOMI, MMM  Neck - Supple, No limited ROM  Pulm - CTAB  Cardiovascular - RRR, S1S2, No m/r/g  Abdomen - Soft, NT/ND, +BS  Extremities - No C/C/E, No calf tenderness, + Vazquez to leg bag   Neuro     Motor -    BUE 5/5                     LEFT    LE - 4/5                     RIGHT LE - HF 4/5, KE 3/5, DF 3+/5, PF 4/5        Sensory - decreased sensation/tingling in the right leg     Reflexes - DTR Intact  Psychiatric - Mood stable, Affect WNL      LABS:  11-01    134<L>  |  98  |  15  ----------------------------<  133<H>  3.4<L>   |  29  |  0.70    Ca    8.6      01 Nov 2021 06:15    TPro  5.9<L>  /  Alb  2.6<L>  /  TBili  0.6  /  DBili  x   /  AST  21  /  ALT  41  /  AlkPhos  67  11-01                                     10.3   4.66  )-----------( 259      ( 01 Nov 2021 06:15 )             30.8         MEDICATIONS:  MEDICATIONS  (STANDING):  ascorbic acid 500 milliGRAM(s) Oral daily  dextrose 40% Gel 15 Gram(s) Oral once  dextrose 5%. 1000 milliLiter(s) (50 mL/Hr) IV Continuous <Continuous>  dextrose 5%. 1000 milliLiter(s) (100 mL/Hr) IV Continuous <Continuous>  dextrose 50% Injectable 25 Gram(s) IV Push once  dextrose 50% Injectable 12.5 Gram(s) IV Push once  dextrose 50% Injectable 25 Gram(s) IV Push once  docusate sodium Enema 283 milliGRAM(s) Rectal daily  doxazosin 8 milliGRAM(s) Oral at bedtime  DULoxetine 30 milliGRAM(s) Oral at bedtime  enoxaparin Injectable 40 milliGRAM(s) SubCutaneous <User Schedule>  finasteride 5 milliGRAM(s) Oral daily  gabapentin 600 milliGRAM(s) Oral every 8 hours  glucagon  Injectable 1 milliGRAM(s) IntraMuscular once  influenza   Vaccine 0.5 milliLiter(s) IntraMuscular once  melatonin 6 milliGRAM(s) Oral at bedtime  methocarbamol 750 milliGRAM(s) Oral every 8 hours  multivitamin 1 Tablet(s) Oral daily  pantoprazole    Tablet 40 milliGRAM(s) Oral before breakfast  senna 2 Tablet(s) Oral every 12 hours    MEDICATIONS  (PRN):  acetaminophen     Tablet .. 650 milliGRAM(s) Oral every 6 hours PRN Mild Pain (1 - 3)  lactulose Syrup 10 Gram(s) Oral daily PRN no BM>1 day  ondansetron   Disintegrating Tablet 4 milliGRAM(s) Oral every 8 hours PRN Nausea and/or Vomiting  oxyCODONE    IR 10 milliGRAM(s) Oral every 4 hours PRN Severe Pain (7 - 10)  oxyCODONE    IR 5 milliGRAM(s) Oral every 4 hours PRN Moderate Pain (4 - 6)

## 2021-11-03 NOTE — PROGRESS NOTE ADULT - ASSESSMENT
70 yo M with HLD, chronic back pain, presented to [facility] 10/6/2021 w/ acute onset back pain and progressively worsening b/l LE weakness, found to have intradural extramedullary thoracolumbar hematoma T12-L1, S/P T10-L2 laminectomy w/ hematoma evacuation (10/7/2021). MRI brain w/ diffuse SAH thought to be 2/2 spinal hematoma.     #B/L LE weakness 2/2 Intradural extramedullary hematoma T12-L1 s/p T10-L2 laminectomy and decompression  # SAH 2/2 above  - PT/OT per PMR  - OP Neurosurgery f/u    #Neurogenic bladder w/ urinary retention  - PT failed TOV and had a kate upon admission to rehab. He was seen by urology at OSH. Likely multifactorial due to limited mobilization, constipation, spinal cord compression from hematoma.  - He was been requiring Intermittent cath at rehab. Kate placed overnight by Urology for difficulty w/ catheter placement (10/28). Maintain kate catheter. May attempt TOV prior to discharge or discharge w/ kate catheter w/ OP Urology f/u (Dr. Boswell).  - Cont doxazosin, and finasteride.     # Hyponatremia - resolving  - trend Na on BMP    # Normocytic Anemia   -h/h stable  -b12 lower limits of normal.     #Hypokalemia  - Repleted. Monitor electrolytes as necessry    #Hypoalbuminemia  - Nutrition eval on going    DVT ppx: lovenox

## 2021-11-03 NOTE — PROGRESS NOTE ADULT - ASSESSMENT
71y with PMHx hyperlipidemia, chronic back pain, presented to [facility] 10/6/21 with acute onset back pain and progressively worsening bilateral lower extremity weakness, 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 MRI brain showed diffuse SAH likely 2/2 spinal hematoma and paraplegia. Hospital course c/b urinary retention, failed TOV, with kate.    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  - Continue Gabapentin 600mg Q8hr, Methocarbamol 750mg Q8hr, Oxycodone PRN for pain, Duloxetine  - Precautions: falls, spine  - FS well controlled + off Decadron since 10/12/21 - FS d/c  - F/u neurosurgery outpatient    #Neurogenic Bowel  - Continue Lactulose PRN, Senna, Miralax  - enemeez (11/2)  - Suppository x 1 today (11/3)    #Neurogenic Bladder   TOV 10/26 --failed   -Was retaining overnight and could not be SC due to difficult anatomy -- urology was consulted and placed 16 Fr 5ml coude cath on 10/28.   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   - Na 134 (11/1)    # Normocytic Anemia   -h/h stable  -b12 and folate levels nl.  - Hb 10.3 11/1, asymptomatic.     Skin/Pressure Injury:   - At risk for pressure injury due to neurologic diagnosis and relative immobility.  - Monitor Incisions: T12-L2 laminectomy  - Turn every 2 hours while in bed, air mattress  - Soft heel protectors  - Skin barrier cream as needed  - Nursing to monitor skin Qshift    DVT ppx:  - Lovenox  - SCDs  - Last Doppler on 10/9/21 NEG  ---------------  Outpatient Follow-up (Specialty/Name of physician):  Horacio Uriarte (MD; PhD)  Neurosurgery  [address] [place] [address]  Phone: [phone]  Fax: [phone]  Follow Up Time:     Meng Gotti S  UROLOGY  [address] [address] [place] [address]  Phone: [phone]  Fax: [phone]  --------------  Goals: Safe discharge to home  Estimated Length of Stay: 10-14 days  Rehab Potential: Good  Medical Prognosis: Good  Estimated Disposition: Home with Home Care  ---------------    Team meeting 11/2  Planned D/C Date: 11/10  Barriers: LE weakness  goal: W/C level ambulation; mod I/superviison  OT  LBD: min A  Tub/shower: supervision w/ tub transfers  PT  ambulation: min a mod a 50ft with assit x2  Transfers: min A.

## 2021-11-03 NOTE — PROGRESS NOTE ADULT - SUBJECTIVE AND OBJECTIVE BOX
Patient is a 71y old  Male who presents with a chief complaint of B/L LE weakness after intradural extramedullary hematoma S/P evacuation (03 Nov 2021 11:26)      SUBJECTIVE / OVERNIGHT EVENTS:  Pt seen and examined at bedside. No acute events overnight.  Pt denies cp, palpitations, sob, abd pain, N/V, fever, chills.    ROS:  All other review of systems negative    Allergies    Celebrex (Other)    Intolerances        MEDICATIONS  (STANDING):  ascorbic acid 500 milliGRAM(s) Oral daily  dextrose 40% Gel 15 Gram(s) Oral once  dextrose 5%. 1000 milliLiter(s) (50 mL/Hr) IV Continuous <Continuous>  dextrose 5%. 1000 milliLiter(s) (100 mL/Hr) IV Continuous <Continuous>  dextrose 50% Injectable 25 Gram(s) IV Push once  dextrose 50% Injectable 12.5 Gram(s) IV Push once  dextrose 50% Injectable 25 Gram(s) IV Push once  docusate sodium Enema 283 milliGRAM(s) Rectal daily  doxazosin 8 milliGRAM(s) Oral at bedtime  DULoxetine 30 milliGRAM(s) Oral at bedtime  enoxaparin Injectable 40 milliGRAM(s) SubCutaneous <User Schedule>  finasteride 5 milliGRAM(s) Oral daily  gabapentin 600 milliGRAM(s) Oral every 8 hours  glucagon  Injectable 1 milliGRAM(s) IntraMuscular once  influenza   Vaccine 0.5 milliLiter(s) IntraMuscular once  melatonin 6 milliGRAM(s) Oral at bedtime  methocarbamol 750 milliGRAM(s) Oral every 8 hours  multivitamin 1 Tablet(s) Oral daily  pantoprazole    Tablet 40 milliGRAM(s) Oral before breakfast  senna 2 Tablet(s) Oral every 12 hours    MEDICATIONS  (PRN):  acetaminophen     Tablet .. 650 milliGRAM(s) Oral every 6 hours PRN Mild Pain (1 - 3)  lactulose Syrup 10 Gram(s) Oral daily PRN no BM>1 day  ondansetron   Disintegrating Tablet 4 milliGRAM(s) Oral every 8 hours PRN Nausea and/or Vomiting  oxyCODONE    IR 10 milliGRAM(s) Oral every 4 hours PRN Severe Pain (7 - 10)  oxyCODONE    IR 5 milliGRAM(s) Oral every 4 hours PRN Moderate Pain (4 - 6)      Vital Signs Last 24 Hrs  T(C): 36.4 (03 Nov 2021 07:38), Max: 36.9 (02 Nov 2021 20:45)  T(F): 97.6 (03 Nov 2021 07:38), Max: 98.5 (02 Nov 2021 20:45)  HR: 90 (03 Nov 2021 07:38) (90 - 101)  BP: 115/79 (03 Nov 2021 07:38) (110/65 - 153/76)  BP(mean): --  RR: 16 (03 Nov 2021 07:38) (16 - 17)  SpO2: 95% (03 Nov 2021 07:38) (95% - 98%)  CAPILLARY BLOOD GLUCOSE        I&O's Summary    02 Nov 2021 07:01  -  03 Nov 2021 07:00  --------------------------------------------------------  IN: 0 mL / OUT: 1280 mL / NET: -1280 mL        PHYSICAL EXAM:  GENERAL: NAD, well-developed  CHEST/LUNG: Clear to auscultation bilaterally; No wheeze, nonlabored breathing  HEART: Regular rate and rhythm; No murmurs, rubs, or gallops  ABDOMEN: Soft, Nontender, Nondistended; Bowel sounds present  EXTREMITIES:  2+ Peripheral Pulses, No clubbing, cyanosis, or edema  NEUROLOGY: AAOx3  PSYCH: calm, appropriate mood    LABS:                    RADIOLOGY & ADDITIONAL TESTS:  Results Reviewed:   Imaging Personally Reviewed:  Electrocardiogram Personally Reviewed:    COORDINATION OF CARE:  Care Discussed with Consultants/Other Providers [Y/N]:  Prior or Outpatient Records Reviewed [Y/N]:

## 2021-11-04 LAB
ALBUMIN SERPL ELPH-MCNC: 2.7 G/DL — LOW (ref 3.3–5)
ALP SERPL-CCNC: 68 U/L — SIGNIFICANT CHANGE UP (ref 40–120)
ALT FLD-CCNC: 48 U/L — HIGH (ref 10–45)
ANION GAP SERPL CALC-SCNC: 7 MMOL/L — SIGNIFICANT CHANGE UP (ref 5–17)
AST SERPL-CCNC: 21 U/L — SIGNIFICANT CHANGE UP (ref 10–40)
BASOPHILS # BLD AUTO: 0.02 K/UL — SIGNIFICANT CHANGE UP (ref 0–0.2)
BASOPHILS NFR BLD AUTO: 0.5 % — SIGNIFICANT CHANGE UP (ref 0–2)
BILIRUB SERPL-MCNC: 0.5 MG/DL — SIGNIFICANT CHANGE UP (ref 0.2–1.2)
BUN SERPL-MCNC: 15 MG/DL — SIGNIFICANT CHANGE UP (ref 7–23)
CALCIUM SERPL-MCNC: 8.7 MG/DL — SIGNIFICANT CHANGE UP (ref 8.4–10.5)
CHLORIDE SERPL-SCNC: 101 MMOL/L — SIGNIFICANT CHANGE UP (ref 96–108)
CO2 SERPL-SCNC: 29 MMOL/L — SIGNIFICANT CHANGE UP (ref 22–31)
CREAT SERPL-MCNC: 0.66 MG/DL — SIGNIFICANT CHANGE UP (ref 0.5–1.3)
EOSINOPHIL # BLD AUTO: 0.21 K/UL — SIGNIFICANT CHANGE UP (ref 0–0.5)
EOSINOPHIL NFR BLD AUTO: 5.3 % — SIGNIFICANT CHANGE UP (ref 0–6)
GLUCOSE SERPL-MCNC: 108 MG/DL — HIGH (ref 70–99)
HCT VFR BLD CALC: 30.8 % — LOW (ref 39–50)
HGB BLD-MCNC: 10.4 G/DL — LOW (ref 13–17)
IMM GRANULOCYTES NFR BLD AUTO: 1.5 % — SIGNIFICANT CHANGE UP (ref 0–1.5)
LYMPHOCYTES # BLD AUTO: 0.77 K/UL — LOW (ref 1–3.3)
LYMPHOCYTES # BLD AUTO: 19.5 % — SIGNIFICANT CHANGE UP (ref 13–44)
MCHC RBC-ENTMCNC: 30.3 PG — SIGNIFICANT CHANGE UP (ref 27–34)
MCHC RBC-ENTMCNC: 33.8 GM/DL — SIGNIFICANT CHANGE UP (ref 32–36)
MCV RBC AUTO: 89.8 FL — SIGNIFICANT CHANGE UP (ref 80–100)
MONOCYTES # BLD AUTO: 0.44 K/UL — SIGNIFICANT CHANGE UP (ref 0–0.9)
MONOCYTES NFR BLD AUTO: 11.1 % — SIGNIFICANT CHANGE UP (ref 2–14)
NEUTROPHILS # BLD AUTO: 2.45 K/UL — SIGNIFICANT CHANGE UP (ref 1.8–7.4)
NEUTROPHILS NFR BLD AUTO: 62.1 % — SIGNIFICANT CHANGE UP (ref 43–77)
NRBC # BLD: 0 /100 WBCS — SIGNIFICANT CHANGE UP (ref 0–0)
PLATELET # BLD AUTO: 239 K/UL — SIGNIFICANT CHANGE UP (ref 150–400)
POTASSIUM SERPL-MCNC: 4 MMOL/L — SIGNIFICANT CHANGE UP (ref 3.5–5.3)
POTASSIUM SERPL-SCNC: 4 MMOL/L — SIGNIFICANT CHANGE UP (ref 3.5–5.3)
PROT SERPL-MCNC: 5.9 G/DL — LOW (ref 6–8.3)
RBC # BLD: 3.43 M/UL — LOW (ref 4.2–5.8)
RBC # FLD: 13.7 % — SIGNIFICANT CHANGE UP (ref 10.3–14.5)
SODIUM SERPL-SCNC: 137 MMOL/L — SIGNIFICANT CHANGE UP (ref 135–145)
WBC # BLD: 3.95 K/UL — SIGNIFICANT CHANGE UP (ref 3.8–10.5)
WBC # FLD AUTO: 3.95 K/UL — SIGNIFICANT CHANGE UP (ref 3.8–10.5)

## 2021-11-04 PROCEDURE — 99233 SBSQ HOSP IP/OBS HIGH 50: CPT

## 2021-11-04 PROCEDURE — 99232 SBSQ HOSP IP/OBS MODERATE 35: CPT

## 2021-11-04 RX ADMIN — SENNA PLUS 2 TABLET(S): 8.6 TABLET ORAL at 06:25

## 2021-11-04 RX ADMIN — GABAPENTIN 600 MILLIGRAM(S): 400 CAPSULE ORAL at 06:25

## 2021-11-04 RX ADMIN — Medication 650 MILLIGRAM(S): at 04:17

## 2021-11-04 RX ADMIN — Medication 650 MILLIGRAM(S): at 04:07

## 2021-11-04 RX ADMIN — Medication 650 MILLIGRAM(S): at 12:50

## 2021-11-04 RX ADMIN — METHOCARBAMOL 750 MILLIGRAM(S): 500 TABLET, FILM COATED ORAL at 21:38

## 2021-11-04 RX ADMIN — Medication 500 MILLIGRAM(S): at 11:56

## 2021-11-04 RX ADMIN — PANTOPRAZOLE SODIUM 40 MILLIGRAM(S): 20 TABLET, DELAYED RELEASE ORAL at 06:25

## 2021-11-04 RX ADMIN — OXYCODONE HYDROCHLORIDE 5 MILLIGRAM(S): 5 TABLET ORAL at 08:38

## 2021-11-04 RX ADMIN — GABAPENTIN 600 MILLIGRAM(S): 400 CAPSULE ORAL at 21:38

## 2021-11-04 RX ADMIN — ENOXAPARIN SODIUM 40 MILLIGRAM(S): 100 INJECTION SUBCUTANEOUS at 17:50

## 2021-11-04 RX ADMIN — METHOCARBAMOL 750 MILLIGRAM(S): 500 TABLET, FILM COATED ORAL at 15:06

## 2021-11-04 RX ADMIN — OXYCODONE HYDROCHLORIDE 5 MILLIGRAM(S): 5 TABLET ORAL at 14:03

## 2021-11-04 RX ADMIN — Medication 6 MILLIGRAM(S): at 21:38

## 2021-11-04 RX ADMIN — OXYCODONE HYDROCHLORIDE 5 MILLIGRAM(S): 5 TABLET ORAL at 13:04

## 2021-11-04 RX ADMIN — DULOXETINE HYDROCHLORIDE 30 MILLIGRAM(S): 30 CAPSULE, DELAYED RELEASE ORAL at 21:38

## 2021-11-04 RX ADMIN — METHOCARBAMOL 750 MILLIGRAM(S): 500 TABLET, FILM COATED ORAL at 06:25

## 2021-11-04 RX ADMIN — Medication 8 MILLIGRAM(S): at 21:38

## 2021-11-04 RX ADMIN — Medication 1 TABLET(S): at 11:56

## 2021-11-04 RX ADMIN — OXYCODONE HYDROCHLORIDE 5 MILLIGRAM(S): 5 TABLET ORAL at 00:00

## 2021-11-04 RX ADMIN — Medication 650 MILLIGRAM(S): at 11:58

## 2021-11-04 RX ADMIN — SENNA PLUS 2 TABLET(S): 8.6 TABLET ORAL at 17:50

## 2021-11-04 RX ADMIN — Medication 283 MILLIGRAM(S): at 06:25

## 2021-11-04 RX ADMIN — FINASTERIDE 5 MILLIGRAM(S): 5 TABLET, FILM COATED ORAL at 11:56

## 2021-11-04 RX ADMIN — GABAPENTIN 600 MILLIGRAM(S): 400 CAPSULE ORAL at 15:06

## 2021-11-04 NOTE — PROGRESS NOTE ADULT - ASSESSMENT
71y with PMHx hyperlipidemia, chronic back pain, presented to [facility] 10/6/21 with acute onset back pain and progressively worsening bilateral lower extremity weakness, 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 MRI brain showed diffuse SAH likely 2/2 spinal hematoma and paraplegia. Hospital course c/b urinary retention, failed TOV, with kate.    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  - Continue Gabapentin 600mg Q8hr, Methocarbamol 750mg Q8hr, Oxycodone PRN for pain, Duloxetine  - Precautions: falls, spine  - FS well controlled + off Decadron since 10/12/21 - FS d/c  - F/u neurosurgery outpatient    #Neurogenic Bowel  - Continue Lactulose PRN, Senna, Miralax  - enemeez (11/2)  - Suppository x 1  (11/3) - seem to have worked.    #Neurogenic Bladder   TOV 10/26 --failed   -Was retaining overnight and could not be SC due to difficult anatomy -- urology was consulted and placed 16 Fr 5ml coude cath on 10/28.   - Kate in place on 10/28 overnight   - Cardura increased to 8mg 10/29   - Urology rec to consider delayed void trial when clinically improved.    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   - Na 137 (11/4)    # Normocytic Anemia   -h/h stable  -b12 and folate levels nl.  - Hb 10.3 11/1, asymptomatic.     Skin/Pressure Injury:   - At risk for pressure injury due to neurologic diagnosis and relative immobility.  - Monitor Incisions: T12-L2 laminectomy  - Turn every 2 hours while in bed, air mattress  - Soft heel protectors  - Skin barrier cream as needed  - Nursing to monitor skin Qshift    DVT ppx:  - Lovenox  - SCDs  - Last Doppler on 10/9/21 NEG  ---------------  Outpatient Follow-up (Specialty/Name of physician):  Horacio Uriarte; PhD)  Neurosurgery  [address] [place] [address]  Phone: [phone]  Fax: [phone]  Follow Up Time:     Meng Gotti  UROLOGY  [address] [address] [place] [address]  Phone: [phone]  Fax: [phone]  --------------  Goals: Safe discharge to home  Estimated Length of Stay: 10-14 days  Rehab Potential: Good  Medical Prognosis: Good  Estimated Disposition: Home with Home Care  ---------------    Team meeting 11/2  Planned D/C Date: 11/10  Barriers: LE weakness  goal: W/C level ambulation; mod I/superviison  OT  LBD: min A  Tub/shower: supervision w/ tub transfers  PT  ambulation: min a mod a 50ft with assit x2  Transfers: min A.   71y with PMHx hyperlipidemia, chronic back pain, presented to [facility] 10/6/21 with acute onset back pain and progressively worsening bilateral lower extremity weakness, 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 MRI brain showed diffuse SAH likely 2/2 spinal hematoma and paraplegia. Hospital course c/b urinary retention, failed TOV, with kate.    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  - Continue Gabapentin 600mg Q8hr, Methocarbamol 750mg Q8hr, Oxycodone PRN for pain, Duloxetine  - Precautions: falls, spine  - FS well controlled + off Decadron since 10/12/21 - FS d/c  - F/u neurosurgery outpatient    #Neurogenic Bowel  - Continue Lactulose PRN, Senna, Miralax  - enemeez (11/2)  - Suppository prn    #Neurogenic Bladder   TOV 10/26 --failed   -Was retaining overnight and could not be SC due to difficult anatomy -- urology was consulted and placed 16 Fr 5ml coude cath on 10/28.   - Kate in place on 10/28 overnight   - Cardura increased to 8mg 10/29   - Urology rec to consider delayed void trial when clinically improved. 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   - Na 137 (11/4)    # Normocytic Anemia   -h/h stable  -b12 and folate levels nl.  - Hb 10.3 11/1, asymptomatic.     Skin/Pressure Injury:   - At risk for pressure injury due to neurologic diagnosis and relative immobility.  - Monitor Incisions: T12-L2 laminectomy  - Turn every 2 hours while in bed, air mattress  - Soft heel protectors  - Skin barrier cream as needed  - Nursing to monitor skin Qshift    DVT ppx:  - Lovenox  - SCDs  - Last Doppler on 10/9/21 NEG  ---------------  Outpatient Follow-up (Specialty/Name of physician):  Horacio Uriarte; PhD)  Neurosurgery  [address] [place] [address]  Phone: [phone]  Fax: [phone]  Follow Up Time:     Meng Gotti  UROLOGY  [address] [address] [place] [address]  Phone: [phone]  Fax: [phone]  --------------  Goals: Safe discharge to home  Estimated Length of Stay: 10-14 days  Rehab Potential: Good  Medical Prognosis: Good  Estimated Disposition: Home with Home Care  ---------------    Team meeting 11/2  Planned D/C Date: 11/10  Barriers: LE weakness  goal: W/C level ambulation; mod I/superviison  OT  LBD: min A  Tub/shower: supervision w/ tub transfers  PT  ambulation: min a mod a 50ft with assit x2  Transfers: min A.

## 2021-11-04 NOTE — PROGRESS NOTE ADULT - SUBJECTIVE AND OBJECTIVE BOX
Patient is a 71y old  Male who presents with a chief complaint of B/L LE weakness after intradural extramedullary hematoma S/P evacuation (04 Nov 2021 10:36)      SUBJECTIVE / OVERNIGHT EVENTS:  Pt seen and examined at bedside. No acute events overnight.  Pt denies cp, palpitations, sob, abd pain, N/V, fever, chills.    ROS:  All other review of systems negative    Allergies    Celebrex (Other)    Intolerances        MEDICATIONS  (STANDING):  ascorbic acid 500 milliGRAM(s) Oral daily  dextrose 40% Gel 15 Gram(s) Oral once  dextrose 5%. 1000 milliLiter(s) (50 mL/Hr) IV Continuous <Continuous>  dextrose 5%. 1000 milliLiter(s) (100 mL/Hr) IV Continuous <Continuous>  dextrose 50% Injectable 25 Gram(s) IV Push once  dextrose 50% Injectable 12.5 Gram(s) IV Push once  dextrose 50% Injectable 25 Gram(s) IV Push once  docusate sodium Enema 283 milliGRAM(s) Rectal daily  doxazosin 8 milliGRAM(s) Oral at bedtime  DULoxetine 30 milliGRAM(s) Oral at bedtime  enoxaparin Injectable 40 milliGRAM(s) SubCutaneous <User Schedule>  finasteride 5 milliGRAM(s) Oral daily  gabapentin 600 milliGRAM(s) Oral every 8 hours  glucagon  Injectable 1 milliGRAM(s) IntraMuscular once  influenza   Vaccine 0.5 milliLiter(s) IntraMuscular once  melatonin 6 milliGRAM(s) Oral at bedtime  methocarbamol 750 milliGRAM(s) Oral every 8 hours  multivitamin 1 Tablet(s) Oral daily  pantoprazole    Tablet 40 milliGRAM(s) Oral before breakfast  senna 2 Tablet(s) Oral every 12 hours    MEDICATIONS  (PRN):  acetaminophen     Tablet .. 650 milliGRAM(s) Oral every 6 hours PRN Mild Pain (1 - 3)  lactulose Syrup 10 Gram(s) Oral daily PRN no BM>1 day  ondansetron   Disintegrating Tablet 4 milliGRAM(s) Oral every 8 hours PRN Nausea and/or Vomiting  oxyCODONE    IR 10 milliGRAM(s) Oral every 4 hours PRN Severe Pain (7 - 10)  oxyCODONE    IR 5 milliGRAM(s) Oral every 4 hours PRN Moderate Pain (4 - 6)      Vital Signs Last 24 Hrs  T(C): 36.6 (04 Nov 2021 08:34), Max: 37.1 (03 Nov 2021 21:09)  T(F): 97.8 (04 Nov 2021 08:34), Max: 98.8 (03 Nov 2021 21:09)  HR: 93 (04 Nov 2021 08:34) (93 - 93)  BP: 140/79 (04 Nov 2021 08:34) (140/79 - 146/82)  BP(mean): --  RR: 16 (04 Nov 2021 08:34) (16 - 17)  SpO2: 98% (04 Nov 2021 08:34) (98% - 98%)  CAPILLARY BLOOD GLUCOSE        I&O's Summary    03 Nov 2021 07:01  -  04 Nov 2021 07:00  --------------------------------------------------------  IN: 0 mL / OUT: 1850 mL / NET: -1850 mL        PHYSICAL EXAM:  GENERAL: NAD, well-developed  CHEST/LUNG: Clear to auscultation bilaterally; No wheeze, nonlabored breathing  HEART: Regular rate and rhythm; No murmurs, rubs, or gallops  ABDOMEN: Soft, Nontender, Nondistended; Bowel sounds present  EXTREMITIES:  2+ Peripheral Pulses, No clubbing, cyanosis, or edema  NEUROLOGY: AAOx3  PSYCH: calm, appropriate mood    LABS:                        10.4   3.95  )-----------( 239      ( 04 Nov 2021 06:45 )             30.8     11-04    137  |  101  |  15  ----------------------------<  108<H>  4.0   |  29  |  0.66    Ca    8.7      04 Nov 2021 06:45    TPro  5.9<L>  /  Alb  2.7<L>  /  TBili  0.5  /  DBili  x   /  AST  21  /  ALT  48<H>  /  AlkPhos  68  11-04              RADIOLOGY & ADDITIONAL TESTS:  Results Reviewed:   Imaging Personally Reviewed:  Electrocardiogram Personally Reviewed:    COORDINATION OF CARE:  Care Discussed with Consultants/Other Providers [Y/N]:  Prior or Outpatient Records Reviewed [Y/N]:

## 2021-11-04 NOTE — PROGRESS NOTE ADULT - ATTENDING COMMENTS
Gait impairment, due to B/L LE weakness after intradural extramedullary hematoma S/P evacuation (04 Nov 2021 10:36)  Bladder dysfunction, catheter in situ for bladder decompression    No acute distress during review  Had good bowel movement after revision of bowel regimen  Lab results unremarkable  No overt cognitive deficits    Motivated and engaging in therapy, but LE weakness still apparent    Continue therapy  Liaise with Urology for bladder care    Seen with Dr Hills, resident, Ms Wall NP, Patient is stable to continue acute rehab treatment

## 2021-11-04 NOTE — PROGRESS NOTE ADULT - ASSESSMENT
72 yo M with HLD, chronic back pain, presented to [facility] 10/6/2021 w/ acute onset back pain and progressively worsening b/l LE weakness, found to have intradural extramedullary thoracolumbar hematoma T12-L1, S/P T10-L2 laminectomy w/ hematoma evacuation (10/7/2021). MRI brain w/ diffuse SAH thought to be 2/2 spinal hematoma.     #B/L LE weakness 2/2 Intradural extramedullary hematoma T12-L1 s/p T10-L2 laminectomy and decompression  # SAH 2/2 above  - PT/OT per PMR  - OP Neurosurgery f/u    #Neurogenic bladder w/ urinary retention  - PT failed TOV and had a kate upon admission to rehab. He was seen by urology at OSH. Likely multifactorial due to limited mobilization, constipation, spinal cord compression from hematoma.  - He was been requiring Intermittent cath at rehab. Kate placed overnight by Urology for difficulty w/ catheter placement (10/28). Maintain kate catheter. May attempt TOV prior to discharge or discharge w/ kate catheter w/ OP Urology f/u (Dr. Boswell).  - Cont doxazosin, and finasteride.     # Hyponatremia   -resolved    # Normocytic Anemia   -h/h stable  -b12 lower limits of normal    #Hypokalemia  -resolved    #Hypoalbuminemia  - Nutrition eval on going    DVT ppx: lovenox

## 2021-11-04 NOTE — PROGRESS NOTE ADULT - SUBJECTIVE AND OBJECTIVE BOX
HPI:  71 year old male with past medical history of hyperlipidemia, chronic back pain, presented to [facility] 10/6/21 with acute onset back pain and progressively worsening bilateral lower extremity weakness. Urgent MR imaging of the spine revealed intradural extramedullary hematoma T12-L1, S/P T10-L2 laminectomy with 1U pRBCs given intraop 10/6/21. Spinal angiogram 10/7/21 showed no vascular abnormalities. MRI brain showed diffuse SAH thought to be secondary to spinal hematoma. Vazquez was placed for urinary retention, failed TOV x2. Epidural Eliseo Tapia drain was removed 10/11/21. Repeat MRI showed stable spine and repeat spinal angiogram was without vascular abnormalities. Spine sutures and right groin suture removed 10/22/21. Patient is medically stable for discharge to  for AR 10/23/21. (23 Oct 2021 17:44)      INTERVAL HPI/OVERNIGHT EVENTS:  Chart Reviewed and patient seen at bedside.  Patient had BM this AM.   No issues nor complaints.     ROS:  Denies fevers, chills, chest pain, shortness of breath, abdominal pain, headaches, nausea/vomiting    Vital Signs Last 24 Hrs  T(C): 36.6 (04 Nov 2021 08:34), Max: 37.1 (03 Nov 2021 21:09)  T(F): 97.8 (04 Nov 2021 08:34), Max: 98.8 (03 Nov 2021 21:09)  HR: 93 (04 Nov 2021 08:34) (93 - 93)  BP: 140/79 (04 Nov 2021 08:34) (140/79 - 146/82)  BP(mean): --  RR: 16 (04 Nov 2021 08:34) (16 - 17)  SpO2: 98% (04 Nov 2021 08:34) (98% - 98%)    Physical Exam:  Gen - NAD, Comfortable  HEENT - NCAT, EOMI, MMM  Neck - Supple, No limited ROM  Pulm - CTAB  Cardiovascular - RRR, S1S2, No m/r/g  Abdomen - Soft, NT/ND, +BS  Extremities - No C/C/E, No calf tenderness, + Vazquez to leg bag   Neuro     Motor -    BUE 5/5                     LEFT    LE - 4/5                     RIGHT LE - HF 4/5, KE 3/5, DF 3+/5, PF 4/5        Sensory - decreased sensation/tingling in the right leg     Reflexes - DTR Intact  Psychiatric - Mood stable, Affect WNL    LABS:  11-04    137  |  101  |  15  ----------------------------<  108<H>  4.0   |  29  |  0.66    Ca    8.7      04 Nov 2021 06:45    TPro  5.9<L>  /  Alb  2.7<L>  /  TBili  0.5  /  DBili  x   /  AST  21  /  ALT  48<H>  /  AlkPhos  68  11-04                                              10.4   3.95  )-----------( 239      ( 04 Nov 2021 06:45 )             30.8     CAPILLARY BLOOD GLUCOSE          MEDICATIONS:  MEDICATIONS  (STANDING):  ascorbic acid 500 milliGRAM(s) Oral daily  dextrose 40% Gel 15 Gram(s) Oral once  dextrose 5%. 1000 milliLiter(s) (50 mL/Hr) IV Continuous <Continuous>  dextrose 5%. 1000 milliLiter(s) (100 mL/Hr) IV Continuous <Continuous>  dextrose 50% Injectable 25 Gram(s) IV Push once  dextrose 50% Injectable 12.5 Gram(s) IV Push once  dextrose 50% Injectable 25 Gram(s) IV Push once  docusate sodium Enema 283 milliGRAM(s) Rectal daily  doxazosin 8 milliGRAM(s) Oral at bedtime  DULoxetine 30 milliGRAM(s) Oral at bedtime  enoxaparin Injectable 40 milliGRAM(s) SubCutaneous <User Schedule>  finasteride 5 milliGRAM(s) Oral daily  gabapentin 600 milliGRAM(s) Oral every 8 hours  glucagon  Injectable 1 milliGRAM(s) IntraMuscular once  influenza   Vaccine 0.5 milliLiter(s) IntraMuscular once  melatonin 6 milliGRAM(s) Oral at bedtime  methocarbamol 750 milliGRAM(s) Oral every 8 hours  multivitamin 1 Tablet(s) Oral daily  pantoprazole    Tablet 40 milliGRAM(s) Oral before breakfast  senna 2 Tablet(s) Oral every 12 hours    MEDICATIONS  (PRN):  acetaminophen     Tablet .. 650 milliGRAM(s) Oral every 6 hours PRN Mild Pain (1 - 3)  lactulose Syrup 10 Gram(s) Oral daily PRN no BM>1 day  ondansetron   Disintegrating Tablet 4 milliGRAM(s) Oral every 8 hours PRN Nausea and/or Vomiting  oxyCODONE    IR 10 milliGRAM(s) Oral every 4 hours PRN Severe Pain (7 - 10)  oxyCODONE    IR 5 milliGRAM(s) Oral every 4 hours PRN Moderate Pain (4 - 6)         HPI:  71 year old male with past medical history of hyperlipidemia, chronic back pain, presented to [facility] 10/6/21 with acute onset back pain and progressively worsening bilateral lower extremity weakness. Urgent MR imaging of the spine revealed intradural extramedullary hematoma T12-L1, S/P T10-L2 laminectomy with 1U pRBCs given intraop 10/6/21. Spinal angiogram 10/7/21 showed no vascular abnormalities. MRI brain showed diffuse SAH thought to be secondary to spinal hematoma. Vzaquez was placed for urinary retention, failed TOV x2. Epidural Eliseo Tapia drain was removed 10/11/21. Repeat MRI showed stable spine and repeat spinal angiogram was without vascular abnormalities. Spine sutures and right groin suture removed 10/22/21. Patient is medically stable for discharge to  for AR 10/23/21. (23 Oct 2021 17:44)      INTERVAL HPI/OVERNIGHT EVENTS:  Chart Reviewed and patient seen at bedside.  No issues nor complaints.  reports normal bowel movement this AM  Clear urine in Vazquez, has previous failed TOV, Urology team managing bladder decompression    ROS:  Denies fevers, chills, chest pain, shortness of breath, abdominal pain, headaches, nausea/vomiting    Vital Signs Last 24 Hrs  T(C): 36.6 (04 Nov 2021 08:34), Max: 37.1 (03 Nov 2021 21:09)  T(F): 97.8 (04 Nov 2021 08:34), Max: 98.8 (03 Nov 2021 21:09)  HR: 93 (04 Nov 2021 08:34) (93 - 93)  BP: 140/79 (04 Nov 2021 08:34) (140/79 - 146/82)  BP(mean): --  RR: 16 (04 Nov 2021 08:34) (16 - 17)  SpO2: 98% (04 Nov 2021 08:34) (98% - 98%)    Physical Exam:  Gen - NAD, Comfortable  HEENT - NCAT, EOMI, MMM  Neck - Supple, No limited ROM  Pulm - CTAB  Cardiovascular - RRR, S1S2, No m/r/g  Abdomen - Soft, NT/ND, +BS  Extremities - No C/C/E, No calf tenderness, + Vazquez to leg bag   Neuro     Motor -    BUE 5/5                     LEFT    LE - 4/5                     RIGHT LE - HF 4/5, KE 3/5, DF 3+/5, PF 4/5        Sensory - decreased sensation/tingling in the right leg     Reflexes - DTR Intact  Psychiatric - Mood stable, Affect WNL    LABS:  11-04    137  |  101  |  15  ----------------------------<  108<H>  4.0   |  29  |  0.66    Ca    8.7      04 Nov 2021 06:45    TPro  5.9<L>  /  Alb  2.7<L>  /  TBili  0.5  /  DBili  x   /  AST  21  /  ALT  48<H>  /  AlkPhos  68  11-04                                              10.4   3.95  )-----------( 239      ( 04 Nov 2021 06:45 )             30.8     CAPILLARY BLOOD GLUCOSE          MEDICATIONS:  MEDICATIONS  (STANDING):  ascorbic acid 500 milliGRAM(s) Oral daily  dextrose 40% Gel 15 Gram(s) Oral once  dextrose 5%. 1000 milliLiter(s) (50 mL/Hr) IV Continuous <Continuous>  dextrose 5%. 1000 milliLiter(s) (100 mL/Hr) IV Continuous <Continuous>  dextrose 50% Injectable 25 Gram(s) IV Push once  dextrose 50% Injectable 12.5 Gram(s) IV Push once  dextrose 50% Injectable 25 Gram(s) IV Push once  docusate sodium Enema 283 milliGRAM(s) Rectal daily  doxazosin 8 milliGRAM(s) Oral at bedtime  DULoxetine 30 milliGRAM(s) Oral at bedtime  enoxaparin Injectable 40 milliGRAM(s) SubCutaneous <User Schedule>  finasteride 5 milliGRAM(s) Oral daily  gabapentin 600 milliGRAM(s) Oral every 8 hours  glucagon  Injectable 1 milliGRAM(s) IntraMuscular once  influenza   Vaccine 0.5 milliLiter(s) IntraMuscular once  melatonin 6 milliGRAM(s) Oral at bedtime  methocarbamol 750 milliGRAM(s) Oral every 8 hours  multivitamin 1 Tablet(s) Oral daily  pantoprazole    Tablet 40 milliGRAM(s) Oral before breakfast  senna 2 Tablet(s) Oral every 12 hours    MEDICATIONS  (PRN):  acetaminophen     Tablet .. 650 milliGRAM(s) Oral every 6 hours PRN Mild Pain (1 - 3)  lactulose Syrup 10 Gram(s) Oral daily PRN no BM>1 day  ondansetron   Disintegrating Tablet 4 milliGRAM(s) Oral every 8 hours PRN Nausea and/or Vomiting  oxyCODONE    IR 10 milliGRAM(s) Oral every 4 hours PRN Severe Pain (7 - 10)  oxyCODONE    IR 5 milliGRAM(s) Oral every 4 hours PRN Moderate Pain (4 - 6)    Function: motivated, engaging in therapy and gradually making functional gains

## 2021-11-04 NOTE — PROGRESS NOTE ADULT - SUBJECTIVE AND OBJECTIVE BOX
Patient is a 71y old  Male who presents with a chief complaint of B/L LE weakness after intradural extramedullary hematoma S/P evacuation (03 Nov 2021 13:58)    HPI:  71 year old male with past medical history of hyperlipidemia, chronic back pain, presented to [facility] 10/6/21 with acute onset back pain and progressively worsening bilateral lower extremity weakness. Urgent MR imaging of the spine revealed intradural extramedullary hematoma T12-L1, S/P T10-L2 laminectomy with 1U pRBCs given intraop 10/6/21. Spinal angiogram 10/7/21 showed no vascular abnormalities. MRI brain showed diffuse SAH thought to be secondary to spinal hematoma. Kate was placed for urinary retention, failed TOV x2. Epidural Eliseo Tapia drain was removed 10/11/21. Repeat MRI showed stable spine and repeat spinal angiogram was without vascular abnormalities. Spine sutures and right groin suture removed 10/22/21. Patient is medically stable for discharge to  for AR 10/23/21. (23 Oct 2021 17:44)    kate draining    Interval Events:  Patient seen and examined at bedside.    MEDICATIONS:  MEDICATIONS  (STANDING):  ascorbic acid 500 milliGRAM(s) Oral daily  dextrose 40% Gel 15 Gram(s) Oral once  dextrose 5%. 1000 milliLiter(s) (50 mL/Hr) IV Continuous <Continuous>  dextrose 5%. 1000 milliLiter(s) (100 mL/Hr) IV Continuous <Continuous>  dextrose 50% Injectable 25 Gram(s) IV Push once  dextrose 50% Injectable 12.5 Gram(s) IV Push once  dextrose 50% Injectable 25 Gram(s) IV Push once  docusate sodium Enema 283 milliGRAM(s) Rectal daily  doxazosin 8 milliGRAM(s) Oral at bedtime  DULoxetine 30 milliGRAM(s) Oral at bedtime  enoxaparin Injectable 40 milliGRAM(s) SubCutaneous <User Schedule>  finasteride 5 milliGRAM(s) Oral daily  gabapentin 600 milliGRAM(s) Oral every 8 hours  glucagon  Injectable 1 milliGRAM(s) IntraMuscular once  influenza   Vaccine 0.5 milliLiter(s) IntraMuscular once  melatonin 6 milliGRAM(s) Oral at bedtime  methocarbamol 750 milliGRAM(s) Oral every 8 hours  multivitamin 1 Tablet(s) Oral daily  pantoprazole    Tablet 40 milliGRAM(s) Oral before breakfast  senna 2 Tablet(s) Oral every 12 hours    MEDICATIONS  (PRN):  acetaminophen     Tablet .. 650 milliGRAM(s) Oral every 6 hours PRN Mild Pain (1 - 3)  lactulose Syrup 10 Gram(s) Oral daily PRN no BM>1 day  ondansetron   Disintegrating Tablet 4 milliGRAM(s) Oral every 8 hours PRN Nausea and/or Vomiting  oxyCODONE    IR 10 milliGRAM(s) Oral every 4 hours PRN Severe Pain (7 - 10)  oxyCODONE    IR 5 milliGRAM(s) Oral every 4 hours PRN Moderate Pain (4 - 6)      Allergies    Celebrex (Other)    Intolerances        T(C): 37.1 (11-03-21 @ 21:09), Max: 37.1 (11-03-21 @ 21:09)  T(F): 98.8 (11-03-21 @ 21:09), Max: 98.8 (11-03-21 @ 21:09)  HR: 93 (11-03-21 @ 21:09) (90 - 101)  BP: 146/82 (11-03-21 @ 21:09) (110/65 - 153/76)  RR: 17 (11-03-21 @ 21:09) (16 - 17)  SpO2: 98% (11-03-21 @ 21:09) (95% - 98%)          11-02-21 @ 07:01  -  11-03-21 @ 07:00  --------------------------------------------------------  IN:  Total IN: 0 mL    OUT:    Indwelling Catheter - Urethral (mL): 1080 mL    Intermittent Catheterization - Urethral (mL): 200 mL  Total OUT: 1280 mL    Total NET: -1280 mL      11-03-21 @ 07:01  -  11-04-21 @ 07:00  --------------------------------------------------------  IN:  Total IN: 0 mL    OUT:    Indwelling Catheter - Urethral (mL): 1850 mL  Total OUT: 1850 mL    Total NET: -1850 mL          LABS:      CBC Full  -  ( 04 Nov 2021 06:45 )  WBC Count : 3.95 K/uL  RBC Count : 3.43 M/uL  Hemoglobin : 10.4 g/dL  Hematocrit : 30.8 %  Platelet Count - Automated : 239 K/uL  Mean Cell Volume : 89.8 fl  Mean Cell Hemoglobin : 30.3 pg  Mean Cell Hemoglobin Concentration : 33.8 gm/dL  Auto Neutrophil # : 2.45 K/uL  Auto Lymphocyte # : 0.77 K/uL  Auto Monocyte # : 0.44 K/uL  Auto Eosinophil # : 0.21 K/uL  Auto Basophil # : 0.02 K/uL  Auto Neutrophil % : 62.1 %  Auto Lymphocyte % : 19.5 %  Auto Monocyte % : 11.1 %  Auto Eosinophil % : 5.3 %  Auto Basophil % : 0.5 %    11-04    137  |  101  |  15  ----------------------------<  108<H>  4.0   |  29  |  0.66    Ca    8.7      04 Nov 2021 06:45    TPro  5.9<L>  /  Alb  2.7<L>  /  TBili  0.5  /  DBili  x   /  AST  21  /  ALT  48<H>  /  AlkPhos  68  11-04                  Physical Exam    Constitutional: alert, no acute distress    Abdomen: soft, nontender, nondistended, no HSM    Genitourinary: no bladder distention, kate

## 2021-11-05 LAB — SARS-COV-2 RNA SPEC QL NAA+PROBE: SIGNIFICANT CHANGE UP

## 2021-11-05 PROCEDURE — 99233 SBSQ HOSP IP/OBS HIGH 50: CPT

## 2021-11-05 RX ADMIN — Medication 650 MILLIGRAM(S): at 15:59

## 2021-11-05 RX ADMIN — Medication 500 MILLIGRAM(S): at 12:16

## 2021-11-05 RX ADMIN — GABAPENTIN 600 MILLIGRAM(S): 400 CAPSULE ORAL at 13:59

## 2021-11-05 RX ADMIN — FINASTERIDE 5 MILLIGRAM(S): 5 TABLET, FILM COATED ORAL at 12:45

## 2021-11-05 RX ADMIN — DULOXETINE HYDROCHLORIDE 30 MILLIGRAM(S): 30 CAPSULE, DELAYED RELEASE ORAL at 21:03

## 2021-11-05 RX ADMIN — Medication 8 MILLIGRAM(S): at 21:03

## 2021-11-05 RX ADMIN — METHOCARBAMOL 750 MILLIGRAM(S): 500 TABLET, FILM COATED ORAL at 14:00

## 2021-11-05 RX ADMIN — METHOCARBAMOL 750 MILLIGRAM(S): 500 TABLET, FILM COATED ORAL at 21:03

## 2021-11-05 RX ADMIN — GABAPENTIN 600 MILLIGRAM(S): 400 CAPSULE ORAL at 06:50

## 2021-11-05 RX ADMIN — SENNA PLUS 2 TABLET(S): 8.6 TABLET ORAL at 17:35

## 2021-11-05 RX ADMIN — OXYCODONE HYDROCHLORIDE 5 MILLIGRAM(S): 5 TABLET ORAL at 08:11

## 2021-11-05 RX ADMIN — Medication 1 TABLET(S): at 12:16

## 2021-11-05 RX ADMIN — ENOXAPARIN SODIUM 40 MILLIGRAM(S): 100 INJECTION SUBCUTANEOUS at 17:35

## 2021-11-05 RX ADMIN — GABAPENTIN 600 MILLIGRAM(S): 400 CAPSULE ORAL at 21:03

## 2021-11-05 RX ADMIN — Medication 283 MILLIGRAM(S): at 05:50

## 2021-11-05 RX ADMIN — Medication 650 MILLIGRAM(S): at 14:00

## 2021-11-05 RX ADMIN — Medication 6 MILLIGRAM(S): at 21:03

## 2021-11-05 RX ADMIN — METHOCARBAMOL 750 MILLIGRAM(S): 500 TABLET, FILM COATED ORAL at 06:50

## 2021-11-05 RX ADMIN — PANTOPRAZOLE SODIUM 40 MILLIGRAM(S): 20 TABLET, DELAYED RELEASE ORAL at 06:50

## 2021-11-05 RX ADMIN — OXYCODONE HYDROCHLORIDE 5 MILLIGRAM(S): 5 TABLET ORAL at 09:40

## 2021-11-05 NOTE — PROGRESS NOTE ADULT - SUBJECTIVE AND OBJECTIVE BOX
HPI:  71 year old male with past medical history of hyperlipidemia, chronic back pain, presented to [facility] 10/6/21 with acute onset back pain and progressively worsening bilateral lower extremity weakness. Urgent MR imaging of the spine revealed intradural extramedullary hematoma T12-L1, S/P T10-L2 laminectomy with 1U pRBCs given intraop 10/6/21. Spinal angiogram 10/7/21 showed no vascular abnormalities. MRI brain showed diffuse SAH thought to be secondary to spinal hematoma. Vazquez was placed for urinary retention, failed TOV x2. Epidural Eliseo Tapia drain was removed 10/11/21. Repeat MRI showed stable spine and repeat spinal angiogram was without vascular abnormalities. Spine sutures and right groin suture removed 10/22/21. Patient is medically stable for discharge to  for AR 10/23/21. (23 Oct 2021 17:44)    INTERVAL HPI/OVERNIGHT EVENTS:  Patient seen and examined  No issues nor complaints.  reports normal bowel movement this AM  Tolerating oral diet. engaging during review not in any acute distress  Clear urine in Vazquez, has previous failed TOV, Urology team managing bladder decompression    ROS:  Denies fevers, chills, chest pain, shortness of breath, abdominal pain, headaches, nausea/vomiting    ICU Vital Signs Last 24 Hrs  T(C): 36.9 (05 Nov 2021 07:25), Max: 36.9 (04 Nov 2021 21:31)  T(F): 98.4 (05 Nov 2021 07:25), Max: 98.5 (04 Nov 2021 21:31)  HR: 91 (05 Nov 2021 07:25) (91 - 92)  BP: 129/86 (05 Nov 2021 07:25) (129/86 - 156/92)  RR: 16 (05 Nov 2021 07:25) (16 - 17)  SpO2: 96% (05 Nov 2021 07:25) (96% - 98%)    Physical Exam:  Gen - NAD, Comfortable  HEENT - unremarkable  Neck - Supple, No limited ROM  Pulm - CTAB  Cardiovascular - RRR, S1S2, No m/r/g  Abdomen - Soft, NT/ND, +BS  Extremities -, No calf tenderness, + Vazquez to leg bag, urine is clear    Neuro  Motor -    BUE 5/5   Lower extremity 3-4/5   Sensory - decreased sensation/tingling in the right leg  Reflexes - DTR Intact  Psychiatric - Mood stable, Affect WNL    LABS:  11-04    137  |  101  |  15  ----------------------------<  108<H>  4.0   |  29  |  0.66    Ca    8.7      04 Nov 2021 06:45    TPro  5.9<L>  /  Alb  2.7<L>  /  TBili  0.5  /  DBili  x   /  AST  21  /  ALT  48<H>  /  AlkPhos  68  11-04                                10.4   3.95  )-----------( 239      ( 04 Nov 2021 06:45 )             30.8     CAPILLARY BLOOD GLUCOSE    MEDICATIONS  (STANDING):  ascorbic acid 500 milliGRAM(s) Oral daily  dextrose 40% Gel 15 Gram(s) Oral once  dextrose 5%. 1000 milliLiter(s) (50 mL/Hr) IV Continuous <Continuous>  dextrose 5%. 1000 milliLiter(s) (100 mL/Hr) IV Continuous <Continuous>  dextrose 50% Injectable 25 Gram(s) IV Push once  dextrose 50% Injectable 12.5 Gram(s) IV Push once  dextrose 50% Injectable 25 Gram(s) IV Push once  docusate sodium Enema 283 milliGRAM(s) Rectal daily  doxazosin 8 milliGRAM(s) Oral at bedtime  DULoxetine 30 milliGRAM(s) Oral at bedtime  enoxaparin Injectable 40 milliGRAM(s) SubCutaneous <User Schedule>  finasteride 5 milliGRAM(s) Oral daily  gabapentin 600 milliGRAM(s) Oral every 8 hours  glucagon  Injectable 1 milliGRAM(s) IntraMuscular once  influenza   Vaccine 0.5 milliLiter(s) IntraMuscular once  melatonin 6 milliGRAM(s) Oral at bedtime  methocarbamol 750 milliGRAM(s) Oral every 8 hours  multivitamin 1 Tablet(s) Oral daily  pantoprazole    Tablet 40 milliGRAM(s) Oral before breakfast  senna 2 Tablet(s) Oral every 12 hours    MEDICATIONS  (PRN):  acetaminophen     Tablet .. 650 milliGRAM(s) Oral every 6 hours PRN Mild Pain (1 - 3)  lactulose Syrup 10 Gram(s) Oral daily PRN no BM>1 day  ondansetron   Disintegrating Tablet 4 milliGRAM(s) Oral every 8 hours PRN Nausea and/or Vomiting  oxyCODONE    IR 10 milliGRAM(s) Oral every 4 hours PRN Severe Pain (7 - 10)  oxyCODONE    IR 5 milliGRAM(s) Oral every 4 hours PRN Moderate Pain (4 - 6)      Function: motivated, engaging in therapy and gradually making functional gains, on UE, still has decreased motor strength on LE

## 2021-11-05 NOTE — PROGRESS NOTE ADULT - ASSESSMENT
71y with Hx Hyperlipidemia, chronic back pain, presented to [facility] 10/6/21 with acute onset back pain and progressively worsening bilateral lower extremity weakness, 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 MRI brain showed diffuse SAH likely 2/2 spinal hematoma and paraplegia. Hospital course c/b urinary retention, failed TOV, with kate.    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  - Continue Gabapentin 600mg Q8hr, Methocarbamol 750mg Q8hr, Oxycodone PRN for pain, Duloxetine  - Precautions: falls, spine  - FS well controlled + off Decadron since 10/12/21 - FS d/c  - F/u neurosurgery outpatient    #Neurogenic Bowel  - Continue Lactulose PRN, Senna, Miralax  - enemeez (11/2)  - Suppository prn    #Neurogenic Bladder   TOV 10/26 --failed   -Was retaining overnight and could not be SC due to difficult anatomy -- urology was consulted and placed 16 Fr 5ml coude cath on 10/28.   - Kate in place on 10/28 overnight   - Cardura increased to 8mg 10/29   - Urology rec to consider delayed void trial when clinically improved. 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    Skin/Pressure Injury:   - At risk for pressure injury due to neurologic diagnosis and relative immobility.  - Monitor Incisions: T12-L2 laminectomy  - Turn every 2 hours while in bed, air mattress  - Soft heel protectors  - Skin barrier cream as needed  - Nursing to monitor skin Qshift    DVT ppx:  - Lovenox  - SCDs  - Last Doppler on 10/9/21 NEG  ---------------  Outpatient Follow-up (Specialty/Name of physician):  Horacio Uriarte (MD; PhD)  Neurosurgery  [address] [place], [place] [address]  Phone: [phone]  Fax: [phone]  Follow Up Time:     Meng Gotti  UROLOGY  [address] [address] [place] [address]  Phone: [phone]  Fax: [phone]  --------------  Goals: Safe discharge to home  Estimated Length of Stay: 10-14 days  Rehab Potential: Good  Medical Prognosis: Good  Estimated Disposition: Home with Home Care  ---------------    Team meeting 11/2  Planned D/C Date: 11/10  Barriers: LE weakness  goal: W/C level ambulation; mod I/superviison  OT  LBD: min A  Tub/shower: supervision w/ tub transfers  PT  ambulation: min a mod a 50ft with assit x2  Transfers: min A.

## 2021-11-05 NOTE — PROGRESS NOTE ADULT - ATTENDING COMMENTS
Gait impairment, due to B/L LE weakness after intradural extramedullary hematoma S/P evacuation (04 Nov 2021 10:36)  Bladder dysfunction, catheter in situ for bladder decompression  Constipation responsive to current bowel regimen  Has antigravity strength both legs, but not full strength, will continue to address this in therapy    No acute distress  Lab results unremarkable 11/3  No overt cognitive deficits    Motivated and engaging in therapy, but LE weakness still apparent  Pain controlled, but will gradully wean off opioids  Continue therapy  Continue Liaion with Urology for bladder care    Expected DC date as stated during last IDT meeting  Seen with Dr Hills, resident, Patient is stable to continue acute rehab treatment Gait impairment, due to B/L LE weakness after intradural extramedullary hematoma S/P evacuation (04 Nov 2021 10:36)  Bladder dysfunction, catheter in situ for bladder decompression  Constipation responsive to current bowel regimen  Has antigravity strength both legs, but not full strength, will continue to address this in therapy    No acute distress  Lab results unremarkable 11/3  No overt cognitive deficits    Motivated and engaging in therapy, but LE weakness still apparent  Pain controlled, but will gradully wean off opioids  Continue therapy  Continue Liaion with Urology for bladder care    Expected DC date as stated during last IDT meeting  Seen with Dr Hills, resident, Patient is stable to continue acute rehab treatment    Time-based billing (NON-critical care).      25 minutes spent on total encounter; more than 50% of the visit was spent counseling and / or coordinating care by the attending physician.  The necessity of the time spent during the encounter on this date of service was due to:  as above.

## 2021-11-06 LAB
APPEARANCE UR: ABNORMAL
BASOPHILS # BLD AUTO: 0.04 K/UL — SIGNIFICANT CHANGE UP (ref 0–0.2)
BASOPHILS NFR BLD AUTO: 0.8 % — SIGNIFICANT CHANGE UP (ref 0–2)
BILIRUB UR-MCNC: NEGATIVE — SIGNIFICANT CHANGE UP
COLOR SPEC: ABNORMAL
DIFF PNL FLD: ABNORMAL
EOSINOPHIL # BLD AUTO: 0.17 K/UL — SIGNIFICANT CHANGE UP (ref 0–0.5)
EOSINOPHIL NFR BLD AUTO: 3.6 % — SIGNIFICANT CHANGE UP (ref 0–6)
GLUCOSE UR QL: NEGATIVE — SIGNIFICANT CHANGE UP
HCT VFR BLD CALC: 34.5 % — LOW (ref 39–50)
HGB BLD-MCNC: 11.4 G/DL — LOW (ref 13–17)
IMM GRANULOCYTES NFR BLD AUTO: 1.5 % — SIGNIFICANT CHANGE UP (ref 0–1.5)
KETONES UR-MCNC: NEGATIVE — SIGNIFICANT CHANGE UP
LEUKOCYTE ESTERASE UR-ACNC: ABNORMAL
LYMPHOCYTES # BLD AUTO: 0.73 K/UL — LOW (ref 1–3.3)
LYMPHOCYTES # BLD AUTO: 15.4 % — SIGNIFICANT CHANGE UP (ref 13–44)
MCHC RBC-ENTMCNC: 30.2 PG — SIGNIFICANT CHANGE UP (ref 27–34)
MCHC RBC-ENTMCNC: 33 GM/DL — SIGNIFICANT CHANGE UP (ref 32–36)
MCV RBC AUTO: 91.5 FL — SIGNIFICANT CHANGE UP (ref 80–100)
MONOCYTES # BLD AUTO: 0.46 K/UL — SIGNIFICANT CHANGE UP (ref 0–0.9)
MONOCYTES NFR BLD AUTO: 9.7 % — SIGNIFICANT CHANGE UP (ref 2–14)
NEUTROPHILS # BLD AUTO: 3.28 K/UL — SIGNIFICANT CHANGE UP (ref 1.8–7.4)
NEUTROPHILS NFR BLD AUTO: 69 % — SIGNIFICANT CHANGE UP (ref 43–77)
NITRITE UR-MCNC: POSITIVE
NRBC # BLD: 0 /100 WBCS — SIGNIFICANT CHANGE UP (ref 0–0)
PH UR: 6 — SIGNIFICANT CHANGE UP (ref 5–8)
PLATELET # BLD AUTO: 222 K/UL — SIGNIFICANT CHANGE UP (ref 150–400)
PROT UR-MCNC: 100
RBC # BLD: 3.77 M/UL — LOW (ref 4.2–5.8)
RBC # FLD: 13.6 % — SIGNIFICANT CHANGE UP (ref 10.3–14.5)
SP GR SPEC: 1.02 — SIGNIFICANT CHANGE UP (ref 1.01–1.02)
UROBILINOGEN FLD QL: NEGATIVE — SIGNIFICANT CHANGE UP
WBC # BLD: 4.75 K/UL — SIGNIFICANT CHANGE UP (ref 3.8–10.5)
WBC # FLD AUTO: 4.75 K/UL — SIGNIFICANT CHANGE UP (ref 3.8–10.5)

## 2021-11-06 PROCEDURE — 99231 SBSQ HOSP IP/OBS SF/LOW 25: CPT | Mod: GC

## 2021-11-06 PROCEDURE — 99232 SBSQ HOSP IP/OBS MODERATE 35: CPT

## 2021-11-06 RX ADMIN — METHOCARBAMOL 750 MILLIGRAM(S): 500 TABLET, FILM COATED ORAL at 22:07

## 2021-11-06 RX ADMIN — GABAPENTIN 600 MILLIGRAM(S): 400 CAPSULE ORAL at 14:07

## 2021-11-06 RX ADMIN — METHOCARBAMOL 750 MILLIGRAM(S): 500 TABLET, FILM COATED ORAL at 14:07

## 2021-11-06 RX ADMIN — PANTOPRAZOLE SODIUM 40 MILLIGRAM(S): 20 TABLET, DELAYED RELEASE ORAL at 06:53

## 2021-11-06 RX ADMIN — OXYCODONE HYDROCHLORIDE 5 MILLIGRAM(S): 5 TABLET ORAL at 12:31

## 2021-11-06 RX ADMIN — GABAPENTIN 600 MILLIGRAM(S): 400 CAPSULE ORAL at 06:53

## 2021-11-06 RX ADMIN — Medication 6 MILLIGRAM(S): at 22:07

## 2021-11-06 RX ADMIN — Medication 283 MILLIGRAM(S): at 06:53

## 2021-11-06 RX ADMIN — Medication 650 MILLIGRAM(S): at 15:41

## 2021-11-06 RX ADMIN — Medication 8 MILLIGRAM(S): at 22:07

## 2021-11-06 RX ADMIN — METHOCARBAMOL 750 MILLIGRAM(S): 500 TABLET, FILM COATED ORAL at 06:53

## 2021-11-06 RX ADMIN — Medication 1 TABLET(S): at 12:24

## 2021-11-06 RX ADMIN — FINASTERIDE 5 MILLIGRAM(S): 5 TABLET, FILM COATED ORAL at 12:24

## 2021-11-06 RX ADMIN — SENNA PLUS 2 TABLET(S): 8.6 TABLET ORAL at 17:10

## 2021-11-06 RX ADMIN — GABAPENTIN 600 MILLIGRAM(S): 400 CAPSULE ORAL at 22:07

## 2021-11-06 RX ADMIN — ENOXAPARIN SODIUM 40 MILLIGRAM(S): 100 INJECTION SUBCUTANEOUS at 17:10

## 2021-11-06 RX ADMIN — Medication 500 MILLIGRAM(S): at 12:24

## 2021-11-06 RX ADMIN — SENNA PLUS 2 TABLET(S): 8.6 TABLET ORAL at 06:54

## 2021-11-06 RX ADMIN — DULOXETINE HYDROCHLORIDE 30 MILLIGRAM(S): 30 CAPSULE, DELAYED RELEASE ORAL at 22:07

## 2021-11-06 NOTE — PROVIDER CONTACT NOTE (OTHER) - BACKGROUND
s/p Incomplete Paralegia   Spinal Cord Injury Lami T12-L1, T10-L2  Vazquez catheter for Urine Retention

## 2021-11-06 NOTE — PROGRESS NOTE ADULT - SUBJECTIVE AND OBJECTIVE BOX
Patient is a 71y old  Male who presents with a chief complaint of B/L LE weakness after intradural extramedullary hematoma S/P evacuation (2021 13:12)      SUBJECTIVE / OVERNIGHT EVENTS:  Pt seen and examined at bedside. No acute events overnight.  Pt denies cp, palpitations, sob, abd pain, N/V, fever, chills.    ROS:  All other review of systems negative    Allergies    Celebrex (Other)    Intolerances        MEDICATIONS  (STANDING):  ascorbic acid 500 milliGRAM(s) Oral daily  dextrose 40% Gel 15 Gram(s) Oral once  dextrose 5%. 1000 milliLiter(s) (50 mL/Hr) IV Continuous <Continuous>  dextrose 5%. 1000 milliLiter(s) (100 mL/Hr) IV Continuous <Continuous>  dextrose 50% Injectable 25 Gram(s) IV Push once  dextrose 50% Injectable 12.5 Gram(s) IV Push once  dextrose 50% Injectable 25 Gram(s) IV Push once  docusate sodium Enema 283 milliGRAM(s) Rectal daily  doxazosin 8 milliGRAM(s) Oral at bedtime  DULoxetine 30 milliGRAM(s) Oral at bedtime  enoxaparin Injectable 40 milliGRAM(s) SubCutaneous <User Schedule>  finasteride 5 milliGRAM(s) Oral daily  gabapentin 600 milliGRAM(s) Oral every 8 hours  glucagon  Injectable 1 milliGRAM(s) IntraMuscular once  influenza   Vaccine 0.5 milliLiter(s) IntraMuscular once  melatonin 6 milliGRAM(s) Oral at bedtime  methocarbamol 750 milliGRAM(s) Oral every 8 hours  multivitamin 1 Tablet(s) Oral daily  pantoprazole    Tablet 40 milliGRAM(s) Oral before breakfast  senna 2 Tablet(s) Oral every 12 hours    MEDICATIONS  (PRN):  acetaminophen     Tablet .. 650 milliGRAM(s) Oral every 6 hours PRN Mild Pain (1 - 3)  lactulose Syrup 10 Gram(s) Oral daily PRN no BM>1 day  ondansetron   Disintegrating Tablet 4 milliGRAM(s) Oral every 8 hours PRN Nausea and/or Vomiting  oxyCODONE    IR 10 milliGRAM(s) Oral every 4 hours PRN Severe Pain (7 - 10)  oxyCODONE    IR 5 milliGRAM(s) Oral every 4 hours PRN Moderate Pain (4 - 6)      Vital Signs Last 24 Hrs  T(C): 36.6 (2021 09:22), Max: 36.8 (2021 20:49)  T(F): 97.8 (2021 09:22), Max: 98.2 (2021 20:49)  HR: 98 (2021 09:22) (90 - 98)  BP: 123/66 (2021 09:22) (123/66 - 155/84)  BP(mean): --  RR: 16 (2021 09:22) (16 - 16)  SpO2: 98% (2021 09:22) (98% - 100%)  CAPILLARY BLOOD GLUCOSE        I&O's Summary    2021 07:01  -  2021 07:00  --------------------------------------------------------  IN: 0 mL / OUT: 1100 mL / NET: -1100 mL        PHYSICAL EXAM:  GENERAL: NAD, well-developed  CHEST/LUNG: Clear to auscultation bilaterally; No wheeze, nonlabored breathing  HEART: Regular rate and rhythm; No murmurs, rubs, or gallops  ABDOMEN: Soft, Nontender, Nondistended; Bowel sounds present  : + Vazquez catheter  EXTREMITIES:  2+ Peripheral Pulses, No clubbing, cyanosis, or edema  NEUROLOGY: AAOx3  PSYCH: calm, appropriate mood    LABS:                        11.4   4.75  )-----------( 222      ( 2021 07:21 )             34.5                 Urinalysis Basic - ( 2021 07:21 )    Color: Alicia / Appearance: very cloudy / S.020 / pH: x  Gluc: x / Ketone: Negative  / Bili: Negative / Urobili: Negative   Blood: x / Protein: 100 / Nitrite: Positive   Leuk Esterase: Small / RBC: >50 /HPF / WBC 11-25 /HPF   Sq Epi: x / Non Sq Epi: Neg.-Few / Bacteria: Moderate /HPF        RADIOLOGY & ADDITIONAL TESTS:  Results Reviewed:   Imaging Personally Reviewed:  Electrocardiogram Personally Reviewed:    COORDINATION OF CARE:  Care Discussed with Consultants/Other Providers [Y/N]:  Prior or Outpatient Records Reviewed [Y/N]:

## 2021-11-06 NOTE — PROGRESS NOTE ADULT - SUBJECTIVE AND OBJECTIVE BOX
Cc: Gait dysfunction    HPI: Patient CO right knee pain after therapy today  urine tea colored this AM - bag emptied- now clear yellow ? secondary to trauma to kate   no chest pain, no N/V, no Fevers/Chills. No other new ROS  Has been tolerating rehabilitation program.    MEDICATIONS  (STANDING):  ascorbic acid 500 milliGRAM(s) Oral daily  dextrose 40% Gel 15 Gram(s) Oral once  dextrose 5%. 1000 milliLiter(s) (50 mL/Hr) IV Continuous <Continuous>  dextrose 5%. 1000 milliLiter(s) (100 mL/Hr) IV Continuous <Continuous>  dextrose 50% Injectable 25 Gram(s) IV Push once  dextrose 50% Injectable 12.5 Gram(s) IV Push once  dextrose 50% Injectable 25 Gram(s) IV Push once  docusate sodium Enema 283 milliGRAM(s) Rectal daily  doxazosin 8 milliGRAM(s) Oral at bedtime  DULoxetine 30 milliGRAM(s) Oral at bedtime  enoxaparin Injectable 40 milliGRAM(s) SubCutaneous <User Schedule>  finasteride 5 milliGRAM(s) Oral daily  gabapentin 600 milliGRAM(s) Oral every 8 hours  glucagon  Injectable 1 milliGRAM(s) IntraMuscular once  influenza   Vaccine 0.5 milliLiter(s) IntraMuscular once  melatonin 6 milliGRAM(s) Oral at bedtime  methocarbamol 750 milliGRAM(s) Oral every 8 hours  multivitamin 1 Tablet(s) Oral daily  pantoprazole    Tablet 40 milliGRAM(s) Oral before breakfast  senna 2 Tablet(s) Oral every 12 hours    MEDICATIONS  (PRN):  acetaminophen     Tablet .. 650 milliGRAM(s) Oral every 6 hours PRN Mild Pain (1 - 3)  lactulose Syrup 10 Gram(s) Oral daily PRN no BM>1 day  ondansetron   Disintegrating Tablet 4 milliGRAM(s) Oral every 8 hours PRN Nausea and/or Vomiting  oxyCODONE    IR 10 milliGRAM(s) Oral every 4 hours PRN Severe Pain (7 - 10)  oxyCODONE    IR 5 milliGRAM(s) Oral every 4 hours PRN Moderate Pain (4 - 6)      Vital Signs Last 24 Hrs  T(C): 36.6 (2021 09:22), Max: 36.8 (2021 20:49)  T(F): 97.8 (2021 09:22), Max: 98.2 (2021 20:49)  HR: 98 (2021 09:22) (90 - 98)  BP: 123/66 (2021 09:22) (123/66 - 155/84)  BP(mean): --  RR: 16 (2021 09:22) (16 - 16)  SpO2: 98% (2021 09:22) (98% - 100%)    In NAD  HEENT- EOMI  Heart- RRR, S1S2  Lungs- CTA bl.  Abd- + BS, NT  Ext- No calf pain  Neuro- Exam unchanged  Right knee - no warmth, sweliing, or tenderness  on ROM-   No calf swelling or Tenderness in BLEs                          11.4   4.75  )-----------( 222      ( 2021 07:21 )             34.5           CAPILLARY BLOOD GLUCOSE        Urinalysis Basic - ( 2021 07:21 )    Color: Alicia / Appearance: very cloudy / S.020 / pH: x  Gluc: x / Ketone: Negative  / Bili: Negative / Urobili: Negative   Blood: x / Protein: 100 / Nitrite: Positive   Leuk Esterase: Small / RBC: >50 /HPF / WBC 11-25 /HPF   Sq Epi: x / Non Sq Epi: Neg.-Few / Bacteria: Moderate /HPF                Imp: Patient with diagnosis of          admitted for comprehensive acute rehabilitation.    Plan:  - Continue therapies  - DVT prophylaxis- lovenox  -Hematuria- cleared on subsequent voiding- has kate UA likely colonization  -Right Knee pain-no signs on exam- tylenol, ice prn- to follow  - Skin- Turn q2h, check skin daily  - Continue current medications; patient medically stable.   - Patient is stable to continue current rehabilitation program.    Cc: Gait dysfunction    HPI: Patient CO right knee pain after therapy today  urine tea colored this AM - bag emptied- now clear yellow ? secondary to trauma to kate   no chest pain, no N/V, no Fevers/Chills. No other new ROS  Has been tolerating rehabilitation program.    MEDICATIONS  (STANDING):  ascorbic acid 500 milliGRAM(s) Oral daily  dextrose 40% Gel 15 Gram(s) Oral once  dextrose 5%. 1000 milliLiter(s) (50 mL/Hr) IV Continuous <Continuous>  dextrose 5%. 1000 milliLiter(s) (100 mL/Hr) IV Continuous <Continuous>  dextrose 50% Injectable 25 Gram(s) IV Push once  dextrose 50% Injectable 12.5 Gram(s) IV Push once  dextrose 50% Injectable 25 Gram(s) IV Push once  docusate sodium Enema 283 milliGRAM(s) Rectal daily  doxazosin 8 milliGRAM(s) Oral at bedtime  DULoxetine 30 milliGRAM(s) Oral at bedtime  enoxaparin Injectable 40 milliGRAM(s) SubCutaneous <User Schedule>  finasteride 5 milliGRAM(s) Oral daily  gabapentin 600 milliGRAM(s) Oral every 8 hours  glucagon  Injectable 1 milliGRAM(s) IntraMuscular once  influenza   Vaccine 0.5 milliLiter(s) IntraMuscular once  melatonin 6 milliGRAM(s) Oral at bedtime  methocarbamol 750 milliGRAM(s) Oral every 8 hours  multivitamin 1 Tablet(s) Oral daily  pantoprazole    Tablet 40 milliGRAM(s) Oral before breakfast  senna 2 Tablet(s) Oral every 12 hours    MEDICATIONS  (PRN):  acetaminophen     Tablet .. 650 milliGRAM(s) Oral every 6 hours PRN Mild Pain (1 - 3)  lactulose Syrup 10 Gram(s) Oral daily PRN no BM>1 day  ondansetron   Disintegrating Tablet 4 milliGRAM(s) Oral every 8 hours PRN Nausea and/or Vomiting  oxyCODONE    IR 10 milliGRAM(s) Oral every 4 hours PRN Severe Pain (7 - 10)  oxyCODONE    IR 5 milliGRAM(s) Oral every 4 hours PRN Moderate Pain (4 - 6)      Vital Signs Last 24 Hrs  T(C): 36.6 (2021 09:22), Max: 36.8 (2021 20:49)  T(F): 97.8 (2021 09:22), Max: 98.2 (2021 20:49)  HR: 98 (2021 09:22) (90 - 98)  BP: 123/66 (2021 09:22) (123/66 - 155/84)  BP(mean): --  RR: 16 (2021 09:22) (16 - 16)  SpO2: 98% (2021 09:22) (98% - 100%)    In NAD  HEENT- EOMI  Heart- RRR, S1S2  Lungs- CTA bl.  Abd- + BS, NT  Ext- No calf pain  Neuro- Exam unchanged  Right knee - no warmth, sweliing, or tenderness  on ROM-   No calf swelling or Tenderness in BLEs                          11.4   4.75  )-----------( 222      ( 2021 07:21 )             34.5           CAPILLARY BLOOD GLUCOSE        Urinalysis Basic - ( 2021 07:21 )    Color: Alicia / Appearance: very cloudy / S.020 / pH: x  Gluc: x / Ketone: Negative  / Bili: Negative / Urobili: Negative   Blood: x / Protein: 100 / Nitrite: Positive   Leuk Esterase: Small / RBC: >50 /HPF / WBC 11-25 /HPF   Sq Epi: x / Non Sq Epi: Neg.-Few / Bacteria: Moderate /HPF                Imp: Patient with diagnosis of paraparesis admitted for comprehensive acute rehabilitation.    Plan:  - Continue therapies  - DVT prophylaxis- lovenox  -Hematuria- cleared on subsequent voiding- has kate UA likely colonization  -Right Knee pain-no signs on exam- tylenol, ice prn- to follow  - Skin- Turn q2h, check skin daily  - Continue current medications; patient medically stable.   - Patient is stable to continue current rehabilitation program.

## 2021-11-06 NOTE — PROGRESS NOTE ADULT - ASSESSMENT
70 yo M with HLD, chronic back pain, presented to [facility] 10/6/2021 w/ acute onset back pain and progressively worsening b/l LE weakness, found to have intradural extramedullary thoracolumbar hematoma T12-L1, S/P T10-L2 laminectomy w/ hematoma evacuation (10/7/2021). MRI brain w/ diffuse SAH thought to be 2/2 spinal hematoma.     #B/L LE weakness 2/2 Intradural extramedullary hematoma T12-L1 s/p T10-L2 laminectomy and decompression  # SAH 2/2 above  - PT/OT per PMR  - OP Neurosurgery f/u    #Neurogenic bladder w/ urinary retention  - PT failed TOV and had a kate upon admission to rehab. He was seen by urology at OSH. Likely multifactorial due to limited mobilization, constipation, spinal cord compression from hematoma.  - Urology recommendations appreciated  - Positive UA, although clinically no indication of infection. Likely colonization of catheters  - Monitor off abx for now. If develops fever/chills or leukocytosis, can start Ceftriaxone    - Cont doxazosin, and finasteride    # Hyponatremia   -resolved    # Normocytic Anemia   -h/h stable  -b12 lower limits of normal    #Hypokalemia  -resolved    #Hypoalbuminemia  - Nutrition eval on going    DVT ppx: lovenox

## 2021-11-07 PROCEDURE — 99231 SBSQ HOSP IP/OBS SF/LOW 25: CPT | Mod: GC

## 2021-11-07 PROCEDURE — 99232 SBSQ HOSP IP/OBS MODERATE 35: CPT

## 2021-11-07 RX ADMIN — Medication 1 TABLET(S): at 11:14

## 2021-11-07 RX ADMIN — METHOCARBAMOL 750 MILLIGRAM(S): 500 TABLET, FILM COATED ORAL at 06:10

## 2021-11-07 RX ADMIN — Medication 283 MILLIGRAM(S): at 06:10

## 2021-11-07 RX ADMIN — ONDANSETRON 4 MILLIGRAM(S): 8 TABLET, FILM COATED ORAL at 17:45

## 2021-11-07 RX ADMIN — GABAPENTIN 600 MILLIGRAM(S): 400 CAPSULE ORAL at 06:10

## 2021-11-07 RX ADMIN — DULOXETINE HYDROCHLORIDE 30 MILLIGRAM(S): 30 CAPSULE, DELAYED RELEASE ORAL at 21:27

## 2021-11-07 RX ADMIN — ENOXAPARIN SODIUM 40 MILLIGRAM(S): 100 INJECTION SUBCUTANEOUS at 17:47

## 2021-11-07 RX ADMIN — FINASTERIDE 5 MILLIGRAM(S): 5 TABLET, FILM COATED ORAL at 11:14

## 2021-11-07 RX ADMIN — SENNA PLUS 2 TABLET(S): 8.6 TABLET ORAL at 06:10

## 2021-11-07 RX ADMIN — GABAPENTIN 600 MILLIGRAM(S): 400 CAPSULE ORAL at 13:41

## 2021-11-07 RX ADMIN — METHOCARBAMOL 750 MILLIGRAM(S): 500 TABLET, FILM COATED ORAL at 21:27

## 2021-11-07 RX ADMIN — Medication 500 MILLIGRAM(S): at 11:14

## 2021-11-07 RX ADMIN — METHOCARBAMOL 750 MILLIGRAM(S): 500 TABLET, FILM COATED ORAL at 13:41

## 2021-11-07 RX ADMIN — SENNA PLUS 2 TABLET(S): 8.6 TABLET ORAL at 17:45

## 2021-11-07 RX ADMIN — Medication 8 MILLIGRAM(S): at 21:27

## 2021-11-07 RX ADMIN — PANTOPRAZOLE SODIUM 40 MILLIGRAM(S): 20 TABLET, DELAYED RELEASE ORAL at 06:10

## 2021-11-07 RX ADMIN — Medication 6 MILLIGRAM(S): at 21:27

## 2021-11-07 RX ADMIN — GABAPENTIN 600 MILLIGRAM(S): 400 CAPSULE ORAL at 21:28

## 2021-11-07 NOTE — PROGRESS NOTE ADULT - SUBJECTIVE AND OBJECTIVE BOX
Patient is a 71y old  Male who presents with a chief complaint of B/L LE weakness after intradural extramedullary hematoma S/P evacuation (2021 17:38)      SUBJECTIVE / OVERNIGHT EVENTS:  Pt seen and examined at bedside. No acute events overnight.  Pt denies cp, palpitations, sob, abd pain, N/V, fever, chills.    ROS:  All other review of systems negative    Allergies    Celebrex (Other)    Intolerances        MEDICATIONS  (STANDING):  ascorbic acid 500 milliGRAM(s) Oral daily  dextrose 40% Gel 15 Gram(s) Oral once  dextrose 5%. 1000 milliLiter(s) (50 mL/Hr) IV Continuous <Continuous>  dextrose 5%. 1000 milliLiter(s) (100 mL/Hr) IV Continuous <Continuous>  dextrose 50% Injectable 25 Gram(s) IV Push once  dextrose 50% Injectable 12.5 Gram(s) IV Push once  dextrose 50% Injectable 25 Gram(s) IV Push once  docusate sodium Enema 283 milliGRAM(s) Rectal daily  doxazosin 8 milliGRAM(s) Oral at bedtime  DULoxetine 30 milliGRAM(s) Oral at bedtime  enoxaparin Injectable 40 milliGRAM(s) SubCutaneous <User Schedule>  finasteride 5 milliGRAM(s) Oral daily  gabapentin 600 milliGRAM(s) Oral every 8 hours  glucagon  Injectable 1 milliGRAM(s) IntraMuscular once  influenza   Vaccine 0.5 milliLiter(s) IntraMuscular once  melatonin 6 milliGRAM(s) Oral at bedtime  methocarbamol 750 milliGRAM(s) Oral every 8 hours  multivitamin 1 Tablet(s) Oral daily  pantoprazole    Tablet 40 milliGRAM(s) Oral before breakfast  senna 2 Tablet(s) Oral every 12 hours    MEDICATIONS  (PRN):  acetaminophen     Tablet .. 650 milliGRAM(s) Oral every 6 hours PRN Mild Pain (1 - 3)  lactulose Syrup 10 Gram(s) Oral daily PRN no BM>1 day  ondansetron   Disintegrating Tablet 4 milliGRAM(s) Oral every 8 hours PRN Nausea and/or Vomiting  oxyCODONE    IR 10 milliGRAM(s) Oral every 4 hours PRN Severe Pain (7 - 10)  oxyCODONE    IR 5 milliGRAM(s) Oral every 4 hours PRN Moderate Pain (4 - 6)      Vital Signs Last 24 Hrs  T(C): 36.4 (2021 07:58), Max: 36.6 (2021 09:22)  T(F): 97.6 (2021 07:58), Max: 97.8 (2021 09:22)  HR: 90 (2021 07:58) (88 - 98)  BP: 136/83 (2021 07:58) (123/66 - 146/83)  BP(mean): --  RR: 18 (2021 07:58) (16 - 18)  SpO2: 96% (2021 07:58) (96% - 100%)  CAPILLARY BLOOD GLUCOSE        I&O's Summary    2021 08:01  -  2021 07:00  --------------------------------------------------------  IN: 0 mL / OUT: 1600 mL / NET: -1600 mL        PHYSICAL EXAM:  GENERAL: NAD, well-developed  CHEST/LUNG: Clear to auscultation bilaterally; No wheeze, nonlabored breathing  HEART: Regular rate and rhythm; No murmurs, rubs, or gallops  ABDOMEN: Soft, Nontender, Nondistended; Bowel sounds present  : + Vazquez catheter  EXTREMITIES:  2+ Peripheral Pulses, No clubbing, cyanosis, or edema  NEUROLOGY: AAOx3  PSYCH: calm, appropriate mood    LABS:                        11.4   4.75  )-----------( 222      ( 2021 07:21 )             34.5                 Urinalysis Basic - ( 2021 07:21 )    Color: Alicia / Appearance: very cloudy / S.020 / pH: x  Gluc: x / Ketone: Negative  / Bili: Negative / Urobili: Negative   Blood: x / Protein: 100 / Nitrite: Positive   Leuk Esterase: Small / RBC: >50 /HPF / WBC 11-25 /HPF   Sq Epi: x / Non Sq Epi: Neg.-Few / Bacteria: Moderate /HPF        RADIOLOGY & ADDITIONAL TESTS:  Results Reviewed:   Imaging Personally Reviewed:  Electrocardiogram Personally Reviewed:    COORDINATION OF CARE:  Care Discussed with Consultants/Other Providers [Y/N]:  Prior or Outpatient Records Reviewed [Y/N]:

## 2021-11-07 NOTE — PROGRESS NOTE ADULT - SUBJECTIVE AND OBJECTIVE BOX
Cc: Gait dysfunction    HPI: knee pain better- urine now clear  CO dizzy in and out of bed- Syst - Improved with fluids  Has been tolerating rehabilitation program.    MEDICATIONS  (STANDING):  ascorbic acid 500 milliGRAM(s) Oral daily  dextrose 40% Gel 15 Gram(s) Oral once  dextrose 5%. 1000 milliLiter(s) (50 mL/Hr) IV Continuous <Continuous>  dextrose 5%. 1000 milliLiter(s) (100 mL/Hr) IV Continuous <Continuous>  dextrose 50% Injectable 25 Gram(s) IV Push once  dextrose 50% Injectable 12.5 Gram(s) IV Push once  dextrose 50% Injectable 25 Gram(s) IV Push once  docusate sodium Enema 283 milliGRAM(s) Rectal daily  doxazosin 8 milliGRAM(s) Oral at bedtime  DULoxetine 30 milliGRAM(s) Oral at bedtime  enoxaparin Injectable 40 milliGRAM(s) SubCutaneous <User Schedule>  finasteride 5 milliGRAM(s) Oral daily  gabapentin 600 milliGRAM(s) Oral every 8 hours  glucagon  Injectable 1 milliGRAM(s) IntraMuscular once  influenza   Vaccine 0.5 milliLiter(s) IntraMuscular once  melatonin 6 milliGRAM(s) Oral at bedtime  methocarbamol 750 milliGRAM(s) Oral every 8 hours  multivitamin 1 Tablet(s) Oral daily  pantoprazole    Tablet 40 milliGRAM(s) Oral before breakfast  senna 2 Tablet(s) Oral every 12 hours    MEDICATIONS  (PRN):  acetaminophen     Tablet .. 650 milliGRAM(s) Oral every 6 hours PRN Mild Pain (1 - 3)  lactulose Syrup 10 Gram(s) Oral daily PRN no BM>1 day  ondansetron   Disintegrating Tablet 4 milliGRAM(s) Oral every 8 hours PRN Nausea and/or Vomiting  oxyCODONE    IR 10 milliGRAM(s) Oral every 4 hours PRN Severe Pain (7 - 10)  oxyCODONE    IR 5 milliGRAM(s) Oral every 4 hours PRN Moderate Pain (4 - 6)      Vital Signs Last 24 Hrs  T(C): 36.6 (2021 09:22), Max: 36.8 (2021 20:49)  T(F): 97.8 (2021 09:22), Max: 98.2 (2021 20:49)  HR: 98 (2021 09:22) (90 - 98)  BP: 123/66 (2021 09:22) (123/66 - 155/84)  BP(mean): --  RR: 16 (2021 09:22) (16 - 16)  SpO2: 98% (2021 09:22) (98% - 100%)    In NAD  HEENT- EOMI  Heart- RRR, S1S2  Lungs- CTA bl.  Abd- + BS, NT  Ext- No calf pain  Neuro- Exam unchanged  No calf swelling or Tenderness in BLEs                          11.4   4.75  )-----------( 222      ( 2021 07:21 )             34.5           CAPILLARY BLOOD GLUCOSE        Urinalysis Basic - ( 2021 07:21 )    Color: Alicia / Appearance: very cloudy / S.020 / pH: x  Gluc: x / Ketone: Negative  / Bili: Negative / Urobili: Negative   Blood: x / Protein: 100 / Nitrite: Positive   Leuk Esterase: Small / RBC: >50 /HPF / WBC 11-25 /HPF   Sq Epi: x / Non Sq Epi: Neg.-Few / Bacteria: Moderate /HPF                Imp: Patient with diagnosis of paraparesis admitted for comprehensive acute rehabilitation.    Plan:  - Continue therapies  - DVT prophylaxis- lovenox  -Hematuria- cleared - has kate UA likely colonization  -Right Knee pain-resolved  - Skin- Turn q2h, check skin daily  - Continue current medications; patient medically stable.   - Patient is stable to continue current rehabilitation program.

## 2021-11-07 NOTE — PROGRESS NOTE ADULT - ASSESSMENT
70 yo M with HLD, chronic back pain, presented to [facility] 10/6/2021 w/ acute onset back pain and progressively worsening b/l LE weakness, found to have intradural extramedullary thoracolumbar hematoma T12-L1, S/P T10-L2 laminectomy w/ hematoma evacuation (10/7/2021). MRI brain w/ diffuse SAH thought to be 2/2 spinal hematoma.     #B/L LE weakness 2/2 Intradural extramedullary hematoma T12-L1 s/p T10-L2 laminectomy and decompression  # SAH 2/2 above  - PT/OT per PMR  - OP Neurosurgery f/u    #Neurogenic bladder w/ urinary retention  - PT failed TOV and had a kate upon admission to rehab. He was seen by urology at OSH. Likely multifactorial due to limited mobilization, constipation, spinal cord compression from hematoma.  - Urology recommendations appreciated  - Positive UA, although clinically no indication of infection. Likely colonization of catheter  - Monitor off abx for now. If develops fever/chills or leukocytosis, can start Ceftriaxone    - Cont doxazosin, and finasteride    # Hyponatremia   -resolved    # Normocytic Anemia   -h/h stable  -b12 lower limits of normal    #Hypokalemia  -resolved    #Hypoalbuminemia  - Nutrition eval on going    DVT ppx: lovenox

## 2021-11-08 LAB
ALBUMIN SERPL ELPH-MCNC: 2.7 G/DL — LOW (ref 3.3–5)
ALP SERPL-CCNC: 64 U/L — SIGNIFICANT CHANGE UP (ref 40–120)
ALT FLD-CCNC: 37 U/L — SIGNIFICANT CHANGE UP (ref 10–45)
ANION GAP SERPL CALC-SCNC: 5 MMOL/L — SIGNIFICANT CHANGE UP (ref 5–17)
AST SERPL-CCNC: 14 U/L — SIGNIFICANT CHANGE UP (ref 10–40)
BASOPHILS # BLD AUTO: 0.02 K/UL — SIGNIFICANT CHANGE UP (ref 0–0.2)
BASOPHILS NFR BLD AUTO: 0.4 % — SIGNIFICANT CHANGE UP (ref 0–2)
BILIRUB SERPL-MCNC: 0.8 MG/DL — SIGNIFICANT CHANGE UP (ref 0.2–1.2)
BUN SERPL-MCNC: 13 MG/DL — SIGNIFICANT CHANGE UP (ref 7–23)
CALCIUM SERPL-MCNC: 8.5 MG/DL — SIGNIFICANT CHANGE UP (ref 8.4–10.5)
CHLORIDE SERPL-SCNC: 103 MMOL/L — SIGNIFICANT CHANGE UP (ref 96–108)
CO2 SERPL-SCNC: 31 MMOL/L — SIGNIFICANT CHANGE UP (ref 22–31)
CREAT SERPL-MCNC: 0.88 MG/DL — SIGNIFICANT CHANGE UP (ref 0.5–1.3)
EOSINOPHIL # BLD AUTO: 0.13 K/UL — SIGNIFICANT CHANGE UP (ref 0–0.5)
EOSINOPHIL NFR BLD AUTO: 2.6 % — SIGNIFICANT CHANGE UP (ref 0–6)
GLUCOSE SERPL-MCNC: 104 MG/DL — HIGH (ref 70–99)
HCT VFR BLD CALC: 32.5 % — LOW (ref 39–50)
HGB BLD-MCNC: 10.4 G/DL — LOW (ref 13–17)
IMM GRANULOCYTES NFR BLD AUTO: 1 % — SIGNIFICANT CHANGE UP (ref 0–1.5)
LYMPHOCYTES # BLD AUTO: 0.8 K/UL — LOW (ref 1–3.3)
LYMPHOCYTES # BLD AUTO: 15.8 % — SIGNIFICANT CHANGE UP (ref 13–44)
MCHC RBC-ENTMCNC: 29.3 PG — SIGNIFICANT CHANGE UP (ref 27–34)
MCHC RBC-ENTMCNC: 32 GM/DL — SIGNIFICANT CHANGE UP (ref 32–36)
MCV RBC AUTO: 91.5 FL — SIGNIFICANT CHANGE UP (ref 80–100)
MONOCYTES # BLD AUTO: 0.57 K/UL — SIGNIFICANT CHANGE UP (ref 0–0.9)
MONOCYTES NFR BLD AUTO: 11.3 % — SIGNIFICANT CHANGE UP (ref 2–14)
NEUTROPHILS # BLD AUTO: 3.49 K/UL — SIGNIFICANT CHANGE UP (ref 1.8–7.4)
NEUTROPHILS NFR BLD AUTO: 68.9 % — SIGNIFICANT CHANGE UP (ref 43–77)
NRBC # BLD: 0 /100 WBCS — SIGNIFICANT CHANGE UP (ref 0–0)
PLATELET # BLD AUTO: 228 K/UL — SIGNIFICANT CHANGE UP (ref 150–400)
POTASSIUM SERPL-MCNC: 4.2 MMOL/L — SIGNIFICANT CHANGE UP (ref 3.5–5.3)
POTASSIUM SERPL-SCNC: 4.2 MMOL/L — SIGNIFICANT CHANGE UP (ref 3.5–5.3)
PROT SERPL-MCNC: 5.9 G/DL — LOW (ref 6–8.3)
RBC # BLD: 3.55 M/UL — LOW (ref 4.2–5.8)
RBC # FLD: 13.4 % — SIGNIFICANT CHANGE UP (ref 10.3–14.5)
SODIUM SERPL-SCNC: 139 MMOL/L — SIGNIFICANT CHANGE UP (ref 135–145)
WBC # BLD: 5.06 K/UL — SIGNIFICANT CHANGE UP (ref 3.8–10.5)
WBC # FLD AUTO: 5.06 K/UL — SIGNIFICANT CHANGE UP (ref 3.8–10.5)

## 2021-11-08 PROCEDURE — 99232 SBSQ HOSP IP/OBS MODERATE 35: CPT

## 2021-11-08 PROCEDURE — 99233 SBSQ HOSP IP/OBS HIGH 50: CPT

## 2021-11-08 RX ORDER — OXYCODONE HYDROCHLORIDE 5 MG/1
10 TABLET ORAL EVERY 4 HOURS
Refills: 0 | Status: DISCONTINUED | OUTPATIENT
Start: 2021-11-08 | End: 2021-11-10

## 2021-11-08 RX ORDER — OXYCODONE HYDROCHLORIDE 5 MG/1
5 TABLET ORAL EVERY 4 HOURS
Refills: 0 | Status: DISCONTINUED | OUTPATIENT
Start: 2021-11-08 | End: 2021-11-10

## 2021-11-08 RX ADMIN — GABAPENTIN 600 MILLIGRAM(S): 400 CAPSULE ORAL at 05:16

## 2021-11-08 RX ADMIN — Medication 1 TABLET(S): at 12:19

## 2021-11-08 RX ADMIN — METHOCARBAMOL 750 MILLIGRAM(S): 500 TABLET, FILM COATED ORAL at 13:48

## 2021-11-08 RX ADMIN — OXYCODONE HYDROCHLORIDE 5 MILLIGRAM(S): 5 TABLET ORAL at 10:08

## 2021-11-08 RX ADMIN — OXYCODONE HYDROCHLORIDE 5 MILLIGRAM(S): 5 TABLET ORAL at 14:30

## 2021-11-08 RX ADMIN — Medication 283 MILLIGRAM(S): at 06:25

## 2021-11-08 RX ADMIN — Medication 650 MILLIGRAM(S): at 06:19

## 2021-11-08 RX ADMIN — Medication 8 MILLIGRAM(S): at 21:15

## 2021-11-08 RX ADMIN — METHOCARBAMOL 750 MILLIGRAM(S): 500 TABLET, FILM COATED ORAL at 21:15

## 2021-11-08 RX ADMIN — SENNA PLUS 2 TABLET(S): 8.6 TABLET ORAL at 17:33

## 2021-11-08 RX ADMIN — SENNA PLUS 2 TABLET(S): 8.6 TABLET ORAL at 05:16

## 2021-11-08 RX ADMIN — METHOCARBAMOL 750 MILLIGRAM(S): 500 TABLET, FILM COATED ORAL at 05:17

## 2021-11-08 RX ADMIN — PANTOPRAZOLE SODIUM 40 MILLIGRAM(S): 20 TABLET, DELAYED RELEASE ORAL at 05:17

## 2021-11-08 RX ADMIN — OXYCODONE HYDROCHLORIDE 5 MILLIGRAM(S): 5 TABLET ORAL at 09:08

## 2021-11-08 RX ADMIN — ENOXAPARIN SODIUM 40 MILLIGRAM(S): 100 INJECTION SUBCUTANEOUS at 17:34

## 2021-11-08 RX ADMIN — Medication 6 MILLIGRAM(S): at 21:15

## 2021-11-08 RX ADMIN — Medication 500 MILLIGRAM(S): at 12:19

## 2021-11-08 RX ADMIN — OXYCODONE HYDROCHLORIDE 5 MILLIGRAM(S): 5 TABLET ORAL at 13:46

## 2021-11-08 RX ADMIN — DULOXETINE HYDROCHLORIDE 30 MILLIGRAM(S): 30 CAPSULE, DELAYED RELEASE ORAL at 21:15

## 2021-11-08 RX ADMIN — GABAPENTIN 600 MILLIGRAM(S): 400 CAPSULE ORAL at 13:50

## 2021-11-08 RX ADMIN — GABAPENTIN 600 MILLIGRAM(S): 400 CAPSULE ORAL at 21:15

## 2021-11-08 RX ADMIN — Medication 650 MILLIGRAM(S): at 05:19

## 2021-11-08 RX ADMIN — FINASTERIDE 5 MILLIGRAM(S): 5 TABLET, FILM COATED ORAL at 12:19

## 2021-11-08 NOTE — PROGRESS NOTE ADULT - ASSESSMENT
70 yo M with HLD, chronic back pain, presented to [facility] 10/6/2021 w/ acute onset back pain and progressively worsening b/l LE weakness, found to have intradural extramedullary thoracolumbar hematoma T12-L1, S/P T10-L2 laminectomy w/ hematoma evacuation (10/7/2021). MRI brain w/ diffuse SAH thought to be 2/2 spinal hematoma.     #B/L LE weakness 2/2 Intradural extramedullary hematoma T12-L1 s/p T10-L2 laminectomy and decompression  # SAH 2/2 above  - PT/OT per PMR  - OP Neurosurgery f/u    #Neurogenic bladder w/ urinary retention  - PT failed TOV and had a kate upon admission to rehab. He was seen by urology at OSH. Likely multifactorial due to limited mobilization, constipation, spinal cord compression from hematoma.  - Urology recommendations appreciated  - Positive UA, although clinically no indication of infection. Likely colonization of catheter  - Monitor off abx for now. If develops fever/chills or leukocytosis, can start Ceftriaxone    - Cont doxazosin, and finasteride  may have TOV tonight at 12 w/ abx ppx prior to catheter removal - defer to Urology.     # Hyponatremia   -resolved    # Normocytic Anemia   -h/h stable  -b12 lower limits of normal    #Hypokalemia  -resolved    #Hypoalbuminemia  - Nutrition eval on going    DVT ppx: lovenox

## 2021-11-08 NOTE — PROGRESS NOTE ADULT - PROBLEM SELECTOR PLAN 1
kate, cardura, proscar    clinically improved    consider void trial tonight at 12, consider antibiotic prophylaxis prior to catheter removal    consider kate replacement if not voiding with plan for discharge mid-week
kate, cardura, proscar    consider delayed void trial when clinically improved
kate    cardura, proscar    likely neurogenic bladder

## 2021-11-08 NOTE — PROGRESS NOTE ADULT - SUBJECTIVE AND OBJECTIVE BOX
Patient is a 71y old  Male who presents with a chief complaint of B/L LE weakness after intradural extramedullary hematoma S/P evacuation (07 Nov 2021 18:01)    HPI:  71 year old male with past medical history of hyperlipidemia, chronic back pain, presented to [facility] 10/6/21 with acute onset back pain and progressively worsening bilateral lower extremity weakness. Urgent MR imaging of the spine revealed intradural extramedullary hematoma T12-L1, S/P T10-L2 laminectomy with 1U pRBCs given intraop 10/6/21. Spinal angiogram 10/7/21 showed no vascular abnormalities. MRI brain showed diffuse SAH thought to be secondary to spinal hematoma. Kate was placed for urinary retention, failed TOV x2. Epidural Eliseo Tapia drain was removed 10/11/21. Repeat MRI showed stable spine and repeat spinal angiogram was without vascular abnormalities. Spine sutures and right groin suture removed 10/22/21. Patient is medically stable for discharge to  for AR 10/23/21. (23 Oct 2021 17:44)    kate draining    Interval Events:  Patient seen and examined at bedside.    MEDICATIONS:  MEDICATIONS  (STANDING):  ascorbic acid 500 milliGRAM(s) Oral daily  dextrose 40% Gel 15 Gram(s) Oral once  dextrose 5%. 1000 milliLiter(s) (50 mL/Hr) IV Continuous <Continuous>  dextrose 5%. 1000 milliLiter(s) (100 mL/Hr) IV Continuous <Continuous>  dextrose 50% Injectable 25 Gram(s) IV Push once  dextrose 50% Injectable 12.5 Gram(s) IV Push once  dextrose 50% Injectable 25 Gram(s) IV Push once  docusate sodium Enema 283 milliGRAM(s) Rectal daily  doxazosin 8 milliGRAM(s) Oral at bedtime  DULoxetine 30 milliGRAM(s) Oral at bedtime  enoxaparin Injectable 40 milliGRAM(s) SubCutaneous <User Schedule>  finasteride 5 milliGRAM(s) Oral daily  gabapentin 600 milliGRAM(s) Oral every 8 hours  glucagon  Injectable 1 milliGRAM(s) IntraMuscular once  influenza   Vaccine 0.5 milliLiter(s) IntraMuscular once  melatonin 6 milliGRAM(s) Oral at bedtime  methocarbamol 750 milliGRAM(s) Oral every 8 hours  multivitamin 1 Tablet(s) Oral daily  pantoprazole    Tablet 40 milliGRAM(s) Oral before breakfast  senna 2 Tablet(s) Oral every 12 hours    MEDICATIONS  (PRN):  acetaminophen     Tablet .. 650 milliGRAM(s) Oral every 6 hours PRN Mild Pain (1 - 3)  lactulose Syrup 10 Gram(s) Oral daily PRN no BM>1 day  ondansetron   Disintegrating Tablet 4 milliGRAM(s) Oral every 8 hours PRN Nausea and/or Vomiting  oxyCODONE    IR 10 milliGRAM(s) Oral every 4 hours PRN Severe Pain (7 - 10)  oxyCODONE    IR 5 milliGRAM(s) Oral every 4 hours PRN Moderate Pain (4 - 6)      Allergies    Celebrex (Other)    Intolerances        T(C): 37 (11-07-21 @ 20:04), Max: 37 (11-07-21 @ 20:04)  T(F): 98.6 (11-07-21 @ 20:04), Max: 98.6 (11-07-21 @ 20:04)  HR: 87 (11-07-21 @ 20:04) (87 - 98)  BP: 134/85 (11-07-21 @ 20:04) (123/66 - 146/83)  RR: 18 (11-07-21 @ 20:04) (16 - 18)  SpO2: 99% (11-07-21 @ 20:04) (96% - 100%)          11-06-21 @ 08:01  -  11-07-21 @ 07:00  --------------------------------------------------------  IN:  Total IN: 0 mL    OUT:    Indwelling Catheter - Urethral (mL): 1600 mL  Total OUT: 1600 mL    Total NET: -1600 mL      11-07-21 @ 07:01  -  11-08-21 @ 07:00  --------------------------------------------------------  IN:  Total IN: 0 mL    OUT:    Indwelling Catheter - Urethral (mL): 1300 mL    Intermittent Catheterization - Urethral (mL): 425 mL  Total OUT: 1725 mL    Total NET: -1725 mL      11-08-21 @ 07:01  -  11-08-21 @ 08:10  --------------------------------------------------------  IN:  Total IN: 0 mL    OUT:    Indwelling Catheter - Urethral (mL): 1200 mL  Total OUT: 1200 mL    Total NET: -1200 mL          LABS:      CBC Full  -  ( 08 Nov 2021 05:05 )  WBC Count : 5.06 K/uL  RBC Count : 3.55 M/uL  Hemoglobin : 10.4 g/dL  Hematocrit : 32.5 %  Platelet Count - Automated : 228 K/uL  Mean Cell Volume : 91.5 fl  Mean Cell Hemoglobin : 29.3 pg  Mean Cell Hemoglobin Concentration : 32.0 gm/dL  Auto Neutrophil # : 3.49 K/uL  Auto Lymphocyte # : 0.80 K/uL  Auto Monocyte # : 0.57 K/uL  Auto Eosinophil # : 0.13 K/uL  Auto Basophil # : 0.02 K/uL  Auto Neutrophil % : 68.9 %  Auto Lymphocyte % : 15.8 %  Auto Monocyte % : 11.3 %  Auto Eosinophil % : 2.6 %  Auto Basophil % : 0.4 %    11-08    139  |  103  |  13  ----------------------------<  104<H>  4.2   |  31  |  0.88    Ca    8.5      08 Nov 2021 05:05    TPro  5.9<L>  /  Alb  2.7<L>  /  TBili  0.8  /  DBili  x   /  AST  14  /  ALT  37  /  AlkPhos  64  11-08                  Physical Exam    Constitutional: alert, no acute distress    Abdomen: soft, nontender, nondistended, no HSM    Genitourinary: no bladder distention, kate

## 2021-11-08 NOTE — PROGRESS NOTE ADULT - SUBJECTIVE AND OBJECTIVE BOX
HPI:  71 year old male with past medical history of hyperlipidemia, chronic back pain, presented to [facility] 10/6/21 with acute onset back pain and progressively worsening bilateral lower extremity weakness. Urgent MR imaging of the spine revealed intradural extramedullary hematoma T12-L1, S/P T10-L2 laminectomy with 1U pRBCs given intraop 10/6/21. Spinal angiogram 10/7/21 showed no vascular abnormalities. MRI brain showed diffuse SAH thought to be secondary to spinal hematoma. Vazquez was placed for urinary retention, failed TOV x2. Epidural Eliseo Tapia drain was removed 10/11/21. Repeat MRI showed stable spine and repeat spinal angiogram was without vascular abnormalities. Spine sutures and right groin suture removed 10/22/21. Patient is medically stable for discharge to  for AR 10/23/21. (23 Oct 2021 17:44)    INTERVAL HPI/OVERNIGHT EVENTS:  Patient seen and examined at bed side  No issues nor complaints. reports normal bowel movement this AM  Seen by Urologist today and plan for possible Vazquez removal and TOV under antibiotic cover  Tolerating oral diet. engaging in therapy  Still has LE weakness, improving    ROS:  Denies fevers, chills, chest pain, shortness of breath, abdominal pain, headaches, nausea/vomiting    Vital Signs Last 24 Hrs  T(C): 36.4 (08 Nov 2021 09:34), Max: 37 (07 Nov 2021 20:04)  T(F): 97.6 (08 Nov 2021 09:34), Max: 98.6 (07 Nov 2021 20:04)  HR: 87 (08 Nov 2021 09:34) (87 - 87)  BP: 108/74 (08 Nov 2021 09:34) (108/74 - 134/85)  RR: 18 (08 Nov 2021 09:34) (18 - 18)  SpO2: 99% (08 Nov 2021 09:34) (99% - 99%)      Physical Exam:  Gen - Comfortable  HEENT - unremarkable  Neck - Supple, No limited ROM  Pulm - normal breath sounds  Cardiovascular - RRR, S1S2, No m/r/g  Abdomen - Soft, NT/ND, +BS  Extremities -, No calf tenderness, + Vazquez to leg bag, urine is clear    Neuro  Motor -    BUE 5/5   Lower extremity 3/5   Sensory - decreased sensation/tingling in the right leg  Reflexes - DTR Intact  Psychiatric - Mood stable, Affect WNL    LABS:  RECENT LABS/IMAGING                        10.4   5.06  )-----------( 228      ( 08 Nov 2021 05:05 )             32.5     11-08    139  |  103  |  13  ----------------------------<  104<H>  4.2   |  31  |  0.88    Ca    8.5      08 Nov 2021 05:05    TPro  5.9<L>  /  Alb  2.7<L>  /  TBili  0.8  /  DBili  x   /  AST  14  /  ALT  37  /  AlkPhos  64  11-08    MEDICATIONS  (STANDING):  ascorbic acid 500 milliGRAM(s) Oral daily  dextrose 40% Gel 15 Gram(s) Oral once  dextrose 5%. 1000 milliLiter(s) (50 mL/Hr) IV Continuous <Continuous>  dextrose 5%. 1000 milliLiter(s) (100 mL/Hr) IV Continuous <Continuous>  dextrose 50% Injectable 25 Gram(s) IV Push once  dextrose 50% Injectable 12.5 Gram(s) IV Push once  dextrose 50% Injectable 25 Gram(s) IV Push once  docusate sodium Enema 283 milliGRAM(s) Rectal daily  doxazosin 8 milliGRAM(s) Oral at bedtime  DULoxetine 30 milliGRAM(s) Oral at bedtime  enoxaparin Injectable 40 milliGRAM(s) SubCutaneous <User Schedule>  finasteride 5 milliGRAM(s) Oral daily  gabapentin 600 milliGRAM(s) Oral every 8 hours  glucagon  Injectable 1 milliGRAM(s) IntraMuscular once  influenza   Vaccine 0.5 milliLiter(s) IntraMuscular once  melatonin 6 milliGRAM(s) Oral at bedtime  methocarbamol 750 milliGRAM(s) Oral every 8 hours  multivitamin 1 Tablet(s) Oral daily  pantoprazole    Tablet 40 milliGRAM(s) Oral before breakfast  senna 2 Tablet(s) Oral every 12 hours    MEDICATIONS  (PRN):  acetaminophen     Tablet .. 650 milliGRAM(s) Oral every 6 hours PRN Mild Pain (1 - 3)  lactulose Syrup 10 Gram(s) Oral daily PRN no BM>1 day  ondansetron   Disintegrating Tablet 4 milliGRAM(s) Oral every 8 hours PRN Nausea and/or Vomiting  oxyCODONE    IR 10 milliGRAM(s) Oral every 4 hours PRN Severe Pain (7 - 10)  oxyCODONE    IR 5 milliGRAM(s) Oral every 4 hours PRN Moderate Pain (4 - 6)    THERAPY  Function: motivated, engaging in therapy and gradually making functional gains, on UE, still has decreased motor strength on LE, and b/l knee buckling

## 2021-11-08 NOTE — PROGRESS NOTE ADULT - ASSESSMENT
71y with Hx Hyperlipidemia, chronic back pain, presented to [facility] 10/6/21 with acute onset back pain and progressively worsening bilateral lower extremity weakness, 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 MRI brain showed diffuse SAH likely 2/2 spinal hematoma and paraplegia. Hospital course c/b urinary retention, failed TOV, with kate.    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  - Continue Gabapentin 600mg Q8hr, Methocarbamol 750mg Q8hr, Oxycodone PRN for pain, Duloxetine  - Precautions: falls, spine  - FS well controlled + off Decadron since 10/12/21 - FS d/c  - F/u neurosurgery outpatient    #Neurogenic Bowel - Continue Lactulose PRN, Senna, Miralax  - enemeez (11/2) - Suppository prn    #Neurogenic Bladder   TOV 10/26 --failed   -Was retaining overnight and could not be SC due to difficult anatomy -- urology was consulted and placed 16 Fr 5ml coude cath on 10/28.   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    Skin/Pressure Injury:   - At risk for pressure injury due to neurologic diagnosis and relative immobility.  - Monitor Incisions: T12-L2 laminectomy  - Turn every 2 hours while in bed, air mattress  - Soft heel protectors  - Skin barrier cream as needed  - Nursing to monitor skin Qshift    DVT ppx:  - Lovenox  - SCDs  - Last Doppler on 10/9/21 NEG  ---------------  Outpatient Follow-up (Specialty/Name of physician):  Horacio Uriarte (MD; PhD)  Neurosurgery  [address] [place], [place] [id number]  Phone: [phone]  Fax: [phone]  Follow Up Time:     Meng Gotti  UROLOGY  [address] [address] [place] [address]  Phone: [phone]  Fax: [phone]  --------------  Goals: Safe discharge to home  Estimated Length of Stay: 10-14 days  Rehab Potential: Good  Medical Prognosis: Good  Estimated Disposition: Home with Home Care  ---------------    Team meeting 11/2  Planned D/C Date: 11/10    Expected DC date as stated during last IDT meeting    Seen with Dr Hills, resident, Patient is stable to continue acute rehab treatment    Time-based billing (NON-critical care).      25 minutes spent on total encounter; more than 50% of the visit was spent counseling and / or coordinating care by the attending physician.  The necessity of the time spent during the encounter on this date of service was due to:  as above.

## 2021-11-08 NOTE — PROGRESS NOTE ADULT - SUBJECTIVE AND OBJECTIVE BOX
Patient is a 71y old  Male who presents with a chief complaint of B/L LE weakness after intradural extramedullary hematoma S/P evacuation (2021 11:15)      24 HOUR EVENTS:  No overnight events reported.     SUBJECTIVE:  Patient seen and examined at bedside.   He feels well. he has persistent right leg pain, but overall improving.     ALLERGIES:  Celebrex (Other)    MEDICATIONS  (STANDING):  ascorbic acid 500 milliGRAM(s) Oral daily  dextrose 40% Gel 15 Gram(s) Oral once  dextrose 5%. 1000 milliLiter(s) (50 mL/Hr) IV Continuous <Continuous>  dextrose 5%. 1000 milliLiter(s) (100 mL/Hr) IV Continuous <Continuous>  dextrose 50% Injectable 25 Gram(s) IV Push once  dextrose 50% Injectable 12.5 Gram(s) IV Push once  dextrose 50% Injectable 25 Gram(s) IV Push once  docusate sodium Enema 283 milliGRAM(s) Rectal daily  doxazosin 8 milliGRAM(s) Oral at bedtime  DULoxetine 30 milliGRAM(s) Oral at bedtime  enoxaparin Injectable 40 milliGRAM(s) SubCutaneous <User Schedule>  finasteride 5 milliGRAM(s) Oral daily  gabapentin 600 milliGRAM(s) Oral every 8 hours  glucagon  Injectable 1 milliGRAM(s) IntraMuscular once  influenza   Vaccine 0.5 milliLiter(s) IntraMuscular once  melatonin 6 milliGRAM(s) Oral at bedtime  methocarbamol 750 milliGRAM(s) Oral every 8 hours  multivitamin 1 Tablet(s) Oral daily  pantoprazole    Tablet 40 milliGRAM(s) Oral before breakfast  senna 2 Tablet(s) Oral every 12 hours    MEDICATIONS  (PRN):  acetaminophen     Tablet .. 650 milliGRAM(s) Oral every 6 hours PRN Mild Pain (1 - 3)  lactulose Syrup 10 Gram(s) Oral daily PRN no BM>1 day  ondansetron   Disintegrating Tablet 4 milliGRAM(s) Oral every 8 hours PRN Nausea and/or Vomiting  oxyCODONE    IR 5 milliGRAM(s) Oral every 4 hours PRN Moderate Pain (4 - 6)  oxyCODONE    IR 10 milliGRAM(s) Oral every 4 hours PRN Severe Pain (7 - 10)    Vital Signs Last 24 Hrs  T(F): 97.6 (2021 09:34), Max: 98.6 (2021 20:04)  HR: 87 (2021 09:34) (87 - 87)  BP: 108/74 (2021 09:34) (108/74 - 134/85)  RR: 18 (2021 09:34) (18 - 18)  SpO2: 99% (2021 09:34) (99% - 99%)  I&O's Summary    2021 07:01  -  2021 07:00  --------------------------------------------------------  IN: 0 mL / OUT: 1725 mL / NET: -1725 mL    2021 07:01  -  2021 14:30  --------------------------------------------------------  IN: 0 mL / OUT: 1200 mL / NET: -1200 mL      PHYSICAL EXAM:  General: NAD, A/O x 3  ENT: Moist mucous membranes, no thrush  Neck: Supple, No JVD  Lungs: Clear to auscultation bilaterally, good air entry, non-labored breathing  Cardio: RRR, S1/S2, No murmur  Abdomen: Soft, Nontender, Nondistended; Bowel sounds present  Extremities: No calf tenderness, No pitting edema    LABS:                        10.4   5.06  )-----------( 228      ( 2021 05:05 )             32.5         139  |  103  |  13  ----------------------------<  104  4.2   |  31  |  0.88    Ca    8.5      2021 05:05    TPro  5.9  /  Alb  2.7  /  TBili  0.8  /  DBili  x   /  AST  14  /  ALT  37  /  AlkPhos  64          eGFR if Non African American: 86 mL/min/1.73M2 (21 @ 05:05)  eGFR if African American: 100 mL/min/1.73M2 (21 @ 05:05)                                Urinalysis Basic - ( 2021 07:21 )    Color: Alicia / Appearance: very cloudy / S.020 / pH: x  Gluc: x / Ketone: Negative  / Bili: Negative / Urobili: Negative   Blood: x / Protein: 100 / Nitrite: Positive   Leuk Esterase: Small / RBC: >50 /HPF / WBC 11-25 /HPF   Sq Epi: x / Non Sq Epi: Neg.-Few / Bacteria: Moderate /HPF        COVID-19 PCR: NotDetec (21 @ 07:15)  COVID-19 PCR: NotDetec (10-30-21 @ 05:45)  COVID-19 PCR: NotDetec (10-23-21 @ 19:45)  COVID-19 PCR: NotDetec (10-20-21 @ 07:11)  COVID-19 PCR: NotDetec (10-17-21 @ 12:40)    RADIOLOGY & ADDITIONAL TESTS:    Care Discussed with Consultants/Other Providers:

## 2021-11-08 NOTE — CHART NOTE - NSCHARTNOTEFT_GEN_A_CORE
Nutrition Follow Up Note  Hospital Course   (Per Electronic Medical Record)    Source:  Patient [X]  Medical Record [X]      Diet:   Regular Diet w/ Thin Liquids  Tolerates Diet Consistency Well  No Chewing/Swallowing Difficulties  No Recent Nausea, Vomiting, Diarrhea or Constipation (as Per Patient)  Consumes % of Meals (as Per Documentation) - States Good PO Intake/Appetite (Per Patient)   Educated Patient on Strategies For Maximizing Caloric Intake   on Ensure Clear 8oz PO TID (Provides 720kcal & 24grams of Protein) - Patient Takes Nutrition Supplement Well    Enteral/Parenteral Nutrition: Not Applicable    Current Weight: 159.8lb on 11/7  Obtain New Weight to Confirm  Obtain Weights Weekly     Pertinent Medications: MEDICATIONS  (STANDING):  ascorbic acid 500 milliGRAM(s) Oral daily  dextrose 40% Gel 15 Gram(s) Oral once  dextrose 5%. 1000 milliLiter(s) (50 mL/Hr) IV Continuous <Continuous>  dextrose 5%. 1000 milliLiter(s) (100 mL/Hr) IV Continuous <Continuous>  dextrose 50% Injectable 25 Gram(s) IV Push once  dextrose 50% Injectable 12.5 Gram(s) IV Push once  dextrose 50% Injectable 25 Gram(s) IV Push once  docusate sodium Enema 283 milliGRAM(s) Rectal daily  doxazosin 8 milliGRAM(s) Oral at bedtime  DULoxetine 30 milliGRAM(s) Oral at bedtime  enoxaparin Injectable 40 milliGRAM(s) SubCutaneous <User Schedule>  finasteride 5 milliGRAM(s) Oral daily  gabapentin 600 milliGRAM(s) Oral every 8 hours  glucagon  Injectable 1 milliGRAM(s) IntraMuscular once  influenza   Vaccine 0.5 milliLiter(s) IntraMuscular once  melatonin 6 milliGRAM(s) Oral at bedtime  methocarbamol 750 milliGRAM(s) Oral every 8 hours  multivitamin 1 Tablet(s) Oral daily  pantoprazole    Tablet 40 milliGRAM(s) Oral before breakfast  senna 2 Tablet(s) Oral every 12 hours    MEDICATIONS  (PRN):  acetaminophen     Tablet .. 650 milliGRAM(s) Oral every 6 hours PRN Mild Pain (1 - 3)  lactulose Syrup 10 Gram(s) Oral daily PRN no BM>1 day  ondansetron   Disintegrating Tablet 4 milliGRAM(s) Oral every 8 hours PRN Nausea and/or Vomiting  oxyCODONE    IR 10 milliGRAM(s) Oral every 4 hours PRN Severe Pain (7 - 10)  oxyCODONE    IR 5 milliGRAM(s) Oral every 4 hours PRN Moderate Pain (4 - 6)    Pertinent Labs:  11-08 Na139 mmol/L Glu 104 mg/dL<H> K+ 4.2 mmol/L Cr  0.88 mg/dL BUN 13 mg/dL 11-08 Alb 2.7 g/dL<L>    Skin: No Pressure Ulcers     Edema: None Noted (as Per Documentation)     Last Bowel Movement: on 11/8 (Per Patient)     Estimated Needs:   [X] No Change Since Previous Assessment    Previous Nutrition Diagnosis:   Severe Malnutrition     Nutrition Diagnosis is [X] Ongoing - Continues on Nutrition Supplement & Patient Takes Nutrition Supplement Well    New Nutrition Diagnosis: [X] Not Applicable    Interventions:   1. Educated Patient on Strategies For Maximizing Caloric Intake   2. Recommend Continue Nutrition Plan of Care     Monitoring & Evaluation:   [X] Weights   [X] PO Intake   [X] Skin Integrity   [X] Follow Up (Per Protocol)  [X] Tolerance to Diet Prescription   [X] Other: Labs     Registered Dietitian/Nutritionist Remains Available.  Rakesh Wall RDN    Pager #[phone]  Phone# [phone]

## 2021-11-09 ENCOUNTER — TRANSCRIPTION ENCOUNTER (OUTPATIENT)
Age: 71
End: 2021-11-09

## 2021-11-09 PROCEDURE — 99233 SBSQ HOSP IP/OBS HIGH 50: CPT

## 2021-11-09 PROCEDURE — 99232 SBSQ HOSP IP/OBS MODERATE 35: CPT

## 2021-11-09 RX ORDER — ASCORBIC ACID 60 MG
1 TABLET,CHEWABLE ORAL
Qty: 30 | Refills: 0
Start: 2021-11-09 | End: 2021-12-08

## 2021-11-09 RX ORDER — SIMVASTATIN 20 MG/1
0 TABLET, FILM COATED ORAL
Qty: 0 | Refills: 0 | DISCHARGE

## 2021-11-09 RX ORDER — SENNA PLUS 8.6 MG/1
2 TABLET ORAL
Qty: 120 | Refills: 0
Start: 2021-11-09 | End: 2021-12-08

## 2021-11-09 RX ORDER — FINASTERIDE 5 MG/1
1 TABLET, FILM COATED ORAL
Qty: 30 | Refills: 0
Start: 2021-11-09 | End: 2021-12-08

## 2021-11-09 RX ORDER — METHOCARBAMOL 500 MG/1
1 TABLET, FILM COATED ORAL
Qty: 90 | Refills: 0
Start: 2021-11-09 | End: 2021-12-08

## 2021-11-09 RX ORDER — PANTOPRAZOLE SODIUM 20 MG/1
1 TABLET, DELAYED RELEASE ORAL
Qty: 30 | Refills: 0
Start: 2021-11-09 | End: 2021-12-08

## 2021-11-09 RX ORDER — ACETAMINOPHEN 500 MG
2 TABLET ORAL
Qty: 240 | Refills: 0
Start: 2021-11-09 | End: 2021-12-08

## 2021-11-09 RX ORDER — DOCUSATE SODIUM 100 MG
1 CAPSULE ORAL
Qty: 30 | Refills: 0
Start: 2021-11-09 | End: 2021-12-08

## 2021-11-09 RX ORDER — DULOXETINE HYDROCHLORIDE 30 MG/1
1 CAPSULE, DELAYED RELEASE ORAL
Qty: 30 | Refills: 0
Start: 2021-11-09 | End: 2021-12-08

## 2021-11-09 RX ORDER — DOXAZOSIN MESYLATE 4 MG
1 TABLET ORAL
Qty: 30 | Refills: 0
Start: 2021-11-09 | End: 2021-12-08

## 2021-11-09 RX ORDER — LANOLIN ALCOHOL/MO/W.PET/CERES
2 CREAM (GRAM) TOPICAL
Qty: 60 | Refills: 0
Start: 2021-11-09 | End: 2021-12-08

## 2021-11-09 RX ORDER — GABAPENTIN 400 MG/1
2 CAPSULE ORAL
Qty: 180 | Refills: 0
Start: 2021-11-09 | End: 2021-12-08

## 2021-11-09 RX ADMIN — OXYCODONE HYDROCHLORIDE 5 MILLIGRAM(S): 5 TABLET ORAL at 12:10

## 2021-11-09 RX ADMIN — Medication 650 MILLIGRAM(S): at 16:00

## 2021-11-09 RX ADMIN — Medication 650 MILLIGRAM(S): at 15:18

## 2021-11-09 RX ADMIN — OXYCODONE HYDROCHLORIDE 5 MILLIGRAM(S): 5 TABLET ORAL at 08:03

## 2021-11-09 RX ADMIN — DULOXETINE HYDROCHLORIDE 30 MILLIGRAM(S): 30 CAPSULE, DELAYED RELEASE ORAL at 22:11

## 2021-11-09 RX ADMIN — GABAPENTIN 600 MILLIGRAM(S): 400 CAPSULE ORAL at 22:11

## 2021-11-09 RX ADMIN — GABAPENTIN 600 MILLIGRAM(S): 400 CAPSULE ORAL at 13:25

## 2021-11-09 RX ADMIN — SENNA PLUS 2 TABLET(S): 8.6 TABLET ORAL at 17:13

## 2021-11-09 RX ADMIN — Medication 500 MILLIGRAM(S): at 12:04

## 2021-11-09 RX ADMIN — Medication 1 TABLET(S): at 12:04

## 2021-11-09 RX ADMIN — METHOCARBAMOL 750 MILLIGRAM(S): 500 TABLET, FILM COATED ORAL at 13:25

## 2021-11-09 RX ADMIN — METHOCARBAMOL 750 MILLIGRAM(S): 500 TABLET, FILM COATED ORAL at 22:12

## 2021-11-09 RX ADMIN — Medication 8 MILLIGRAM(S): at 22:10

## 2021-11-09 RX ADMIN — GABAPENTIN 600 MILLIGRAM(S): 400 CAPSULE ORAL at 06:44

## 2021-11-09 RX ADMIN — FINASTERIDE 5 MILLIGRAM(S): 5 TABLET, FILM COATED ORAL at 12:04

## 2021-11-09 RX ADMIN — Medication 10 MILLIGRAM(S): at 06:44

## 2021-11-09 RX ADMIN — SENNA PLUS 2 TABLET(S): 8.6 TABLET ORAL at 06:46

## 2021-11-09 RX ADMIN — OXYCODONE HYDROCHLORIDE 5 MILLIGRAM(S): 5 TABLET ORAL at 12:45

## 2021-11-09 RX ADMIN — ENOXAPARIN SODIUM 40 MILLIGRAM(S): 100 INJECTION SUBCUTANEOUS at 17:13

## 2021-11-09 RX ADMIN — METHOCARBAMOL 750 MILLIGRAM(S): 500 TABLET, FILM COATED ORAL at 06:44

## 2021-11-09 RX ADMIN — PANTOPRAZOLE SODIUM 40 MILLIGRAM(S): 20 TABLET, DELAYED RELEASE ORAL at 06:44

## 2021-11-09 RX ADMIN — Medication 6 MILLIGRAM(S): at 22:11

## 2021-11-09 RX ADMIN — OXYCODONE HYDROCHLORIDE 5 MILLIGRAM(S): 5 TABLET ORAL at 08:35

## 2021-11-09 NOTE — PROGRESS NOTE ADULT - ATTENDING COMMENTS
Dx 72y/o M  on acute rehab treatment fpr bilateral lower extremity weakness, with intradural extramedullary hematoma T12-L1, S/P T10-L2 laminectomy     Stable,  Sustained improvement, with UE motor function  Still working on LE motor deficits  Voiding put still has high PVRs after Vazquez removal yesterday    Continue therapy  Will monitor as recs by Urology    Seen with Dr Hills, Rehab Resident, stable to continue Acute rehab treatment    25 minutes spent on total encounter; more than 50% of the visit was spent counseling and / or coordinating care by the attending physician, discharge planning and liaison with multiple specialities The necessity of the time spent during the encounter on this date of service as stated

## 2021-11-09 NOTE — PROGRESS NOTE ADULT - ASSESSMENT
71y with Hx Hyperlipidemia, chronic back pain, presented to [facility] 10/6/21 with acute onset back pain and progressively worsening bilateral lower extremity weakness, 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 MRI brain showed diffuse SAH likely 2/2 spinal hematoma and paraplegia. Hospital course c/b urinary retention, failed TOV, with kate.    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  - Continue Gabapentin 600mg Q8hr, Methocarbamol 750mg Q8hr, Oxycodone PRN for pain, Duloxetine  - Precautions: falls, spine  - FS well controlled + off Decadron since 10/12/21 - FS d/c  - F/u neurosurgery outpatient    #Neurogenic Bowel - Continue Lactulose PRN, Senna, Miralax  - enemeez (11/2) - Suppository prn    #Neurogenic Bladder   TOV 10/26 --failed   -Was retaining overnight and could not be SC due to difficult anatomy -- urology was consulted and placed 16 Fr 5ml coude cath on 10/28.   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    Skin/Pressure Injury:   - At risk for pressure injury due to neurologic diagnosis and relative immobility.  - Monitor Incisions: T12-L2 laminectomy  - Turn every 2 hours while in bed, air mattress  - Soft heel protectors  - Skin barrier cream as needed  - Nursing to monitor skin Qshift    DVT ppx:  - Lovenox  - SCDs  - Last Doppler on 10/9/21 NEG  ---------------  Outpatient Follow-up (Specialty/Name of physician):  Horacio Uriarte; PhD)  Neurosurgery  [address] [place] [address]  Phone: [phone]  Fax: [phone]  Follow Up Time:     Meng Gotti  UROLOGY  [address] [address] [place] [address]  Phone: [phone]  Fax: [phone]  --------------  Goals: Safe discharge to home  Estimated Length of Stay: 10-14 days  Rehab Potential: Good  Medical Prognosis: Good  Estimated Disposition: Home with Home Care  ---------------    Team meeting 11/2  Planned D/C Date: 11/10, provided pvr ,400cc    Expected DC date as stated during last IDT meeting    Seen with Dr Hills, resident, Patient is stable to continue acute rehab treatment    Time-based billing (NON-critical care).      25 minutes spent on total encounter; more than 50% of the visit was spent counseling and / or coordinating care by the attending physician.  The necessity of the time spent during the encounter on this date of service was due to: as above.

## 2021-11-09 NOTE — PROGRESS NOTE ADULT - SUBJECTIVE AND OBJECTIVE BOX
HPI:  71 year old male with past medical history of hyperlipidemia, chronic back pain, presented to [facility] 10/6/21 with acute onset back pain and progressively worsening bilateral lower extremity weakness. Urgent MR imaging of the spine revealed intradural extramedullary hematoma T12-L1, S/P T10-L2 laminectomy with 1U pRBCs given intraop 10/6/21. Spinal angiogram 10/7/21 showed no vascular abnormalities. MRI brain showed diffuse SAH thought to be secondary to spinal hematoma. Vazquez was placed for urinary retention, failed TOV x2. Epidural Eliseo Tapia drain was removed 10/11/21. Repeat MRI showed stable spine and repeat spinal angiogram was without vascular abnormalities. Spine sutures and right groin suture removed 10/22/21. Patient is medically stable for discharge to  for AR 10/23/21. (23 Oct 2021 17:44)    INTERVAL HPI/OVERNIGHT EVENTS:  Patient seen and examined at bed side  No issues nor complaints. reports normal bowel movements  Vazquez removed by Urology last night, voiding minimally, PVRs slightly elevated   Discussed with Urology, plan is to monitor for 24-36hrs, ensure PVR is <400cc   But if PVRs elevated, planned DC for tomorrow 11/9 will be deferred  In no acute distress  Tolerating oral diet. engaging in therapy  Still has LE weakness, R>L    ROS:  Denies fevers, chills, chest pain, shortness of breath, abdominal pain, headaches, nausea/vomiting    Vital Signs Last 24 Hrs  Vital Signs Last 24 Hrs  T(C): 36.4 (09 Nov 2021 08:33), Max: 36.6 (08 Nov 2021 20:44)  T(F): 97.5 (09 Nov 2021 08:33), Max: 97.9 (08 Nov 2021 20:44)  HR: 96 (09 Nov 2021 08:33) (89 - 96)  BP: 129/85 (09 Nov 2021 08:33) (129/85 - 145/72)  RR: 17 (08 Nov 2021 20:44) (17 - 17)  SpO2: 98% (08 Nov 2021 20:44) (98% - 98%)    Physical Exam:  Gen - Comfortable  HEENT - unremarkable  Neck - Supple, No limited ROM  Pulm - normal breath sounds  Cardiovascular - RRR, S1S2, No m/r/g  Abdomen - Soft, NT/ND, +BS  Extremities -, No calf tenderness, + Vazquez to leg bag, urine is clear    Neuro  Motor -    BUE 5/5   Lower extremity 3/5 on left, Right 2/5  Sensory - decreased sensation/tingling in the right leg  Reflexes - DTR Intact  Psychiatric - Mood stable, Affect WNL    LABS:                    10.4   5.06  )-----------( 228      ( 08 Nov 2021 05:05 )             32.5     11-08    139  |  103  |  13  ----------------------------<  104<H>  4.2   |  31  |  0.88    Ca    8.5      08 Nov 2021 05:05    TPro  5.9<L>  /  Alb  2.7<L>  /  TBili  0.8  /  DBili  x   /  AST  14  /  ALT  37  /  AlkPhos  64  11-08    MEDICATIONS  (STANDING):  ascorbic acid 500 milliGRAM(s) Oral daily  dextrose 40% Gel 15 Gram(s) Oral once  dextrose 5%. 1000 milliLiter(s) (50 mL/Hr) IV Continuous <Continuous>  dextrose 5%. 1000 milliLiter(s) (100 mL/Hr) IV Continuous <Continuous>  dextrose 50% Injectable 25 Gram(s) IV Push once  dextrose 50% Injectable 12.5 Gram(s) IV Push once  dextrose 50% Injectable 25 Gram(s) IV Push once  docusate sodium Enema 283 milliGRAM(s) Rectal daily  doxazosin 8 milliGRAM(s) Oral at bedtime  DULoxetine 30 milliGRAM(s) Oral at bedtime  enoxaparin Injectable 40 milliGRAM(s) SubCutaneous <User Schedule>  finasteride 5 milliGRAM(s) Oral daily  gabapentin 600 milliGRAM(s) Oral every 8 hours  glucagon  Injectable 1 milliGRAM(s) IntraMuscular once  influenza   Vaccine 0.5 milliLiter(s) IntraMuscular once  melatonin 6 milliGRAM(s) Oral at bedtime  methocarbamol 750 milliGRAM(s) Oral every 8 hours  multivitamin 1 Tablet(s) Oral daily  pantoprazole    Tablet 40 milliGRAM(s) Oral before breakfast  senna 2 Tablet(s) Oral every 12 hours    MEDICATIONS  (PRN):  acetaminophen     Tablet .. 650 milliGRAM(s) Oral every 6 hours PRN Mild Pain (1 - 3)  lactulose Syrup 10 Gram(s) Oral daily PRN no BM>1 day  ondansetron   Disintegrating Tablet 4 milliGRAM(s) Oral every 8 hours PRN Nausea and/or Vomiting  oxyCODONE    IR 10 milliGRAM(s) Oral every 4 hours PRN Severe Pain (7 - 10)  oxyCODONE    IR 5 milliGRAM(s) Oral every 4 hours PRN Moderate Pain (4 - 6)    THERAPY  Function: motivated, engaging in therapy and gradually making functional gains, on UE, still has decreased motor strength on LE, and b/l knee buckling   HPI:  71 year old male with past medical history of hyperlipidemia, chronic back pain, presented to [facility] 10/6/21 with acute onset back pain and progressively worsening bilateral lower extremity weakness. Urgent MR imaging of the spine revealed intradural extramedullary hematoma T12-L1, S/P T10-L2 laminectomy with 1U pRBCs given intraop 10/6/21. Spinal angiogram 10/7/21 showed no vascular abnormalities. MRI brain showed diffuse SAH thought to be secondary to spinal hematoma. Vazquez was placed for urinary retention, failed TOV x2. Epidural Eliseo Tapia drain was removed 10/11/21. Repeat MRI showed stable spine and repeat spinal angiogram was without vascular abnormalities. Spine sutures and right groin suture removed 10/22/21. Patient is medically stable for discharge to  for AR 10/23/21. (23 Oct 2021 17:44)    INTERVAL HPI/OVERNIGHT EVENTS:  Patient seen and examined at bed side  No issues nor complaints. reports normal bowel movements  Vazquez removed by Urology last night, voiding minimally, PVRs slightly elevated   Discussed with Urology, plan is to monitor for 24-36hrs, ensure PVR is <400cc   But if PVRs elevated, planned DC for tomorrow 11/9 will be deferred  In no acute distress  Tolerating oral diet. engaging in therapy  Still has LE weakness, R>L      ROS:  Denies fevers, chills, chest pain, shortness of breath, abdominal pain, headaches, nausea/vomiting    Vital Signs Last 24 Hrs  Vital Signs Last 24 Hrs  T(C): 36.4 (09 Nov 2021 08:33), Max: 36.6 (08 Nov 2021 20:44)  T(F): 97.5 (09 Nov 2021 08:33), Max: 97.9 (08 Nov 2021 20:44)  HR: 96 (09 Nov 2021 08:33) (89 - 96)  BP: 129/85 (09 Nov 2021 08:33) (129/85 - 145/72)  RR: 17 (08 Nov 2021 20:44) (17 - 17)  SpO2: 98% (08 Nov 2021 20:44) (98% - 98%)    Physical Exam:  Gen - Comfortable  HEENT - unremarkable  Neck - Supple, No limited ROM  Pulm - normal breath sounds  Cardiovascular - RRR, S1S2, No m/r/g  Abdomen - Soft, NT/ND, +BS  Extremities -, No calf tenderness, + Vazquez to leg bag, urine is clear    Neuro  Motor -    BUE 5/5   Lower extremity 3/5 on left, Right 2/5  Sensory - decreased sensation/tingling in the right leg  Reflexes - DTR Intact  Psychiatric - Mood stable, Affect WNL    LABS:                    10.4   5.06  )-----------( 228      ( 08 Nov 2021 05:05 )             32.5     11-08    139  |  103  |  13  ----------------------------<  104<H>  4.2   |  31  |  0.88    Ca    8.5      08 Nov 2021 05:05    TPro  5.9<L>  /  Alb  2.7<L>  /  TBili  0.8  /  DBili  x   /  AST  14  /  ALT  37  /  AlkPhos  64  11-08    MEDICATIONS  (STANDING):  ascorbic acid 500 milliGRAM(s) Oral daily  dextrose 40% Gel 15 Gram(s) Oral once  dextrose 5%. 1000 milliLiter(s) (50 mL/Hr) IV Continuous <Continuous>  dextrose 5%. 1000 milliLiter(s) (100 mL/Hr) IV Continuous <Continuous>  dextrose 50% Injectable 25 Gram(s) IV Push once  dextrose 50% Injectable 12.5 Gram(s) IV Push once  dextrose 50% Injectable 25 Gram(s) IV Push once  docusate sodium Enema 283 milliGRAM(s) Rectal daily  doxazosin 8 milliGRAM(s) Oral at bedtime  DULoxetine 30 milliGRAM(s) Oral at bedtime  enoxaparin Injectable 40 milliGRAM(s) SubCutaneous <User Schedule>  finasteride 5 milliGRAM(s) Oral daily  gabapentin 600 milliGRAM(s) Oral every 8 hours  glucagon  Injectable 1 milliGRAM(s) IntraMuscular once  influenza   Vaccine 0.5 milliLiter(s) IntraMuscular once  melatonin 6 milliGRAM(s) Oral at bedtime  methocarbamol 750 milliGRAM(s) Oral every 8 hours  multivitamin 1 Tablet(s) Oral daily  pantoprazole    Tablet 40 milliGRAM(s) Oral before breakfast  senna 2 Tablet(s) Oral every 12 hours    MEDICATIONS  (PRN):  acetaminophen     Tablet .. 650 milliGRAM(s) Oral every 6 hours PRN Mild Pain (1 - 3)  lactulose Syrup 10 Gram(s) Oral daily PRN no BM>1 day  ondansetron   Disintegrating Tablet 4 milliGRAM(s) Oral every 8 hours PRN Nausea and/or Vomiting  oxyCODONE    IR 10 milliGRAM(s) Oral every 4 hours PRN Severe Pain (7 - 10)  oxyCODONE    IR 5 milliGRAM(s) Oral every 4 hours PRN Moderate Pain (4 - 6)    THERAPY  Function: motivated, engaging in therapy and gradually making functional gains, on UE, still has decreased motor strength on LE, and b/l knee buckling

## 2021-11-09 NOTE — PROGRESS NOTE ADULT - ASSESSMENT
72 yo M with HLD, chronic back pain, presented to [facility] 10/6/2021 w/ acute onset back pain and progressively worsening b/l LE weakness, found to have intradural extramedullary thoracolumbar hematoma T12-L1, S/P T10-L2 laminectomy w/ hematoma evacuation (10/7/2021). MRI brain w/ diffuse SAH thought to be 2/2 spinal hematoma.     #B/L LE weakness 2/2 Intradural extramedullary hematoma T12-L1 s/p T10-L2 laminectomy and decompression  # SAH 2/2 above  - PT/OT per PMR  - OP Neurosurgery f/u    #Neurogenic bladder w/ urinary retention  - PT failed TOV and had a kate upon admission to rehab. He was seen by urology at OSH. Likely multifactorial due to limited mobilization, constipation, spinal cord compression from hematoma.  - Urology recommendations appreciated  - Positive UA, although clinically no indication of infection. Likely colonization of catheter  - Monitor off abx for now. If develops fever/chills or leukocytosis, can start Ceftriaxone    - Cont doxazosin, and finasteride  pt currently on TOV since 11/8. Will monitor PVR. Per Urology, okay to d/c home tomorrow if PVR <400.      # Hyponatremia   -resolved    # Normocytic Anemia   -h/h stable  -b12 lower limits of normal    #Hypokalemia  -resolved    #Hypoalbuminemia  - Nutrition eval on going    DVT ppx: lovenox

## 2021-11-09 NOTE — DISCHARGE NOTE NURSING/CASE MANAGEMENT/SOCIAL WORK - PATIENT PORTAL LINK FT
You can access the FollowMyHealth Patient Portal offered by [facility] by registering at the following website: [email or web address]. By joining [employer]’s FollowMyHealth portal, you will also be able to view your health information using other applications (apps) compatible with our system.

## 2021-11-10 VITALS
TEMPERATURE: 98 F | DIASTOLIC BLOOD PRESSURE: 84 MMHG | RESPIRATION RATE: 16 BRPM | HEART RATE: 92 BPM | SYSTOLIC BLOOD PRESSURE: 139 MMHG | OXYGEN SATURATION: 98 %

## 2021-11-10 PROCEDURE — 99239 HOSP IP/OBS DSCHRG MGMT >30: CPT

## 2021-11-10 RX ADMIN — OXYCODONE HYDROCHLORIDE 5 MILLIGRAM(S): 5 TABLET ORAL at 09:00

## 2021-11-10 RX ADMIN — OXYCODONE HYDROCHLORIDE 5 MILLIGRAM(S): 5 TABLET ORAL at 08:28

## 2021-11-10 RX ADMIN — Medication 500 MILLIGRAM(S): at 11:20

## 2021-11-10 RX ADMIN — METHOCARBAMOL 750 MILLIGRAM(S): 500 TABLET, FILM COATED ORAL at 06:10

## 2021-11-10 RX ADMIN — SENNA PLUS 2 TABLET(S): 8.6 TABLET ORAL at 06:10

## 2021-11-10 RX ADMIN — Medication 283 MILLIGRAM(S): at 07:00

## 2021-11-10 RX ADMIN — METHOCARBAMOL 750 MILLIGRAM(S): 500 TABLET, FILM COATED ORAL at 13:00

## 2021-11-10 RX ADMIN — Medication 1 TABLET(S): at 11:20

## 2021-11-10 RX ADMIN — GABAPENTIN 600 MILLIGRAM(S): 400 CAPSULE ORAL at 06:09

## 2021-11-10 RX ADMIN — OXYCODONE HYDROCHLORIDE 5 MILLIGRAM(S): 5 TABLET ORAL at 13:03

## 2021-11-10 RX ADMIN — FINASTERIDE 5 MILLIGRAM(S): 5 TABLET, FILM COATED ORAL at 11:20

## 2021-11-10 RX ADMIN — PANTOPRAZOLE SODIUM 40 MILLIGRAM(S): 20 TABLET, DELAYED RELEASE ORAL at 06:10

## 2021-11-10 RX ADMIN — GABAPENTIN 600 MILLIGRAM(S): 400 CAPSULE ORAL at 13:00

## 2021-11-10 NOTE — PROGRESS NOTE ADULT - ASSESSMENT
71y with Hx Hyperlipidemia, chronic back pain, presented to [facility] 10/6/21 with acute onset back pain and progressively worsening bilateral lower extremity weakness, 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 MRI brain showed diffuse SAH likely 2/2 spinal hematoma and paraplegia. Hospital course c/b urinary retention, failed TOV, with kate.  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   - S/P T10-L2 laminectomy  - Continue Gabapentin 600mg Q8hr, Methocarbamol 750mg Q8hr, Oxycodone PRN for pain, Duloxetine  - Precautions: falls, spine  - FS well controlled + off Decadron since 10/12/21 - FS d/c  - F/u neurosurgery outpatient    #Neurogenic Bowel - Continue Lactulose PRN, Senna, Miralax  - enemeez (11/2) - Suppository prn    #Neurogenic Bladder   - Kate in place on 10/28, possible Kate removed 11/8 voiding minimally f/u with his private Urologist  - Cardura increased to 8mg 10/29     # 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    Skin/Pressure Injury:   - At risk for pressure injury due to neurologic diagnosis and relative immobility.  - Monitor Incisions: T12-L2 laminectomy  - Turn every 2 hours while in bed, air mattress  - Soft heel protectors  - Skin barrier cream as needed  - Nursing to monitor skin Qshift    DVT ppx:  - Lovenox  - SCDs  - Last Doppler on 10/9/21 NEG  ---------------  Outpatient Follow-up (Specialty/Name of physician):  Horacio Uriarte; PhD)  Neurosurgery  [address] [place] [address]  Phone: [phone]  Fax: [phone]  Follow Up Time:     Meng Gotti  UROLOGY  [address] [place] [id number]  Phone: [phone]  Fax: [phone]  --------------  Goals: Safe discharge to home  Estimated Length of Stay: 10-14 days  Rehab Potential: Good  Medical Prognosis: Good  Estimated Disposition: Home with Home Care  ---------------    Team meeting 11/2  Planned D/C Date: 11/10, provided pvr ,400cc    Expected DC date as stated during last IDT meeting    Seen with Dr Hills, resident, Patient is stable to continue acute rehab treatment    Time-based billing (NON-critical care).      25 minutes spent on total encounter; more than 50% of the visit was spent counseling and / or coordinating care by the attending physician.  The necessity of the time spent during the encounter on this date of service was due to: as above.

## 2021-11-10 NOTE — PROGRESS NOTE ADULT - SUBJECTIVE AND OBJECTIVE BOX
HPI:  71 year old male with past medical history of hyperlipidemia, chronic back pain, presented to [facility] 10/6/21 with acute onset back pain and progressively worsening bilateral lower extremity weakness. Urgent MR imaging of the spine revealed intradural extramedullary hematoma T12-L1, S/P T10-L2 laminectomy with 1U pRBCs given intraop 10/6/21. Spinal angiogram 10/7/21 showed no vascular abnormalities. MRI brain showed diffuse SAH thought to be secondary to spinal hematoma. Vazquez was placed for urinary retention, failed TOV x2. Epidural Eliseo Tapia drain was removed 10/11/21. Repeat MRI showed stable spine and repeat spinal angiogram was without vascular abnormalities. Spine sutures and right groin suture removed 10/22/21. Patient is medically stable for discharge to  for AR 10/23/21. (23 Oct 2021 17:44)    INTERVAL HPI/OVERNIGHT EVENTS:  Patient seen and examined at bed side  No issues nor complaints. reports normal bowel movements  PVRs below 350cc, and no abd pain  Had uneventful therapy session, and happy for home discharge today  Vitals normal    ROS:  Denies fevers, chills, chest pain, shortness of breath, abdominal pain, headaches, nausea/vomiting    Vital Signs Last 24 Hrs  T(C): 36.5 (10 Nov 2021 09:17), Max: 36.8 (09 Nov 2021 20:19)  T(F): 97.7 (10 Nov 2021 09:17), Max: 98.3 (09 Nov 2021 20:19)  HR: 92 (10 Nov 2021 09:17) (89 - 92)  BP: 139/84 (10 Nov 2021 09:17) (139/84 - 146/84)  RR: 16 (10 Nov 2021 09:17) (16 - 16)  SpO2: 98% (10 Nov 2021 09:17) (98% - 99%)      Physical Exam:  Gen - Comfortable  HEENT - unremarkable  Neck - Supple, No limited ROM  Pulm - normal breath sounds  Cardiovascular - RRR, S1S2, No m/r/g  Abdomen - Soft, NT/ND, +BS  Extremities -, No calf tenderness,    Neuro  Motor -    BUE 5/5   Lower extremity 3/5 on left, Right 2/5  Sensory - decreased sensation/tingling in the right leg  Reflexes - DTR Intact  Psychiatric - Mood stable, Affect WNL    LABS:                    10.4   5.06  )-----------( 228      ( 08 Nov 2021 05:05 )             32.5     11-08    139  |  103  |  13  ----------------------------<  104<H>  4.2   |  31  |  0.88    Ca    8.5      08 Nov 2021 05:05    TPro  5.9<L>  /  Alb  2.7<L>  /  TBili  0.8  /  DBili  x   /  AST  14  /  ALT  37  /  AlkPhos  64  11-08    MEDICATIONS  (STANDING):  MEDICATIONS  (STANDING):  ascorbic acid 500 milliGRAM(s) Oral daily  dextrose 40% Gel 15 Gram(s) Oral once  dextrose 5%. 1000 milliLiter(s) (50 mL/Hr) IV Continuous <Continuous>  dextrose 5%. 1000 milliLiter(s) (100 mL/Hr) IV Continuous <Continuous>  dextrose 50% Injectable 25 Gram(s) IV Push once  dextrose 50% Injectable 12.5 Gram(s) IV Push once  dextrose 50% Injectable 25 Gram(s) IV Push once  docusate sodium Enema 283 milliGRAM(s) Rectal daily  doxazosin 8 milliGRAM(s) Oral at bedtime  DULoxetine 30 milliGRAM(s) Oral at bedtime  enoxaparin Injectable 40 milliGRAM(s) SubCutaneous <User Schedule>  finasteride 5 milliGRAM(s) Oral daily  gabapentin 600 milliGRAM(s) Oral every 8 hours  glucagon  Injectable 1 milliGRAM(s) IntraMuscular once  melatonin 6 milliGRAM(s) Oral at bedtime  methocarbamol 750 milliGRAM(s) Oral every 8 hours  multivitamin 1 Tablet(s) Oral daily  pantoprazole    Tablet 40 milliGRAM(s) Oral before breakfast  senna 2 Tablet(s) Oral every 12 hours    MEDICATIONS  (PRN):  acetaminophen     Tablet .. 650 milliGRAM(s) Oral every 6 hours PRN Mild Pain (1 - 3)  lactulose Syrup 10 Gram(s) Oral daily PRN no BM>1 day  ondansetron   Disintegrating Tablet 4 milliGRAM(s) Oral every 8 hours PRN Nausea and/or Vomiting  oxyCODONE    IR 10 milliGRAM(s) Oral every 4 hours PRN Severe Pain (7 - 10)  oxyCODONE    IR 5 milliGRAM(s) Oral every 4 hours PRN Moderate Pain (4 - 6)      THERAPY: Engaged in therapy, prior to DC today

## 2021-11-10 NOTE — PROGRESS NOTE ADULT - REASON FOR ADMISSION
B/L LE weakness after intradural extramedullary hematoma S/P evacuation

## 2021-11-10 NOTE — PROGRESS NOTE ADULT - NUTRITIONAL ASSESSMENT
This patient has been assessed with a concern for Malnutrition and has been determined to have a diagnosis/diagnoses of Severe protein-calorie malnutrition.    This patient is being managed with:   Diet Regular-  Supplement Feeding Modality:  Oral  Ensure Clear Cans or Servings Per Day:  1       Frequency:  Three Times a day  Entered: Oct 25 2021 10:25AM    
This patient has been assessed with a concern for Malnutrition and has been determined to have a diagnosis/diagnoses of Severe protein-calorie malnutrition.    This patient is being managed with:   Diet Regular-  Supplement Feeding Modality:  Oral  Ensure Clear Cans or Servings Per Day:  1       Frequency:  Three Times a day  Entered: Oct 25 2021 10:25AM    Diet Regular-  Supplement Feeding Modality:  Oral  Ensure Enlive Cans or Servings Per Day:  1       Frequency:  Three Times a day  Entered: Oct 23 2021  4:56PM    The following pending diet order is being considered for treatment of Severe protein-calorie malnutrition:null
This patient has been assessed with a concern for Malnutrition and has been determined to have a diagnosis/diagnoses of Severe protein-calorie malnutrition.    This patient is being managed with:   Diet Regular-  Supplement Feeding Modality:  Oral  Ensure Clear Cans or Servings Per Day:  1       Frequency:  Three Times a day  Entered: Oct 25 2021 10:25AM    
This patient has been assessed with a concern for Malnutrition and has been determined to have a diagnosis/diagnoses of Severe protein-calorie malnutrition.    This patient is being managed with:   Diet Regular-  Supplement Feeding Modality:  Oral  Ensure Clear Cans or Servings Per Day:  1       Frequency:  Three Times a day  Entered: Oct 25 2021 10:25AM    
This patient has been assessed with a concern for Malnutrition and has been determined to have a diagnosis/diagnoses of Severe protein-calorie malnutrition.    This patient is being managed with:   Diet Regular-  Supplement Feeding Modality:  Oral  Ensure Clear Cans or Servings Per Day:  1       Frequency:  Three Times a day  Entered: Oct 25 2021 10:25AM    Diet Regular-  Supplement Feeding Modality:  Oral  Ensure Enlive Cans or Servings Per Day:  1       Frequency:  Three Times a day  Entered: Oct 23 2021  4:56PM    The following pending diet order is being considered for treatment of Severe protein-calorie malnutrition:null
This patient has been assessed with a concern for Malnutrition and has been determined to have a diagnosis/diagnoses of Severe protein-calorie malnutrition.    This patient is being managed with:   Diet Regular-  Supplement Feeding Modality:  Oral  Ensure Clear Cans or Servings Per Day:  1       Frequency:  Three Times a day  Entered: Oct 25 2021 10:25AM    
This patient has been assessed with a concern for Malnutrition and has been determined to have a diagnosis/diagnoses of Severe protein-calorie malnutrition.    This patient is being managed with:   Diet Regular-  Supplement Feeding Modality:  Oral  Ensure Clear Cans or Servings Per Day:  1       Frequency:  Three Times a day  Entered: Oct 25 2021 10:25AM    Diet Regular-  Supplement Feeding Modality:  Oral  Ensure Enlive Cans or Servings Per Day:  1       Frequency:  Three Times a day  Entered: Oct 23 2021  4:56PM    The following pending diet order is being considered for treatment of Severe protein-calorie malnutrition:null
This patient has been assessed with a concern for Malnutrition and has been determined to have a diagnosis/diagnoses of Severe protein-calorie malnutrition.    This patient is being managed with:   Diet Regular-  Supplement Feeding Modality:  Oral  Ensure Clear Cans or Servings Per Day:  1       Frequency:  Three Times a day  Entered: Oct 25 2021 10:25AM    
This patient has been assessed with a concern for Malnutrition and has been determined to have a diagnosis/diagnoses of Severe protein-calorie malnutrition.    This patient is being managed with:   Diet Regular-  Supplement Feeding Modality:  Oral  Ensure Clear Cans or Servings Per Day:  1       Frequency:  Three Times a day  Entered: Oct 25 2021 10:25AM    Diet Regular-  Supplement Feeding Modality:  Oral  Ensure Enlive Cans or Servings Per Day:  1       Frequency:  Three Times a day  Entered: Oct 23 2021  4:56PM    The following pending diet order is being considered for treatment of Severe protein-calorie malnutrition:null
This patient has been assessed with a concern for Malnutrition and has been determined to have a diagnosis/diagnoses of Severe protein-calorie malnutrition.    This patient is being managed with:   Diet Regular-  Supplement Feeding Modality:  Oral  Ensure Clear Cans or Servings Per Day:  1       Frequency:  Three Times a day  Entered: Oct 25 2021 10:25AM    

## 2021-11-10 NOTE — PROGRESS NOTE ADULT - ATTENDING COMMENTS
Dx 72y/o M  on acute rehab treatment for bilateral lower extremity weakness, with intradural extramedullary hematoma T12-L1, S/P T10-L2 laminectomy     Stable for discharge  Sustained improvement, with UE motor function, still had lower extremity motor deficits  No speech or cognitive deficits  Voiding appropriately post Vazquez removal  x 36hr  Had an uneventful therapy session  Vitals stable    will f/u with his neurosurgeon and Rehab medicine  Other discharge plans as stated in DC summary     Seen with Ms Duke NP, stable for discharge form acute rehab treatment    35 minutes spent on total encounter; more than 50% of the visit was spent counseling and / or coordinating care by the attending physician, discharge planning and liaison with multiple specialities The necessity of the time spent during the encounter on this date of service as stated

## 2021-11-22 ENCOUNTER — NON-APPOINTMENT (OUTPATIENT)
Age: 71
End: 2021-11-22

## 2021-11-23 ENCOUNTER — APPOINTMENT (OUTPATIENT)
Dept: NEUROSURGERY | Facility: CLINIC | Age: 71
End: 2021-11-23
Payer: MEDICARE

## 2021-11-23 VITALS
HEART RATE: 85 BPM | HEIGHT: 71 IN | OXYGEN SATURATION: 97 % | TEMPERATURE: 97.6 F | WEIGHT: 195 LBS | BODY MASS INDEX: 27.3 KG/M2 | DIASTOLIC BLOOD PRESSURE: 88 MMHG | SYSTOLIC BLOOD PRESSURE: 151 MMHG

## 2021-11-23 DIAGNOSIS — Z86.39 PERSONAL HISTORY OF OTHER ENDOCRINE, NUTRITIONAL AND METABOLIC DISEASE: ICD-10-CM

## 2021-11-23 DIAGNOSIS — I10 ESSENTIAL (PRIMARY) HYPERTENSION: ICD-10-CM

## 2021-11-23 DIAGNOSIS — Q28.8 OTHER SPECIFIED CONGENITAL MALFORMATIONS OF CIRCULATORY SYSTEM: ICD-10-CM

## 2021-11-23 DIAGNOSIS — Z86.69 PERSONAL HISTORY OF OTHER DISEASES OF THE NERVOUS SYSTEM AND SENSE ORGANS: ICD-10-CM

## 2021-11-23 DIAGNOSIS — Z86.79 PERSONAL HISTORY OF OTHER DISEASES OF THE CIRCULATORY SYSTEM: ICD-10-CM

## 2021-11-23 DIAGNOSIS — Z87.19 PERSONAL HISTORY OF OTHER DISEASES OF THE DIGESTIVE SYSTEM: ICD-10-CM

## 2021-11-23 DIAGNOSIS — I62.03 NONTRAUMATIC CHRONIC SUBDURAL HEMORRHAGE: ICD-10-CM

## 2021-11-23 PROCEDURE — 99214 OFFICE O/P EST MOD 30 MIN: CPT

## 2021-11-23 PROCEDURE — 99024 POSTOP FOLLOW-UP VISIT: CPT

## 2021-11-24 PROBLEM — Z87.19 HISTORY OF ULCERATIVE COLITIS: Status: RESOLVED | Noted: 2021-11-24 | Resolved: 2021-11-24

## 2021-11-24 PROBLEM — Z86.39 HISTORY OF HYPERLIPIDEMIA: Status: RESOLVED | Noted: 2021-06-08 | Resolved: 2021-11-24

## 2021-11-24 PROBLEM — Q28.8 SPINAL ARTERIOVENOUS MALFORMATION: Status: ACTIVE | Noted: 2021-11-24

## 2021-11-24 PROBLEM — Z86.79 HISTORY OF HYPERTENSION: Status: RESOLVED | Noted: 2021-06-08 | Resolved: 2021-11-24

## 2021-11-24 NOTE — REVIEW OF SYSTEMS
[As Noted in HPI] : as noted in HPI [Negative] : Heme/Lymph [Leg Weakness] : leg weakness [Numbness] : numbness [Tingling] : tingling [Difficulty Walking] : difficulty walking [Depression] : no depression

## 2021-11-24 NOTE — DATA REVIEWED
[de-identified] : EXAM: MR SPINE LUMBAR WAW IC\par  EXAM: MR SPINE THORACIC WAW IC\par \par PROCEDURE DATE: 10/18/2021\par \par \par \par INTERPRETATION: MR OF THE THORACIC AND LUMBAR SPINE WITH AND WITHOUT CONTRAST.\par \par CLINICAL INDICATION: Status post laminectomy for subdural hematoma evacuation.\par \par TECHNIQUE: Multiplanar, multisequence MR imaging of the thoracic and lumbar spine was performed with and without contrast.\par \par CONTRAST: 9 mL of Gadavist was administered without complications. 1 cc of intravenous contrast was discarded.\par \par COMPARISON: MRI of the thoracic and lumbar spine, 10/8/2021.\par \par FINDINGS:\par There are postsurgical changes related to prior T10-L2 laminectomy for intraspinal hematoma evacuation.\par \par The previously seen T2/STIR signal hyperintensity at T10-T11 cord levels has decreased since prior study. There is layering of blood products which demonstrate intrinsic T1 hyperintensity and T2 hypointensity, implying early subacute blood products in the ventral aspect of the cord from T4 to T10 with a maximal thickness of 7 mm in the ventral aspects of T9 and T10.\par \par There is postsurgical fluid in the dorsal epidural aspect of the laminectomy site from T10 to L2 without significant enhancement, probably represent postoperative fluid/seroma. The collection measures 9.4 cm in maximal craniocaudal dimension and 1.1 x 1.8 cm in maximal transaxial dimensions at T11.\par \par The previously seen epidural catheter seen from T11 caudally to L2 has been removed.\par \par There are several pockets of subacute subdural fluid hematoma for example layering in the right dorsal aspect of the cauda equina nerve roots at L2-L3 measuring 3.4 cm in craniocaudal dimension and 0.6 cm in thickness. There is also subdural hematoma layering in the sacral thecal sac surrounding cauda equina nerve roots.\par \par \par IMPRESSION:\par \par Compared to previous studies,\par \par There are postsurgical changes related to T10-L2 laminectomy with interval removal of the epidural catheter as described.\par \par There has been interval decrease in T2/STIR hyperintensity at T10-T11 consistent with interval improvement of distal cord edema. No new cord edema is seen.\par \par Layering of ventral subdural fluid collection from T4 to T10 may represent residual subdural hematoma, conspicuous in this examination may be secondary to expected evolution of blood products now in the early subacute stage.\par \par \par

## 2021-11-24 NOTE — END OF VISIT
[FreeTextEntry3] : I agree with history, exam and plan as stated above. I made modifications were pertinent.  [Time Spent: ___ minutes] : I have spent [unfilled] minutes of time on the encounter.

## 2021-11-24 NOTE — REASON FOR VISIT
[Spouse] : spouse [de-identified] : hematoma\par Bilateral decompression laminectomies T10-L2 for evacuation of the intradural hematoma [de-identified] : 10/7/21

## 2021-11-24 NOTE — DATA REVIEWED
[de-identified] : EXAM: NEURO IR PROCEDURE\par \par PROCEDURE DATE: 10/07/2021\par \par \par \par INTERPRETATION: Pre-procedure diagnosis: Intradural extramedullary spinal hematoma extending from T10-L2\par \par Post-procedure diagnosis: Normal cerebral and spinal angiography\par \par Procedure: Cerebral angiography and Spinal angiography\par \par Interventionalist: Karen Blanco MD\par \par Assistant: Mirian Kevin NP, Keanu Gonzalez RT\par \par Anesthesiologist: Leonel Garzon DO\par \par Contrast: Omnipaque 240, 336 cc\par \par Radiation Dose: A: 81.4 min, 2954 mGy B: 11.1 min, 105.5 mGy Total: 92.5 min, 3059 mGy\par \par Indications: The patient is a 71 years old male 911  who presented with acute worsening of back pain while fishing with friends this morning with associated acute bilateral leg weakness and numbness. He had a Vazquez catheter placed due to urinary retention and right lower extremity was found with 1/5 strength and left lower extremity 2/5 strength. The MR of the spine showed and intradural, extramedullary hematoma extending from T10-L2. Patient was taken for emergent thoracolumbar laminectomy with Dr. Uriarte. Recommendations were to perform cerebral and spinal angiography to rule out underlying vascular lesion as culprit of recent bleed. The patient now presents for cerebral angiography and spinal angiography. The risks, benefits, alternatives, complications and personnel associated with the procedure was discussed with the patient and the patient's family in great detail. They request that we proceed.\par \par Procedure: The patient was brought into the angiography suite and placed on the table supine. The patient was intubated and placed under general anesthesia. A Vazquez catheter was confirmed patent. Both femoral regions were prepped and draped in the standard sterile fashion. The right femoral artery was accessed using a micropuncture kit and modified Seldinger technique. A 5 Arabic long was inserted and attached to heparinized flush. The Mikaelson catheter was used to selectively catheterize the following segmental arteries on the right and spinal angiography was performed: T7, T8, T9, T10, T11, T12, L1, L3, L4 and L5. The Mikaelson catheter was used to selectively catheterize the following segmental arteries on the left and spinal angiography was performed: T7, T9, T10, T11, T12, L4 and L5. The Mikaelson catheter was used to selectively catheterize the median sacral artery and spinal angiography was performed. A 5 Arabic Cordis Vert diagnostic catheter over an angled Glidewire was navigated into the right internal and external carotid artery and angiography of the intracranial and extracranial circulation was performed. The catheter was then navigated into the right vertebral artery and angiography of the intracranial circulation was performed. The catheter was then navigated into the left internal and external carotid artery and angiography of the intracranial and extracranial circulation was performed. The catheter was navigated into the left vertebral artery and angiography of the intracranial circulation was performed. Lastly the catheter was navigated into the left subclavian artery and angiography was performed.\par \par All catheters and guidewires were removed and hemostasis was obtained with manual compression, Quickclot and the Safeguard dressing\par \par Findings:\par \par Right:\par \par T7: There is normal transit of contrast through the segmental arteries without evidence of arteriovenous shunting. Contribution to the anterior or posterior spinal axis was not visualized.\par \par T8: There is normal transit of contrast through the segmental arteries without evidence of arteriovenous shunting. Contribution to the anterior or posterior spinal axis was not visualized.\par \par T9: There is normal transit of contrast through the segmental arteries without evidence of arteriovenous shunting. Contribution to the anterior or posterior spinal axis was not visualized.\par \par T10: There is normal transit of contrast through the segmental arteries without evidence of arteriovenous shunting. Contribution to the anterior or posterior spinal axis was not visualized.\par \par T11: There is normal transit of contrast through the segmental arteries without evidence of arteriovenous shunting. Contribution to the anterior or posterior spinal axis was not visualized.\par \par T12: There is normal transit of contrast through the segmental arteries without evidence of arteriovenous shunting. Contribution to the anterior or posterior spinal axis was not visualized.\par \par L1: There is normal transit of contrast through the segmental arteries without evidence of arteriovenous shunting. Contribution to the anterior or posterior spinal axis was not visualized.\par \par L3: There is normal transit of contrast through the segmental arteries without evidence of arteriovenous shunting. Contribution to the anterior or posterior spinal axis was not visualized.\par \par L4: There is normal transit of contrast through the segmental arteries without evidence of arteriovenous shunting. Contribution to the anterior or posterior spinal axis was not visualized.\par \par L5: There is normal transit of contrast through the segmental arteries without evidence of arteriovenous shunting. Contribution to the anterior or posterior spinal axis was not visualized.\par \par Left:\par \par T7: There is normal transit of contrast through the segmental arteries without evidence of arteriovenous shunting. Contribution to the anterior or posterior spinal axis was not visualized.\par \par T9: There is normal transit of contrast through the segmental arteries without evidence of arteriovenous shunting. Contribution to the anterior or posterior spinal axis was not visualized.\par \par T10: There is normal transit of contrast through the segmental arteries without evidence of arteriovenous shunting. Contribution to the anterior or posterior spinal axis was not visualized.\par \par T11: There is normal transit of contrast through the segmental arteries without evidence of arteriovenous shunting. Contribution to the anterior or posterior spinal axis was not visualized.\par \par T12: There is normal transit of contrast through the segmental arteries without evidence of arteriovenous shunting. Contribution to the anterior or posterior spinal axis was not visualized.\par \par L4: There is normal transit of contrast through the segmental arteries without evidence of arteriovenous shunting. Contribution to the anterior or posterior spinal axis was not visualized.\par \par L5: There is normal transit of contrast through the segmental arteries without evidence of arteriovenous shunting. Contribution to the anterior or posterior spinal axis was not visualized.\par \par Median sacral artery: There is normal transit of contrast through the median sacral artery without evidence of arteriovenous shunting. Contribution to the anterior or posterior spinal axis was not identified.\par \par Cerebral angiography:\par \par Right internal and external carotid artery: There is normal transit of contrast through the arterial, capillary and venous phases. There is tortuosity along the course of the right internal carotid artery. There is filling across the anterior communicating artery. A fetal origin of the right posterior cerebral artery is identified. There is no evidence of intracranial aneurysm, arteriovenous malformation or arteriovenous shunting. Multiple superficial cortical veins are identified. The superior sagittal sinus drains into both transverse and sigmoid sinuses. The vein of Senait is identified. The basal vein of Manuela is identified. The internal cerebral vein drains into the great vein of Juan and the straight sinus. The sylvian venous system drains into the cavernous, inferior petrosal sinus and pterygoid venous plexus. There is normal transit of contrast through the right external carotid circulation without evidence of arteriovenous shunting or dural fistula.\par \par Right vertebral artery: There is normal transit of contrast through the arterial, capillary and venous phases. There is significant reflux into the contralateral vertebral artery with immediate washout. The basilar artery terminates in left posterior cerebral artery. An area of fenestration is identified at the distal basilar artery. The left posterior communicating artery was briefly opacified. There is no evidence of intracranial aneurysm, arteriovenous malformation or arteriovenous shunting. The posterior circulation drains into the straight sinus and both transverse and sigmoid sinuses.\par \par Left internal and external carotid artery: There is normal transit of contrast through the arterial, capillary and venous phases. The cervical segment of the left internal carotid artery is elongated. There is filling across the anterior communicating artery. A small posterior communicating artery was briefly opacified. There is no evidence of intracranial aneurysm, arteriovenous malformation or arteriovenous shunting. Multiple superficial cortical veins are identified. The superior sagittal sinus drains into both transverse and sigmoid sinuses. The vein of Senait is identified. The basal vein of Manuela is identified. The internal cerebral vein drains into the great vein of Juan and the straight sinus. The sylvian venous system drains into the cavernous and inferior petrosal sinus. There is normal transit of contrast through the left external carotid circulation without evidence of arteriovenous shunting or dural fistula.\par \par Left vertebral artery: There is normal transit of contrast through the arterial, capillary and venous phases. A direct origin of the left vertebral artery from the arch is identified. There is minimal reflux into the contralateral vertebral artery. The basilar artery terminates in left posterior cerebral artery. An area of fenestration is identified at the distal basilar artery. The left posterior communicating artery was briefly opacified. There is no evidence of intracranial aneurysm, arteriovenous malformation or arteriovenous shunting. The posterior circulation drains into the straight sinus and both transverse and sigmoid sinuses.\par \par Left subclavian artery: The thyrocervical trunk is visualized. The internal mammary artery was opacified. The costocervical trunk is as well visualized. There is no significant area of stenosis or occlusion.\par \par Impression: Normal cerebral and spinal angiography\par \par --- End of Report ---\par  [de-identified] : EXAM: NEURO IR PROCEDURE\par \par PROCEDURE DATE: 10/21/2021\par \par \par \par INTERPRETATION: Pre-procedure diagnosis: Intradural extramedullary spinal hematoma extending from T10-L2\par \par Post-procedure diagnosis: Normal spinal angiography\par \par Procedure: Spinal angiography\par \par Interventionalist: Karen Blanco MD\par \par Assistant: Rachna BROUSSARD, Liliane BROUSSARD, Adonay Childs RT\par \par Anesthesiologist: Chalino Le MD\par \par Contrast: Omnipaque 240, 229 cc\par \par Radiation Dose: A: 45.1 min, 1341 mGy B: 0 min, 0.9 mGy Total: 45.1 min, 1342 mGy\par \par Indications: The patient is a 71 years old male 911  who presented with acute worsening of back pain while fishing with friends with associated acute bilateral leg weakness and numbness. The MR of the spine showed and intradural, extramedullary hematoma extending from T10-L2. Patient was taken for emergent thoracolumbar laminectomy with Dr. Uriarte. He has had significant improvement in symptoms able to move both legs against gravity but still incontinent. He underwent formal evaluation with spinal angiography on 10/7/2021 with no evidence of spinal arteriovenous malformation or shunting. The patient now presents for spinal angiography. The risks, benefits, alternatives, complications and personnel associated with the procedure was discussed with the patient and the patient's family in great detail. They request that we proceed.\par \par Procedure: The patient was brought into the angiography suite and placed on the table supine. The patient was intubated and placed under general anesthesia. A Vazquez catheter was inserted. Both femoral regions were prepped and draped in the standard sterile fashion. The right femoral artery was accessed using a micropuncture kit and modified Seldinger technique. A 5 Slovak short sheath was inserted and attached to heparinized flush. The 4 Slovak Hutchinson Hook catheter was used to selectively catheterize the following segmental arteries on the right and spinal angiography was performed of T6, T7, T8, T9, T10, T11, T12 and L1. The Hutchinson Hook catheter was used to selectively catheterize the following segmental arteries on the left and spinal angiography was performed of T6, T7, T8, T9, T10, T11, T12 and L1. A Pigtail catheter was used to catheterize the abdominal aorta and an aortogram was performed with visualization of the following segmental arteries bilateral L3, L4 and L5.\par \par All catheters and guidewires were removed and hemostasis was obtained with manual compression, Quickclot and the Safeguard dressing\par \par Findings:\par \par Right:\par \par T6: There is normal transit of contrast through the segmental arteries without evidence of arteriovenous shunting. Contribution to the anterior or posterior spinal axis was not visualized.\par \par T7: There is normal transit of contrast through the segmental arteries without evidence of arteriovenous shunting. Contribution to the anterior or posterior spinal axis was not visualized.\par \par T8: There is normal transit of contrast through the segmental arteries without evidence of arteriovenous shunting. Contribution to the anterior or posterior spinal axis was not visualized.\par \par T9: There is normal transit of contrast through the segmental arteries without evidence of arteriovenous shunting. Contribution to the anterior or posterior spinal axis was not visualized.\par \par T10: There is normal transit of contrast through the segmental arteries without evidence of arteriovenous shunting. Contribution to the anterior or posterior spinal axis was not visualized.\par \par T11: There is normal transit of contrast through the segmental arteries without evidence of arteriovenous shunting. Contribution to the anterior or posterior spinal axis was not visualized.\par \par T12: There is normal transit of contrast through the segmental arteries without evidence of arteriovenous shunting. Contribution to the anterior or posterior spinal axis was not visualized.\par \par L1: There is normal transit of contrast through the segmental arteries without evidence of arteriovenous shunting. Contribution to the anterior or posterior spinal axis was not visualized.\par \par Aortogram:\par \par L3: There is normal transit of contrast through the segmental arteries without evidence of arteriovenous shunting. Contribution to the anterior or posterior spinal axis was not visualized.\par L4: There is normal transit of contrast through the segmental arteries without evidence of arteriovenous shunting. Contribution to the anterior or posterior spinal axis was not visualized.\par L5: There is normal transit of contrast through the segmental arteries without evidence of arteriovenous shunting. Contribution to the anterior or posterior spinal axis was not visualized.\par \par Left:\par \par T6: There is normal transit of contrast through the segmental arteries without evidence of arteriovenous shunting. Contribution to the anterior or posterior spinal axis was not visualized.\par \par T7: There is normal transit of contrast through the segmental arteries without evidence of arteriovenous shunting. Contribution to the anterior or posterior spinal axis was not visualized.\par \par T8: There is normal transit of contrast through the segmental arteries without evidence of arteriovenous shunting. Contribution to the anterior or posterior spinal axis was not visualized.\par \par T9: There is normal transit of contrast through the segmental arteries without evidence of arteriovenous shunting. Contribution to the anterior or posterior spinal axis was not visualized.\par \par T10: There is normal transit of contrast through the segmental arteries without evidence of arteriovenous shunting. Contribution to the anterior spinal axis was visualized.\par \par T11: There is normal transit of contrast through the segmental arteries without evidence of arteriovenous shunting. Contribution to the anterior or posterior spinal axis was not visualized.\par \par T12: There is normal transit of contrast through the segmental arteries without evidence of arteriovenous shunting. Contribution to the anterior and posterior spinal axis was visualized.\par \par L1: There is normal transit of contrast through the segmental arteries without evidence of arteriovenous shunting. Contribution to the anterior and posterior spinal axis was visualized. The main contributor to the anterior spinal axis (Adamkiewicz, ) arises from the left L1 level.\par \par Aortogram:\par \par L3: There is normal transit of contrast through the segmental arteries without evidence of arteriovenous shunting. Contribution to the anterior or posterior spinal axis was not visualized.\par L4: There is normal transit of contrast through the segmental arteries without evidence of arteriovenous shunting. Contribution to the anterior or posterior spinal axis was not visualized.\par L5: There is normal transit of contrast through the segmental arteries without evidence of arteriovenous shunting. Contribution to the anterior or posterior spinal axis was not visualized.\par \par Impression: Normal spinal angiography\par \par --- End of Report ---

## 2021-11-24 NOTE — PHYSICAL EXAM
[General Appearance - Alert] : alert [General Appearance - In No Acute Distress] : in no acute distress [Oriented To Time, Place, And Person] : oriented to person, place, and time [Impaired Insight] : insight and judgment were intact [Affect] : the affect was normal [Person] : oriented to person [Place] : oriented to place [Time] : oriented to time [Concentration Intact] : normal concentrating ability [Visual Intact] : visual attention was ~T not ~L decreased [Naming Objects] : no difficulty naming common objects [Writing A Sentence] : no difficulty writing a sentence [Fluency] : fluency intact [Comprehension] : comprehension intact [Reading] : reading intact [Cranial Nerves Optic (II)] : visual acuity intact bilaterally,  visual fields full to confrontation, pupils equal round and reactive to light [Cranial Nerves Oculomotor (III)] : extraocular motion intact [Cranial Nerves Trigeminal (V)] : facial sensation intact symmetrically [Cranial Nerves Facial (VII)] : face symmetrical [Cranial Nerves Glossopharyngeal (IX)] : tongue and palate midline [Cranial Nerves Accessory (XI - Cranial And Spinal)] : head turning and shoulder shrug symmetric [Cranial Nerves Hypoglossal (XII)] : there was no tongue deviation with protrusion [Motor Tone] : muscle tone was normal in all four extremities [No Muscle Atrophy] : normal bulk in all four extremities [Repeating Phrases] : difficulty repeating a phrase [Past History] : inadequate knowledge of personal past history [Motor Strength Shoulders Right Weakness] : normal shoulder strength [Motor Strength Upper Extremities Right] : normal arm strength [Motor Strength Wrists Right Weakness] : normal wrist strength [Hand Weakness Right] : normal hand  [Motor Strength Shoulders Left Weakness] : normal shoulder strength [Motor Strength Upper Extremities Left] : normal arm strength [Motor Strength Wrists Left Weakness] : normal wrist strength [Hand Weakness Left] : normal hand  [Motor Strength Hips Right Weakness] : hip weakness was present [4] : hip extension 4/5 [Motor Strength Hips Left Weakness] : hip weakness was present [+4] : hip extension +4/5 [Limited Balance] : the patient's balance was impaired [Past-pointing] : there was no past-pointing [Tremor] : no tremor present [Plantar Reflex Right Only] : normal on the right [Plantar Reflex Left Only] : normal on the left [FreeTextEntry7] : Numbness and dimished sensation to touch at the T10-T11 level [FreeTextEntry8] : Uses a 4 point walker

## 2021-11-24 NOTE — ASSESSMENT
[FreeTextEntry1] : Patient with above history and imaging. Progressively improving. Continue PT, and do f/u MRI w/wo thoracic and lumbar spine in 1 month\par \par Plan:\par MRI w/wo thoracic and lumbar spine\par f/u after imaging\par Patient knows to call the office if there are any new or worsening symptoms. \par All questions and concerns answered and addressed in detail to patient's complete satisfaction. Patient verbalized understanding and agreed to plan.\par \par

## 2021-11-24 NOTE — ASSESSMENT
[FreeTextEntry1] : Mr. Toro presents with the above history.  Patient is clinically doing well with improvement of his LE strength.  He does not offer any new neurological complaints. \par \par \par Plan:\par Neurosurgery Dr. Uriarte will repeat another MRI thoracic and lumbar spine to assess interval resolution of intradural extramedullary hematoma T12-L1 in one month.\par Tentative plan to repeat spinal angiogram upon review. March 2021\par GI follow up for UC and hemorrhoids. and if needed substitution of Mesalamine\par Follow up after imaging\par Patient has been given an opportunity to ask and have their questions answered to their satisfaction.\par Patient knows to call the office if there are any new or worsening symptoms.\par \par \par I have seen and examined the patient along with my nurse practitioner, Roopa Suanders .  I have personally determined the assessment and plan of care based on today's encounter.\par

## 2021-11-24 NOTE — HISTORY OF PRESENT ILLNESS
[FreeTextEntry1] : DORIE OROSCO is a 71 year old male with PMH of hyperlipidemia, chronic back pain,who presents for post op neurosurgical evaluation of bilateral decompression laminectomies T10L2, dural opening and evacuation of the intradural hematoma done on 10/7/21.\par Hospital course:\par He presented to [facility] 10/6/21 with acute onset back pain and progressively worsening bilateral lower extremity weakness. Urgent MR imaging of the spine revealed intradural extramedullary hematoma T12-L1, S/P T10-L2 laminectomy with 1U pRBCs given intraop 10/6/21. Spinal angiogram 10/7/21 showed no vascular abnormalities.\par \par Presents accompanied by his wife in no acute distress. Patient was discharged to home from [facility] facility last week.  Denies any headaches, n/v or vision changes. Patient is currently involved in physical therapy at home with significant improvement of his UE and LE strength. He does endorse perineal numbness and urinary incontinence but does state his urine control is dependent on how quickly he can get to the bathroom. \par \par \par

## 2021-11-24 NOTE — PHYSICAL EXAM
[General Appearance - Alert] : alert [General Appearance - In No Acute Distress] : in no acute distress [General Appearance - Well Nourished] : well nourished [Clean] : clean [Dry] : dry [Intact] : intact [No Drainage] : without drainage [Normal Skin] : normal [Oriented To Time, Place, And Person] : oriented to person, place, and time [Impaired Insight] : insight and judgment were intact [Affect] : the affect was normal [Person] : oriented to person [Place] : oriented to place [Time] : oriented to time [Cranial Nerves Optic (II)] : visual acuity intact bilaterally,  pupils equal round and reactive to light [Cranial Nerves Oculomotor (III)] : extraocular motion intact [Cranial Nerves Trigeminal (V)] : facial sensation intact symmetrically [Cranial Nerves Facial (VII)] : face symmetrical [Cranial Nerves Vestibulocochlear (VIII)] : hearing was intact bilaterally [Cranial Nerves Glossopharyngeal (IX)] : tongue and palate midline [Cranial Nerves Accessory (XI - Cranial And Spinal)] : head turning and shoulder shrug symmetric [Cranial Nerves Hypoglossal (XII)] : there was no tongue deviation with protrusion [Motor Strength] : muscle strength was normal in all four extremities [Sensation Tactile Decrease] : light touch was intact [Sensation Pain / Temperature Decrease] : pain and temperature was intact [Sclera] : the sclera and conjunctiva were normal [PERRL With Normal Accommodation] : pupils were equal in size, round, reactive to light, with normal accommodation [Extraocular Movements] : extraocular movements were intact [Neck Appearance] : the appearance of the neck was normal [] : no respiratory distress [Involuntary Movements] : no involuntary movements were seen [Motor Tone] : muscle strength and tone were normal [FreeTextEntry8] : ambulats with a walker

## 2021-12-17 ENCOUNTER — APPOINTMENT (OUTPATIENT)
Dept: PHYSICAL MEDICINE AND REHAB | Facility: CLINIC | Age: 71
End: 2021-12-17
Payer: MEDICARE

## 2021-12-17 VITALS
SYSTOLIC BLOOD PRESSURE: 157 MMHG | DIASTOLIC BLOOD PRESSURE: 88 MMHG | RESPIRATION RATE: 18 BRPM | TEMPERATURE: 98.8 F | WEIGHT: 184 LBS | BODY MASS INDEX: 25.76 KG/M2 | HEART RATE: 75 BPM | HEIGHT: 71 IN | OXYGEN SATURATION: 98 %

## 2021-12-17 DIAGNOSIS — R33.9 RETENTION OF URINE, UNSPECIFIED: ICD-10-CM

## 2021-12-17 PROCEDURE — 99214 OFFICE O/P EST MOD 30 MIN: CPT

## 2021-12-17 RX ORDER — LIDOCAINE 40 MG/G
4 PATCH TOPICAL
Qty: 3 | Refills: 1 | Status: ACTIVE | COMMUNITY
Start: 2021-12-17 | End: 1900-01-01

## 2021-12-17 RX ORDER — GABAPENTIN 300 MG/1
300 CAPSULE ORAL
Refills: 0 | Status: DISCONTINUED | COMMUNITY

## 2021-12-17 RX ORDER — STANDARDIZED SENNA CONCENTRATE 8.6 MG/1
8.6 TABLET ORAL
Qty: 60 | Refills: 1 | Status: ACTIVE | COMMUNITY
Start: 2021-12-17 | End: 1900-01-01

## 2021-12-17 NOTE — PHYSICAL EXAM
[FreeTextEntry1] : General: comfortable\par HEENT--NAD\par Chest--clear \par Abd--soft non tender\par Ext-no edema\par \par Neuro\par AAO X 3\par LT sensation reduced over L3 B/L R>L and diffusely over b/l legs\par Tone--normal\par MMT Upper extremities 5/5, Rt leg 3/5 hip/knee 5/5 ankle\par Left leg 5/5\par No dysmetria\par Balance--good dynamic, increased sway on dynamic gait\par Reflexex knees 1+, elbows 2+\par \par MSK--TTP over right T10-T12 dermatome on the right

## 2021-12-17 NOTE — HISTORY OF PRESENT ILLNESS
[FreeTextEntry1] : \par DORIE OROSCO is a 71 year old male, with Hx of hyperlipidemia, chronic back pain and spinal vascular abnormalities ,s/p bilateral decompression laminectomies T10_L2, dural opening and evacuation of the intradural hematoma done on 10/7/21.Has post operative obstructive uropathy,,which responded to doxazosin\par Had Acute Rehab Treatment from 10/27 to 11/10/21 and discharge home to Physical therapy for B/L Lower extremity weakness\par \par Presents to Rehab Medicine clinic for follow up with his wife\par Interval events\par \par Seen by Neurosurgeon 10/23/21--noted to be stable, with sustained improvement, repeat MRI lumbar and thoracic spine ordered with plan for f/u afterwards\par \par Reports sustained improvement of mobility and physical function with out patient PT, now ambulating with a cane, functional distance and no gait aid at home\par Independent with ADLs\par \par Still reporting tingling/numbness, b/l anterior thighs and diffusely over both legs R>L, worse at night, mild relief with current low dose gabapentin, denies sedation\par \par Constipation is still present, requiring daily suppository, sennat 2 tabs TID, Colace one tab daily\par Reports nausea with miralax in the past\par Back pain Rt lower back over Lower thoracic region and Rt lower ribs, tingling/numbness and burning pain\par Associated with muscle spasms which previously responded to methocarbamol tabs\par Urinary urge incontinence improved with dosazocin but dis improved when his tabs exhausted\par

## 2021-12-17 NOTE — REVIEW OF SYSTEMS
[Negative] : Heme/Lymph [FreeTextEntry8] : intermittent urge incontinence [FreeTextEntry9] : Reduced motor strength left knee and left hip [de-identified] : Reduced light touch sensation left anterior thigh

## 2021-12-17 NOTE — ASSESSMENT
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 s/p evaluation/laminectomy 10/7/21\par --increased gabapentin from 300mg to 400mg TID, script sent to pharmacy\par --Continue physical therapy for Lower extremity muscle strengthening\par -Contniue f/u with Neurosurgeon Dr Rowan\par \par Constipation--suboptimal response to current treatment\par  d/c dulcolax tab \par commence senna 2 tabs nightly, \par lactulose 30ml daily/prn\par continue dulcolax suppository daily/prn\par \par Back pain--Lidocaine patch to G53--V34 area on right side apply 12hly on an off\par \par Urinary obstruction/bladder hyperactivity--continue doxazosin and Urology f/u\par \par Follow up ion 4-6wks time \par \par

## 2021-12-24 PROCEDURE — 97112 NEUROMUSCULAR REEDUCATION: CPT

## 2021-12-24 PROCEDURE — U0003: CPT

## 2021-12-24 PROCEDURE — 86769 SARS-COV-2 COVID-19 ANTIBODY: CPT

## 2021-12-24 PROCEDURE — 83550 IRON BINDING TEST: CPT

## 2021-12-24 PROCEDURE — 97116 GAIT TRAINING THERAPY: CPT

## 2021-12-24 PROCEDURE — 80053 COMPREHEN METABOLIC PANEL: CPT

## 2021-12-24 PROCEDURE — 97163 PT EVAL HIGH COMPLEX 45 MIN: CPT

## 2021-12-24 PROCEDURE — 97110 THERAPEUTIC EXERCISES: CPT

## 2021-12-24 PROCEDURE — 82728 ASSAY OF FERRITIN: CPT

## 2021-12-24 PROCEDURE — 82607 VITAMIN B-12: CPT

## 2021-12-24 PROCEDURE — 97535 SELF CARE MNGMENT TRAINING: CPT

## 2021-12-24 PROCEDURE — 84443 ASSAY THYROID STIM HORMONE: CPT

## 2021-12-24 PROCEDURE — 36415 COLL VENOUS BLD VENIPUNCTURE: CPT

## 2021-12-24 PROCEDURE — 83935 ASSAY OF URINE OSMOLALITY: CPT

## 2021-12-24 PROCEDURE — 83930 ASSAY OF BLOOD OSMOLALITY: CPT

## 2021-12-24 PROCEDURE — 97530 THERAPEUTIC ACTIVITIES: CPT

## 2021-12-24 PROCEDURE — 80048 BASIC METABOLIC PNL TOTAL CA: CPT

## 2021-12-24 PROCEDURE — 82962 GLUCOSE BLOOD TEST: CPT

## 2021-12-24 PROCEDURE — 83540 ASSAY OF IRON: CPT

## 2021-12-24 PROCEDURE — 97167 OT EVAL HIGH COMPLEX 60 MIN: CPT

## 2021-12-24 PROCEDURE — U0005: CPT

## 2021-12-24 PROCEDURE — 85045 AUTOMATED RETICULOCYTE COUNT: CPT

## 2021-12-24 PROCEDURE — 85025 COMPLETE CBC W/AUTO DIFF WBC: CPT

## 2021-12-24 PROCEDURE — 82746 ASSAY OF FOLIC ACID SERUM: CPT

## 2021-12-24 PROCEDURE — 84300 ASSAY OF URINE SODIUM: CPT

## 2021-12-24 PROCEDURE — 81001 URINALYSIS AUTO W/SCOPE: CPT

## 2021-12-30 PROCEDURE — P9037: CPT

## 2021-12-30 PROCEDURE — 80053 COMPREHEN METABOLIC PANEL: CPT

## 2021-12-30 PROCEDURE — 83090 ASSAY OF HOMOCYSTEINE: CPT

## 2021-12-30 PROCEDURE — 75705 ARTERY X-RAYS SPINE: CPT

## 2021-12-30 PROCEDURE — 82595 ASSAY OF CRYOGLOBULIN: CPT

## 2021-12-30 PROCEDURE — 72128 CT CHEST SPINE W/O DYE: CPT | Mod: MG

## 2021-12-30 PROCEDURE — 72156 MRI NECK SPINE W/O & W/DYE: CPT | Mod: MG

## 2021-12-30 PROCEDURE — 85652 RBC SED RATE AUTOMATED: CPT

## 2021-12-30 PROCEDURE — 80074 ACUTE HEPATITIS PANEL: CPT

## 2021-12-30 PROCEDURE — 93005 ELECTROCARDIOGRAM TRACING: CPT

## 2021-12-30 PROCEDURE — 0225U NFCT DS DNA&RNA 21 SARSCOV2: CPT

## 2021-12-30 PROCEDURE — 97116 GAIT TRAINING THERAPY: CPT

## 2021-12-30 PROCEDURE — 82962 GLUCOSE BLOOD TEST: CPT

## 2021-12-30 PROCEDURE — 36215 PLACE CATHETER IN ARTERY: CPT

## 2021-12-30 PROCEDURE — 86901 BLOOD TYPING SEROLOGIC RH(D): CPT

## 2021-12-30 PROCEDURE — 80076 HEPATIC FUNCTION PANEL: CPT

## 2021-12-30 PROCEDURE — 83036 HEMOGLOBIN GLYCOSYLATED A1C: CPT

## 2021-12-30 PROCEDURE — 70553 MRI BRAIN STEM W/O & W/DYE: CPT

## 2021-12-30 PROCEDURE — 86038 ANTINUCLEAR ANTIBODIES: CPT

## 2021-12-30 PROCEDURE — 86923 COMPATIBILITY TEST ELECTRIC: CPT

## 2021-12-30 PROCEDURE — 85303 CLOT INHIBIT PROT C ACTIVITY: CPT

## 2021-12-30 PROCEDURE — P9100: CPT

## 2021-12-30 PROCEDURE — 86900 BLOOD TYPING SEROLOGIC ABO: CPT

## 2021-12-30 PROCEDURE — 85018 HEMOGLOBIN: CPT

## 2021-12-30 PROCEDURE — C1894: CPT

## 2021-12-30 PROCEDURE — 96376 TX/PRO/DX INJ SAME DRUG ADON: CPT

## 2021-12-30 PROCEDURE — 36224 PLACE CATH CAROTD ART: CPT

## 2021-12-30 PROCEDURE — 99285 EMERGENCY DEPT VISIT HI MDM: CPT

## 2021-12-30 PROCEDURE — 84132 ASSAY OF SERUM POTASSIUM: CPT

## 2021-12-30 PROCEDURE — 96375 TX/PRO/DX INJ NEW DRUG ADDON: CPT

## 2021-12-30 PROCEDURE — 82947 ASSAY GLUCOSE BLOOD QUANT: CPT

## 2021-12-30 PROCEDURE — 87635 SARS-COV-2 COVID-19 AMP PRB: CPT

## 2021-12-30 PROCEDURE — 72131 CT LUMBAR SPINE W/O DYE: CPT | Mod: MG

## 2021-12-30 PROCEDURE — 85025 COMPLETE CBC W/AUTO DIFF WBC: CPT

## 2021-12-30 PROCEDURE — 72157 MRI CHEST SPINE W/O & W/DYE: CPT | Mod: MG

## 2021-12-30 PROCEDURE — 86769 SARS-COV-2 COVID-19 ANTIBODY: CPT

## 2021-12-30 PROCEDURE — 86160 COMPLEMENT ANTIGEN: CPT

## 2021-12-30 PROCEDURE — 85014 HEMATOCRIT: CPT

## 2021-12-30 PROCEDURE — 97167 OT EVAL HIGH COMPLEX 60 MIN: CPT

## 2021-12-30 PROCEDURE — U0005: CPT

## 2021-12-30 PROCEDURE — U0003: CPT

## 2021-12-30 PROCEDURE — 96374 THER/PROPH/DIAG INJ IV PUSH: CPT

## 2021-12-30 PROCEDURE — 86850 RBC ANTIBODY SCREEN: CPT

## 2021-12-30 PROCEDURE — 80048 BASIC METABOLIC PNL TOTAL CA: CPT

## 2021-12-30 PROCEDURE — 83615 LACTATE (LD) (LDH) ENZYME: CPT

## 2021-12-30 PROCEDURE — 86147 CARDIOLIPIN ANTIBODY EA IG: CPT

## 2021-12-30 PROCEDURE — C1887: CPT

## 2021-12-30 PROCEDURE — 36226 PLACE CATH VERTEBRAL ART: CPT

## 2021-12-30 PROCEDURE — 70544 MR ANGIOGRAPHY HEAD W/O DYE: CPT

## 2021-12-30 PROCEDURE — C1889: CPT

## 2021-12-30 PROCEDURE — C9399: CPT

## 2021-12-30 PROCEDURE — 86803 HEPATITIS C AB TEST: CPT

## 2021-12-30 PROCEDURE — 83605 ASSAY OF LACTIC ACID: CPT

## 2021-12-30 PROCEDURE — 76000 FLUOROSCOPY <1 HR PHYS/QHP: CPT

## 2021-12-30 PROCEDURE — 88304 TISSUE EXAM BY PATHOLOGIST: CPT

## 2021-12-30 PROCEDURE — 84295 ASSAY OF SERUM SODIUM: CPT

## 2021-12-30 PROCEDURE — 85027 COMPLETE CBC AUTOMATED: CPT

## 2021-12-30 PROCEDURE — 36227 PLACE CATH XTRNL CAROTID: CPT

## 2021-12-30 PROCEDURE — 82435 ASSAY OF BLOOD CHLORIDE: CPT

## 2021-12-30 PROCEDURE — 72158 MRI LUMBAR SPINE W/O & W/DYE: CPT | Mod: MG

## 2021-12-30 PROCEDURE — 36430 TRANSFUSION BLD/BLD COMPNT: CPT

## 2021-12-30 PROCEDURE — C1769: CPT

## 2021-12-30 PROCEDURE — 36415 COLL VENOUS BLD VENIPUNCTURE: CPT

## 2021-12-30 PROCEDURE — C1763: CPT

## 2021-12-30 PROCEDURE — 83735 ASSAY OF MAGNESIUM: CPT

## 2021-12-30 PROCEDURE — 85730 THROMBOPLASTIN TIME PARTIAL: CPT

## 2021-12-30 PROCEDURE — 97530 THERAPEUTIC ACTIVITIES: CPT

## 2021-12-30 PROCEDURE — 85306 CLOT INHIBIT PROT S FREE: CPT

## 2021-12-30 PROCEDURE — G1004: CPT

## 2021-12-30 PROCEDURE — 70450 CT HEAD/BRAIN W/O DYE: CPT

## 2021-12-30 PROCEDURE — 82803 BLOOD GASES ANY COMBINATION: CPT

## 2021-12-30 PROCEDURE — 84100 ASSAY OF PHOSPHORUS: CPT

## 2021-12-30 PROCEDURE — 82330 ASSAY OF CALCIUM: CPT

## 2021-12-30 PROCEDURE — 86146 BETA-2 GLYCOPROTEIN ANTIBODY: CPT

## 2021-12-30 PROCEDURE — 93970 EXTREMITY STUDY: CPT

## 2021-12-30 PROCEDURE — 86140 C-REACTIVE PROTEIN: CPT

## 2021-12-30 PROCEDURE — 85610 PROTHROMBIN TIME: CPT

## 2021-12-30 PROCEDURE — 36245 INS CATH ABD/L-EXT ART 1ST: CPT

## 2021-12-31 ENCOUNTER — APPOINTMENT (OUTPATIENT)
Dept: MRI IMAGING | Facility: CLINIC | Age: 71
End: 2021-12-31
Payer: MEDICARE

## 2021-12-31 ENCOUNTER — OUTPATIENT (OUTPATIENT)
Dept: OUTPATIENT SERVICES | Facility: HOSPITAL | Age: 71
LOS: 1 days | End: 2021-12-31

## 2021-12-31 DIAGNOSIS — G95.19 OTHER VASCULAR MYELOPATHIES: ICD-10-CM

## 2021-12-31 PROCEDURE — G1004: CPT

## 2021-12-31 PROCEDURE — 72157 MRI CHEST SPINE W/O & W/DYE: CPT | Mod: 26,MG

## 2021-12-31 PROCEDURE — 72158 MRI LUMBAR SPINE W/O & W/DYE: CPT | Mod: 26,MG

## 2022-01-12 ENCOUNTER — TRANSCRIPTION ENCOUNTER (OUTPATIENT)
Age: 72
End: 2022-01-12

## 2022-01-14 ENCOUNTER — APPOINTMENT (OUTPATIENT)
Dept: PHYSICAL MEDICINE AND REHAB | Facility: CLINIC | Age: 72
End: 2022-01-14
Payer: MEDICARE

## 2022-01-14 DIAGNOSIS — R60.0 LOCALIZED EDEMA: ICD-10-CM

## 2022-01-14 PROCEDURE — 99213 OFFICE O/P EST LOW 20 MIN: CPT | Mod: 95

## 2022-01-14 NOTE — REASON FOR VISIT
[Home] : at home, [unfilled] , at the time of the visit. [Medical Office: ([facility])___] : at the medical office located in  [Spouse] : spouse [Verbal consent obtained from patient] : the patient, [unfilled]

## 2022-01-14 NOTE — HISTORY OF PRESENT ILLNESS
[0] : a current pain level of 0/10 [Gait Dysfunction] : gait dysfunction [Improved] : have improved [FreeTextEntry1] : \par Seen for follow up, last seen 12/2021 post discharge from  acute rehab\par Seen for f/iu today\par \par Background\par \par 71 year old male, with Hx of hyperlipidemia, chronic back pain and spinal vascular abnormalities ,s/p bilateral decompression laminectomies T10_L2, dural opening and evacuation of the intradural hematoma done on 10/7/21.Has post operative obstructive uropathy,,which responded to doxazosin\par Had Acute Rehab Treatment from 10/27 to 11/10/21 and discharge home to Physical therapy for B/L Lower extremity weakness.\par \par Televisit today with his wife to review clinical conditions and meds\par \par Both reports continued functional improvement, \par Now without without gait assistance in the home and for limited distance\par \par Reports no falls\par Now continent of urine, although still has urgency\par Bowel continent\par \par Lower extremity tingling/numbness if non progressive, felt sedated with increased dose of gabapentin, hence has continued 300mg bid and 40mg nocte\par Using Robaxin prn and discontinued cymbalta\par \par Currently attending PT finds this beneficial\par \par Reports intermittent leg selling\par Due family holiday to [place] and will be back to [place] after 3/1/22\par \par

## 2022-01-14 NOTE — ASSESSMENT
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 f/u at next visit \par --Continue physical therapy for Lower extremity muscle strengthening\par -Continue f/u with Neurosurgeon Dr Rowan, patient of request appointment to discuss result oft MRI which he stated that he just did\par \par Constipation--responsive to current bowel regimen\par esenna 2 tabs nightly, \par lactulose 30ml daily/prn\par continue dulcolax suppository daily/prn\par \par Back pain--Lidocaine patch to I82--S83 area on right side apply 12hly on an off\par \par Urinary obstruction/bladder hyperactivity--continue doxazosin and Urology f/u\par \par Follow up ion 6-8wks time in person visit\par Review of renal function, and f/u review \par

## 2022-03-11 ENCOUNTER — APPOINTMENT (OUTPATIENT)
Dept: PHYSICAL MEDICINE AND REHAB | Facility: CLINIC | Age: 72
End: 2022-03-11
Payer: MEDICARE

## 2022-03-11 VITALS
HEART RATE: 71 BPM | TEMPERATURE: 98.1 F | WEIGHT: 195 LBS | RESPIRATION RATE: 18 BRPM | BODY MASS INDEX: 27.3 KG/M2 | SYSTOLIC BLOOD PRESSURE: 137 MMHG | HEIGHT: 71 IN | DIASTOLIC BLOOD PRESSURE: 85 MMHG | OXYGEN SATURATION: 97 %

## 2022-03-11 PROCEDURE — 99214 OFFICE O/P EST MOD 30 MIN: CPT

## 2022-03-11 NOTE — REVIEW OF SYSTEMS
[Fever] : no fever [Fatigue] : no fatigue [Recent Change In Weight] : ~T no recent weight change [Eye Pain] : no eye pain [Earache] : no earache [Chest Pain] : no chest pain [Abdominal Pain] : no abdominal pain [Nausea] : no nausea [Constipation] : no constipation [Joint Pain] : no joint pain [Muscle Pain] : no muscle pain [Joint Stiffness] : no joint stiffness [Muscle Weakness] : no muscle weakness [Dizziness] : no dizziness [Fainting] : no fainting [Difficulty Walking] : no difficulty walking

## 2022-03-11 NOTE — HISTORY OF PRESENT ILLNESS
[FreeTextEntry1] : 72y/o M Hx HLD, Chronic back pain with spinal vascular abnormality s/p Laminectomy T10--L2 and intradural hematoma evacuation with resultant spinal cord syndrome\par Recieved acute rehab treatment prior to home discharge\par \par Last seen--Telehealth 1/14/22.\par F/u review with his wife today\par \par Reports further functional improvement\par Just returned from a holiday in [place], had driven to [place] with his wife for that trip\par \par Now ambulating without gait assistance, functional distance, but still has occasional loss of balance\par No falls. Recommenced PT recently.\par \par Still experiencing tingling/numbness, both feet, buttock (on prolonged sitting) and anterior thigh.\par Pain is controlled with current meds\par \par Constipation is responding to his current bowel regimen, BM once/day with senna and lactulose\par Still has occasional urge incontinence, currently on doxazosin as recommended by Urologist\par \par Reports having recent blood tests with his PCP, including micronutrient levels and plans to f/u for the results\par Has upcoming neurosurgery appointment and he plans to make a Urology f/u appointment\par No other acute issues\par \par \par

## 2022-03-11 NOTE — PHYSICAL EXAM
[Normal] : Normal gait, no clubbing or cyanosis of the fingernails, no joint swelling seen and muscle strength and tone normal [de-identified] : AAO X 3, Tone normal, Muscle strength 5/5 all groups, Reflexes 2+ b/l knee, biceps, triceps, good static balance, fair dynamic balance, able to do toe walking, some difficulty with heel walking, unable to do tandem walk, Rhomberg +ve

## 2022-03-16 ENCOUNTER — APPOINTMENT (OUTPATIENT)
Dept: NEUROSURGERY | Facility: CLINIC | Age: 72
End: 2022-03-16
Payer: MEDICARE

## 2022-03-16 VITALS
BODY MASS INDEX: 27.3 KG/M2 | HEART RATE: 71 BPM | TEMPERATURE: 97.6 F | HEIGHT: 71 IN | WEIGHT: 195 LBS | SYSTOLIC BLOOD PRESSURE: 137 MMHG | DIASTOLIC BLOOD PRESSURE: 83 MMHG

## 2022-03-16 PROCEDURE — 99215 OFFICE O/P EST HI 40 MIN: CPT

## 2022-03-16 NOTE — PHYSICAL EXAM
[General Appearance - Alert] : alert [General Appearance - In No Acute Distress] : in no acute distress [General Appearance - Well Nourished] : well nourished [Clean] : clean [Dry] : dry [Well-Healed] : well-healed [Intact] : intact [No Drainage] : without drainage [Normal Skin] : normal [Oriented To Time, Place, And Person] : oriented to person, place, and time [Impaired Insight] : insight and judgment were intact [Affect] : the affect was normal [Sensation Tactile Decrease] : light touch was intact [Sensation Pain / Temperature Decrease] : pain and temperature was intact [No Visual Abnormalities] : no visible abnormailities [No Tenderness to Palpation] : no spine tenderness on palpation [Normal] : normal [Able to toe walk] : the patient was able to toe walk [Able to heel walk] : the patient was able to heel walk [Sclera] : the sclera and conjunctiva were normal [Neck Appearance] : the appearance of the neck was normal [No Spinal Tenderness] : no spinal tenderness [Abnormal Walk] : normal gait [Involuntary Movements] : no involuntary movements were seen [Motor Tone] : muscle strength and tone were normal

## 2022-03-16 NOTE — REVIEW OF SYSTEMS
[As Noted in HPI] : as noted in HPI [Negative] : Heme/Lymph Infliximab Counseling:  I discussed with the patient the risks of infliximab including but not limited to myelosuppression, immunosuppression, autoimmune hepatitis, demyelinating diseases, lymphoma, and serious infections.  The patient understands that monitoring is required including a PPD at baseline and must alert us or the primary physician if symptoms of infection or other concerning signs are noted.

## 2022-03-17 NOTE — HISTORY OF PRESENT ILLNESS
[FreeTextEntry1] : DORIE OROSCO is a 71 year old male with PMH of hyperlipidemia, chronic back pain,who presents for follow up neurosurgical evaluation of bilateral decompression laminectomies T10_L2, dural opening and evacuation of the intradural hematoma done on 10/7/21.\par Hospital course:He presented to [facility] 10/6/21 with acute onset back pain and progressively worsening bilateral lower extremity weakness. Urgent MR imaging of the spine revealed intradural extramedullary hematoma T12-L1, S/P T10-L2 laminectomy with 1U pRBCs given intraop 10/6/21. Spinal angiogram 10/7/21 showed no vascular abnormalities.\par \par Presents accompanied by his wife in no acute distress. He is ambulating by self and states he is now back to working in his garden. Denies any headaches, n/v or vision changes. [facility] He does endorse perineal numbness and numbness down the back of his thigh to the underside of his foot. The numbness has been present there since after the surgery. He states he has leakage of urine if he does not go to the bathroom in time. He denies loss of strength, or muscle weakness, and imbalance.

## 2022-03-17 NOTE — DATA REVIEWED
[de-identified] : EXAM: MR SPINE LUMBAR WAW IC\par \par EXAM: MR SPINE THORACIC WAW IC\par \par \par PROCEDURE DATE: 12/31/2021\par \par \par \par INTERPRETATION: CLINICAL INDICATION: Follow-up for decompression of epidural hematoma and cord compression\par \par Magnetic resonance imaging of the thoracic and lumbosacral spine was carried out with sagittal surface coil imaging from T1 to S2-S3 using T1 and fast spin echo T2 weighted images with and without fat saturation technique with axial T1 and fast spin echo T2 weighted imaging on a Delma magnet. Post contrast axial and sagittal T1 weighted images were obtained. 9 cc of Gadavist were intravenously injected, 1 cc were discarded.\par \par \par Comparison is made with the prior MRI of 10/18/2021, 10/8/2021 and 10/6/2021.\par \par The visualized thoracic and lumbosacral vertebral bodies are normal in height and signal intensity. There is a grade 1 anterolisthesis of L5 on S1. A transitional vertebrae at the lumbosacral junction. Using C1 to count accurately, there is a small hypoplastic disc space present at S1-S2.\par \par There has been a prior laminectomy from T10 through L2. No residual intraspinal hemorrhage is identified. Solid dorsal enhancing postoperative changes are identified from T10 through L2. No fluid collections are identified to suggest infection. There is a ventral subdural fluid collection extending from T9-10 through L1 which has the appearance of CSF which may be due to postoperative scarring. The cord is displaced posteriorly against the dorsal dura which may be due to tethering. The cord is no longer compressed but demonstrates edema extending from T7 through the conus with maximum signal abnormality at T11-12. Since 10/18/2021 the subacute subdural hemorrhage has resolved.\par \par There is normal vascular and osseous enhancement. There is also diffuse dural enhancement which is likely inflammatory.\par \par Moderate spinal stenosis is identified at L3-4. Severe degenerative spinal stenosis is noted L4-5 with degenerative facet changes and anterolisthesis.\par \par IMPRESSION: Post T10-L2 laminectomy postoperative changes are identified. There is a ventral subdural fluid collection which may be due to scarring. The hemorrhage has completely resolved. There is posterior displacement of the cord from T10 through the conus which may be due to postoperative cord tethering. The cord is no longer compressed but demonstrates edema extending from T7 through the conus with maximal signal abnormality T11-12. Nonspecific dural enhancement.\par \par \par \par \par \par \par \par \par

## 2022-03-17 NOTE — ASSESSMENT
[FreeTextEntry1] : Patient with above history and imaging. Numbness most likely due to signal changes at T7-T12. Will need time and he has been referred to physical therapy for strengthening, core strengthening, and to decrease pain modalities. Discussed body mechanics while gardening, and taking care when walking because of numbness.\par \par Plan:\par Continue with PT\par f/u after 8 weeks of PT\par Patient knows to call the office if there are any new or worsening symptoms. \par All questions and concerns answered and addressed in detail to patient's complete satisfaction. Patient verbalized understanding and agreed to plan.\par \par

## 2022-03-31 RX ORDER — GABAPENTIN 600 MG/1
600 TABLET, COATED ORAL
Qty: 30 | Refills: 4 | Status: ACTIVE | COMMUNITY
Start: 2022-03-31 | End: 1900-01-01

## 2022-03-31 RX ORDER — GABAPENTIN 300 MG/1
300 CAPSULE ORAL TWICE DAILY
Qty: 60 | Refills: 3 | Status: ACTIVE | COMMUNITY
Start: 2022-01-14 | End: 1900-01-01

## 2022-04-03 NOTE — ED PROVIDER NOTE - DOMESTIC TRAVEL HIGH RISK QUESTION
Vijaya Eng is a 75 y o  male with PMH lung adenocarcinoma who p/w aphasia/apraxia and was found to have [address] likely 2/2 intracranial mass  Suspicious for metastatic process on imaging given additional 2 cerebellar lesions  NSg to evaluate for possible intervention on parietal mass though no surgical procedure recommended for cerebellar lesions  Would likely need SRS or additional management which he and his wife are contemplating at this time   PMH lung adenocarcinoma (s/p DAVID lobectomy and radiation now on chemo), HTN, HLD   TxF from [address] for ICH (ICH score 1 for volume)  P/w aphasia, apraxia 4/22 and found to have L parietal IPH with surrounding edema on CTH   Initial Blood Pressure: 141/84    ICH Score 1 for ICH volume > 30 cc   MRI: hemorrhagic left parietal mass w/ local sulcal effacement but no subfalcine herniation and 2 small left parietal cerebellar masses    Plan:   Continue Dexamethasone, 4 q6h   F/u CT CAP for underlying malignancy   Neurosurgery consulted, appreciate recs   Monitor for signs of ICP increase (bradycardia, HTN, neuro exam, increased tone, pupillary changes)   Monitor for post stroke edema (Peak days 3-5)   SBP goal <140   Tight glycemic control (goal glucose < 180), keep afebrile as able - Tylenol PRN   Continue SCDs for DVT ppx and avoid chemical ppx 2/2 hemorrhage   Repeat CTH if > 2 pt drop in GCS in one hour   PT/OT/Speech/PMR consults when able  [facility] management as per primary team No

## 2022-04-29 ENCOUNTER — APPOINTMENT (OUTPATIENT)
Dept: PHYSICAL MEDICINE AND REHAB | Facility: CLINIC | Age: 72
End: 2022-04-29
Payer: MEDICARE

## 2022-04-29 VITALS
HEART RATE: 86 BPM | HEIGHT: 71 IN | BODY MASS INDEX: 27.3 KG/M2 | TEMPERATURE: 97.7 F | OXYGEN SATURATION: 96 % | SYSTOLIC BLOOD PRESSURE: 130 MMHG | RESPIRATION RATE: 18 BRPM | WEIGHT: 195 LBS | DIASTOLIC BLOOD PRESSURE: 84 MMHG

## 2022-04-29 DIAGNOSIS — L60.8 OTHER NAIL DISORDERS: ICD-10-CM

## 2022-04-29 DIAGNOSIS — L60.2 ONYCHOGRYPHOSIS: ICD-10-CM

## 2022-04-29 PROCEDURE — 99214 OFFICE O/P EST MOD 30 MIN: CPT

## 2022-04-29 RX ORDER — LACTULOSE 10 G/15ML
10 SOLUTION ORAL; RECTAL TWICE DAILY
Qty: 1800 | Refills: 2 | Status: ACTIVE | COMMUNITY
Start: 2021-12-17 | End: 1900-01-01

## 2022-04-29 RX ORDER — GABAPENTIN 400 MG/1
400 CAPSULE ORAL
Qty: 30 | Refills: 2 | Status: ACTIVE | COMMUNITY
Start: 2022-01-14 | End: 1900-01-01

## 2022-04-29 RX ORDER — GABAPENTIN 600 MG/1
600 TABLET, COATED ORAL
Qty: 30 | Refills: 2 | Status: ACTIVE | COMMUNITY
Start: 2022-04-29 | End: 1900-01-01

## 2022-04-29 RX ORDER — GABAPENTIN 400 MG/1
400 CAPSULE ORAL TWICE DAILY
Qty: 60 | Refills: 2 | Status: ACTIVE | COMMUNITY
Start: 2021-12-17 | End: 1900-01-01

## 2022-04-29 NOTE — ASSESSMENT
[FreeTextEntry1] : 73 y/o M on f/u for abnormal gait, epidural spinal cord hemorrhage s/p evacuation and laminectomy\par \par Abnormal gait and impaired balance\par --continue PT\par \par \par Neuropahty--increase gabapentin from 300mg bid, 600mg qhs to 400mg bid and 600mg qhs\par discontinue cymbalta\par use metocarbamol prn\par \par \par Constipation--continue lactulose daily and can increase to 15ml bid as needed\par Overgrown toe nails--Podiatry referral given \par Urinary obstruction--resolved, c/w cardura and discuss further plan with urologist\par \par epidural spinal cord hemorrhage s/p evacuation/laminectomy--continue Neurosurgery f/u\par \par Rehab medicine f/u in 6-8wkls time

## 2022-04-29 NOTE — PHYSICAL EXAM
[FreeTextEntry1] : Comfortable \par Clear chest\par Abd soft \par Ext--no edema\par \par AAO x 3\par Reflexes 1+\par Tone normal\par MMT 5/5\par Gait--non antalgic , but difficulty with tandem gait and single leg support

## 2022-04-29 NOTE — HISTORY OF PRESENT ILLNESS
[FreeTextEntry1] : 72y/o M Hx HLD, Chronic back pain with spinal vascular abnormality s/p Laminectomy T10--L2 and intradural hematoma evacuation with resultant spinal cord syndrome\par s/p  acute rehab treatment treatment\par \par F/u review with his wife today, seen with his wife\par \par Had interval neurosurgery review--3/16/22 PT ordered with plan for f/u after PT\par Urology review with external provider, reports unremarkable bladder scan, no change in treatment, on cardura\par \par Reports continued functional improvement\par Ambulatory functional distance without gait assistance device\par Independent with ADLs\par \par Still reporting diffuse numbness of Lower extremities R> L\par Not sedatve with current dose of gabapentin\par Have not taken cymbalta for a long time\par \par Takes metocarbamol prn for muscle spasms\par Reports great benefit with on going PT\par \par Reports improved bowel function with lactulose daily, but yet to achieve normal fxn of once daily for him\par \par \par

## 2022-04-29 NOTE — REVIEW OF SYSTEMS
[Chills] : no chills [Red Eyes] : eyes not red [Abdominal Pain] : no abdominal pain [Nausea] : no nausea [Joint Pain] : no joint pain [Muscle Weakness] : no muscle weakness [Headache] : no headaches [Dizziness] : no dizziness [Fainting] : no fainting [Difficulty Walking] : no difficulty walking [FreeTextEntry9] : numbness anterior thighs and feet  [FreeTextEntry1] : overgrown toe nails, impaired dynamic support

## 2022-06-02 NOTE — PROGRESS NOTE ADULT - SUBJECTIVE AND OBJECTIVE BOX
Patient is a 71y old  Male who presents with a chief complaint of B/L LE weakness after intradural extramedullary hematoma S/P evacuation (09 Nov 2021 11:11)      24 HOUR EVENTS:  No overnight events reported.     SUBJECTIVE:  Patient seen and examined at bedside.   same pain in his right foot  kate catheter removed - he was able to void independently . continuing to monitor PVRs.    ALLERGIES:  Celebrex (Other)    MEDICATIONS  (STANDING):  ascorbic acid 500 milliGRAM(s) Oral daily  dextrose 40% Gel 15 Gram(s) Oral once  dextrose 5%. 1000 milliLiter(s) (50 mL/Hr) IV Continuous <Continuous>  dextrose 5%. 1000 milliLiter(s) (100 mL/Hr) IV Continuous <Continuous>  dextrose 50% Injectable 25 Gram(s) IV Push once  dextrose 50% Injectable 12.5 Gram(s) IV Push once  dextrose 50% Injectable 25 Gram(s) IV Push once  docusate sodium Enema 283 milliGRAM(s) Rectal daily  doxazosin 8 milliGRAM(s) Oral at bedtime  DULoxetine 30 milliGRAM(s) Oral at bedtime  enoxaparin Injectable 40 milliGRAM(s) SubCutaneous <User Schedule>  finasteride 5 milliGRAM(s) Oral daily  gabapentin 600 milliGRAM(s) Oral every 8 hours  glucagon  Injectable 1 milliGRAM(s) IntraMuscular once  influenza   Vaccine 0.5 milliLiter(s) IntraMuscular once  melatonin 6 milliGRAM(s) Oral at bedtime  methocarbamol 750 milliGRAM(s) Oral every 8 hours  multivitamin 1 Tablet(s) Oral daily  pantoprazole    Tablet 40 milliGRAM(s) Oral before breakfast  senna 2 Tablet(s) Oral every 12 hours    MEDICATIONS  (PRN):  acetaminophen     Tablet .. 650 milliGRAM(s) Oral every 6 hours PRN Mild Pain (1 - 3)  lactulose Syrup 10 Gram(s) Oral daily PRN no BM>1 day  ondansetron   Disintegrating Tablet 4 milliGRAM(s) Oral every 8 hours PRN Nausea and/or Vomiting  oxyCODONE    IR 10 milliGRAM(s) Oral every 4 hours PRN Severe Pain (7 - 10)  oxyCODONE    IR 5 milliGRAM(s) Oral every 4 hours PRN Moderate Pain (4 - 6)    Vital Signs Last 24 Hrs  T(F): 97.5 (09 Nov 2021 08:33), Max: 97.9 (08 Nov 2021 20:44)  HR: 96 (09 Nov 2021 08:33) (89 - 96)  BP: 129/85 (09 Nov 2021 08:33) (129/85 - 145/72)  RR: 17 (08 Nov 2021 20:44) (17 - 17)  SpO2: 98% (08 Nov 2021 20:44) (98% - 98%)  I&O's Summary    08 Nov 2021 07:01  -  09 Nov 2021 07:00  --------------------------------------------------------  IN: 0 mL / OUT: 3225 mL / NET: -3225 mL      PHYSICAL EXAM:  General: NAD, A/O x 3  ENT: Moist mucous membranes, no thrush  Neck: Supple, No JVD  Lungs: Clear to auscultation bilaterally, good air entry, non-labored breathing  Cardio: RRR, S1/S2, No murmur  Abdomen: Soft, Nontender, Nondistended; Bowel sounds present  Extremities: No calf tenderness, No pitting edema    LABS:                        10.4   5.06  )-----------( 228      ( 08 Nov 2021 05:05 )             32.5     11-08    139  |  103  |  13  ----------------------------<  104  4.2   |  31  |  0.88    Ca    8.5      08 Nov 2021 05:05    TPro  5.9  /  Alb  2.7  /  TBili  0.8  /  DBili  x   /  AST  14  /  ALT  37  /  AlkPhos  64  11-08        eGFR if Non African American: 86 mL/min/1.73M2 (11-08-21 @ 05:05)  eGFR if African American: 100 mL/min/1.73M2 (11-08-21 @ 05:05)                                    COVID-19 PCR: NotDetec (11-05-21 @ 07:15)  COVID-19 PCR: NotDetec (10-30-21 @ 05:45)  COVID-19 PCR: NotDetec (10-23-21 @ 19:45)  COVID-19 PCR: NotDetec (10-20-21 @ 07:11)  COVID-19 PCR: NotDetec (10-17-21 @ 12:40)    RADIOLOGY & ADDITIONAL TESTS:    Care Discussed with Consultants/Other Providers:    Detail Level: Simple Instructions: This plan will send the code FBSD to the PM system.  DO NOT or CHANGE the price. Price (Do Not Change): 0.00

## 2022-06-14 ENCOUNTER — APPOINTMENT (OUTPATIENT)
Dept: NEUROSURGERY | Facility: CLINIC | Age: 72
End: 2022-06-14
Payer: MEDICARE

## 2022-06-14 VITALS
BODY MASS INDEX: 2.66 KG/M2 | OXYGEN SATURATION: 97 % | TEMPERATURE: 98.7 F | SYSTOLIC BLOOD PRESSURE: 163 MMHG | HEART RATE: 74 BPM | HEIGHT: 71 IN | WEIGHT: 19 LBS | DIASTOLIC BLOOD PRESSURE: 74 MMHG

## 2022-06-14 PROCEDURE — 99212 OFFICE O/P EST SF 10 MIN: CPT

## 2022-06-15 ENCOUNTER — OUTPATIENT (OUTPATIENT)
Dept: OUTPATIENT SERVICES | Facility: HOSPITAL | Age: 72
LOS: 1 days | End: 2022-06-15

## 2022-06-15 ENCOUNTER — APPOINTMENT (OUTPATIENT)
Dept: ULTRASOUND IMAGING | Facility: CLINIC | Age: 72
End: 2022-06-15
Payer: MEDICARE

## 2022-06-15 DIAGNOSIS — R60.0 LOCALIZED EDEMA: ICD-10-CM

## 2022-06-15 PROCEDURE — 93970 EXTREMITY STUDY: CPT | Mod: 26

## 2022-06-17 ENCOUNTER — APPOINTMENT (OUTPATIENT)
Dept: PHYSICAL MEDICINE AND REHAB | Facility: CLINIC | Age: 72
End: 2022-06-17
Payer: MEDICARE

## 2022-06-17 VITALS — DIASTOLIC BLOOD PRESSURE: 74 MMHG | SYSTOLIC BLOOD PRESSURE: 143 MMHG

## 2022-06-17 DIAGNOSIS — K59.09 OTHER CONSTIPATION: ICD-10-CM

## 2022-06-17 PROCEDURE — 99214 OFFICE O/P EST MOD 30 MIN: CPT

## 2022-06-17 RX ORDER — DULOXETINE HYDROCHLORIDE 30 MG/1
30 CAPSULE, DELAYED RELEASE PELLETS ORAL
Qty: 30 | Refills: 3 | Status: DISCONTINUED | COMMUNITY
Start: 2021-12-17 | End: 2022-06-17

## 2022-06-17 RX ORDER — METHOCARBAMOL 750 MG/1
750 TABLET, FILM COATED ORAL 3 TIMES DAILY
Qty: 90 | Refills: 1 | Status: DISCONTINUED | COMMUNITY
Start: 2021-12-17 | End: 2022-06-17

## 2022-06-17 RX ORDER — DOXAZOSIN 8 MG/1
8 TABLET ORAL DAILY
Qty: 30 | Refills: 2 | Status: DISCONTINUED | COMMUNITY
Start: 2021-12-17 | End: 2022-06-17

## 2022-06-17 NOTE — HISTORY OF PRESENT ILLNESS
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\par s/p acute rehab treatment treatment\par \par F/u review, wife participated on phone\par \par Had interval neurosurgery review--MRI spine recommended, continued PT\par Reports Urology review for urge incontinence/UTI--commenced on Alfuzosin, cefuroxime from 6/9 and cranberry capsules\par Cardura discontinued\par \par Reports progressive numbness groin and proximal thigh, worse over ante and lateral Rt thigh\par Voiding appropriately\par \par Reports recent constipation and inability to hold once he has the urge to defecate\par Has been on lactulose\par Also feels numb over b/l feet and buttock\par \par Recently getting inj treatment for Rt big toe from Podiatry for OA\par \par Otherwise, fully ambulatory mainly withouth gait device\par On gabapentin 400mg 400mg and 600mg ,no sedation\par \par Attending PT, but will be exhausting his Medicare limit for therapy after few sessions\par

## 2022-06-17 NOTE — ASSESSMENT
[FreeTextEntry1] : 73 y/o M on f/u for abnormal gait, epidural spinal cord hemorrhage s/p evacuation and laminectomy\par \par Abnormal gait and impaired balance-much improved, now ambulating without device for most part\par --Complete remaining PT sessions and f/u Home exercise program\par \par NeuropatHy--c/w gabapentin 400/400/600mg report any drowziness\par Epidural spinal cord hemorrhage s/p evacuation/laminectomy--Neurosurgery f/u, \par Get MRI spine as planned\par \par Constipation--add metamucil and c/w lactulose daily\par OA Right big toe and foot care continue f/u with Podiatry \par \par Urinary obstruction--continue urology f/u and alfuzosin\par \par Rehab medicine f/u in 6-8wkls time. \par

## 2022-06-17 NOTE — REVIEW OF SYSTEMS
[Vision Problems] : no vision problems [Lower Ext Edema] : no lower extremity edema [Cough] : no cough [Diarrhea] : no diarrhea [Dysuria] : dysuria [Difficulty Walking] : difficulty walking [Anxiety] : no anxiety [Depression] : depression [de-identified] : numbness over legs R>L

## 2022-06-17 NOTE — PHYSICAL EXAM
[FreeTextEntry1] : Comfortable \par Clear chest\par Abd soft \par Ext--no edema\par \par AAO x 3\par Reflexes 1+\par Tone normal\par MMT 5/5\par Gait--non antalgic , but difficulty with tandem gait and single leg support.

## 2022-06-21 NOTE — PHYSICAL EXAM
[General Appearance - Alert] : alert [General Appearance - In No Acute Distress] : in no acute distress [Well-Healed] : well-healed [Oriented To Time, Place, And Person] : oriented to person, place, and time [Impaired Insight] : insight and judgment were intact [Person] : oriented to person [Place] : oriented to place [Time] : oriented to time [Short Term Intact] : short term memory intact [Remote Intact] : remote memory intact [Span Intact] : the attention span was normal [Concentration Intact] : normal concentrating ability [Fluency] : fluency intact [Comprehension] : comprehension intact [Current Events] : adequate knowledge of current events [Past History] : adequate knowledge of personal past history [Vocabulary] : adequate range of vocabulary [Cranial Nerves Oculomotor (III)] : extraocular motion intact [Cranial Nerves Trigeminal (V)] : facial sensation intact symmetrically [Cranial Nerves Facial (VII)] : face symmetrical [Cranial Nerves Vestibulocochlear (VIII)] : hearing was intact bilaterally [Cranial Nerves Glossopharyngeal (IX)] : tongue and palate midline [Cranial Nerves Accessory (XI - Cranial And Spinal)] : head turning and shoulder shrug symmetric [Cranial Nerves Hypoglossal (XII)] : there was no tongue deviation with protrusion [Motor Tone] : muscle tone was normal in all four extremities [Motor Strength] : muscle strength was normal in all four extremities [No Muscle Atrophy] : normal bulk in all four extremities [Sensation Tactile Decrease] : light touch was intact [Balance] : balance was intact [2+] : Patella left 2+ [Extraocular Movements] : extraocular movements were intact [Outer Ear] : the ears and nose were normal in appearance [Neck Appearance] : the appearance of the neck was normal [] : no respiratory distress [Respiration, Rhythm And Depth] : normal respiratory rhythm and effort [Exaggerated Use Of Accessory Muscles For Inspiration] : no accessory muscle use [Heart Rate And Rhythm] : heart rate was normal and rhythm regular [Abnormal Walk] : normal gait [Involuntary Movements] : no involuntary movements were seen [Skin Color & Pigmentation] : normal skin color and pigmentation [Skin Turgor] : normal skin turgor [Past-pointing] : there was no past-pointing [Tremor] : no tremor present [FreeTextEntry8] : 5/5 motor strength throughout

## 2022-06-21 NOTE — HISTORY OF PRESENT ILLNESS
[FreeTextEntry1] : DORIE OROSCO is status post hematoma\par Bilateral decompression laminectomies T10-L2 for evacuation of the intradural hematoma and he is here for a follow up visit. \par Surgery Date: 10/7/21 \par Patient accompanied by spouse. \par \par DORIE OROSCO is a 72 year old male with PMH of hyperlipidemia, chronic back pain,who presents for follow up  neurosurgical evaluation of bilateral decompression laminectomies T10_L2, dural opening and evacuation of the intradural hematoma done on 10/7/21.\par Hospital course:\par He presented to [facility]21 with acute onset back pain and progressively worsening bilateral lower extremity weakness. Urgent MR imaging of the spine revealed intradural extramedullary hematoma T12-L1, S/P T10-L2 laminectomy with 1U pRBCs given intraop 10/6/21. Spinal angiogram 10/7/21 showed no vascular abnormalities.\par \par Presents accompanied by his wife in no acute distress. Denies any headaches, n/v or vision changes. Patient is currently involved in physical therapy at home with significant improvement of his UE and LE strength. He does endorse perineal numbness to the right thigh and left thigh. He also states he has pain in the region just described. Right leg strength is strong, bilateral feet numb. Bilateral lower extremity edema persists R>L. He denies calf pain and negative Homans sign. \par He denies urinary incontinence but does state his urine control is dependent on how quickly he can get to the bathroom. He also admits to having some loss of bowel control. He can feel the sensation approaching. \par Venous dopplers negative for blood clot. \par \par \par Plan: MRI thoracic and lumbar spine w wo \par Bilateral venous dopplers \par Continue Gabapentin, possible change to Lyrica- at the discretion of pain doctor. \par

## 2022-06-23 ENCOUNTER — OUTPATIENT (OUTPATIENT)
Dept: OUTPATIENT SERVICES | Facility: HOSPITAL | Age: 72
LOS: 1 days | End: 2022-06-23

## 2022-06-23 ENCOUNTER — APPOINTMENT (OUTPATIENT)
Dept: MRI IMAGING | Facility: CLINIC | Age: 72
End: 2022-06-23

## 2022-06-23 DIAGNOSIS — G95.19 OTHER VASCULAR MYELOPATHIES: ICD-10-CM

## 2022-06-23 PROCEDURE — 72157 MRI CHEST SPINE W/O & W/DYE: CPT | Mod: 26

## 2022-06-23 PROCEDURE — 72158 MRI LUMBAR SPINE W/O & W/DYE: CPT | Mod: 26

## 2022-07-05 ENCOUNTER — APPOINTMENT (OUTPATIENT)
Dept: NEUROSURGERY | Facility: CLINIC | Age: 72
End: 2022-07-05

## 2022-07-05 VITALS
OXYGEN SATURATION: 98 % | WEIGHT: 193 LBS | BODY MASS INDEX: 27.02 KG/M2 | DIASTOLIC BLOOD PRESSURE: 77 MMHG | TEMPERATURE: 98.7 F | SYSTOLIC BLOOD PRESSURE: 132 MMHG | HEIGHT: 71 IN | HEART RATE: 81 BPM

## 2022-07-05 PROCEDURE — 99213 OFFICE O/P EST LOW 20 MIN: CPT

## 2022-07-05 NOTE — DATA REVIEWED
[de-identified] : EXAM: [id number] - MR SPINE LUMBAR WAW IC - ORDERED BY: BEBETO ROBERTS\par \par EXAM: [id number] - MR SPINE THORACIC WAW IC - ORDERED BY: BEBETO ROBERTS\par \par \par PROCEDURE DATE: 06/23/2022\par \par \par \par INTERPRETATION: MR thoracic and lumbar with and without gadolinium\par \par CLINICAL INDICATION: Thoracic spinal stenosis.\par \par TECHNIQUE: Sagittal and axial T1-weighted images, sagittal STIR images, and sagittal and axial T2-weighted images of the thoracic spine were obtained. Following 9 cc of Gadavist administration intravenously/1 cc discarded , sagittal and axial T1-weighted fat-saturated images were obtained.\par \par FINDINGS: MRI thoracic and lumbar spine dated 12/31/2021 available for review.\par \par Thoracic and lumbar vertebral alignment is significant for grade 1 anterospondylolisthesis at L4-5 on a degenerative basis.. Images again demonstrate multiple laminectomies at T10-L3. Thoracic and lumbar vertebral body heights are maintained. Marrow signal intensity within thoracic vertebral bodies and posterior elements is unremarkable. There is dural enhancement and thickening consistent with prior surgery. No osseous expansion, epidural disease or paraspinal abnormality is found.\par \par Thoracic and lumbar intervertebral discs demonstrate multilevel degenerative disc disease and spondylosis with loss of disc height and signal within the nucleus pulposus. Small RIGHT paracentral osteophytic ridge/disc complex at T3-4 and tiny central disc herniation at T4-5 and T5-6 without cord impingement. Disc bulges are noted at L1-2 through L5-S1 with flattening of the ventral thecal sac and narrowing of the BILATERAL neural foramina. Moderate central stenosis at L3-4 and severe central stenosis at L4-5 on a degenerative basis due to disc bulge, facet osteophytic hypertrophy and redundancy of the ligamentum flavum. Moderate facet osteophytic hypertrophy also noted at L5-S1.\par \par \par The spinal canal demonstrates multiple areas of subarachnoid adhesions resulting in asymmetric pockets of CSF predominantly posterolaterally at theT3 level and inferiorly as well as ventrally at T10-L2. These pockets result in mass effect and regions of tethering of the thoracic cord and conus medullaris, relatively unchanged. The thoracic cord demonstrates abnormal T2 weighted signal throughout the cord, increased from prior examination and extending from the T4 level inferiorly into the conus medullaris, which may reflect ischemia or gliosis from prior cord compression.\par \par Multiple hepatic cysts are noted.\par \par \par IMPRESSION: The spinal canal demonstrates multiple areas of subarachnoid adhesions resulting in asymmetric pockets of CSF predominantly posterolaterally at theT3 level and inferiorly as well as ventrally at T10-L2. These pockets result in mass effect and regions of tethering of the thoracic cord and conus medullaris, relatively unchanged. The thoracic cord demonstrates abnormal T2 weighted signal throughout the cord, increased from prior examination and extending from the T4 level inferiorly into the conus medullaris, which may reflect cord ischemia or gliosis from prior cord compression.\par \par \par Multilevel degenerative disc disease and spondylosis. Small RIGHT paracentral osteophytic ridge/disc complex at T3-4 and tiny central disc herniation at T4-5 and T5-6 without cord impingement. Disc bulges are noted at L1-2 through L5-S1 with flattening of the ventral thecal sac and narrowing of the BILATERAL neural foramina. Moderate central stenosis at L3-4 and severe central stenosis at L4-5 on a degenerative basis. Moderate facet osteophytic hypertrophy also noted at L5-S1.\par

## 2022-07-05 NOTE — HISTORY OF PRESENT ILLNESS
[FreeTextEntry1] : DORIE OROSCO is a 72 year old male with PMH of hyperlipidemia, chronic back pain, who presents for follow up neurosurgical evaluation of MRI results after bilateral decompressive laminectomies T10-L2 for evacuation of intradural hematoma. \par TO review: he is status post hematoma Bilateral decompression laminectomies T10-L2 for evacuation of the intradural hematoma and he is here for a follow up visit. \par Surgery Date: 10/7/21. who presents for follow up neurosurgical evaluation of bilateral decompression laminectomies T10_L2, dural opening and evacuation of the intradural hematoma done on 10/7/21.\par Hospital course: He presented to [facility] 10/6/21 with acute onset back pain and progressively worsening bilateral lower extremity weakness. Urgent MR imaging of the spine revealed intradural extramedullary hematoma T12-L1, S/P T10-L2 laminectomy with 1U pRBCs given intraop 10/6/21. Spinal angiogram 10/7/21 showed no vascular abnormalities. Venous dopplers negative for blood clot. \par \par Presents accompanied by his wife in no acute distress. Denies any headaches, n/v or vision changes. He endorses perineal numbness to the right thigh and left thigh. Right leg strength is strong, bilateral feet numb. He denies calf pain and negative Homans sign. He denies urinary incontinence but does state his urine control is dependent on how quickly he can get to the bathroom. He can feel the sensation approaching. He continues on Gabapentin.\par \par

## 2022-07-05 NOTE — PHYSICAL EXAM
[General Appearance - Alert] : alert [General Appearance - In No Acute Distress] : in no acute distress [General Appearance - Well Nourished] : well nourished [Oriented To Time, Place, And Person] : oriented to person, place, and time [Impaired Insight] : insight and judgment were intact [Affect] : the affect was normal [Motor Tone] : muscle tone was normal in all four extremities [Motor Strength] : muscle strength was normal in all four extremities [Sensation Tactile Decrease] : light touch was intact [Sensation Pain / Temperature Decrease] : pain and temperature was intact [Limited Balance] : the patient's balance was impaired [2+] : Ankle jerk left 2+ [No Visual Abnormalities] : no visible abnormailities [No Tenderness to Palpation] : no spine tenderness on palpation [Sclera] : the sclera and conjunctiva were normal [PERRL With Normal Accommodation] : pupils were equal in size, round, reactive to light, with normal accommodation [Extraocular Movements] : extraocular movements were intact [Neck Appearance] : the appearance of the neck was normal [] : no respiratory distress [No Spinal Tenderness] : no spinal tenderness [Abnormal Walk] : normal gait [Involuntary Movements] : no involuntary movements were seen [Plantar Reflex Right Only] : normal on the right [Plantar Reflex Left Only] : normal on the left [___] : absent on the right [___] : absent on the left [FreeTextEntry8] : a

## 2022-07-05 NOTE — ASSESSMENT
[FreeTextEntry1] : Patient with above history and imaging. Patient states he feels like he is walking on balloons, and he is unable to walk for long distances because of the pain in his feet. Advised that we could  offer an L4-L5 laminectomy to help with the walking but cannot guarantee that the numbness will go away as he does have injury and cysts to the spinal cord in the thoracic region. Risk v. benefits of surgery were discussed. Pt and wife would like to think about it before making a decision. \par \par Plan:\par Possible Surgery. Patient will call if he wants to proceed.\par Patient knows to call the office if there are any new or worsening symptoms. \par All questions and concerns answered and addressed in detail to patient's complete satisfaction. Patient verbalized understanding and agreed to plan.\par \par

## 2022-07-18 RX ORDER — GABAPENTIN 600 MG/1
600 TABLET, COATED ORAL
Qty: 30 | Refills: 3 | Status: ACTIVE | COMMUNITY
Start: 2022-07-18 | End: 1900-01-01

## 2022-07-18 RX ORDER — GABAPENTIN 400 MG/1
400 CAPSULE ORAL TWICE DAILY
Qty: 60 | Refills: 3 | Status: ACTIVE | COMMUNITY
Start: 2022-07-18 | End: 1900-01-01

## 2022-07-22 NOTE — ED PROVIDER NOTE - NSCAREINITIATED _GEN_ER
Chitra Ellis(Attending) Discharge Planning Assessment  [facility]     Patient Name: Kandace Andrade  MRN: [id number]  Today's Date: 7/22/2022    Admit Date: 7/19/2022     Discharge Needs Assessment    No documentation.                Discharge Plan     Row Name 07/22/22 1108       Plan    Plan Discharge Home with [facility]    Final Discharge Disposition Code 06 - home with home health care    Final Note Patient has discharge orders and will be going with family support and [facility]. Norma FAIRCHILD              Continued Care and Services - Discharged on 7/22/2022 Admission date: 7/19/2022 - Discharge disposition: Home or Self Care    Home Medical Care     Service Provider Request Status Selected Services Address Phone Fax Patient Preferred    [facility]  Accepted N/A [address] [facility] [id number] [phone] --    [facility]  Pending - No Request Sent N/A [id number] [address]  [facility] [address] [address] [phone] --    [facility]  Declined N/A [address] [place] [facility] [address] [facility] [id number] [phone] --    [facility]  Declined  capacity for payor N/A [address] [id number] [phone] --    [facility]  Declined  unable to staff N/A [address] [facility] [address] [id number] [phone] --              Expected Discharge Date and Time     Expected Discharge Date Expected Discharge Time    Jul 21, 2022          Demographic Summary    No documentation.                Functional Status    No documentation.                Psychosocial    No documentation.                Abuse/Neglect    No documentation.                Legal    No documentation.                Substance Abuse    No documentation.                Patient Forms    No documentation.                   Emperatriz Diaz, RN

## 2022-08-05 ENCOUNTER — APPOINTMENT (OUTPATIENT)
Dept: PHYSICAL MEDICINE AND REHAB | Facility: CLINIC | Age: 72
End: 2022-08-05

## 2022-08-05 VITALS
TEMPERATURE: 97 F | BODY MASS INDEX: 27.02 KG/M2 | HEIGHT: 71 IN | HEART RATE: 79 BPM | DIASTOLIC BLOOD PRESSURE: 64 MMHG | WEIGHT: 193 LBS | OXYGEN SATURATION: 99 % | RESPIRATION RATE: 16 BRPM | SYSTOLIC BLOOD PRESSURE: 132 MMHG

## 2022-08-05 DIAGNOSIS — B35.1 TINEA UNGUIUM: ICD-10-CM

## 2022-08-05 DIAGNOSIS — Z74.09 OTHER REDUCED MOBILITY: ICD-10-CM

## 2022-08-05 PROCEDURE — 99214 OFFICE O/P EST MOD 30 MIN: CPT

## 2022-08-05 RX ORDER — CICLOPIROX OLAMINE 7.7 MG/ML
0.77 SUSPENSION TOPICAL
Qty: 1 | Refills: 1 | Status: ACTIVE | COMMUNITY
Start: 2022-08-05 | End: 1900-01-01

## 2022-08-05 RX ORDER — NYSTATIN 100000 [USP'U]/ML
SUSPENSION ORAL
Refills: 0 | Status: ACTIVE | COMMUNITY

## 2022-08-05 RX ORDER — GABAPENTIN 400 MG/1
400 CAPSULE ORAL 3 TIMES DAILY
Qty: 90 | Refills: 2 | Status: ACTIVE | COMMUNITY
Start: 2022-08-05 | End: 1900-01-01

## 2022-08-05 NOTE — ASSESSMENT
[FreeTextEntry1] : 73 y/o M on f/u for abnormal gait, neuroapthy, epidural spinal cord hemorrhage s/p evacuation and laminectomy\par \par Abnormal gait and impaired balance--significantly improved, not requiring any device\par continue home exercise program\par \par NeuropatHy- reduce  gabapentin  from 400/400/600mg  to 400mg tid\par Epidural spinal cord hemorrhage s/p evacuation/laminectomy--Neurosurgery f/u, \par and further discussion regarding recent spinal MRI\par \par Constipation--C/W metamucil and  lactulose daily\par OA Right big toe and foot care continue f/u with Podiatry \par \par Urinary obstruction--continue urology f/u and alfuzosin\par \par Left thumB tineal unguim---ciclopirox topical drops ordered f/u with PCP \par Discharged from clinic, can be referred as needed\par \par \par \par RECENT MRI\par MRI: EXAM: [id number] - MR SPINE LUMBAR WAW IC - ORDERED BY: BEBETO ROBERTS\par EXAM: [id number] - MR SPINE THORACIC WAW IC - ORDERED BY: BEBETO ROBERTS\par \par PROCEDURE DATE: 06/23/2022\par \par DATE: 06/23/2022\par \par INTERPRETATION: MR thoracic and lumbar with and without gadolinium\par \par CLINICAL INDICATION: Thoracic spinal stenosis.\par \par TECHNIQUE: Sagittal and axial T1-weighted images, sagittal STIR images, and sagittal and axial T2-weighted images of the thoracic spine were obtained. Following 9 cc of Gadavist administration intravenously/1 cc discarded , sagittal and axial T1-weighted fat-saturated images were obtained.\par \par FINDINGS: MRI thoracic and lumbar spine dated 12/31/2021 available for review.\par \par Thoracic and lumbar vertebral alignment is significant for grade 1 anterospondylolisthesis at L4-5 on a degenerative basis.. Images again demonstrate multiple laminectomies at T10-L3. Thoracic and lumbar vertebral body heights are maintained. Marrow signal intensity within thoracic vertebral bodies and posterior elements is unremarkable. There is dural enhancement and thickening consistent with prior surgery. No osseous expansion, epidural disease or paraspinal abnormality is found.\par \par Thoracic and lumbar intervertebral discs demonstrate multilevel degenerative disc disease and spondylosis with loss of disc height and signal within the nucleus pulposus. Small RIGHT paracentral osteophytic ridge/disc complex at T3-4 and tiny central disc herniation at T4-5 and T5-6 without cord impingement. Disc bulges are noted at L1-2 through L5-S1 with flattening of the ventral thecal sac and narrowing of the BILATERAL neural foramina. Moderate central stenosis at L3-4 and severe central stenosis at L4-5 on a degenerative basis due to disc bulge, facet osteophytic hypertrophy and redundancy of the ligamentum flavum. Moderate facet osteophytic hypertrophy also noted at L5-S1.\par \par The spinal canal demonstrates multiple areas of subarachnoid adhesions resulting in asymmetric pockets of CSF predominantly posterolaterally at theT3 level and inferiorly as well as ventrally at T10-L2. These pockets result in mass effect and regions of tethering of the thoracic cord and conus medullaris, relatively unchanged. The thoracic cord demonstrates abnormal T2 weighted signal throughout the cord, increased from prior examination and extending from the T4 level inferiorly into the conus medullaris, which may reflect ischemia or gliosis from prior cord compression.\par \par Multiple hepatic cysts are noted.\par \par IMPRESSION: The spinal canal demonstrates multiple areas of subarachnoid adhesions resulting in asymmetric pockets of CSF predominantly posterolaterally at theT3 level and inferiorly as well as ventrally at T10-L2. These pockets result in mass effect and regions of tethering of the thoracic cord and conus medullaris, relatively unchanged. The thoracic cord demonstrates abnormal T2 weighted signal throughout the cord, increased from prior examination and extending from the T4 level inferiorly into the conus medullaris, which may reflect cord ischemia or gliosis from prior cord compression.\par Multilevel degenerative disc disease and spondylosis. Small RIGHT paracentral osteophytic ridge/disc complex at T3-4 and tiny central disc herniation at T4-5 and T5-6 without cord impingement. Disc bulges are noted at L1-2 through L5-S1 with flattening of the ventral thecal sac and narrowing of the BILATERAL neural foramina. Moderate central stenosis at L3-4 and severe central stenosis at L4-5 on a degenerative basis. Moderate facet osteophytic hypertrophy also noted at L5-S1.\par  \par

## 2022-08-05 NOTE — REVIEW OF SYSTEMS
[Shortness Of Breath] : no shortness of breath [Cough] : no cough [Nausea] : no nausea [Joint Stiffness] : no joint stiffness [Headache] : no headaches [Dizziness] : no dizziness [FreeTextEntry9] : Discolored nail left thumb

## 2022-08-05 NOTE — PHYSICAL EXAM
[FreeTextEntry1] : No acute distress, normal RR on R/A\par Clear chest\par Abd soft \par Ext--no edema\par Discolored left thumb nail\par \par AAO x 3\par Reflexes 1+\par Tone normal\par MMT 5/5\par Gait--non antalgic , able to do toe and heel walk \par \par  \par

## 2022-09-06 ENCOUNTER — APPOINTMENT (OUTPATIENT)
Dept: NEUROSURGERY | Facility: CLINIC | Age: 72
End: 2022-09-06

## 2022-09-06 VITALS
OXYGEN SATURATION: 97 % | WEIGHT: 195 LBS | HEIGHT: 71 IN | TEMPERATURE: 98.3 F | SYSTOLIC BLOOD PRESSURE: 146 MMHG | DIASTOLIC BLOOD PRESSURE: 86 MMHG | HEART RATE: 68 BPM | BODY MASS INDEX: 27.3 KG/M2

## 2022-09-06 DIAGNOSIS — M54.9 DORSALGIA, UNSPECIFIED: ICD-10-CM

## 2022-09-06 DIAGNOSIS — G89.29 DORSALGIA, UNSPECIFIED: ICD-10-CM

## 2022-09-06 DIAGNOSIS — G62.9 POLYNEUROPATHY, UNSPECIFIED: ICD-10-CM

## 2022-09-06 PROCEDURE — 99212 OFFICE O/P EST SF 10 MIN: CPT

## 2023-01-26 NOTE — PATIENT PROFILE ADULT - PUBLIC BENEFITS
Initial Anesthesia Post-op Note    Patient: Darnell Neely  Procedure(s) Performed: TONSILLECTOMY AND ADENOIDECTOMY - BILATERAL  Anesthesia type: General    Vitals Value Taken Time   Temp 36.7 01/26/23 0743   Pulse 137 01/26/23 0743   Resp 28 01/26/23 0743   SpO2 99 01/26/23 0743    62 01/26/23 0743         Patient Location: PACU Phase 1  Post-op Vital Signs:stable  Level of Consciousness: responds to stimulation  Respiratory Status: oral airway, face mask and spontaneous ventilation  Cardiovascular stable  Hydration: euvolemic  Pain Management: well controlled  Handoff: Handoff to receiving clinician was performed and questions were answered  Vomiting: none  Nausea: None  Airway Patency:patent  Post-op Assessment: no complications, patient tolerated procedure well with no complications, no evidence of recall, dentition within defined limits and No Corneal Abrasion      No notable events documented.   no

## 2023-05-01 ENCOUNTER — APPOINTMENT (OUTPATIENT)
Dept: MRI IMAGING | Facility: CLINIC | Age: 73
End: 2023-05-01

## 2023-05-01 ENCOUNTER — OUTPATIENT (OUTPATIENT)
Dept: OUTPATIENT SERVICES | Facility: HOSPITAL | Age: 73
LOS: 1 days | End: 2023-05-01
Payer: MEDICARE

## 2023-05-01 DIAGNOSIS — G95.19 OTHER VASCULAR MYELOPATHIES: ICD-10-CM

## 2023-05-01 PROCEDURE — 72146 MRI CHEST SPINE W/O DYE: CPT | Mod: 26

## 2023-05-23 ENCOUNTER — APPOINTMENT (OUTPATIENT)
Dept: NEUROSURGERY | Facility: CLINIC | Age: 73
End: 2023-05-23
Payer: MEDICARE

## 2023-05-23 VITALS
HEIGHT: 71 IN | SYSTOLIC BLOOD PRESSURE: 130 MMHG | DIASTOLIC BLOOD PRESSURE: 79 MMHG | OXYGEN SATURATION: 96 % | WEIGHT: 195 LBS | TEMPERATURE: 97.9 F | BODY MASS INDEX: 27.3 KG/M2 | HEART RATE: 74 BPM

## 2023-05-23 PROCEDURE — 99213 OFFICE O/P EST LOW 20 MIN: CPT

## 2023-08-31 DIAGNOSIS — Z92.89 PERSONAL HISTORY OF OTHER MEDICAL TREATMENT: ICD-10-CM

## 2023-08-31 DIAGNOSIS — I65.29 OCCLUSION AND STENOSIS OF UNSPECIFIED CAROTID ARTERY: ICD-10-CM

## 2023-08-31 DIAGNOSIS — I77.9 DISORDER OF ARTERIES AND ARTERIOLES, UNSPECIFIED: ICD-10-CM

## 2023-08-31 DIAGNOSIS — Z82.49 FAMILY HISTORY OF ISCHEMIC HEART DISEASE AND OTHER DISEASES OF THE CIRCULATORY SYSTEM: ICD-10-CM

## 2023-08-31 DIAGNOSIS — I45.10 UNSPECIFIED RIGHT BUNDLE-BRANCH BLOCK: ICD-10-CM

## 2023-08-31 DIAGNOSIS — Z78.9 OTHER SPECIFIED HEALTH STATUS: ICD-10-CM

## 2023-08-31 RX ORDER — IRBESARTAN 300 MG/1
300 TABLET ORAL
Refills: 0 | Status: ACTIVE | COMMUNITY

## 2023-08-31 RX ORDER — SIMVASTATIN 20 MG/1
20 TABLET, FILM COATED ORAL
Refills: 0 | Status: ACTIVE | COMMUNITY

## 2023-08-31 RX ORDER — GABAPENTIN 300 MG/1
300 CAPSULE ORAL
Refills: 0 | Status: ACTIVE | COMMUNITY

## 2023-10-23 NOTE — PROGRESS NOTE ADULT - PROVIDER SPECIALTY LIST ADULT
Hospitalist
Hospitalist
Rehab Medicine
Rehab Medicine
Hospitalist
Hospitalist
Rehab Medicine
Hospitalist
Physiatry
Physiatry
Rehab Medicine
Hospitalist
Physiatry
Rehab Medicine
Urology
Hospitalist
Rehab Medicine
Rehab Medicine
Urology
Rehab Medicine
Urology
mupirocin 2% topical ointment: Apply topically to affected area 2 times a day

## 2024-02-16 NOTE — OCCUPATIONAL THERAPY INITIAL EVALUATION ADULT - LEVEL OF INDEPENDENCE: SUPINE/SIT, REHAB EVAL
,[email or web address],DirectAddress_Unknown,DirectAddress_Unknown not observed.  Pt in chair.

## 2024-03-25 ENCOUNTER — OUTPATIENT (OUTPATIENT)
Dept: OUTPATIENT SERVICES | Facility: HOSPITAL | Age: 74
LOS: 1 days | End: 2024-03-25

## 2024-03-25 ENCOUNTER — APPOINTMENT (OUTPATIENT)
Dept: MRI IMAGING | Facility: CLINIC | Age: 74
End: 2024-03-25
Payer: MEDICARE

## 2024-03-25 DIAGNOSIS — G95.19 OTHER VASCULAR MYELOPATHIES: ICD-10-CM

## 2024-03-25 PROCEDURE — 72146 MRI CHEST SPINE W/O DYE: CPT | Mod: 26

## 2024-03-28 ENCOUNTER — NON-APPOINTMENT (OUTPATIENT)
Age: 74
End: 2024-03-28

## 2024-04-01 ENCOUNTER — APPOINTMENT (OUTPATIENT)
Dept: NEUROSURGERY | Facility: CLINIC | Age: 74
End: 2024-04-01
Payer: MEDICARE

## 2024-04-01 VITALS
TEMPERATURE: 97.7 F | HEART RATE: 72 BPM | BODY MASS INDEX: 27.3 KG/M2 | HEIGHT: 71 IN | WEIGHT: 195 LBS | SYSTOLIC BLOOD PRESSURE: 180 MMHG | DIASTOLIC BLOOD PRESSURE: 100 MMHG | OXYGEN SATURATION: 95 %

## 2024-04-01 DIAGNOSIS — Z98.890 OTHER SPECIFIED POSTPROCEDURAL STATES: ICD-10-CM

## 2024-04-01 PROCEDURE — 99214 OFFICE O/P EST MOD 30 MIN: CPT

## 2024-04-01 NOTE — PHYSICAL EXAM
[General Appearance - Alert] : alert [General Appearance - Well Nourished] : well nourished [General Appearance - In No Acute Distress] : in no acute distress [Oriented To Time, Place, And Person] : oriented to person, place, and time [Impaired Insight] : insight and judgment were intact [Affect] : the affect was normal [Motor Tone] : muscle tone was normal in all four extremities [Motor Strength] : muscle strength was normal in all four extremities [Sensation Tactile Decrease] : light touch was intact [Limited Balance] : the patient's balance was impaired [Sensation Pain / Temperature Decrease] : pain and temperature was intact [2+] : Ankle jerk left 2+ [No Visual Abnormalities] : no visible abnormailities [No Tenderness to Palpation] : no spine tenderness on palpation [Sclera] : the sclera and conjunctiva were normal [PERRL With Normal Accommodation] : pupils were equal in size, round, reactive to light, with normal accommodation [Extraocular Movements] : extraocular movements were intact [Neck Appearance] : the appearance of the neck was normal [] : no respiratory distress [No Spinal Tenderness] : no spinal tenderness [Abnormal Walk] : normal gait [Involuntary Movements] : no involuntary movements were seen [Plantar Reflex Left Only] : normal on the left [Plantar Reflex Right Only] : normal on the right [___] : absent on the left [___] : absent on the right

## 2024-04-01 NOTE — ASSESSMENT
[FreeTextEntry1] : Patient with above history and imaging. Patient states he feels like he is walking on balloons, and he is unable to walk for long distances because of the pain in his feet.  He continues to endorse gait instability.  No new falls.    Plan: Recommend to see Dr. Jewell for consideration of spinal angiogram to f/u on intradural hematoma.  Patient knows to call the office if there are any new or worsening symptoms.  All questions and concerns answered and addressed in detail to patient's complete satisfaction. Patient verbalized understanding and agreed to plan.

## 2024-04-01 NOTE — HISTORY OF PRESENT ILLNESS
[FreeTextEntry1] : DORIE OROSCO is a 72 year old male with PMH of hyperlipidemia, chronic back pain, who presents for follow up neurosurgical evaluation of MRI results after bilateral decompressive laminectomies T10-L2 for evacuation of intradural hematoma. \par  TO review: he is status post hematoma Bilateral decompression laminectomies T10-L2 for evacuation of the intradural hematoma and he is here for a follow up visit. \par  Surgery Date: 10/7/21. who presents for follow up neurosurgical evaluation of bilateral decompression laminectomies T10_L2, dural opening and evacuation of the intradural hematoma done on 10/7/21.\par  Hospital course: He presented to [facility] 10/6/21 with acute onset back pain and progressively worsening bilateral lower extremity weakness. Urgent MR imaging of the spine revealed intradural extramedullary hematoma T12-L1, S/P T10-L2 laminectomy with 1U pRBCs given intraop 10/6/21. Spinal angiogram 10/7/21 showed no vascular abnormalities. Venous dopplers negative for blood clot. \par  \par  Presents accompanied by his wife in no acute distress. Denies any headaches, n/v or vision changes. He endorses perineal numbness to the right thigh and left thigh. Right leg strength is strong, bilateral feet numb. He denies calf pain and negative Homans sign. He denies urinary incontinence but does state his urine control is dependent on how quickly he can get to the bathroom. He can feel the sensation approaching. He continues on Gabapentin.\par  \par

## 2024-04-01 NOTE — DATA REVIEWED
[de-identified] : EXAM: [id number] - MR SPINE LUMBAR WAW IC - ORDERED BY: BEBETO ROBERTS\par  \par  EXAM: [id number] - MR SPINE THORACIC WAW IC - ORDERED BY: BEBETO ROBERTS\par  \par  \par  PROCEDURE DATE: 06/23/2022\par  \par  \par  \par  INTERPRETATION: MR thoracic and lumbar with and without gadolinium\par  \par  CLINICAL INDICATION: Thoracic spinal stenosis.\par  \par  TECHNIQUE: Sagittal and axial T1-weighted images, sagittal STIR images, and sagittal and axial T2-weighted images of the thoracic spine were obtained. Following 9 cc of Gadavist administration intravenously/1 cc discarded , sagittal and axial T1-weighted fat-saturated images were obtained.\par  \par  FINDINGS: MRI thoracic and lumbar spine dated 12/31/2021 available for review.\par  \par  Thoracic and lumbar vertebral alignment is significant for grade 1 anterospondylolisthesis at L4-5 on a degenerative basis.. Images again demonstrate multiple laminectomies at T10-L3. Thoracic and lumbar vertebral body heights are maintained. Marrow signal intensity within thoracic vertebral bodies and posterior elements is unremarkable. There is dural enhancement and thickening consistent with prior surgery. No osseous expansion, epidural disease or paraspinal abnormality is found.\par  \par  Thoracic and lumbar intervertebral discs demonstrate multilevel degenerative disc disease and spondylosis with loss of disc height and signal within the nucleus pulposus. Small RIGHT paracentral osteophytic ridge/disc complex at T3-4 and tiny central disc herniation at T4-5 and T5-6 without cord impingement. Disc bulges are noted at L1-2 through L5-S1 with flattening of the ventral thecal sac and narrowing of the BILATERAL neural foramina. Moderate central stenosis at L3-4 and severe central stenosis at L4-5 on a degenerative basis due to disc bulge, facet osteophytic hypertrophy and redundancy of the ligamentum flavum. Moderate facet osteophytic hypertrophy also noted at L5-S1.\par  \par  \par  The spinal canal demonstrates multiple areas of subarachnoid adhesions resulting in asymmetric pockets of CSF predominantly posterolaterally at theT3 level and inferiorly as well as ventrally at T10-L2. These pockets result in mass effect and regions of tethering of the thoracic cord and conus medullaris, relatively unchanged. The thoracic cord demonstrates abnormal T2 weighted signal throughout the cord, increased from prior examination and extending from the T4 level inferiorly into the conus medullaris, which may reflect ischemia or gliosis from prior cord compression.\par  \par  Multiple hepatic cysts are noted.\par  \par  \par  IMPRESSION: The spinal canal demonstrates multiple areas of subarachnoid adhesions resulting in asymmetric pockets of CSF predominantly posterolaterally at theT3 level and inferiorly as well as ventrally at T10-L2. These pockets result in mass effect and regions of tethering of the thoracic cord and conus medullaris, relatively unchanged. The thoracic cord demonstrates abnormal T2 weighted signal throughout the cord, increased from prior examination and extending from the T4 level inferiorly into the conus medullaris, which may reflect cord ischemia or gliosis from prior cord compression.\par  \par  \par  Multilevel degenerative disc disease and spondylosis. Small RIGHT paracentral osteophytic ridge/disc complex at T3-4 and tiny central disc herniation at T4-5 and T5-6 without cord impingement. Disc bulges are noted at L1-2 through L5-S1 with flattening of the ventral thecal sac and narrowing of the BILATERAL neural foramina. Moderate central stenosis at L3-4 and severe central stenosis at L4-5 on a degenerative basis. Moderate facet osteophytic hypertrophy also noted at L5-S1.\par

## 2024-04-03 ENCOUNTER — OUTPATIENT (OUTPATIENT)
Dept: OUTPATIENT SERVICES | Facility: HOSPITAL | Age: 74
LOS: 1 days | End: 2024-04-03

## 2024-04-03 ENCOUNTER — APPOINTMENT (OUTPATIENT)
Dept: CT IMAGING | Facility: CLINIC | Age: 74
End: 2024-04-03
Payer: MEDICARE

## 2024-04-03 DIAGNOSIS — I62.03 NONTRAUMATIC CHRONIC SUBDURAL HEMORRHAGE: ICD-10-CM

## 2024-04-03 PROCEDURE — 70450 CT HEAD/BRAIN W/O DYE: CPT | Mod: 26

## 2024-04-04 ENCOUNTER — APPOINTMENT (OUTPATIENT)
Dept: NEUROSURGERY | Facility: CLINIC | Age: 74
End: 2024-04-04
Payer: MEDICARE

## 2024-04-04 ENCOUNTER — NON-APPOINTMENT (OUTPATIENT)
Age: 74
End: 2024-04-04

## 2024-04-04 VITALS
HEART RATE: 64 BPM | HEIGHT: 71 IN | BODY MASS INDEX: 27.3 KG/M2 | WEIGHT: 195 LBS | OXYGEN SATURATION: 97 % | TEMPERATURE: 97.9 F | DIASTOLIC BLOOD PRESSURE: 89 MMHG | SYSTOLIC BLOOD PRESSURE: 153 MMHG

## 2024-04-04 DIAGNOSIS — R26.9 UNSPECIFIED ABNORMALITIES OF GAIT AND MOBILITY: ICD-10-CM

## 2024-04-04 DIAGNOSIS — G95.19 OTHER VASCULAR MYELOPATHIES: ICD-10-CM

## 2024-04-04 PROCEDURE — 99214 OFFICE O/P EST MOD 30 MIN: CPT

## 2024-04-04 NOTE — PHYSICAL EXAM
[FreeTextEntry1] : Constitutional: NAD Neuro * Mental Status:  GCS 15: Awake, alert, oriented to conversation. No aphasia or difficulty speaking. No dysarthria. * Cranial Nerves: Cnii-Cnxii grossly intact. EOMI, tongue midline, no gaze deviation, no facial droop * Motor: b/l UE intact, RLE intact, LLE HF 4+/5, KF 4/5 * Sensory: b/l feet numb (subjective) * Reflexes: + Romberg  Cardiovascular: Regular rate and rhythm. Eyes: See neurologic examination with detailed examination of eyes. Respiratory: non labored breathing Musculoskeletal: No muscle wasting noted Skin: grossly intact

## 2024-04-04 NOTE — ASSESSMENT
[FreeTextEntry1] : 74M with PMH HLD, chronic back pain who was found to have spontaneous intradural hematoma T12-L1 and then underwent T10-L2 b/l decompressive laminectomies on 10/7/21. He followed up with Dr. Barrios and was referred to Dr. Jewell for possible repeat spinal angiogram.  Plan:  - Spinal angiogram to evaluate for source of hemorrhage, tentatively planned for May 21, 2024 - PST, medical clearance prior to procedure - Patient and family in agreement with plan

## 2024-04-04 NOTE — HISTORY OF PRESENT ILLNESS
[FreeTextEntry1] : DENIS OROSCO is a 72 year old male with PMH of hyperlipidemia, chronic back pain, who presents for follow up neurosurgical evaluation of MRI results after bilateral decompressive laminectomies T10-L2 for evacuation of intradural hematoma. TO review: he is status post hematoma Bilateral decompression laminectomies T10-L2 for evacuation of the intradural hematoma. Surgery Date: 10/7/21. He presents for follow up neurosurgical evaluation of bilateral decompression laminectomies T10-L2, dural opening and evacuation of the intradural hematoma done on 10/7/21. Hospital course: He presented to [facility] 10/6/21 with acute onset back pain and progressively worsening bilateral lower extremity weakness. Urgent MR imaging of the spine revealed intradural extramedullary hematoma T12-L1, S/P T10-L2 laminectomy with 1U pRBCs given intraop 10/6/21. Spinal angiogram 10/7/21 showed no vascular abnormalities. Venous dopplers negative for blood clot.  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 There are varying degrees of mass effect upon the cord with stable central cord edema extending from T5 into the conus medullaris. Laminectomies noted at T10 and extending inferiorly. Since prior spinal angiogram was negative, consider coagulopathy as a cause of repetitive intrathecal subarachnoid hemorrhages."  He saw Dr. Porfirio Barrios on 4/1/24 for follow up. He has not had repeat spinal angiograms to r/o vascular source of spontaneous intradural hematoma. He was referred by Dr. Barrios for consideration of repeat spinal angiogram given MRI findings. He admits to perineal numbness to the right thigh and left thigh. Right leg strength is strong, bilateral feet numb. He denies urinary incontinence but does state his urine control is dependent on how quickly he can get to the bathroom. He can feel the sensation approaching.   PCP Denis Doll

## 2024-05-07 ENCOUNTER — RESULT REVIEW (OUTPATIENT)
Age: 74
End: 2024-05-07

## 2024-05-07 ENCOUNTER — OUTPATIENT (OUTPATIENT)
Dept: OUTPATIENT SERVICES | Facility: HOSPITAL | Age: 74
LOS: 1 days | End: 2024-05-07
Payer: MEDICARE

## 2024-05-07 VITALS
RESPIRATION RATE: 18 BRPM | OXYGEN SATURATION: 98 % | TEMPERATURE: 98 F | DIASTOLIC BLOOD PRESSURE: 70 MMHG | HEART RATE: 84 BPM | WEIGHT: 195.77 LBS | HEIGHT: 71 IN | SYSTOLIC BLOOD PRESSURE: 110 MMHG

## 2024-05-07 DIAGNOSIS — I62.03 NONTRAUMATIC CHRONIC SUBDURAL HEMORRHAGE: ICD-10-CM

## 2024-05-07 DIAGNOSIS — Z98.890 OTHER SPECIFIED POSTPROCEDURAL STATES: Chronic | ICD-10-CM

## 2024-05-07 DIAGNOSIS — I10 ESSENTIAL (PRIMARY) HYPERTENSION: ICD-10-CM

## 2024-05-07 DIAGNOSIS — Z01.818 ENCOUNTER FOR OTHER PREPROCEDURAL EXAMINATION: ICD-10-CM

## 2024-05-07 DIAGNOSIS — G47.33 OBSTRUCTIVE SLEEP APNEA (ADULT) (PEDIATRIC): ICD-10-CM

## 2024-05-07 DIAGNOSIS — Z91.89 OTHER SPECIFIED PERSONAL RISK FACTORS, NOT ELSEWHERE CLASSIFIED: ICD-10-CM

## 2024-05-07 LAB
A1C WITH ESTIMATED AVERAGE GLUCOSE RESULT: 5.3 % — SIGNIFICANT CHANGE UP (ref 4–5.6)
ALBUMIN SERPL ELPH-MCNC: 4.3 G/DL — SIGNIFICANT CHANGE UP (ref 3.3–5.2)
ALP SERPL-CCNC: 58 U/L — SIGNIFICANT CHANGE UP (ref 40–120)
ALT FLD-CCNC: 29 U/L — SIGNIFICANT CHANGE UP
ANION GAP SERPL CALC-SCNC: 9 MMOL/L — SIGNIFICANT CHANGE UP (ref 5–17)
APTT BLD: 36.7 SEC — HIGH (ref 24.5–35.6)
AST SERPL-CCNC: 26 U/L — SIGNIFICANT CHANGE UP
BASOPHILS # BLD AUTO: 0.03 K/UL — SIGNIFICANT CHANGE UP (ref 0–0.2)
BASOPHILS NFR BLD AUTO: 0.5 % — SIGNIFICANT CHANGE UP (ref 0–2)
BILIRUB SERPL-MCNC: 1.6 MG/DL — SIGNIFICANT CHANGE UP (ref 0.4–2)
BLD GP AB SCN SERPL QL: SIGNIFICANT CHANGE UP
BUN SERPL-MCNC: 23.8 MG/DL — HIGH (ref 8–20)
CALCIUM SERPL-MCNC: 9.3 MG/DL — SIGNIFICANT CHANGE UP (ref 8.4–10.5)
CHLORIDE SERPL-SCNC: 104 MMOL/L — SIGNIFICANT CHANGE UP (ref 96–108)
CO2 SERPL-SCNC: 27 MMOL/L — SIGNIFICANT CHANGE UP (ref 22–29)
CREAT SERPL-MCNC: 0.73 MG/DL — SIGNIFICANT CHANGE UP (ref 0.5–1.3)
EGFR: 95 ML/MIN/1.73M2 — SIGNIFICANT CHANGE UP
EOSINOPHIL # BLD AUTO: 0.09 K/UL — SIGNIFICANT CHANGE UP (ref 0–0.5)
EOSINOPHIL NFR BLD AUTO: 1.5 % — SIGNIFICANT CHANGE UP (ref 0–6)
ESTIMATED AVERAGE GLUCOSE: 105 MG/DL — SIGNIFICANT CHANGE UP (ref 68–114)
GLUCOSE SERPL-MCNC: 102 MG/DL — HIGH (ref 70–99)
HCT VFR BLD CALC: 40.8 % — SIGNIFICANT CHANGE UP (ref 39–50)
HGB BLD-MCNC: 13.9 G/DL — SIGNIFICANT CHANGE UP (ref 13–17)
IMM GRANULOCYTES NFR BLD AUTO: 0.3 % — SIGNIFICANT CHANGE UP (ref 0–0.9)
INR BLD: 1.01 RATIO — SIGNIFICANT CHANGE UP (ref 0.85–1.18)
LYMPHOCYTES # BLD AUTO: 1.77 K/UL — SIGNIFICANT CHANGE UP (ref 1–3.3)
LYMPHOCYTES # BLD AUTO: 28.9 % — SIGNIFICANT CHANGE UP (ref 13–44)
MAGNESIUM SERPL-MCNC: 2.1 MG/DL — SIGNIFICANT CHANGE UP (ref 1.6–2.6)
MCHC RBC-ENTMCNC: 30.5 PG — SIGNIFICANT CHANGE UP (ref 27–34)
MCHC RBC-ENTMCNC: 34.1 GM/DL — SIGNIFICANT CHANGE UP (ref 32–36)
MCV RBC AUTO: 89.7 FL — SIGNIFICANT CHANGE UP (ref 80–100)
MONOCYTES # BLD AUTO: 0.49 K/UL — SIGNIFICANT CHANGE UP (ref 0–0.9)
MONOCYTES NFR BLD AUTO: 8 % — SIGNIFICANT CHANGE UP (ref 2–14)
NEUTROPHILS # BLD AUTO: 3.73 K/UL — SIGNIFICANT CHANGE UP (ref 1.8–7.4)
NEUTROPHILS NFR BLD AUTO: 60.8 % — SIGNIFICANT CHANGE UP (ref 43–77)
PLATELET # BLD AUTO: 168 K/UL — SIGNIFICANT CHANGE UP (ref 150–400)
POTASSIUM SERPL-MCNC: 3.8 MMOL/L — SIGNIFICANT CHANGE UP (ref 3.5–5.3)
POTASSIUM SERPL-SCNC: 3.8 MMOL/L — SIGNIFICANT CHANGE UP (ref 3.5–5.3)
PROT SERPL-MCNC: 6.6 G/DL — SIGNIFICANT CHANGE UP (ref 6.6–8.7)
PROTHROM AB SERPL-ACNC: 11.2 SEC — SIGNIFICANT CHANGE UP (ref 9.5–13)
RBC # BLD: 4.55 M/UL — SIGNIFICANT CHANGE UP (ref 4.2–5.8)
RBC # FLD: 13.3 % — SIGNIFICANT CHANGE UP (ref 10.3–14.5)
SODIUM SERPL-SCNC: 139 MMOL/L — SIGNIFICANT CHANGE UP (ref 135–145)
WBC # BLD: 6.13 K/UL — SIGNIFICANT CHANGE UP (ref 3.8–10.5)
WBC # FLD AUTO: 6.13 K/UL — SIGNIFICANT CHANGE UP (ref 3.8–10.5)

## 2024-05-07 PROCEDURE — 86901 BLOOD TYPING SEROLOGIC RH(D): CPT

## 2024-05-07 PROCEDURE — 85610 PROTHROMBIN TIME: CPT

## 2024-05-07 PROCEDURE — 83036 HEMOGLOBIN GLYCOSYLATED A1C: CPT

## 2024-05-07 PROCEDURE — 85025 COMPLETE CBC W/AUTO DIFF WBC: CPT

## 2024-05-07 PROCEDURE — 80053 COMPREHEN METABOLIC PANEL: CPT

## 2024-05-07 PROCEDURE — 85730 THROMBOPLASTIN TIME PARTIAL: CPT

## 2024-05-07 PROCEDURE — 83735 ASSAY OF MAGNESIUM: CPT

## 2024-05-07 PROCEDURE — 71046 X-RAY EXAM CHEST 2 VIEWS: CPT | Mod: 26

## 2024-05-07 PROCEDURE — 71046 X-RAY EXAM CHEST 2 VIEWS: CPT

## 2024-05-07 PROCEDURE — 87640 STAPH A DNA AMP PROBE: CPT

## 2024-05-07 PROCEDURE — 87641 MR-STAPH DNA AMP PROBE: CPT

## 2024-05-07 PROCEDURE — 93010 ELECTROCARDIOGRAM REPORT: CPT

## 2024-05-07 PROCEDURE — G0463: CPT

## 2024-05-07 PROCEDURE — 36415 COLL VENOUS BLD VENIPUNCTURE: CPT

## 2024-05-07 PROCEDURE — 93005 ELECTROCARDIOGRAM TRACING: CPT

## 2024-05-07 PROCEDURE — 86850 RBC ANTIBODY SCREEN: CPT

## 2024-05-07 PROCEDURE — 86900 BLOOD TYPING SEROLOGIC ABO: CPT

## 2024-05-07 RX ORDER — SODIUM CHLORIDE 9 MG/ML
3 INJECTION INTRAMUSCULAR; INTRAVENOUS; SUBCUTANEOUS ONCE
Refills: 0 | Status: DISCONTINUED | OUTPATIENT
Start: 2024-05-21 | End: 2024-06-04

## 2024-05-07 NOTE — H&P PST ADULT - NSICDXPASTSURGICALHX_GEN_ALL_CORE_FT
PAST SURGICAL HISTORY:  History of lumbar laminectomy for spinal cord decompression     Status post Mohs surgery

## 2024-05-07 NOTE — H&P PST ADULT - HISTORY OF PRESENT ILLNESS
75 y/o male with PMH of HLD and chronic back pain presents to PST with complaints of needing spinal angiogram.     72 year old male with PMH of hyperlipidemia, chronic back pain, who presents for follow up neurosurgical evaluation of MRI results after bilateral decompressive laminectomies T10-L2 for evacuation of intradural hematoma.  TO review: he is status post hematoma Bilateral decompression laminectomies T10-L2 for evacuation of the intradural hematoma.  Surgery Date: 10/7/21. He presents for follow up neurosurgical evaluation of bilateral decompression laminectomies T10-L2, dural opening and evacuation of the intradural hematoma done on 10/7/21.  Hospital course: He presented to [facility] 10/6/21 with acute onset back pain and progressively worsening bilateral lower extremity weakness. Urgent MR imaging of the spine revealed intradural extramedullary hematoma T12-L1, S/P T10-L2 laminectomy with 1U pRBCs given intraop 10/6/21. Spinal angiogram 10/7/21 showed no vascular abnormalities. Venous dopplers negative for blood clot.  ?  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 There are varying degrees of mass effect upon the cord with stable central cord edema extending from T5 into the conus medullaris. Laminectomies noted at T10 and extending inferiorly. Since prior spinal angiogram was negative, consider coagulopathy as a cause of repetitive intrathecal subarachnoid hemorrhages."  ?  He saw Dr. Porfirio Barrios on 4/1/24 for follow up. He has not had repeat spinal angiograms to r/o vascular source of spontaneous intradural hematoma. He was referred by Dr. Barrios for consideration of repeat spinal angiogram given MRI findings. He admits to perineal numbness to the right thigh and left thigh. Right leg strength is strong, bilateral feet numb. He denies urinary incontinence but does state his urine control is dependent on how quickly he can get to the bathroom. He can feel the sensation approaching.   75 y/o male with PMH of HLD, HTN, melanoma and chronic back pain presents to PST with complaints of needing spinal angiogram. He initially presented to [facility] on 10/6/24 with complaints of acute onset back pain and progressively worsening bilateral lower extremity weakness. At that time he had an urgent MRI which showed intradural extramedullary hematoma T12-L1, S/P T10-L2 laminectomy with 1U pRBCs given intraop 10/6/21. He is s/p 10/7/2024 hematoma Bilateral decompression laminectomies T10-L2 for evacuation of the intradural hematoma. Spinal angiogram done on 10/7/21 showed no vascular abnormalities and Venous dopplers negative for blood clot. 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 He currently reports B/L feet numbness, and numbness on right leg that he feels is getting progressively worse. Reports intermittent right knee and foot pain, described as sharp, 3/10 in severity, worse with nothing, relieved with nothing. Denies urinary or bowel incontinence. Patient is scheduled for spinal angiogram on 5/21/24 with Dr. Jewell.  75 y/o male with PMH of HLD, HTN, melanoma and chronic back pain presents to PST with complaints of needing spinal angiogram. He initially presented to [facility] on 10/6/24 with complaints of acute onset back pain and progressively worsening bilateral lower extremity weakness. At that time he had an urgent MRI which showed intradural extramedullary hematoma T12-L1, S/P T10-L2 laminectomy with 1U PRBCs given intraop 10/6/21. He is s/p 10/7/2024 hematoma Bilateral decompression laminectomies T10-L2 for evacuation of the intradural hematoma. Spinal angiogram done on 10/7/21 showed no vascular abnormalities and Venous dopplers negative for blood clot. 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 He currently reports B/L feet numbness, and numbness on right leg that he feels is getting progressively worse. Reports intermittent right knee and foot pain, described as sharp, 3/10 in severity, worse with nothing, relieved with nothing. Denies urinary or bowel incontinence. Patient is scheduled for spinal angiogram on 5/21/24 with Dr. Jewell.

## 2024-05-07 NOTE — H&P PST ADULT - PROBLEM SELECTOR PLAN 2
Caprini Score, at risk, surgical team to order appropriate VTE prophylaxis Caprini Score 9, at high risk, surgical team to order appropriate VTE prophylaxis

## 2024-05-07 NOTE — H&P PST ADULT - ASSESSMENT
Patient educated on surgical scrub, preadmission instructions, medical clearance and day of procedure medications, verbalizes understanding. Pt instructed to stop vitamins/supplements/herbal medications/ASA/NSAIDS for one week prior to surgery and discuss with PMD.    CAPRINI SCORE    AGE RELATED RISK FACTORS                                                             [ ] Age 41-60 years                                            (1 Point)  [ ] Age: 61-74 years                                           (2 Points)                 [ ] Age= 75 years                                                (3 Points)             DISEASE RELATED RISK FACTORS                                                       [ ] Edema in the lower extremities                 (1 Point)                     [ ] Varicose veins                                               (1 Point)                                 [ ] BMI > 25 Kg/m2                                            (1 Point)                                  [ ] Serious infection (ie PNA)                            (1 Point)                     [ ] Lung disease ( COPD, Emphysema)            (1 Point)                                                                          [ ] Acute myocardial infarction                         (1 Point)                  [ ] Congestive heart failure (in the previous month)  (1 Point)         [ ] Inflammatory bowel disease                            (1 Point)                  [ ] Central venous access, PICC or Port               (2 points)       (within the last month)                                                                [ ] Stroke (in the previous month)                        (5 Points)    [ ] Previous or present malignancy                       (2 points)                                                                                                                                                         HEMATOLOGY RELATED FACTORS                                                         [ ] Prior episodes of VTE                                     (3 Points)                     [ ] Positive family history for VTE                      (3 Points)                  [ ] Prothrombin 67322 A                                     (3 Points)                     [ ] Factor V Leiden                                                (3 Points)                        [ ] Lupus anticoagulants                                      (3 Points)                                                           [ ] Anticardiolipin antibodies                              (3 Points)                                                       [ ] High homocysteine in the blood                   (3 Points)                                             [ ] Other congenital or acquired thrombophilia      (3 Points)                                                [ ] Heparin induced thrombocytopenia                  (3 Points)                                        MOBILITY RELATED FACTORS  [ ] Bed rest                                                         (1 Point)  [ ] Plaster cast                                                    (2 points)  [ ] Bed bound for more than 72 hours           (2 Points)    GENDER SPECIFIC FACTORS  [ ] Pregnancy or had a baby within the last month   (1 Point)  [ ] Post-partum < 6 weeks                                   (1 Point)  [ ] Hormonal therapy  or oral contraception   (1 Point)  [ ] History of pregnancy complications              (1 point)  [ ] Unexplained or recurrent              (1 Point)    OTHER RISK FACTORS                                           (1 Point)  [ ] BMI >40, smoking, diabetes requiring insulin, chemotherapy  blood transfusions and length of surgery over 2 hours    SURGERY RELATED RISK FACTORS  [ ]  Section within the last month     (1 Point)  [ ] Minor surgery                                                  (1 Point)  [ ] Arthroscopic surgery                                       (2 Points)  [ ] Planned major surgery lasting more            (2 Points)      than 45 minutes     [ ] Elective hip or knee joint replacement       (5 points)       surgery                                                TRAUMA RELATED RISK FACTORS  [ ] Fracture of the hip, pelvis, or leg                       (5 Points)  [ ] Spinal cord injury resulting in paralysis             (5 points)       (in the previous month)    [ ] Paralysis  (less than 1 month)                             (5 Points)  [ ] Multiple Trauma within 1 month                        (5 Points)    Total Score [        ]    Caprini Score 0-2: Low Risk, NO VTE prophylaxis required for most patients, encourage ambulation  Caprini Score 3-6: Moderate Risk , pharmacologic VTE prophylaxis is indicated for most patients (in the absence of contraindications)  Caprini Score Greater than or =7: High risk, pharmocologic VTE prophylaxis indicated for most patients (in the absence of contraindications)    OPIOID RISK TOOL    YESSICA EACH BOX THAT APPLIES AND ADD TOTALS AT THE END    FAMILY HISTORY OF SUBSTANCE ABUSE                 FEMALE         MALE                                                Alcohol                             [  ]1 pt          [  ]3pts                                               Illegal Durgs                     [  ]2 pts        [  ]3pts                                               Rx Drugs                           [  ]4 pts        [  ]4 pts    PERSONAL HISTORY OF SUBSTANCE ABUSE                                                                                          Alcohol                             [  ]3 pts       [  ]3 pts                                               Illegal Drugs                     [  ]4 pts        [  ]4 pts                                               Rx Drugs                           [  ]5 pts        [  ]5 pts    AGE BETWEEN 16-45 YEARS                                      [  ]1 pt         [  ]1 pt    HISTORY OF PREADOLESCENT   SEXUAL ABUSE                                                             [  ]3 pts        [  ]0pts    PSYCHOLOGICAL DISEASE                     ADD, OCD, Bipolar, Schizophrenia        [  ]2 pts         [  ]2 pts                      Depression                                               [  ]1 pt           [  ]1 pt           SCORING TOTAL   (add numbers and type here)              (***)                                     A score of 3 or lower indicated LOW risk for future opioid abuse  A score of 4 to 7 indicated moderate risk for future opioid abuse  A score of 8 or higher indicates a high risk for opioid abuse   75 y/o male with PMH of HLD, HTN, melanoma and chronic back pain presents to PST with complaints of needing spinal angiogram. He initially presented to [facility] on 10/6/24 with complaints of acute onset back pain and progressively worsening bilateral lower extremity weakness. At that time he had an urgent MRI which showed intradural extramedullary hematoma T12-L1, S/P T10-L2 laminectomy with 1U pRBCs given intraop 10/6/21. He is s/p 10/7/2024 hematoma Bilateral decompression laminectomies T10-L2 for evacuation of the intradural hematoma. Spinal angiogram done on 10/7/21 showed no vascular abnormalities and Venous dopplers negative for blood clot. 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 He currently reports B/L feet numbness, and numbness on right leg that he feels is getting progressively worse. Reports intermittent right knee and foot pain, described as sharp, 3/10 in severity, worse with nothing, relieved with nothing. Denies urinary or bowel incontinence. Patient is scheduled for spinal angiogram on 24 with Dr. Jewell.     Patient educated on surgical scrub, preadmission instructions, medical clearance and day of procedure medications, verbalizes understanding. Pt instructed to stop vitamins/supplements/herbal medications/ASA/NSAIDS for one week prior to surgery and discuss with PMD.    CAPRINI SCORE    AGE RELATED RISK FACTORS                                                             [ ] Age 41-60 years                                            (1 Point)  [ ] Age: 61-74 years                                           (2 Points)                 [ ] Age= 75 years                                                (3 Points)             DISEASE RELATED RISK FACTORS                                                       [ ] Edema in the lower extremities                 (1 Point)                     [ ] Varicose veins                                               (1 Point)                                 [ ] BMI > 25 Kg/m2                                            (1 Point)                                  [ ] Serious infection (ie PNA)                            (1 Point)                     [ ] Lung disease ( COPD, Emphysema)            (1 Point)                                                                          [ ] Acute myocardial infarction                         (1 Point)                  [ ] Congestive heart failure (in the previous month)  (1 Point)         [ ] Inflammatory bowel disease                            (1 Point)                  [ ] Central venous access, PICC or Port               (2 points)       (within the last month)                                                                [ ] Stroke (in the previous month)                        (5 Points)    [ ] Previous or present malignancy                       (2 points)                                                                                                                                                         HEMATOLOGY RELATED FACTORS                                                         [ ] Prior episodes of VTE                                     (3 Points)                     [ ] Positive family history for VTE                      (3 Points)                  [ ] Prothrombin 73188 A                                     (3 Points)                     [ ] Factor V Leiden                                                (3 Points)                        [ ] Lupus anticoagulants                                      (3 Points)                                                           [ ] Anticardiolipin antibodies                              (3 Points)                                                       [ ] High homocysteine in the blood                   (3 Points)                                             [ ] Other congenital or acquired thrombophilia      (3 Points)                                                [ ] Heparin induced thrombocytopenia                  (3 Points)                                        MOBILITY RELATED FACTORS  [ ] Bed rest                                                         (1 Point)  [ ] Plaster cast                                                    (2 points)  [ ] Bed bound for more than 72 hours           (2 Points)    GENDER SPECIFIC FACTORS  [ ] Pregnancy or had a baby within the last month   (1 Point)  [ ] Post-partum < 6 weeks                                   (1 Point)  [ ] Hormonal therapy  or oral contraception   (1 Point)  [ ] History of pregnancy complications              (1 point)  [ ] Unexplained or recurrent              (1 Point)    OTHER RISK FACTORS                                           (1 Point)  [ ] BMI >40, smoking, diabetes requiring insulin, chemotherapy  blood transfusions and length of surgery over 2 hours    SURGERY RELATED RISK FACTORS  [ ]  Section within the last month     (1 Point)  [ ] Minor surgery                                                  (1 Point)  [ ] Arthroscopic surgery                                       (2 Points)  [ ] Planned major surgery lasting more            (2 Points)      than 45 minutes     [ ] Elective hip or knee joint replacement       (5 points)       surgery                                                TRAUMA RELATED RISK FACTORS  [ ] Fracture of the hip, pelvis, or leg                       (5 Points)  [ ] Spinal cord injury resulting in paralysis             (5 points)       (in the previous month)    [ ] Paralysis  (less than 1 month)                             (5 Points)  [ ] Multiple Trauma within 1 month                        (5 Points)    Total Score [        ]    Caprini Score 0-2: Low Risk, NO VTE prophylaxis required for most patients, encourage ambulation  Caprini Score 3-6: Moderate Risk , pharmacologic VTE prophylaxis is indicated for most patients (in the absence of contraindications)  Caprini Score Greater than or =7: High risk, pharmocologic VTE prophylaxis indicated for most patients (in the absence of contraindications)    OPIOID RISK TOOL    YESSICA EACH BOX THAT APPLIES AND ADD TOTALS AT THE END    FAMILY HISTORY OF SUBSTANCE ABUSE                 FEMALE         MALE                                                Alcohol                             [  ]1 pt          [  ]3pts                                               Illegal Durgs                     [  ]2 pts        [  ]3pts                                               Rx Drugs                           [  ]4 pts        [  ]4 pts    PERSONAL HISTORY OF SUBSTANCE ABUSE                                                                                          Alcohol                             [  ]3 pts       [  ]3 pts                                               Illegal Drugs                     [  ]4 pts        [  ]4 pts                                               Rx Drugs                           [  ]5 pts        [  ]5 pts    AGE BETWEEN 16-45 YEARS                                      [  ]1 pt         [  ]1 pt    HISTORY OF PREADOLESCENT   SEXUAL ABUSE                                                             [  ]3 pts        [  ]0pts    PSYCHOLOGICAL DISEASE                     ADD, OCD, Bipolar, Schizophrenia        [  ]2 pts         [  ]2 pts                      Depression                                               [  ]1 pt           [  ]1 pt           SCORING TOTAL   (add numbers and type here)              (***)                                     A score of 3 or lower indicated LOW risk for future opioid abuse  A score of 4 to 7 indicated moderate risk for future opioid abuse  A score of 8 or higher indicates a high risk for opioid abuse   73 y/o male with PMH of HLD, HTN, melanoma and chronic back pain presents to PST with complaints of needing spinal angiogram. He initially presented to [facility] on 10/6/24 with complaints of acute onset back pain and progressively worsening bilateral lower extremity weakness. At that time he had an urgent MRI which showed intradural extramedullary hematoma T12-L1, S/P T10-L2 laminectomy with 1U PRBCs given intraop 10/6/21. He is s/p 10/7/2024 hematoma Bilateral decompression laminectomies T10-L2 for evacuation of the intradural hematoma. Spinal angiogram done on 10/7/21 showed no vascular abnormalities and Venous dopplers negative for blood clot. 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 He currently reports B/L feet numbness, and numbness on right leg that he feels is getting progressively worse. Reports intermittent right knee and foot pain, described as sharp, 3/10 in severity, worse with nothing, relieved with nothing. Denies urinary or bowel incontinence. Patient is scheduled for spinal angiogram on 24 with Dr. Jewell. Patient educated on surgical scrub, preadmission instructions, medical clearance and day of procedure medications, verbalizes understanding. Pt instructed to stop vitamins/supplements/herbal medications/NSAIDS for one week prior to surgery and discuss with PMD.    CAPRINI SCORE    AGE RELATED RISK FACTORS                                                             [ ] Age 41-60 years                                            (1 Point)  [ ] Age: 61-74 years                                           (2 Points)                 [ x] Age= 75 years                                                (3 Points)             DISEASE RELATED RISK FACTORS                                                       [ ] Edema in the lower extremities                 (1 Point)                     [ ] Varicose veins                                               (1 Point)                                 [x ] BMI > 25 Kg/m2                                            (1 Point)                                  [ ] Serious infection (ie PNA)                            (1 Point)                     [ ] Lung disease ( COPD, Emphysema)            (1 Point)                                                                          [ ] Acute myocardial infarction                         (1 Point)                  [ ] Congestive heart failure (in the previous month)  (1 Point)         [ ] Inflammatory bowel disease                            (1 Point)                  [ ] Central venous access, PICC or Port               (2 points)       (within the last month)                                                                [ ] Stroke (in the previous month)                        (5 Points)    [x ] Previous or present malignancy                       (2 points)                                                                                                                                                         HEMATOLOGY RELATED FACTORS                                                         [ ] Prior episodes of VTE                                     (3 Points)                     [ ] Positive family history for VTE                      (3 Points)                  [ ] Prothrombin 49952 A                                     (3 Points)                     [ ] Factor V Leiden                                                (3 Points)                        [ ] Lupus anticoagulants                                      (3 Points)                                                           [ ] Anticardiolipin antibodies                              (3 Points)                                                       [ ] High homocysteine in the blood                   (3 Points)                                             [ ] Other congenital or acquired thrombophilia      (3 Points)                                                [ ] Heparin induced thrombocytopenia                  (3 Points)                                        MOBILITY RELATED FACTORS  [ ] Bed rest                                                         (1 Point)  [ ] Plaster cast                                                    (2 points)  [ ] Bed bound for more than 72 hours           (2 Points)    GENDER SPECIFIC FACTORS  [ ] Pregnancy or had a baby within the last month   (1 Point)  [ ] Post-partum < 6 weeks                                   (1 Point)  [ ] Hormonal therapy  or oral contraception   (1 Point)  [ ] History of pregnancy complications              (1 point)  [ ] Unexplained or recurrent              (1 Point)    OTHER RISK FACTORS                                           (1 Point)  [x ] BMI >40, smoking, diabetes requiring insulin, chemotherapy  blood transfusions and length of surgery over 2 hours    SURGERY RELATED RISK FACTORS  [ ]  Section within the last month     (1 Point)  [ ] Minor surgery                                                  (1 Point)  [ ] Arthroscopic surgery                                       (2 Points)  [x ] Planned major surgery lasting more            (2 Points)      than 45 minutes     [ ] Elective hip or knee joint replacement       (5 points)       surgery                                                TRAUMA RELATED RISK FACTORS  [ ] Fracture of the hip, pelvis, or leg                       (5 Points)  [ ] Spinal cord injury resulting in paralysis             (5 points)       (in the previous month)    [ ] Paralysis  (less than 1 month)                             (5 Points)  [ ] Multiple Trauma within 1 month                        (5 Points)    Total Score [     9   ]    Caprini Score 0-2: Low Risk, NO VTE prophylaxis required for most patients, encourage ambulation  Caprini Score 3-6: Moderate Risk , pharmacologic VTE prophylaxis is indicated for most patients (in the absence of contraindications)  Caprini Score Greater than or =7: High risk, pharmocologic VTE prophylaxis indicated for most patients (in the absence of contraindications)    OPIOID RISK TOOL    YESSICA EACH BOX THAT APPLIES AND ADD TOTALS AT THE END    FAMILY HISTORY OF SUBSTANCE ABUSE                 FEMALE         MALE                                                Alcohol                             [  ]1 pt          [  ]3pts                                               Illegal Durgs                     [  ]2 pts        [  ]3pts                                               Rx Drugs                           [  ]4 pts        [  ]4 pts    PERSONAL HISTORY OF SUBSTANCE ABUSE                                                                                          Alcohol                             [  ]3 pts       [  ]3 pts                                               Illegal Drugs                     [  ]4 pts        [  ]4 pts                                               Rx Drugs                           [  ]5 pts        [  ]5 pts    AGE BETWEEN 16-45 YEARS                                      [  ]1 pt         [  ]1 pt    HISTORY OF PREADOLESCENT   SEXUAL ABUSE                                                             [  ]3 pts        [  ]0pts    PSYCHOLOGICAL DISEASE                     ADD, OCD, Bipolar, Schizophrenia        [  ]2 pts         [  ]2 pts                      Depression                                               [  ]1 pt           [  ]1 pt           SCORING TOTAL   (add numbers and type here)              (*0**)                                     A score of 3 or lower indicated LOW risk for future opioid abuse  A score of 4 to 7 indicated moderate risk for future opioid abuse  A score of 8 or higher indicates a high risk for opioid abuse

## 2024-05-07 NOTE — H&P PST ADULT - GENERAL
EXAM DESCRIPTION:  Roman Single View10/4/2019 9:57 pm

 

CLINICAL HISTORY:  Chest pain

 

COMPARISON:  none

 

FINDINGS:   The lungs appear clear of acute infiltrate. The heart is normal size

 

IMPRESSION:   No acute abnormalities displayed
negative

## 2024-05-07 NOTE — H&P PST ADULT - NSICDXPASTMEDICALHX_GEN_ALL_CORE_FT
PAST MEDICAL HISTORY:  Colitis     High cholesterol     HTN (hypertension)     Melanoma     Vertigo

## 2024-05-08 LAB
MRSA PCR RESULT.: SIGNIFICANT CHANGE UP
S AUREUS DNA NOSE QL NAA+PROBE: SIGNIFICANT CHANGE UP

## 2024-05-21 ENCOUNTER — APPOINTMENT (OUTPATIENT)
Dept: NEUROSURGERY | Facility: HOSPITAL | Age: 74
End: 2024-05-21

## 2024-05-21 ENCOUNTER — TRANSCRIPTION ENCOUNTER (OUTPATIENT)
Age: 74
End: 2024-05-21

## 2024-05-21 ENCOUNTER — OUTPATIENT (OUTPATIENT)
Dept: OUTPATIENT SERVICES | Facility: HOSPITAL | Age: 74
LOS: 1 days | End: 2024-05-21
Payer: MEDICARE

## 2024-05-21 VITALS
HEART RATE: 62 BPM | RESPIRATION RATE: 17 BRPM | SYSTOLIC BLOOD PRESSURE: 131 MMHG | OXYGEN SATURATION: 97 % | DIASTOLIC BLOOD PRESSURE: 75 MMHG

## 2024-05-21 VITALS
WEIGHT: 195.11 LBS | DIASTOLIC BLOOD PRESSURE: 88 MMHG | RESPIRATION RATE: 17 BRPM | HEIGHT: 71 IN | SYSTOLIC BLOOD PRESSURE: 147 MMHG | TEMPERATURE: 98 F | HEART RATE: 59 BPM | OXYGEN SATURATION: 97 %

## 2024-05-21 DIAGNOSIS — I77.0 ARTERIOVENOUS FISTULA, ACQUIRED: ICD-10-CM

## 2024-05-21 DIAGNOSIS — Z98.890 OTHER SPECIFIED POSTPROCEDURAL STATES: Chronic | ICD-10-CM

## 2024-05-21 DIAGNOSIS — R55 SYNCOPE AND COLLAPSE: ICD-10-CM

## 2024-05-21 DIAGNOSIS — I62.03 NONTRAUMATIC CHRONIC SUBDURAL HEMORRHAGE: ICD-10-CM

## 2024-05-21 LAB — BLD GP AB SCN SERPL QL: SIGNIFICANT CHANGE UP

## 2024-05-21 PROCEDURE — 75705 ARTERY X-RAYS SPINE: CPT | Mod: 26

## 2024-05-21 PROCEDURE — 36245 INS CATH ABD/L-EXT ART 1ST: CPT | Mod: LT

## 2024-05-21 PROCEDURE — 36224 PLACE CATH CAROTD ART: CPT | Mod: 50

## 2024-05-21 PROCEDURE — 36215 PLACE CATHETER IN ARTERY: CPT | Mod: 59

## 2024-05-21 PROCEDURE — 36227 PLACE CATH XTRNL CAROTID: CPT | Mod: 50

## 2024-05-21 PROCEDURE — 70496 CT ANGIOGRAPHY HEAD: CPT | Mod: 26

## 2024-05-21 PROCEDURE — 36226 PLACE CATH VERTEBRAL ART: CPT | Mod: 50

## 2024-05-21 RX ORDER — GABAPENTIN 400 MG/1
1 CAPSULE ORAL
Refills: 0 | DISCHARGE

## 2024-05-21 RX ORDER — IRBESARTAN 75 MG/1
1 TABLET ORAL
Refills: 0 | DISCHARGE

## 2024-05-21 RX ORDER — SODIUM CHLORIDE 9 MG/ML
1000 INJECTION INTRAMUSCULAR; INTRAVENOUS; SUBCUTANEOUS
Refills: 0 | Status: DISCONTINUED | OUTPATIENT
Start: 2024-05-21 | End: 2024-06-04

## 2024-05-21 RX ORDER — GABAPENTIN 400 MG/1
2 CAPSULE ORAL
Refills: 0 | DISCHARGE

## 2024-05-21 RX ORDER — SIMVASTATIN 20 MG/1
1 TABLET, FILM COATED ORAL
Refills: 0 | DISCHARGE

## 2024-05-21 RX ADMIN — SODIUM CHLORIDE 125 MILLILITER(S): 9 INJECTION INTRAMUSCULAR; INTRAVENOUS; SUBCUTANEOUS at 11:59

## 2024-05-21 NOTE — ASU PATIENT PROFILE, ADULT - FALL HARM RISK - HARM RISK INTERVENTIONS

## 2024-05-21 NOTE — ASU DISCHARGE PLAN (ADULT/PEDIATRIC) - CARE PROVIDER_API CALL
Jose Martin Jewellul  Neurosurgery  [address] [place] [id number]  Phone: [phone]  Fax: [phone]  Follow Up Time: 2 weeks

## 2024-05-21 NOTE — CHART NOTE - NSCHARTNOTEFT_GEN_A_CORE
Neurointerventional Surgery  Pre-Procedure Note       HPI:  74M with PMH HLD, HTN, melanoma, back pain who presents today for spinal angiogram. He initially presented 10/2021 with acute onset back pain which progressed to b/l LE weakness and was found to have an extramedually hematoma @ T12/L1 and underwent T10 - L2 laminectomy. He underwent spinal angiogram at that time which was negative. He was ultimately discharged to home and lost to follow up, but he re-presented in 3/2024 and underwent MRI which showed multiple pockets of CSF in the cervical and thoracic spine, likely related to repetitive subarachnoid hemorrhage. Due to these findings, it was recommended that he undergo repeat spinal angiogram to r/o source of hemorrhage. Currently he admits to b/l foot numbness and R leg numbness but otherwise denies weakness, tingling, dizziness, difficulty walking, CP, SOB, N/V.       Allergies: Shrimp (Angioedema)  Celebrex (Other)      PAST MEDICAL & SURGICAL HISTORY:  High cholesterol  Colitis  Vertigo  Melanoma  HTN (hypertension)  History of lumbar laminectomy for spinal cord decompression  Status post Mohs surgery      Physical Exam:  Constitutional: NAD, lying in bed  Neuro  * Mental Status:  GCS 15: Awake, alert, oriented to conversation. No aphasia or difficulty speaking. No dysarthria. Able to name objects and their function.  * Cranial Nerves: Cnii-Cnxii grossly intact. PERRL, EOMI, tongue midline, no gaze deviation  * Motor: RUE 5/5, LUE 5/5, RLE 5/5, LLE 5/5, no drift or dysmetria  * Sensory: Sensation intact to light touch  * Reflexes: not assessed   Cardiovascular: Regular rate and rhythm.  Eyes: See neurologic examination with detailed examination of eyes.  ENT: No JVD, Trachea Midline  Respiratory: non labored breathing   Gastrointestinal: Soft, nontender, nondistended.  Genitourinary: [ ] Vazquez, [ x ] No Vazquez.   Musculoskeletal: No muscle wasting noted, (See neurologic assessment for full muscle strength assessment)   Skin:  no wounds, no redness, no abrasions noted  Hematologic / Lymph / Immunologic: No bleeding from IV sites or wounds      NIH SS:  DATE: 5/21/24   TIME:   1A: Level of consciousness (0-3): 0  1B: Questions (0-2): 0    1C: Commands (0-2): 0  2: Gaze (0-2): 0  3: Visual fields (0-3): 0  4: Facial palsy (0-3): 0  MOTOR:  5A: Left arm motor drift (0-4): 0  5B: Right arm motor drift (0-4): 0  6A: Left leg motor drift (0-4): 0  6B: Right leg motor drift (0-4): 0  7: Limb ataxia (0-2): 0  SENSORY:  8: Sensation (0-2): 0  SPEECH:  9: Language (0-3): 0  10: Dysarthria (0-2): 0  EXTINCTION:  11: Extinction/inattention (0-2): 0    TOTAL SCORE:       Labs:   5/7/24: WBC 6.13, hgb 4.55, hct 13.9, platelets 168  5/7/24: PTT 36.7, INR 1.01  5/7/24: Na 139, K 3.8, Cl 104, CO2 27, BUN 23.8, Cr 0.73, glucose 102     Assessment/Plan:   This is a 74y  year old Male  presents with hx of spontaneous extramedullary hematoma s/p T10-L2 laminectomy. Patient presents to neuro-IR for spinal angiography.    Procedure, goals, risks, benefits and alternatives  were discussed with patient.  All questions were answered.  Risks include but are not limited to stroke, vessel injury, hemorrhage, and or groin hematoma.  Patient demonstrates understanding  of all risks involved with this procedure and wishes to continue.   Appropriate  consent was obtained from patient and consent is in the patient's chart. Neurointerventional Surgery  Pre-Procedure Note       HPI:  74M with PMH HLD, HTN, melanoma, back pain who presents today for spinal angiogram. He initially presented 10/2021 with acute onset back pain which progressed to b/l LE weakness and was found to have an extramedually hematoma @ T12/L1 and underwent T10 - L2 laminectomy. He underwent spinal angiogram at that time which was negative. He was ultimately discharged to home and lost to follow up, but he re-presented in 3/2024 and underwent MRI which showed multiple pockets of CSF in the cervical and thoracic spine, likely related to repetitive subarachnoid hemorrhage. Due to these findings, it was recommended that he undergo repeat spinal angiogram to r/o source of hemorrhage. Currently he admits to b/l foot numbness and R leg numbness but otherwise denies weakness, tingling, dizziness, difficulty walking, CP, SOB, N/V.       Allergies: Shrimp (Angioedema)  Celebrex (Other)      PAST MEDICAL & SURGICAL HISTORY:  High cholesterol  Colitis  Vertigo  Melanoma  HTN (hypertension)  History of lumbar laminectomy for spinal cord decompression  Status post Mohs surgery      Physical Exam:  Constitutional: NAD, lying in bed  Neuro  * Mental Status:  GCS 15: Awake, alert, oriented to conversation. No aphasia or difficulty speaking. No dysarthria. Able to name objects and their function.  * Cranial Nerves: Cnii-Cnxii grossly intact. PERRL, EOMI, tongue midline, no gaze deviation  * Motor: RUE 5/5, LUE 5/5, RLE 5/5, LLE 5/5, no drift or dysmetria  * Sensory: decreased sensation RLE otherwise intact   * Reflexes: not assessed   Cardiovascular: Regular rate and rhythm.  Eyes: See neurologic examination with detailed examination of eyes.  ENT: No JVD, Trachea Midline  Respiratory: non labored breathing   Gastrointestinal: Soft, nontender, nondistended.  Genitourinary: [ ] Vazquez, [ x ] No Vazquez.   Musculoskeletal: No muscle wasting noted, (See neurologic assessment for full muscle strength assessment)   Skin:  no wounds, no redness, no abrasions noted  Hematologic / Lymph / Immunologic: No bleeding from IV sites or wounds      NIH SS:  DATE: 5/21/24   TIME: 0710  1A: Level of consciousness (0-3): 0  1B: Questions (0-2): 0    1C: Commands (0-2): 0  2: Gaze (0-2): 0  3: Visual fields (0-3): 0  4: Facial palsy (0-3): 0  MOTOR:  5A: Left arm motor drift (0-4): 0  5B: Right arm motor drift (0-4): 0  6A: Left leg motor drift (0-4): 0  6B: Right leg motor drift (0-4): 0  7: Limb ataxia (0-2): 0  SENSORY:  8: Sensation (0-2): 1  SPEECH:  9: Language (0-3): 0  10: Dysarthria (0-2): 0  EXTINCTION:  11: Extinction/inattention (0-2): 0    TOTAL SCORE: 1      Labs:   5/7/24: WBC 6.13, hgb 4.55, hct 13.9, platelets 168  5/7/24: PTT 36.7, INR 1.01  5/7/24: Na 139, K 3.8, Cl 104, CO2 27, BUN 23.8, Cr 0.73, glucose 102     Assessment/Plan:   This is a 74y  year old Male  presents with hx of spontaneous extramedullary hematoma s/p T10-L2 laminectomy. Patient presents to neuro-IR for spinal angiography.    Procedure, goals, risks, benefits and alternatives  were discussed with patient.  All questions were answered.  Risks include but are not limited to stroke, vessel injury, hemorrhage, and or groin hematoma.  Patient demonstrates understanding  of all risks involved with this procedure and wishes to continue.   Appropriate  consent was obtained from patient and consent is in the patient's chart.

## 2024-05-21 NOTE — CHART NOTE - NSCHARTNOTEFT_GEN_A_CORE
Neurointerventional Surgery Post Procedure Note    Procedure: spinal angiography     Pre- Procedure Diagnosis: hx of extramedullary hematoma @ T12/L1   Post- Procedure Diagnosis:     : Dr. Fanta BISHOP  Physician Assistant: Tri Pisano PA-C  Nurse:   Anesthesiologist: TJ Roque                               Radiology Tech:    Sheath:  5 Vietnamese Sheath    I/Os: estimated blood loss less than 10cc,  IV fluids cc, Urine output cc, Contrast: Omnipaque 240   cc    Vitals:  BP   HR   Spo2  %    Preliminary Report:  Under a 5 Vietnamese short sheath via the right groin under general anesthesia via T4 - L5 spinal arteries, b/l vertebral arteries, b/l subclavian arteries, a spinal angiogram was performed and reveals       . ( Official note to follow).    Patient tolerated procedure well.  Patient remains hemodynamically stable, no change in neurological status compared to baseline.  Results were discussed with patient, patient's family and Neurosurgery.  Right groin sheath  was discontinued using Vascade closure device at __. Hemostasis was obtained with approximately __ minutes of manual compression.     No active bleeding, no hematoma, no ecchymosis.   Quick clot and safeguard balloon dressing applied at __  Patient transferred to recovery room in stable condition. Neurointerventional Surgery Post Procedure Note    Procedure: spinal angiography     Pre- Procedure Diagnosis: hx of extramedullary hematoma @ T12/L1   Post- Procedure Diagnosis: C1/C2 AV fistula     : Dr. Fanta BISHOP  Physician Assistant: Tri Pisano PA-C  Nurse: Chelsea Christensen   Anesthesiologist: TJ Roque                               Radiology Tech: Dana Lopez   Fellow: Lamonte Olivas     Sheath:  5 [place] Sheath    I/Os: estimated blood loss less than 20cc,  IV fluids 1000cc, Urine output 1700cc, Contrast: Omnipaque 240  608 cc    Vitals:  /56  HR 56  Spo2  99%    Preliminary Report:  Under a 5 [place] short sheath via the right groin under general anesthesia via T4 - L5 spinal arteries, b/l vertebral arteries, b/l subclavian arteries, b/l internal carotid arteries and b/l external carotid arteries, a spinal and cerebral angiogram was performed and reveals C1/C2 AV fistula. ( Official note to follow).    Patient tolerated procedure well.  Patient remains hemodynamically stable, no change in neurological status compared to baseline.  Results were discussed with patient, patient's family and Neurosurgery.  Right groin sheath  was discontinued using Vascade closure device at 10:08. Hemostasis was obtained with approximately 7 minutes of manual compression.     No active bleeding, no hematoma, no ecchymosis.   Quick clot and safeguard balloon dressing applied at 1015.  Patient transferred to recovery room in stable condition.

## 2024-05-21 NOTE — DISCHARGE NOTE NURSING/CASE MANAGEMENT/SOCIAL WORK - PATIENT PORTAL LINK FT
You can access the FollowMyHealth Patient Portal offered by [facility] by registering at the following website: [email or web address]. By joining [facility]’s FollowMyHealth portal, you will also be able to view your health information using other applications (apps) compatible with our system.

## 2024-05-21 NOTE — ASU PATIENT PROFILE, ADULT - ABILITY TO HEAR (WITH HEARING AID OR HEARING APPLIANCE IF NORMALLY USED):
deaf  in right ear/Mildly to Moderately Impaired: difficulty hearing in some environments or speaker may need to increase volume or speak distinctly

## 2024-05-23 PROBLEM — C43.9 MALIGNANT MELANOMA OF SKIN, UNSPECIFIED: Chronic | Status: ACTIVE | Noted: 2024-05-07

## 2024-05-23 PROBLEM — I10 ESSENTIAL (PRIMARY) HYPERTENSION: Chronic | Status: ACTIVE | Noted: 2024-05-07

## 2024-05-23 PROBLEM — I77.0 AV FISTULA: Status: ACTIVE | Noted: 2024-05-23

## 2024-05-30 ENCOUNTER — RESULT REVIEW (OUTPATIENT)
Age: 74
End: 2024-05-30

## 2024-05-30 PROCEDURE — C1760: CPT

## 2024-05-30 PROCEDURE — 36215 PLACE CATHETER IN ARTERY: CPT

## 2024-05-30 PROCEDURE — 36415 COLL VENOUS BLD VENIPUNCTURE: CPT

## 2024-05-30 PROCEDURE — C9399: CPT

## 2024-05-30 PROCEDURE — 36227 PLACE CATH XTRNL CAROTID: CPT

## 2024-05-30 PROCEDURE — 86901 BLOOD TYPING SEROLOGIC RH(D): CPT

## 2024-05-30 PROCEDURE — C1769: CPT

## 2024-05-30 PROCEDURE — 36224 PLACE CATH CAROTD ART: CPT

## 2024-05-30 PROCEDURE — C1887: CPT

## 2024-05-30 PROCEDURE — C1894: CPT

## 2024-05-30 PROCEDURE — 36245 INS CATH ABD/L-EXT ART 1ST: CPT

## 2024-05-30 PROCEDURE — 86900 BLOOD TYPING SEROLOGIC ABO: CPT

## 2024-05-30 PROCEDURE — 75705 ARTERY X-RAYS SPINE: CPT

## 2024-05-30 PROCEDURE — 36226 PLACE CATH VERTEBRAL ART: CPT

## 2024-05-30 PROCEDURE — 86850 RBC ANTIBODY SCREEN: CPT

## 2024-05-31 ENCOUNTER — APPOINTMENT (OUTPATIENT)
Dept: MRI IMAGING | Facility: CLINIC | Age: 74
End: 2024-05-31
Payer: MEDICARE

## 2024-05-31 PROCEDURE — 70553 MRI BRAIN STEM W/O & W/DYE: CPT

## 2024-05-31 PROCEDURE — 72156 MRI NECK SPINE W/O & W/DYE: CPT

## 2024-05-31 PROCEDURE — A9585: CPT | Mod: JW

## 2024-06-05 ENCOUNTER — NON-APPOINTMENT (OUTPATIENT)
Age: 74
End: 2024-06-05

## 2024-06-06 ENCOUNTER — APPOINTMENT (OUTPATIENT)
Dept: NEUROSURGERY | Facility: CLINIC | Age: 74
End: 2024-06-06

## 2024-06-06 VITALS
TEMPERATURE: 98.4 F | SYSTOLIC BLOOD PRESSURE: 144 MMHG | WEIGHT: 195 LBS | HEART RATE: 65 BPM | BODY MASS INDEX: 27.3 KG/M2 | OXYGEN SATURATION: 98 % | HEIGHT: 71 IN | DIASTOLIC BLOOD PRESSURE: 86 MMHG

## 2024-06-06 DIAGNOSIS — I67.1 CEREBRAL ANEURYSM, NONRUPTURED: ICD-10-CM

## 2024-06-06 PROCEDURE — 99215 OFFICE O/P EST HI 40 MIN: CPT

## 2024-06-06 NOTE — HISTORY OF PRESENT ILLNESS
[FreeTextEntry1] : DENIS OROSCO is a 72 year old male with PMH of hyperlipidemia, chronic back pain, who presents for follow up neurosurgical evaluation of MRI results after bilateral decompressive laminectomies T10-L2 for evacuation of intradural hematoma. TO review: he is status post hematoma Bilateral decompression laminectomies T10-L2 for evacuation of the intradural hematoma. Surgery Date: 10/7/21. He presents for follow up neurosurgical evaluation of bilateral decompression laminectomies T10-L2, dural opening and evacuation of the intradural hematoma done on 10/7/21. Hospital course: He [facility]/21 with acute onset back pain and progressively worsening bilateral lower extremity weakness. Urgent MR imaging of the spine revealed intradural extramedullary hematoma T12-L1, S/P T10-L2 laminectomy with 1U pRBCs given intraop 10/6/21. Spinal angiogram 10/7/21 showed no vascular abnormalities. Venous dopplers negative for blood clot.  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 There are varying degrees of mass effect upon the cord with stable central cord edema extending from T5 into the conus medullaris. Laminectomies noted at T10 and extending inferiorly. Since prior spinal angiogram was negative, consider coagulopathy as a cause of repetitive intrathecal subarachnoid hemorrhages."  He saw Dr. Porfirio Barrios on 4/1/24 for follow up. He has not had repeat spinal angiograms to r/o vascular source of spontaneous intradural hematoma. 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 He also underwent MRI brain and C spine with and without contrast which read "resolution of previously identified ventral epidural enhancement along the posterior clivus/upper cervical spine as well as circumferential enhancement throughout the cervical spine, suggesting interval resolution of previously identified intracranial hypotension." He presents today for follow up of findings. Continues with b/l thigh and feet numbness and LLE weakness. Admits to intermittent episodes of difficulty walking requiring assistance but otherwise denies headache, weakness, tingling, difficulty speaking, dizziness.   PCP Denis Doll

## 2024-06-06 NOTE — PHYSICAL EXAM
[FreeTextEntry1] : Constitutional: NAD Neuro * Mental Status:  GCS 15: Awake, alert, oriented to conversation. No aphasia or difficulty speaking. No dysarthria. * Cranial Nerves: Cnii-Cnxii grossly intact. EOMI, tongue midline, no gaze deviation, no facial droop * Motor: b/l UE intact, RLE intact, LLE HF 4+/5, KF 4/5  * Sensory: Sensation intact to light touch * Reflexes: not assessed Cardiovascular: Regular rate and rhythm. Eyes: See neurologic examination with detailed examination of eyes. Respiratory: non labored breathing Musculoskeletal: No muscle wasting noted Skin: grossly intact

## 2024-06-06 NOTE — ASSESSMENT
[FreeTextEntry1] : 74M with PMH HLD, chronic back pain who was found to have spontaneous intradural hematoma T12-L1 and then underwent T10-L2 b/l decompressive laminectomies on 10/7/21. He underwent repeat spinal cerebral angiogram on 5/21/24 which showed L C3-4 AVF and L cranio-cervical AVF. Discussion treatment options of both AVFs extensively.   Plan: - Surgical embolization of C3-4 fistula first - POD1 angiogram to determine persistent of cranio-cervical fistula - If cranio-cervical fistula persists, POD2 surgical embolization with intraoperative angiogram - Pending surgical dates  - PST, medical clearance prior to procedure - Patient and family in agreement with plan.

## 2024-06-21 DIAGNOSIS — R42 DIZZINESS AND GIDDINESS: ICD-10-CM

## 2024-06-21 RX ORDER — MECLIZINE HYDROCHLORIDE 25 MG/1
25 TABLET ORAL 3 TIMES DAILY
Qty: 90 | Refills: 0 | Status: ACTIVE | COMMUNITY
Start: 2024-06-21 | End: 1900-01-01

## 2024-07-08 ENCOUNTER — APPOINTMENT (OUTPATIENT)
Dept: CARDIOLOGY | Facility: CLINIC | Age: 74
End: 2024-07-08
Payer: MEDICARE

## 2024-07-08 VITALS
HEIGHT: 71 IN | BODY MASS INDEX: 26.32 KG/M2 | SYSTOLIC BLOOD PRESSURE: 128 MMHG | DIASTOLIC BLOOD PRESSURE: 86 MMHG | OXYGEN SATURATION: 99 % | HEART RATE: 73 BPM | WEIGHT: 188 LBS

## 2024-07-08 DIAGNOSIS — R94.31 ABNORMAL ELECTROCARDIOGRAM [ECG] [EKG]: ICD-10-CM

## 2024-07-08 DIAGNOSIS — E78.5 HYPERLIPIDEMIA, UNSPECIFIED: ICD-10-CM

## 2024-07-08 DIAGNOSIS — I77.9 DISORDER OF ARTERIES AND ARTERIOLES, UNSPECIFIED: ICD-10-CM

## 2024-07-08 DIAGNOSIS — I45.10 UNSPECIFIED RIGHT BUNDLE-BRANCH BLOCK: ICD-10-CM

## 2024-07-08 PROCEDURE — 99204 OFFICE O/P NEW MOD 45 MIN: CPT

## 2024-07-08 PROCEDURE — 93000 ELECTROCARDIOGRAM COMPLETE: CPT

## 2024-07-08 PROCEDURE — 99214 OFFICE O/P EST MOD 30 MIN: CPT

## 2024-07-16 ENCOUNTER — NON-APPOINTMENT (OUTPATIENT)
Age: 74
End: 2024-07-16

## 2024-07-17 ENCOUNTER — APPOINTMENT (OUTPATIENT)
Dept: CARDIOLOGY | Facility: CLINIC | Age: 74
End: 2024-07-17
Payer: MEDICARE

## 2024-07-17 PROCEDURE — 93880 EXTRACRANIAL BILAT STUDY: CPT

## 2024-07-17 PROCEDURE — 93306 TTE W/DOPPLER COMPLETE: CPT

## 2024-07-30 ENCOUNTER — APPOINTMENT (OUTPATIENT)
Dept: CARDIOLOGY | Facility: CLINIC | Age: 74
End: 2024-07-30
Payer: MEDICARE

## 2024-07-30 PROCEDURE — 78452 HT MUSCLE IMAGE SPECT MULT: CPT

## 2024-07-30 PROCEDURE — A9502: CPT

## 2024-07-30 PROCEDURE — 93015 CV STRESS TEST SUPVJ I&R: CPT

## 2024-08-05 ENCOUNTER — APPOINTMENT (OUTPATIENT)
Dept: CARDIOLOGY | Facility: CLINIC | Age: 74
End: 2024-08-05

## 2024-08-05 PROBLEM — Z01.810 PREOP CARDIOVASCULAR EXAM: Status: ACTIVE | Noted: 2024-08-05

## 2024-08-05 PROCEDURE — 99441: CPT | Mod: 93

## 2024-08-05 NOTE — HISTORY OF PRESENT ILLNESS
[FreeTextEntry1] : The patient is a 74-year-old white male with a past medical history remarkable for hypercholesterolemia, carotid artery disease, and an abnormal ECG who presents for evaluation prior to cervical spine disease. The patient does not exercise.  He is limited by right lower extremity paresthesias.  Recent laboratory results are unavailable

## 2024-08-05 NOTE — REASON FOR VISIT
[FreeTextEntry1] : Telephone telemedicine visit   chief complaint: Preoperative cardiovascular evaluation

## 2024-08-05 NOTE — DISCUSSION/SUMMARY
[FreeTextEntry1] : Preoperative cardiovascular evaluation: Cervical spine In view of the patient's normal ejection fraction, normal stress test, adequate functional capacity, and the absence of unstable angina, congestive heart failure, or severe aortic stenosis, he is at average risk for perioperative cardiovascular morbidity and mortality, and cleared from a cardiology standpoint with the following recommendations: Perioperative beta-blockers, endocarditis prophylaxis, and a postoperative monitored bed are not indicated. DVT prophylaxis at the surgeon's discretion.  Abnormal ECG Status post normal nuclear stress test.  Normal LV function by echocardiography   carotid artery disease Nonobstructive. Continue medical therapy

## 2024-08-05 NOTE — CARDIOLOGY SUMMARY
[de-identified] : - abnormal ECG/cRBBB - hypercholesterolemia - right lower extremity paresthesias status post spinal cord bleed - ECHOCARDIOGRAM: 7/17/2024, EF 60%, trace tricuspid regurgitation, no PA systolic   - CAROTID ULTRASOUND: 7/18/2024, nonobstructive disease  - PHARMACOLOGIC NUCLEAR STRESS TEST: 7/30/2024, normal, EF 62%

## 2024-08-05 NOTE — REVIEW OF SYSTEMS
[Tingling (Paresthesia)] : tingling [Limb Weakness (Paresis)] : limb weakness (Paresis) [Negative] : Heme/Lymph

## 2024-08-09 NOTE — ASU PATIENT PROFILE, ADULT - NS PREOP UNDERSTANDS INFO
EMERGENCY DEPARTMENT HISTORY AND PHYSICAL EXAM      Date: 7/15/2020  Patient Name: Zana Kay    History of Presenting Illness     Chief Complaint   Patient presents with    Diarrhea     History Provided By: Patient    HPI: Zana Kay, 75 y.o. female with past medical history significant for osteoarthritis, bipolar disorder, depression, and hypertension who presents via private vehicle to the ED with cc of diarrhea for the past month, weight loss, and generalized fatigue. Patient states she has had 2-3 episodes of watery diarrhea daily for the past 4 to 5 weeks. She was seen at [facility] in the emergency department and had blood work done that was unremarkable. She has tried Imodium with temporary relief of symptoms in the past, but has stopped taking that and her symptoms immediately returned. She was set up for a colonoscopy with Dr. Ken Pandya, but that is not until August 20. She called her primary care doctor who [facility] and she recommended that she come to the emergency department for reevaluation. She denies any abdominal pain, vomiting, fevers, or chills. Patient denies any recent hospitalization or travel, but was taking an antibiotic for urinary tract infection just prior to the symptoms started. PMHx: Osteoarthritis, bipolar disorder, depression, hypertension  Social Hx: Denies alcohol, tobacco, or illegal drug use    PCP: Augustin Gomez MD    There are no other complaints, changes, or physical findings at this time. No current facility-administered medications on file prior to encounter. Current Outpatient Medications on File Prior to Encounter   Medication Sig Dispense Refill    mirtazapine (REMERON) 15 mg tablet Take  by mouth nightly.  memantine (NAMENDA) 10 mg tablet Take 10 mg by mouth daily.  donepeziL (ARICEPT) 10 mg tablet Take 10 mg by mouth nightly.  omeprazole (PRILOSEC) 40 mg capsule Take 40 mg by mouth daily.       gabapentin (NEURONTIN) 300 mg capsule Take 2 Caps by mouth three (3) times daily. 180 Cap 11    sertraline (ZOLOFT) 100 mg tablet Take 200 mg by mouth daily.  ondansetron hcl (Zofran) 4 mg tablet Take 1 Tab by mouth every eight (8) hours as needed for Nausea or Vomiting. 10 Tab 0    metFORMIN ER (GLUCOPHAGE XR) 500 mg tablet take 1 tablet by mouth once daily with DINNER 30 Tab 3    esomeprazole (NEXIUM) 40 mg capsule Take 1 Cap by mouth daily. 30 Cap 5    amitriptyline (ELAVIL) 150 mg tablet Take 150 mg by mouth nightly.  naproxen (NAPROSYN) 500 mg tablet Take 1 Tab by mouth two (2) times daily (with meals). 60 Tab 11    LORazepam (ATIVAN) 1 mg tablet Take one tablet by mouth prior to EMG procedure. 1 Tab 0    food supplemt, lactose-reduced (ENSURE COMPLETE) 0.05-1.5 gram-kcal/mL liqd Take 1 Can by mouth three (3) times daily (with meals). 90 Can 1    risperiDONE (RISPERDAL) 3 mg tablet Take 6 mg by mouth nightly. Past History     Past Medical History:  Past Medical History:   Diagnosis Date    Arthritis     Bilateral Knee     Bipolar I disorder with mood-congruent psychotic features ([address]     Depressed     Depression     Hypertension     Psychotic disorder ([address]     Auditory Hallucinations      Past Surgical History:  Past Surgical History:   Procedure Laterality Date    BREAST SURGERY PROCEDURE UNLISTED Left     Mass/Lump Removal     HX BREAST LUMPECTOMY      HX ORTHOPAEDIC      HX ORTHOPAEDIC      Pin Placed In (R) Shoulder     Family History:  Family History   Problem Relation Age of Onset    Cancer Mother         Breast Cancer    Cancer Father         Prostate/Throat Cancer    Cancer Paternal Aunt         Breast Cancer    Diabetes Paternal Aunt         Amputee    Cancer Paternal Grandmother         Breast Cancer     Social History:  Social History     Tobacco Use    Smoking status: Former Smoker    Smokeless tobacco: Never Used   Substance Use Topics    Alcohol use:  No Alcohol/week: 0.0 standard drinks    Drug use: No     Allergies: Allergies   Allergen Reactions    Sulfa (Sulfonamide Antibiotics) Itching and Swelling    Sulfa (Sulfonamide Antibiotics) Hives     Review of Systems   Review of Systems   Constitutional: Positive for fatigue and unexpected weight change. Negative for chills and fever. HENT: Negative for congestion, rhinorrhea, sneezing and sore throat. Eyes: Negative for redness and visual disturbance. Respiratory: Negative for shortness of breath. Cardiovascular: Negative for leg swelling. Gastrointestinal: Positive for diarrhea. Negative for abdominal pain, nausea and vomiting. Genitourinary: Negative for difficulty urinating and frequency. Musculoskeletal: Negative for back pain, myalgias and neck stiffness. Skin: Negative for rash. Neurological: Negative for dizziness, syncope, weakness and headaches. Hematological: Negative for adenopathy. All other systems reviewed and are negative. Physical Exam   Physical Exam  Vitals signs and nursing note reviewed. Constitutional:       Appearance: Normal appearance. She is well-developed. HENT:      Head: Normocephalic and atraumatic. Eyes:      Conjunctiva/sclera: Conjunctivae normal.   Neck:      Musculoskeletal: Full passive range of motion without pain, normal range of motion and neck supple. Cardiovascular:      Rate and Rhythm: Normal rate and regular rhythm. Pulses: Normal pulses. Heart sounds: Normal heart sounds, S1 normal and S2 normal. No murmur. Pulmonary:      Effort: Pulmonary effort is normal. No respiratory distress. Breath sounds: Normal breath sounds. No wheezing. Abdominal:      General: Bowel sounds are normal. There is no distension. Palpations: Abdomen is soft. Tenderness: There is no abdominal tenderness. There is no rebound. Musculoskeletal: Normal range of motion. Skin:     General: Skin is warm and dry. Findings: No rash. Neurological:      Mental Status: She is alert and oriented to person, place, and time. Psychiatric:         Mood and Affect: Affect is flat. Speech: Speech normal.         Behavior: Behavior normal.         Thought Content: Thought content normal.         Judgment: Judgment normal.       Diagnostic Study Results   Labs -     Recent Results (from the past 12 hour(s))   CBC WITH AUTOMATED DIFF    Collection Time: 07/15/20  2:48 PM   Result Value Ref Range    WBC 6.3 3.6 - 11.0 K/uL    RBC 4.17 3.80 - 5.20 M/uL    HGB 12.4 11.5 - 16.0 g/dL    HCT 38.9 35.0 - 47.0 %    MCV 93.3 80.0 - 99.0 FL    MCH 29.7 26.0 - 34.0 PG    MCHC 31.9 30.0 - 36.5 g/dL    RDW 13.1 11.5 - 14.5 %    PLATELET 773 (L) 439 - 400 K/uL    NRBC 0.0 0  WBC    ABSOLUTE NRBC 0.00 0.00 - 0.01 K/uL    NEUTROPHILS 76 (H) 32 - 75 %    LYMPHOCYTES 18 12 - 49 %    MONOCYTES 5 5 - 13 %    EOSINOPHILS 1 0 - 7 %    BASOPHILS 1 0 - 1 %    IMMATURE GRANULOCYTES 0 0.0 - 0.5 %    ABS. NEUTROPHILS 4.8 1.8 - 8.0 K/UL    ABS. LYMPHOCYTES 1.1 0.8 - 3.5 K/UL    ABS. MONOCYTES 0.3 0.0 - 1.0 K/UL    ABS. EOSINOPHILS 0.1 0.0 - 0.4 K/UL    ABS. BASOPHILS 0.0 0.0 - 0.1 K/UL    ABS. IMM. GRANS. 0.0 0.00 - 0.04 K/UL    DF AUTOMATED     METABOLIC PANEL, COMPREHENSIVE    Collection Time: 07/15/20  2:48 PM   Result Value Ref Range    Sodium 145 136 - 145 mmol/L    Potassium 3.5 3.5 - 5.1 mmol/L    Chloride 106 97 - 108 mmol/L    CO2 29 21 - 32 mmol/L    Anion gap 10 5 - 15 mmol/L    Glucose 90 65 - 100 mg/dL    BUN 12 6 - 20 MG/DL    Creatinine 0.83 0.55 - 1.02 MG/DL    BUN/Creatinine ratio 14 12 - 20      GFR est AA >60 >60 ml/min/1.73m2    GFR est non-AA >60 >60 ml/min/1.73m2    Calcium 8.8 8.5 - 10.1 MG/DL    Bilirubin, total 0.4 0.2 - 1.0 MG/DL    ALT (SGPT) 20 12 - 78 U/L    AST (SGOT) 22 15 - 37 U/L    Alk.  phosphatase 66 45 - 117 U/L    Protein, total 7.0 6.4 - 8.2 g/dL    Albumin 3.5 3.5 - 5.0 g/dL    Globulin 3.5 2.0 - 4.0 g/dL    A-G Ratio 1.0 (L) 1.1 - 2.2     MAGNESIUM    Collection Time: 07/15/20  2:48 PM   Result Value Ref Range    Magnesium 1.6 1.6 - 2.4 mg/dL       Radiologic Studies -   CT ABD PELV W CONT   Final Result   IMPRESSION:   No acute process on CT. No evidence of colitis. No significant change since 2016   given difference in technique. Ct Abd Pelv W Cont    Result Date: 7/15/2020  IMPRESSION: No acute process on CT. No evidence of colitis. No significant change since 2016 given difference in technique. Medical Decision Making   I am the first provider for this patient. I reviewed the vital signs, available nursing notes, past medical history, past surgical history, family history and social history. Vital Signs-Reviewed the patient's vital signs. Patient Vitals for the past 24 hrs:   Temp Pulse Resp BP SpO2   07/15/20 1720    156/75 96 %   07/15/20 1352 98.3 °F (36.8 °C) 82 16 132/58 97 %     Pulse Oximetry Analysis - 97% on RA    Records Reviewed: Nursing Notes, Old Medical Records and Previous Laboratory Studies    Provider Notes (Medical Decision Making):   58-year-old female presents with diarrhea for the past 4 to 5 weeks, will unexpected weight loss, and generalized fatigue/weakness. Differential includes functional diarrhea, C. difficile, colitis, electrolyte abnormality, dehydration, and anemia. Will repeat some of the labs she had done at [facility] and CT her abdomen pelvis and reassess. ED Course:   Initial assessment performed. The patients presenting problems have been discussed, and they are in agreement with the care plan formulated and outlined with them. I have encouraged them to ask questions as they arise throughout their visit. Patient's work-up is unremarkable. She has a follow-up appointment scheduled with GI on August 20. I instructed her to try to get an earlier appointment with GI which she will work on. She has not had a bowel movement in the emergency department.   She will follow-up with her primary care doctor for possible stool studies. Progress Note:   Updated pt on all returned results and findings. Discussed the importance of proper follow up as referred below along with return precautions. Pt in agreement with the care plan and expresses agreement with and understanding of all items discussed. Disposition:  Discharge Note:  The pt is ready for discharge. The pt's signs, symptoms, diagnosis, and discharge instructions have been discussed and pt has conveyed their understanding. The pt is to follow up as recommended or return to ER should their symptoms worsen. Plan has been discussed and pt is in agreement. PLAN:  1. Discharge Medication List as of 7/15/2020  4:59 PM      START taking these medications    Details   loperamide (IMODIUM) 2 mg capsule Take 1 Cap by mouth two (2) times daily as needed for Diarrhea., Normal, Disp-20 Cap,R-0         CONTINUE these medications which have NOT CHANGED    Details   mirtazapine (REMERON) 15 mg tablet Take  by mouth nightly., Historical Med      memantine (NAMENDA) 10 mg tablet Take 10 mg by mouth daily. , Historical Med      donepeziL (ARICEPT) 10 mg tablet Take 10 mg by mouth nightly., Historical Med      omeprazole (PRILOSEC) 40 mg capsule Take 40 mg by mouth daily. , Historical Med      gabapentin (NEURONTIN) 300 mg capsule Take 2 Caps by mouth three (3) times daily. , Normal, Disp-180 Cap, R-11      sertraline (ZOLOFT) 100 mg tablet Take 200 mg by mouth daily. , Historical Med      ondansetron hcl (Zofran) 4 mg tablet Take 1 Tab by mouth every eight (8) hours as needed for Nausea or Vomiting., Print, Disp-10 Tab, R-0      metFORMIN ER (GLUCOPHAGE XR) 500 mg tablet take 1 tablet by mouth once daily with DINNER, Normal, Disp-30 Tab, R-3      esomeprazole (NEXIUM) 40 mg capsule Take 1 Cap by mouth daily. , Normal, Disp-30 Cap, R-5      amitriptyline (ELAVIL) 150 mg tablet Take 150 mg by mouth nightly., Historical Med      naproxen (NAPROSYN) 500 mg tablet Take 1 Tab by mouth two (2) times daily (with meals). , Normal, Disp-60 Tab, R-11      LORazepam (ATIVAN) 1 mg tablet Take one tablet by mouth prior to EMG procedure., Print, Disp-1 Tab, R-0      food supplemt, lactose-reduced (ENSURE COMPLETE) 0.05-1.5 gram-kcal/mL liqd Take 1 Can by mouth three (3) times daily (with meals). , Print, Disp-90 Can, R-1      risperiDONE (RISPERDAL) 3 mg tablet Take 6 mg by mouth nightly., Historical Med           2. Follow-up Information     Follow up With Specialties Details Why Contact Info    Your Primary Care Doctor  Call in 1 day      Sheeba Del Toro MD Gastroenterology Call To try to get an earlier appointment [place] [id number] [phone]      [address] [place] [address] EMERGENCY DEPT Emergency Medicine  As needed, If symptoms worsen Chepe Agustin  [phone]        Return to ED if worse     Diagnosis     Clinical Impression:   1. Diarrhea, unspecified type            Please note that this dictation was completed with Dragon, computer voice recognition software. Quite often unanticipated grammatical, syntax, homophones, and other interpretive errors are inadvertently transcribed by the computer software. Please disregard these errors. Additionally, please excuse any errors that have escaped final proofreading. yes

## 2024-08-09 NOTE — ASU PATIENT PROFILE, ADULT - NSICDXPASTSURGICALHX_GEN_ALL_CORE_FT
Patient Education        Anxiety Disorder: Care Instructions  Your Care Instructions     Anxiety is a normal reaction to stress. Difficult situations can cause you to have symptoms such as sweaty palms and a nervous feeling. In an anxiety disorder, the symptoms are far more severe. Constant worry, muscle tension, trouble sleeping, nausea and diarrhea, and other symptoms can make normal daily activities difficult or impossible. These symptoms may occur for no reason, and they can affect your work, school, or social life. Medicines, counseling, and self-care can all help. Follow-up care is a key part of your treatment and safety. Be sure to make and go to all appointments, and call your doctor if you are having problems. It's also a good idea to know your test results and keep a list of the medicines you take. How can you care for yourself at home? · Take medicines exactly as directed. Call your doctor if you think you are having a problem with your medicine. · Go to your counseling sessions and follow-up appointments. · Recognize and accept your anxiety. Then, when you are in a situation that makes you anxious, say to yourself, \"This is not an emergency. I feel uncomfortable, but I am not in danger. I can keep going even if I feel anxious. \"  · Be kind to your body:  ? Relieve tension with exercise or a massage. ? Get enough rest.  ? Avoid alcohol, caffeine, nicotine, and illegal drugs. They can increase your anxiety level and cause sleep problems. ? Learn and do relaxation techniques. See below for more about these techniques. · Engage your mind. Get out and do something you enjoy. Go to a funny movie, or take a walk or hike. Plan your day. Having too much or too little to do can make you anxious. · Keep a record of your symptoms. Discuss your fears with a good friend or family member, or join a support group for people with similar problems. Talking to others sometimes relieves stress.   · Get involved in social groups, or volunteer to help others. Being alone sometimes makes things seem worse than they are. · Get at least 30 minutes of exercise on most days of the week to relieve stress. Walking is a good choice. You also may want to do other activities, such as running, swimming, cycling, or playing tennis or team sports. Relaxation techniques  Do relaxation exercises 10 to 20 minutes a day. You can play soothing, relaxing music while you do them, if you wish. · Tell others in your house that you are going to do your relaxation exercises. Ask them not to disturb you. · Find a comfortable place, away from all distractions and noise. · Lie down on your back, or sit with your back straight. · Focus on your breathing. Make it slow and steady. · Breathe in through your nose. Breathe out through either your nose or mouth. · Breathe deeply, filling up the area between your navel and your rib cage. Breathe so that your belly goes up and down. · Do not hold your breath. · Breathe like this for 5 to 10 minutes. Notice the feeling of calmness throughout your whole body. As you continue to breathe slowly and deeply, relax by doing the following for another 5 to 10 minutes:  · Tighten and relax each muscle group in your body. You can begin at your toes and work your way up to your head. · Imagine your muscle groups relaxing and becoming heavy. · Empty your mind of all thoughts. · Let yourself relax more and more deeply. · Become aware of the state of calmness that surrounds you. · When your relaxation time is over, you can bring yourself back to alertness by moving your fingers and toes and then your hands and feet and then stretching and moving your entire body. Sometimes people fall asleep during relaxation, but they usually wake up shortly afterward. · Always give yourself time to return to full alertness before you drive a car or do anything that might cause an accident if you are not fully alert.  Never play a relaxation tape while you drive a car. When should you call for help? Call 911 anytime you think you may need emergency care. For example, call if:    · You feel you cannot stop from hurting yourself or someone else. Keep the numbers for these national suicide hotlines: 5-618-533-TALK (5-645.269.3009) and 2-314-TBFDCER (7-574.457.2864). [facility] ([facility]). PAST SURGICAL HISTORY:  History of lumbar laminectomy for spinal cord decompression     Status post Mohs surgery

## 2024-08-12 ENCOUNTER — APPOINTMENT (OUTPATIENT)
Dept: NEUROSURGERY | Facility: HOSPITAL | Age: 74
End: 2024-08-12

## 2024-08-12 ENCOUNTER — INPATIENT (INPATIENT)
Facility: HOSPITAL | Age: 74
LOS: 6 days | Discharge: OTHER INSTITUTION | DRG: 93 | End: 2024-08-19
Attending: STUDENT IN AN ORGANIZED HEALTH CARE EDUCATION/TRAINING PROGRAM | Admitting: STUDENT IN AN ORGANIZED HEALTH CARE EDUCATION/TRAINING PROGRAM
Payer: MEDICARE

## 2024-08-12 VITALS
SYSTOLIC BLOOD PRESSURE: 162 MMHG | DIASTOLIC BLOOD PRESSURE: 98 MMHG | OXYGEN SATURATION: 96 % | HEIGHT: 71 IN | TEMPERATURE: 98 F | WEIGHT: 189.6 LBS | HEART RATE: 64 BPM | RESPIRATION RATE: 16 BRPM

## 2024-08-12 DIAGNOSIS — Z98.890 OTHER SPECIFIED POSTPROCEDURAL STATES: Chronic | ICD-10-CM

## 2024-08-12 DIAGNOSIS — I67.1 CEREBRAL ANEURYSM, NONRUPTURED: ICD-10-CM

## 2024-08-12 LAB
BLD GP AB SCN SERPL QL: SIGNIFICANT CHANGE UP
HCT VFR BLD CALC: 33.8 % — LOW (ref 39–50)
HGB BLD-MCNC: 11.7 G/DL — LOW (ref 13–17)
MCHC RBC-ENTMCNC: 30.7 PG — SIGNIFICANT CHANGE UP (ref 27–34)
MCHC RBC-ENTMCNC: 34.6 GM/DL — SIGNIFICANT CHANGE UP (ref 32–36)
MCV RBC AUTO: 88.7 FL — SIGNIFICANT CHANGE UP (ref 80–100)
PLATELET # BLD AUTO: 109 K/UL — LOW (ref 150–400)
RBC # BLD: 3.81 M/UL — LOW (ref 4.2–5.8)
RBC # FLD: 13.1 % — SIGNIFICANT CHANGE UP (ref 10.3–14.5)
WBC # BLD: 4.47 K/UL — SIGNIFICANT CHANGE UP (ref 3.8–10.5)
WBC # FLD AUTO: 4.47 K/UL — SIGNIFICANT CHANGE UP (ref 3.8–10.5)

## 2024-08-12 PROCEDURE — 99291 CRITICAL CARE FIRST HOUR: CPT

## 2024-08-12 PROCEDURE — 63250 REVISE SPINAL CORD VSLS CRVL: CPT

## 2024-08-12 PROCEDURE — 63250 REVISE SPINAL CORD VSLS CRVL: CPT | Mod: 82

## 2024-08-12 PROCEDURE — 99232 SBSQ HOSP IP/OBS MODERATE 35: CPT

## 2024-08-12 PROCEDURE — 72020 X-RAY EXAM OF SPINE 1 VIEW: CPT | Mod: 26

## 2024-08-12 PROCEDURE — 69990 MICROSURGERY ADD-ON: CPT | Mod: 82

## 2024-08-12 PROCEDURE — 69990 MICROSURGERY ADD-ON: CPT

## 2024-08-12 PROCEDURE — 72125 CT NECK SPINE W/O DYE: CPT | Mod: 26

## 2024-08-12 DEVICE — FLOSEAL WITH RECOTHROM THROMBIN 10ML: Type: IMPLANTABLE DEVICE | Status: FUNCTIONAL

## 2024-08-12 DEVICE — KIT A-LINE 1LUM 20G X 12CM SAFE KIT: Type: IMPLANTABLE DEVICE | Status: FUNCTIONAL

## 2024-08-12 DEVICE — SURGICEL 2 X 14": Type: IMPLANTABLE DEVICE | Status: FUNCTIONAL

## 2024-08-12 DEVICE — SURGIFOAM PAD 8CM X 12.5CM X 10MM (100): Type: IMPLANTABLE DEVICE | Status: FUNCTIONAL

## 2024-08-12 DEVICE — TACHOSIL 4.8 X 4.8CM: Type: IMPLANTABLE DEVICE | Status: FUNCTIONAL

## 2024-08-12 RX ORDER — ONDANSETRON 2 MG/ML
4 INJECTION, SOLUTION INTRAMUSCULAR; INTRAVENOUS EVERY 6 HOURS
Refills: 0 | Status: DISCONTINUED | OUTPATIENT
Start: 2024-08-12 | End: 2024-08-19

## 2024-08-12 RX ORDER — SIMVASTATIN 10 MG
20 TABLET ORAL AT BEDTIME
Refills: 0 | Status: DISCONTINUED | OUTPATIENT
Start: 2024-08-12 | End: 2024-08-19

## 2024-08-12 RX ORDER — METHOCARBAMOL 750 MG/1
750 TABLET, FILM COATED ORAL THREE TIMES A DAY
Refills: 0 | Status: DISCONTINUED | OUTPATIENT
Start: 2024-08-12 | End: 2024-08-19

## 2024-08-12 RX ORDER — SODIUM CHLORIDE 9 MG/ML
3 INJECTION INTRAMUSCULAR; INTRAVENOUS; SUBCUTANEOUS EVERY 8 HOURS
Refills: 0 | Status: DISCONTINUED | OUTPATIENT
Start: 2024-08-12 | End: 2024-08-12

## 2024-08-12 RX ORDER — SENNA 187 MG
2 TABLET ORAL AT BEDTIME
Refills: 0 | Status: DISCONTINUED | OUTPATIENT
Start: 2024-08-12 | End: 2024-08-19

## 2024-08-12 RX ORDER — ACETAMINOPHEN 325 MG/1
1000 TABLET ORAL ONCE
Refills: 0 | Status: COMPLETED | OUTPATIENT
Start: 2024-08-12 | End: 2024-08-12

## 2024-08-12 RX ORDER — OXYCODONE HYDROCHLORIDE 5 MG/1
10 TABLET ORAL EVERY 4 HOURS
Refills: 0 | Status: DISCONTINUED | OUTPATIENT
Start: 2024-08-12 | End: 2024-08-19

## 2024-08-12 RX ORDER — SODIUM CHLORIDE 9 MG/ML
1000 INJECTION INTRAMUSCULAR; INTRAVENOUS; SUBCUTANEOUS
Refills: 0 | Status: DISCONTINUED | OUTPATIENT
Start: 2024-08-12 | End: 2024-08-14

## 2024-08-12 RX ORDER — GABAPENTIN 100 MG
300 CAPSULE ORAL AT BEDTIME
Refills: 0 | Status: DISCONTINUED | OUTPATIENT
Start: 2024-08-12 | End: 2024-08-19

## 2024-08-12 RX ORDER — PHENYLEPHRINE HYDROCHLORIDE 10 MG/ML
0.1 INJECTION INTRAVENOUS
Qty: 40 | Refills: 0 | Status: DISCONTINUED | OUTPATIENT
Start: 2024-08-12 | End: 2024-08-13

## 2024-08-12 RX ORDER — CEFAZOLIN SODIUM 2 G/100ML
2000 INJECTION, SOLUTION INTRAVENOUS EVERY 8 HOURS
Refills: 0 | Status: COMPLETED | OUTPATIENT
Start: 2024-08-12 | End: 2024-08-12

## 2024-08-12 RX ORDER — POLYETHYLENE GLYCOL 3350 17 G/17G
17 POWDER, FOR SOLUTION ORAL AT BEDTIME
Refills: 0 | Status: DISCONTINUED | OUTPATIENT
Start: 2024-08-12 | End: 2024-08-16

## 2024-08-12 RX ORDER — GABAPENTIN 100 MG
600 CAPSULE ORAL
Refills: 0 | Status: DISCONTINUED | OUTPATIENT
Start: 2024-08-12 | End: 2024-08-19

## 2024-08-12 RX ORDER — OXYCODONE HYDROCHLORIDE 5 MG/1
5 TABLET ORAL EVERY 4 HOURS
Refills: 0 | Status: DISCONTINUED | OUTPATIENT
Start: 2024-08-12 | End: 2024-08-19

## 2024-08-12 RX ORDER — CEFAZOLIN SODIUM 2 G/100ML
2000 INJECTION, SOLUTION INTRAVENOUS EVERY 8 HOURS
Refills: 0 | Status: DISCONTINUED | OUTPATIENT
Start: 2024-08-12 | End: 2024-08-12

## 2024-08-12 RX ORDER — PHENYLEPHRINE HYDROCHLORIDE 10 MG/ML
0.1 INJECTION INTRAVENOUS
Qty: 40 | Refills: 0 | Status: DISCONTINUED | OUTPATIENT
Start: 2024-08-12 | End: 2024-08-12

## 2024-08-12 RX ORDER — ACETAMINOPHEN 325 MG/1
650 TABLET ORAL EVERY 6 HOURS
Refills: 0 | Status: DISCONTINUED | OUTPATIENT
Start: 2024-08-12 | End: 2024-08-13

## 2024-08-12 RX ORDER — CEFAZOLIN SODIUM 2 G/100ML
2000 INJECTION, SOLUTION INTRAVENOUS ONCE
Refills: 0 | Status: DISCONTINUED | OUTPATIENT
Start: 2024-08-12 | End: 2024-08-12

## 2024-08-12 RX ORDER — HYDROMORPHONE HYDROCHLORIDE 2 MG/1
0.5 TABLET ORAL EVERY 4 HOURS
Refills: 0 | Status: DISCONTINUED | OUTPATIENT
Start: 2024-08-12 | End: 2024-08-13

## 2024-08-12 RX ORDER — ACETAMINOPHEN 325 MG/1
975 TABLET ORAL ONCE
Refills: 0 | Status: COMPLETED | OUTPATIENT
Start: 2024-08-12 | End: 2024-08-12

## 2024-08-12 RX ADMIN — OXYCODONE HYDROCHLORIDE 10 MILLIGRAM(S): 5 TABLET ORAL at 23:00

## 2024-08-12 RX ADMIN — ACETAMINOPHEN 975 MILLIGRAM(S): 325 TABLET ORAL at 06:19

## 2024-08-12 RX ADMIN — CEFAZOLIN SODIUM 2000 MILLIGRAM(S): 2 INJECTION, SOLUTION INTRAVENOUS at 21:14

## 2024-08-12 RX ADMIN — HYDROMORPHONE HYDROCHLORIDE 0.5 MILLIGRAM(S): 2 TABLET ORAL at 21:00

## 2024-08-12 RX ADMIN — OXYCODONE HYDROCHLORIDE 10 MILLIGRAM(S): 5 TABLET ORAL at 21:44

## 2024-08-12 RX ADMIN — Medication 300 MILLIGRAM(S): at 21:14

## 2024-08-12 RX ADMIN — OXYCODONE HYDROCHLORIDE 10 MILLIGRAM(S): 5 TABLET ORAL at 15:58

## 2024-08-12 RX ADMIN — OXYCODONE HYDROCHLORIDE 10 MILLIGRAM(S): 5 TABLET ORAL at 16:53

## 2024-08-12 RX ADMIN — ACETAMINOPHEN 400 MILLIGRAM(S): 325 TABLET ORAL at 18:28

## 2024-08-12 RX ADMIN — ONDANSETRON 4 MILLIGRAM(S): 2 INJECTION, SOLUTION INTRAMUSCULAR; INTRAVENOUS at 21:44

## 2024-08-12 RX ADMIN — Medication 20 MILLIGRAM(S): at 21:14

## 2024-08-12 RX ADMIN — ACETAMINOPHEN 400 MILLIGRAM(S): 325 TABLET ORAL at 23:23

## 2024-08-12 RX ADMIN — HYDROMORPHONE HYDROCHLORIDE 0.5 MILLIGRAM(S): 2 TABLET ORAL at 20:08

## 2024-08-12 RX ADMIN — ACETAMINOPHEN 1000 MILLIGRAM(S): 325 TABLET ORAL at 18:53

## 2024-08-12 NOTE — BRIEF OPERATIVE NOTE - OPERATION/FINDINGS
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
C2-C5 laminectomy with ligation of dural AVF with intraop angiogram, C3-4 left dural AVF

## 2024-08-12 NOTE — CONSULT NOTE ADULT - ASSESSMENT
Assessment:   74M with PMH HTN, HLD, epidural hematoma s/p resection 2021 with Dr. Uriarte who presented for elective dural AVF ligation, now s/p C2-5 lami with dAVF ligation, complicated by L vert dissection on 8/12/24.   - POD0  - Exam intact   - Mild neck pain    Plan:  - Discussed with Dr. Jewell  - Q1 hour neuro checks  - MAP >65, SBP >100  - No imaging needed but if decline in exam get STAT CT Cervical non con for eval of compressive hematoma  - No intervention for L Vert dissection at the moment, pending Angio in AM.  - [place] drain to half suction, monitor output hourly  - HOB 30 or >  - Ancef while drains in   - Pain control PRN, avoid oversedation  - Advance diet as tolerated, NPO @ [place] for angiogram, results will determine necessity for Wednesdays OR plan  - Bowel regimen: senna, miralax   - PT/OT/OOB to chair

## 2024-08-12 NOTE — BRIEF OPERATIVE NOTE - NSICDXBRIEFPROCEDURE_GEN_ALL_CORE_FT
PROCEDURES:  Laminectomy, spine, cervical, for spinal cord AVM excision or occlusion 12-Aug-2024 14:15:06  Porfirio Barrios  
PROCEDURES:  Laminectomy, spine, cervical, 3 levels 12-Aug-2024 14:14:42  Porfirio Barrios  Laminectomy, spine, cervical, for spinal cord AVM excision or occlusion 12-Aug-2024 14:15:06 C2-C5 laminectomy with ligation of dural AVF with intraop angiogram Porfirio Barrios

## 2024-08-12 NOTE — BRIEF OPERATIVE NOTE - SECOND ASSIST PARTICIPATION DETAILS - (COMPONENTS OF THE PROCEDURE THAT ASSISTANT PARTICIPATED IN)
I acted within this role throughout the entirety of the procedure performed by the primary surgeon
coagulation of fistula after first intraoperative angiogram through start of dural closure

## 2024-08-12 NOTE — PATIENT PROFILE ADULT - FUNCTIONAL ASSESSMENT - BASIC MOBILITY 6.
2-calculated by average/Not able to assess (calculate score using [facility] averaging method)

## 2024-08-12 NOTE — BRIEF OPERATIVE NOTE - NSICDXBRIEFPREOP_GEN_ALL_CORE_FT
PRE-OP DIAGNOSIS:  Dural arteriovenous fistula of spinal cord 12-Aug-2024 14:15:30  Porfirio Barrios  
PRE-OP DIAGNOSIS:  Dural arteriovenous fistula of spinal cord 12-Aug-2024 14:15:30  Porfirio Barrios

## 2024-08-12 NOTE — INPATIENT CERTIFICATION FOR MEDICARE PATIENTS - PHYSICIAN CONCUR
I concur with the Admission Order and I certify that services are provided in accordance with Section 42 CFR § 412.3 symmetrical

## 2024-08-12 NOTE — PROGRESS NOTE ADULT - SUBJECTIVE AND OBJECTIVE BOX
Patient is a 74y old  Male who presents with a chief complaint of "Fixing the AV fistula" (23 Jul 2024 11:59)    HPI:  74 year old male with PMH of HLD, HTN, melanoma and chronic back pain presents to [facility]. He initially presented to [facility] on 10/6/24 with complaints of acute onset back pain and progressively worsening bilateral lower extremity weakness. At that time he had an urgent MRI which showed intradural extramedullary hematoma T12-L1, S/P T10-L2 laminectomy on 10/6/21. Spinal angiogram done on 10/7/24 showed no vascular abnormalities and Venous dopplers negative for blood clot. 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 He currently reports B/L feet numbness, and numbness on right leg that he feels is getting progressively worse. Reports intermittent right knee and foot pain, described as sharp, 3/10 in severity, worse with nothing, relieved with nothing. Denies urinary or bowel incontinence. He had another spinal angiogram on 5/21/24 with Dr. Jewell which showed Spinal dural AVF, he continue to have dizziness and impaired coordination while walking. These episodes have occurred in the past and resolved after a few hours, but now these episode has lasted longer than usual. He reports no other symptoms including new weakness, nausea, vomiting, headache. Now he is scheduled for C3-C5 Laminoplasty with clipping of spinal dural AVF with intraoperative angiogram by Dr. Jewell on 8/12/24. cardiac clearance pending  (23 Jul 2024 11:59)      Interval history:  Patient seen and examined by neurosurgery team. Patient POD0 from C3 dAVF ligation, complicated by L vert dissection. Patient reports that he feels well, admits to mild neck pain but otherwise has no acute complaints.       Vital Signs Last 24 Hrs  T(C): 34.8 (12 Aug 2024 15:15), Max: 36.7 (12 Aug 2024 05:44)  T(F): 94.6 (12 Aug 2024 15:15), Max: 98.1 (12 Aug 2024 05:44)  HR: 71 (12 Aug 2024 15:15) (58 - 79)  BP: 125/74 (12 Aug 2024 15:15) (125/74 - 162/98)  BP(mean): 89 (12 Aug 2024 15:15) (89 - 99)  RR: 19 (12 Aug 2024 15:15) (12 - 19)  SpO2: 98% (12 Aug 2024 15:15) (96% - 99%)    Parameters below as of 12 Aug 2024 14:30  Patient On (Oxygen Delivery Method): nasal cannula  O2 Flow (L/min): 4      Physical Exam:  Constitutional: NAD, lying in bed  Neuro  * Mental Status:  GCS 15: Awake, alert, oriented to conversation. No aphasia or difficulty speaking. No dysarthria.   * Cranial Nerves: Cnii-Cnxii grossly intact. PERRL, EOMI, tongue midline, no gaze deviation  * Motor: RUE 5/5, LUE 5/5, RLE 5/5, LLE 5/5, no drift or dysmetria  * Sensory: Sensation intact to light touch  * Reflexes: not assessed   Wrist: soft, nontender, no hematoma, no ecchymoses       LABS:                        11.7   4.47  )-----------( 109      ( 12 Aug 2024 15:12 )             33.8     7/23/24: PTT 38.9, INR 0.97  7/23/24: Na 137, K 4.9, Cl 101, CO2 27, BUN 17.8, Cr 0.59, glucose 100      Medications:  MEDICATIONS  (STANDING):  ceFAZolin  Injectable. 2000 milliGRAM(s) IV Push every 8 hours  gabapentin 300 milliGRAM(s) Oral at bedtime  gabapentin 600 milliGRAM(s) Oral <User Schedule>  multivitamin 1 Tablet(s) Oral daily  polyethylene glycol 3350 17 Gram(s) Oral at bedtime  senna 2 Tablet(s) Oral at bedtime  simvastatin 20 milliGRAM(s) Oral at bedtime  sodium chloride 0.9%. 1000 milliLiter(s) (75 mL/Hr) IV Continuous <Continuous>    MEDICATIONS  (PRN):  acetaminophen     Tablet .. 650 milliGRAM(s) Oral every 6 hours PRN Mild Pain (1 - 3)  HYDROmorphone  Injectable 0.5 milliGRAM(s) IV Push every 4 hours PRN Severe Pain (7 - 10)  methocarbamol 750 milliGRAM(s) Oral three times a day PRN Muscle Spasm  ondansetron Injectable 4 milliGRAM(s) IV Push every 6 hours PRN Nausea and/or Vomiting  oxyCODONE    IR 10 milliGRAM(s) Oral every 4 hours PRN Severe Pain (7 - 10)  oxyCODONE    IR 5 milliGRAM(s) Oral every 4 hours PRN Moderate Pain (4 - 6)      RADIOLOGY & ADDITIONAL STUDIES:  < from: CT Cervical Spine No Cont (08.12.24 @ 07:56) >  The cervical and upper thoracic vertebral bodies are normal in height and   density. Mild degenerative anterolisthesis at C4-5. Anterior osteophytes   are present C5-6 C6-7 C7-T1. No acute fractures or dislocations are   identified. Uncal vertebral joint and facet degenerative changes are   noted. Left-sided facet hypertrophy causes left neural foraminal stenosis   at C3-4 and C4-5.    IMPRESSION: Mild degenerative changes.    --- End of Report ---  EDGAR GUZMAN MD; Attending Radiologist  This document has been electronically signed. Aug 12 2024  8:31AM    < end of copied text >

## 2024-08-12 NOTE — PATIENT PROFILE ADULT - FALL HARM RISK - HARM RISK INTERVENTIONS

## 2024-08-12 NOTE — PATIENT PROFILE ADULT - SURGICAL SITE DESCRIPTION
Detail Level: Zone Detail Level: Generalized Patient Specific Counseling (Will Not Stick From Patient To Patient): This includes all her areas of concern. [place] drain s/p lami

## 2024-08-12 NOTE — BRIEF OPERATIVE NOTE - FIRST ASSIST CATEGORY
No qualified resident/fellow available to assist
No Resident Program within this Specialty at this Location

## 2024-08-12 NOTE — PATIENT PROFILE ADULT - NSTRANSFERBELONGINGSDISPO_GEN_A_NUR
Admission Reconciliation is Completed  Discharge Reconciliation is Not Complete Admission Reconciliation is Completed  Discharge Reconciliation is Completed with patient

## 2024-08-12 NOTE — BRIEF OPERATIVE NOTE - FIRST ASSIST PARTICIPATION DETAILS - (COMPONENTS OF THE PROCEDURE THAT ASSISTANT PARTICIPATED IN)
exposure, C3-4 laminectomy with left medial facetectomies and partial C2 and C5 undercutting, durotomy, coagulation of fistula through first intra-operative angiogram, initial hemostasis and dural closure

## 2024-08-12 NOTE — PROGRESS NOTE ADULT - ASSESSMENT
74M with PMH HTN, HLD, epidural hematoma s/p resection 2021 with Dr. Uriarte who presented for elective dural AVF ligation, now s/p C2-5 lami with dAVF ligation, complicated by L vert dissection on 8/12/24.   - POD0  - Exam intact   - Mild neck pain    Plan:  - Discussed with Dr. Jewell  - Q1 hour neuro checks  - MAP >80 for spinal perfusion  - No imaging needed  - [place] drain to half suction, monitor output hourly   - Pain control PRN, avoid oversedation  - Advance diet as tolerated, NPO @ [place] for angiogram  - Bowel regimen: senna, miralax   - PT/OT/OOB to chair  - Plan for cerebral angiogram to am to further evaluate vertebral arteries and cranio-cervical junction AVF

## 2024-08-12 NOTE — BRIEF OPERATIVE NOTE - FIRST ASSIST PARTICIPATION DETAILS - (COMPONENTS OF THE PROCEDURE THAT ASSISTANT PARTICIPATED IN)
I acted within this role throughout the entirety of the procedure performed by the primary surgeon exposure, C3-4 laminectomy with left medial facetectomies and partial C2 and C5 undercutting, durotomy, coagulation of fistula through first intra-operative angiogram, initial hemostasis and dural closure

## 2024-08-12 NOTE — CHART NOTE - NSCHARTNOTEFT_GEN_A_CORE
Neurointerventional Surgery Procedure Note    HPI:   74 year old male with PMH of HLD, HTN, melanoma and chronic back pain presents to [facility]. He initially presented to [facility] on 10/6/24 with complaints of acute onset back pain and progressively worsening bilateral lower extremity weakness. At that time he had an urgent MRI which showed intradural extramedullary hematoma T12-L1, S/P T10-L2 laminectomy on 10/6/21. Spinal angiogram done on 10/7/24 showed no vascular abnormalities and Venous dopplers negative for blood clot. 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 He currently reports B/L feet numbness, and numbness on right leg that he feels is getting progressively worse. Reports intermittent right knee and foot pain, described as sharp, 3/10 in severity, worse with nothing, relieved with nothing. Denies urinary or bowel incontinence. He had another spinal angiogram on 5/21/24 with Dr. Jewell which showed Spinal dural AVF, he continue to have dizziness and impaired coordination while walking. These episodes have occurred in the past and resolved after a few hours, but now these episode has lasted longer than usual. He reports no other symptoms including new weakness, nausea, vomiting, headache. He presents today for C3-5 laminoplasty with clipping of dAVF. He reports that he feels well this morning, no acute complaints. Neurointerventional Surgery Procedure Note    Pre procedure note:  HPI:   74 year old male with PMH of HLD, HTN, melanoma and chronic back pain presents to [facility]. He initially presented to [facility] on 10/6/24 with complaints of acute onset back pain and progressively worsening bilateral lower extremity weakness. At that time he had an urgent MRI which showed intradural extramedullary hematoma T12-L1, S/P T10-L2 laminectomy on 10/6/21. Spinal angiogram done on 10/7/24 showed no vascular abnormalities and Venous dopplers negative for blood clot. 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 He currently reports B/L feet numbness, and numbness on right leg that he feels is getting progressively worse. Reports intermittent right knee and foot pain, described as sharp, 3/10 in severity, worse with nothing, relieved with nothing. Denies urinary or bowel incontinence. He had another spinal angiogram on 5/21/24 with Dr. Jewell which showed Spinal dural AVF, he continue to have dizziness and impaired coordination while walking. These episodes have occurred in the past and resolved after a few hours, but now these episode has lasted longer than usual. He reports no other symptoms including new weakness, nausea, vomiting, headache. He presents today for C3-5 laminoplasty with clipping of dAVF. He reports that he feels well this morning, no acute complaints.    Vital Signs Last 24 Hrs  T(C): 36.3 (12 Aug 2024 07:05), Max: 36.7 (12 Aug 2024 05:44)  T(F): 97.3 (12 Aug 2024 07:05), Max: 98.1 (12 Aug 2024 05:44)  HR: 58 (12 Aug 2024 07:05) (58 - 64)  BP: 156/92 (12 Aug 2024 07:05) (156/92 - 162/98)  BP(mean): --  RR: 16 (12 Aug 2024 07:05) (16 - 16)  SpO2: 96% (12 Aug 2024 07:05) (96% - 96%)    Parameters below as of 12 Aug 2024 07:05  Patient On (Oxygen Delivery Method): room air      Labs:  7/23/24: WBC 4.70, hgb 14.8, hct 43.5, platelets 151  7/23/24: PTT 38.9, INR 0.97  7/23/24: Na 137, K 4.9, Cl 101, CO2 27, BUN 17.8, Cr 0.59, glucose 100        Post procedure note:    Pre procedure diagnosis: 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 Jose Martin Jewell MD  Physician Assistant: Tri Pisano  Radiology techs: Dana Degroot    Vitals:  HR 50  /64   SpO2 100%    Sheath:  5/6 Cypriot Sheath    I/Os: Contrast: Omnipaque 240 40  cc, radial cocktail: milrinone 3mg, heparin 300 units, verapamil 2.5 mg     Preliminary Report:  Under a 5/6 Cypriot short sheath via the left wrist under general anesthesia via left vertebral artery, a selective cerebral angiography  was performed and reveals resection of C3-4 dural AVF with persistent craniocervical dural AVF, L vertebral artery dissection. ( Official note to follow).    Patient tolerated procedure well.   Results were discussed with patient and patient's family  Right wrist sheath was discontinued using TR band placed at __ with __ cc of air.      No active bleeding, no hematoma, no ecchymosis.    Patient transferred to neuro ICU in stable condition. Neurointerventional Surgery Procedure Note    Pre procedure note:  HPI:   74 year old male with PMH of HLD, HTN, melanoma and chronic back pain presents to [facility]. He initially presented to [facility] on 10[facility]/24 with complaints of acute onset back pain and progressively worsening bilateral lower extremity weakness. At that time he had an urgent MRI which showed intradural extramedullary hematoma T12-L1, S/P T10-L2 laminectomy on 10/6/21. Spinal angiogram done on 10/7/24 showed no vascular abnormalities and Venous dopplers negative for blood clot. 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 He currently reports B/L feet numbness, and numbness on right leg that he feels is getting progressively worse. Reports intermittent right knee and foot pain, described as sharp, 3/10 in severity, worse with nothing, relieved with nothing. Denies urinary or bowel incontinence. He had another spinal angiogram on 5/21/24 with Dr. Jewell which showed Spinal dural AVF, he continue to have dizziness and impaired coordination while walking. These episodes have occurred in the past and resolved after a few hours, but now these episode has lasted longer than usual. He reports no other symptoms including new weakness, nausea, vomiting, headache. He presents today for C3-5 laminoplasty with clipping of dAVF. He reports that he feels well this morning, no acute complaints.    Vital Signs Last 24 Hrs  T(C): 36.3 (12 Aug 2024 07:05), Max: 36.7 (12 Aug 2024 05:44)  T(F): 97.3 (12 Aug 2024 07:05), Max: 98.1 (12 Aug 2024 05:44)  HR: 58 (12 Aug 2024 07:05) (58 - 64)  BP: 156/92 (12 Aug 2024 07:05) (156/92 - 162/98)  BP(mean): --  RR: 16 (12 Aug 2024 07:05) (16 - 16)  SpO2: 96% (12 Aug 2024 07:05) (96% - 96%)    Parameters below as of 12 Aug 2024 07:05  Patient On (Oxygen Delivery Method): room air      Labs:  7/23/24: WBC 4.70, hgb 14.8, hct 43.5, platelets 151  7/23/24: PTT 38.9, INR 0.97  7/23/24: Na 137, K 4.9, Cl 101, CO2 27, BUN 17.8, Cr 0.59, glucose 100        Post procedure note:    Pre procedure diagnosis: 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 Jose Martin Jewell MD  Physician Assistant: Tri Pisano  Radiology techs: Dana Degroot    Vitals:  HR 50  /64   SpO2 100%    Sheath:  5/6 Serbian Sheath    I/Os: Contrast: Omnipaque 240 40  cc, radial cocktail: milrinone 3mg, heparin 300 units, verapamil 2.5 mg     Preliminary Report:  Under a 5/6 Serbian short sheath via the left wrist under general anesthesia via left vertebral artery, a selective cerebral angiography  was performed and reveals resection of C3-4 dural AVF with persistent craniocervical dural AVF, L vertebral artery dissection. ( Official note to follow).    Patient tolerated procedure well.   Results were discussed with patient and patient's family  Right wrist sheath was discontinued using TR band placed at 1510 with 10cc of air.      No active bleeding, no hematoma, no ecchymosis.    Patient transferred to neuro ICU in stable condition.

## 2024-08-13 ENCOUNTER — APPOINTMENT (OUTPATIENT)
Dept: NEUROSURGERY | Facility: HOSPITAL | Age: 74
End: 2024-08-13

## 2024-08-13 LAB
ANION GAP SERPL CALC-SCNC: 12 MMOL/L — SIGNIFICANT CHANGE UP (ref 5–17)
APTT BLD: 33 SEC — SIGNIFICANT CHANGE UP (ref 24.5–35.6)
BASOPHILS # BLD AUTO: 0.01 K/UL — SIGNIFICANT CHANGE UP (ref 0–0.2)
BASOPHILS NFR BLD AUTO: 0.1 % — SIGNIFICANT CHANGE UP (ref 0–2)
BUN SERPL-MCNC: 17.5 MG/DL — SIGNIFICANT CHANGE UP (ref 8–20)
CALCIUM SERPL-MCNC: 7.3 MG/DL — LOW (ref 8.4–10.5)
CHLORIDE SERPL-SCNC: 107 MMOL/L — SIGNIFICANT CHANGE UP (ref 96–108)
CO2 SERPL-SCNC: 20 MMOL/L — LOW (ref 22–29)
CREAT SERPL-MCNC: 0.54 MG/DL — SIGNIFICANT CHANGE UP (ref 0.5–1.3)
EGFR: 104 ML/MIN/1.73M2 — SIGNIFICANT CHANGE UP
EOSINOPHIL # BLD AUTO: 0 K/UL — SIGNIFICANT CHANGE UP (ref 0–0.5)
EOSINOPHIL NFR BLD AUTO: 0 % — SIGNIFICANT CHANGE UP (ref 0–6)
GLUCOSE SERPL-MCNC: 123 MG/DL — HIGH (ref 70–99)
HCT VFR BLD CALC: 31.7 % — LOW (ref 39–50)
HGB BLD-MCNC: 11.1 G/DL — LOW (ref 13–17)
IMM GRANULOCYTES NFR BLD AUTO: 0.4 % — SIGNIFICANT CHANGE UP (ref 0–0.9)
INR BLD: 1.1 RATIO — SIGNIFICANT CHANGE UP (ref 0.85–1.18)
LYMPHOCYTES # BLD AUTO: 1.12 K/UL — SIGNIFICANT CHANGE UP (ref 1–3.3)
LYMPHOCYTES # BLD AUTO: 11.6 % — LOW (ref 13–44)
MAGNESIUM SERPL-MCNC: 1.8 MG/DL — SIGNIFICANT CHANGE UP (ref 1.6–2.6)
MCHC RBC-ENTMCNC: 30.9 PG — SIGNIFICANT CHANGE UP (ref 27–34)
MCHC RBC-ENTMCNC: 35 GM/DL — SIGNIFICANT CHANGE UP (ref 32–36)
MCV RBC AUTO: 88.3 FL — SIGNIFICANT CHANGE UP (ref 80–100)
MONOCYTES # BLD AUTO: 0.53 K/UL — SIGNIFICANT CHANGE UP (ref 0–0.9)
MONOCYTES NFR BLD AUTO: 5.5 % — SIGNIFICANT CHANGE UP (ref 2–14)
MRSA PCR RESULT.: SIGNIFICANT CHANGE UP
NEUTROPHILS # BLD AUTO: 7.94 K/UL — HIGH (ref 1.8–7.4)
NEUTROPHILS NFR BLD AUTO: 82.4 % — HIGH (ref 43–77)
PHOSPHATE SERPL-MCNC: 3.2 MG/DL — SIGNIFICANT CHANGE UP (ref 2.4–4.7)
PLATELET # BLD AUTO: 140 K/UL — LOW (ref 150–400)
POTASSIUM SERPL-MCNC: 4.4 MMOL/L — SIGNIFICANT CHANGE UP (ref 3.5–5.3)
POTASSIUM SERPL-SCNC: 4.4 MMOL/L — SIGNIFICANT CHANGE UP (ref 3.5–5.3)
PROTHROM AB SERPL-ACNC: 12.2 SEC — SIGNIFICANT CHANGE UP (ref 9.5–13)
RBC # BLD: 3.59 M/UL — LOW (ref 4.2–5.8)
RBC # FLD: 13.3 % — SIGNIFICANT CHANGE UP (ref 10.3–14.5)
S AUREUS DNA NOSE QL NAA+PROBE: SIGNIFICANT CHANGE UP
SODIUM SERPL-SCNC: 139 MMOL/L — SIGNIFICANT CHANGE UP (ref 135–145)
WBC # BLD: 9.64 K/UL — SIGNIFICANT CHANGE UP (ref 3.8–10.5)
WBC # FLD AUTO: 9.64 K/UL — SIGNIFICANT CHANGE UP (ref 3.8–10.5)

## 2024-08-13 PROCEDURE — 99291 CRITICAL CARE FIRST HOUR: CPT

## 2024-08-13 PROCEDURE — 36226 PLACE CATH VERTEBRAL ART: CPT | Mod: 50

## 2024-08-13 PROCEDURE — 93010 ELECTROCARDIOGRAM REPORT: CPT

## 2024-08-13 RX ORDER — LIDOCAINE/BENZALKONIUM/ALCOHOL
1 SOLUTION, NON-ORAL TOPICAL DAILY
Refills: 0 | Status: DISCONTINUED | OUTPATIENT
Start: 2024-08-13 | End: 2024-08-19

## 2024-08-13 RX ORDER — CHLORHEXIDINE GLUCONATE 40 MG/ML
1 SOLUTION TOPICAL
Refills: 0 | Status: DISCONTINUED | OUTPATIENT
Start: 2024-08-13 | End: 2024-08-19

## 2024-08-13 RX ORDER — HYDROMORPHONE HYDROCHLORIDE 2 MG/1
0.5 TABLET ORAL ONCE
Refills: 0 | Status: DISCONTINUED | OUTPATIENT
Start: 2024-08-13 | End: 2024-08-13

## 2024-08-13 RX ORDER — ACETAMINOPHEN 325 MG/1
975 TABLET ORAL EVERY 6 HOURS
Refills: 0 | Status: COMPLETED | OUTPATIENT
Start: 2024-08-13 | End: 2024-08-14

## 2024-08-13 RX ORDER — HYDROMORPHONE HYDROCHLORIDE 2 MG/1
0.5 TABLET ORAL EVERY 4 HOURS
Refills: 0 | Status: DISCONTINUED | OUTPATIENT
Start: 2024-08-13 | End: 2024-08-19

## 2024-08-13 RX ORDER — HYDROMORPHONE HYDROCHLORIDE 2 MG/1
0.5 TABLET ORAL EVERY 4 HOURS
Refills: 0 | Status: DISCONTINUED | OUTPATIENT
Start: 2024-08-13 | End: 2024-08-13

## 2024-08-13 RX ORDER — ACETAMINOPHEN 325 MG/1
1000 TABLET ORAL ONCE
Refills: 0 | Status: COMPLETED | OUTPATIENT
Start: 2024-08-13 | End: 2024-08-13

## 2024-08-13 RX ADMIN — OXYCODONE HYDROCHLORIDE 5 MILLIGRAM(S): 5 TABLET ORAL at 12:41

## 2024-08-13 RX ADMIN — Medication 1 PATCH: at 15:20

## 2024-08-13 RX ADMIN — ACETAMINOPHEN 975 MILLIGRAM(S): 325 TABLET ORAL at 18:00

## 2024-08-13 RX ADMIN — OXYCODONE HYDROCHLORIDE 10 MILLIGRAM(S): 5 TABLET ORAL at 23:00

## 2024-08-13 RX ADMIN — HYDROMORPHONE HYDROCHLORIDE 0.5 MILLIGRAM(S): 2 TABLET ORAL at 05:25

## 2024-08-13 RX ADMIN — Medication 1 PATCH: at 20:57

## 2024-08-13 RX ADMIN — SODIUM CHLORIDE 75 MILLILITER(S): 9 INJECTION INTRAMUSCULAR; INTRAVENOUS; SUBCUTANEOUS at 17:39

## 2024-08-13 RX ADMIN — HYDROMORPHONE HYDROCHLORIDE 0.5 MILLIGRAM(S): 2 TABLET ORAL at 03:00

## 2024-08-13 RX ADMIN — OXYCODONE HYDROCHLORIDE 5 MILLIGRAM(S): 5 TABLET ORAL at 18:00

## 2024-08-13 RX ADMIN — Medication 600 MILLIGRAM(S): at 14:11

## 2024-08-13 RX ADMIN — Medication 300 MILLIGRAM(S): at 22:01

## 2024-08-13 RX ADMIN — Medication 2 TABLET(S): at 22:01

## 2024-08-13 RX ADMIN — METHOCARBAMOL 750 MILLIGRAM(S): 750 TABLET, FILM COATED ORAL at 02:35

## 2024-08-13 RX ADMIN — Medication 600 MILLIGRAM(S): at 05:25

## 2024-08-13 RX ADMIN — POLYETHYLENE GLYCOL 3350 17 GRAM(S): 17 POWDER, FOR SOLUTION ORAL at 22:00

## 2024-08-13 RX ADMIN — SODIUM CHLORIDE 75 MILLILITER(S): 9 INJECTION INTRAMUSCULAR; INTRAVENOUS; SUBCUTANEOUS at 03:32

## 2024-08-13 RX ADMIN — Medication 1 PATCH: at 15:21

## 2024-08-13 RX ADMIN — CHLORHEXIDINE GLUCONATE 1 APPLICATION(S): 40 SOLUTION TOPICAL at 13:37

## 2024-08-13 RX ADMIN — ACETAMINOPHEN 975 MILLIGRAM(S): 325 TABLET ORAL at 17:00

## 2024-08-13 RX ADMIN — HYDROMORPHONE HYDROCHLORIDE 0.5 MILLIGRAM(S): 2 TABLET ORAL at 02:28

## 2024-08-13 RX ADMIN — ACETAMINOPHEN 1000 MILLIGRAM(S): 325 TABLET ORAL at 00:00

## 2024-08-13 RX ADMIN — ACETAMINOPHEN 1000 MILLIGRAM(S): 325 TABLET ORAL at 09:00

## 2024-08-13 RX ADMIN — OXYCODONE HYDROCHLORIDE 5 MILLIGRAM(S): 5 TABLET ORAL at 04:00

## 2024-08-13 RX ADMIN — HYDROMORPHONE HYDROCHLORIDE 0.5 MILLIGRAM(S): 2 TABLET ORAL at 06:00

## 2024-08-13 RX ADMIN — OXYCODONE HYDROCHLORIDE 5 MILLIGRAM(S): 5 TABLET ORAL at 13:30

## 2024-08-13 RX ADMIN — ACETAMINOPHEN 975 MILLIGRAM(S): 325 TABLET ORAL at 23:07

## 2024-08-13 RX ADMIN — OXYCODONE HYDROCHLORIDE 5 MILLIGRAM(S): 5 TABLET ORAL at 03:31

## 2024-08-13 RX ADMIN — Medication 20 MILLIGRAM(S): at 22:00

## 2024-08-13 RX ADMIN — Medication 1 TABLET(S): at 12:41

## 2024-08-13 RX ADMIN — OXYCODONE HYDROCHLORIDE 5 MILLIGRAM(S): 5 TABLET ORAL at 17:39

## 2024-08-13 RX ADMIN — OXYCODONE HYDROCHLORIDE 10 MILLIGRAM(S): 5 TABLET ORAL at 22:00

## 2024-08-13 RX ADMIN — ACETAMINOPHEN 400 MILLIGRAM(S): 325 TABLET ORAL at 08:22

## 2024-08-13 NOTE — CHART NOTE - NSCHARTNOTEFT_GEN_A_CORE
Neurointerventional Surgery Post Procedure Note    Procedure: Selective Cerebral Angiography     Pre- Procedure Diagnosis: C3-4 dural AVF s/p ligation, craniocervical junction AVF  Post- Procedure Diagnosis:     : Dr. Jose Martin Jewell  Physician Assistant: Tri Pisano PA-C   Nurse: Sheree Pardo  Anesthesiologist: TJ Roque               Radiology Tech: Dana Degroot   Fellow: David Perez    Sheath:  5 Bangladeshi Sheath    I/Os: estimated blood loss less than 10cc,  IV fluids cc, Urine output cc, Contrast: Omnipaque 240   cc    Vitals:  BP   HR   Spo2  %    Preliminary Report:  Under a 5 Bangladeshi short sheath via the right groin under MAC sedation via left vertebral artery, left internal carotid artery, left external carotid artery, right vertebral artery, right internal carotid artery, right external carotid artery, a selective cerebral angiography  was performed and reveals       . ( Official note to follow).    Patient tolerated procedure well.  Patient remains hemodynamically stable, no change in neurological status compared to baseline.  Results were discussed with patient, patient's family and Neurosurgery.  Right groin sheath  was discontinued. Hemostasis was obtained with approximately 15 minutes of manual compression.     No active bleeding, no hematoma, no ecchymosis.   Quick clot and safeguard balloon dressing applied at   Patient transferred to recovery room in stable condition. 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 Jose Martin Jewell  Physician Assistant: Tri Pisano PA-C   Nurse: Sheree Pardo  Anesthesiologist: TJ Roque               Radiology Tech: Dana Degroot   Fellow: David Perez    Sheath:  5 Martiniquais Sheath    I/Os: estimated blood loss less than 10cc,  IV fluids 400cc, Urine output 125cc, Contrast: Omnipaque 240  50 cc    Vitals:  /60  HR 78  Spo2  %    Preliminary Report:  Under a 5 Martiniquais short sheath via the right groin under MAC sedation via left vertebral artery, right vertebral artery, a selective cerebral angiography  was performed and reveals complete occlusion of C3-4 dAVF, L vertebral artery dissection resolving, craniocervical junction with reduced filling compared to prior angiogram. ( Official note to follow).    Patient tolerated procedure well.  Patient remains hemodynamically stable, no change in neurological status compared to baseline.  Results were discussed with patient, patient's family and Neurosurgery.  Right groin sheath  was discontinued using vascade closure device at 1025. Hemostasis was obtained with approximately 10 minutes of manual compression.     No active bleeding, no hematoma, no ecchymosis.   Quick clot and safeguard balloon dressing applied at 1035.  Patient transferred to neuro ICU in stable condition.

## 2024-08-13 NOTE — PROGRESS NOTE ADULT - SUBJECTIVE AND OBJECTIVE BOX
NSICU ATTENDING PROGRESS NOTE    Historical Review:   HPI:  74 year old male with PMH of HLD, HTN, melanoma and chronic back pain presents to [facility]. He initially presented to [facility] on 10/6/24 with complaints of acute onset back pain and progressively worsening bilateral lower extremity weakness. At that time he had an urgent MRI which showed intradural extramedullary hematoma T12-L1, S/P T10-L2 laminectomy on 10/6/21. Spinal angiogram done on 10/7/24 showed no vascular abnormalities and Venous dopplers negative for blood clot. 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 He currently reports B/L feet numbness, and numbness on right leg that he feels is getting progressively worse. Reports intermittent right knee and foot pain, described as sharp, 3/10 in severity, worse with nothing, relieved with nothing. Denies urinary or bowel incontinence. He had another spinal angiogram on 5/21/24 with Dr. Jewell which showed Spinal dural AVF, he continue to have dizziness and impaired coordination while walking. These episodes have occurred in the past and resolved after a few hours, but now these episode has lasted longer than usual. He reports no other symptoms including new weakness, nausea, vomiting, headache. Now he is scheduled for C3-C5 Laminoplasty with clipping of spinal dural AVF with intraoperative angiogram by Dr. Jewell on 8/12/24. cardiac clearance pending  (23 Jul 2024 11:59)    24hr EVENTS:  8/12 - hypotensive post-op, responded well to jose luis, exam stable      ROS: mild incisional pain, difficulty side-stepping to chair, no CP/SOB, no n/v/d, no urinary complaints, no new numbness/weakness    ----------------------------------------------------------------------------------------------------  PHYSICAL EXAM:  Constitutional: NAD, sittin up in chair  Neuro  * Mental Status:  GCS 15: Awake, alert, oriented to conversation. No aphasia or difficulty speaking. No dysarthria.   * Cranial Nerves: Cnii-Cnxii grossly intact. PERRL, EOMI, tongue midline, no gaze deviation  * Motor: RUE 5/5, LUE 5/5, RLE 5/5, LLE 5/5, no drift or dysmetria  * Sensory: Sensation intact to light touch  * Reflexes: not assessed   Wrist: soft, nontender, no hematoma, no ecchymoses     CV: regular rate  Pulm: no accessory muscle use  Abd: soft, nontender, nondistended, []TF  Skin: warm, dry, [] incontinence associated skin breakdown    -----------------------------------------------------------------------------------------------------  ICU Vital Signs Last 24 Hrs  T(C): 37.1 (13 Aug 2024 09:00), Max: 37.3 (13 Aug 2024 02:45)  T(F): 98.8 (13 Aug 2024 09:00), Max: 99.1 (13 Aug 2024 02:45)  HR: 73 (13 Aug 2024 09:00) (49 - 89)  BP: 117/64 (13 Aug 2024 09:00) (81/53 - 138/82)  BP(mean): 79 (13 Aug 2024 09:00) (62 - 99)  ABP: 105/48 (13 Aug 2024 09:00) (71/37 - 150/67)  ABP(mean): 66 (13 Aug 2024 09:00) (49 - 101)  RR: 19 (13 Aug 2024 09:00) (12 - 24)  SpO2: 94% (13 Aug 2024 09:00) (94% - 99%)    O2 Parameters below as of 13 Aug 2024 08:00  Patient On (Oxygen Delivery Method): room air            I&O's Summary    12 Aug 2024 07:01  -  13 Aug 2024 07:00  --------------------------------------------------------  IN: 1722.9 mL / OUT: 1300 mL / NET: 422.9 mL    13 Aug 2024 07:01  -  13 Aug 2024 09:33  --------------------------------------------------------  IN: 256.4 mL / OUT: 90 mL / NET: 166.4 mL        MEDICATIONS  (STANDING):  gabapentin 600 milliGRAM(s) Oral <User Schedule>  gabapentin 300 milliGRAM(s) Oral at bedtime  multivitamin 1 Tablet(s) Oral daily  phenylephrine    Infusion 0.1 MICROgram(s)/kG/Min (3.23 mL/Hr) IV Continuous <Continuous>  polyethylene glycol 3350 17 Gram(s) Oral at bedtime  senna 2 Tablet(s) Oral at bedtime  simvastatin 20 milliGRAM(s) Oral at bedtime  sodium chloride 0.9%. 1000 milliLiter(s) (75 mL/Hr) IV Continuous <Continuous>      RESPIRATORY:        IMAGING:   Recent imaging studies were reviewed.    LAB RESULTS:                          11.1   9.64  )-----------( 140      ( 13 Aug 2024 02:18 )             31.7       PT/INR - ( 13 Aug 2024 02:18 )   PT: 12.2 sec;   INR: 1.10 ratio         PTT - ( 13 Aug 2024 02:18 )  PTT:33.0 sec    08-13    139  |  107  |  17.5  ----------------------------<  123<H>  4.4   |  20.0<L>  |  0.54    Ca    7.3<L>      13 Aug 2024 02:18  Phos  3.2     08-13  Mg     1.8     08-13                  -----------------------------------------------------------------------------------------------------------------------------------------------------------------------------------                     NSICU ATTENDING PROGRESS NOTE    Historical Review:   74 year old male with PMH of HLD, HTN, melanoma and chronic back pain presents to [facility]. He initially presented to [facility] on 10/6/24 with complaints of acute onset back pain and progressively worsening bilateral lower extremity weakness. At that time he had an urgent MRI which showed intradural extramedullary hematoma T12-L1, S/P T10-L2 laminectomy on 10/6/21. Spinal angiogram done on 10/7/24 showed no vascular abnormalities and Venous dopplers negative for blood clot. 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 He currently reports B/L feet numbness, and numbness on right leg that he feels is getting progressively worse. Reports intermittent right knee and foot pain, described as sharp, 3/10 in severity, worse with nothing, relieved with nothing. Denies urinary or bowel incontinence. He had another spinal angiogram on 5/21/24 with Dr. Jewell which showed Spinal dural AVF, he continue to have dizziness and impaired coordination while walking. These episodes have occurred in the past and resolved after a few hours, but now these episode has lasted longer than usual. He reports no other symptoms including new weakness, nausea, vomiting, headache. Now he is scheduled for C3-C5 Laminoplasty with clipping of spinal dural AVF with intraoperative angiogram by Dr. Jewell on 8/12/24. cardiac clearance pending  (23 Jul 2024 11:59)    24hr EVENTS:  8/12 - hypotensive post-op, responded well to jose luis, exam stable  8/13 - continued on jose luis overnight      ROS: incisional pain, difficulty controlling L leg, no CP/SOB, no n/v/d, no urinary complaints, no new numbness/weakness    ----------------------------------------------------------------------------------------------------  jose luis @0.1  NS @75cc/hr    PHYSICAL EXAM:  Constitutional: NAD, sitting up in chair  Neuro: incision c/d/i  * Mental Status:  GCS 15: Awake, alert, oriented to conversation. No aphasia or difficulty speaking. No dysarthria.   * Cranial Nerves: Cnii-Cnxii grossly intact. PERRL, EOMI, tongue midline, no gaze deviation  * Motor: RUE 5/5, LUE 5/5, RLE 5/5, LLE 5/5, no drift or dysmetria  * Sensory: Sensation intact to light touch  * Gait: L leg incoordination with standing     CV: regular rate and rhythm  Pulm: no accessory muscle use, normal WOB  Abd: soft, nontender, nondistended, +BS, LBM 8/11  : kate with yellow urine  Skin: warm, dry, no edema    2PIVs, R radial A-line  -----------------------------------------------------------------------------------------------------  ICU Vital Signs Last 24 Hrs  T(C): 37.1 (13 Aug 2024 09:00), Max: 37.3 (13 Aug 2024 02:45)  T(F): 98.8 (13 Aug 2024 09:00), Max: 99.1 (13 Aug 2024 02:45)  HR: 73 (13 Aug 2024 09:00) (49 - 89)  BP: 117/64 (13 Aug 2024 09:00) (81/53 - 138/82)  BP(mean): 79 (13 Aug 2024 09:00) (62 - 99)  ABP: 105/48 (13 Aug 2024 09:00) (71/37 - 150/67)  ABP(mean): 66 (13 Aug 2024 09:00) (49 - 101)  RR: 19 (13 Aug 2024 09:00) (12 - 24)  SpO2: 94% (13 Aug 2024 09:00) (94% - 99%)    O2 Parameters below as of 13 Aug 2024 08:00  Patient On (Oxygen Delivery Method): room air            I&O's Summary    12 Aug 2024 07:01  -  13 Aug 2024 07:00  --------------------------------------------------------  IN: 1722.9 mL / OUT: 1300 mL / NET: 422.9 mL    13 Aug 2024 07:01  -  13 Aug 2024 09:33  --------------------------------------------------------  IN: 256.4 mL / OUT: 90 mL / NET: 166.4 mL        MEDICATIONS  (STANDING):  gabapentin 600 milliGRAM(s) Oral <User Schedule>  gabapentin 300 milliGRAM(s) Oral at bedtime  multivitamin 1 Tablet(s) Oral daily  phenylephrine    Infusion 0.1 MICROgram(s)/kG/Min (3.23 mL/Hr) IV Continuous <Continuous>  polyethylene glycol 3350 17 Gram(s) Oral at bedtime  senna 2 Tablet(s) Oral at bedtime  simvastatin 20 milliGRAM(s) Oral at bedtime  sodium chloride 0.9%. 1000 milliLiter(s) (75 mL/Hr) IV Continuous <Continuous>    IMAGING:   CT c-spine - mild degenerative changes    LAB RESULTS:                          11.1   9.64  )-----------( 140      ( 13 Aug 2024 02:18 )             31.7       PT/INR - ( 13 Aug 2024 02:18 )   PT: 12.2 sec;   INR: 1.10 ratio       PTT - ( 13 Aug 2024 02:18 )  PTT:33.0 sec    08-13    139  |  107  |  17.5  ----------------------------<  123<H>  4.4   |  20.0<L>  |  0.54    Ca    7.3<L>      13 Aug 2024 02:18  Phos  3.2     08-13  Mg     1.8     08-13    -----------------------------------------------------------------------------------------------------------------------------------------------------------------------------------

## 2024-08-13 NOTE — PROGRESS NOTE ADULT - CRITICAL CARE ATTENDING COMMENT
I have personally provided the above noted minutes of critical care time including examining patient, review of laboratory values, imaging, interdisciplinary care coordination, and frequent monitoring for decompensation.    Based on my personal evaluation, this patient has a high probability of imminent or life-threatening deterioration due to the presence of: dAVF, hypotension, post-neurosurgical care -  which required my direct attention, intervention, and personal management. Other billable procedures, if performed, are documented separately.

## 2024-08-13 NOTE — PROGRESS NOTE ADULT - SUBJECTIVE AND OBJECTIVE BOX
HPI:  74 year old male with PMH of HLD, HTN, melanoma and chronic back pain presents to [facility]. He initially presented to [facility] on 10/6/24 with complaints of acute onset back pain and progressively worsening bilateral lower extremity weakness. At that time he had an urgent MRI which showed intradural extramedullary hematoma T12-L1, S/P T10-L2 laminectomy on 10/6/21. Spinal angiogram done on 10/7/24 showed no vascular abnormalities and Venous dopplers negative for blood clot. 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 He currently reports B/L feet numbness, and numbness on right leg that he feels is getting progressively worse. Reports intermittent right knee and foot pain, described as sharp, 3/10 in severity, worse with nothing, relieved with nothing. Denies urinary or bowel incontinence. He had another spinal angiogram on 5/21/24 with Dr. Jewell which showed Spinal dural AVF, he continue to have dizziness and impaired coordination while walking. These episodes have occurred in the past and resolved after a few hours, but now these episode has lasted longer than usual. He reports no other symptoms including new weakness, nausea, vomiting, headache. Now he is scheduled for C3-C5 Laminoplasty with clipping of spinal dural AVF with intraoperative angiogram by Dr. Jewell on 8/12/24. cardiac clearance pending  (23 Jul 2024 11:59)      INTERVAL HPI/OVERNIGHT EVENTS:  74M POD #1 s/p C2-C5 laminectomy with ligation of dural AVF with intraop angiogram, complicated by L vert dissection. Pt complains of shoulder pain, improved w current pain regimen. Plan for cerebral angio today 8/13.    Vital Signs Last 24 Hrs  T(C): 37.1 (13 Aug 2024 00:00), Max: 37.1 (12 Aug 2024 22:15)  T(F): 98.8 (13 Aug 2024 00:00), Max: 98.8 (12 Aug 2024 22:15)  HR: 61 (13 Aug 2024 00:00) (49 - 81)  BP: 113/68 (13 Aug 2024 00:00) (81/53 - 162/98)  BP(mean): 81 (13 Aug 2024 00:00) (62 - 99)  RR: 14 (13 Aug 2024 00:00) (12 - 20)  SpO2: 99% (13 Aug 2024 00:00) (95% - 99%)    Parameters below as of 13 Aug 2024 00:00  Patient On (Oxygen Delivery Method): nasal cannula  O2 Flow (L/min): 2      PHYSICAL EXAM:  GENERAL: NAD  HEAD:  Atraumatic, normocephalic  WOUND: Dressing clean dry intact, 1 subfascial [place] to half-suction  TREVOR COMA SCORE: E-4 V-5 M-6 = 15  MENTAL STATUS: AAO x3; Awake/; Opens eyes spontaneously; Appropriately conversant without aphasia; following simple commands  CRANIAL NERVES: PERRL. EOMI without nystagmus. Face symmetric, tongue midline. Hearing grossly intact. Speech clear.  MOTOR: strength 5/5 b/l upper and lower extremities  SENSATION: grossly intact to light touch all extremities  SKIN: Warm, dry;    LABS:                        11.7   4.47  )-----------( 109      ( 12 Aug 2024 15:12 )             33.8                 08-12 @ 07:01  -  08-13 @ 00:26  --------------------------------------------------------  IN: 1000.5 mL / OUT: 770 mL / NET: 230.5 mL        RADIOLOGY & ADDITIONAL TESTS:  < from: CT Cervical Spine No Cont (08.12.24 @ 07:56) >  The cervical and upper thoracic vertebral bodies are normal in height and   density. Mild degenerative anterolisthesis at C4-5. Anterior osteophytes   are present C5-6 C6-7 C7-T1. No acute fractures or dislocations are   identified. Uncal vertebral joint and facet degenerative changes are   noted. Left-sided facet hypertrophy causes left neural foraminal stenosis   at C3-4 and C4-5.    IMPRESSION: Mild degenerative changes.    --- End of Report ---  EDGAR GUZMAN MD; Attending Radiologist  This document has been electronically signed. Aug 12 2024  8:31AM    < end of copied text >

## 2024-08-13 NOTE — PROGRESS NOTE ADULT - ASSESSMENT
Assessment:   74M with PMH HTN, HLD, epidural hematoma s/p resection 2021 with Dr. Uriarte who presented for elective dural AVF ligation, now s/p C2-5 lami with dAVF ligation, complicated by L vert dissection on 8/12/24.   - POD0  - Exam intact   - Mild neck pain    Plan: ***  - Discussed with Dr. Jewell  - Q1 hour neuro checks  - MAP >65, SBP >100  - No imaging needed but if decline in exam get STAT CT Cervical non con for eval of compressive hematoma  - No intervention for L Vert dissection at the moment, pending Angio in AM.  - J[place] drain to half suction, monitor output hourly  - HOB 30 or >  - Ancef while drains in   - Pain control PRN, avoid oversedation  - Advance diet as tolerated, NPO @ [place] for angiogram, results will determine necessity for Wednesdays OR plan  - Bowel regimen: senna, miralax   - PT/OT/OOB to chair 74M with PMH HTN, HLD, epidural hematoma (s/p resection 2021 with Dr. Uriarte), who presented for elective dural AVF ligation, now s/p C2-5 lami with dAVF ligation, complicated by L vert dissection on 8/12/24.     Plan:   - Q1 hour general neuro checks post-angio  - f/u with NSGY re:post-angio imaging  - pain control: tylenol, /600/300  - J[place] drain to half suction, monitor output hourly  - incentive spirometer, goal SpO2>92%  - DASH diet, bowel regimen  - maintain kate while awaiting OR plan, monitor UOP  - replete electrolytes  - marbella-op ancef   - SCDs, hold off chemoppx   - PT/OT/OOB to chair    Dispo: stays in ICU

## 2024-08-13 NOTE — CHART NOTE - NSCHARTNOTEFT_GEN_A_CORE
Neurointerventional Surgery  Pre-Procedure Note        HPI:  74 year old male with PMH of HLD, HTN, melanoma and chronic back pain presents to [facility]. He initially presented to [facility] on 10/6/24 with complaints of acute onset back pain and progressively worsening bilateral lower extremity weakness. At that time he had an urgent MRI which showed intradural extramedullary hematoma T12-L1, S/P T10-L2 laminectomy on 10/6/21. Spinal angiogram done on 10/7/24 showed no vascular abnormalities and Venous dopplers negative for blood clot. 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 He currently reports B/L feet numbness, and numbness on right leg that he feels is getting progressively worse. Reports intermittent right knee and foot pain, described as sharp, 3/10 in severity, worse with nothing, relieved with nothing. Denies urinary or bowel incontinence. He had another spinal angiogram on 5/21/24 with Dr. Jewell which showed Spinal dural AVF, he continue to have dizziness and impaired coordination while walking. These episodes have occurred in the past and resolved after a few hours, but now these episode has lasted longer than usual. He reports no other symptoms including new weakness, nausea, vomiting, headache.  cardiac clearance pending  (23 Jul 2024 11:59)    Patient underwent C2-5 laminectomy with dAVF ligation yesterday, complicated by vertebral artery dissection (asymptomatic). Intraop angiogram showed resolution of AVF but continued craniocervical fistula. He presents today for cerebral angiogram to further evaluate vasculature. He reports neck pain but otherwise denies weakness, numbness, tingling, CP, SOB, N/V.       Allergies: Shrimp (Angioedema)  Celebrex (Other)      PAST MEDICAL & SURGICAL HISTORY:  High cholesterol  Colitis  Vertigo  Melanoma  HTN (hypertension)  History of lumbar laminectomy for spinal cord decompression  Status post Mohs surgery      FAMILY HISTORY:  FH: Parkinson's disease (Mother)      Current Medications:   acetaminophen     Tablet .. 650 milliGRAM(s) Oral every 6 hours PRN  gabapentin 300 milliGRAM(s) Oral at bedtime  gabapentin 600 milliGRAM(s) Oral <User Schedule>  HYDROmorphone  Injectable 0.5 milliGRAM(s) IV Push every 4 hours PRN  methocarbamol 750 milliGRAM(s) Oral three times a day PRN  multivitamin 1 Tablet(s) Oral daily  ondansetron Injectable 4 milliGRAM(s) IV Push every 6 hours PRN  oxyCODONE    IR 10 milliGRAM(s) Oral every 4 hours PRN  oxyCODONE    IR 5 milliGRAM(s) Oral every 4 hours PRN  phenylephrine    Infusion 0.1 MICROgram(s)/kG/Min IV Continuous <Continuous>  polyethylene glycol 3350 17 Gram(s) Oral at bedtime  senna 2 Tablet(s) Oral at bedtime  simvastatin 20 milliGRAM(s) Oral at bedtime  sodium chloride 0.9%. 1000 milliLiter(s) IV Continuous <Continuous>      Physical Exam:  Constitutional: NAD, lying in bed  Neuro  * Mental Status:  GCS 15: Awake, alert, oriented to conversation. No aphasia or difficulty speaking. No dysarthria. Able to name objects and their function.  * Cranial Nerves: Cnii-Cnxii grossly intact. PERRL, EOMI, tongue midline, no gaze deviation  * Motor: RUE 5/5, LUE 5/5, RLE 5/5, LLE 5/5, no drift or dysmetria  * Sensory: Sensation intact to light touch  * Reflexes: not assessed   Cardiovascular: Regular rate and rhythm.  Eyes: See neurologic examination with detailed examination of eyes.  ENT: No JVD, Trachea Midline  Respiratory: non labored breathing   Gastrointestinal: Soft, nontender, nondistended.  Genitourinary: [ ] Vazquez, [ x ] No Vazquez.   Musculoskeletal: No muscle wasting noted, (See neurologic assessment for full muscle strength assessment)  Skin:  no wounds, no redness, no abrasions noted  Hematologic / Lymph / Immunologic: No bleeding from IV sites or wounds      NIH SS:  DATE: 8/13/24   TIME:  1A: Level of consciousness (0-3): 0  1B: Questions (0-2): 0    1C: Commands (0-2): 0  2: Gaze (0-2): 0  3: Visual fields (0-3): 0  4: Facial palsy (0-3): 0  MOTOR:  5A: Left arm motor drift (0-4): 0  5B: Right arm motor drift (0-4): 0  6A: Left leg motor drift (0-4): 0  6B: Right leg motor drift (0-4): 0  7: Limb ataxia (0-2): 0  SENSORY:  8: Sensation (0-2): 0  SPEECH:  9: Language (0-3): 0  10: Dysarthria (0-2): 0  EXTINCTION:  11: Extinction/inattention (0-2): 0    TOTAL SCORE:       Labs:                         11.1   9.64  )-----------( 140      ( 13 Aug 2024 02:18 )             31.7       08-13    139  |  107  |  17.5  ----------------------------<  123<H>  4.4   |  20.0<L>  |  0.54    Ca    7.3<L>      13 Aug 2024 02:18  Phos  3.2     08-13  Mg     1.8     08-13    PT/INR - ( 13 Aug 2024 02:18 )   PT: 12.2 sec;   INR: 1.10 ratio    PTT - ( 13 Aug 2024 02:18 )  PTT:33.0 sec      Assessment/Plan:   This is a 74y  year old Male  presents with C3-4 dAVF and craniocervical dAVF s/p ligation of C3-4 dAVF. Patient presents to neuro-IR for selective cerebral angiography.     Procedure, goals, risks, benefits and alternatives  were discussed with patient.  All questions were answered.  Risks include but are not limited to stroke, vessel injury, hemorrhage, and or groin hematoma.  Patient demonstrates understanding  of all risks involved with this procedure and wishes to continue.   Appropriate  consent was obtained from patient and consent is in the patient's chart. Neurointerventional Surgery  Pre-Procedure Note        HPI:  74 year old male with PMH of HLD, HTN, melanoma and chronic back pain presents to [facility]. He initially presented to [facility] on 10/6/24 with complaints of acute onset back pain and progressively worsening bilateral lower extremity weakness. At that time he had an urgent MRI which showed intradural extramedullary hematoma T12-L1, S/P T10-L2 laminectomy on 10/6/21. Spinal angiogram done on 10/7/24 showed no vascular abnormalities and Venous dopplers negative for blood clot. 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 He currently reports B/L feet numbness, and numbness on right leg that he feels is getting progressively worse. Reports intermittent right knee and foot pain, described as sharp, 3/10 in severity, worse with nothing, relieved with nothing. Denies urinary or bowel incontinence. He had another spinal angiogram on 5/21/24 with Dr. Jewell which showed Spinal dural AVF, he continue to have dizziness and impaired coordination while walking. These episodes have occurred in the past and resolved after a few hours, but now these episode has lasted longer than usual. He reports no other symptoms including new weakness, nausea, vomiting, headache.  cardiac clearance pending  (23 Jul 2024 11:59)    Patient underwent C2-5 laminectomy with dAVF ligation yesterday, complicated by vertebral artery dissection (asymptomatic). Intraop angiogram showed resolution of AVF but continued craniocervical fistula. He presents today for cerebral angiogram to further evaluate vasculature. He reports b/l shoulder pain but otherwise denies weakness, numbness, tingling, CP, SOB, N/V.       Allergies: Shrimp (Angioedema)  Celebrex (Other)      PAST MEDICAL & SURGICAL HISTORY:  High cholesterol  Colitis  Vertigo  Melanoma  HTN (hypertension)  History of lumbar laminectomy for spinal cord decompression  Status post Mohs surgery      FAMILY HISTORY:  FH: Parkinson's disease (Mother)      Current Medications:   acetaminophen     Tablet .. 650 milliGRAM(s) Oral every 6 hours PRN  gabapentin 300 milliGRAM(s) Oral at bedtime  gabapentin 600 milliGRAM(s) Oral <User Schedule>  HYDROmorphone  Injectable 0.5 milliGRAM(s) IV Push every 4 hours PRN  methocarbamol 750 milliGRAM(s) Oral three times a day PRN  multivitamin 1 Tablet(s) Oral daily  ondansetron Injectable 4 milliGRAM(s) IV Push every 6 hours PRN  oxyCODONE    IR 10 milliGRAM(s) Oral every 4 hours PRN  oxyCODONE    IR 5 milliGRAM(s) Oral every 4 hours PRN  phenylephrine    Infusion 0.1 MICROgram(s)/kG/Min IV Continuous <Continuous>  polyethylene glycol 3350 17 Gram(s) Oral at bedtime  senna 2 Tablet(s) Oral at bedtime  simvastatin 20 milliGRAM(s) Oral at bedtime  sodium chloride 0.9%. 1000 milliLiter(s) IV Continuous <Continuous>      Physical Exam:  Constitutional: NAD, lying in bed  Neuro  * Mental Status:  GCS 15: Awake, alert, oriented to conversation. No aphasia or difficulty speaking. No dysarthria. Able to name objects and their function.  * Cranial Nerves: Cnii-Cnxii grossly intact. PERRL, EOMI, tongue midline, no gaze deviation  * Motor: RUE 5/5, LUE 5/5, RLE 5/5, LLE 5/5, no drift or dysmetria  * Sensory: mild decreased sensation R foot (baseline)  * Reflexes: not assessed   Cardiovascular: Regular rate and rhythm.  Eyes: See neurologic examination with detailed examination of eyes.  ENT: No JVD, Trachea Midline  Respiratory: non labored breathing   Gastrointestinal: Soft, nontender, nondistended.  Genitourinary: [ ] Vazquez, [ x ] No Vazquez.   Musculoskeletal: No muscle wasting noted, (See neurologic assessment for full muscle strength assessment)  Skin:  no wounds, no redness, no abrasions noted  Hematologic / Lymph / Immunologic: No bleeding from IV sites or wounds      NIH SS:  DATE: 8/13/24   TIME: 0940  1A: Level of consciousness (0-3): 0  1B: Questions (0-2): 0    1C: Commands (0-2): 0  2: Gaze (0-2): 0  3: Visual fields (0-3): 0  4: Facial palsy (0-3): 0  MOTOR:  5A: Left arm motor drift (0-4): 0  5B: Right arm motor drift (0-4): 0  6A: Left leg motor drift (0-4): 0  6B: Right leg motor drift (0-4): 0  7: Limb ataxia (0-2): 0  SENSORY:  8: Sensation (0-2): 1  SPEECH:  9: Language (0-3): 0  10: Dysarthria (0-2): 0  EXTINCTION:  11: Extinction/inattention (0-2): 0    TOTAL SCORE: 1      Labs:                         11.1   9.64  )-----------( 140      ( 13 Aug 2024 02:18 )             31.7       08-13    139  |  107  |  17.5  ----------------------------<  123<H>  4.4   |  20.0<L>  |  0.54    Ca    7.3<L>      13 Aug 2024 02:18  Phos  3.2     08-13  Mg     1.8     08-13    PT/INR - ( 13 Aug 2024 02:18 )   PT: 12.2 sec;   INR: 1.10 ratio    PTT - ( 13 Aug 2024 02:18 )  PTT:33.0 sec      Assessment/Plan:   This is a 74y  year old Male  presents with C3-4 dAVF and craniocervical dAVF s/p ligation of C3-4 dAVF. Patient presents to neuro-IR for selective cerebral angiography.     Procedure, goals, risks, benefits and alternatives were discussed with patient.  All questions were answered.  Risks include but are not limited to stroke, vessel injury, hemorrhage, and or groin hematoma.  Patient demonstrates understanding  of all risks involved with this procedure and wishes to continue.   Appropriate  consent was obtained from patient and consent is in the patient's chart.

## 2024-08-13 NOTE — PROGRESS NOTE ADULT - ASSESSMENT
Assessment:  74M with PMH HTN, HLD, epidural hematoma s/p resection 2021 with Dr. Uriarte who presented for elective dural AVF ligation, now s/p C2-5 lami with dAVF ligation, complicated by L vert dissection on 8/12/24.   - POD1  - Exam intact   - Mild neck pain    Plan:  - Q1 hour neuro checks  - MAP >65 for spinal perfusion  - No post-op imaging needed  - if decline in exam get STAT CT Cervical non con for eval of compressive hematoma- [place] drain to half suction, monitor output hourly   - 1 subfascial [place] to half-suction; record output  - Pain control PRN, avoid oversedation  - Advance diet as tolerated, NPO @ [facility] for cerebral angiogram 8/13 to further evaluate vertebral arteries and cranio-cervical junction AV  - Bowel regimen: senna, miralax   - PT/OT/OOB to chair  - To be discussed with Dr. Jewell at AM rounds

## 2024-08-14 ENCOUNTER — APPOINTMENT (OUTPATIENT)
Dept: NEUROSURGERY | Facility: HOSPITAL | Age: 74
End: 2024-08-14

## 2024-08-14 LAB
ANION GAP SERPL CALC-SCNC: 8 MMOL/L — SIGNIFICANT CHANGE UP (ref 5–17)
BUN SERPL-MCNC: 13.9 MG/DL — SIGNIFICANT CHANGE UP (ref 8–20)
CALCIUM SERPL-MCNC: 7.4 MG/DL — LOW (ref 8.4–10.5)
CHLORIDE SERPL-SCNC: 106 MMOL/L — SIGNIFICANT CHANGE UP (ref 96–108)
CO2 SERPL-SCNC: 25 MMOL/L — SIGNIFICANT CHANGE UP (ref 22–29)
CREAT SERPL-MCNC: 0.47 MG/DL — LOW (ref 0.5–1.3)
EGFR: 108 ML/MIN/1.73M2 — SIGNIFICANT CHANGE UP
GLUCOSE SERPL-MCNC: 116 MG/DL — HIGH (ref 70–99)
HCT VFR BLD CALC: 28.1 % — LOW (ref 39–50)
HGB BLD-MCNC: 9.5 G/DL — LOW (ref 13–17)
MAGNESIUM SERPL-MCNC: 1.9 MG/DL — SIGNIFICANT CHANGE UP (ref 1.6–2.6)
MCHC RBC-ENTMCNC: 30.4 PG — SIGNIFICANT CHANGE UP (ref 27–34)
MCHC RBC-ENTMCNC: 33.8 GM/DL — SIGNIFICANT CHANGE UP (ref 32–36)
MCV RBC AUTO: 89.8 FL — SIGNIFICANT CHANGE UP (ref 80–100)
PHOSPHATE SERPL-MCNC: 1.7 MG/DL — LOW (ref 2.4–4.7)
PLATELET # BLD AUTO: 121 K/UL — LOW (ref 150–400)
POTASSIUM SERPL-MCNC: 4 MMOL/L — SIGNIFICANT CHANGE UP (ref 3.5–5.3)
POTASSIUM SERPL-SCNC: 4 MMOL/L — SIGNIFICANT CHANGE UP (ref 3.5–5.3)
RBC # BLD: 3.13 M/UL — LOW (ref 4.2–5.8)
RBC # FLD: 13.7 % — SIGNIFICANT CHANGE UP (ref 10.3–14.5)
SODIUM SERPL-SCNC: 139 MMOL/L — SIGNIFICANT CHANGE UP (ref 135–145)
WBC # BLD: 6.46 K/UL — SIGNIFICANT CHANGE UP (ref 3.8–10.5)
WBC # FLD AUTO: 6.46 K/UL — SIGNIFICANT CHANGE UP (ref 3.8–10.5)

## 2024-08-14 PROCEDURE — 99232 SBSQ HOSP IP/OBS MODERATE 35: CPT

## 2024-08-14 RX ORDER — SODIUM PHOSPHATE, DIBASIC, ANHYDROUS, POTASSIUM PHOSPHATE, MONOBASIC, AND SODIUM PHOSPHATE, MONOBASIC, MONOHYDRATE 852; 155; 130 MG/1; MG/1; MG/1
1 TABLET, COATED ORAL ONCE
Refills: 0 | Status: COMPLETED | OUTPATIENT
Start: 2024-08-14 | End: 2024-08-14

## 2024-08-14 RX ORDER — ENOXAPARIN SODIUM 100 MG/ML
40 INJECTION SUBCUTANEOUS
Refills: 0 | Status: DISCONTINUED | OUTPATIENT
Start: 2024-08-14 | End: 2024-08-19

## 2024-08-14 RX ORDER — ACETAMINOPHEN 325 MG/1
650 TABLET ORAL EVERY 6 HOURS
Refills: 0 | Status: DISCONTINUED | OUTPATIENT
Start: 2024-08-14 | End: 2024-08-19

## 2024-08-14 RX ADMIN — ACETAMINOPHEN 975 MILLIGRAM(S): 325 TABLET ORAL at 00:00

## 2024-08-14 RX ADMIN — HYDROMORPHONE HYDROCHLORIDE 0.5 MILLIGRAM(S): 2 TABLET ORAL at 01:00

## 2024-08-14 RX ADMIN — Medication 600 MILLIGRAM(S): at 05:12

## 2024-08-14 RX ADMIN — HYDROMORPHONE HYDROCHLORIDE 0.5 MILLIGRAM(S): 2 TABLET ORAL at 00:15

## 2024-08-14 RX ADMIN — Medication 20 MILLIGRAM(S): at 21:28

## 2024-08-14 RX ADMIN — ENOXAPARIN SODIUM 40 MILLIGRAM(S): 100 INJECTION SUBCUTANEOUS at 21:29

## 2024-08-14 RX ADMIN — Medication 10 MILLIGRAM(S): at 10:42

## 2024-08-14 RX ADMIN — Medication 1 PATCH: at 03:00

## 2024-08-14 RX ADMIN — ACETAMINOPHEN 975 MILLIGRAM(S): 325 TABLET ORAL at 06:00

## 2024-08-14 RX ADMIN — Medication 1 PATCH: at 17:31

## 2024-08-14 RX ADMIN — METHOCARBAMOL 750 MILLIGRAM(S): 750 TABLET, FILM COATED ORAL at 08:15

## 2024-08-14 RX ADMIN — OXYCODONE HYDROCHLORIDE 10 MILLIGRAM(S): 5 TABLET ORAL at 02:26

## 2024-08-14 RX ADMIN — Medication 1 PATCH: at 17:32

## 2024-08-14 RX ADMIN — Medication 600 MILLIGRAM(S): at 14:52

## 2024-08-14 RX ADMIN — Medication 300 MILLIGRAM(S): at 21:28

## 2024-08-14 RX ADMIN — Medication 1 PATCH: at 19:28

## 2024-08-14 RX ADMIN — ACETAMINOPHEN 975 MILLIGRAM(S): 325 TABLET ORAL at 05:11

## 2024-08-14 RX ADMIN — Medication 1 TABLET(S): at 13:01

## 2024-08-14 RX ADMIN — Medication 1 PATCH: at 19:29

## 2024-08-14 RX ADMIN — CHLORHEXIDINE GLUCONATE 1 APPLICATION(S): 40 SOLUTION TOPICAL at 05:12

## 2024-08-14 RX ADMIN — OXYCODONE HYDROCHLORIDE 10 MILLIGRAM(S): 5 TABLET ORAL at 03:00

## 2024-08-14 RX ADMIN — SODIUM PHOSPHATE, DIBASIC, ANHYDROUS, POTASSIUM PHOSPHATE, MONOBASIC, AND SODIUM PHOSPHATE, MONOBASIC, MONOHYDRATE 1 PACKET(S): 852; 155; 130 TABLET, COATED ORAL at 04:17

## 2024-08-14 RX ADMIN — ACETAMINOPHEN 975 MILLIGRAM(S): 325 TABLET ORAL at 12:45

## 2024-08-14 RX ADMIN — ACETAMINOPHEN 975 MILLIGRAM(S): 325 TABLET ORAL at 13:30

## 2024-08-14 NOTE — PHYSICAL THERAPY INITIAL EVALUATION ADULT - PERTINENT HX OF CURRENT PROBLEM, REHAB EVAL
74M with PMH HTN, HLD, epidural hematoma (s/p resection 2021 with Dr. Uriarte), who presented for elective dural AVF ligation, now s/p C2-5 lami with dAVF ligation, complicated by L vert dissection on 8/12/24.

## 2024-08-14 NOTE — PROGRESS NOTE ADULT - ASSESSMENT
74M with PMH HTN, HLD, epidural hematoma s/p resection 2021 with Dr. Uriarte who presented for elective dural AVF ligation, now s/p C2-5 lami with dAVF ligation, complicated by L vert dissection on 8/12/24.   - POD2     Plan:  - Q1 hour neuro checks  - MAP >60  - No post-op imaging needed  - Angiogram, cancelled.   - if decline in exam get STAT CT Cervical non con for eval of compressive hematoma- [place] drain to half suction, monitor output hourly   - 1 subfascial [place] to half-suction; record output  - Pain control PRN, avoid oversedation  - Dash diet, regular  - Bowel regimen: senna, miralax   - PT/OT/OOB to chair  - To be discussed with Dr. Jewell at AM rounds. Possible downgrade in AM

## 2024-08-14 NOTE — DIETITIAN INITIAL EVALUATION ADULT - ORAL INTAKE PTA/DIET HISTORY
Pt reports eating well PTA; denies any recent weight changes. Pt denies difficulty chewing or swallowing at this time. Encouraged HBV protein foods to promote healing.

## 2024-08-14 NOTE — CHART NOTE - NSCHARTNOTEFT_GEN_A_CORE
HPI:  74 year old male with PMH of HLD, HTN, melanoma and chronic back pain presents to [facility]. He initially presented to [facility] on 10/6/24 with complaints of acute onset back pain and progressively worsening bilateral lower extremity weakness. At that time he had an urgent MRI which showed intradural extramedullary hematoma T12-L1, S/P T10-L2 laminectomy on 10/6/21. Spinal angiogram done on 10/7/24 showed no vascular abnormalities and Venous dopplers negative for blood clot. 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 He currently reports B/L feet numbness, and numbness on right leg that he feels is getting progressively worse. Reports intermittent right knee and foot pain, described as sharp, 3/10 in severity, worse with nothing, relieved with nothing. Denies urinary or bowel incontinence. He had another spinal angiogram on 5/21/24 with Dr. Jewell which showed Spinal dural AVF, he continue to have dizziness and impaired coordination while walking. These episodes have occurred in the past and resolved after a few hours, but now these episode has lasted longer than usual. He reports no other symptoms including new weakness, nausea, vomiting, headache. Now he is scheduled for C3-C5 Laminoplasty with clipping of spinal dural AVF with intraoperative angiogram by Dr. Jewell on 8/12/24. cardiac clearance pending  (23 Jul 2024 11:59)      INTERVAL HPI/OVERNIGHT EVENTS:  8/12: OR for AV fistula ligation. Pt lost motors on left side during case seen on neuro-monitoring but came back. Decadron 10 given intra-op.   8/13shoulder pain relieved with tylenol and oxy  8/14: 74y Male s/p C2-C5 laminectomy with ligation of dural AV fistula (8/12) and angio (8/13) seen lying comfortably in bed. Tolerating diet. TOV today. Denies headache, weakness, numbness, n/v/d, fevers, chills, chest pain, SOB. Pt has not had BM yet. [place] drain to 1/2 suction put out 80/190.     Vital Signs Last 24 Hrs  T(C): 37 (14 Aug 2024 07:00), Max: 37.2 (13 Aug 2024 09:15)  T(F): 98.6 (14 Aug 2024 07:00), Max: 99 (13 Aug 2024 09:15)  HR: 75 (14 Aug 2024 07:00) (62 - 89)  BP: 151/76 (14 Aug 2024 07:00) (117/64 - 151/76)  BP(mean): 97 (14 Aug 2024 07:00) (79 - 97)  RR: 17 (14 Aug 2024 07:00) (13 - 24)  SpO2: 93% (14 Aug 2024 07:00) (91% - 98%)    Parameters below as of 14 Aug 2024 04:00  Patient On (Oxygen Delivery Method): room air        PHYSICAL EXAM:  GENERAL: NAD, well-groomed  HEAD:  Atraumatic, normocephalic  DRAINS: [place] drain to 1/2 suction  WOUND: Dressing clean dry intact  TREVOR COMA SCORE: E- V- M- = 15  MENTAL STATUS: AAO x3; Awake; Opens eyes spontaneously; Appropriately conversant without aphasia; following simple commands   CRANIAL NERVES: PERRL. EOMI without nystagmus. Facial sensation intact V1-3 distribution b/l. Face symmetric w/ normal eye closure and smile, tongue midline. Hearing grossly intact. Speech clear. Head turning and shoulder shrug intact.   REFLEXES: PERRL. Corneals intact b/l.    MOTOR: strength 5/5 b/l upper and lower extremities  SENSATION: grossly intact to light touch all extremities  CHEST/LUNG: Chest rise and fall equally and bilaterally   HEART: +S1/+S2; Regular rate and rhythm   ABDOMEN: Soft, nontender, nondistended   EXTREMITIES:  2+ peripheral pulses, no clubbing, cyanosis, or edema  SKIN: Warm, dry; no rashes or lesions    LABS:                        9.5    6.46  )-----------( 121      ( 14 Aug 2024 02:19 )             28.1     08-14    139  |  106  |  13.9  ----------------------------<  116<H>  4.0   |  25.0  |  0.47<L>    Ca    7.4<L>      14 Aug 2024 02:19  Phos  1.7     08-14  Mg     1.9     08-14      PT/INR - ( 13 Aug 2024 02:18 )   PT: 12.2 sec;   INR: 1.10 ratio         PTT - ( 13 Aug 2024 02:18 )  PTT:33.0 sec  Urinalysis Basic - ( 14 Aug 2024 02:19 )    Color: x / Appearance: x / SG: x / pH: x  Gluc: 116 mg/dL / Ketone: x  / Bili: x / Urobili: x   Blood: x / Protein: x / Nitrite: x   Leuk Esterase: x / RBC: x / WBC x   Sq Epi: x / Non Sq Epi: x / Bacteria: x        08-13 @ 07:01  -  08-14 @ 07:00  --------------------------------------------------------  IN: 2506.4 mL / OUT: 1980 mL / NET: 526.4 mL        RADIOLOGY & ADDITIONAL TESTS:  8/13 CT cspine: mild degenerative changes     ASSESSMENT PLANS: HPI:  74 year old male with PMH of HLD, HTN, melanoma and chronic back pain presents to [facility]. He initially presented to [facility] on 10/6/24 with complaints of acute onset back pain and progressively worsening bilateral lower extremity weakness. At that time he had an urgent MRI which showed intradural extramedullary hematoma T12-L1, S/P T10-L2 laminectomy on 10/6/21. Spinal angiogram done on 10/7/24 showed no vascular abnormalities and Venous dopplers negative for blood clot. 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 He currently reports B/L feet numbness, and numbness on right leg that he feels is getting progressively worse. Reports intermittent right knee and foot pain, described as sharp, 3/10 in severity, worse with nothing, relieved with nothing. Denies urinary or bowel incontinence. He had another spinal angiogram on 5/21/24 with Dr. Jewell which showed Spinal dural AVF, he continue to have dizziness and impaired coordination while walking. These episodes have occurred in the past and resolved after a few hours, but now these episode has lasted longer than usual. He reports no other symptoms including new weakness, nausea, vomiting, headache. Now he is scheduled for C3-C5 Laminoplasty with clipping of spinal dural AVF with intraoperative angiogram by Dr. Jewell on 8/12/24. cardiac clearance pending  (23 Jul 2024 11:59)      INTERVAL HPI/OVERNIGHT EVENTS:  8/12: OR for AV fistula ligation. Pt lost motors on left side during case seen on neuro-monitoring but came back. Decadron 10 given intra-op.   8/13shoulder pain relieved with tylenol and oxy  8/14: 74y Male s/p C2-C5 laminectomy with ligation of dural AV fistula (8/12) and angio (8/13) seen lying comfortably in bed. Tolerating diet. TOV today. Denies headache, weakness, numbness, n/v/d, fevers, chills, chest pain, SOB. Pt has not had BM yet. [place] drain to 1/2 suction put out 80/190.     Vital Signs Last 24 Hrs  T(C): 37 (14 Aug 2024 07:00), Max: 37.2 (13 Aug 2024 09:15)  T(F): 98.6 (14 Aug 2024 07:00), Max: 99 (13 Aug 2024 09:15)  HR: 75 (14 Aug 2024 07:00) (62 - 89)  BP: 151/76 (14 Aug 2024 07:00) (117/64 - 151/76)  BP(mean): 97 (14 Aug 2024 07:00) (79 - 97)  RR: 17 (14 Aug 2024 07:00) (13 - 24)  SpO2: 93% (14 Aug 2024 07:00) (91% - 98%)    Parameters below as of 14 Aug 2024 04:00  Patient On (Oxygen Delivery Method): room air        PHYSICAL EXAM:  GENERAL: NAD, well-groomed  HEAD:  Atraumatic, normocephalic  DRAINS: [place] drain to 1/2 suction with bloody output   WOUND: Dressing clean dry intact  TREVOR COMA SCORE: E- V- M- = 15  MENTAL STATUS: AAO x3; Awake; Opens eyes spontaneously; Appropriately conversant without aphasia; following simple commands   CRANIAL NERVES: PERRL. EOMI without nystagmus. Facial sensation intact V1-3 distribution b/l. Face symmetric w/ normal eye closure and smile, tongue midline. Hearing grossly intact. Speech clear. Head turning and shoulder shrug intact.   REFLEXES: PERRL. Corneals intact b/l.    MOTOR: strength 5/5 b/l upper and lower extremities  SENSATION: grossly intact to light touch all extremities  CHEST/LUNG: Chest rise and fall equally and bilaterally   HEART: +S1/+S2; Regular rate and rhythm   ABDOMEN: Soft, nontender, nondistended   EXTREMITIES:  2+ peripheral pulses, no clubbing, cyanosis, or edema  SKIN: Warm, dry; no rashes or lesions    LABS:                        9.5    6.46  )-----------( 121      ( 14 Aug 2024 02:19 )             28.1     08-14    139  |  106  |  13.9  ----------------------------<  116<H>  4.0   |  25.0  |  0.47<L>    Ca    7.4<L>      14 Aug 2024 02:19  Phos  1.7     08-14  Mg     1.9     08-14      PT/INR - ( 13 Aug 2024 02:18 )   PT: 12.2 sec;   INR: 1.10 ratio         PTT - ( 13 Aug 2024 02:18 )  PTT:33.0 sec  Urinalysis Basic - ( 14 Aug 2024 02:19 )    Color: x / Appearance: x / SG: x / pH: x  Gluc: 116 mg/dL / Ketone: x  / Bili: x / Urobili: x   Blood: x / Protein: x / Nitrite: x   Leuk Esterase: x / RBC: x / WBC x   Sq Epi: x / Non Sq Epi: x / Bacteria: x        08-13 @ 07:01  -  08-14 @ 07:00  --------------------------------------------------------  IN: 2506.4 mL / OUT: 1980 mL / NET: 526.4 mL        RADIOLOGY & ADDITIONAL TESTS:  8/13 CT cspine: mild degenerative changes     ASSESSMENT PLANS:  Assessment: 74M with PMH HTN, HLD, epidural hematoma (s/p resection 2021 with Dr. Uriarte), who presented for elective dural AVF ligation, now s/p C2-5 lami with dAVF ligation, complicated by L vert dissection on 8/12/24.     Plan:   - Q2 hour general neuro checks post-angio  - pain control: tylenol, /600/300  - [place] drain to half suction, monitor output hourly  - incentive spirometer, goal SpO2>92%  - DASH diet   - Needs BM- ordered suppository today   - TOV today   - replete electrolytes  - s/p ancef   - SCDs, hold off chemoppx   - PT/OT/OOB to chair    Dispo: DG to SDU under Neurosurgery HPI:  74 year old male with PMH of HLD, HTN, melanoma and chronic back pain presents to [facility]. He initially presented to [facility] on 10/6/24 with complaints of acute onset back pain and progressively worsening bilateral lower extremity weakness. At that time he had an urgent MRI which showed intradural extramedullary hematoma T12-L1, S/P T10-L2 laminectomy on 10/6/21. Spinal angiogram done on 10/7/24 showed no vascular abnormalities and Venous dopplers negative for blood clot. 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 He currently reports B/L feet numbness, and numbness on right leg that he feels is getting progressively worse. Reports intermittent right knee and foot pain, described as sharp, 3/10 in severity, worse with nothing, relieved with nothing. Denies urinary or bowel incontinence. He had another spinal angiogram on 5/21/24 with Dr. Jewell which showed Spinal dural AVF, he continue to have dizziness and impaired coordination while walking. These episodes have occurred in the past and resolved after a few hours, but now these episode has lasted longer than usual. He reports no other symptoms including new weakness, nausea, vomiting, headache. Now he is scheduled for C3-C5 Laminoplasty with clipping of spinal dural AVF with intraoperative angiogram by Dr. Jewell on 8/12/24. cardiac clearance pending  (23 Jul 2024 11:59)      INTERVAL HPI/OVERNIGHT EVENTS:  8/12: OR for AV fistula ligation. Pt lost motors on left side during case seen on neuro-monitoring but came back. Decadron 10 given intra-op.   8/13shoulder pain relieved with tylenol and oxy  8/14: 74y Male s/p C2-C5 laminectomy with ligation of dural AV fistula (8/12) and angio (8/13) seen lying comfortably in bed. Tolerating diet. TOV today. Denies headache, weakness, numbness, n/v/d, fevers, chills, chest pain, SOB. Pt has not had BM yet. [place] drain to 1/2 suction put out 80/190.     Vital Signs Last 24 Hrs  T(C): 37 (14 Aug 2024 07:00), Max: 37.2 (13 Aug 2024 09:15)  T(F): 98.6 (14 Aug 2024 07:00), Max: 99 (13 Aug 2024 09:15)  HR: 75 (14 Aug 2024 07:00) (62 - 89)  BP: 151/76 (14 Aug 2024 07:00) (117/64 - 151/76)  BP(mean): 97 (14 Aug 2024 07:00) (79 - 97)  RR: 17 (14 Aug 2024 07:00) (13 - 24)  SpO2: 93% (14 Aug 2024 07:00) (91% - 98%)    Parameters below as of 14 Aug 2024 04:00  Patient On (Oxygen Delivery Method): room air        PHYSICAL EXAM:  GENERAL: NAD, well-groomed  HEAD:  Atraumatic, normocephalic  DRAINS: [place] drain to 1/2 suction with bloody output   WOUND: Dressing clean dry intact  TREVOR COMA SCORE: E- V- M- = 15  MENTAL STATUS: AAO x3; Awake; Opens eyes spontaneously; Appropriately conversant without aphasia; following simple commands   CRANIAL NERVES: PERRL. EOMI without nystagmus. Facial sensation intact V1-3 distribution b/l. Face symmetric w/ normal eye closure and smile, tongue midline. Hearing grossly intact. Speech clear. Head turning and shoulder shrug intact.   REFLEXES: PERRL. Corneals intact b/l.    MOTOR: strength 5/5 b/l upper and lower extremities  SENSATION: Decreased sensation on lateral portion of RLE (chronic)   CHEST/LUNG: Chest rise and fall equally and bilaterally   HEART: +S1/+S2; Regular rate and rhythm   ABDOMEN: Soft, nontender, nondistended   EXTREMITIES:  2+ peripheral pulses, no clubbing, cyanosis, or edema  SKIN: Warm, dry; no rashes or lesions    LABS:                        9.5    6.46  )-----------( 121      ( 14 Aug 2024 02:19 )             28.1     08-14    139  |  106  |  13.9  ----------------------------<  116<H>  4.0   |  25.0  |  0.47<L>    Ca    7.4<L>      14 Aug 2024 02:19  Phos  1.7     08-14  Mg     1.9     08-14      PT/INR - ( 13 Aug 2024 02:18 )   PT: 12.2 sec;   INR: 1.10 ratio         PTT - ( 13 Aug 2024 02:18 )  PTT:33.0 sec  Urinalysis Basic - ( 14 Aug 2024 02:19 )    Color: x / Appearance: x / SG: x / pH: x  Gluc: 116 mg/dL / Ketone: x  / Bili: x / Urobili: x   Blood: x / Protein: x / Nitrite: x   Leuk Esterase: x / RBC: x / WBC x   Sq Epi: x / Non Sq Epi: x / Bacteria: x        08-13 @ 07:01  -  08-14 @ 07:00  --------------------------------------------------------  IN: 2506.4 mL / OUT: 1980 mL / NET: 526.4 mL        RADIOLOGY & ADDITIONAL TESTS:  8/13 CT cspine: mild degenerative changes     ASSESSMENT PLANS:  Assessment: 74M with PMH HTN, HLD, epidural hematoma (s/p resection 2021 with Dr. Uriarte), who presented for elective dural AVF ligation, now s/p C2-5 lami with dAVF ligation, complicated by L vert dissection on 8/12/24.     Plan:   - Q2 hour general neuro checks post-angio  - pain control: tylenol, /600/300  - [place] drain to half suction, monitor output hourly  - incentive spirometer, goal SpO2>92%  - DASH diet   - Needs BM- ordered suppository today   - TOV today   - replete electrolytes  - s/p ancef   - SCDs, hold off chemoppx   - PT/OT/OOB to chair    Dispo: DG to SDU under Neurosurgery HPI:  74 year old male with PMH of HLD, HTN, melanoma and chronic back pain presents to [facility]. He initially presented to [facility] on 10/6/24 with complaints of acute onset back pain and progressively worsening bilateral lower extremity weakness. At that time he had an urgent MRI which showed intradural extramedullary hematoma T12-L1, S/P T10-L2 laminectomy on 10/6/21. Spinal angiogram done on 10/7/24 showed no vascular abnormalities and Venous dopplers negative for blood clot. 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 He currently reports B/L feet numbness, and numbness on right leg that he feels is getting progressively worse. Reports intermittent right knee and foot pain, described as sharp, 3/10 in severity, worse with nothing, relieved with nothing. Denies urinary or bowel incontinence. He had another spinal angiogram on 5/21/24 with Dr. Jewell which showed Spinal dural AVF, he continue to have dizziness and impaired coordination while walking. These episodes have occurred in the past and resolved after a few hours, but now these episode has lasted longer than usual. He reports no other symptoms including new weakness, nausea, vomiting, headache. Now he is scheduled for C3-C5 Laminoplasty with clipping of spinal dural AVF with intraoperative angiogram by Dr. Jewell on 8/12/24. cardiac clearance pending  (23 Jul 2024 11:59)      INTERVAL HPI/OVERNIGHT EVENTS:  8/12: OR for AV fistula ligation. Pt lost motors on left side during case seen on neuro-monitoring but came back. Decadron 10 given intra-op.   8/13shoulder pain relieved with tylenol and oxy  8/14: 74y Male s/p C2-C5 laminectomy with ligation of dural AV fistula (8/12) and angio (8/13) seen lying comfortably in bed. Tolerating diet. TOV today. Denies headache, weakness, numbness, n/v/d, fevers, chills, chest pain, SOB. Pt has not had BM yet. [place] drain to 1/2 suction put out 80/190.     Vital Signs Last 24 Hrs  T(C): 37 (14 Aug 2024 07:00), Max: 37.2 (13 Aug 2024 09:15)  T(F): 98.6 (14 Aug 2024 07:00), Max: 99 (13 Aug 2024 09:15)  HR: 75 (14 Aug 2024 07:00) (62 - 89)  BP: 151/76 (14 Aug 2024 07:00) (117/64 - 151/76)  BP(mean): 97 (14 Aug 2024 07:00) (79 - 97)  RR: 17 (14 Aug 2024 07:00) (13 - 24)  SpO2: 93% (14 Aug 2024 07:00) (91% - 98%)    Parameters below as of 14 Aug 2024 04:00  Patient On (Oxygen Delivery Method): room air        PHYSICAL EXAM:  GENERAL: NAD, well-groomed  HEAD:  Atraumatic, normocephalic  DRAINS: [place] drain to 1/2 suction with bloody output   WOUND: Dressing clean dry intact  TREVOR COMA SCORE: E- V- M- = 15  MENTAL STATUS: AAO x3; Awake; Opens eyes spontaneously; Appropriately conversant without aphasia; following simple commands   CRANIAL NERVES: PERRL. EOMI without nystagmus. Facial sensation intact V1-3 distribution b/l. Face symmetric w/ normal eye closure and smile, tongue midline. Hearing grossly intact. Speech clear. Head turning and shoulder shrug intact.   REFLEXES: PERRL. Corneals intact b/l.    MOTOR: strength 5/5 b/l upper and lower extremities  SENSATION: Decreased sensation on lateral portion of RLE (chronic)   CHEST/LUNG: Chest rise and fall equally and bilaterally   HEART: +S1/+S2; Regular rate and rhythm   ABDOMEN: Soft, nontender, nondistended   EXTREMITIES:  2+ peripheral pulses, no clubbing, cyanosis, or edema  SKIN: Warm, dry; no rashes or lesions    LABS:                        9.5    6.46  )-----------( 121      ( 14 Aug 2024 02:19 )             28.1     08-14    139  |  106  |  13.9  ----------------------------<  116<H>  4.0   |  25.0  |  0.47<L>    Ca    7.4<L>      14 Aug 2024 02:19  Phos  1.7     08-14  Mg     1.9     08-14      PT/INR - ( 13 Aug 2024 02:18 )   PT: 12.2 sec;   INR: 1.10 ratio         PTT - ( 13 Aug 2024 02:18 )  PTT:33.0 sec  Urinalysis Basic - ( 14 Aug 2024 02:19 )    Color: x / Appearance: x / SG: x / pH: x  Gluc: 116 mg/dL / Ketone: x  / Bili: x / Urobili: x   Blood: x / Protein: x / Nitrite: x   Leuk Esterase: x / RBC: x / WBC x   Sq Epi: x / Non Sq Epi: x / Bacteria: x        08-13 @ 07:01  -  08-14 @ 07:00  --------------------------------------------------------  IN: 2506.4 mL / OUT: 1980 mL / NET: 526.4 mL        RADIOLOGY & ADDITIONAL TESTS:  8/13 CT cspine: mild degenerative changes     ASSESSMENT PLANS:  Assessment: 74M with PMH HTN, HLD, epidural hematoma (s/p resection 2021 with Dr. Uriarte), who presented for elective dural AVF ligation, now s/p C2-5 lami with dAVF ligation, complicated by L vert dissection on 8/12/24.     Plan:   - Q2 hour general neuro checks post-angio  - pain control: tylenol, /600/300  - [place] drain to half suction, monitor output hourly  - incentive spirometer, goal SpO2>92%  - DASH diet   - Needs BM- ordered suppository today   - TOV today   - replete electrolytes  - s/p ancef   - SCDs, start lovenox tonight   - PT/OT/OOB to chair    Dispo: DG to SDU under Neurosurgery

## 2024-08-14 NOTE — DIETITIAN INITIAL EVALUATION ADULT - PERTINENT LABORATORY DATA
08-14    139  |  106  |  13.9  ----------------------------<  116<H>  4.0   |  25.0  |  0.47<L>    Ca    7.4<L>      14 Aug 2024 02:19  Phos  1.7     08-14  Mg     1.9     08-14    A1C with Estimated Average Glucose Result: 5.5 % (07-23-24 @ 12:04)  A1C with Estimated Average Glucose Result: 5.3 % (05-07-24 @ 14:05)

## 2024-08-14 NOTE — OCCUPATIONAL THERAPY INITIAL EVALUATION ADULT - LEVEL OF INDEPENDENCE: SIT/STAND, REHAB EVAL
Earlier in night patient stated that leg was tingling and was unable to ambulate.  Educated patient to call for assistance to bathroom. Nurse went in to room at 0615 and patient stated that she had ambulated to bathroom on her own. Reeducated about calling for help to bathroom to decrease the risk of falls.    minimum assist (75% patients effort)

## 2024-08-14 NOTE — OCCUPATIONAL THERAPY INITIAL EVALUATION ADULT - PERTINENT HX OF CURRENT PROBLEM, REHAB EVAL
As per MD note: 74 year old male with PMH of HLD, HTN, melanoma and chronic back pain presents to [facility]. He initially presented to [facility] on 10/6/24 with complaints of acute onset back pain and progressively worsening bilateral lower extremity weakness. At that time he had an urgent MRI which showed intradural extramedullary hematoma T12-L1, S/P T10-L2 laminectomy on 10/6/21. Spinal angiogram done on 10/7/24 showed no vascular abnormalities and Venous dopplers negative for blood clot. 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 He currently reports B/L feet numbness, and numbness on right leg that he feels is getting progressively worse. Reports intermittent right knee and foot pain, described as sharp, 3/10 in severity, worse with nothing, relieved with nothing. Denies urinary or bowel incontinence. He had another spinal angiogram on 5/21/24 with Dr. Jewell which showed Spinal dural AVF, he continue to have dizziness and impaired coordination while walking. These episodes have occurred in the past and resolved after a few hours, but now these episode has lasted longer than usual. He reports no other symptoms including new weakness, nausea, vomiting, headache. Now he is scheduled for C3-C5 Laminoplasty with clipping of spinal dural AVF with intraoperative angiogram by Dr. Jewell on 8/12/24. cardiac clearance pending  (23 Jul 2024 11:59)

## 2024-08-14 NOTE — PHYSICAL THERAPY INITIAL EVALUATION ADULT - ADDITIONAL COMMENTS
pt reports living in private home with wife who is able to assist. 5 NUNU with bilateral handrails and all needs met on the first floor. Independent prior to admission. Owns RW/Cane

## 2024-08-14 NOTE — CHART NOTE - NSCHARTNOTEFT_GEN_A_CORE
Patient seen and examined at bedside. Patient s/p cerebral angiogram via right groin, POD1. Groin examined. Groin soft, nontender, no hematoma, mild ecchymoses. Dressing removed. Questions/concerns answered. Left in NAD.

## 2024-08-14 NOTE — DIETITIAN INITIAL EVALUATION ADULT - PERTINENT MEDS FT
MEDICATIONS  (STANDING):  acetaminophen     Tablet .. 975 milliGRAM(s) Oral every 6 hours  bisacodyl Suppository 10 milliGRAM(s) Rectal once  chlorhexidine 2% Cloths 1 Application(s) Topical <User Schedule>  gabapentin 300 milliGRAM(s) Oral at bedtime  lidocaine   4% Patch 1 Patch Transdermal daily  lidocaine   4% Patch 1 Patch Transdermal daily  multivitamin 1 Tablet(s) Oral daily  polyethylene glycol 3350 17 Gram(s) Oral at bedtime  senna 2 Tablet(s) Oral at bedtime  simvastatin 20 milliGRAM(s) Oral at bedtime  sodium chloride 0.9%. 1000 milliLiter(s) (75 mL/Hr) IV Continuous <Continuous>    MEDICATIONS  (PRN):  HYDROmorphone  Injectable 0.5 milliGRAM(s) IV Push every 4 hours PRN breakthrough severe pain  methocarbamol 750 milliGRAM(s) Oral three times a day PRN Muscle Spasm  ondansetron Injectable 4 milliGRAM(s) IV Push every 6 hours PRN Nausea and/or Vomiting  oxyCODONE    IR 5 milliGRAM(s) Oral every 4 hours PRN Moderate Pain (4 - 6)

## 2024-08-14 NOTE — PROGRESS NOTE ADULT - SUBJECTIVE AND OBJECTIVE BOX
HPI:  HPI:  74 year old male with PMH of HLD, HTN, melanoma and chronic back pain presents to [facility]. He initially presented to [facility] on 10/6/24 with complaints of acute onset back pain and progressively worsening bilateral lower extremity weakness. At that time he had an urgent MRI which showed intradural extramedullary hematoma T12-L1, S/P T10-L2 laminectomy on 10/6/21. Spinal angiogram done on 10/7/24 showed no vascular abnormalities and Venous dopplers negative for blood clot. 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 He currently reports B/L feet numbness, and numbness on right leg that he feels is getting progressively worse. Reports intermittent right knee and foot pain, described as sharp, 3/10 in severity, worse with nothing, relieved with nothing. Denies urinary or bowel incontinence. He had another spinal angiogram on 5/21/24 with Dr. Jewell which showed Spinal dural AVF, he continue to have dizziness and impaired coordination while walking. These episodes have occurred in the past and resolved after a few hours, but now these episode has lasted longer than usual. He reports no other symptoms including new weakness, nausea, vomiting, headache. Now he is scheduled for C3-C5 Laminoplasty with clipping of spinal dural AVF with intraoperative angiogram by Dr. Jewell on 8/12/24. cardiac clearance pending  (23 Jul 2024 11:59)    Interval events: patient seen and examined at bedside. No complaints at this time. Patient is neurologically intact.     Vital Signs Last 24 Hrs  T(C): 37.2 (13 Aug 2024 23:00), Max: 37.3 (13 Aug 2024 02:45)  T(F): 99 (13 Aug 2024 23:00), Max: 99.1 (13 Aug 2024 02:45)  HR: 70 (13 Aug 2024 23:00) (62 - 89)  BP: 136/72 (13 Aug 2024 23:00) (104/62 - 149/73)  BP(mean): 88 (13 Aug 2024 23:00) (75 - 97)  RR: 16 (13 Aug 2024 23:00) (12 - 24)  SpO2: 94% (13 Aug 2024 23:00) (91% - 99%)    Parameters below as of 13 Aug 2024 20:00  Patient On (Oxygen Delivery Method): room air    Physical Exam:  Constitutional: NAD, lying in bed  Neuro  * Mental Status:  GCS 15: Awake, alert, oriented to conversation. No aphasia or difficulty speaking. No dysarthria. Able to name objects and their function.  * Cranial Nerves: Cnii-Cnxii grossly intact. PERRL, EOMI, tongue midline, no gaze deviation  * Motor: RUE 5/5, LUE 5/5, RLE 5/5, LLE 5/5, no drift or dysmetria  * Sensory: mild decreased sensation R foot (baseline)  * Reflexes: not assessed   Cardiovascular: Regular rate and rhythm.  Eyes: See neurologic examination with detailed examination of eyes.  ENT: No JVD, Trachea Midline  Respiratory: non labored breathing   Gastrointestinal: Soft, nontender, nondistended.  Genitourinary: [ ] Vazquez, [ x ] No Vazquez.   Musculoskeletal: No muscle wasting noted, (See neurologic assessment for full muscle strength assessment)  Skin:  no wounds, no redness, no abrasions noted  Hematologic / Lymph / Immunologic: No bleeding from IV sites or wounds        LABS:                        11.1   9.64  )-----------( 140      ( 13 Aug 2024 02:18 )             31.7     08-13    139  |  107  |  17.5  ----------------------------<  123<H>  4.4   |  20.0<L>  |  0.54    Ca    7.3<L>      13 Aug 2024 02:18  Phos  3.2     08-13  Mg     1.8     08-13      PT/INR - ( 13 Aug 2024 02:18 )   PT: 12.2 sec;   INR: 1.10 ratio         PTT - ( 13 Aug 2024 02:18 )  PTT:33.0 sec  Urinalysis Basic - ( 13 Aug 2024 02:18 )    Color: x / Appearance: x / SG: x / pH: x  Gluc: 123 mg/dL / Ketone: x  / Bili: x / Urobili: x   Blood: x / Protein: x / Nitrite: x   Leuk Esterase: x / RBC: x / WBC x   Sq Epi: x / Non Sq Epi: x / Bacteria: x        08-12 @ 07:01  -  08-13 @ 07:00  --------------------------------------------------------  IN: 1722.9 mL / OUT: 1300 mL / NET: 422.9 mL    08-13 @ 07:01  -  08-14 @ 00:19  --------------------------------------------------------  IN: 1906.4 mL / OUT: 1425 mL / NET: 481.4 mL      RADIOLOGY & ADDITIONAL TESTS:  Xray Cervical Spine, Portable (08.12.24 @ 15:05)   IMPRESSION: Surgical marker as noted.

## 2024-08-14 NOTE — DIETITIAN INITIAL EVALUATION ADULT - OTHER INFO
Pt is a 74 year old M with PMH HTN, HLD, epidural hematoma s/p resection 2021 with Dr. Uriarte who presented for elective dural AVF ligation, now s/p C2-5 lami with dAVF ligation, complicated by L vert dissection on 8/12/24.

## 2024-08-14 NOTE — PHYSICAL THERAPY INITIAL EVALUATION ADULT - GENERAL OBSERVATIONS, REHAB EVAL
Pt received in chair + JAMA[place] drain + IV + tele//BP monitoring, breathing on RA in NAD, c/o neck stiffness, agreeable to PT eval

## 2024-08-14 NOTE — CHART NOTE - NSCHARTNOTESELECT_GEN_ALL_CORE
Downgrade Note/Event Note
Groin check note/Event Note
Neurointerventional Surgery Pre Procedure Note/Event Note
Neurointerventional Surgery Post Procedure Note/Event Note
Neurointerventional Surgery Procedure Note/Event Note

## 2024-08-15 LAB
ANION GAP SERPL CALC-SCNC: 10 MMOL/L — SIGNIFICANT CHANGE UP (ref 5–17)
BUN SERPL-MCNC: 11.4 MG/DL — SIGNIFICANT CHANGE UP (ref 8–20)
CALCIUM SERPL-MCNC: 8.4 MG/DL — SIGNIFICANT CHANGE UP (ref 8.4–10.5)
CHLORIDE SERPL-SCNC: 103 MMOL/L — SIGNIFICANT CHANGE UP (ref 96–108)
CO2 SERPL-SCNC: 26 MMOL/L — SIGNIFICANT CHANGE UP (ref 22–29)
CREAT SERPL-MCNC: 0.53 MG/DL — SIGNIFICANT CHANGE UP (ref 0.5–1.3)
EGFR: 105 ML/MIN/1.73M2 — SIGNIFICANT CHANGE UP
GLUCOSE SERPL-MCNC: 109 MG/DL — HIGH (ref 70–99)
HCT VFR BLD CALC: 30.8 % — LOW (ref 39–50)
HGB BLD-MCNC: 10.3 G/DL — LOW (ref 13–17)
MAGNESIUM SERPL-MCNC: 2 MG/DL — SIGNIFICANT CHANGE UP (ref 1.6–2.6)
MCHC RBC-ENTMCNC: 30.3 PG — SIGNIFICANT CHANGE UP (ref 27–34)
MCHC RBC-ENTMCNC: 33.4 GM/DL — SIGNIFICANT CHANGE UP (ref 32–36)
MCV RBC AUTO: 90.6 FL — SIGNIFICANT CHANGE UP (ref 80–100)
PHOSPHATE SERPL-MCNC: 2.4 MG/DL — SIGNIFICANT CHANGE UP (ref 2.4–4.7)
PLATELET # BLD AUTO: 112 K/UL — LOW (ref 150–400)
POTASSIUM SERPL-MCNC: 4.1 MMOL/L — SIGNIFICANT CHANGE UP (ref 3.5–5.3)
POTASSIUM SERPL-SCNC: 4.1 MMOL/L — SIGNIFICANT CHANGE UP (ref 3.5–5.3)
RBC # BLD: 3.4 M/UL — LOW (ref 4.2–5.8)
RBC # FLD: 13.2 % — SIGNIFICANT CHANGE UP (ref 10.3–14.5)
SODIUM SERPL-SCNC: 139 MMOL/L — SIGNIFICANT CHANGE UP (ref 135–145)
WBC # BLD: 5.86 K/UL — SIGNIFICANT CHANGE UP (ref 3.8–10.5)
WBC # FLD AUTO: 5.86 K/UL — SIGNIFICANT CHANGE UP (ref 3.8–10.5)

## 2024-08-15 PROCEDURE — 99223 1ST HOSP IP/OBS HIGH 75: CPT

## 2024-08-15 RX ORDER — TAMSULOSIN HYDROCHLORIDE 0.4 MG/1
0.4 CAPSULE ORAL AT BEDTIME
Refills: 0 | Status: DISCONTINUED | OUTPATIENT
Start: 2024-08-15 | End: 2024-08-19

## 2024-08-15 RX ORDER — SODIUM PHOSPHATE, DIBASIC, ANHYDROUS, POTASSIUM PHOSPHATE, MONOBASIC, AND SODIUM PHOSPHATE, MONOBASIC, MONOHYDRATE 852; 155; 130 MG/1; MG/1; MG/1
1 TABLET, COATED ORAL ONCE
Refills: 0 | Status: COMPLETED | OUTPATIENT
Start: 2024-08-15 | End: 2024-08-15

## 2024-08-15 RX ADMIN — ENOXAPARIN SODIUM 40 MILLIGRAM(S): 100 INJECTION SUBCUTANEOUS at 21:16

## 2024-08-15 RX ADMIN — Medication 1 PATCH: at 09:22

## 2024-08-15 RX ADMIN — CHLORHEXIDINE GLUCONATE 1 APPLICATION(S): 40 SOLUTION TOPICAL at 05:12

## 2024-08-15 RX ADMIN — ACETAMINOPHEN 650 MILLIGRAM(S): 325 TABLET ORAL at 03:16

## 2024-08-15 RX ADMIN — Medication 300 MILLIGRAM(S): at 21:16

## 2024-08-15 RX ADMIN — OXYCODONE HYDROCHLORIDE 5 MILLIGRAM(S): 5 TABLET ORAL at 13:33

## 2024-08-15 RX ADMIN — Medication 600 MILLIGRAM(S): at 05:12

## 2024-08-15 RX ADMIN — Medication 1 PATCH: at 05:00

## 2024-08-15 RX ADMIN — Medication 1 TABLET(S): at 09:20

## 2024-08-15 RX ADMIN — Medication 600 MILLIGRAM(S): at 13:27

## 2024-08-15 RX ADMIN — ACETAMINOPHEN 650 MILLIGRAM(S): 325 TABLET ORAL at 09:22

## 2024-08-15 RX ADMIN — METHOCARBAMOL 750 MILLIGRAM(S): 750 TABLET, FILM COATED ORAL at 00:27

## 2024-08-15 RX ADMIN — METHOCARBAMOL 750 MILLIGRAM(S): 750 TABLET, FILM COATED ORAL at 09:20

## 2024-08-15 RX ADMIN — HYDROMORPHONE HYDROCHLORIDE 0.5 MILLIGRAM(S): 2 TABLET ORAL at 18:29

## 2024-08-15 RX ADMIN — ACETAMINOPHEN 650 MILLIGRAM(S): 325 TABLET ORAL at 10:20

## 2024-08-15 RX ADMIN — OXYCODONE HYDROCHLORIDE 5 MILLIGRAM(S): 5 TABLET ORAL at 00:27

## 2024-08-15 RX ADMIN — POLYETHYLENE GLYCOL 3350 17 GRAM(S): 17 POWDER, FOR SOLUTION ORAL at 21:17

## 2024-08-15 RX ADMIN — Medication 20 MILLIGRAM(S): at 21:17

## 2024-08-15 RX ADMIN — ACETAMINOPHEN 650 MILLIGRAM(S): 325 TABLET ORAL at 04:00

## 2024-08-15 RX ADMIN — OXYCODONE HYDROCHLORIDE 5 MILLIGRAM(S): 5 TABLET ORAL at 01:00

## 2024-08-15 RX ADMIN — Medication 2 TABLET(S): at 21:17

## 2024-08-15 RX ADMIN — TAMSULOSIN HYDROCHLORIDE 0.4 MILLIGRAM(S): 0.4 CAPSULE ORAL at 21:17

## 2024-08-15 RX ADMIN — SODIUM PHOSPHATE, DIBASIC, ANHYDROUS, POTASSIUM PHOSPHATE, MONOBASIC, AND SODIUM PHOSPHATE, MONOBASIC, MONOHYDRATE 1 PACKET(S): 852; 155; 130 TABLET, COATED ORAL at 13:28

## 2024-08-15 RX ADMIN — Medication 1 PATCH: at 09:21

## 2024-08-15 NOTE — PROGRESS NOTE ADULT - ASSESSMENT
74M with PMH HTN, HLD, epidural hematoma s/p resection 2021 with Dr. Uriarte who presented for elective dural AVF ligation, now s/p C2-5 lami with dAVF ligation, complicated by L vert dissection on 8/12/24 with Dr. Jewell. Pt underwent angio on 8/13.  - POD3     PLAN:    - Q4n neuro checks; okay to downgrade to tele  - MAP >65  - No post-op imaging needed  - if decline in exam get STAT CT Cervical non con for eval of compressive hematoma  - JAMAICA removed today  - Pain control PRN, avoid oversedation  - Dash diet, regular  - Bowel regimen: senna, miralax; LBM: 8/14  - PT/OT/OOB to chair  - PT/OT/PMR recommending AR, pt wants to go home; will have pt re-eval'd tomorrow   - Discussed case w/ Dr. Jewell

## 2024-08-15 NOTE — CONSULT NOTE ADULT - SUBJECTIVE AND OBJECTIVE BOX
74yM was admitted on 08-12 with progressive of dizziness and weakness related to edema of the spinal cord with varying degrees of mass effect. He had multiple spinal angiograms which showed Spinal dural AVF. He is now s/p C2-C5 Laminoplasty with clipping of spinal dural AVF with intraoperative angiogram. Intraopertively complicated by left vertebral dissection.     Imaging Reviewed Today:  CT C SPINE 8/12 - Comparison is made with the prior cervical spine MRI of 5/31/2024. The cervical and upper thoracic vertebral bodies are normal in height and density. Mild degenerative anterolisthesis at C4-5. Anterior osteophytes are present C5-6 C6-7 C7-T1. No acute fractures or dislocations are identified. Uncal vertebral joint and facet degenerative changes are noted. Left-sided facet hypertrophy causes left neural foraminal stenosis at C3-4 and C4-5. IMPRESSION: Mild degenerative changes.  ----------------------------  Patient felt better yesterday.   Upset about his bladder not working well.  Pain is fairly controlled.      VITALS  T(C): 36.7 (08-15-24 @ 07:57), Max: 37.1 (08-15-24 @ 04:30)  HR: 86 (08-15-24 @ 08:00) (67 - 90)  BP: 153/91 (08-15-24 @ 08:00) (121/81 - 153/91)  RR: 13 (08-15-24 @ 08:00) (12 - 21)  SpO2: 94% (08-15-24 @ 08:00) (94% - 100%)  Wt(kg): --    PAST MEDICAL & SURGICAL HISTORY  High cholesterol    Colitis    Vertigo    Melanoma    HTN (hypertension)    History of lumbar laminectomy for spinal cord decompression    Status post Mohs surgery         RECENT LABS REVIEWED    CBC Full  -  ( 15 Aug 2024 03:10 )  WBC Count : 5.86 K/uL  RBC Count : 3.40 M/uL  Hemoglobin : 10.3 g/dL  Hematocrit : 30.8 %  Platelet Count - Automated : 112 K/uL  Mean Cell Volume : 90.6 fl  Mean Cell Hemoglobin : 30.3 pg  Mean Cell Hemoglobin Concentration : 33.4 gm/dL  Auto Neutrophil # : x  Auto Lymphocyte # : x  Auto Monocyte # : x  Auto Eosinophil # : x  Auto Basophil # : x  Auto Neutrophil % : x  Auto Lymphocyte % : x  Auto Monocyte % : x  Auto Eosinophil % : x  Auto Basophil % : x    08-15    139  |  103  |  11.4  ----------------------------<  109<H>  4.1   |  26.0  |  0.53    Ca    8.4      15 Aug 2024 03:10  Phos  2.4     08-15  Mg     2.0     08-15      Urinalysis Basic - ( 15 Aug 2024 03:10 )    Color: x / Appearance: x / SG: x / pH: x  Gluc: 109 mg/dL / Ketone: x  / Bili: x / Urobili: x   Blood: x / Protein: x / Nitrite: x   Leuk Esterase: x / RBC: x / WBC x   Sq Epi: x / Non Sq Epi: x / Bacteria: x        ALLERGIES  Shrimp (Angioedema)  Celebrex (Other)      MEDICATIONS   acetaminophen     Tablet .. 650 milliGRAM(s) Oral every 6 hours PRN  chlorhexidine 2% Cloths 1 Application(s) Topical <User Schedule>  enoxaparin Injectable 40 milliGRAM(s) SubCutaneous <User Schedule>  gabapentin 300 milliGRAM(s) Oral at bedtime  gabapentin 600 milliGRAM(s) Oral <User Schedule>  HYDROmorphone  Injectable 0.5 milliGRAM(s) IV Push every 4 hours PRN  lidocaine   4% Patch 1 Patch Transdermal daily  lidocaine   4% Patch 1 Patch Transdermal daily  methocarbamol 750 milliGRAM(s) Oral three times a day PRN  multivitamin 1 Tablet(s) Oral daily  ondansetron Injectable 4 milliGRAM(s) IV Push every 6 hours PRN  oxyCODONE    IR 10 milliGRAM(s) Oral every 4 hours PRN  oxyCODONE    IR 5 milliGRAM(s) Oral every 4 hours PRN  polyethylene glycol 3350 17 Gram(s) Oral at bedtime  senna 2 Tablet(s) Oral at bedtime  simvastatin 20 milliGRAM(s) Oral at bedtime      ----------------------------------------------------------------------------------------  FUNCTIONAL HISTORY  Lives with wife, 5 NUNU  Independent    FUNCTIONAL STATUS/PROGRESS  8/14 PT  Transfer: Sit to Stand:     · Level of [place]	minimum assist (75% patients effort); right knee buckling noted  · Physical Assist/Nonphysical Assist	1 person + 1 person to manage equipment  · Assistive Device	rolling walker    Transfer: Stand to Sit:     · Level of [place]	minimum assist (75% patients effort)  · Physical Assist/Nonphysical Assist	1 person + 1 person to manage equipment  · Assistive Device	rolling walker    Sit/Stand Transfer Safety Analysis:     · Impairments Contributing to Impaired Transfers	impaired balance; impaired postural control; decreased strength; impaired sensory feedback    Gait Skills:     · Level of [place]	minimum assist (75% patients effort)  · Physical Assist/Nonphysical Assist	1 person + 1 person to manage equipment; chair follow  · Assistive Device	rolling walker  · Gait Distance	15ft    Gait Analysis:     · Gait Pattern Used	3-point gait  · Gait Deviations Noted	decreased heavenly; decreased step length; decreased stride length  · Impairments Contributing to Gait Deviations	impaired balance; decreased ROM; decreased strength; impaired postural control; decreased sensation    8/14 OT  Bathing Training:     · Level of [place]	moderate assist (50% patients effort)    Upper Body Dressing Training:     · Level of [place]	minimum assist (75% patients effort)    Lower Body Dressing Training:     · Level of [place]	moderate assist (50% patients effort)    Toilet Hygiene Training:     · Level of [place]	minimum assist (75% patients effort)    Grooming Training:     · Level of [place]	supervision      ----------------------------------------------------------------------------------------  PHYSICAL EXAM  Constitutional - NAD, Uncomfortable  HEENT - NCAT, EOMI  Neck - Supple, +limited ROM, +JAMA[place] drain  Chest - Breathing comfortably, No wheezing  Cardiovascular - S1S2   Abdomen - Soft   Extremities - No C/C/E, No calf tenderness   Neurologic Exam -                    Cognitive - AAO to self, place, date, year, situation     Communication - Fluent, No dysarthria     Cranial Nerves - CN 2-12 intact     FUNCTIONAL MOTOR EXAM - No focal deficits                    LEFT    UE - ShAB 5/5, EF 5/5, EE 5/5, WE 5/5,  5/5                    RIGHT UE - ShAB 5/5, EF 5/5, EE 5/5, WE 5/5,  5/5                    LEFT    LE - HF 5/5, KE 5/5, DF 5/5, PF 5/5                    RIGHT LE - HF 5/5, KE 5/5, DF 5/5, PF 5/5        Sensory - Decreased in the right foot   Psychiatric - Mood stable, Affect WNL  ----------------------------------------------------------------------------------------  ASSESSMENT/PLAN  74yMale with functional deficits related to dural AVF   Dural AVF s/p ligation, C2-5 laminectomy, complicated by vertebral dissection - Monitoring  Pain - Tylenol, Dilaudid IV, Lidoderm, Neurontin, Robaxin, Oxycodone  Neurogenic Bladder - IC, Consider Vazquez   CAD - Zocor  DVT PPX - SCDs, Lovenox  Rehab/Impaired mobility and function - Patient continues to require hospitalization for the above diagnoses and ongoing active management of comorbid complications (JAMAICA drain, ICU level care) that are substantially posing a threat to bodily function, functional ability and quality of life.     RECOMMEND - OOB daily     Patient would like to consider DC home. However, discussed that based on the patient's diagnosis, current functional status and potential for progress, recommend ACUTE inpatient rehabilitation for the functional deficits consisting of 3 hours of therapy/day & 24 hour RN/daily PMR physician for active comorbid medical management. Patient will be able to tolerate 3 hours a day.     Will continue to follow. Rehab recommendations are dependent on how functional progress changes as well as how patient continues to participate and tolerate therapeutic interventions, WHICH MAY CHANGE UPON FOLLOW UP EXAMINATIONS. Recommend ongoing mobilization by staff to maintain cardiopulmonary function and prevention of secondary complications related to debility/immobility. Discussed the specific rehabilitation management and recommendations documented above with rehab clinical care team/rehab liaison.      Total Time Spent on Encounter (reviewing clinical notes, labs, radiology and medications, reviewing patient history, physical exam, assessment and discussing rehabilitation options - with consideration of prior level of function, expected level of recovery and return to community living, rounding with team) - 75 minutes
Patient is a 74y old  Male who presents with a chief complaint of "Fixing the AV fistula" (23 Jul 2024 11:59)    HPI:  74 year old male with PMH of HLD, HTN, melanoma and chronic back pain presents to [facility]. He initially presented to [facility] on 10/6/24 with complaints of acute onset back pain and progressively worsening bilateral lower extremity weakness. At that time he had an urgent MRI which showed intradural extramedullary hematoma T12-L1, S/P T10-L2 laminectomy on 10/6/21. Spinal angiogram done on 10/7/24 showed no vascular abnormalities and Venous dopplers negative for blood clot. 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 He currently reports B/L feet numbness, and numbness on right leg that he feels is getting progressively worse. Reports intermittent right knee and foot pain, described as sharp, 3/10 in severity, worse with nothing, relieved with nothing. Denies urinary or bowel incontinence. He had another spinal angiogram on 5/21/24 with Dr. Jewell which showed Spinal dural AVF, he continue to have dizziness and impaired coordination while walking. These episodes have occurred in the past and resolved after a few hours, but now these episode has lasted longer than usual. He reports no other symptoms including new weakness, nausea, vomiting, headache. Now he is scheduled for C3-C5 Laminoplasty with clipping of spinal dural AVF with intraoperative angiogram by Dr. Jewell on 8/12/24. cardiac clearance pending  (23 Jul 2024 11:59)      Interval history:  Patient seen and examined by NSICU team. Patient POD0 from C2-5 Laminectomy w/ C1 dAVF ligation, complicated by L vert dissection. Patient reports that he feels well, admits to mild neck pain but otherwise has no acute complaints.       Vital Signs Last 24 Hrs  T(C): 34.8 (12 Aug 2024 15:15), Max: 36.7 (12 Aug 2024 05:44)  T(F): 94.6 (12 Aug 2024 15:15), Max: 98.1 (12 Aug 2024 05:44)  HR: 71 (12 Aug 2024 15:15) (58 - 79)  BP: 125/74 (12 Aug 2024 15:15) (125/74 - 162/98)  BP(mean): 89 (12 Aug 2024 15:15) (89 - 99)  RR: 19 (12 Aug 2024 15:15) (12 - 19)  SpO2: 98% (12 Aug 2024 15:15) (96% - 99%)    Parameters below as of 12 Aug 2024 14:30  Patient On (Oxygen Delivery Method): nasal cannula  O2 Flow (L/min): 4      Physical Exam:  Constitutional: NAD, lying in bed  Neuro  * Mental Status:  GCS 15: Awake, alert, oriented to conversation. No aphasia or difficulty speaking. No dysarthria.   * Cranial Nerves: Cnii-Cnxii grossly intact. PERRL, EOMI, tongue midline, no gaze deviation  * Motor: RUE 5/5, LUE 5/5, RLE 5/5, LLE 5/5, no drift or dysmetria  * Sensory: Sensation intact to light touch  * Reflexes: not assessed   Wrist: soft, nontender, no hematoma, no ecchymoses       LABS:                        11.7   4.47  )-----------( 109      ( 12 Aug 2024 15:12 )             33.8     7/23/24: PTT 38.9, INR 0.97  7/23/24: Na 137, K 4.9, Cl 101, CO2 27, BUN 17.8, Cr 0.59, glucose 100      Medications:  MEDICATIONS  (STANDING):  ceFAZolin  Injectable. 2000 milliGRAM(s) IV Push every 8 hours  gabapentin 300 milliGRAM(s) Oral at bedtime  gabapentin 600 milliGRAM(s) Oral <User Schedule>  multivitamin 1 Tablet(s) Oral daily  polyethylene glycol 3350 17 Gram(s) Oral at bedtime  senna 2 Tablet(s) Oral at bedtime  simvastatin 20 milliGRAM(s) Oral at bedtime  sodium chloride 0.9%. 1000 milliLiter(s) (75 mL/Hr) IV Continuous <Continuous>    MEDICATIONS  (PRN):  acetaminophen     Tablet .. 650 milliGRAM(s) Oral every 6 hours PRN Mild Pain (1 - 3)  HYDROmorphone  Injectable 0.5 milliGRAM(s) IV Push every 4 hours PRN Severe Pain (7 - 10)  methocarbamol 750 milliGRAM(s) Oral three times a day PRN Muscle Spasm  ondansetron Injectable 4 milliGRAM(s) IV Push every 6 hours PRN Nausea and/or Vomiting  oxyCODONE    IR 10 milliGRAM(s) Oral every 4 hours PRN Severe Pain (7 - 10)  oxyCODONE    IR 5 milliGRAM(s) Oral every 4 hours PRN Moderate Pain (4 - 6)      RADIOLOGY & ADDITIONAL STUDIES:  < from: CT Cervical Spine No Cont (08.12.24 @ 07:56) >  The cervical and upper thoracic vertebral bodies are normal in height and   density. Mild degenerative anterolisthesis at C4-5. Anterior osteophytes   are present C5-6 C6-7 C7-T1. No acute fractures or dislocations are   identified. Uncal vertebral joint and facet degenerative changes are   noted. Left-sided facet hypertrophy causes left neural foraminal stenosis   at C3-4 and C4-5.    IMPRESSION: Mild degenerative changes.    --- End of Report ---  EDGAR GUZMAN MD; Attending Radiologist  This document has been electronically signed. Aug 12 2024  8:31AM    < end of copied text >

## 2024-08-15 NOTE — PROGRESS NOTE ADULT - SUBJECTIVE AND OBJECTIVE BOX
SUBJECTIVE:   74M with PMH HTN, HLD, epidural hematoma s/p resection 2021 with Dr. Uriarte who presented for elective dural AVF ligation, now s/p C2-5 lami with dAVF ligation, complicated by L vert dissection on 8/12/24 with Dr. Jewell. Pt underwent angio on 8/13.  - POD3     INTERVAL HX/OVERNIGHT EVENTS:  Pt POD #3 s/p C2-5 lami with dAVF ligation, complicated by L vert dissection. Patient seen and examined at bedside. Denies any acute complaints. Drain removed at bedside. Pt tolerated well. Top incision staple removed and replaced d/t small amount of bleeding from incision site. Pt straight cath'd x2. Vazquez replaced this morning.     Patient was positioned. Drain was taken off suction. Sterile dressing removed with dried blood noted. Incision clean, dry, intact with mild bloody drainage at uppermost part of incision. Top incision staple removed and replaced d/t small amount of bleeding from incision site. [place] drain removed slowly with minimal drainage from site. Dressing placed at drain site. Patient tolerated procedure well. RN aware.     Vital Signs Last 24 Hrs  T(C): 36.7 (08-15-24 @ 07:57), Max: 37.1 (08-15-24 @ 04:30)  T(F): 98.1 (08-15-24 @ 07:57), Max: 98.7 (08-15-24 @ 04:30)  HR: 86 (08-15-24 @ 08:00) (67 - 90)  BP: 153/91 (08-15-24 @ 08:00) (121/81 - 153/91)  BP(mean): 103 (08-15-24 @ 08:00) (83 - 103)  RR: 13 (08-15-24 @ 08:00) (12 - 21)  SpO2: 94% (08-15-24 @ 08:00) (94% - 100%)    PHYSICAL EXAM:    GENERAL: NAD    INCISION: Incision site w/ staples in place, c/d/i     HEAD: normocephalic   MENTAL STATUS: AAO x 3, conversant, following simple commands    CRANIAL NERVES: PERRL, EOMI without nystagmus. Face symmetric, Tongue is midline. Hearing grossly intact. Head turning and shoulder shrug intact.    REFLEXES: No pronator drift   MOTOR: strength 5/5 b/l upper and lower extremities   SENSATION: grossly intact to light touch all extremities   SKIN: Warm, dry; No edema or ecchymosis at R groin site; Mild ecchymosis of L wrist     LABS:                          10.3   5.86  )-----------( 112      ( 15 Aug 2024 03:10 )             30.8    08-15    139  |  103  |  11.4  ----------------------------<  109<H>  4.1   |  26.0  |  0.53    Ca    8.4      15 Aug 2024 03:10  Phos  2.4     08-15  Mg     2.0     08-15        08-14 @ 07:01  -  08-15 @ 07:00  --------------------------------------------------------  IN: 2535 mL / OUT: 2638 mL / NET: -103 mL    08-15 @ 07:01  -  08-15 @ 10:45  --------------------------------------------------------  IN: 0 mL / OUT: 500 mL / NET: -500 mL        IMAGING:   No new imaging to review

## 2024-08-16 ENCOUNTER — TRANSCRIPTION ENCOUNTER (OUTPATIENT)
Age: 74
End: 2024-08-16

## 2024-08-16 LAB
ANION GAP SERPL CALC-SCNC: 10 MMOL/L — SIGNIFICANT CHANGE UP (ref 5–17)
BUN SERPL-MCNC: 13 MG/DL — SIGNIFICANT CHANGE UP (ref 8–20)
CALCIUM SERPL-MCNC: 8.3 MG/DL — LOW (ref 8.4–10.5)
CHLORIDE SERPL-SCNC: 103 MMOL/L — SIGNIFICANT CHANGE UP (ref 96–108)
CO2 SERPL-SCNC: 28 MMOL/L — SIGNIFICANT CHANGE UP (ref 22–29)
CREAT SERPL-MCNC: 0.49 MG/DL — LOW (ref 0.5–1.3)
EGFR: 107 ML/MIN/1.73M2 — SIGNIFICANT CHANGE UP
GLUCOSE SERPL-MCNC: 108 MG/DL — HIGH (ref 70–99)
HCT VFR BLD CALC: 27.7 % — LOW (ref 39–50)
HGB BLD-MCNC: 9.5 G/DL — LOW (ref 13–17)
MAGNESIUM SERPL-MCNC: 2 MG/DL — SIGNIFICANT CHANGE UP (ref 1.8–2.6)
MCHC RBC-ENTMCNC: 30.7 PG — SIGNIFICANT CHANGE UP (ref 27–34)
MCHC RBC-ENTMCNC: 34.3 GM/DL — SIGNIFICANT CHANGE UP (ref 32–36)
MCV RBC AUTO: 89.6 FL — SIGNIFICANT CHANGE UP (ref 80–100)
PHOSPHATE SERPL-MCNC: 2.9 MG/DL — SIGNIFICANT CHANGE UP (ref 2.4–4.7)
PLATELET # BLD AUTO: 134 K/UL — LOW (ref 150–400)
POTASSIUM SERPL-MCNC: 4.1 MMOL/L — SIGNIFICANT CHANGE UP (ref 3.5–5.3)
POTASSIUM SERPL-SCNC: 4.1 MMOL/L — SIGNIFICANT CHANGE UP (ref 3.5–5.3)
RBC # BLD: 3.09 M/UL — LOW (ref 4.2–5.8)
RBC # FLD: 13.2 % — SIGNIFICANT CHANGE UP (ref 10.3–14.5)
SODIUM SERPL-SCNC: 141 MMOL/L — SIGNIFICANT CHANGE UP (ref 135–145)
WBC # BLD: 4.98 K/UL — SIGNIFICANT CHANGE UP (ref 3.8–10.5)
WBC # FLD AUTO: 4.98 K/UL — SIGNIFICANT CHANGE UP (ref 3.8–10.5)

## 2024-08-16 PROCEDURE — 99233 SBSQ HOSP IP/OBS HIGH 50: CPT

## 2024-08-16 RX ORDER — POLYETHYLENE GLYCOL 3350 17 G/17G
17 POWDER, FOR SOLUTION ORAL EVERY 12 HOURS
Refills: 0 | Status: DISCONTINUED | OUTPATIENT
Start: 2024-08-16 | End: 2024-08-19

## 2024-08-16 RX ADMIN — Medication 2 TABLET(S): at 22:25

## 2024-08-16 RX ADMIN — Medication 600 MILLIGRAM(S): at 13:32

## 2024-08-16 RX ADMIN — HYDROMORPHONE HYDROCHLORIDE 0.5 MILLIGRAM(S): 2 TABLET ORAL at 23:26

## 2024-08-16 RX ADMIN — POLYETHYLENE GLYCOL 3350 17 GRAM(S): 17 POWDER, FOR SOLUTION ORAL at 18:49

## 2024-08-16 RX ADMIN — ACETAMINOPHEN 650 MILLIGRAM(S): 325 TABLET ORAL at 14:24

## 2024-08-16 RX ADMIN — HYDROMORPHONE HYDROCHLORIDE 0.5 MILLIGRAM(S): 2 TABLET ORAL at 22:26

## 2024-08-16 RX ADMIN — Medication 1 PATCH: at 19:00

## 2024-08-16 RX ADMIN — Medication 20 MILLIGRAM(S): at 22:25

## 2024-08-16 RX ADMIN — OXYCODONE HYDROCHLORIDE 5 MILLIGRAM(S): 5 TABLET ORAL at 01:44

## 2024-08-16 RX ADMIN — Medication 1 PATCH: at 23:08

## 2024-08-16 RX ADMIN — CHLORHEXIDINE GLUCONATE 1 APPLICATION(S): 40 SOLUTION TOPICAL at 05:14

## 2024-08-16 RX ADMIN — Medication 1 PATCH: at 11:39

## 2024-08-16 RX ADMIN — ACETAMINOPHEN 650 MILLIGRAM(S): 325 TABLET ORAL at 13:37

## 2024-08-16 RX ADMIN — TAMSULOSIN HYDROCHLORIDE 0.4 MILLIGRAM(S): 0.4 CAPSULE ORAL at 22:25

## 2024-08-16 RX ADMIN — Medication 1 TABLET(S): at 11:38

## 2024-08-16 RX ADMIN — ENOXAPARIN SODIUM 40 MILLIGRAM(S): 100 INJECTION SUBCUTANEOUS at 22:25

## 2024-08-16 RX ADMIN — OXYCODONE HYDROCHLORIDE 5 MILLIGRAM(S): 5 TABLET ORAL at 02:44

## 2024-08-16 RX ADMIN — Medication 600 MILLIGRAM(S): at 05:14

## 2024-08-16 RX ADMIN — Medication 300 MILLIGRAM(S): at 22:25

## 2024-08-16 NOTE — DISCHARGE NOTE NURSING/CASE MANAGEMENT/SOCIAL WORK - PATIENT PORTAL LINK FT
You can access the FollowMyHealth Patient Portal offered by [facility] by registering at the following website: [email or web address]. By joining [employer]’s FollowMyHealth portal, you will also be able to view your health information using other applications (apps) compatible with our system.

## 2024-08-16 NOTE — DISCHARGE NOTE NURSING/CASE MANAGEMENT/SOCIAL WORK - NSDCPEFALRISK_GEN_ALL_CORE
For information on Fall & Injury Prevention, visit: [email or web address] OR  [email or web address] OR  https://www.cdc.gov/steadi/patient.html

## 2024-08-16 NOTE — PROGRESS NOTE ADULT - ASSESSMENT
74M with PMH HTN, HLD, epidural hematoma s/p resection 2021 with Dr. Uriarte who presented for elective dural AVF ligation, now s/p C2-5 lami with dAVF ligation, complicated by L vert dissection on 8/12/24 with Dr. Jewell. Pt underwent angio on 8/13.  - POD4     PLAN:    - Q4n neuro checks  - MAP >65  - No post-op imaging needed  - if decline in exam get STAT CT Cervical non con for eval of compressive hematoma  - Pain control PRN, avoid oversedation  - Dash diet, regular  - Bowel regimen: senna, miralax; LBM: 8/14, needs another before discharge  - Pending AR  - Discussed case w/ Dr. Jewell

## 2024-08-16 NOTE — PROGRESS NOTE ADULT - SUBJECTIVE AND OBJECTIVE BOX
Patient feels well.  Neck is less stiff.  Thinking about AR, despite wanting home.    FUNCTIONAL PROGRESS  8/14 PT  Transfer: Sit to Stand:     · Level of [place]	minimum assist (75% patients effort); right knee buckling noted  · Physical Assist/Nonphysical Assist	1 person + 1 person to manage equipment  · Assistive Device	rolling walker    Transfer: Stand to Sit:     · Level of [place]	minimum assist (75% patients effort)  · Physical Assist/Nonphysical Assist	1 person + 1 person to manage equipment  · Assistive Device	rolling walker    Sit/Stand Transfer Safety Analysis:     · Impairments Contributing to Impaired Transfers	impaired balance; impaired postural control; decreased strength; impaired sensory feedback    Gait Skills:     · Level of [place]	minimum assist (75% patients effort)  · Physical Assist/Nonphysical Assist	1 person + 1 person to manage equipment; chair follow  · Assistive Device	rolling walker  · Gait Distance	15ft    Gait Analysis:     · Gait Pattern Used	3-point gait  · Gait Deviations Noted	decreased heavenly; decreased step length; decreased stride length  · Impairments Contributing to Gait Deviations	impaired balance; decreased ROM; decreased strength; impaired postural control; decreased sensation    8/14 OT  Bathing Training:     · Level of [place]	moderate assist (50% patients effort)    Upper Body Dressing Training:     · Level of [place]	minimum assist (75% patients effort)    Lower Body Dressing Training:     · Level of [place]	moderate assist (50% patients effort)    Toilet Hygiene Training:     · Level of [place]	minimum assist (75% patients effort)    Grooming Training:     · Level of [place]	supervision      VITALS  T(C): 36.8 (08-16-24 @ 07:56), Max: 37.7 (08-15-24 @ 15:45)  HR: 89 (08-16-24 @ 07:56) (67 - 92)  BP: 146/82 (08-16-24 @ 07:56) (116/73 - 160/80)  RR: 16 (08-16-24 @ 07:56) (14 - 18)  SpO2: 95% (08-16-24 @ 07:56) (92% - 97%)  Wt(kg): --    MEDICATIONS   acetaminophen     Tablet .. 650 milliGRAM(s) every 6 hours PRN  chlorhexidine 2% Cloths 1 Application(s) <User Schedule>  enoxaparin Injectable 40 milliGRAM(s) <User Schedule>  gabapentin 600 milliGRAM(s) <User Schedule>  gabapentin 300 milliGRAM(s) at bedtime  HYDROmorphone  Injectable 0.5 milliGRAM(s) every 4 hours PRN  lidocaine   4% Patch 1 Patch daily  lidocaine   4% Patch 1 Patch daily  methocarbamol 750 milliGRAM(s) three times a day PRN  multivitamin 1 Tablet(s) daily  ondansetron Injectable 4 milliGRAM(s) every 6 hours PRN  oxyCODONE    IR 10 milliGRAM(s) every 4 hours PRN  oxyCODONE    IR 5 milliGRAM(s) every 4 hours PRN  polyethylene glycol 3350 17 Gram(s) at bedtime  senna 2 Tablet(s) at bedtime  simvastatin 20 milliGRAM(s) at bedtime  tamsulosin 0.4 milliGRAM(s) at bedtime      RECENT LABS/IMAGING  - Reviewed Today                        9.5    4.98  )-----------( 134      ( 16 Aug 2024 03:34 )             27.7     08-16    141  |  103  |  13.0  ----------------------------<  108<H>  4.1   |  28.0  |  0.49<L>    Ca    8.3<L>      16 Aug 2024 03:34  Phos  2.9     08-16  Mg     2.0     08-16        Urinalysis Basic - ( 16 Aug 2024 03:34 )    Color: x / Appearance: x / SG: x / pH: x  Gluc: 108 mg/dL / Ketone: x  / Bili: x / Urobili: x   Blood: x / Protein: x / Nitrite: x   Leuk Esterase: x / RBC: x / WBC x   Sq Epi: x / Non Sq Epi: x / Bacteria: x            CT C SPINE 8/12 - Comparison is made with the prior cervical spine MRI of 5/31/2024. The cervical and upper thoracic vertebral bodies are normal in height and density. Mild degenerative anterolisthesis at C4-5. Anterior osteophytes are present C5-6 C6-7 C7-T1. No acute fractures or dislocations are identified. Uncal vertebral joint and facet degenerative changes are noted. Left-sided facet hypertrophy causes left neural foraminal stenosis at C3-4 and C4-5. IMPRESSION: Mild degenerative changes.      ----------------------------------------------------------------------------------------  PHYSICAL EXAM  Constitutional - NAD, Comfortable  Extremities - No C/C/E, No calf tenderness   Neurologic Exam -                    Cognitive - AAO to self, place, date, year, situation     Communication - Fluent, No dysarthria     Cranial Nerves - CN 2-12 intact     FUNCTIONAL MOTOR EXAM - No focal deficits     Sensory - Decreased in the right foot   Psychiatric - Mood stable, Affect WNL  ----------------------------------------------------------------------------------------  ASSESSMENT/PLAN  74yMale with functional deficits related to dural AVF   Dural AVF s/p ligation, 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     RECOMMEND - OOB daily     Patient would like to consider DC home. However, discussed that based on the patient's diagnosis, current functional status and potential for progress, recommend ACUTE inpatient rehabilitation for the functional deficits consisting of 3 hours of therapy/day & 24 hour RN/daily PMR physician for active comorbid medical management. Patient will be able to tolerate 3 hours a day.     Will continue to follow. Rehab recommendations are dependent on how functional progress changes as well as how patient continues to participate and tolerate therapeutic interventions, WHICH MAY CHANGE UPON FOLLOW UP EXAMINATIONS. Recommend ongoing mobilization by staff to maintain cardiopulmonary function and prevention of secondary complications related to debility/immobility. Discussed the specific rehabilitation management and recommendations documented above with rehab clinical care team/rehab liaison.  Discussed with Roberta VALDEZ.

## 2024-08-16 NOTE — PROGRESS NOTE ADULT - SUBJECTIVE AND OBJECTIVE BOX
SUBJECTIVE:   74M with PMH HTN, HLD, epidural hematoma s/p resection 2021 with Dr. Uriarte who presented for elective dural AVF ligation, now s/p C2-5 lami with dAVF ligation, complicated by L vert dissection on 8/12/24 with Dr. Jewell. Pt underwent angio on 8/13.  - POD3     INTERVAL HX/OVERNIGHT EVENTS:  Pt POD #4 s/p C2-5 lami with dAVF ligation, complicated by L vert dissection. Patient seen and examined at bedside. Denies any acute complaints. Taylor replaced this 8/15.     Vital Signs Last 24 Hrs                        9.5    4.98  )-----------( 134      ( 16 Aug 2024 03:34 )             27.7   08-16    141  |  103  |  13.0  ----------------------------<  108<H>  4.1   |  28.0  |  0.49<L>    Ca    8.3<L>      16 Aug 2024 03:34  Phos  2.9     08-16  Mg     2.0     08-16      PHYSICAL EXAM:    GENERAL: NAD    HEAD: normocephalic   MENTAL STATUS: AAO x 3, conversant, following simple commands    CRANIAL NERVES: PERRL, EOMI without nystagmus. Face symmetric, Tongue is midline. Hearing grossly intact. Head turning and shoulder shrug intact.    REFLEXES: No pronator drift   MOTOR: strength 5/5 b/l upper and lower extremities   SENSATION: grossly intact to light touch all extremities   SKIN: Warm, dry; No edema or ecchymosis at R groin site; Mild ecchymosis of L wrist     LABS:                                   9.5    4.98  )-----------( 134      ( 16 Aug 2024 03:34 )             27.7   08-16    141  |  103  |  13.0  ----------------------------<  108<H>  4.1   |  28.0  |  0.49<L>    Ca    8.3<L>      16 Aug 2024 03:34  Phos  2.9     08-16  Mg     2.0     08-16      I&O's Summary    15 Aug 2024 07:01  -  16 Aug 2024 07:00  --------------------------------------------------------  IN: 210 mL / OUT: 1950 mL / NET: -1740 mL    16 Aug 2024 07:01  -  16 Aug 2024 13:09  --------------------------------------------------------  IN: 240 mL / OUT: 0 mL / NET: 240 mL    IMAGING:   No new imaging to review

## 2024-08-17 LAB
ANION GAP SERPL CALC-SCNC: 12 MMOL/L — SIGNIFICANT CHANGE UP (ref 5–17)
BUN SERPL-MCNC: 14.7 MG/DL — SIGNIFICANT CHANGE UP (ref 8–20)
CALCIUM SERPL-MCNC: 8.1 MG/DL — LOW (ref 8.4–10.5)
CHLORIDE SERPL-SCNC: 101 MMOL/L — SIGNIFICANT CHANGE UP (ref 96–108)
CO2 SERPL-SCNC: 27 MMOL/L — SIGNIFICANT CHANGE UP (ref 22–29)
CREAT SERPL-MCNC: 0.5 MG/DL — SIGNIFICANT CHANGE UP (ref 0.5–1.3)
EGFR: 106 ML/MIN/1.73M2 — SIGNIFICANT CHANGE UP
GLUCOSE SERPL-MCNC: 114 MG/DL — HIGH (ref 70–99)
HCT VFR BLD CALC: 27.1 % — LOW (ref 39–50)
HGB BLD-MCNC: 9.4 G/DL — LOW (ref 13–17)
MAGNESIUM SERPL-MCNC: 2 MG/DL — SIGNIFICANT CHANGE UP (ref 1.6–2.6)
MCHC RBC-ENTMCNC: 30.4 PG — SIGNIFICANT CHANGE UP (ref 27–34)
MCHC RBC-ENTMCNC: 34.7 GM/DL — SIGNIFICANT CHANGE UP (ref 32–36)
MCV RBC AUTO: 87.7 FL — SIGNIFICANT CHANGE UP (ref 80–100)
PHOSPHATE SERPL-MCNC: 2.6 MG/DL — SIGNIFICANT CHANGE UP (ref 2.4–4.7)
PLATELET # BLD AUTO: 148 K/UL — LOW (ref 150–400)
POTASSIUM SERPL-MCNC: 3.7 MMOL/L — SIGNIFICANT CHANGE UP (ref 3.5–5.3)
POTASSIUM SERPL-SCNC: 3.7 MMOL/L — SIGNIFICANT CHANGE UP (ref 3.5–5.3)
RBC # BLD: 3.09 M/UL — LOW (ref 4.2–5.8)
RBC # FLD: 13.2 % — SIGNIFICANT CHANGE UP (ref 10.3–14.5)
SODIUM SERPL-SCNC: 140 MMOL/L — SIGNIFICANT CHANGE UP (ref 135–145)
WBC # BLD: 4.64 K/UL — SIGNIFICANT CHANGE UP (ref 3.8–10.5)
WBC # FLD AUTO: 4.64 K/UL — SIGNIFICANT CHANGE UP (ref 3.8–10.5)

## 2024-08-17 RX ADMIN — Medication 1 PATCH: at 11:08

## 2024-08-17 RX ADMIN — Medication 1 PATCH: at 18:46

## 2024-08-17 RX ADMIN — POLYETHYLENE GLYCOL 3350 17 GRAM(S): 17 POWDER, FOR SOLUTION ORAL at 17:46

## 2024-08-17 RX ADMIN — CHLORHEXIDINE GLUCONATE 1 APPLICATION(S): 40 SOLUTION TOPICAL at 05:51

## 2024-08-17 RX ADMIN — Medication 600 MILLIGRAM(S): at 13:07

## 2024-08-17 RX ADMIN — OXYCODONE HYDROCHLORIDE 5 MILLIGRAM(S): 5 TABLET ORAL at 18:45

## 2024-08-17 RX ADMIN — ACETAMINOPHEN 650 MILLIGRAM(S): 325 TABLET ORAL at 09:20

## 2024-08-17 RX ADMIN — ACETAMINOPHEN 650 MILLIGRAM(S): 325 TABLET ORAL at 08:20

## 2024-08-17 RX ADMIN — OXYCODONE HYDROCHLORIDE 5 MILLIGRAM(S): 5 TABLET ORAL at 06:51

## 2024-08-17 RX ADMIN — Medication 1 TABLET(S): at 11:08

## 2024-08-17 RX ADMIN — HYDROMORPHONE HYDROCHLORIDE 0.5 MILLIGRAM(S): 2 TABLET ORAL at 21:34

## 2024-08-17 RX ADMIN — OXYCODONE HYDROCHLORIDE 5 MILLIGRAM(S): 5 TABLET ORAL at 03:28

## 2024-08-17 RX ADMIN — Medication 1 PATCH: at 18:45

## 2024-08-17 RX ADMIN — Medication 2 TABLET(S): at 21:34

## 2024-08-17 RX ADMIN — ENOXAPARIN SODIUM 40 MILLIGRAM(S): 100 INJECTION SUBCUTANEOUS at 21:34

## 2024-08-17 RX ADMIN — OXYCODONE HYDROCHLORIDE 5 MILLIGRAM(S): 5 TABLET ORAL at 17:48

## 2024-08-17 RX ADMIN — OXYCODONE HYDROCHLORIDE 5 MILLIGRAM(S): 5 TABLET ORAL at 05:53

## 2024-08-17 RX ADMIN — OXYCODONE HYDROCHLORIDE 5 MILLIGRAM(S): 5 TABLET ORAL at 02:38

## 2024-08-17 RX ADMIN — Medication 300 MILLIGRAM(S): at 23:00

## 2024-08-17 RX ADMIN — Medication 600 MILLIGRAM(S): at 05:53

## 2024-08-17 RX ADMIN — TAMSULOSIN HYDROCHLORIDE 0.4 MILLIGRAM(S): 0.4 CAPSULE ORAL at 21:34

## 2024-08-17 RX ADMIN — Medication 20 MILLIGRAM(S): at 21:34

## 2024-08-17 NOTE — PROGRESS NOTE ADULT - SUBJECTIVE AND OBJECTIVE BOX
HPI:  74 year old male with PMH of HLD, HTN, melanoma and chronic back pain presents to [facility]. He initially presented to [facility] on 10/6/24 with complaints of acute onset back pain and progressively worsening bilateral lower extremity weakness. At that time he had an urgent MRI which showed intradural extramedullary hematoma T12-L1, S/P T10-L2 laminectomy on 10/6/21. Spinal angiogram done on 10/7/24 showed no vascular abnormalities and Venous dopplers negative for blood clot. 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 He currently reports B/L feet numbness, and numbness on right leg that he feels is getting progressively worse. Reports intermittent right knee and foot pain, described as sharp, 3/10 in severity, worse with nothing, relieved with nothing. Denies urinary or bowel incontinence. He had another spinal angiogram on 5/21/24 with Dr. Jewell which showed Spinal dural AVF, he continue to have dizziness and impaired coordination while walking. These episodes have occurred in the past and resolved after a few hours, but now these episode has lasted longer than usual. He reports no other symptoms including new weakness, nausea, vomiting, headache. Now he is scheduled for C3-C5 Laminoplasty with clipping of spinal dural AVF with intraoperative angiogram by Dr. Jewell on 8/12/24. cardiac clearance pending  (23 Jul 2024 11:59)    INTERVAL HPI/OVERNIGHT EVENTS:  74M s/p C2-C5 laminectomy with ligation of dural AVF, complicated by L vert dissection was seen this morning lying. Tolerating diet. LBM: 8/17. Vazquez in with dark yellow urine. Denies headache, weakness, numbness, n/v.    Vital Signs Last 24 Hrs  T(C): 36.7 (17 Aug 2024 08:00), Max: 37 (17 Aug 2024 00:00)  T(F): 98.1 (17 Aug 2024 08:00), Max: 98.6 (17 Aug 2024 00:00)  HR: 81 (17 Aug 2024 12:00) (80 - 97)  BP: 125/70 (17 Aug 2024 12:00) (119/71 - 156/77)  BP(mean): --  RR: 16 (17 Aug 2024 12:00) (16 - 17)  SpO2: 96% (17 Aug 2024 12:00) (90% - 97%)    Parameters below as of 17 Aug 2024 12:00  Patient On (Oxygen Delivery Method): room air    PHYSICAL EXAM:  GENERAL: NAD    HEAD: Normocephalic   WOUND: Clean, dry, intact, minimal dried blood, dressing removed  MENTAL STATUS: AAO x 3, conversant, following simple commands    CRANIAL NERVES: PERRL, EOMI without nystagmus. Face symmetric, Tongue is midline. Hearing grossly intact. Head turning and shoulder shrug intact.    REFLEXES: No pronator drift   MOTOR: strength 5/5 b/l upper and lower extremities   SENSATION: grossly intact to light touch all extremities   SKIN: Warm, dry; No edema or ecchymosis at R groin site; Mild ecchymosis of L wrist     LABS:                        9.4    4.64  )-----------( 148      ( 17 Aug 2024 04:20 )             27.1     08-17    140  |  101  |  14.7  ----------------------------<  114<H>  3.7   |  27.0  |  0.50    Ca    8.1<L>      17 Aug 2024 04:20  Phos  2.6     08-17  Mg     2.0     08-17    Urinalysis Basic - ( 17 Aug 2024 04:20 )    Color: x / Appearance: x / SG: x / pH: x  Gluc: 114 mg/dL / Ketone: x  / Bili: x / Urobili: x   Blood: x / Protein: x / Nitrite: x   Leuk Esterase: x / RBC: x / WBC x   Sq Epi: x / Non Sq Epi: x / Bacteria: x    08-16 @ 07:01  -  08-17 @ 07:00  --------------------------------------------------------  IN: 480 mL / OUT: 1925 mL / NET: -1445 mL    RADIOLOGY & ADDITIONAL TESTS:  < from: CT Cervical Spine No Cont (08.12.24 @ 07:56) >  IMPRESSION: Mild degenerative changes.

## 2024-08-17 NOTE — PROGRESS NOTE ADULT - ASSESSMENT
74M with PMH HTN, HLD, epidural hematoma s/p resection 2021 with Dr. Uriarte who presented for elective dural AVF ligation, now s/p C2-5 lami with dAVF ligation, complicated by L vert dissection on 8/12/24 with Dr. Jewell. Pt underwent angio on 8/13.    Plan:  - Q4 neuro checks  - MAP >65  - All imaging reviewed  - If decline in exam get STAT CT Cervical non con for eval of compressive hematoma  - Pain control PRN, avoid oversedation  - Dash diet, regular  - Kate replaced 8/15, kate can remain in for discharge, pt to f/u with outpatient urologist   - Start ASA 81 2 weeks post-op (8/26)  - Bowel regimen: senna, miralax; LBM: 8/17  - Pending AR, no bed until Monday 8/19  - Discussed case with Dr. Bright

## 2024-08-18 LAB
ANION GAP SERPL CALC-SCNC: 11 MMOL/L — SIGNIFICANT CHANGE UP (ref 5–17)
BUN SERPL-MCNC: 14.5 MG/DL — SIGNIFICANT CHANGE UP (ref 8–20)
CALCIUM SERPL-MCNC: 8.6 MG/DL — SIGNIFICANT CHANGE UP (ref 8.4–10.5)
CHLORIDE SERPL-SCNC: 99 MMOL/L — SIGNIFICANT CHANGE UP (ref 96–108)
CO2 SERPL-SCNC: 27 MMOL/L — SIGNIFICANT CHANGE UP (ref 22–29)
CREAT SERPL-MCNC: 0.53 MG/DL — SIGNIFICANT CHANGE UP (ref 0.5–1.3)
EGFR: 105 ML/MIN/1.73M2 — SIGNIFICANT CHANGE UP
GLUCOSE SERPL-MCNC: 117 MG/DL — HIGH (ref 70–99)
HCT VFR BLD CALC: 27.9 % — LOW (ref 39–50)
HGB BLD-MCNC: 9.6 G/DL — LOW (ref 13–17)
MAGNESIUM SERPL-MCNC: 2 MG/DL — SIGNIFICANT CHANGE UP (ref 1.6–2.6)
MCHC RBC-ENTMCNC: 30.3 PG — SIGNIFICANT CHANGE UP (ref 27–34)
MCHC RBC-ENTMCNC: 34.4 GM/DL — SIGNIFICANT CHANGE UP (ref 32–36)
MCV RBC AUTO: 88 FL — SIGNIFICANT CHANGE UP (ref 80–100)
PHOSPHATE SERPL-MCNC: 3 MG/DL — SIGNIFICANT CHANGE UP (ref 2.4–4.7)
PLATELET # BLD AUTO: 161 K/UL — SIGNIFICANT CHANGE UP (ref 150–400)
POTASSIUM SERPL-MCNC: 4.1 MMOL/L — SIGNIFICANT CHANGE UP (ref 3.5–5.3)
POTASSIUM SERPL-SCNC: 4.1 MMOL/L — SIGNIFICANT CHANGE UP (ref 3.5–5.3)
RBC # BLD: 3.17 M/UL — LOW (ref 4.2–5.8)
RBC # FLD: 13.4 % — SIGNIFICANT CHANGE UP (ref 10.3–14.5)
SODIUM SERPL-SCNC: 137 MMOL/L — SIGNIFICANT CHANGE UP (ref 135–145)
WBC # BLD: 5.29 K/UL — SIGNIFICANT CHANGE UP (ref 3.8–10.5)
WBC # FLD AUTO: 5.29 K/UL — SIGNIFICANT CHANGE UP (ref 3.8–10.5)

## 2024-08-18 RX ADMIN — CHLORHEXIDINE GLUCONATE 1 APPLICATION(S): 40 SOLUTION TOPICAL at 05:22

## 2024-08-18 RX ADMIN — ACETAMINOPHEN 650 MILLIGRAM(S): 325 TABLET ORAL at 23:20

## 2024-08-18 RX ADMIN — Medication 1 TABLET(S): at 11:18

## 2024-08-18 RX ADMIN — OXYCODONE HYDROCHLORIDE 5 MILLIGRAM(S): 5 TABLET ORAL at 23:21

## 2024-08-18 RX ADMIN — Medication 600 MILLIGRAM(S): at 13:41

## 2024-08-18 RX ADMIN — POLYETHYLENE GLYCOL 3350 17 GRAM(S): 17 POWDER, FOR SOLUTION ORAL at 05:22

## 2024-08-18 RX ADMIN — OXYCODONE HYDROCHLORIDE 5 MILLIGRAM(S): 5 TABLET ORAL at 18:07

## 2024-08-18 RX ADMIN — Medication 1 PATCH: at 11:19

## 2024-08-18 RX ADMIN — Medication 600 MILLIGRAM(S): at 05:21

## 2024-08-18 RX ADMIN — OXYCODONE HYDROCHLORIDE 5 MILLIGRAM(S): 5 TABLET ORAL at 04:48

## 2024-08-18 RX ADMIN — Medication 20 MILLIGRAM(S): at 21:09

## 2024-08-18 RX ADMIN — OXYCODONE HYDROCHLORIDE 5 MILLIGRAM(S): 5 TABLET ORAL at 10:10

## 2024-08-18 RX ADMIN — TAMSULOSIN HYDROCHLORIDE 0.4 MILLIGRAM(S): 0.4 CAPSULE ORAL at 21:09

## 2024-08-18 RX ADMIN — ENOXAPARIN SODIUM 40 MILLIGRAM(S): 100 INJECTION SUBCUTANEOUS at 21:09

## 2024-08-18 RX ADMIN — OXYCODONE HYDROCHLORIDE 5 MILLIGRAM(S): 5 TABLET ORAL at 11:10

## 2024-08-18 RX ADMIN — Medication 2 TABLET(S): at 21:09

## 2024-08-18 RX ADMIN — Medication 300 MILLIGRAM(S): at 21:09

## 2024-08-18 NOTE — PROGRESS NOTE ADULT - SUBJECTIVE AND OBJECTIVE BOX
HPI: 74 year old male with PMH of HLD, HTN, melanoma and chronic back pain presents to [facility]. He initially presented to [facility] on 10/6/24 with complaints of acute onset back pain and progressively worsening bilateral lower extremity weakness. At that time he had an urgent MRI which showed intradural extramedullary hematoma T12-L1, S/P T10-L2 laminectomy on 10/6/21. Spinal angiogram done on 10/7/24 showed no vascular abnormalities and Venous dopplers negative for blood clot. 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 He currently reports B/L feet numbness, and numbness on right leg that he feels is getting progressively worse. Reports intermittent right knee and foot pain, described as sharp, 3/10 in severity, worse with nothing, relieved with nothing. Denies urinary or bowel incontinence. He had another spinal angiogram on 5/21/24 with Dr. Jewell which showed Spinal dural AVF, he continue to have dizziness and impaired coordination while walking. These episodes have occurred in the past and resolved after a few hours, but now these episode has lasted longer than usual. He reports no other symptoms including new weakness, nausea, vomiting, headache. Now he is scheduled for C3-C5 Laminoplasty with clipping of spinal dural AVF with intraoperative angiogram by Dr. Jewell on 8/12/24. cardiac clearance pending  (23 Jul 2024 11:59)    INTERVAL HPI/OVERNIGHT EVENTS:   74M s/p C2-C5 laminectomy with ligation of dural AVF, complicated by L vert dissection was seen this morning lying. Tolerating diet. LBM: 8/17. Vazquez in with dark yellow urine. Denies any acute complaints.    Vital Signs Last 24 Hrs  T(C): 36.8 (18 Aug 2024 10:58), Max: 37.1 (17 Aug 2024 16:30)  T(F): 98.3 (18 Aug 2024 10:58), Max: 98.8 (18 Aug 2024 01:43)  HR: 84 (18 Aug 2024 10:58) (76 - 94)  BP: 148/80 (18 Aug 2024 10:58) (133/75 - 166/84)  BP(mean): --  RR: 17 (18 Aug 2024 10:58) (16 - 20)  SpO2: 95% (18 Aug 2024 10:58) (93% - 96%)    Parameters below as of 18 Aug 2024 10:58  Patient On (Oxygen Delivery Method): room air      PHYSICAL EXAM:  GENERAL: NAD    HEAD: Normocephalic   WOUND: Clean, dry, intact, minimal dried blood, dressing removed  MENTAL STATUS: AAO x 3, conversant, following simple commands    CRANIAL NERVES: PERRL, EOMI without nystagmus. Face symmetric, Tongue is midline. Hearing grossly intact. Head turning and shoulder shrug intact.    REFLEXES: No pronator drift   MOTOR: strength 5/5 b/l upper and lower extremities   SENSATION: grossly intact to light touch all extremities   SKIN: Warm, dry; No edema or ecchymosis at R groin site; Mild ecchymosis of L wrist       LABS:                        9.6    5.29  )-----------( 161      ( 18 Aug 2024 05:00 )             27.9     08-18    137  |  99  |  14.5  ----------------------------<  117<H>  4.1   |  27.0  |  0.53    Ca    8.6      18 Aug 2024 05:00  Phos  3.0     08-18  Mg     2.0     08-18    Urinalysis Basic - ( 18 Aug 2024 05:00 )    Color: x / Appearance: x / SG: x / pH: x  Gluc: 117 mg/dL / Ketone: x  / Bili: x / Urobili: x   Blood: x / Protein: x / Nitrite: x   Leuk Esterase: x / RBC: x / WBC x   Sq Epi: x / Non Sq Epi: x / Bacteria: x    08-17 @ 07:01  -  08-18 @ 07:00  --------------------------------------------------------  IN: 210 mL / OUT: 2000 mL / NET: -1790 mL        RADIOLOGY & ADDITIONAL TESTS:    < from: CT Cervical Spine No Cont (08.12.24 @ 07:56) >  IMPRESSION: Mild degenerative changes.

## 2024-08-19 ENCOUNTER — TRANSCRIPTION ENCOUNTER (OUTPATIENT)
Age: 74
End: 2024-08-19

## 2024-08-19 ENCOUNTER — INPATIENT (INPATIENT)
Facility: HOSPITAL | Age: 74
LOS: 18 days | Discharge: HOME | DRG: 951 | End: 2024-09-07
Attending: PHYSICAL MEDICINE & REHABILITATION | Admitting: PHYSICAL MEDICINE & REHABILITATION
Payer: MEDICARE

## 2024-08-19 VITALS
HEART RATE: 86 BPM | RESPIRATION RATE: 18 BRPM | TEMPERATURE: 98 F | DIASTOLIC BLOOD PRESSURE: 81 MMHG | HEIGHT: 71 IN | WEIGHT: 181.22 LBS | SYSTOLIC BLOOD PRESSURE: 149 MMHG | OXYGEN SATURATION: 94 %

## 2024-08-19 VITALS
OXYGEN SATURATION: 95 % | TEMPERATURE: 99 F | HEART RATE: 80 BPM | SYSTOLIC BLOOD PRESSURE: 145 MMHG | DIASTOLIC BLOOD PRESSURE: 79 MMHG | RESPIRATION RATE: 17 BRPM

## 2024-08-19 DIAGNOSIS — Z98.890 OTHER SPECIFIED POSTPROCEDURAL STATES: Chronic | ICD-10-CM

## 2024-08-19 DIAGNOSIS — Z98.1 ARTHRODESIS STATUS: ICD-10-CM

## 2024-08-19 LAB
ANION GAP SERPL CALC-SCNC: 9 MMOL/L — SIGNIFICANT CHANGE UP (ref 5–17)
BUN SERPL-MCNC: 13.6 MG/DL — SIGNIFICANT CHANGE UP (ref 8–20)
CALCIUM SERPL-MCNC: 8.1 MG/DL — LOW (ref 8.4–10.5)
CHLORIDE SERPL-SCNC: 100 MMOL/L — SIGNIFICANT CHANGE UP (ref 96–108)
CO2 SERPL-SCNC: 26 MMOL/L — SIGNIFICANT CHANGE UP (ref 22–29)
CREAT SERPL-MCNC: 0.46 MG/DL — LOW (ref 0.5–1.3)
EGFR: 109 ML/MIN/1.73M2 — SIGNIFICANT CHANGE UP
GLUCOSE SERPL-MCNC: 109 MG/DL — HIGH (ref 70–99)
HCT VFR BLD CALC: 26.9 % — LOW (ref 39–50)
HGB BLD-MCNC: 9.3 G/DL — LOW (ref 13–17)
MAGNESIUM SERPL-MCNC: 2 MG/DL — SIGNIFICANT CHANGE UP (ref 1.6–2.6)
MCHC RBC-ENTMCNC: 30.6 PG — SIGNIFICANT CHANGE UP (ref 27–34)
MCHC RBC-ENTMCNC: 34.6 GM/DL — SIGNIFICANT CHANGE UP (ref 32–36)
MCV RBC AUTO: 88.5 FL — SIGNIFICANT CHANGE UP (ref 80–100)
PHOSPHATE SERPL-MCNC: 3.2 MG/DL — SIGNIFICANT CHANGE UP (ref 2.4–4.7)
PLATELET # BLD AUTO: 160 K/UL — SIGNIFICANT CHANGE UP (ref 150–400)
POTASSIUM SERPL-MCNC: 4.2 MMOL/L — SIGNIFICANT CHANGE UP (ref 3.5–5.3)
POTASSIUM SERPL-SCNC: 4.2 MMOL/L — SIGNIFICANT CHANGE UP (ref 3.5–5.3)
RBC # BLD: 3.04 M/UL — LOW (ref 4.2–5.8)
RBC # FLD: 13.4 % — SIGNIFICANT CHANGE UP (ref 10.3–14.5)
SARS-COV-2 RNA SPEC QL NAA+PROBE: SIGNIFICANT CHANGE UP
SODIUM SERPL-SCNC: 135 MMOL/L — SIGNIFICANT CHANGE UP (ref 135–145)
WBC # BLD: 4.93 K/UL — SIGNIFICANT CHANGE UP (ref 3.8–10.5)
WBC # FLD AUTO: 4.93 K/UL — SIGNIFICANT CHANGE UP (ref 3.8–10.5)

## 2024-08-19 PROCEDURE — 99233 SBSQ HOSP IP/OBS HIGH 50: CPT

## 2024-08-19 PROCEDURE — 99232 SBSQ HOSP IP/OBS MODERATE 35: CPT

## 2024-08-19 RX ORDER — POLYETHYLENE GLYCOL 3350 17 G/17G
17 POWDER, FOR SOLUTION ORAL EVERY 12 HOURS
Refills: 0 | Status: DISCONTINUED | OUTPATIENT
Start: 2024-08-19 | End: 2024-09-07

## 2024-08-19 RX ORDER — ACETAMINOPHEN 325 MG/1
2 TABLET ORAL
Qty: 0 | Refills: 0 | DISCHARGE
Start: 2024-08-19

## 2024-08-19 RX ORDER — TAMSULOSIN HYDROCHLORIDE 0.4 MG/1
1 CAPSULE ORAL
Qty: 0 | Refills: 0 | DISCHARGE
Start: 2024-08-19

## 2024-08-19 RX ORDER — LIDOCAINE/BENZALKONIUM/ALCOHOL
1 SOLUTION, NON-ORAL TOPICAL
Qty: 0 | Refills: 0 | DISCHARGE
Start: 2024-08-19

## 2024-08-19 RX ORDER — FINASTERIDE 1 MG/1
5 TABLET, FILM COATED ORAL DAILY
Refills: 0 | Status: DISCONTINUED | OUTPATIENT
Start: 2024-08-19 | End: 2024-08-19

## 2024-08-19 RX ORDER — GABAPENTIN 100 MG
300 CAPSULE ORAL AT BEDTIME
Refills: 0 | Status: DISCONTINUED | OUTPATIENT
Start: 2024-08-19 | End: 2024-08-22

## 2024-08-19 RX ORDER — OXYCODONE HYDROCHLORIDE 5 MG/1
10 TABLET ORAL EVERY 6 HOURS
Refills: 0 | Status: DISCONTINUED | OUTPATIENT
Start: 2024-08-19 | End: 2024-08-23

## 2024-08-19 RX ORDER — SENNA 187 MG
2 TABLET ORAL AT BEDTIME
Refills: 0 | Status: DISCONTINUED | OUTPATIENT
Start: 2024-08-19 | End: 2024-09-07

## 2024-08-19 RX ORDER — POLYETHYLENE GLYCOL 3350 17 G/17G
17 POWDER, FOR SOLUTION ORAL
Qty: 0 | Refills: 0 | DISCHARGE
Start: 2024-08-19

## 2024-08-19 RX ORDER — OXYCODONE HYDROCHLORIDE 5 MG/1
1 TABLET ORAL
Qty: 0 | Refills: 0 | DISCHARGE
Start: 2024-08-19

## 2024-08-19 RX ORDER — ENOXAPARIN SODIUM 100 MG/ML
40 INJECTION SUBCUTANEOUS
Qty: 0 | Refills: 0 | DISCHARGE
Start: 2024-08-19

## 2024-08-19 RX ORDER — TAMSULOSIN HYDROCHLORIDE 0.4 MG/1
0.4 CAPSULE ORAL AT BEDTIME
Refills: 0 | Status: DISCONTINUED | OUTPATIENT
Start: 2024-08-19 | End: 2024-09-07

## 2024-08-19 RX ORDER — METHOCARBAMOL 750 MG/1
1 TABLET, FILM COATED ORAL
Qty: 0 | Refills: 0 | DISCHARGE
Start: 2024-08-19

## 2024-08-19 RX ORDER — LIDOCAINE/BENZALKONIUM/ALCOHOL
1 SOLUTION, NON-ORAL TOPICAL EVERY 24 HOURS
Refills: 0 | Status: DISCONTINUED | OUTPATIENT
Start: 2024-08-19 | End: 2024-09-07

## 2024-08-19 RX ORDER — GABAPENTIN 100 MG
600 CAPSULE ORAL
Refills: 0 | Status: DISCONTINUED | OUTPATIENT
Start: 2024-08-19 | End: 2024-08-26

## 2024-08-19 RX ORDER — SENNA 187 MG
2 TABLET ORAL
Qty: 0 | Refills: 0 | DISCHARGE
Start: 2024-08-19

## 2024-08-19 RX ORDER — OXYCODONE HYDROCHLORIDE 5 MG/1
5 TABLET ORAL EVERY 6 HOURS
Refills: 0 | Status: DISCONTINUED | OUTPATIENT
Start: 2024-08-19 | End: 2024-08-23

## 2024-08-19 RX ORDER — LOSARTAN POTASSIUM 50 MG/1
100 TABLET ORAL DAILY
Refills: 0 | Status: DISCONTINUED | OUTPATIENT
Start: 2024-08-19 | End: 2024-09-07

## 2024-08-19 RX ORDER — OXYCODONE HYDROCHLORIDE 5 MG/1
5 TABLET ORAL EVERY 6 HOURS
Refills: 0 | Status: DISCONTINUED | OUTPATIENT
Start: 2024-08-19 | End: 2024-08-19

## 2024-08-19 RX ORDER — METHOCARBAMOL 750 MG/1
750 TABLET, FILM COATED ORAL THREE TIMES A DAY
Refills: 0 | Status: DISCONTINUED | OUTPATIENT
Start: 2024-08-19 | End: 2024-09-07

## 2024-08-19 RX ORDER — ENOXAPARIN SODIUM 100 MG/ML
40 INJECTION SUBCUTANEOUS
Refills: 0 | Status: DISCONTINUED | OUTPATIENT
Start: 2024-08-19 | End: 2024-09-07

## 2024-08-19 RX ORDER — OXYCODONE HYDROCHLORIDE 5 MG/1
10 TABLET ORAL EVERY 6 HOURS
Refills: 0 | Status: DISCONTINUED | OUTPATIENT
Start: 2024-08-19 | End: 2024-08-19

## 2024-08-19 RX ORDER — ACETAMINOPHEN 325 MG/1
650 TABLET ORAL EVERY 6 HOURS
Refills: 0 | Status: DISCONTINUED | OUTPATIENT
Start: 2024-08-19 | End: 2024-09-07

## 2024-08-19 RX ADMIN — ACETAMINOPHEN 650 MILLIGRAM(S): 325 TABLET ORAL at 23:21

## 2024-08-19 RX ADMIN — POLYETHYLENE GLYCOL 3350 17 GRAM(S): 17 POWDER, FOR SOLUTION ORAL at 05:38

## 2024-08-19 RX ADMIN — ACETAMINOPHEN 650 MILLIGRAM(S): 325 TABLET ORAL at 08:28

## 2024-08-19 RX ADMIN — Medication 600 MILLIGRAM(S): at 05:38

## 2024-08-19 RX ADMIN — ACETAMINOPHEN 650 MILLIGRAM(S): 325 TABLET ORAL at 07:47

## 2024-08-19 RX ADMIN — CHLORHEXIDINE GLUCONATE 1 APPLICATION(S): 40 SOLUTION TOPICAL at 05:38

## 2024-08-19 RX ADMIN — ENOXAPARIN SODIUM 40 MILLIGRAM(S): 100 INJECTION SUBCUTANEOUS at 22:18

## 2024-08-19 RX ADMIN — ACETAMINOPHEN 650 MILLIGRAM(S): 325 TABLET ORAL at 22:21

## 2024-08-19 RX ADMIN — Medication 40 MILLIGRAM(S): at 22:18

## 2024-08-19 RX ADMIN — ACETAMINOPHEN 650 MILLIGRAM(S): 325 TABLET ORAL at 00:13

## 2024-08-19 RX ADMIN — Medication 300 MILLIGRAM(S): at 22:24

## 2024-08-19 RX ADMIN — ACETAMINOPHEN 650 MILLIGRAM(S): 325 TABLET ORAL at 15:15

## 2024-08-19 RX ADMIN — Medication 1 TABLET(S): at 12:01

## 2024-08-19 RX ADMIN — OXYCODONE HYDROCHLORIDE 5 MILLIGRAM(S): 5 TABLET ORAL at 00:13

## 2024-08-19 RX ADMIN — Medication 600 MILLIGRAM(S): at 14:01

## 2024-08-19 RX ADMIN — METHOCARBAMOL 750 MILLIGRAM(S): 750 TABLET, FILM COATED ORAL at 23:52

## 2024-08-19 RX ADMIN — TAMSULOSIN HYDROCHLORIDE 0.4 MILLIGRAM(S): 0.4 CAPSULE ORAL at 22:19

## 2024-08-19 NOTE — DISCHARGE NOTE PROVIDER - NSDCACTIVITY_GEN_ALL_CORE
No restrictions/Showering allowed/Stairs allowed/Walking - Indoors allowed/Walking - Outdoors allowed/Activity as tolerated

## 2024-08-19 NOTE — H&P ADULT - NSHPSOCIALHISTORY_GEN_ALL_CORE
Lives With:: Pt lives in a 1 level home with 5 NUNU  Pt has a RW, shower chair and raised toilet seated SOCIAL HISTORY  Smoking - Denied  EtOH - Denied   Drugs - Denied    FUNCTIONAL HISTORY  Lives With:: Pt lives in a 1 level home with 5 NUNU  Pt has a RW, shower chair and raised toilet seat     CURRENT FUNCTIONAL STATUS  - Transfers: Min A  - Gait: Min A  - ADLs: Min -mod A SOCIAL HISTORY  Smoking - Denied  EtOH - Denied   Drugs - Denied    FUNCTIONAL HISTORY  [employer]   Lives With:: Pt lives in a   with 4 NUNU, 1st floor living   Pt has a RW, shower chair and raised toilet seat     CURRENT FUNCTIONAL STATUS  - Transfers: Min A   - Gait: Min A  - ADLs: Min -mod A

## 2024-08-19 NOTE — H&P ADULT - NSICDXFAMILYHX_GEN_ALL_CORE_FT
FAMILY HISTORY:  Mother  Still living? Unknown  FH: Parkinson's disease, Age at diagnosis: Age Unknown

## 2024-08-19 NOTE — DISCHARGE NOTE PROVIDER - NSDCCPCAREPLAN_GEN_ALL_CORE_FT
PRINCIPAL DISCHARGE DIAGNOSIS  Diagnosis: Dural arteriovenous fistula  Assessment and Plan of Treatment: You underwent treatment of your C3-4 dural arteriovenous with a C2-5 laminectomy with AVF ligation. Your post op angiogram showed resolution of C3-4 fistula and reduced flow of the craniocervical fistula. Your staples can be removed on 8/26/24. You should follow up with Dr. Jewell in 1-2 weeks after dischage.      SECONDARY DISCHARGE DIAGNOSES  Diagnosis: Dissection, vertebral artery  Assessment and Plan of Treatment: You had injury to your left vertebral artery which is an injury of the blood vessel wall. For treatment, you should start aspirin 81 mg starting on 8/26/24.    Diagnosis: Urinary retention  Assessment and Plan of Treatment: You were started on Flomax 0.4 mg daily for urinary retention. You should follow up with your urologist for further care.

## 2024-08-19 NOTE — DISCHARGE NOTE PROVIDER - CARE PROVIDER_API CALL
Jose Martin Jewellul  Neurosurgery  [address] [place] [id number]  Phone: [phone]  Fax: [phone]  Follow Up Time: 1 week

## 2024-08-19 NOTE — PROGRESS NOTE ADULT - PROVIDER SPECIALTY LIST ADULT
NSICU
Physiatry
NSICU
Neurosurgery
Neurosurgery
Physiatry
Neurosurgery

## 2024-08-19 NOTE — H&P ADULT - HISTORY OF PRESENT ILLNESS
74 year old man w/ HTN, melanoma, spontaneous T12 intradural hematoma in 2021 s/p T10-L2 lami, presented for elective treatment of cervical AVF w/ C2-5 lami and fistula ligation 8/12/24. Intraop angiogram showed resolution of C3-4 dAVF w/ L vertebral artery dissection, then underwent formal angiogram the following day, which showed resolution of the fistula, improving vertebral artery dissection. Post-operatively, patient had urinary retention. However, his Vazquez was removed and the patient voided on 8/19/24, prior to his admission to [facility] for acute multidisciplinary rehab. 74 year old man w/ HTN, melanoma, spontaneous T12 intradural hematoma in 2021 s/p T10-L2 lami, presented for elective treatment of cervical AVF w/ C2-5 lami and fistula ligation 8/12/24. Intraop angiogram showed resolution of C3-4 dAVF w/ L vertebral artery dissection, then underwent formal angiogram the following day, which showed resolution of the fistula, improving vertebral artery dissection. Post-operatively, patient had urinary retention. However, his Vazquez was removed and the patient voided on 8/19/24. PM&R was consulted and deemed him an appropriate candidate for IRF. He was medically stabilized and cleared for discharge to [facility].  Mr. Denis Toro is a 74-year-old male patient with past medical history of HTN, melanoma, and a spontaneous T12 intradural hematoma in 2021 s/p T10-L2 laminectomy who presented for elective treatment of cervical AVF w/ C2-5 lami and fistula ligation on 8/12/24. Intraop angiogram showed resolution of C3-4 dAVF w/ L vertebral artery dissection, then underwent formal angiogram the following day, which showed resolution of the fistula, and improving vertebral artery dissection. Post-operatively, patient had urinary retention. However, his Vazquez was removed and the patient voided on 8/19/24. PM&R was consulted and deemed him an appropriate candidate for IRF. He was cleared for discharge to [facility] on 8/19/24.

## 2024-08-19 NOTE — H&P ADULT - REASON FOR ADMISSION
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

## 2024-08-19 NOTE — H&P ADULT - NSHPREVIEWOFSYSTEMS_GEN_ALL_CORE
REVIEW OF SYSTEMS  Constitutional: No fever, No Chills, No fatigue  HEENT: No eye pain, No visual disturbances, No difficulty hearing  Pulm: No cough,  No shortness of breath  Cardio: No chest pain, No palpitations  GI:  No abdominal pain, No nausea, No vomiting, No constipation, + diminished appetite (since surgery), + diarrhea   : No dysuria, No frequency, No hematuria  Neuro: No headaches, No memory loss, No loss of strength, No numbness, No tremors  Skin: No itching, No rashes, No lesions    Endo: No temperature intolerance  MSK: No joint pain, No joint swelling, No muscle pain, + mild neck pain with limited ROM, + BL LE weakness, + BL LE numbness/tingling (R>L)   Psych:  No depression, No anxiety REVIEW OF SYSTEMS  Constitutional: No fever, No Chills, No fatigue  HEENT: No eye pain, No visual disturbances, No difficulty hearing  Pulm: No cough,  No shortness of breath  Cardio: No chest pain, No palpitations  GI:  No abdominal pain, No nausea, No vomiting, No constipation, + diminished appetite (since surgery), + diarrhea   : No dysuria, No frequency, No hematuria  Neuro: No headaches, No memory loss, No loss of strength, No tremors, + BL LE numbness/tingling (R>L)   Skin: No itching, No rashes, No lesions    Endo: No temperature intolerance  MSK: No joint pain, No joint swelling, No muscle pain, + mild neck pain with limited ROM, + BL LE weakness  Psych:  No depression, No anxiety Constitutional: No fever, No Chills, No fatigue  HEENT: No eye pain, No visual disturbances, No difficulty hearing  Pulm: No cough,  No shortness of breath  Cardio: No chest pain, No palpitations  GI:  No abdominal pain, No nausea, No vomiting, No constipation, + diminished appetite (since surgery), + diarrhea   : No dysuria, No frequency, No hematuria  Neuro: No headaches, No memory loss, No loss of strength, No tremors, + BL LE numbness/tingling (R>L)   Skin: No itching, No rashes, No lesions    Endo: No temperature intolerance  MSK: No joint pain, No joint swelling, No muscle pain, + mild neck pain with limited ROM, + BL LE weakness  Psych:  No depression, No anxiety

## 2024-08-19 NOTE — H&P ADULT - NS ATTEND AMEND GEN_ALL_CORE FT
I independently performed the documented the history, exam, and medical decision making. I have made amendments to the documentation where necessary. I have personally seen and examined the patient. Medical records were reviewed and I have made amendments to the documentation where necessary and adjusted the history, physical examination, and plan as documented by the Nurse Practitioner and Resident Physician. Patient was seen and evaluated at bedside today. Reported no overnight events and is in no acute distress. Eager to participate on the recommended rehabilitation program. Denies any CP, SOB, GAMA, palpitations, fever, chills, body aches, cough, congestion, or any other symptoms at this time. Admission vitals, labs, and physical exam are outlined below.    LAB                        9.3    4.93  )-----------( 160      ( 19 Aug 2024 04:37 )             26.9     08-19    135  |  100  |  13.6  ----------------------------<  109<H>  4.2   |  26.0  |  0.46<L>    Ca    8.1<L>      19 Aug 2024 04:37  Phos  3.2     08-19  Mg     2.0     08-19        PHYSICAL EXAM  Vital Signs Last 24 Hrs  T(C): 36.8 (19 Aug 2024 20:00), Max: 37.1 (19 Aug 2024 16:30)  T(F): 98.3 (19 Aug 2024 20:00), Max: 98.7 (19 Aug 2024 16:30)  HR: 84 (20 Aug 2024 05:27) (79 - 86)  BP: 137/76 (20 Aug 2024 05:27) (122/81 - 150/87)  RR: 17 (19 Aug 2024 20:00) (16 - 18)  SpO2: 95% (19 Aug 2024 20:00) (94% - 96%) I independently performed the documented the history, exam, and medical decision making. I have made amendments to the documentation where necessary. I have personally seen and examined the patient. Medical records were reviewed and I have made amendments to the documentation where necessary and adjusted the history, physical examination, and plan as documented by the Nurse Practitioner and Resident Physician. Patient was seen and evaluated at bedside today. Reported no overnight events and is in no acute distress. Eager to participate on the recommended rehabilitation program. Denies any CP, SOB, GAMA, palpitations, fever, chills, body aches, cough, congestion, or any other symptoms at this time. Admission vitals and labs are outlined below.    LAB                        9.3    4.93  )-----------( 160      ( 19 Aug 2024 04:37 )             26.9     08-19    135  |  100  |  13.6  ----------------------------<  109<H>  4.2   |  26.0  |  0.46<L>    Ca    8.1<L>      19 Aug 2024 04:37  Phos  3.2     08-19  Mg     2.0     08-19      Vital Signs Last 24 Hrs  T(C): 36.8 (19 Aug 2024 20:00), Max: 37.1 (19 Aug 2024 16:30)  T(F): 98.3 (19 Aug 2024 20:00), Max: 98.7 (19 Aug 2024 16:30)  HR: 84 (20 Aug 2024 05:27) (79 - 86)  BP: 137/76 (20 Aug 2024 05:27) (122/81 - 150/87)  RR: 17 (19 Aug 2024 20:00) (16 - 18)  SpO2: 95% (19 Aug 2024 20:00) (94% - 96%)

## 2024-08-19 NOTE — DISCHARGE NOTE PROVIDER - HOSPITAL COURSE
74M with PMH HLD, HTN, melanoma, chronic back pain. 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 His post op course has been fairly unremarkable. Patient had post op urinary retention with failure of trail of void, kate replaced and started on flomax, repeat trial of void showed ___. Patient evaluated by PT/OT and was recommended for acute rehab, PM&R in agreement. Accepted to [facility] pending bed placement.     For the left vertebral artery dissection, you should begin aspirin 81 mg on 8/26/24.   Your staples can be removed 14 days post procedure on 8/26/24.     On _____, the patient and/or their family member/caretaker were provided with a brief summary and overview of the hospital course including admission date, diagnosis, surgery / procedure, complications if applicable, discharge instructions including but not limited to: incision care, signs/symptoms to look out for after discharge, home medications, new medications, patient / family education/training and instructions for follow up. Any concerns were addressed, and all questions were answered. 74M with PMH HLD, HTN, melanoma, chronic back pain. 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 His post op course has been fairly unremarkable. Patient had post op urinary retention with failure of trail of void, kate replaced and started on flomax, repeat trial of void showed ___. Patient evaluated by PT/OT and was recommended for acute rehab, PM&R in agreement. Accepted to [facility] pending bed placement.     For the left vertebral artery dissection, he should begin aspirin 81 mg on 8/26/24.   The cervical incision staples can be removed 14 days post procedure on 8/26/24.     On 8/19/24, the patient and/or their family member/caretaker were provided with a brief summary and overview of the hospital course including admission date, diagnosis, surgery / procedure, complications if applicable, discharge instructions including but not limited to: incision care, signs/symptoms to look out for after discharge, home medications, new medications, patient / family education/training and instructions for follow up. Any concerns were addressed, and all questions were answered. 74M with PMH HLD, HTN, melanoma, chronic back pain. 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 His post op course has been fairly unremarkable. Patient had post op urinary retention with failure of trail of void, kate replaced and started on flomax, repeat trial of void attempted with patient void x1. Patient evaluated by PT/OT and was recommended for acute rehab, PM&R in agreement. Accepted to [facility].    For the left vertebral artery dissection, he should begin aspirin 81 mg on 8/26/24.   The cervical incision staples can be removed 14 days post procedure on 8/26/24.     On 8/19/24, the patient and/or their family member/caretaker were provided with a brief summary and overview of the hospital course including admission date, diagnosis, surgery / procedure, complications if applicable, discharge instructions including but not limited to: incision care, signs/symptoms to look out for after discharge, home medications, new medications, patient / family education/training and instructions for follow up. Any concerns were addressed, and all questions were answered.

## 2024-08-19 NOTE — DISCHARGE NOTE PROVIDER - NSDCMRMEDTOKEN_GEN_ALL_CORE_FT
gabapentin 300 mg oral capsule: 2 cap(s) orally 2 times a day at  6  am and  2  pm  gabapentin 300 mg oral capsule: 1 cap(s) orally once a day (at bedtime)  irbesartan 300 mg oral tablet: 1 tab(s) orally once a day  lidocaine 4% topical film: Apply topically to affected area once a day Please place one patch on each shoulder daily for neck pain  lidocaine 4% topical film: Apply topically to affected area once a day Please place one patch on each shoulder daily to aid in neck / shoulder pain  methocarbamol 750 mg oral tablet: 1 tab(s) orally 3 times a day As needed Muscle Spasm  Multiple Vitamins oral tablet: 1 tab(s) orally once a day  polyethylene glycol 3350 oral powder for reconstitution: 17 gram(s) orally every 12 hours  senna leaf extract oral tablet: 2 tab(s) orally once a day (at bedtime)  simvastatin 20 mg oral tablet: 1 tab(s) orally once a day   acetaminophen 325 mg oral tablet: 2 tab(s) orally every 6 hours As needed Mild Pain (1 - 3)  gabapentin 300 mg oral capsule: 2 cap(s) orally 2 times a day at  6  am and  2  pm  gabapentin 300 mg oral capsule: 1 cap(s) orally once a day (at bedtime)  irbesartan 300 mg oral tablet: 1 tab(s) orally once a day  lidocaine 4% topical film: Apply topically to affected area once a day Please place one patch on each shoulder daily for neck pain  lidocaine 4% topical film: Apply topically to affected area once a day Please place one patch on each shoulder daily to aid in neck / shoulder pain  methocarbamol 750 mg oral tablet: 1 tab(s) orally 3 times a day As needed Muscle Spasm  Multiple Vitamins oral tablet: 1 tab(s) orally once a day  oxyCODONE 10 mg oral tablet: 1 tab(s) orally every 6 hours As needed Severe Pain (7 - 10)  oxyCODONE 5 mg oral tablet: 1 tab(s) orally every 6 hours As needed Moderate Pain (4 - 6)  polyethylene glycol 3350 oral powder for reconstitution: 17 gram(s) orally every 12 hours  senna leaf extract oral tablet: 2 tab(s) orally once a day (at bedtime)  simvastatin 20 mg oral tablet: 1 tab(s) orally once a day  tamsulosin 0.4 mg oral capsule: 1 cap(s) orally once a day (at bedtime)   acetaminophen 325 mg oral tablet: 2 tab(s) orally every 6 hours As needed Mild Pain (1 - 3)  enoxaparin: 40 milligram(s) subcutaneous once a day  gabapentin 300 mg oral capsule: 2 cap(s) orally 2 times a day at  6  am and  2  pm  gabapentin 300 mg oral capsule: 1 cap(s) orally once a day (at bedtime)  irbesartan 300 mg oral tablet: 1 tab(s) orally once a day  lidocaine 4% topical film: Apply topically to affected area once a day Please place one patch on each shoulder daily for neck pain  lidocaine 4% topical film: Apply topically to affected area once a day Please place one patch on each shoulder daily to aid in neck / shoulder pain  methocarbamol 750 mg oral tablet: 1 tab(s) orally 3 times a day As needed Muscle Spasm  Multiple Vitamins oral tablet: 1 tab(s) orally once a day  oxyCODONE 10 mg oral tablet: 1 tab(s) orally every 6 hours As needed Severe Pain (7 - 10)  oxyCODONE 5 mg oral tablet: 1 tab(s) orally every 6 hours As needed Moderate Pain (4 - 6)  polyethylene glycol 3350 oral powder for reconstitution: 17 gram(s) orally every 12 hours  senna leaf extract oral tablet: 2 tab(s) orally once a day (at bedtime)  simvastatin 20 mg oral tablet: 1 tab(s) orally once a day  tamsulosin 0.4 mg oral capsule: 1 cap(s) orally once a day (at bedtime)

## 2024-08-19 NOTE — H&P ADULT - ASSESSMENT
74 year old man with PMHx HTN, melanoma, spontaneous intradural hematoma in 2021 presented for elective treatment of AVF with C2-5 lami on 8/12, however intraop angiogram revealed resolution of the fistula with L vertebral artery disssection. Remaining hospital course notable for failed TOV, then successful TOV on 8/19. Admitted for multidisciplinary rehab program    #Vertebral Artery Dissection   #Comprehensive Multidisciplinary Rehab Program:  - Gait, ADL, Functional impairments  - PT/OT/ SLP 3 hours a day 5 days a week  - Start Aspirin 81 mg daily on 8/26/24    #Pain:  - PRN Scale: Tylenol, Oxycodone 5 mg q6h, Oxycodone 10 mg q6h   - PRN Robaxin 750 mg TID  - Standing Gabapentin 300 mg qPM, 600 mg BID   - Lidocaine patch    #GI/Bowel:  - Senna 2 tabs daily  - Miralax q12h     #/Bladder:  - Per chart review, Vazquez removed today and patient voided 150 cc w/  cc   - Monitor PVR if no void in 8h; SC for >400 cc    #Diet / Dysphagia:    - Diet: Regular/DASH   - Ongoing SLP assessment  - Nutrition to follow    #Skin/ Pressure Injury Prevention:  - Assessment on admission   - Incisions:    #DVT prophylaxis:  - Lovenox     #Precautions/ Restrictions  - Falls, Spine    --------------------------------------------  Outpatient Follow up:  Jose Martin Jewell  Neurosurgery  [address] [place] [id number]  Phone: [phone]  Fax: [phone]  Follow Up Time: 1 week    --------------------------------------------      MEDICAL PROGNOSIS: GOOD            REHAB POTENTIAL: GOOD             ESTIMATED DISPOSITION: HOME WITH HOME CARE            ELOS: 10-14 Days   EXPECTED THERAPY:     P.T. 1hr/day       O.T. 1hr/day      S.L.P. 1hr/day     P&O Unnecessary     EXP FREQUENCY: 5 days per 7 day period     PRESCREEN COMPARISON:   I have reviewed the prescreen information and I have found no relevant changes between the preadmission screening and my post admission evaluation     RATIONALE FOR INPATIENT ADMISSION - Patient demonstrates the following: (check all that apply)  [X] Medically appropriate for rehabilitation admission  [X] Has attainable rehab goals with an appropriate initial discharge plan  [X] Has rehabilitation potential (expected to make a significant improvement within a reasonable period of time)   [X] Requires close medical management by a rehab physician, rehab nursing care, Hospitalist and comprehensive interdisciplinary team (including PT, OT, & or SLP, Prosthetics and Orthotics)  74 year old man with PMHx HTN, melanoma, spontaneous intradural hematoma in 2021 presented for elective treatment of AVF with C2-5 lami on 8/12, however intraop angiogram revealed resolution of the fistula with L vertebral artery disssection. Remaining hospital course notable for failed TOV, then successful TOV on 8/19. Admitted for multidisciplinary rehab program    #Vertebral Artery Dissection   #Comprehensive Multidisciplinary Rehab Program:  - Gait, ADL, Functional impairments  - PT/OT/ SLP 3 hours a day 5 days a week  - Start Aspirin 81 mg daily on 8/26/24    #Pain:  - PRN Scale: Tylenol, Oxycodone 5 mg q6h, Oxycodone 10 mg q6h   - PRN Robaxin 750 mg TID  - Standing Gabapentin 300 mg qPM, 600 mg BID   - Lidocaine patch    #GI/Bowel:  - Senna 2 tabs daily  - Miralax q12h     #/Bladder:  - Per chart review, Vazquez removed today and patient voided 150 cc w/  cc   - Monitor PVR if no void in 8h; SC for >400 cc    #Diet / Dysphagia:    - Diet: Regular/DASH   - Ongoing SLP assessment  - Nutrition to follow    #Skin/ Pressure Injury Prevention:  - Assessment on admission: Surgical wound JUAN ALBERTO, staples intact    #DVT prophylaxis:  - Lovenox     #Precautions/ Restrictions  - Falls, Spine    --------------------------------------------  Outpatient Follow up:  Jose Martin Jewellul  Neurosurgery  [address] [place] [id number]  Phone: [phone]  Fax: [phone]  Follow Up Time: 1 week    --------------------------------------------      MEDICAL PROGNOSIS: GOOD            REHAB POTENTIAL: GOOD             ESTIMATED DISPOSITION: HOME WITH HOME CARE            ELOS: 10-14 Days   EXPECTED THERAPY:     P.T. 1hr/day       O.T. 1hr/day      S.L.P. 1hr/day     P&O Unnecessary     EXP FREQUENCY: 5 days per 7 day period     PRESCREEN COMPARISON:   I have reviewed the prescreen information and I have found no relevant changes between the preadmission screening and my post admission evaluation     RATIONALE FOR INPATIENT ADMISSION - Patient demonstrates the following: (check all that apply)  [X] Medically appropriate for rehabilitation admission  [X] Has attainable rehab goals with an appropriate initial discharge plan  [X] Has rehabilitation potential (expected to make a significant improvement within a reasonable period of time)   [X] Requires close medical management by a rehab physician, rehab nursing care, Hospitalist and comprehensive interdisciplinary team (including PT, OT, & or SLP, Prosthetics and Orthotics)  Mr. Denis Toro is a 74-year-old male patient with past medical history of HTN, melanoma, and a spontaneous T12 intradural hematoma in 2021 s/p T10-L2 laminectomy who is admitted for Acute Inpatient Rehabilitation with a multidisciplinary rehab program at [facility] with functional impairments in ADLs and mobility secondary to undergoing elective surgery for cervical AVF w/ C2-5 lami and fistula ligation on 8/12/24.       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  - S/P laminectomy for spinal cord AVM excision with ligation (8/12/24)      * Start Aspirin 81 mg daily on 8/26/24      * Vertebral artery dissection      * Post-operative urinary retention  - Vertebral Artery Dissection   - Bilateral lower extremity weakness  - Bilateral lower extremity sensory deficits  - Impaired ADLs and mobility  - Need for assistance with personal care  - Comprehensive Multidisciplinary Rehab Program:      * PT/OT/ SLP 3 hours a day 5 days a week    #Pain:  - PRN Scale: Tylenol, Oxycodone 5 mg q6h, Oxycodone 10 mg q6h   - PRN Robaxin 750 mg TID  - Standing Gabapentin 300 mg qPM, 600 mg BID   - Lidocaine patch    #GI/Bowel:  - Senna 2 tabs daily  - Miralax q12h     #/Bladder:  - Per chart review, Vazquez removed today and patient voided 150 cc w/  cc   - Monitor PVR if no void in 8h; SC for >400 cc    #Diet / Dysphagia:    - Diet: Regular/DASH   - Ongoing SLP assessment  - Nutrition to follow    #Skin/ Pressure Injury Prevention:  - Assessment on admission: Surgical wound JUAN ALBERTO, staples intact    #DVT prophylaxis:  - Lovenox     #Precautions/ Restrictions  - Falls, Spine    --------------------------------------------  Outpatient Follow up:  Jose Martin Jewell  Neurosurgery  [address] [place] [id number]  Phone: [phone]  Fax: [phone]  Follow Up Time: 1 week    --------------------------------------------      MEDICAL PROGNOSIS: GOOD            REHAB POTENTIAL: GOOD             ESTIMATED DISPOSITION: HOME WITH HOME CARE            ELOS: 10-14 Days   EXPECTED THERAPY:     P.T. 1hr/day       O.T. 1hr/day      S.L.P. 1hr/day     P&O Unnecessary     EXP FREQUENCY: 5 days per 7 day period     PRESCREEN COMPARISON:   I have reviewed the prescreen information and I have found no relevant changes between the preadmission screening and my post admission evaluation     RATIONALE FOR INPATIENT ADMISSION - Patient demonstrates the following: (check all that apply)  [X] Medically appropriate for rehabilitation admission  [X] Has attainable rehab goals with an appropriate initial discharge plan  [X] Has rehabilitation potential (expected to make a significant improvement within a reasonable period of time)   [X] Requires close medical management by a rehab physician, rehab nursing care, Hospitalist and comprehensive interdisciplinary team (including PT, OT, & or SLP, Prosthetics and Orthotics)  Mr. Denis Toro is a 74-year-old male patient with past medical history of HTN, melanoma, and a spontaneous T12 intradural hematoma in 2021 s/p T10-L2 laminectomy who is admitted for Acute Inpatient Rehabilitation with a multidisciplinary rehab program at [facility] with functional impairments in ADLs and mobility secondary to undergoing elective surgery for cervical AVF w/ C2-5 lami and fistula ligation on 8/12/24.       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  - S/P laminectomy for spinal cord AVM excision with ligation (8/12/24)      * Start Aspirin 81 mg daily on 8/26/24      * Vertebral artery dissection      * Post-operative urinary retention  - Vertebral Artery Dissection   - Bilateral lower extremity weakness  - Bilateral lower extremity sensory deficits  - Impaired ADLs and mobility  - Need for assistance with personal care  - Comprehensive Multidisciplinary Rehab Program:      * PT/OT/ SLP 3 hours a day 5 days a week    #Pain:  - PRN Scale: Tylenol, Oxycodone 5 mg q6h, Oxycodone 10 mg q6h   - PRN Robaxin 750 mg TID  - Standing Gabapentin 300 mg qPM, 600 mg BID   - Lidocaine patch    #GI/Bowel:  - Senna 2 tabs daily  - Miralax q12h     #/Bladder:  - Per chart review, Vazquez removed today and patient voided 150 cc w/  cc   - Monitor PVR if no void in 8h; SC for >400 cc    #Diet / Dysphagia:    - Diet: Regular/DASH   - Ongoing SLP assessment  - Nutrition to follow    #Skin/ Pressure Injury Prevention:  - Assessment on admission: Surgical wound JUAN ALBERTO, staples intact    #DVT prophylaxis:  - Lovenox     #Precautions/ Restrictions  - Falls, Spine    --------------------------------------------  Outpatient Follow up:  Jose Martin Jewell  Neurosurgery  [address] [place] [id number]  Phone: [phone]  Fax: [phone]  Follow Up Time: 1 week    --------------------------------------------      MEDICAL PROGNOSIS: GOOD            REHAB POTENTIAL: GOOD             ESTIMATED DISPOSITION: HOME WITH HOME CARE            ELOS: 10-14 Days   EXPECTED THERAPY:     P.T. 1hr/day       O.T. 1hr/day      S.L.P. 1hr/day     P&O Unnecessary     EXP FREQUENCY: 5 days per 7 day period     PRESCREEN COMPARISON:   I have reviewed the prescreen information and I have found no relevant changes between the preadmission screening and my post admission evaluation     RATIONALE FOR INPATIENT ADMISSION - Patient demonstrates the following: (check all that apply)  [X] Medically appropriate for rehabilitation admission  [X] Has attainable rehab goals with an appropriate initial discharge plan  [X] Has rehabilitation potential (expected to make a significant improvement within a reasonable period of time)   [X] Requires close medical management by a rehab physician, rehab nursing care, Hospitalist and comprehensive interdisciplinary team (including PT, OT, & or SLP, Prosthetics and Orthotics)

## 2024-08-19 NOTE — H&P ADULT - NSHPPHYSICALEXAM_GEN_ALL_CORE
Vital Signs  T(C): 36.9 (08-19-24 @ 18:48), Max: 37.3 (08-18-24 @ 20:30)  HR: 86 (08-19-24 @ 18:48) (76 - 94)  BP: 149/81 (08-19-24 @ 18:48) (122/81 - 150/80)  RR: 18 (08-19-24 @ 18:48) (16 - 19)  SpO2: 94% (08-19-24 @ 18:48) (93% - 96%)    Gen - NAD, Comfortable  HEENT - NCAT, EOMI, MMM  Neck - Supple, Limited ROM   Pulm - CTAB, No wheeze, No rhonchi, No crackles  Cardiovascular - RRR, S1S2, No m/r/g  Abdomen - Soft, NT/ND, +BS  Extremities - No C/C/E, No calf tenderness  Neuro-     Cognitive - AAOx3     Communication - Fluent, No dysarthria     Attention: Intact      Judgement: Good evidence of judgement     Memory: Recall 3 objects immediate and 3 min later with cueing      No focal deficits cranial nerve deficits on exam.     Motor -                    LEFT    UE - 5/5                    RIGHT UE - 5/5                    LEFT    LE - 4/5                    RIGHT LE - 4/5      Sensory - BL LE numbness and tingling (R>L)      Coordination - FTN & HTS intact  Psychiatric - Mood stable, Affect WNL  Skin: Intact  Wounds: Surgical wound JUAN ALBERTO, staples intact

## 2024-08-19 NOTE — PROGRESS NOTE ADULT - ASSESSMENT
74M with PMH HTN, HLD, epidural hematoma s/p resection 2021 with Dr. Uriarte who presented for elective dural AVF ligation, now s/p C2-5 lami with dAVF ligation, complicated by L vert dissection on 8/12/24.   - POD7  - Exam intact   - Mild neck pain, relieved with tylenol     Plan:  - Discussed and examined with Dr. Akins  - Q4 hour neuro checks  - SBP goal   - Incision C/D/I, monitor  - DVT prophylaxis: SCDs, lovenox   - Pain control PRN, tylenol, oxy 5/10 ordered, robaxin 750 mg TID, lidocaine patches, IV dilaudid d/c'd  - Gabapentin 600 BID, 300 qHS (home medication)  - DASH diet  - Bowel regimen: senna, miralax; last BM 8/19 am   - Flomax for urinary retention, trial of void today, will replace kate if unable to void x3  - Simvastatin 20  - MVI   - PT/OT to see today  - Dispo: accepted to [facility] pending bed availability

## 2024-08-19 NOTE — PROGRESS NOTE ADULT - SUBJECTIVE AND OBJECTIVE BOX
Patient ready for DC as per  Neurosurgery PA.  Patient is pending a bed at .    FUNCTIONAL PROGRESS  8/17 PT  Sit-Stand Transfer Training  Transfer Training Sit-to-Stand Transfer: minimum assist (75% patient effort);  1 person assist  Transfer Training Stand-to-Sit Transfer: minimum assist (75% patient effort);  1 person assist    Gait Training  Gait Training: minimum assist (75% patient effort);  1 person assist;  rolling walker;  30ft  Gait Analysis: impaired balance;  impaired coordination    8/16 OT  Bed-Chair Transfer Training  Transfer Training Bed-to-Chair Transfer: minimum assist (75% patient effort);  1 person assist;  rolling walker  Transfer Training Chair-to-Bed Transfer: minimum assist (75% patient effort);  1 person assist;  rolling walker    Sit-Stand Transfer Training  Transfer Training Sit-to-Stand Transfer: minimum assist (75% patient effort)  Transfer Training Stand-to-Sit Transfer: minimum assist (75% patient effort)    Lower Body Dressing Training  Lower Body Dressing Training Assistance: moderate assist (50% patient effort)    Upper Body Dressing Training  Upper Body Dressing Training Assistance: minimum assist (75% patient effort)      VITALS  T(C): 36.6 (08-19-24 @ 08:02), Max: 37.3 (08-18-24 @ 20:30)  HR: 79 (08-19-24 @ 08:02) (75 - 94)  BP: 122/81 (08-19-24 @ 08:02) (122/81 - 150/80)  RR: 16 (08-19-24 @ 08:02) (16 - 19)  SpO2: 95% (08-19-24 @ 08:02) (93% - 96%)  Wt(kg): --    MEDICATIONS   acetaminophen     Tablet .. 650 milliGRAM(s) every 6 hours PRN  chlorhexidine 2% Cloths 1 Application(s) <User Schedule>  enoxaparin Injectable 40 milliGRAM(s) <User Schedule>  gabapentin 300 milliGRAM(s) at bedtime  gabapentin 600 milliGRAM(s) <User Schedule>  lidocaine   4% Patch 1 Patch daily  lidocaine   4% Patch 1 Patch daily  methocarbamol 750 milliGRAM(s) three times a day PRN  multivitamin 1 Tablet(s) daily  ondansetron Injectable 4 milliGRAM(s) every 6 hours PRN  oxyCODONE    IR 10 milliGRAM(s) every 6 hours PRN  oxyCODONE    IR 5 milliGRAM(s) every 6 hours PRN  polyethylene glycol 3350 17 Gram(s) every 12 hours  senna 2 Tablet(s) at bedtime  simvastatin 20 milliGRAM(s) at bedtime  tamsulosin 0.4 milliGRAM(s) at bedtime      RECENT LABS/IMAGING  - Reviewed Today                        9.3    4.93  )-----------( 160      ( 19 Aug 2024 04:37 )             26.9     08-19    135  |  100  |  13.6  ----------------------------<  109<H>  4.2   |  26.0  |  0.46<L>    Ca    8.1<L>      19 Aug 2024 04:37  Phos  3.2     08-19  Mg     2.0     08-19        Urinalysis Basic - ( 19 Aug 2024 04:37 )    Color: x / Appearance: x / SG: x / pH: x  Gluc: 109 mg/dL / Ketone: x  / Bili: x / Urobili: x   Blood: x / Protein: x / Nitrite: x   Leuk Esterase: x / RBC: x / WBC x   Sq Epi: x / Non Sq Epi: x / Bacteria: x                CT C SPINE 8/12 - Comparison is made with the prior cervical spine MRI of 5/31/2024. The cervical and upper thoracic vertebral bodies are normal in height and density. Mild degenerative anterolisthesis at C4-5. Anterior osteophytes are present C5-6 C6-7 C7-T1. No acute fractures or dislocations are identified. Uncal vertebral joint and facet degenerative changes are noted. Left-sided facet hypertrophy causes left neural foraminal stenosis at C3-4 and C4-5. IMPRESSION: Mild degenerative changes.      ----------------------------------------------------------------------------------------  PHYSICAL EXAM  Constitutional - NAD, Comfortable  Extremities - No C/C/E, No calf tenderness   Neurologic Exam -                    Cognitive - AAO to self, place, date, year, situation     Communication - Fluent, No dysarthria     Cranial Nerves - CN 2-12 intact     FUNCTIONAL MOTOR EXAM - No focal deficits     Sensory - Decreased in the right foot   Psychiatric - Mood stable, Affect WNL  ----------------------------------------------------------------------------------------  ASSESSMENT/PLAN  74yMale with functional deficits related to dural AVF   Dural AVF s/p ligation, C2-5 laminectomy, complicated by vertebral dissection - Monitoring  Pain - Tylenol, Lidoderm, Neurontin, Robaxin, Oxycodone  Neurogenic Bladder - Flomax, Vazquez   CAD - Zocor  DVT PPX - SCDs, Lovenox  Rehab/Impaired mobility and function - Patient continues to require hospitalization for the above diagnoses and ongoing active management of comorbid complications (needs PT and OT within 48hrs of AR - needs reevals) that are substantially posing a threat to bodily function, necessitating transfer of hospitalization at an acute inpatient rehabilitation facility.     When medically optimized, based on the patient's diagnosis, current functional status and potential for progress, recommend ACUTE inpatient rehabilitation for the functional deficits consisting of 3 hours of therapy/day & 24 hour RN/daily PMR physician for active comorbid medical management. Patient will be able to tolerate 3 hours a day.    Goals for acute inpatient rehab are to achieve modified independence with bed mobility, modified independence with transfers and modified independence with ambulation of 100' over an LOS of 14 days.    Will continue to follow. Rehab recommendations are dependent on how functional progress changes as well as how patient continues to participate and tolerate therapeutic interventions, WHICH MAY CHANGE UPON FOLLOW UP EVALUATIONS. Recommend ongoing mobilization by staff to maintain cardiopulmonary function and prevention of secondary complications related to debility. Discussed the specific rehabilitation management and recommendations above with Richard ZUÑIGA

## 2024-08-19 NOTE — H&P ADULT - NSHPLABSRESULTS_GEN_ALL_CORE
LABS:                        9.3    4.93  )-----------( 160      ( 19 Aug 2024 04:37 )             26.9     08-19    135  |  100  |  13.6  ----------------------------<  109<H>  4.2   |  26.0  |  0.46<L>    Ca    8.1<L>      19 Aug 2024 04:37  Phos  3.2     08-19  Mg     2.0     08-19    Urinalysis Basic - ( 19 Aug 2024 04:37 )    Color: x / Appearance: x / SG: x / pH: x  Gluc: 109 mg/dL / Ketone: x  / Bili: x / Urobili: x   Blood: x / Protein: x / Nitrite: x   Leuk Esterase: x / RBC: x / WBC x   Sq Epi: x / Non Sq Epi: x / Bacteria: x    IMAGING/RADIOLOGY:  CERVICAL SPINE XRAY (8/12)   IMPRESSION: Surgical marker as noted.    BRIEF OPERATIVE NOTE (8/12)  POST-OP DIAGNOSIS: Dural arteriovenous fistula of spinal cord 12-Aug-2024 14:33:26  Vita Hewitt  PROCEDURES:  Laminectomy, spine, cervical, 3 levels 12-Aug-2024 14:14:42  Porfirio Barrios  Laminectomy, spine, cervical, for spinal cord AVM excision or occlusion 12-Aug-2024 14:15:06 C2-C5 laminectomy with ligation of dural AVF with intraop angiogram Porfirio Barrios  Operative Findings:C2-C5 laminectomy with ligation of dural AVF with intraop angiogram, C3-4 left dural AVF    CT C SPINE (8/12)  IMPRESSION: Mild degenerative changes. LABS:                        9.3    4.93  )-----------( 160      ( 19 Aug 2024 04:37 )             26.9     08-19    135  |  100  |  13.6  ----------------------------<  109<H>  4.2   |  26.0  |  0.46<L>    Ca    8.1<L>      19 Aug 2024 04:37  Phos  3.2     08-19  Mg     2.0     08-19    Urinalysis Basic - ( 19 Aug 2024 04:37 )    Color: x / Appearance: x / SG: x / pH: x  Gluc: 109 mg/dL / Ketone: x  / Bili: x / Urobili: x   Blood: x / Protein: x / Nitrite: x   Leuk Esterase: x / RBC: x / WBC x   Sq Epi: x / Non Sq Epi: x / Bacteria: x    IMAGING/RADIOLOGY:  CERVICAL SPINE XRAY (8/12)   IMPRESSION: Surgical marker as noted.    BRIEF OPERATIVE NOTE (8/12)  POST-OP DIAGNOSIS: Dural arteriovenous fistula of spinal cord 12-Aug-2024 14:33:26    PROCEDURES:  Laminectomy, spine, cervical, 3 levels 12-Aug-2024 14:14:42    Laminectomy, spine, cervical, for spinal cord AVM excision or occlusion 12-Aug-2024 14:15:06 C2-C5 laminectomy with ligation of dural AVF with intraop angiogram   Operative Findings:C2-C5 laminectomy with ligation of dural AVF with intraop angiogram, C3-4 left dural AVF    CT C SPINE (8/12)  IMPRESSION: Mild degenerative changes.

## 2024-08-20 PROCEDURE — 99232 SBSQ HOSP IP/OBS MODERATE 35: CPT

## 2024-08-20 PROCEDURE — 99239 HOSP IP/OBS DSCHRG MGMT >30: CPT

## 2024-08-20 RX ADMIN — ACETAMINOPHEN 650 MILLIGRAM(S): 325 TABLET ORAL at 07:55

## 2024-08-20 RX ADMIN — ACETAMINOPHEN 650 MILLIGRAM(S): 325 TABLET ORAL at 09:00

## 2024-08-20 RX ADMIN — Medication 300 MILLIGRAM(S): at 21:12

## 2024-08-20 RX ADMIN — Medication 40 MILLIGRAM(S): at 21:13

## 2024-08-20 RX ADMIN — TAMSULOSIN HYDROCHLORIDE 0.4 MILLIGRAM(S): 0.4 CAPSULE ORAL at 21:13

## 2024-08-20 RX ADMIN — LOSARTAN POTASSIUM 100 MILLIGRAM(S): 50 TABLET ORAL at 05:24

## 2024-08-20 RX ADMIN — ACETAMINOPHEN 650 MILLIGRAM(S): 325 TABLET ORAL at 21:42

## 2024-08-20 RX ADMIN — ENOXAPARIN SODIUM 40 MILLIGRAM(S): 100 INJECTION SUBCUTANEOUS at 21:12

## 2024-08-20 RX ADMIN — Medication 600 MILLIGRAM(S): at 13:06

## 2024-08-20 RX ADMIN — OXYCODONE HYDROCHLORIDE 10 MILLIGRAM(S): 5 TABLET ORAL at 04:13

## 2024-08-20 RX ADMIN — POLYETHYLENE GLYCOL 3350 17 GRAM(S): 17 POWDER, FOR SOLUTION ORAL at 05:24

## 2024-08-20 RX ADMIN — ACETAMINOPHEN 650 MILLIGRAM(S): 325 TABLET ORAL at 14:00

## 2024-08-20 RX ADMIN — POLYETHYLENE GLYCOL 3350 17 GRAM(S): 17 POWDER, FOR SOLUTION ORAL at 17:52

## 2024-08-20 RX ADMIN — Medication 600 MILLIGRAM(S): at 05:24

## 2024-08-20 RX ADMIN — Medication 2 TABLET(S): at 21:13

## 2024-08-20 RX ADMIN — OXYCODONE HYDROCHLORIDE 10 MILLIGRAM(S): 5 TABLET ORAL at 05:00

## 2024-08-20 RX ADMIN — ACETAMINOPHEN 650 MILLIGRAM(S): 325 TABLET ORAL at 15:00

## 2024-08-20 RX ADMIN — Medication 1 TABLET(S): at 13:07

## 2024-08-20 RX ADMIN — ACETAMINOPHEN 650 MILLIGRAM(S): 325 TABLET ORAL at 21:12

## 2024-08-20 NOTE — CONSULT NOTE ADULT - ASSESSMENT
74M with HTN, melanoma, and a spontaneous T12 intradural hematoma in 2021 s/p T10-L2 laminectomy  Admit SP Elective C Spine AVF ligation w/ C2-5 laminectomy and fusion  Hospital course complicated by Vertebral Art Dissection  Tx to acute rehab Aug19    Dural arteriovenous fistula of C Spine  S/P laminectomy for spinal cord AVM excision with ligation (8/12/24)  Care per PMR    Vertebral Art Dissection  Seen SP AVM excision  ASA started    #Pain:  Per PMR    #GI/Bowel:  - Senna 2 tabs daily  - Miralax q12h     #DVT prophylaxis:  - Lovenox     #Precautions/ Restrictions  - Falls, Spine

## 2024-08-20 NOTE — DIETITIAN INITIAL EVALUATION ADULT - PERTINENT MEDS FT
MEDICATIONS  (STANDING):  atorvastatin 40 milliGRAM(s) Oral at bedtime  enoxaparin Injectable 40 milliGRAM(s) SubCutaneous <User Schedule>  gabapentin 300 milliGRAM(s) Oral at bedtime  gabapentin 600 milliGRAM(s) Oral <User Schedule>  lidocaine   4% Patch 1 Patch Transdermal every 24 hours  lidocaine   4% Patch 1 Patch Transdermal every 24 hours  losartan 100 milliGRAM(s) Oral daily  multivitamin 1 Tablet(s) Oral daily  polyethylene glycol 3350 17 Gram(s) Oral every 12 hours  senna 2 Tablet(s) Oral at bedtime  tamsulosin 0.4 milliGRAM(s) Oral at bedtime    MEDICATIONS  (PRN):  acetaminophen     Tablet .. 650 milliGRAM(s) Oral every 6 hours PRN Mild Pain (1 - 3)  methocarbamol 750 milliGRAM(s) Oral three times a day PRN Muscle Spasm  oxyCODONE    IR 5 milliGRAM(s) Oral every 6 hours PRN Moderate Pain (4 - 6)  oxyCODONE    IR 10 milliGRAM(s) Oral every 6 hours PRN Severe Pain (7 - 10)

## 2024-08-20 NOTE — DIETITIAN INITIAL EVALUATION ADULT - EDUCATION DIETARY MODIFICATIONS
Education Provided on Proper Nutrition & Need for Increased Fiber/(2) meets goals/outcomes/verbalization

## 2024-08-20 NOTE — DIETITIAN INITIAL EVALUATION ADULT - ORAL NUTRITION SUPPLEMENTS
Recommend Initiate Ensure Clear 8oz PO Daily (Provides 240kcal & 8grams of Protein)   Declines "Milky Ensures"

## 2024-08-20 NOTE — DIETITIAN INITIAL EVALUATION ADULT - PERTINENT LABORATORY DATA
08-19    135  |  100  |  13.6  ----------------------------<  109<H>  4.2   |  26.0  |  0.46<L>    Ca    8.1<L>      19 Aug 2024 04:37  Phos  3.2     08-19  Mg     2.0     08-19    A1C with Estimated Average Glucose Result: 5.5 % (07-23-24 @ 12:04)  A1C with Estimated Average Glucose Result: 5.3 % (05-07-24 @ 14:05)

## 2024-08-20 NOTE — DIETITIAN INITIAL EVALUATION ADULT - ORAL INTAKE PTA/DIET HISTORY
Patient Does Not Follow Diet @Home  Consumes 2-3Meals a Day   Wife Usually Cooks For Patient   Does Take Vitamin/Supplements @Home (Vitamin C, Vitamin D, Multivitamin, Vitamin B Complex)

## 2024-08-20 NOTE — DIETITIAN INITIAL EVALUATION ADULT - ADD RECOMMEND
1) Monitor Weights, Intake, Tolerance, Skin & Labwork  2) Recommend Change Diet to Regular Diet  3) Recommend Initiate Ensure Clear 8oz PO Daily (Provides 240kcal & 8grams of Protein)   4) Continue Nutrition Plan of Care

## 2024-08-20 NOTE — DIETITIAN INITIAL EVALUATION ADULT - OTHER INFO
Initial Nutrition Assessment   74yr Old Male   Denies Food Allergy/Intolerance  Tolerates Diet Well - No Chewing/Swallowing Complications (Per Patient)  No Pressure Ulcers (as Per Nursing Flow Sheets)  No Edema Noted (as Per Nursing Flow Sheets)  No Recent Nausea/Vomiting/Diarrhea & Some Recent Constipation (as Per Patient)

## 2024-08-20 NOTE — CONSULT NOTE ADULT - SUBJECTIVE AND OBJECTIVE BOX
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 Denis Toro is a 74-year-old male patient with past medical history of HTN, melanoma, and a spontaneous T12 intradural hematoma in 2021 s/p T10-L2 laminectomy who presented for elective treatment of cervical AVF w/ C2-5 lami and fistula ligation on 8/12/24. Intraop angiogram showed resolution of C3-4 dAVF w/ L vertebral artery dissection, then underwent formal angiogram the following day, which showed resolution of the fistula, and improving vertebral artery dissection. Post-operatively, patient had urinary retention. However, his Vazquez was removed and the patient voided on 8/19/24. PM&R was consulted and deemed him an appropriate candidate for IRF. He was cleared for discharge to [facility] on 8/19/24.        Review of Systems:  Review of Systems: Constitutional: No fever, No Chills, No fatigue  HEENT: No eye pain, No visual disturbances, No difficulty hearing  Pulm: No cough,  No shortness of breath  Cardio: No chest pain, No palpitations  GI:  No abdominal pain, No nausea, No vomiting, No constipation, + diminished appetite (since surgery), + diarrhea   : No dysuria, No frequency, No hematuria  Neuro: No headaches, No memory loss, No loss of strength, No tremors, + BL LE numbness/tingling (R>L)   Skin: No itching, No rashes, No lesions    Endo: No temperature intolerance  MSK: No joint pain, No joint swelling, No muscle pain, + mild neck pain with limited ROM, + BL LE weakness  Psych:  No depression, No anxiety      Allergies and Intolerances:        Allergies:  	Celebrex: Drug, Other, stomach pain  	Shrimp: Food, Angioedema    Home Medications:   * Incomplete Medication History as of 19-Aug-2024 09:38 documented in Structured Notes    Patient History:    Past Medical, Past Surgical, and Family History:  PAST MEDICAL HISTORY:  Colitis     High cholesterol     HTN (hypertension)     Melanoma     Vertigo.     PAST SURGICAL HISTORY:  History of lumbar laminectomy for spinal cord decompression     Status post Mohs surgery.     FAMILY HISTORY:  Mother  Still living? Unknown  FH: Parkinson's disease, Age at diagnosis: Age Unknown.     Social History:  · Substance use	No  · Social History (marital status, living situation, occupation, and sexual history)	SOCIAL HISTORY  Smoking - Denied  EtOH - Denied   Drugs - Denied    FUNCTIONAL HISTORY  Reitred JEMAL   Lives With:: Pt lives in a   with 4 NUNU, 1st floor living   Pt has a RW, shower chair and raised toilet seat     CURRENT FUNCTIONAL STATUS  - Transfers: Min A   - Gait: Min A  - ADLs: Min -mod A     Tobacco Screening:  · Core Measure Site	Yes  · Has the patient used tobacco in the past 30 days?	No    Risk Assessment:    Present on Admission:  Deep Venous Thrombosis	no  Pulmonary Embolus	no     HIV Screening:  · In accordance with [facility] law, we offer every patient who comes to our ED an HIV test. Would you like to be tested today?	Opt out     Hepatitis C Test Questions:  · In accordance with [facility] Law, we offer every patient a Hepatitis C test. Would you like to be tested today?	Opt out    Physical Exam:   Physical Exam: Vital Signs  T(C): 36.9 (08-19-24 @ 18:48), Max: 37.3 (08-18-24 @ 20:30)  HR: 86 (08-19-24 @ 18:48) (76 - 94)  BP: 149/81 (08-19-24 @ 18:48) (122/81 - 150/80)  RR: 18 (08-19-24 @ 18:48) (16 - 19)  SpO2: 94% (08-19-24 @ 18:48) (93% - 96%)    Gen - NAD, Comfortable  HEENT - NCAT, EOMI, MMM  Neck - Supple, Limited ROM   Pulm - CTAB, No wheeze, No rhonchi, No crackles  Cardiovascular - RRR, S1S2, No m/r/g  Abdomen - Soft, NT/ND, +BS  Extremities - No C/C/E, No calf tenderness  Neuro-     Cognitive - AAOx3     Communication - Fluent, No dysarthria     Attention: Intact      Judgement: Good evidence of judgement     Memory: Recall 3 objects immediate and 3 min later with cueing      No focal deficits cranial nerve deficits on exam.     Motor -                    LEFT    UE - 5/5                    RIGHT UE - 5/5                    LEFT    LE - 4/5                    RIGHT LE - 4/5      Sensory - BL LE numbness and tingling (R>L)      Coordination - FTN & HTS intact  Psychiatric - Mood stable, Affect WNL  Skin: Intact  Wounds: Surgical wound JUAN ALBERTO, staples intact       Labs and Results:  Labs, Radiology, Cardiology, and Other Results: LABS:                        9.3    4.93  )-----------( 160      ( 19 Aug 2024 04:37 )             26.9     08-19    135  |  100  |  13.6  ----------------------------<  109<H>  4.2   |  26.0  |  0.46<L>    Ca    8.1<L>      19 Aug 2024 04:37  Phos  3.2     08-19  Mg     2.0     08-19    Urinalysis Basic - ( 19 Aug 2024 04:37 )    Color: x / Appearance: x / SG: x / pH: x  Gluc: 109 mg/dL / Ketone: x  / Bili: x / Urobili: x   Blood: x / Protein: x / Nitrite: x   Leuk Esterase: x / RBC: x / WBC x   Sq Epi: x / Non Sq Epi: x / Bacteria: x    IMAGING/RADIOLOGY:  CERVICAL SPINE XRAY (8/12)   IMPRESSION: Surgical marker as noted.    BRIEF OPERATIVE NOTE (8/12)  POST-OP DIAGNOSIS: Dural arteriovenous fistula of spinal cord 12-Aug-2024 14:33:26    PROCEDURES:  Laminectomy, spine, cervical, 3 levels 12-Aug-2024 14:14:42    Laminectomy, spine, cervical, for spinal cord AVM excision or occlusion 12-Aug-2024 14:15:06 C2-C5 laminectomy with ligation of dural AVF with intraop angiogram   Operative Findings:C2-C5 laminectomy with ligation of dural AVF with intraop angiogram, C3-4 left dural AVF    CT C SPINE (8/12)  IMPRESSION: Mild degenerative changes.    Assessment and Plan:   · VTE Risk Assessment	VTE Assessment already completed for this visit  · Completed VTE Risk Assessment(s)	Refer to the Assessment tab to view/cancel completed assessment.

## 2024-08-20 NOTE — DIETITIAN NUTRITION RISK NOTIFICATION - TREATMENT: THE FOLLOWING DIET HAS BEEN RECOMMENDED
Recommend Change Diet to Regular Diet  Recommend Initiate Ensure Clear 8oz PO Daily (Provides 240kcal & 8grams of Protein)

## 2024-08-20 NOTE — DIETITIAN INITIAL EVALUATION ADULT - NS FNS DIET ORDER
on DASH-TLC Diet w/ Thin Liquids (IDDSI Level 0)  Recommend Change Diet to Regular Diet   Recommend Initiate Nutrition Supplement - Ensure Clear 8oz PO Daily  Education Provided on Proper Nutrition & Need for Increased Fiber

## 2024-08-20 NOTE — DIETITIAN INITIAL EVALUATION ADULT - REASON FOR ADMISSION
Status post arthrodesis     Alert and oriented to person, place and time/Patient baseline mental status/Awake

## 2024-08-21 PROCEDURE — 99232 SBSQ HOSP IP/OBS MODERATE 35: CPT

## 2024-08-21 RX ADMIN — TAMSULOSIN HYDROCHLORIDE 0.4 MILLIGRAM(S): 0.4 CAPSULE ORAL at 21:19

## 2024-08-21 RX ADMIN — ACETAMINOPHEN 650 MILLIGRAM(S): 325 TABLET ORAL at 05:52

## 2024-08-21 RX ADMIN — Medication 600 MILLIGRAM(S): at 05:15

## 2024-08-21 RX ADMIN — LOSARTAN POTASSIUM 100 MILLIGRAM(S): 50 TABLET ORAL at 05:15

## 2024-08-21 RX ADMIN — Medication 1 TABLET(S): at 12:27

## 2024-08-21 RX ADMIN — ACETAMINOPHEN 650 MILLIGRAM(S): 325 TABLET ORAL at 12:27

## 2024-08-21 RX ADMIN — ENOXAPARIN SODIUM 40 MILLIGRAM(S): 100 INJECTION SUBCUTANEOUS at 21:19

## 2024-08-21 RX ADMIN — Medication 40 MILLIGRAM(S): at 21:18

## 2024-08-21 RX ADMIN — ACETAMINOPHEN 650 MILLIGRAM(S): 325 TABLET ORAL at 12:30

## 2024-08-21 RX ADMIN — METHOCARBAMOL 750 MILLIGRAM(S): 750 TABLET, FILM COATED ORAL at 21:19

## 2024-08-21 RX ADMIN — Medication 600 MILLIGRAM(S): at 13:14

## 2024-08-21 RX ADMIN — Medication 300 MILLIGRAM(S): at 21:18

## 2024-08-21 RX ADMIN — ACETAMINOPHEN 650 MILLIGRAM(S): 325 TABLET ORAL at 05:22

## 2024-08-21 RX ADMIN — METHOCARBAMOL 750 MILLIGRAM(S): 750 TABLET, FILM COATED ORAL at 12:29

## 2024-08-21 RX ADMIN — Medication 2 TABLET(S): at 21:19

## 2024-08-21 NOTE — PROGRESS NOTE ADULT - ASSESSMENT
Mr. Deins Toro is a 74-year-old male patient with past medical history of HTN, melanoma, and a spontaneous T12 intradural hematoma in 2021 s/p T10-L2 laminectomy who is admitted for Acute Inpatient Rehabilitation with a multidisciplinary rehab program at [facility] with functional impairments in ADLs and mobility secondary to undergoing elective surgery for cervical AVF w/ C2-5 lami and fistula ligation on 8/12/24.       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  - S/P laminectomy for spinal cord AVM excision with ligation (8/12/24)      * Start Aspirin 81 mg daily on 8/26/24      * Vertebral artery dissection      * Post-operative urinary retention  - Vertebral Artery Dissection   - Bilateral lower extremity weakness  - Bilateral lower extremity sensory deficits  - Impaired ADLs and mobility  - Need for assistance with personal care  - Comprehensive Multidisciplinary Rehab Program:      * PT/OT/ SLP 3 hours a day 5 days a week    #Pain:  - PRN Scale: Tylenol, Oxycodone 5 mg q6h, Oxycodone 10 mg q6h   - PRN Robaxin 750 mg TID  - Standing Gabapentin 300 mg qPM, 600 mg BID   - Lidocaine patch    #GI/Bowel:  - Senna 2 tabs daily  - Miralax q12h     #/Bladder:  - Per chart review, Vazquez removed today and patient voided 150 cc w/  cc   - Monitor PVR if no void in 8h; SC for >400 cc    #Diet / Dysphagia:    - Diet: Regular/DASH   - Ongoing SLP assessment  - Nutrition to follow    #Skin/ Pressure Injury Prevention:  - Assessment on admission: Surgical wound JUAN ALBERTO, staples intact    #DVT prophylaxis:  - Lovenox     #Precautions/ Restrictions  - Falls, Spine    --------------------------------------------  Outpatient Follow up:  [facility]  Neurosurgery  [address] [place] [id number]  Phone: [phone]  Fax: [phone]  Follow Up Time: 1 week    --------------------------------------------

## 2024-08-21 NOTE — PROGRESS NOTE ADULT - SUBJECTIVE AND OBJECTIVE BOX
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 No acute events overnight.  Pt denies cp, palpitations, sob, abd pain, N/V, fever, chills.    ROS:  All other review of systems negative    Allergies    Celebrex (Other)  Shrimp (Angioedema)    Intolerances        MEDICATIONS  (STANDING):  atorvastatin 40 milliGRAM(s) Oral at bedtime  enoxaparin Injectable 40 milliGRAM(s) SubCutaneous <User Schedule>  gabapentin 300 milliGRAM(s) Oral at bedtime  gabapentin 600 milliGRAM(s) Oral <User Schedule>  lidocaine   4% Patch 1 Patch Transdermal every 24 hours  lidocaine   4% Patch 1 Patch Transdermal every 24 hours  losartan 100 milliGRAM(s) Oral daily  multivitamin 1 Tablet(s) Oral daily  polyethylene glycol 3350 17 Gram(s) Oral every 12 hours  senna 2 Tablet(s) Oral at bedtime  tamsulosin 0.4 milliGRAM(s) Oral at bedtime    MEDICATIONS  (PRN):  acetaminophen     Tablet .. 650 milliGRAM(s) Oral every 6 hours PRN Mild Pain (1 - 3)  methocarbamol 750 milliGRAM(s) Oral three times a day PRN Muscle Spasm  oxyCODONE    IR 5 milliGRAM(s) Oral every 6 hours PRN Moderate Pain (4 - 6)  oxyCODONE    IR 10 milliGRAM(s) Oral every 6 hours PRN Severe Pain (7 - 10)      Vital Signs Last 24 Hrs  T(C): 36.8 (21 Aug 2024 07:24), Max: 36.8 (21 Aug 2024 07:24)  T(F): 98.2 (21 Aug 2024 07:24), Max: 98.2 (21 Aug 2024 07:24)  HR: 79 (21 Aug 2024 07:24) (79 - 80)  BP: 131/78 (21 Aug 2024 07:24) (131/78 - 150/85)  BP(mean): --  RR: 15 (21 Aug 2024 07:24) (15 - 16)  SpO2: 95% (21 Aug 2024 07:24) (95% - 97%)    Parameters below as of 21 Aug 2024 07:24  Patient On (Oxygen Delivery Method): room air      CAPILLARY BLOOD GLUCOSE        I&O's Summary      PHYSICAL EXAM:  GENERAL: NAD, well-developed AAOx3 male   HEAD:  Atraumatic, Normocephalic  NECK: Supple, No JVD  CHEST/LUNG: Clear to auscultation bilaterally; No wheeze, nonlabored breathing  HEART: Regular rate and rhythm; No murmurs, rubs, or gallops  ABDOMEN: Soft, Nontender, Nondistended; Bowel sounds present  EXTREMITIES: No clubbing, cyanosis, or edema  PSYCH: calm, appropriate mood    LABS:                    RADIOLOGY & ADDITIONAL TESTS:  Results Reviewed:   Imaging Personally Reviewed:  Electrocardiogram Personally Reviewed:    COORDINATION OF CARE:  Care Discussed with Consultants/Other Providers [Y/N]:  Prior or Outpatient Records Reviewed [Y/N]:

## 2024-08-21 NOTE — PROGRESS NOTE ADULT - ASSESSMENT
74M with HTN, melanoma, and a spontaneous T12 intradural hematoma in 2021 s/p T10-L2 laminectomy  Admit SP Elective C Spine AVF ligation w/ C2-5 laminectomy and fusion  Hospital course complicated by Vertebral Art Dissection  Tx to acute rehab Aug19    #Dural arteriovenous fistula of C Spine  Vertebral Art Dissection  -S/P laminectomy for spinal cord AVM excision with ligation (8/12/24)  -Start Aspirin 81 mg daily on 8/26/24    #Anemia  -Likely postoperative blood loss  -Stable H/H  -Monitor and transfuse if Hb<7    #DVT prophylaxis:  - Lovenox

## 2024-08-21 NOTE — PROGRESS NOTE ADULT - SUBJECTIVE AND OBJECTIVE BOX
HPI:  Mr. Denis Toro is a 74-year-old male patient with past medical history of HTN, melanoma, and a spontaneous T12 intradural hematoma in 2021 s/p T10-L2 laminectomy who presented for elective treatment of cervical AVF w/ C2-5 lami and fistula ligation on 8/12/24. Intraop angiogram showed resolution of C3-4 dAVF w/ L vertebral artery dissection, then underwent formal angiogram the following day, which showed resolution of the fistula, and improving vertebral artery dissection. Post-operatively, patient had urinary retention. However, his Vazquez was removed and the patient voided on 8/19/24. PM&R was consulted and deemed him an appropriate candidate for IRF. He was cleared for discharge to [facility] on 8/19/24.  (19 Aug 2024 14:41)    ___________________________________________________________________________    SUBJECTIVE/ROS  Patient was seen and evaluated at bedside today.  Reported no overnight events and is in no acute distress.  Tolerated admission process and initial evaluations.  Case to be evaluated at Interdisciplinary Team meeting tomorrow.  Eager to participate on the recommended rehabilitation program.  Denies any CP, SOB, GAMA, palpitations, fever, chills, body aches, cough, congestion, or any other symptoms at this time.   ___________________________________________________________________________    VITALS  T(C): 36.8 (08-21-24 @ 07:24), Max: 36.8 (08-21-24 @ 07:24)  HR: 79 (08-21-24 @ 07:24) (79 - 80)  BP: 131/78 (08-21-24 @ 07:24) (131/78 - 150/85)  RR: 15 (08-21-24 @ 07:24) (15 - 16)  SpO2: 95% (08-21-24 @ 07:24) (95% - 97%)  ___________________________________________________________________________    LAB                        9.3    4.93  )-----------( 160      ( 19 Aug 2024 04:37 )             26.9     08-19    135  |  100  |  13.6  ----------------------------<  109<H>  4.2   |  26.0  |  0.46<L>    Ca    8.1<L>      19 Aug 2024 04:37  Phos  3.2     08-19  Mg     2.0     08-19    ___________________________________________________________________________    MEDICATIONS  (STANDING):  atorvastatin 40 milliGRAM(s) Oral at bedtime  enoxaparin Injectable 40 milliGRAM(s) SubCutaneous <User Schedule>  gabapentin 300 milliGRAM(s) Oral at bedtime  gabapentin 600 milliGRAM(s) Oral <User Schedule>  lidocaine   4% Patch 1 Patch Transdermal every 24 hours  lidocaine   4% Patch 1 Patch Transdermal every 24 hours  losartan 100 milliGRAM(s) Oral daily  multivitamin 1 Tablet(s) Oral daily  polyethylene glycol 3350 17 Gram(s) Oral every 12 hours  senna 2 Tablet(s) Oral at bedtime  tamsulosin 0.4 milliGRAM(s) Oral at bedtime    MEDICATIONS  (PRN):  acetaminophen     Tablet .. 650 milliGRAM(s) Oral every 6 hours PRN Mild Pain (1 - 3)  methocarbamol 750 milliGRAM(s) Oral three times a day PRN Muscle Spasm  oxyCODONE    IR 5 milliGRAM(s) Oral every 6 hours PRN Moderate Pain (4 - 6)  oxyCODONE    IR 10 milliGRAM(s) Oral every 6 hours PRN Severe Pain (7 - 10)    ___________________________________________________________________________    PHYSICAL EXAM:    Alert   LUNGS- clear  COR- RRR  ABD- SOFT, NT  EXTR- w/o edema  NEURO- stable    ___________________________________________________________________________ HPI:  Mr. Denis Toro is a 74-year-old male patient with past medical history of HTN, melanoma, and a spontaneous T12 intradural hematoma in 2021 s/p T10-L2 laminectomy who presented for elective treatment of cervical AVF w/ C2-5 lami and fistula ligation on 8/12/24. Intraop angiogram showed resolution of C3-4 dAVF w/ L vertebral artery dissection, then underwent formal angiogram the following day, which showed resolution of the fistula, and improving vertebral artery dissection. Post-operatively, patient had urinary retention. However, his Vazquez was removed and the patient voided on 8/19/24. PM&R was consulted and deemed him an appropriate candidate for IRF. He was cleared for discharge to [facility] on 8/19/24.  (19 Aug 2024 14:41)    ___________________________________________________________________________    SUBJECTIVE/ROS  Patient was seen and evaluated at bedside today.  Reported no overnight events and is in no acute distress.  Tolerated admission process and initial evaluations.  Case to be evaluated at Interdisciplinary Team meeting tomorrow.  Eager to participate on the recommended rehabilitation program.  Denies any CP, SOB, GAMA, palpitations, fever, chills, body aches, cough, congestion, or any other symptoms at this time.   ___________________________________________________________________________    VITALS  T(C): 36.8 (08-21-24 @ 07:24), Max: 36.8 (08-21-24 @ 07:24)  HR: 79 (08-21-24 @ 07:24) (79 - 80)  BP: 131/78 (08-21-24 @ 07:24) (131/78 - 150/85)  RR: 15 (08-21-24 @ 07:24) (15 - 16)  SpO2: 95% (08-21-24 @ 07:24) (95% - 97%)  ___________________________________________________________________________    LAB                        9.3    4.93  )-----------( 160      ( 19 Aug 2024 04:37 )             26.9     08-19    135  |  100  |  13.6  ----------------------------<  109<H>  4.2   |  26.0  |  0.46<L>    Ca    8.1<L>      19 Aug 2024 04:37  Phos  3.2     08-19  Mg     2.0     08-19    ___________________________________________________________________________    MEDICATIONS  (STANDING):  atorvastatin 40 milliGRAM(s) Oral at bedtime  enoxaparin Injectable 40 milliGRAM(s) SubCutaneous <User Schedule>  gabapentin 300 milliGRAM(s) Oral at bedtime  gabapentin 600 milliGRAM(s) Oral <User Schedule>  lidocaine   4% Patch 1 Patch Transdermal every 24 hours  lidocaine   4% Patch 1 Patch Transdermal every 24 hours  losartan 100 milliGRAM(s) Oral daily  multivitamin 1 Tablet(s) Oral daily  polyethylene glycol 3350 17 Gram(s) Oral every 12 hours  senna 2 Tablet(s) Oral at bedtime  tamsulosin 0.4 milliGRAM(s) Oral at bedtime    MEDICATIONS  (PRN):  acetaminophen     Tablet .. 650 milliGRAM(s) Oral every 6 hours PRN Mild Pain (1 - 3)  methocarbamol 750 milliGRAM(s) Oral three times a day PRN Muscle Spasm  oxyCODONE    IR 5 milliGRAM(s) Oral every 6 hours PRN Moderate Pain (4 - 6)  oxyCODONE    IR 10 milliGRAM(s) Oral every 6 hours PRN Severe Pain (7 - 10)    ___________________________________________________________________________    PHYSICAL EXAM:    PHYSICAL EXAM:    Gen - NAD, Comfortable  HEENT - NCAT, EOMI, MMM  Neck - Supple, Limited ROM   Pulm - CTAB, No wheeze, No rhonchi, No crackles  Cardiovascular - RRR, S1S2, No m/r/g  Abdomen - Soft, NT/ND, +BS  Extremities - No C/C/E, No calf tenderness  Neuro-     Cognitive - AAOx3     Motor -                    LEFT    UE - 5/5                    RIGHT UE - 5/5                    LEFT    LE - 4/5                    RIGHT LE - 4/5      Sensory - BL LE numbness and tingling (R>L)   Psychiatric - Mood stable, Affect WNL  Skin: Intact  Wounds: Surgical wound JUAN ALBERTO, staples intact    ___________________________________________________________________________

## 2024-08-22 LAB
ALBUMIN SERPL ELPH-MCNC: 2.8 G/DL — LOW (ref 3.3–5)
ALP SERPL-CCNC: 67 U/L — SIGNIFICANT CHANGE UP (ref 40–120)
ALT FLD-CCNC: 42 U/L — SIGNIFICANT CHANGE UP (ref 10–45)
ANION GAP SERPL CALC-SCNC: 5 MMOL/L — SIGNIFICANT CHANGE UP (ref 5–17)
AST SERPL-CCNC: 21 U/L — SIGNIFICANT CHANGE UP (ref 10–40)
BASOPHILS # BLD AUTO: 0.02 K/UL — SIGNIFICANT CHANGE UP (ref 0–0.2)
BASOPHILS NFR BLD AUTO: 0.3 % — SIGNIFICANT CHANGE UP (ref 0–2)
BILIRUB SERPL-MCNC: 1.3 MG/DL — HIGH (ref 0.2–1.2)
BUN SERPL-MCNC: 18 MG/DL — SIGNIFICANT CHANGE UP (ref 7–23)
CALCIUM SERPL-MCNC: 8.9 MG/DL — SIGNIFICANT CHANGE UP (ref 8.4–10.5)
CHLORIDE SERPL-SCNC: 102 MMOL/L — SIGNIFICANT CHANGE UP (ref 96–108)
CO2 SERPL-SCNC: 30 MMOL/L — SIGNIFICANT CHANGE UP (ref 22–31)
CREAT SERPL-MCNC: 0.66 MG/DL — SIGNIFICANT CHANGE UP (ref 0.5–1.3)
EGFR: 98 ML/MIN/1.73M2 — SIGNIFICANT CHANGE UP
EOSINOPHIL # BLD AUTO: 0.1 K/UL — SIGNIFICANT CHANGE UP (ref 0–0.5)
EOSINOPHIL NFR BLD AUTO: 1.6 % — SIGNIFICANT CHANGE UP (ref 0–6)
GLUCOSE SERPL-MCNC: 118 MG/DL — HIGH (ref 70–99)
HCT VFR BLD CALC: 30.8 % — LOW (ref 39–50)
HGB BLD-MCNC: 10.5 G/DL — LOW (ref 13–17)
IMM GRANULOCYTES NFR BLD AUTO: 0.8 % — SIGNIFICANT CHANGE UP (ref 0–0.9)
LYMPHOCYTES # BLD AUTO: 1.38 K/UL — SIGNIFICANT CHANGE UP (ref 1–3.3)
LYMPHOCYTES # BLD AUTO: 22 % — SIGNIFICANT CHANGE UP (ref 13–44)
MCHC RBC-ENTMCNC: 30.4 PG — SIGNIFICANT CHANGE UP (ref 27–34)
MCHC RBC-ENTMCNC: 34.1 GM/DL — SIGNIFICANT CHANGE UP (ref 32–36)
MCV RBC AUTO: 89.3 FL — SIGNIFICANT CHANGE UP (ref 80–100)
MONOCYTES # BLD AUTO: 0.59 K/UL — SIGNIFICANT CHANGE UP (ref 0–0.9)
MONOCYTES NFR BLD AUTO: 9.4 % — SIGNIFICANT CHANGE UP (ref 2–14)
NEUTROPHILS # BLD AUTO: 4.12 K/UL — SIGNIFICANT CHANGE UP (ref 1.8–7.4)
NEUTROPHILS NFR BLD AUTO: 65.9 % — SIGNIFICANT CHANGE UP (ref 43–77)
NRBC # BLD: 0 /100 WBCS — SIGNIFICANT CHANGE UP (ref 0–0)
PLATELET # BLD AUTO: 265 K/UL — SIGNIFICANT CHANGE UP (ref 150–400)
POTASSIUM SERPL-MCNC: 4.7 MMOL/L — SIGNIFICANT CHANGE UP (ref 3.5–5.3)
POTASSIUM SERPL-SCNC: 4.7 MMOL/L — SIGNIFICANT CHANGE UP (ref 3.5–5.3)
PROT SERPL-MCNC: 6.3 G/DL — SIGNIFICANT CHANGE UP (ref 6–8.3)
RBC # BLD: 3.45 M/UL — LOW (ref 4.2–5.8)
RBC # FLD: 13.2 % — SIGNIFICANT CHANGE UP (ref 10.3–14.5)
SODIUM SERPL-SCNC: 137 MMOL/L — SIGNIFICANT CHANGE UP (ref 135–145)
WBC # BLD: 6.26 K/UL — SIGNIFICANT CHANGE UP (ref 3.8–10.5)
WBC # FLD AUTO: 6.26 K/UL — SIGNIFICANT CHANGE UP (ref 3.8–10.5)

## 2024-08-22 PROCEDURE — 99232 SBSQ HOSP IP/OBS MODERATE 35: CPT

## 2024-08-22 PROCEDURE — 72192 CT PELVIS W/O DYE: CPT | Mod: 26

## 2024-08-22 RX ORDER — GABAPENTIN 100 MG
600 CAPSULE ORAL AT BEDTIME
Refills: 0 | Status: DISCONTINUED | OUTPATIENT
Start: 2024-08-22 | End: 2024-08-26

## 2024-08-22 RX ADMIN — ACETAMINOPHEN 650 MILLIGRAM(S): 325 TABLET ORAL at 12:30

## 2024-08-22 RX ADMIN — ENOXAPARIN SODIUM 40 MILLIGRAM(S): 100 INJECTION SUBCUTANEOUS at 21:24

## 2024-08-22 RX ADMIN — TAMSULOSIN HYDROCHLORIDE 0.4 MILLIGRAM(S): 0.4 CAPSULE ORAL at 21:23

## 2024-08-22 RX ADMIN — Medication 1 TABLET(S): at 12:04

## 2024-08-22 RX ADMIN — ACETAMINOPHEN 650 MILLIGRAM(S): 325 TABLET ORAL at 19:51

## 2024-08-22 RX ADMIN — LOSARTAN POTASSIUM 100 MILLIGRAM(S): 50 TABLET ORAL at 05:41

## 2024-08-22 RX ADMIN — METHOCARBAMOL 750 MILLIGRAM(S): 750 TABLET, FILM COATED ORAL at 12:04

## 2024-08-22 RX ADMIN — Medication 40 MILLIGRAM(S): at 21:24

## 2024-08-22 RX ADMIN — ACETAMINOPHEN 650 MILLIGRAM(S): 325 TABLET ORAL at 05:44

## 2024-08-22 RX ADMIN — Medication 600 MILLIGRAM(S): at 05:41

## 2024-08-22 RX ADMIN — OXYCODONE HYDROCHLORIDE 10 MILLIGRAM(S): 5 TABLET ORAL at 03:41

## 2024-08-22 RX ADMIN — ACETAMINOPHEN 650 MILLIGRAM(S): 325 TABLET ORAL at 12:04

## 2024-08-22 RX ADMIN — METHOCARBAMOL 750 MILLIGRAM(S): 750 TABLET, FILM COATED ORAL at 19:52

## 2024-08-22 RX ADMIN — Medication 600 MILLIGRAM(S): at 21:24

## 2024-08-22 RX ADMIN — METHOCARBAMOL 750 MILLIGRAM(S): 750 TABLET, FILM COATED ORAL at 05:44

## 2024-08-22 RX ADMIN — Medication 600 MILLIGRAM(S): at 14:06

## 2024-08-22 NOTE — PROGRESS NOTE ADULT - SUBJECTIVE AND OBJECTIVE BOX
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 Pt reports LLE pain last night. He states he had to ask for the oxycodone which helped but does not want to take this too much. Discussed increasing his gabapentin at night which he was amendable to.    ALLERGIES:  Celebrex (Other)  Shrimp (Angioedema)    MEDICATIONS  (STANDING):  atorvastatin 40 milliGRAM(s) Oral at bedtime  enoxaparin Injectable 40 milliGRAM(s) SubCutaneous <User Schedule>  gabapentin 300 milliGRAM(s) Oral at bedtime  gabapentin 600 milliGRAM(s) Oral <User Schedule>  lidocaine   4% Patch 1 Patch Transdermal every 24 hours  lidocaine   4% Patch 1 Patch Transdermal every 24 hours  losartan 100 milliGRAM(s) Oral daily  multivitamin 1 Tablet(s) Oral daily  polyethylene glycol 3350 17 Gram(s) Oral every 12 hours  senna 2 Tablet(s) Oral at bedtime  tamsulosin 0.4 milliGRAM(s) Oral at bedtime    MEDICATIONS  (PRN):  acetaminophen     Tablet .. 650 milliGRAM(s) Oral every 6 hours PRN Mild Pain (1 - 3)  methocarbamol 750 milliGRAM(s) Oral three times a day PRN Muscle Spasm  oxyCODONE    IR 5 milliGRAM(s) Oral every 6 hours PRN Moderate Pain (4 - 6)  oxyCODONE    IR 10 milliGRAM(s) Oral every 6 hours PRN Severe Pain (7 - 10)    Vital Signs Last 24 Hrs  T(F): 98 (22 Aug 2024 07:46), Max: 98.6 (21 Aug 2024 20:00)  HR: 78 (22 Aug 2024 09:41) (78 - 89)  BP: 92/58 (22 Aug 2024 09:41) (92/58 - 150/91)  RR: 15 (22 Aug 2024 09:41) (15 - 16)  SpO2: 99% (22 Aug 2024 09:41) (95% - 99%)  I&O's Summary    BMI (kg/m2): 25.3 (08-21-24 @ 11:18)    PHYSICAL EXAM:  GENERAL: pt laying in bed in NAD  CHEST/LUNG: Clear to percussion bilaterally; No rales, rhonchi, wheezing, or rubs; normal respiratory effort   HEART: Regular rate and rhythm; No murmurs noted  ABDOMEN: Soft, Nontender, Nondistended; Bowel sounds present   MUSCULOSKELETAL/EXTREMITIES:  no cyanosis, no edema noted to BLE  NERVOUS SYSTEM:   Sensation intact; follows commands  PSYCH: Appropriate affect, Alert & Oriented x 3     LABS:                        10.5   6.26  )-----------( 265      ( 22 Aug 2024 06:22 )             30.8       08-22    137  |  102  |  18  ----------------------------<  118  4.7   |  30  |  0.66    Ca    8.9      22 Aug 2024 06:22    TPro  6.3  /  Alb  2.8  /  TBili  1.3  /  DBili  x   /  AST  21  /  ALT  42  /  AlkPhos  67  08-22                                  Urinalysis Basic - ( 22 Aug 2024 06:22 )    Color: x / Appearance: x / SG: x / pH: x  Gluc: 118 mg/dL / Ketone: x  / Bili: x / Urobili: x   Blood: x / Protein: x / Nitrite: x   Leuk Esterase: x / RBC: x / WBC x   Sq Epi: x / Non Sq Epi: x / Bacteria: x        COVID-19 PCR: NotDetec (08-19-24 @ 19:48)      Care Discussed with Consultants/Other Providers: Yes

## 2024-08-22 NOTE — PROGRESS NOTE ADULT - ASSESSMENT
Mr. Denis Toro is a 74-year-old male patient with past medical history of HTN, melanoma, and a spontaneous T12 intradural hematoma in 2021 s/p T10-L2 laminectomy who is admitted for Acute Inpatient Rehabilitation with a multidisciplinary rehab program at [facility] with functional impairments in ADLs and mobility secondary to undergoing elective surgery for cervical AVF w/ C2-5 lami and fistula ligation on 8/12/24.       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  - S/P laminectomy for spinal cord AVM excision with ligation (8/12/24)      * Start Aspirin 81 mg daily on 8/26/24      * Vertebral artery dissection      * Post-operative urinary retention  - Vertebral Artery Dissection   - Bilateral lower extremity weakness  - Bilateral lower extremity sensory deficits  - Impaired ADLs and mobility  - Need for assistance with personal care  - Comprehensive Multidisciplinary Rehab Program:      * PT/OT/ SLP 3 hours a day 5 days a week    #Pain:  - PRN Scale: Tylenol, Oxycodone 5 mg q6h, Oxycodone 10 mg q6h   - PRN Robaxin 750 mg TID  - Standing Gabapentin 300 mg qPM, 600 mg BID   - Lidocaine patch    #GI/Bowel:  - Senna 2 tabs daily  - Miralax q12h     #/Bladder:  - Per chart review, Vazquez removed today and patient voided 150 cc w/  cc   - Monitor PVR if no void in 8h; SC for >400 cc    #Diet / Dysphagia:    - Diet: Regular/DASH   - Ongoing SLP assessment  - Nutrition to follow    #Skin/ Pressure Injury Prevention:  - Assessment on admission: Surgical wound JUAN ALBERTO, staples intact    #DVT prophylaxis:  - Lovenox     #Precautions/ Restrictions  - Falls, Spine    --------------------------------------------  Outpatient Follow up:  Jose Martin Jewell  Neurosurgery  [address] [place] [id number]  Phone: [phone]  Fax: [phone]  Follow Up Time: 1 week    --------------------------------------------

## 2024-08-22 NOTE — PROGRESS NOTE ADULT - SUBJECTIVE AND OBJECTIVE BOX
HPI:  Mr. Denis Toro is a 74-year-old male patient with past medical history of HTN, melanoma, and a spontaneous T12 intradural hematoma in 2021 s/p T10-L2 laminectomy who presented for elective treatment of cervical AVF w/ C2-5 lami and fistula ligation on 8/12/24. Intraop angiogram showed resolution of C3-4 dAVF w/ L vertebral artery dissection, then underwent formal angiogram the following day, which showed resolution of the fistula, and improving vertebral artery dissection. Post-operatively, patient had urinary retention. However, his Vazquez was removed and the patient voided on 8/19/24. PM&R was consulted and deemed him an appropriate candidate for IRF. He was cleared for discharge to [facility] on 8/19/24.  (19 Aug 2024 14:41)    TDD: 9/10 to Home  ___________________________________________________________________________    SUBJECTIVE/ROS  Patient was seen and evaluated at bedside today.  Reported feeling LLE pain overnight associated with weakness. In no acute distress.  Will further assess with MR and CT imaging given history and admitting diagnosis.  Case was discussed at Interdisciplinary Team meeting today.  A tentative discharge date is outlined above.  Patient is eager to continue participation on the recommended rehabilitation program.  Denies any CP, SOB, GAMA, palpitations, fever, chills, body aches, cough, congestion, or any other symptoms at this time.   ___________________________________________________________________________    Vital Signs Last 24 Hrs  T(C): 36.7 (22 Aug 2024 07:46), Max: 37 (21 Aug 2024 20:00)  T(F): 98 (22 Aug 2024 07:46), Max: 98.6 (21 Aug 2024 20:00)  HR: 78 (22 Aug 2024 09:41) (78 - 89)  BP: 92/58 (22 Aug 2024 09:41) (92/58 - 150/91)  RR: 15 (22 Aug 2024 09:41) (15 - 16)  SpO2: 99% (22 Aug 2024 09:41) (95% - 99%)    ___________________________________________________________________________    LAB                        10.5   6.26  )-----------( 265      ( 22 Aug 2024 06:22 )             30.8       08-22    137  |  102  |  18  ----------------------------<  118<H>  4.7   |  30  |  0.66    Ca    8.9      22 Aug 2024 06:22    TPro  6.3  /  Alb  2.8<L>  /  TBili  1.3<H>  /  DBili  x   /  AST  21  /  ALT  42  /  AlkPhos  67  08-22    LIVER FUNCTIONS - ( 22 Aug 2024 06:22 )  Alb: 2.8 g/dL / Pro: 6.3 g/dL / ALK PHOS: 67 U/L / ALT: 42 U/L / AST: 21 U/L / GGT: x           ___________________________________________________________________________    MEDICATIONS  (STANDING):  atorvastatin 40 milliGRAM(s) Oral at bedtime  enoxaparin Injectable 40 milliGRAM(s) SubCutaneous <User Schedule>  gabapentin 300 milliGRAM(s) Oral at bedtime  gabapentin 600 milliGRAM(s) Oral <User Schedule>  lidocaine   4% Patch 1 Patch Transdermal every 24 hours  lidocaine   4% Patch 1 Patch Transdermal every 24 hours  losartan 100 milliGRAM(s) Oral daily  multivitamin 1 Tablet(s) Oral daily  polyethylene glycol 3350 17 Gram(s) Oral every 12 hours  senna 2 Tablet(s) Oral at bedtime  tamsulosin 0.4 milliGRAM(s) Oral at bedtime    MEDICATIONS  (PRN):  acetaminophen     Tablet .. 650 milliGRAM(s) Oral every 6 hours PRN Mild Pain (1 - 3)  methocarbamol 750 milliGRAM(s) Oral three times a day PRN Muscle Spasm  oxyCODONE    IR 5 milliGRAM(s) Oral every 6 hours PRN Moderate Pain (4 - 6)  oxyCODONE    IR 10 milliGRAM(s) Oral every 6 hours PRN Severe Pain (7 - 10)    ___________________________________________________________________________    PHYSICAL EXAM:    Gen - NAD, Comfortable  HEENT - NCAT, EOMI, MMM  Neck - Supple, Limited ROM   Pulm - CTAB, No wheeze, No rhonchi, No crackles  Cardiovascular - RRR, S1S2, No m/r/g  Abdomen - Soft, NT/ND, +BS  Extremities - No C/C/E, No calf tenderness  Neuro-     Cognitive - AAOx3     Motor -                    LEFT    UE - 5/5                    RIGHT UE - 5/5                    LEFT    LE - 4/5                    RIGHT LE - 4/5      Sensory - BL LE numbness and tingling (R>L)   Psychiatric - Mood stable, Affect WNL  Skin: Intact  Wounds: Surgical wound JUAN ALBERTO, staples intact    ___________________________________________________________________________

## 2024-08-22 NOTE — PROGRESS NOTE ADULT - ASSESSMENT
74-year-old male patient with past medical history of HTN, melanoma, and a spontaneous T12 intradural hematoma in 2021 s/p T10-L2 laminectomy who is admitted for Acute Inpatient Rehabilitation with a multidisciplinary rehab program at [facility] with functional impairments in ADLs and mobility secondary to undergoing elective surgery for cervical AVF w/ C2-5 lami and fistula ligation on 8/12/24.     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 Lau.  74-year-old male patient with past medical history of HTN, melanoma, and a spontaneous T12 intradural hematoma in 2021 s/p T10-L2 laminectomy who is admitted for Acute Inpatient Rehabilitation with a multidisciplinary rehab program at [facility] with functional impairments in ADLs and mobility secondary to undergoing elective surgery for cervical AVF w/ C2-5 lami and fistula ligation on 8/12/24.     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 Lau.

## 2024-08-23 PROCEDURE — 99232 SBSQ HOSP IP/OBS MODERATE 35: CPT

## 2024-08-23 PROCEDURE — 72158 MRI LUMBAR SPINE W/O & W/DYE: CPT | Mod: 26

## 2024-08-23 RX ORDER — OXYCODONE HYDROCHLORIDE 5 MG/1
5 TABLET ORAL EVERY 6 HOURS
Refills: 0 | Status: DISCONTINUED | OUTPATIENT
Start: 2024-08-23 | End: 2024-08-28

## 2024-08-23 RX ORDER — OXYCODONE HYDROCHLORIDE 5 MG/1
10 TABLET ORAL EVERY 6 HOURS
Refills: 0 | Status: DISCONTINUED | OUTPATIENT
Start: 2024-08-23 | End: 2024-08-28

## 2024-08-23 RX ADMIN — Medication 600 MILLIGRAM(S): at 14:06

## 2024-08-23 RX ADMIN — METHOCARBAMOL 750 MILLIGRAM(S): 750 TABLET, FILM COATED ORAL at 03:09

## 2024-08-23 RX ADMIN — METHOCARBAMOL 750 MILLIGRAM(S): 750 TABLET, FILM COATED ORAL at 21:09

## 2024-08-23 RX ADMIN — ACETAMINOPHEN 650 MILLIGRAM(S): 325 TABLET ORAL at 14:15

## 2024-08-23 RX ADMIN — Medication 1 TABLET(S): at 13:29

## 2024-08-23 RX ADMIN — ACETAMINOPHEN 650 MILLIGRAM(S): 325 TABLET ORAL at 03:09

## 2024-08-23 RX ADMIN — POLYETHYLENE GLYCOL 3350 17 GRAM(S): 17 POWDER, FOR SOLUTION ORAL at 05:31

## 2024-08-23 RX ADMIN — TAMSULOSIN HYDROCHLORIDE 0.4 MILLIGRAM(S): 0.4 CAPSULE ORAL at 21:08

## 2024-08-23 RX ADMIN — LOSARTAN POTASSIUM 100 MILLIGRAM(S): 50 TABLET ORAL at 05:32

## 2024-08-23 RX ADMIN — ENOXAPARIN SODIUM 40 MILLIGRAM(S): 100 INJECTION SUBCUTANEOUS at 21:08

## 2024-08-23 RX ADMIN — Medication 600 MILLIGRAM(S): at 05:32

## 2024-08-23 RX ADMIN — Medication 40 MILLIGRAM(S): at 21:08

## 2024-08-23 RX ADMIN — ACETAMINOPHEN 650 MILLIGRAM(S): 325 TABLET ORAL at 22:09

## 2024-08-23 RX ADMIN — ACETAMINOPHEN 650 MILLIGRAM(S): 325 TABLET ORAL at 13:28

## 2024-08-23 RX ADMIN — OXYCODONE HYDROCHLORIDE 10 MILLIGRAM(S): 5 TABLET ORAL at 10:31

## 2024-08-23 RX ADMIN — ACETAMINOPHEN 650 MILLIGRAM(S): 325 TABLET ORAL at 04:09

## 2024-08-23 RX ADMIN — OXYCODONE HYDROCHLORIDE 10 MILLIGRAM(S): 5 TABLET ORAL at 11:20

## 2024-08-23 RX ADMIN — Medication 600 MILLIGRAM(S): at 21:08

## 2024-08-23 RX ADMIN — ACETAMINOPHEN 650 MILLIGRAM(S): 325 TABLET ORAL at 21:09

## 2024-08-23 RX ADMIN — Medication 2 TABLET(S): at 21:09

## 2024-08-23 NOTE — PROGRESS NOTE ADULT - ASSESSMENT
Mr. Denis Toro is a 74-year-old male patient with past medical history of HTN, melanoma, and a spontaneous T12 intradural hematoma in 2021 s/p T10-L2 laminectomy who is admitted for Acute Inpatient Rehabilitation with a multidisciplinary rehab program at [facility] with functional impairments in ADLs and mobility secondary to undergoing elective surgery for cervical AVF w/ C2-5 lami and fistula ligation on 8/12/24.       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  - S/P laminectomy for spinal cord AVM excision with ligation (8/12/24)      * Start Aspirin 81 mg daily on 8/26/24      * Vertebral artery dissection      * Post-operative urinary retention  - Vertebral Artery Dissection   - Bilateral lower extremity weakness  - Bilateral lower extremity sensory deficits  - Impaired ADLs and mobility  - Need for assistance with personal care  - Comprehensive Multidisciplinary Rehab Program:      * PT/OT/ SLP 3 hours a day 5 days a week  - new LLE pain  - CT pelvis negative for acute pathology/venous compression  - MRI L spine pending    #Pain:  - PRN Scale: Tylenol, Oxycodone 5 mg q6h, Oxycodone 10 mg q6h   - PRN Robaxin 750 mg TID  - Standing Gabapentin 300 mg qPM, 600 mg BID   - Lidocaine patch    #GI/Bowel:  - Senna 2 tabs daily  - Miralax q12h     #/Bladder:  - Per chart review, Vazquez removed today and patient voided 150 cc w/  cc   - Monitor PVR if no void in 8h; SC for >400 cc    #Diet / Dysphagia:    - Diet: Regular/DASH   - Ongoing SLP assessment  - Nutrition to follow    #Skin/ Pressure Injury Prevention:  - Assessment on admission: Surgical wound JUAN ALBERTO, staples intact    #DVT prophylaxis:  - Lovenox     #Precautions/ Restrictions  - Falls, Spine    --------------------------------------------  Outpatient Follow up:  Jose Martin Jewell  Neurosurgery  [address] [place] [id number]  Phone: [phone]  Fax: [phone]  Follow Up Time: 1 week- rescheduled to 9/19    --------------------------------------------

## 2024-08-23 NOTE — PROGRESS NOTE ADULT - SUBJECTIVE AND OBJECTIVE BOX
HPI:  Mr. Denis Toro is a 74-year-old male patient with past medical history of HTN, melanoma, and a spontaneous T12 intradural hematoma in 2021 s/p T10-L2 laminectomy who presented for elective treatment of cervical AVF w/ C2-5 lami and fistula ligation on 8/12/24. Intraop angiogram showed resolution of C3-4 dAVF w/ L vertebral artery dissection, then underwent formal angiogram the following day, which showed resolution of the fistula, and improving vertebral artery dissection. Post-operatively, patient had urinary retention. However, his Vazquez was removed and the patient voided on 8/19/24. PM&R was consulted and deemed him an appropriate candidate for IRF. He was cleared for discharge to [facility] on 8/19/24.  (19 Aug 2024 14:41)    TDD: 9/10 to Home  ___________________________________________________________________________    SUBJECTIVE/ROS  Patient was seen and evaluated at bedside today.  Reported feeling LLE pain overnight associated with weakness. In no acute distress.  Discussed negative result of CT pelvis.  Patient reports the pain has improved slightly from yesterday.  Otherwise, no complaints.  Explained to the patient that his neurosurgery appointment was rescheduled and we can assess the wound while he is here.  Patient is eager to continue participation on the recommended rehabilitation program.  Denies any CP, SOB, GAMA, palpitations, fever, chills, body aches, cough, congestion, or any other symptoms at this time.   ___________________________________________________________________________    Vital Signs Last 24 Hrs  T(C): 36.6 (08-23-24 @ 07:37), Max: 36.8 (08-22-24 @ 20:09)  T(F): 97.9 (08-23-24 @ 07:37), Max: 98.3 (08-22-24 @ 20:09)  HR: 80 (08-23-24 @ 07:37) (79 - 80)  BP: 130/75 (08-23-24 @ 07:37) (130/75 - 150/75)  ABP: --  ABP(mean): --  RR: 16 (08-23-24 @ 07:37) (16 - 16)  SpO2: 96% (08-23-24 @ 07:37) (96% - 97%)      ___________________________________________________________________________    LAB                        10.5   6.26  )-----------( 265      ( 22 Aug 2024 06:22 )             30.8     08-22    137  |  102  |  18  ----------------------------<  118<H>  4.7   |  30  |  0.66    Ca    8.9      22 Aug 2024 06:22    TPro  6.3  /  Alb  2.8<L>  /  TBili  1.3<H>  /  DBili  x   /  AST  21  /  ALT  42  /  AlkPhos  67  08-22    LIVER FUNCTIONS - ( 22 Aug 2024 06:22 )  Alb: 2.8 g/dL / Pro: 6.3 g/dL / ALK PHOS: 67 U/L / ALT: 42 U/L / AST: 21 U/L / GGT: x                 Urinalysis Basic - ( 22 Aug 2024 06:22 )    Color: x / Appearance: x / SG: x / pH: x  Gluc: 118 mg/dL / Ketone: x  / Bili: x / Urobili: x   Blood: x / Protein: x / Nitrite: x   Leuk Esterase: x / RBC: x / WBC x   Sq Epi: x / Non Sq Epi: x / Bacteria: x               ___________________________________________________________________________    MEDICATIONS  (STANDING):  atorvastatin 40 milliGRAM(s) Oral at bedtime  enoxaparin Injectable 40 milliGRAM(s) SubCutaneous <User Schedule>  gabapentin 600 milliGRAM(s) Oral at bedtime  gabapentin 600 milliGRAM(s) Oral <User Schedule>  lidocaine   4% Patch 1 Patch Transdermal every 24 hours  lidocaine   4% Patch 1 Patch Transdermal every 24 hours  losartan 100 milliGRAM(s) Oral daily  multivitamin 1 Tablet(s) Oral daily  polyethylene glycol 3350 17 Gram(s) Oral every 12 hours  senna 2 Tablet(s) Oral at bedtime  tamsulosin 0.4 milliGRAM(s) Oral at bedtime    MEDICATIONS  (PRN):  acetaminophen     Tablet .. 650 milliGRAM(s) Oral every 6 hours PRN Mild Pain (1 - 3)  methocarbamol 750 milliGRAM(s) Oral three times a day PRN Muscle Spasm  oxyCODONE    IR 10 milliGRAM(s) Oral every 6 hours PRN Severe Pain (7 - 10)  oxyCODONE    IR 5 milliGRAM(s) Oral every 6 hours PRN Moderate Pain (4 - 6)      ___________________________________________________________________________    PHYSICAL EXAM:    Gen - NAD, Comfortable  HEENT - NCAT, EOMI, MMM  Neck - Supple, Limited ROM   Pulm - CTAB, No wheeze, No rhonchi, No crackles  Cardiovascular - RRR, S1S2, No m/r/g  Abdomen - Soft, NT/ND, +BS  Extremities - No C/C/E, No calf tenderness  Neuro-     Cognitive - AAOx3  Psychiatric - Mood stable, Affect WNL  Skin: Intact    ___________________________________________________________________________ HPI:  Mr. Denis Toro is a 74-year-old male patient with past medical history of HTN, melanoma, and a spontaneous T12 intradural hematoma in 2021 s/p T10-L2 laminectomy who presented for elective treatment of cervical AVF w/ C2-5 lami and fistula ligation on 8/12/24. Intraop angiogram showed resolution of C3-4 dAVF w/ L vertebral artery dissection, then underwent formal angiogram the following day, which showed resolution of the fistula, and improving vertebral artery dissection. Post-operatively, patient had urinary retention. However, his Vazquez was removed and the patient voided on 8/19/24. PM&R was consulted and deemed him an appropriate candidate for IRF. He was cleared for discharge to [facility] on 8/19/24.  (19 Aug 2024 14:41)    TDD: 9/10 to Home  ___________________________________________________________________________    SUBJECTIVE/ROS  Patient was seen and evaluated at bedside today.  Reported feeling LLE pain overnight associated with weakness. In no acute distress.  Discussed negative result of CT pelvis.  Patient reports the pain has improved slightly from yesterday.  Otherwise, no complaints.  Explained to the patient that his neurosurgery appointment was rescheduled and we can assess the wound while he is here.  Patient is eager to continue participation on the recommended rehabilitation program.  Denies any CP, SOB, GAMA, palpitations, fever, chills, body aches, cough, congestion, or any other symptoms at this time.   ___________________________________________________________________________    Vital Signs Last 24 Hrs  T(C): 36.6 (08-23-24 @ 07:37), Max: 36.8 (08-22-24 @ 20:09)  T(F): 97.9 (08-23-24 @ 07:37), Max: 98.3 (08-22-24 @ 20:09)  HR: 80 (08-23-24 @ 07:37) (79 - 80)  BP: 130/75 (08-23-24 @ 07:37) (130/75 - 150/75)  RR: 16 (08-23-24 @ 07:37) (16 - 16)  SpO2: 96% (08-23-24 @ 07:37) (96% - 97%)    ___________________________________________________________________________    LAB                        10.5   6.26  )-----------( 265      ( 22 Aug 2024 06:22 )             30.8     08-22    137  |  102  |  18  ----------------------------<  118<H>  4.7   |  30  |  0.66    Ca    8.9      22 Aug 2024 06:22    TPro  6.3  /  Alb  2.8<L>  /  TBili  1.3<H>  /  DBili  x   /  AST  21  /  ALT  42  /  AlkPhos  67  08-22    LIVER FUNCTIONS - ( 22 Aug 2024 06:22 )  Alb: 2.8 g/dL / Pro: 6.3 g/dL / ALK PHOS: 67 U/L / ALT: 42 U/L / AST: 21 U/L / GGT: x           ___________________________________________________________________________    MEDICATIONS  (STANDING):  atorvastatin 40 milliGRAM(s) Oral at bedtime  enoxaparin Injectable 40 milliGRAM(s) SubCutaneous <User Schedule>  gabapentin 600 milliGRAM(s) Oral at bedtime  gabapentin 600 milliGRAM(s) Oral <User Schedule>  lidocaine   4% Patch 1 Patch Transdermal every 24 hours  lidocaine   4% Patch 1 Patch Transdermal every 24 hours  losartan 100 milliGRAM(s) Oral daily  multivitamin 1 Tablet(s) Oral daily  polyethylene glycol 3350 17 Gram(s) Oral every 12 hours  senna 2 Tablet(s) Oral at bedtime  tamsulosin 0.4 milliGRAM(s) Oral at bedtime    MEDICATIONS  (PRN):  acetaminophen     Tablet .. 650 milliGRAM(s) Oral every 6 hours PRN Mild Pain (1 - 3)  methocarbamol 750 milliGRAM(s) Oral three times a day PRN Muscle Spasm  oxyCODONE    IR 10 milliGRAM(s) Oral every 6 hours PRN Severe Pain (7 - 10)  oxyCODONE    IR 5 milliGRAM(s) Oral every 6 hours PRN Moderate Pain (4 - 6)    ___________________________________________________________________________    PHYSICAL EXAM:    Gen - NAD, Comfortable  HEENT - NCAT, EOMI, MMM  Neck - Supple, Limited ROM   Pulm - CTAB, No wheeze, No rhonchi, No crackles  Cardiovascular - RRR, S1S2, No m/r/g  Abdomen - Soft, NT/ND, +BS  Extremities - No C/C/E, No calf tenderness  Neuro-     Cognitive - AAOx3  Psychiatric - Mood stable, Affect WNL  Skin: Intact    ___________________________________________________________________________

## 2024-08-23 NOTE — PROGRESS NOTE ADULT - SUBJECTIVE AND OBJECTIVE BOX
CC: Patient is a 74y old  Male who presents with a chief complaint of Dural arteriovenous fistula of cervical spinal cord s/p laminectomy with excision and ligation (23 Aug 2024 11:42)      Interval History: Patient seen and examined at bedside. No acute overnight events. Requesting Tylenol/muscle relaxer but no other complaints    ALLERGIES:  Celebrex (Other)  Shrimp (Angioedema)    MEDICATIONS  (STANDING):  atorvastatin 40 milliGRAM(s) Oral at bedtime  enoxaparin Injectable 40 milliGRAM(s) SubCutaneous <User Schedule>  gabapentin 600 milliGRAM(s) Oral <User Schedule>  gabapentin 600 milliGRAM(s) Oral at bedtime  lidocaine   4% Patch 1 Patch Transdermal every 24 hours  lidocaine   4% Patch 1 Patch Transdermal every 24 hours  losartan 100 milliGRAM(s) Oral daily  multivitamin 1 Tablet(s) Oral daily  polyethylene glycol 3350 17 Gram(s) Oral every 12 hours  senna 2 Tablet(s) Oral at bedtime  tamsulosin 0.4 milliGRAM(s) Oral at bedtime    MEDICATIONS  (PRN):  acetaminophen     Tablet .. 650 milliGRAM(s) Oral every 6 hours PRN Mild Pain (1 - 3)  methocarbamol 750 milliGRAM(s) Oral three times a day PRN Muscle Spasm  oxyCODONE    IR 10 milliGRAM(s) Oral every 6 hours PRN Severe Pain (7 - 10)  oxyCODONE    IR 5 milliGRAM(s) Oral every 6 hours PRN Moderate Pain (4 - 6)    Vital Signs Last 24 Hrs  T(F): 97.9 (23 Aug 2024 07:37), Max: 98.3 (22 Aug 2024 20:09)  HR: 80 (23 Aug 2024 07:37) (79 - 80)  BP: 130/75 (23 Aug 2024 07:37) (130/75 - 150/75)  RR: 16 (23 Aug 2024 07:37) (16 - 16)  SpO2: 96% (23 Aug 2024 07:37) (96% - 97%)  I&O's Summary    BMI (kg/m2): 25.3 (08-22-24 @ 14:10)    PHYSICAL EXAM:  GENERAL: pt sitting in chair in NAD  CHEST/LUNG: nonlabored breathing, no resp distress   HEART: Regular rate and rhythm; No murmurs noted  ABDOMEN: Soft, Nontender, Nondistended; Bowel sounds present   MUSCULOSKELETAL/EXTREMITIES:  no cyanosis, no edema noted to BLE  NERVOUS SYSTEM:   Sensation intact; follows commands  PSYCH: Appropriate affect, Alert & Oriented x 3     LABS:                        10.5   6.26  )-----------( 265      ( 22 Aug 2024 06:22 )             30.8       08-22    137  |  102  |  18  ----------------------------<  118  4.7   |  30  |  0.66    Ca    8.9      22 Aug 2024 06:22    TPro  6.3  /  Alb  2.8  /  TBili  1.3  /  DBili  x   /  AST  21  /  ALT  42  /  AlkPhos  67  08-22                                  Urinalysis Basic - ( 22 Aug 2024 06:22 )    Color: x / Appearance: x / SG: x / pH: x  Gluc: 118 mg/dL / Ketone: x  / Bili: x / Urobili: x   Blood: x / Protein: x / Nitrite: x   Leuk Esterase: x / RBC: x / WBC x   Sq Epi: x / Non Sq Epi: x / Bacteria: x        COVID-19 PCR: NotDetec (08-19-24 @ 19:48)      Care Discussed with Consultants/Other Providers: Yes

## 2024-08-23 NOTE — PROGRESS NOTE ADULT - ASSESSMENT
74-year-old male patient with past medical history of HTN, melanoma, and a spontaneous T12 intradural hematoma in 2021 s/p T10-L2 laminectomy who is admitted for Acute Inpatient Rehabilitation with a multidisciplinary rehab program at [facility] with functional impairments in ADLs and mobility secondary to undergoing elective surgery for cervical AVF w/ C2-5 lami and fistula ligation on 8/12/24.     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

## 2024-08-24 PROCEDURE — 99232 SBSQ HOSP IP/OBS MODERATE 35: CPT

## 2024-08-24 PROCEDURE — 99233 SBSQ HOSP IP/OBS HIGH 50: CPT

## 2024-08-24 RX ORDER — DICLOFENAC SODIUM 20 MG/G
2 SOLUTION TOPICAL
Refills: 0 | Status: DISCONTINUED | OUTPATIENT
Start: 2024-08-24 | End: 2024-09-07

## 2024-08-24 RX ADMIN — ACETAMINOPHEN 650 MILLIGRAM(S): 325 TABLET ORAL at 07:53

## 2024-08-24 RX ADMIN — OXYCODONE HYDROCHLORIDE 10 MILLIGRAM(S): 5 TABLET ORAL at 03:50

## 2024-08-24 RX ADMIN — ACETAMINOPHEN 650 MILLIGRAM(S): 325 TABLET ORAL at 21:15

## 2024-08-24 RX ADMIN — DICLOFENAC SODIUM 2 GRAM(S): 20 SOLUTION TOPICAL at 21:17

## 2024-08-24 RX ADMIN — ACETAMINOPHEN 650 MILLIGRAM(S): 325 TABLET ORAL at 08:37

## 2024-08-24 RX ADMIN — Medication 600 MILLIGRAM(S): at 13:54

## 2024-08-24 RX ADMIN — ACETAMINOPHEN 650 MILLIGRAM(S): 325 TABLET ORAL at 22:00

## 2024-08-24 RX ADMIN — Medication 2 TABLET(S): at 21:16

## 2024-08-24 RX ADMIN — OXYCODONE HYDROCHLORIDE 5 MILLIGRAM(S): 5 TABLET ORAL at 17:16

## 2024-08-24 RX ADMIN — OXYCODONE HYDROCHLORIDE 10 MILLIGRAM(S): 5 TABLET ORAL at 02:50

## 2024-08-24 RX ADMIN — TAMSULOSIN HYDROCHLORIDE 0.4 MILLIGRAM(S): 0.4 CAPSULE ORAL at 21:15

## 2024-08-24 RX ADMIN — Medication 40 MILLIGRAM(S): at 21:16

## 2024-08-24 RX ADMIN — LOSARTAN POTASSIUM 100 MILLIGRAM(S): 50 TABLET ORAL at 05:10

## 2024-08-24 RX ADMIN — ENOXAPARIN SODIUM 40 MILLIGRAM(S): 100 INJECTION SUBCUTANEOUS at 21:14

## 2024-08-24 RX ADMIN — Medication 600 MILLIGRAM(S): at 05:10

## 2024-08-24 RX ADMIN — Medication 1 TABLET(S): at 11:09

## 2024-08-24 RX ADMIN — Medication 600 MILLIGRAM(S): at 21:15

## 2024-08-24 NOTE — PROGRESS NOTE ADULT - SUBJECTIVE AND OBJECTIVE BOX
HPI:  Mr. Denis Toro is a 74-year-old male patient with past medical history of HTN, melanoma, and a spontaneous T12 intradural hematoma in 2021 s/p T10-L2 laminectomy who presented for elective treatment of cervical AVF w/ C2-5 lami and fistula ligation on 8/12/24. Intraop angiogram showed resolution of C3-4 dAVF w/ L vertebral artery dissection, then underwent formal angiogram the following day, which showed resolution of the fistula, and improving vertebral artery dissection. Post-operatively, patient had urinary retention. However, his Vazquez was removed and the patient voided on 8/19/24. PM&R was consulted and deemed him an appropriate candidate for IRF. He was cleared for discharge to [facility] on 8/19/24.  (19 Aug 2024 14:41)    TDD: 9/10 to Home  ___________________________________________________________________________    SUBJECTIVE/ROS  Patient was seen and evaluated at bedside today.  Reported significant improvement in his LLE pain with less association with weakness. In no acute distress.  Discussed negative result of CT pelvis and MRI with patient and spouse.  All questions were answered and they expressed understanding and agreement.   Explained these were discussed with spine surgeon who recommended conservative management.  Patient is eager to continue participation on the recommended rehabilitation program.  Denies any CP, SOB, GAMA, palpitations, fever, chills, body aches, cough, congestion, or any other symptoms at this time.   ___________________________________________________________________________    Vital Signs Last 24 Hrs  T(C): 36.6 (08-23-24 @ 07:37), Max: 36.8 (08-22-24 @ 20:09)  T(F): 97.9 (08-23-24 @ 07:37), Max: 98.3 (08-22-24 @ 20:09)  HR: 80 (08-23-24 @ 07:37) (79 - 80)  BP: 130/75 (08-23-24 @ 07:37) (130/75 - 150/75)  RR: 16 (08-23-24 @ 07:37) (16 - 16)  SpO2: 96% (08-23-24 @ 07:37) (96% - 97%)    ___________________________________________________________________________    LAB                        10.5   6.26  )-----------( 265      ( 22 Aug 2024 06:22 )             30.8     08-22    137  |  102  |  18  ----------------------------<  118<H>  4.7   |  30  |  0.66    Ca    8.9      22 Aug 2024 06:22    TPro  6.3  /  Alb  2.8<L>  /  TBili  1.3<H>  /  DBili  x   /  AST  21  /  ALT  42  /  AlkPhos  67  08-22    LIVER FUNCTIONS - ( 22 Aug 2024 06:22 )  Alb: 2.8 g/dL / Pro: 6.3 g/dL / ALK PHOS: 67 U/L / ALT: 42 U/L / AST: 21 U/L / GGT: x           ___________________________________________________________________________    MEDICATIONS  (STANDING):  atorvastatin 40 milliGRAM(s) Oral at bedtime  enoxaparin Injectable 40 milliGRAM(s) SubCutaneous <User Schedule>  gabapentin 600 milliGRAM(s) Oral at bedtime  gabapentin 600 milliGRAM(s) Oral <User Schedule>  lidocaine   4% Patch 1 Patch Transdermal every 24 hours  lidocaine   4% Patch 1 Patch Transdermal every 24 hours  losartan 100 milliGRAM(s) Oral daily  multivitamin 1 Tablet(s) Oral daily  polyethylene glycol 3350 17 Gram(s) Oral every 12 hours  senna 2 Tablet(s) Oral at bedtime  tamsulosin 0.4 milliGRAM(s) Oral at bedtime    MEDICATIONS  (PRN):  acetaminophen     Tablet .. 650 milliGRAM(s) Oral every 6 hours PRN Mild Pain (1 - 3)  methocarbamol 750 milliGRAM(s) Oral three times a day PRN Muscle Spasm  oxyCODONE    IR 10 milliGRAM(s) Oral every 6 hours PRN Severe Pain (7 - 10)  oxyCODONE    IR 5 milliGRAM(s) Oral every 6 hours PRN Moderate Pain (4 - 6)    ___________________________________________________________________________    PHYSICAL EXAM:    Gen - NAD, Comfortable  HEENT - NCAT, EOMI, MMM  Neck - Supple, Limited ROM   Pulm - CTAB, No wheeze, No rhonchi, No crackles  Cardiovascular - RRR, S1S2, No m/r/g  Abdomen - Soft, NT/ND, +BS  Extremities - No C/C/E, No calf tenderness  Neuro-     Cognitive - AAOx3  Psychiatric - Mood stable, Affect WNL  Skin: Intact    ___________________________________________________________________________

## 2024-08-24 NOTE — PROGRESS NOTE ADULT - ASSESSMENT
Mr. Denis Toro is a 74-year-old male patient with past medical history of HTN, melanoma, and a spontaneous T12 intradural hematoma in 2021 s/p T10-L2 laminectomy who is admitted for Acute Inpatient Rehabilitation with a multidisciplinary rehab program at [facility] with functional impairments in ADLs and mobility secondary to undergoing elective surgery for cervical AVF w/ C2-5 lami and fistula ligation on 8/12/24.       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  - S/P laminectomy for spinal cord AVM excision with ligation (8/12/24)      * Start Aspirin 81 mg daily on 8/26/24      * Vertebral artery dissection      * Post-operative urinary retention  - Vertebral Artery Dissection   - Bilateral lower extremity weakness  - Bilateral lower extremity sensory deficits  - Impaired ADLs and mobility  - Need for assistance with personal care  - Comprehensive Multidisciplinary Rehab Program:      * PT/OT/ SLP 3 hours a day 5 days a week  - new LLE pain  - CT pelvis negative for acute pathology/venous compression  - MRI L spine pending    #Pain:  - PRN Scale: Tylenol, Oxycodone 5 mg q6h, Oxycodone 10 mg q6h   - PRN Robaxin 750 mg TID  - Standing Gabapentin 300 mg qPM, 600 mg BID   - Lidocaine patch    #GI/Bowel:  - Senna 2 tabs daily  - Miralax q12h     #/Bladder:  - Per chart review, Vazquez removed today and patient voided 150 cc w/  cc   - Monitor PVR if no void in 8h; SC for >400 cc    #Diet / Dysphagia:    - Diet: Regular/DASH   - Ongoing SLP assessment  - Nutrition to follow    #Skin/ Pressure Injury Prevention:  - Assessment on admission: Surgical wound JUAN ALBERTO, staples intact    #DVT prophylaxis:  - Lovenox     #Precautions/ Restrictions  - Falls, Spine    --------------------------------------------  Outpatient Follow up:  Jose Martin Jewell  Neurosurgery  [address] [place] [id number]  Phone: [phone]  Fax: [phone]  Follow Up Time: 1 week- rescheduled to 9/19    --------------------------------------------

## 2024-08-24 NOTE — PROGRESS NOTE ADULT - SUBJECTIVE AND OBJECTIVE BOX
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 c/o mild left hip pain/spasms in IT band, somewhat improved today. otherwise no acute medical complaints.     ALLERGIES:  Celebrex (Other)  Shrimp (Angioedema)    MEDICATIONS  (STANDING):  atorvastatin 40 milliGRAM(s) Oral at bedtime  diclofenac sodium 1% Gel 2 Gram(s) Topical two times a day  enoxaparin Injectable 40 milliGRAM(s) SubCutaneous <User Schedule>  gabapentin 600 milliGRAM(s) Oral at bedtime  gabapentin 600 milliGRAM(s) Oral <User Schedule>  lidocaine   4% Patch 1 Patch Transdermal every 24 hours  lidocaine   4% Patch 1 Patch Transdermal every 24 hours  losartan 100 milliGRAM(s) Oral daily  multivitamin 1 Tablet(s) Oral daily  polyethylene glycol 3350 17 Gram(s) Oral every 12 hours  senna 2 Tablet(s) Oral at bedtime  tamsulosin 0.4 milliGRAM(s) Oral at bedtime    MEDICATIONS  (PRN):  acetaminophen     Tablet .. 650 milliGRAM(s) Oral every 6 hours PRN Mild Pain (1 - 3)  methocarbamol 750 milliGRAM(s) Oral three times a day PRN Muscle Spasm  oxyCODONE    IR 10 milliGRAM(s) Oral every 6 hours PRN Severe Pain (7 - 10)  oxyCODONE    IR 5 milliGRAM(s) Oral every 6 hours PRN Moderate Pain (4 - 6)    Vital Signs Last 24 Hrs  T(F): 98.2 (24 Aug 2024 07:40), Max: 98.2 (24 Aug 2024 07:40)  HR: 80 (24 Aug 2024 07:40) (80 - 89)  BP: 133/78 (24 Aug 2024 07:40) (133/78 - 145/79)  RR: 16 (24 Aug 2024 07:40) (16 - 16)  SpO2: 96% (24 Aug 2024 07:40) (96% - 96%)  I&O's Summary    BMI (kg/m2): 25.3 (08-22-24 @ 14:10)    PHYSICAL EXAM:  General: NAD  ENT: MMM, no scleral icterus  Neck: Supple, No JVD  Lungs: Clear to auscultation bilaterally, no wheezes, rales, rhonchi  Cardio: RRR, S1/S2  Abdomen: Soft, Nontender, Nondistended; Bowel sounds present  Extremities: No calf tenderness, No pitting edema    LABS:                        10.5   6.26  )-----------( 265      ( 22 Aug 2024 06:22 )             30.8       08-22    137  |  102  |  18  ----------------------------<  118  4.7   |  30  |  0.66    Ca    8.9      22 Aug 2024 06:22    TPro  6.3  /  Alb  2.8  /  TBili  1.3  /  DBili  x   /  AST  21  /  ALT  42  /  AlkPhos  67  08-22                                  Urinalysis Basic - ( 22 Aug 2024 06:22 )    Color: x / Appearance: x / SG: x / pH: x  Gluc: 118 mg/dL / Ketone: x  / Bili: x / Urobili: x   Blood: x / Protein: x / Nitrite: x   Leuk Esterase: x / RBC: x / WBC x   Sq Epi: x / Non Sq Epi: x / Bacteria: x        COVID-19 PCR: NotDetec (08-19-24 @ 19:48)      RADIOLOGY & ADDITIONAL TESTS:  < from: MR Lumbar Spine w/wo IV Cont (08.23.24 @ 12:19) >  IMPRESSION:    No abnormal enhancement.  Similar abnormal signal within the visualized lower thoracic cord.    Similar arachnoid adhesions resulting in similar appearing loculated pockets of CSF within the ventral spinal canal from T11-T12 through   L1-L2, and at L2-L3.    Similar clumping/thickening of descending lumbosacral nerve roots at multiple levels.    Multilevel degenerative changes detailed in the body the report, with significantly worse spinal canal stenosis at L2-L3, currently moderate to   severe in degree. New mass effect upon the descending bilateral L3 nerve roots in the lateral recesses.    --- End of Report ---    < end of copied text >    Care Discussed with Consultants/Other Providers: yes, rehab

## 2024-08-24 NOTE — PROGRESS NOTE ADULT - ASSESSMENT
75 y/o M with PMH HTN, melanoma, and a spontaneous T12 intradural hematoma in 2021 s/p T10-L2 laminectomy who is admitted for Acute Inpatient Rehabilitation with a multidisciplinary rehab program at [facility] with functional impairments in ADLs and mobility secondary to undergoing elective surgery for cervical AVF w/ C2-5 lami and fistula ligation on 8/12/24.     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 New mass effect upon the descending bilateral L3 nerve roots in the lateral recesses  -Follow up neuro/spine    #HTN  -Continue Losartan 100mg daily    # Normocytic Anemia  -Likely postoperative blood loss  -Stable H/H  -Monitor and transfuse if Hb<7    #DVT prophylaxis  -Lovenox

## 2024-08-25 PROCEDURE — 99232 SBSQ HOSP IP/OBS MODERATE 35: CPT

## 2024-08-25 RX ADMIN — OXYCODONE HYDROCHLORIDE 5 MILLIGRAM(S): 5 TABLET ORAL at 10:40

## 2024-08-25 RX ADMIN — ACETAMINOPHEN 650 MILLIGRAM(S): 325 TABLET ORAL at 22:00

## 2024-08-25 RX ADMIN — ACETAMINOPHEN 650 MILLIGRAM(S): 325 TABLET ORAL at 14:26

## 2024-08-25 RX ADMIN — METHOCARBAMOL 750 MILLIGRAM(S): 750 TABLET, FILM COATED ORAL at 09:41

## 2024-08-25 RX ADMIN — DICLOFENAC SODIUM 2 GRAM(S): 20 SOLUTION TOPICAL at 18:02

## 2024-08-25 RX ADMIN — LOSARTAN POTASSIUM 100 MILLIGRAM(S): 50 TABLET ORAL at 05:36

## 2024-08-25 RX ADMIN — OXYCODONE HYDROCHLORIDE 5 MILLIGRAM(S): 5 TABLET ORAL at 09:42

## 2024-08-25 RX ADMIN — Medication 1 TABLET(S): at 14:25

## 2024-08-25 RX ADMIN — Medication 600 MILLIGRAM(S): at 05:36

## 2024-08-25 RX ADMIN — Medication 600 MILLIGRAM(S): at 21:11

## 2024-08-25 RX ADMIN — Medication 40 MILLIGRAM(S): at 21:11

## 2024-08-25 RX ADMIN — OXYCODONE HYDROCHLORIDE 5 MILLIGRAM(S): 5 TABLET ORAL at 20:00

## 2024-08-25 RX ADMIN — METHOCARBAMOL 750 MILLIGRAM(S): 750 TABLET, FILM COATED ORAL at 21:12

## 2024-08-25 RX ADMIN — ACETAMINOPHEN 650 MILLIGRAM(S): 325 TABLET ORAL at 21:11

## 2024-08-25 RX ADMIN — OXYCODONE HYDROCHLORIDE 5 MILLIGRAM(S): 5 TABLET ORAL at 19:13

## 2024-08-25 RX ADMIN — ENOXAPARIN SODIUM 40 MILLIGRAM(S): 100 INJECTION SUBCUTANEOUS at 21:10

## 2024-08-25 RX ADMIN — Medication 2 TABLET(S): at 21:11

## 2024-08-25 RX ADMIN — Medication 600 MILLIGRAM(S): at 14:25

## 2024-08-25 RX ADMIN — TAMSULOSIN HYDROCHLORIDE 0.4 MILLIGRAM(S): 0.4 CAPSULE ORAL at 21:10

## 2024-08-25 NOTE — PROGRESS NOTE ADULT - SUBJECTIVE AND OBJECTIVE BOX
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 no acute complaints, feeling improved.     ALLERGIES:  Celebrex (Other)  Shrimp (Angioedema)    MEDICATIONS  (STANDING):  atorvastatin 40 milliGRAM(s) Oral at bedtime  diclofenac sodium 1% Gel 2 Gram(s) Topical two times a day  enoxaparin Injectable 40 milliGRAM(s) SubCutaneous <User Schedule>  gabapentin 600 milliGRAM(s) Oral at bedtime  gabapentin 600 milliGRAM(s) Oral <User Schedule>  lidocaine   4% Patch 1 Patch Transdermal every 24 hours  lidocaine   4% Patch 1 Patch Transdermal every 24 hours  losartan 100 milliGRAM(s) Oral daily  multivitamin 1 Tablet(s) Oral daily  polyethylene glycol 3350 17 Gram(s) Oral every 12 hours  senna 2 Tablet(s) Oral at bedtime  tamsulosin 0.4 milliGRAM(s) Oral at bedtime    MEDICATIONS  (PRN):  acetaminophen     Tablet .. 650 milliGRAM(s) Oral every 6 hours PRN Mild Pain (1 - 3)  methocarbamol 750 milliGRAM(s) Oral three times a day PRN Muscle Spasm  oxyCODONE    IR 5 milliGRAM(s) Oral every 6 hours PRN Moderate Pain (4 - 6)  oxyCODONE    IR 10 milliGRAM(s) Oral every 6 hours PRN Severe Pain (7 - 10)    Vital Signs Last 24 Hrs  T(F): 98.5 (25 Aug 2024 08:42), Max: 98.5 (25 Aug 2024 08:42)  HR: 80 (25 Aug 2024 08:42) (80 - 85)  BP: 144/88 (25 Aug 2024 08:42) (139/89 - 150/67)  RR: 16 (25 Aug 2024 08:42) (16 - 16)  SpO2: 96% (25 Aug 2024 08:42) (96% - 96%)  I&O's Summary    BMI (kg/m2): 25.3 (08-24-24 @ 13:36)    PHYSICAL EXAM:  General: NAD  ENT: MMM, no scleral icterus  Neck: Supple, No JVD  Lungs: Clear to auscultation bilaterally, no wheezes, rales, rhonchi  Cardio: RRR, S1/S2  Abdomen: Soft, Nontender, Nondistended; Bowel sounds present  Extremities: No calf tenderness, No pitting edema      LABS:                                              COVID-19 PCR: NotDetec (08-19-24 @ 19:48)      RADIOLOGY & ADDITIONAL TESTS:    Care Discussed with Consultants/Other Providers: yes, rehab

## 2024-08-25 NOTE — PROGRESS NOTE ADULT - ASSESSMENT
Mr. Denis Toro is a 74-year-old male patient with past medical history of HTN, melanoma, and a spontaneous T12 intradural hematoma in 2021 s/p T10-L2 laminectomy who is admitted for Acute Inpatient Rehabilitation with a multidisciplinary rehab program at [facility] with functional impairments in ADLs and mobility secondary to undergoing elective surgery for cervical AVF w/ C2-5 lami and fistula ligation on 8/12/24.       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  - S/P laminectomy for spinal cord AVM excision with ligation (8/12/24)      * Start Aspirin 81 mg daily on 8/26/24      * Vertebral artery dissection      * Post-operative urinary retention  - Vertebral Artery Dissection   - Bilateral lower extremity weakness  - Bilateral lower extremity sensory deficits  - Impaired ADLs and mobility  - Need for assistance with personal care  - Comprehensive Multidisciplinary Rehab Program:      * PT/OT/ SLP 3 hours a day 5 days a week  - new LLE pain  - CT pelvis negative for acute pathology/venous compression  - MRI L spine pending    #Pain:  - PRN Scale: Tylenol, Oxycodone 5 mg q6h, Oxycodone 10 mg q6h   - PRN Robaxin 750 mg TID  - Standing Gabapentin 300 mg qPM, 600 mg BID   - Lidocaine patch    #GI/Bowel:  - Senna 2 tabs daily  - Miralax q12h     #/Bladder:  - Per chart review, Vazquez removed today and patient voided 150 cc w/  cc   - Monitor PVR if no void in 8h; SC for >400 cc    #Diet / Dysphagia:    - Diet: Regular/DASH   - Ongoing SLP assessment  - Nutrition to follow    #Skin/ Pressure Injury Prevention:  - Assessment on admission: Surgical wound JUAN ALBERTO, staples intact    #DVT prophylaxis:  - Lovenox     #Precautions/ Restrictions  - Falls, Spine    --------------------------------------------  Outpatient Follow up:  Jose Martin Jewell  Neurosurgery  [address] [place] [id number]  Phone: [phone]  Fax: [phone]  Follow Up Time: 1 week- rescheduled to 9/19    --------------------------------------------

## 2024-08-25 NOTE — PROGRESS NOTE ADULT - SUBJECTIVE AND OBJECTIVE BOX
HPI:  Mr. Denis Toro is a 74-year-old male patient with past medical history of HTN, melanoma, and a spontaneous T12 intradural hematoma in 2021 s/p T10-L2 laminectomy who presented for elective treatment of cervical AVF w/ C2-5 lami and fistula ligation on 8/12/24. Intraop angiogram showed resolution of C3-4 dAVF w/ L vertebral artery dissection, then underwent formal angiogram the following day, which showed resolution of the fistula, and improving vertebral artery dissection. Post-operatively, patient had urinary retention. However, his Vazquez was removed and the patient voided on 8/19/24. PM&R was consulted and deemed him an appropriate candidate for IRF. He was cleared for discharge to [facility] on 8/19/24.  (19 Aug 2024 14:41)    TDD: 9/10 to Home  ___________________________________________________________________________    SUBJECTIVE/ROS  Patient was seen and evaluated at bedside today.  Reported further improvement in his LLE pain with less association with weakness. In no acute distress.  Patient is eager to continue participation on the recommended rehabilitation program.  Denies any CP, SOB, GAMA, palpitations, fever, chills, body aches, cough, congestion, or any other symptoms at this time.   ___________________________________________________________________________    MR SPINE LUMBAR WAW IC (08/23/2024)  IMPRESSION: No abnormal enhancement. Similar abnormal signal within the visualized lower thoracic cord. Similar arachnoid adhesions resulting in similar appearing loculated pockets of CSF within the ventral spinal canal from T11-T12 through L1-L2, and at L2-L3. Similar clumping/thickening of descending lumbosacral nerve roots at multiple levels. Multilevel degenerative changes detailed in the body the report, with significantly worse spinal canal stenosis at L2-L3, currently moderate to severe in degree. New mass effect upon the descending bilateral L3 nerve roots in the lateral recesses.     CT PELVIS ONLY (08/22/2024)  IMPRESSION: No acute fracture. Minimal degenerative changes of both hips. If there remains concern for iliac vein compression, follow-up evaluation with contrast administration is recommended for more definitive characterization.    ___________________________________________________________________________    Vital Signs Last 24 Hrs  T(C): 36.9 (25 Aug 2024 08:42), Max: 36.9 (25 Aug 2024 08:42)  T(F): 98.5 (25 Aug 2024 08:42), Max: 98.5 (25 Aug 2024 08:42)  HR: 80 (25 Aug 2024 08:42) (80 - 85)  BP: 144/88 (25 Aug 2024 08:42) (139/89 - 150/67)  RR: 16 (25 Aug 2024 08:42) (16 - 16)  SpO2: 96% (25 Aug 2024 08:42) (96% - 96%)    ___________________________________________________________________________    LAB                        10.5   6.26  )-----------( 265      ( 22 Aug 2024 06:22 )             30.8     08-22    137  |  102  |  18  ----------------------------<  118<H>  4.7   |  30  |  0.66    Ca    8.9      22 Aug 2024 06:22    TPro  6.3  /  Alb  2.8<L>  /  TBili  1.3<H>  /  DBili  x   /  AST  21  /  ALT  42  /  AlkPhos  67  08-22    LIVER FUNCTIONS - ( 22 Aug 2024 06:22 )  Alb: 2.8 g/dL / Pro: 6.3 g/dL / ALK PHOS: 67 U/L / ALT: 42 U/L / AST: 21 U/L / GGT: x           ___________________________________________________________________________    MEDICATIONS  (STANDING):  atorvastatin 40 milliGRAM(s) Oral at bedtime  enoxaparin Injectable 40 milliGRAM(s) SubCutaneous <User Schedule>  gabapentin 600 milliGRAM(s) Oral at bedtime  gabapentin 600 milliGRAM(s) Oral <User Schedule>  lidocaine   4% Patch 1 Patch Transdermal every 24 hours  lidocaine   4% Patch 1 Patch Transdermal every 24 hours  losartan 100 milliGRAM(s) Oral daily  multivitamin 1 Tablet(s) Oral daily  polyethylene glycol 3350 17 Gram(s) Oral every 12 hours  senna 2 Tablet(s) Oral at bedtime  tamsulosin 0.4 milliGRAM(s) Oral at bedtime    MEDICATIONS  (PRN):  acetaminophen     Tablet .. 650 milliGRAM(s) Oral every 6 hours PRN Mild Pain (1 - 3)  methocarbamol 750 milliGRAM(s) Oral three times a day PRN Muscle Spasm  oxyCODONE    IR 10 milliGRAM(s) Oral every 6 hours PRN Severe Pain (7 - 10)  oxyCODONE    IR 5 milliGRAM(s) Oral every 6 hours PRN Moderate Pain (4 - 6)    ___________________________________________________________________________    PHYSICAL EXAM:    Gen - NAD, Comfortable  HEENT - NCAT, EOMI, MMM  Neck - Supple, Limited ROM   Pulm - CTAB, No wheeze, No rhonchi, No crackles  Cardiovascular - RRR, S1S2, No m/r/g  Abdomen - Soft, NT/ND, +BS  Extremities - No C/C/E, No calf tenderness  Neuro-     Cognitive - AAOx3     Motor -                    LEFT    UE - 5/5                    RIGHT UE - 5/5                    LEFT    LE - 4/5                    RIGHT LE - 4/5      Sensory - BL LE numbness and tingling (R>L)   Psychiatric - Mood stable, Affect WNL  Skin: Intact    ___________________________________________________________________________

## 2024-08-25 NOTE — PROGRESS NOTE ADULT - ASSESSMENT
75 y/o M with PMH HTN, melanoma, and a spontaneous T12 intradural hematoma in 2021 s/p T10-L2 laminectomy who is admitted for Acute Inpatient Rehabilitation with a multidisciplinary rehab program at [facility] with functional impairments in ADLs and mobility secondary to undergoing elective surgery for cervical AVF w/ C2-5 lami and fistula ligation on 8/12/24.     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 New mass effect upon the descending bilateral L3 nerve roots in the lateral recesses  -Spine surgeon recc conservative management per rehab    #HTN  -Continue Losartan 100mg daily    #Normocytic Anemia  -Likely postoperative blood loss  -Stable H/H  -Monitor and transfuse if Hb<7    #DVT prophylaxis  -Lovenox

## 2024-08-26 LAB
ALBUMIN SERPL ELPH-MCNC: 2.9 G/DL — LOW (ref 3.3–5)
ALP SERPL-CCNC: 71 U/L — SIGNIFICANT CHANGE UP (ref 40–120)
ALT FLD-CCNC: 48 U/L — HIGH (ref 10–45)
ANION GAP SERPL CALC-SCNC: 8 MMOL/L — SIGNIFICANT CHANGE UP (ref 5–17)
AST SERPL-CCNC: 22 U/L — SIGNIFICANT CHANGE UP (ref 10–40)
BASOPHILS # BLD AUTO: 0.04 K/UL — SIGNIFICANT CHANGE UP (ref 0–0.2)
BASOPHILS NFR BLD AUTO: 0.7 % — SIGNIFICANT CHANGE UP (ref 0–2)
BILIRUB SERPL-MCNC: 1 MG/DL — SIGNIFICANT CHANGE UP (ref 0.2–1.2)
BUN SERPL-MCNC: 18 MG/DL — SIGNIFICANT CHANGE UP (ref 7–23)
CALCIUM SERPL-MCNC: 8.8 MG/DL — SIGNIFICANT CHANGE UP (ref 8.4–10.5)
CHLORIDE SERPL-SCNC: 101 MMOL/L — SIGNIFICANT CHANGE UP (ref 96–108)
CO2 SERPL-SCNC: 27 MMOL/L — SIGNIFICANT CHANGE UP (ref 22–31)
CREAT SERPL-MCNC: 0.71 MG/DL — SIGNIFICANT CHANGE UP (ref 0.5–1.3)
EGFR: 96 ML/MIN/1.73M2 — SIGNIFICANT CHANGE UP
EOSINOPHIL # BLD AUTO: 0.13 K/UL — SIGNIFICANT CHANGE UP (ref 0–0.5)
EOSINOPHIL NFR BLD AUTO: 2.4 % — SIGNIFICANT CHANGE UP (ref 0–6)
GLUCOSE SERPL-MCNC: 102 MG/DL — HIGH (ref 70–99)
HCT VFR BLD CALC: 30.9 % — LOW (ref 39–50)
HGB BLD-MCNC: 10.5 G/DL — LOW (ref 13–17)
IMM GRANULOCYTES NFR BLD AUTO: 1.1 % — HIGH (ref 0–0.9)
LYMPHOCYTES # BLD AUTO: 1.35 K/UL — SIGNIFICANT CHANGE UP (ref 1–3.3)
LYMPHOCYTES # BLD AUTO: 24.5 % — SIGNIFICANT CHANGE UP (ref 13–44)
MCHC RBC-ENTMCNC: 30.3 PG — SIGNIFICANT CHANGE UP (ref 27–34)
MCHC RBC-ENTMCNC: 34 GM/DL — SIGNIFICANT CHANGE UP (ref 32–36)
MCV RBC AUTO: 89.3 FL — SIGNIFICANT CHANGE UP (ref 80–100)
MONOCYTES # BLD AUTO: 0.53 K/UL — SIGNIFICANT CHANGE UP (ref 0–0.9)
MONOCYTES NFR BLD AUTO: 9.6 % — SIGNIFICANT CHANGE UP (ref 2–14)
NEUTROPHILS # BLD AUTO: 3.39 K/UL — SIGNIFICANT CHANGE UP (ref 1.8–7.4)
NEUTROPHILS NFR BLD AUTO: 61.7 % — SIGNIFICANT CHANGE UP (ref 43–77)
NRBC # BLD: 0 /100 WBCS — SIGNIFICANT CHANGE UP (ref 0–0)
PLATELET # BLD AUTO: 288 K/UL — SIGNIFICANT CHANGE UP (ref 150–400)
POTASSIUM SERPL-MCNC: 3.8 MMOL/L — SIGNIFICANT CHANGE UP (ref 3.5–5.3)
POTASSIUM SERPL-SCNC: 3.8 MMOL/L — SIGNIFICANT CHANGE UP (ref 3.5–5.3)
PROT SERPL-MCNC: 6.3 G/DL — SIGNIFICANT CHANGE UP (ref 6–8.3)
RBC # BLD: 3.46 M/UL — LOW (ref 4.2–5.8)
RBC # FLD: 13.5 % — SIGNIFICANT CHANGE UP (ref 10.3–14.5)
SODIUM SERPL-SCNC: 136 MMOL/L — SIGNIFICANT CHANGE UP (ref 135–145)
WBC # BLD: 5.5 K/UL — SIGNIFICANT CHANGE UP (ref 3.8–10.5)
WBC # FLD AUTO: 5.5 K/UL — SIGNIFICANT CHANGE UP (ref 3.8–10.5)

## 2024-08-26 PROCEDURE — 99232 SBSQ HOSP IP/OBS MODERATE 35: CPT | Mod: GC

## 2024-08-26 PROCEDURE — 99232 SBSQ HOSP IP/OBS MODERATE 35: CPT

## 2024-08-26 RX ORDER — GABAPENTIN 100 MG
800 CAPSULE ORAL
Refills: 0 | Status: DISCONTINUED | OUTPATIENT
Start: 2024-08-26 | End: 2024-09-07

## 2024-08-26 RX ORDER — GABAPENTIN 100 MG
900 CAPSULE ORAL AT BEDTIME
Refills: 0 | Status: DISCONTINUED | OUTPATIENT
Start: 2024-08-26 | End: 2024-09-07

## 2024-08-26 RX ADMIN — ACETAMINOPHEN 650 MILLIGRAM(S): 325 TABLET ORAL at 21:11

## 2024-08-26 RX ADMIN — ACETAMINOPHEN 650 MILLIGRAM(S): 325 TABLET ORAL at 06:52

## 2024-08-26 RX ADMIN — OXYCODONE HYDROCHLORIDE 10 MILLIGRAM(S): 5 TABLET ORAL at 20:00

## 2024-08-26 RX ADMIN — OXYCODONE HYDROCHLORIDE 10 MILLIGRAM(S): 5 TABLET ORAL at 19:31

## 2024-08-26 RX ADMIN — ENOXAPARIN SODIUM 40 MILLIGRAM(S): 100 INJECTION SUBCUTANEOUS at 21:10

## 2024-08-26 RX ADMIN — OXYCODONE HYDROCHLORIDE 5 MILLIGRAM(S): 5 TABLET ORAL at 12:29

## 2024-08-26 RX ADMIN — Medication 600 MILLIGRAM(S): at 06:18

## 2024-08-26 RX ADMIN — METHOCARBAMOL 750 MILLIGRAM(S): 750 TABLET, FILM COATED ORAL at 21:10

## 2024-08-26 RX ADMIN — ACETAMINOPHEN 650 MILLIGRAM(S): 325 TABLET ORAL at 22:00

## 2024-08-26 RX ADMIN — Medication 40 MILLIGRAM(S): at 21:11

## 2024-08-26 RX ADMIN — ACETAMINOPHEN 650 MILLIGRAM(S): 325 TABLET ORAL at 06:20

## 2024-08-26 RX ADMIN — METHOCARBAMOL 750 MILLIGRAM(S): 750 TABLET, FILM COATED ORAL at 12:29

## 2024-08-26 RX ADMIN — OXYCODONE HYDROCHLORIDE 5 MILLIGRAM(S): 5 TABLET ORAL at 13:15

## 2024-08-26 RX ADMIN — Medication 800 MILLIGRAM(S): at 14:10

## 2024-08-26 RX ADMIN — Medication 1 TABLET(S): at 12:25

## 2024-08-26 RX ADMIN — TAMSULOSIN HYDROCHLORIDE 0.4 MILLIGRAM(S): 0.4 CAPSULE ORAL at 21:11

## 2024-08-26 RX ADMIN — Medication 900 MILLIGRAM(S): at 21:10

## 2024-08-26 RX ADMIN — LOSARTAN POTASSIUM 100 MILLIGRAM(S): 50 TABLET ORAL at 06:18

## 2024-08-26 RX ADMIN — Medication 2 TABLET(S): at 21:12

## 2024-08-26 NOTE — PROGRESS NOTE ADULT - SUBJECTIVE AND OBJECTIVE BOX
I can not do that, since I have not seen her for this   I can give note only for 3 days     And will need apt HFU next week ok db antoinette HPI:  Mr. Dneis Toro is a 74-year-old male patient with past medical history of HTN, melanoma, and a spontaneous T12 intradural hematoma in 2021 s/p T10-L2 laminectomy who presented for elective treatment of cervical AVF w/ C2-5 lami and fistula ligation on 8/12/24. Intraop angiogram showed resolution of C3-4 dAVF w/ L vertebral artery dissection, then underwent formal angiogram the following day, which showed resolution of the fistula, and improving vertebral artery dissection. Post-operatively, patient had urinary retention. However, his Vazquez was removed and the patient voided on 8/19/24. PM&R was consulted and deemed him an appropriate candidate for IRF. He was cleared for discharge to [facility] on 8/19/24.  (19 Aug 2024 14:41)    TDD: 9/10 to Home  ___________________________________________________________________________    SUBJECTIVE/ROS  Patient was seen and evaluated at bedside today.  Reported further improvement in his LLE pain with less association with weakness. In no acute distress.  Still has burning pain down the left leg- will increase gabapentin- patient agreeable.   reports no side effects from gabapentin.  Patient is eager to continue participation on the recommended rehabilitation program.  Denies any CP, SOB, GAMA, palpitations, fever, chills, body aches, cough, congestion, or any other symptoms at this time.   ___________________________________________________________________________    MR SPINE LUMBAR WAW IC (08/23/2024)  IMPRESSION: No abnormal enhancement. Similar abnormal signal within the visualized lower thoracic cord. Similar arachnoid adhesions resulting in similar appearing loculated pockets of CSF within the ventral spinal canal from T11-T12 through L1-L2, and at L2-L3. Similar clumping/thickening of descending lumbosacral nerve roots at multiple levels. Multilevel degenerative changes detailed in the body the report, with significantly worse spinal canal stenosis at L2-L3, currently moderate to severe in degree. New mass effect upon the descending bilateral L3 nerve roots in the lateral recesses.     CT PELVIS ONLY (08/22/2024)  IMPRESSION: No acute fracture. Minimal degenerative changes of both hips. If there remains concern for iliac vein compression, follow-up evaluation with contrast administration is recommended for more definitive characterization.    ___________________________________________________________________________    Vital Signs Last 24 Hrs  T(C): 36.7 (08-26-24 @ 07:29), Max: 36.9 (08-25-24 @ 20:50)  T(F): 98 (08-26-24 @ 07:29), Max: 98.5 (08-25-24 @ 20:50)  HR: 89 (08-26-24 @ 07:29) (80 - 94)  BP: 131/86 (08-26-24 @ 07:29) (131/77 - 143/86)  ABP: --  ABP(mean): --  RR: 16 (08-26-24 @ 07:29) (16 - 18)  SpO2: 94% (08-26-24 @ 07:29) (93% - 94%)    ___________________________________________________________________________    LAB                        10.5   5.50  )-----------( 288      ( 26 Aug 2024 05:29 )             30.9     08-26    136  |  101  |  18  ----------------------------<  102<H>  3.8   |  27  |  0.71    Ca    8.8      26 Aug 2024 05:29    TPro  6.3  /  Alb  2.9<L>  /  TBili  1.0  /  DBili  x   /  AST  22  /  ALT  48<H>  /  AlkPhos  71  08-26    LIVER FUNCTIONS - ( 26 Aug 2024 05:29 )  Alb: 2.9 g/dL / Pro: 6.3 g/dL / ALK PHOS: 71 U/L / ALT: 48 U/L / AST: 22 U/L / GGT: x                 Urinalysis Basic - ( 26 Aug 2024 05:29 )    Color: x / Appearance: x / SG: x / pH: x  Gluc: 102 mg/dL / Ketone: x  / Bili: x / Urobili: x   Blood: x / Protein: x / Nitrite: x   Leuk Esterase: x / RBC: x / WBC x   Sq Epi: x / Non Sq Epi: x / Bacteria: x                  ___________________________________________________________________________    MEDICATIONS  (STANDING):  atorvastatin 40 milliGRAM(s) Oral at bedtime  diclofenac sodium 1% Gel 2 Gram(s) Topical two times a day  enoxaparin Injectable 40 milliGRAM(s) SubCutaneous <User Schedule>  gabapentin 900 milliGRAM(s) Oral at bedtime  gabapentin 800 milliGRAM(s) Oral <User Schedule>  lidocaine   4% Patch 1 Patch Transdermal every 24 hours  lidocaine   4% Patch 1 Patch Transdermal every 24 hours  losartan 100 milliGRAM(s) Oral daily  multivitamin 1 Tablet(s) Oral daily  polyethylene glycol 3350 17 Gram(s) Oral every 12 hours  senna 2 Tablet(s) Oral at bedtime  tamsulosin 0.4 milliGRAM(s) Oral at bedtime    MEDICATIONS  (PRN):  acetaminophen     Tablet .. 650 milliGRAM(s) Oral every 6 hours PRN Mild Pain (1 - 3)  methocarbamol 750 milliGRAM(s) Oral three times a day PRN Muscle Spasm  oxyCODONE    IR 5 milliGRAM(s) Oral every 6 hours PRN Moderate Pain (4 - 6)  oxyCODONE    IR 10 milliGRAM(s) Oral every 6 hours PRN Severe Pain (7 - 10)      ___________________________________________________________________________    PHYSICAL EXAM:    Gen - NAD, Comfortable  HEENT - NCAT, EOMI, MMM  Neck - Supple, Limited ROM   Pulm - CTAB, No wheeze, No rhonchi, No crackles  Cardiovascular - RRR, S1S2, No m/r/g  Abdomen - Soft, NT/ND, +BS  Extremities - No C/C/E, No calf tenderness  Neuro-     Cognitive - AAOx3     Motor -                    LEFT    LE - HF 4/5, KE 5/5, DF 5/5, PF 5/5                    RIGHT LE - HF 4/5, KE 4/5, DF 4/5, PF 5/5     Sensory - BL LE numbness and tingling (R>L)   Psychiatric - Mood stable, Affect WNL  Skin: Intact    ___________________________________________________________________________

## 2024-08-26 NOTE — PROGRESS NOTE ADULT - ASSESSMENT
75 y/o M with PMH HTN, melanoma, and a spontaneous T12 intradural hematoma in 2021 s/p T10-L2 laminectomy who is admitted for Acute Inpatient Rehabilitation with a multidisciplinary rehab program at [facility] with functional impairments in ADLs and mobility secondary to undergoing elective surgery for cervical AVF w/ C2-5 lami and fistula ligation on 8/12/24.     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 New mass effect upon the descending bilateral L3 nerve roots in the lateral recesses  -Spine surgeon recc conservative management per rehab    #HTN  -Continue Losartan 100mg daily    #Normocytic Anemia  -Likely postoperative blood loss  -Stable H/H  -Monitor and transfuse if Hb<7    #DVT prophylaxis  -Lovenox

## 2024-08-26 NOTE — PROGRESS NOTE ADULT - ASSESSMENT
Mr. Denis Toro is a 74-year-old male patient with past medical history of HTN, melanoma, and a spontaneous T12 intradural hematoma in 2021 s/p T10-L2 laminectomy who is admitted for Acute Inpatient Rehabilitation with a multidisciplinary rehab program at [facility] with functional impairments in ADLs and mobility secondary to undergoing elective surgery for cervical AVF w/ C2-5 lami and fistula ligation on 8/12/24.       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  - S/P laminectomy for spinal cord AVM excision with ligation (8/12/24)      * Start Aspirin 81 mg daily on 8/26/24      * Vertebral artery dissection      * Post-operative urinary retention  - Vertebral Artery Dissection   - Bilateral lower extremity weakness  - Bilateral lower extremity sensory deficits  - Impaired ADLs and mobility  - Need for assistance with personal care  - Comprehensive Multidisciplinary Rehab Program:      * PT/OT/ SLP 3 hours a day 5 days a week  - new LLE pain  - CT pelvis negative for acute pathology/venous compression  - MRI L spine pending    #Pain:  - PRN Scale: Tylenol, Oxycodone 5 mg q6h, Oxycodone 10 mg q6h   - PRN Robaxin 750 mg TID  - Increased gabapentin to 800 BID in day, 900 at night  - Lidocaine patch    #GI/Bowel:  - Senna 2 tabs daily  - Miralax q12h     #/Bladder:  - Per chart review, Vazquez removed today and patient voided 150 cc w/  cc   - Monitor PVR if no void in 8h; SC for >400 cc    #Diet / Dysphagia:    - Diet: Regular/DASH   - Ongoing SLP assessment  - Nutrition to follow    #Skin/ Pressure Injury Prevention:  - Assessment on admission: Surgical wound JUAN ALBERTO, staples intact    #DVT prophylaxis:  - Lovenox     #Precautions/ Restrictions  - Falls, Spine    --------------------------------------------  Outpatient Follow up:  [facility]  Neurosurgery  [address] [place] [id number]  Phone: [phone]  Fax: [phone]  Follow Up Time: 1 week- rescheduled to 9/19    --------------------------------------------

## 2024-08-26 NOTE — PROGRESS NOTE ADULT - SUBJECTIVE AND OBJECTIVE BOX
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 NAD. denies acute medical complaints.     ALLERGIES:  Celebrex (Other)  Shrimp (Angioedema)    MEDICATIONS  (STANDING):  atorvastatin 40 milliGRAM(s) Oral at bedtime  diclofenac sodium 1% Gel 2 Gram(s) Topical two times a day  enoxaparin Injectable 40 milliGRAM(s) SubCutaneous <User Schedule>  gabapentin 900 milliGRAM(s) Oral at bedtime  gabapentin 800 milliGRAM(s) Oral <User Schedule>  lidocaine   4% Patch 1 Patch Transdermal every 24 hours  lidocaine   4% Patch 1 Patch Transdermal every 24 hours  losartan 100 milliGRAM(s) Oral daily  multivitamin 1 Tablet(s) Oral daily  polyethylene glycol 3350 17 Gram(s) Oral every 12 hours  senna 2 Tablet(s) Oral at bedtime  tamsulosin 0.4 milliGRAM(s) Oral at bedtime    MEDICATIONS  (PRN):  acetaminophen     Tablet .. 650 milliGRAM(s) Oral every 6 hours PRN Mild Pain (1 - 3)  methocarbamol 750 milliGRAM(s) Oral three times a day PRN Muscle Spasm  oxyCODONE    IR 5 milliGRAM(s) Oral every 6 hours PRN Moderate Pain (4 - 6)  oxyCODONE    IR 10 milliGRAM(s) Oral every 6 hours PRN Severe Pain (7 - 10)    Vital Signs Last 24 Hrs  T(F): 98 (26 Aug 2024 07:29), Max: 98.5 (25 Aug 2024 20:50)  HR: 89 (26 Aug 2024 07:29) (80 - 94)  BP: 131/86 (26 Aug 2024 07:29) (131/77 - 143/86)  RR: 16 (26 Aug 2024 07:29) (16 - 18)  SpO2: 94% (26 Aug 2024 07:29) (93% - 94%)  I&O's Summary    BMI (kg/m2): 25.3 (08-25-24 @ 09:42)    PHYSICAL EXAM:  General: NAD  ENT: MMM, no scleral icterus  Neck: Supple, No JVD  Lungs: Clear to auscultation bilaterally, no wheezes, rales, rhonchi  Cardio: RRR, S1/S2  Abdomen: Soft, Nontender, Nondistended; Bowel sounds present  Extremities: No calf tenderness, No pitting edema    LABS:                        10.5   5.50  )-----------( 288      ( 26 Aug 2024 05:29 )             30.9       08-26    136  |  101  |  18  ----------------------------<  102  3.8   |  27  |  0.71    Ca    8.8      26 Aug 2024 05:29    TPro  6.3  /  Alb  2.9  /  TBili  1.0  /  DBili  x   /  AST  22  /  ALT  48  /  AlkPhos  71  08-26                                  Urinalysis Basic - ( 26 Aug 2024 05:29 )    Color: x / Appearance: x / SG: x / pH: x  Gluc: 102 mg/dL / Ketone: x  / Bili: x / Urobili: x   Blood: x / Protein: x / Nitrite: x   Leuk Esterase: x / RBC: x / WBC x   Sq Epi: x / Non Sq Epi: x / Bacteria: x        COVID-19 PCR: NotDetec (08-19-24 @ 19:48)      RADIOLOGY & ADDITIONAL TESTS:    Care Discussed with Consultants/Other Providers: yes, rehab

## 2024-08-26 NOTE — PROGRESS NOTE ADULT - ATTENDING COMMENTS
Rehab Attending- Patient seen and examined by me with- Case discussed, above note reviewed by me with modifications made    patient seen and evaluated bedside  LE numbness persists- burning pain less since surgery-  gabapentin increased  motor strength LLE improved  labs reviewed by me- stable   voiding using [place]- no control  senses BMS- going in bathroom- good formed  to continue intensive rehab program

## 2024-08-27 PROCEDURE — 99232 SBSQ HOSP IP/OBS MODERATE 35: CPT

## 2024-08-27 PROCEDURE — 99232 SBSQ HOSP IP/OBS MODERATE 35: CPT | Mod: GC

## 2024-08-27 RX ADMIN — TAMSULOSIN HYDROCHLORIDE 0.4 MILLIGRAM(S): 0.4 CAPSULE ORAL at 21:10

## 2024-08-27 RX ADMIN — METHOCARBAMOL 750 MILLIGRAM(S): 750 TABLET, FILM COATED ORAL at 21:10

## 2024-08-27 RX ADMIN — Medication 2 TABLET(S): at 21:10

## 2024-08-27 RX ADMIN — Medication 800 MILLIGRAM(S): at 06:09

## 2024-08-27 RX ADMIN — OXYCODONE HYDROCHLORIDE 5 MILLIGRAM(S): 5 TABLET ORAL at 21:39

## 2024-08-27 RX ADMIN — METHOCARBAMOL 750 MILLIGRAM(S): 750 TABLET, FILM COATED ORAL at 11:54

## 2024-08-27 RX ADMIN — Medication 1 TABLET(S): at 11:54

## 2024-08-27 RX ADMIN — ENOXAPARIN SODIUM 40 MILLIGRAM(S): 100 INJECTION SUBCUTANEOUS at 21:09

## 2024-08-27 RX ADMIN — ACETAMINOPHEN 650 MILLIGRAM(S): 325 TABLET ORAL at 08:15

## 2024-08-27 RX ADMIN — Medication 900 MILLIGRAM(S): at 21:09

## 2024-08-27 RX ADMIN — Medication 40 MILLIGRAM(S): at 21:10

## 2024-08-27 RX ADMIN — ACETAMINOPHEN 650 MILLIGRAM(S): 325 TABLET ORAL at 07:53

## 2024-08-27 RX ADMIN — LOSARTAN POTASSIUM 100 MILLIGRAM(S): 50 TABLET ORAL at 06:09

## 2024-08-27 RX ADMIN — OXYCODONE HYDROCHLORIDE 5 MILLIGRAM(S): 5 TABLET ORAL at 11:53

## 2024-08-27 RX ADMIN — OXYCODONE HYDROCHLORIDE 5 MILLIGRAM(S): 5 TABLET ORAL at 12:20

## 2024-08-27 RX ADMIN — OXYCODONE HYDROCHLORIDE 5 MILLIGRAM(S): 5 TABLET ORAL at 21:09

## 2024-08-27 RX ADMIN — Medication 800 MILLIGRAM(S): at 14:40

## 2024-08-27 RX ADMIN — ACETAMINOPHEN 650 MILLIGRAM(S): 325 TABLET ORAL at 17:30

## 2024-08-27 RX ADMIN — ACETAMINOPHEN 650 MILLIGRAM(S): 325 TABLET ORAL at 17:51

## 2024-08-27 NOTE — PROGRESS NOTE ADULT - SUBJECTIVE AND OBJECTIVE BOX
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 no fever, chills, sob, cp, abd pain. reports L lateral thigh tingling sensation improving. overall, pain is improving and well controlled. + BM    ALLERGIES:  Celebrex (Other)  Shrimp (Angioedema)    MEDICATIONS  (STANDING):  atorvastatin 40 milliGRAM(s) Oral at bedtime  diclofenac sodium 1% Gel 2 Gram(s) Topical two times a day  enoxaparin Injectable 40 milliGRAM(s) SubCutaneous <User Schedule>  gabapentin 800 milliGRAM(s) Oral <User Schedule>  gabapentin 900 milliGRAM(s) Oral at bedtime  lidocaine   4% Patch 1 Patch Transdermal every 24 hours  lidocaine   4% Patch 1 Patch Transdermal every 24 hours  losartan 100 milliGRAM(s) Oral daily  multivitamin 1 Tablet(s) Oral daily  polyethylene glycol 3350 17 Gram(s) Oral every 12 hours  senna 2 Tablet(s) Oral at bedtime  tamsulosin 0.4 milliGRAM(s) Oral at bedtime    MEDICATIONS  (PRN):  acetaminophen     Tablet .. 650 milliGRAM(s) Oral every 6 hours PRN Mild Pain (1 - 3)  methocarbamol 750 milliGRAM(s) Oral three times a day PRN Muscle Spasm  oxyCODONE    IR 10 milliGRAM(s) Oral every 6 hours PRN Severe Pain (7 - 10)  oxyCODONE    IR 5 milliGRAM(s) Oral every 6 hours PRN Moderate Pain (4 - 6)    Vital Signs Last 24 Hrs  T(F): 98.6 (27 Aug 2024 07:43), Max: 98.6 (27 Aug 2024 07:43)  HR: 75 (27 Aug 2024 07:43) (75 - 87)  BP: 143/81 (27 Aug 2024 07:43) (134/66 - 144/94)  RR: 15 (27 Aug 2024 07:43) (15 - 16)  SpO2: 97% (27 Aug 2024 07:43) (97% - 97%)  I&O's Summary    PHYSICAL EXAM:  General: NAD, A/O x 3  ENT: MMM  Neck: Supple, No JVD  Lungs: Clear to auscultation bilaterally  Cardio: RRR, S1/S2, No murmurs  Abdomen: Soft, Nontender, Nondistended; Bowel sounds present  Extremities: No calf tenderness, No pitting edema    LABS:                        10.5   5.50  )-----------( 288      ( 26 Aug 2024 05:29 )             30.9     08-26    136  |  101  |  18  ----------------------------<  102  3.8   |  27  |  0.71    Ca    8.8      26 Aug 2024 05:29    TPro  6.3  /  Alb  2.9  /  TBili  1.0  /  DBili  x   /  AST  22  /  ALT  48  /  AlkPhos  71  08-26          Urinalysis Basic - ( 26 Aug 2024 05:29 )    Color: x / Appearance: x / SG: x / pH: x  Gluc: 102 mg/dL / Ketone: x  / Bili: x / Urobili: x   Blood: x / Protein: x / Nitrite: x   Leuk Esterase: x / RBC: x / WBC x   Sq Epi: x / Non Sq Epi: x / Bacteria: x        COVID-19 PCR: NotDetec (08-19-24 @ 19:48)      RADIOLOGY & ADDITIONAL TESTS:    Care Discussed with Consultants/Other Providers:

## 2024-08-27 NOTE — PROGRESS NOTE ADULT - SUBJECTIVE AND OBJECTIVE BOX
HPI:  Mr. Denis Toro is a 74-year-old male patient with past medical history of HTN, melanoma, and a spontaneous T12 intradural hematoma in 2021 s/p T10-L2 laminectomy who presented for elective treatment of cervical AVF w/ C2-5 lami and fistula ligation on 8/12/24. Intraop angiogram showed resolution of C3-4 dAVF w/ L vertebral artery dissection, then underwent formal angiogram the following day, which showed resolution of the fistula, and improving vertebral artery dissection. Post-operatively, patient had urinary retention. However, his Vazquez was removed and the patient voided on 8/19/24. PM&R was consulted and deemed him an appropriate candidate for IRF. He was cleared for discharge to [facility] on 8/19/24.  (19 Aug 2024 14:41)    TDD: 9/10 to Home  ___________________________________________________________________________    SUBJECTIVE/ROS  Patient was seen and evaluated at therapy today.  Reported further improvement in his LLE pain with less association with weakness. In no acute distress.  Patient reports that he is still having neuropathic pain but is improved on his increased dose of gabapentin. Will continue to monitor.   Talked to patient about some exercises he can do while in bed, such as isometric quad contractions.   Patient is eager to continue participation on the recommended rehabilitation program.  Denies any CP, SOB, GAMA, palpitations, fever, chills, body aches, cough, congestion, or any other symptoms at this time.   ___________________________________________________________________________    MR SPINE LUMBAR WAW IC (08/23/2024)  IMPRESSION: No abnormal enhancement. Similar abnormal signal within the visualized lower thoracic cord. Similar arachnoid adhesions resulting in similar appearing loculated pockets of CSF within the ventral spinal canal from T11-T12 through L1-L2, and at L2-L3. Similar clumping/thickening of descending lumbosacral nerve roots at multiple levels. Multilevel degenerative changes detailed in the body the report, with significantly worse spinal canal stenosis at L2-L3, currently moderate to severe in degree. New mass effect upon the descending bilateral L3 nerve roots in the lateral recesses.     CT PELVIS ONLY (08/22/2024)  IMPRESSION: No acute fracture. Minimal degenerative changes of both hips. If there remains concern for iliac vein compression, follow-up evaluation with contrast administration is recommended for more definitive characterization.    ___________________________________________________________________________    Vital Signs Last 24 Hrs  T(C): 37 (08-27-24 @ 07:43), Max: 37 (08-27-24 @ 07:43)  T(F): 98.6 (08-27-24 @ 07:43), Max: 98.6 (08-27-24 @ 07:43)  HR: 75 (08-27-24 @ 07:43) (75 - 87)  BP: 143/81 (08-27-24 @ 07:43) (134/66 - 144/94)  ABP: --  ABP(mean): --  RR: 15 (08-27-24 @ 07:43) (15 - 16)  SpO2: 97% (08-27-24 @ 07:43) (97% - 97%)    ___________________________________________________________________________    LAB                        10.5   5.50  )-----------( 288      ( 26 Aug 2024 05:29 )             30.9     08-26    136  |  101  |  18  ----------------------------<  102<H>  3.8   |  27  |  0.71    Ca    8.8      26 Aug 2024 05:29    TPro  6.3  /  Alb  2.9<L>  /  TBili  1.0  /  DBili  x   /  AST  22  /  ALT  48<H>  /  AlkPhos  71  08-26    LIVER FUNCTIONS - ( 26 Aug 2024 05:29 )  Alb: 2.9 g/dL / Pro: 6.3 g/dL / ALK PHOS: 71 U/L / ALT: 48 U/L / AST: 22 U/L / GGT: x                 Urinalysis Basic - ( 26 Aug 2024 05:29 )    Color: x / Appearance: x / SG: x / pH: x  Gluc: 102 mg/dL / Ketone: x  / Bili: x / Urobili: x   Blood: x / Protein: x / Nitrite: x   Leuk Esterase: x / RBC: x / WBC x   Sq Epi: x / Non Sq Epi: x / Bacteria: x                      ___________________________________________________________________________    MEDICATIONS  (STANDING):  atorvastatin 40 milliGRAM(s) Oral at bedtime  diclofenac sodium 1% Gel 2 Gram(s) Topical two times a day  enoxaparin Injectable 40 milliGRAM(s) SubCutaneous <User Schedule>  gabapentin 800 milliGRAM(s) Oral <User Schedule>  gabapentin 900 milliGRAM(s) Oral at bedtime  lidocaine   4% Patch 1 Patch Transdermal every 24 hours  lidocaine   4% Patch 1 Patch Transdermal every 24 hours  losartan 100 milliGRAM(s) Oral daily  multivitamin 1 Tablet(s) Oral daily  polyethylene glycol 3350 17 Gram(s) Oral every 12 hours  senna 2 Tablet(s) Oral at bedtime  tamsulosin 0.4 milliGRAM(s) Oral at bedtime    MEDICATIONS  (PRN):  acetaminophen     Tablet .. 650 milliGRAM(s) Oral every 6 hours PRN Mild Pain (1 - 3)  methocarbamol 750 milliGRAM(s) Oral three times a day PRN Muscle Spasm  oxyCODONE    IR 10 milliGRAM(s) Oral every 6 hours PRN Severe Pain (7 - 10)  oxyCODONE    IR 5 milliGRAM(s) Oral every 6 hours PRN Moderate Pain (4 - 6)      ___________________________________________________________________________    PHYSICAL EXAM:    Gen - NAD, Comfortable  HEENT - NCAT, EOMI, MMM  Neck - Supple, Limited ROM   Pulm - CTAB, No wheeze, No rhonchi, No crackles  Cardiovascular - RRR, S1S2, No m/r/g  Abdomen - Soft, NT/ND  Extremities - No C/C/E  Neuro-     Cognitive - AAOx3     Sensory - BL LE numbness and tingling (R>L)      Gait- instability noted as he is slightly leaning to the right, knees hyperextended during gait  Psychiatric - Mood stable, Affect WNL  Skin: Intact    ___________________________________________________________________________

## 2024-08-27 NOTE — PROGRESS NOTE ADULT - ASSESSMENT
75 y/o M with PMH HTN, melanoma, and a spontaneous T12 intradural hematoma in 2021 s/p T10-L2 laminectomy who is admitted for Acute Inpatient Rehabilitation with a multidisciplinary rehab program at [facility] with functional impairments in ADLs and mobility secondary to undergoing elective surgery for cervical AVF w/ C2-5 lami and fistula ligation on 8/12/24.     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 New mass effect upon the descending bilateral L3 nerve roots in the lateral recesses  -Spine surgeon recc conservative management per rehab    #HTN  -Continue Losartan 100mg daily  -BP acceptable     #Normocytic Anemia  -Likely postoperative blood loss  -Stable H/H  -Monitor and transfuse if Hb<7    #DVT prophylaxis  -Lovenox

## 2024-08-27 NOTE — PROGRESS NOTE ADULT - ASSESSMENT
Mr. Denis Toro is a 74-year-old male patient with past medical history of HTN, melanoma, and a spontaneous T12 intradural hematoma in 2021 s/p T10-L2 laminectomy who is admitted for Acute Inpatient Rehabilitation with a multidisciplinary rehab program at [facility] with functional impairments in ADLs and mobility secondary to undergoing elective surgery for cervical AVF w/ C2-5 lami and fistula ligation on 8/12/24.       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  - S/P laminectomy for spinal cord AVM excision with ligation (8/12/24)      * Start Aspirin 81 mg daily on 8/26/24      * Vertebral artery dissection      * Post-operative urinary retention  - Vertebral Artery Dissection   - Bilateral lower extremity weakness  - Bilateral lower extremity sensory deficits  - Impaired ADLs and mobility  - Need for assistance with personal care  - Comprehensive Multidisciplinary Rehab Program:      * PT/OT/ SLP 3 hours a day 5 days a week  - new LLE pain  - CT pelvis negative for acute pathology/venous compression  - MRI L spine pending    #Pain:  - PRN Scale: Tylenol, Oxycodone 5 mg q6h, Oxycodone 10 mg q6h   - PRN Robaxin 750 mg TID  - Increased gabapentin to 800 BID in day, 900 at night  - Lidocaine patch    #GI/Bowel:  - Senna 2 tabs daily  - Miralax q12h     #/Bladder:  - Per chart review, Vazquez removed today and patient voided 150 cc w/  cc   - Monitor PVR if no void in 8h; SC for >400 cc    #Diet / Dysphagia:    - Diet: Regular/DASH   - Ongoing SLP assessment  - Nutrition to follow    #Skin/ Pressure Injury Prevention:  - Assessment on admission: Surgical wound JUAN ALBERTO, staples intact    #DVT prophylaxis:  - Lovenox     #Precautions/ Restrictions  - Falls, Spine    --------------------------------------------  Outpatient Follow up:  Jose Martin Jewell  Neurosurgery  [address] [place] [id number]  Phone: [phone]  Fax: [phone]  Follow Up Time: 1 week- rescheduled to 9/19    -------------------------------------------- Mr. Denis Toro is a 74-year-old male patient with past medical history of HTN, melanoma, and a spontaneous T12 intradural hematoma in 2021 s/p T10-L2 laminectomy who is admitted for Acute Inpatient Rehabilitation with a multidisciplinary rehab program at [facility] with functional impairments in ADLs and mobility secondary to undergoing elective surgery for cervical AVF w/ C2-5 lami and fistula ligation on 8/12/24.       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  - S/P laminectomy for spinal cord AVM excision with ligation (8/12/24)      * Start Aspirin 81 mg daily on 8/26/24      * Vertebral artery dissection      * Post-operative urinary retention  - Vertebral Artery Dissection   - Bilateral lower extremity weakness  - Bilateral lower extremity sensory deficits  - Impaired ADLs and mobility  - Need for assistance with personal care  - Comprehensive Multidisciplinary Rehab Program:      * PT/OT/ SLP 3 hours a day 5 days a week  - new LLE pain  - CT pelvis negative for acute pathology/venous compression  - MRI L spine  with mod-severe spinal stenosis, bilateral L3 nerve root compression     #Pain:  - PRN Scale: Tylenol, Oxycodone 5 mg q6h, Oxycodone 10 mg q6h   - PRN Robaxin 750 mg TID  - Increased gabapentin to 800 BID in day, 900 at night- helping   - Lidocaine patch    #GI/Bowel:  - Senna 2 tabs daily  - Miralax q12h     #/Bladder:  - Per chart review, Vazquez removed   - Monitor PVR if no void in 8h; SC for >400 cc    #Diet / Dysphagia:    - Diet: Regular/DASH   - Ongoing SLP assessment  - Nutrition to follow    #Skin/ Pressure Injury Prevention:  - Assessment on admission: Surgical wound JUAN ALBERTO, staples intact    #DVT prophylaxis:  - Lovenox     #Precautions/ Restrictions  - Falls, Spine    --------------------------------------------  Outpatient Follow up:  Jose Martin Jewell  Neurosurgery  [address] [place] [address]  Phone: [phone]  Fax: [phone]  Follow Up Time: 1 week- rescheduled to 9/19    --------------------------------------------

## 2024-08-28 PROCEDURE — 99232 SBSQ HOSP IP/OBS MODERATE 35: CPT | Mod: GC

## 2024-08-28 PROCEDURE — 99232 SBSQ HOSP IP/OBS MODERATE 35: CPT

## 2024-08-28 RX ORDER — OXYCODONE HYDROCHLORIDE 5 MG/1
10 TABLET ORAL EVERY 6 HOURS
Refills: 0 | Status: DISCONTINUED | OUTPATIENT
Start: 2024-08-28 | End: 2024-09-04

## 2024-08-28 RX ORDER — OXYCODONE HYDROCHLORIDE 5 MG/1
5 TABLET ORAL EVERY 6 HOURS
Refills: 0 | Status: DISCONTINUED | OUTPATIENT
Start: 2024-08-28 | End: 2024-09-04

## 2024-08-28 RX ADMIN — TAMSULOSIN HYDROCHLORIDE 0.4 MILLIGRAM(S): 0.4 CAPSULE ORAL at 21:36

## 2024-08-28 RX ADMIN — Medication 800 MILLIGRAM(S): at 06:09

## 2024-08-28 RX ADMIN — OXYCODONE HYDROCHLORIDE 5 MILLIGRAM(S): 5 TABLET ORAL at 11:01

## 2024-08-28 RX ADMIN — METHOCARBAMOL 750 MILLIGRAM(S): 750 TABLET, FILM COATED ORAL at 21:34

## 2024-08-28 RX ADMIN — Medication 1 TABLET(S): at 11:52

## 2024-08-28 RX ADMIN — Medication 800 MILLIGRAM(S): at 14:11

## 2024-08-28 RX ADMIN — Medication 40 MILLIGRAM(S): at 21:35

## 2024-08-28 RX ADMIN — OXYCODONE HYDROCHLORIDE 5 MILLIGRAM(S): 5 TABLET ORAL at 10:24

## 2024-08-28 RX ADMIN — ACETAMINOPHEN 650 MILLIGRAM(S): 325 TABLET ORAL at 22:30

## 2024-08-28 RX ADMIN — Medication 2 TABLET(S): at 21:34

## 2024-08-28 RX ADMIN — ACETAMINOPHEN 650 MILLIGRAM(S): 325 TABLET ORAL at 21:35

## 2024-08-28 RX ADMIN — METHOCARBAMOL 750 MILLIGRAM(S): 750 TABLET, FILM COATED ORAL at 10:24

## 2024-08-28 RX ADMIN — ENOXAPARIN SODIUM 40 MILLIGRAM(S): 100 INJECTION SUBCUTANEOUS at 21:34

## 2024-08-28 RX ADMIN — LOSARTAN POTASSIUM 100 MILLIGRAM(S): 50 TABLET ORAL at 06:09

## 2024-08-28 RX ADMIN — Medication 900 MILLIGRAM(S): at 21:36

## 2024-08-28 NOTE — PROGRESS NOTE ADULT - ASSESSMENT
73 y/o M with PMH HTN, melanoma, and a spontaneous T12 intradural hematoma in 2021 s/p T10-L2 laminectomy who is admitted for Acute Inpatient Rehabilitation with a multidisciplinary rehab program at [facility] with functional impairments in ADLs and mobility secondary to undergoing elective surgery for cervical AVF w/ C2-5 lami and fistula ligation on 8/12/24.     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 New mass effect upon the descending bilateral L3 nerve roots in the lateral recesses  -Spine surgeon recc conservative management per rehab    #HTN  -Continue Losartan 100mg daily  -BP controlled     #Normocytic Anemia  -Likely postoperative blood loss  -Stable H/H  -Monitor and transfuse if Hb<7    #DVT prophylaxis  -Lovenox

## 2024-08-28 NOTE — PROGRESS NOTE ADULT - SUBJECTIVE AND OBJECTIVE BOX
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 left thigh pain is mild and improving. surgical pain in posterior neck well controlled. no headache, dizziness. + BM yesterday. no fever, chills, sob, cp, abd pain.     ALLERGIES:  Celebrex (Other)  Shrimp (Angioedema)    MEDICATIONS  (STANDING):  atorvastatin 40 milliGRAM(s) Oral at bedtime  diclofenac sodium 1% Gel 2 Gram(s) Topical two times a day  enoxaparin Injectable 40 milliGRAM(s) SubCutaneous <User Schedule>  gabapentin 900 milliGRAM(s) Oral at bedtime  gabapentin 800 milliGRAM(s) Oral <User Schedule>  lidocaine   4% Patch 1 Patch Transdermal every 24 hours  lidocaine   4% Patch 1 Patch Transdermal every 24 hours  losartan 100 milliGRAM(s) Oral daily  multivitamin 1 Tablet(s) Oral daily  polyethylene glycol 3350 17 Gram(s) Oral every 12 hours  senna 2 Tablet(s) Oral at bedtime  tamsulosin 0.4 milliGRAM(s) Oral at bedtime    MEDICATIONS  (PRN):  acetaminophen     Tablet .. 650 milliGRAM(s) Oral every 6 hours PRN Mild Pain (1 - 3)  methocarbamol 750 milliGRAM(s) Oral three times a day PRN Muscle Spasm  oxyCODONE    IR 10 milliGRAM(s) Oral every 6 hours PRN Severe Pain (7 - 10)  oxyCODONE    IR 5 milliGRAM(s) Oral every 6 hours PRN Moderate Pain (4 - 6)    Vital Signs Last 24 Hrs  T(F): 98 (28 Aug 2024 07:28), Max: 98 (27 Aug 2024 19:28)  HR: 83 (28 Aug 2024 07:28) (76 - 83)  BP: 124/83 (28 Aug 2024 07:28) (124/83 - 138/81)  RR: 15 (28 Aug 2024 07:28) (15 - 16)  SpO2: 97% (28 Aug 2024 07:28) (96% - 97%)  I&O's Summary    PHYSICAL EXAM:  General: NAD, A/O x 3  ENT: MMM  Neck: Supple, No JVD  Lungs: Clear to auscultation bilaterally  Cardio: RRR, S1/S2, No murmurs  Abdomen: Soft, Nontender, Nondistended; Bowel sounds present  Extremities: No calf tenderness, No pitting edema    LABS:                        10.5   5.50  )-----------( 288      ( 26 Aug 2024 05:29 )             30.9     08-26    136  |  101  |  18  ----------------------------<  102  3.8   |  27  |  0.71    Ca    8.8      26 Aug 2024 05:29    TPro  6.3  /  Alb  2.9  /  TBili  1.0  /  DBili  x   /  AST  22  /  ALT  48  /  AlkPhos  71  08-26            Urinalysis Basic - ( 26 Aug 2024 05:29 )    Color: x / Appearance: x / SG: x / pH: x  Gluc: 102 mg/dL / Ketone: x  / Bili: x / Urobili: x   Blood: x / Protein: x / Nitrite: x   Leuk Esterase: x / RBC: x / WBC x   Sq Epi: x / Non Sq Epi: x / Bacteria: x        COVID-19 PCR: NotDetec (08-19-24 @ 19:48)      RADIOLOGY & ADDITIONAL TESTS:    Care Discussed with Consultants/Other Providers:

## 2024-08-28 NOTE — PROGRESS NOTE ADULT - ASSESSMENT
Mr. Denis Toro is a 74-year-old male patient with past medical history of HTN, melanoma, and a spontaneous T12 intradural hematoma in 2021 s/p T10-L2 laminectomy who is admitted for Acute Inpatient Rehabilitation with a multidisciplinary rehab program at [facility] with functional impairments in ADLs and mobility secondary to undergoing elective surgery for cervical AVF w/ C2-5 lami and fistula ligation on 8/12/24.       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  - S/P laminectomy for spinal cord AVM excision with ligation (8/12/24)      * Start Aspirin 81 mg daily on 8/26/24      * Vertebral artery dissection      * Post-operative urinary retention  - Vertebral Artery Dissection   - Bilateral lower extremity weakness  - Bilateral lower extremity sensory deficits  - Impaired ADLs and mobility  - Need for assistance with personal care  - Comprehensive Multidisciplinary Rehab Program:      * PT/OT/ SLP 3 hours a day 5 days a week  - new LLE pain  - CT pelvis negative for acute pathology/venous compression  - MRI L spine  with mod-severe spinal stenosis, bilateral L3 nerve root compression     #Pain:  - PRN Scale: Tylenol, Oxycodone 5 mg q6h, Oxycodone 10 mg q6h   - PRN Robaxin 750 mg TID  - Increased gabapentin to 800 BID in day, 900 at night- helping   - Lidocaine patch    #GI/Bowel:  - Senna 2 tabs daily  - Miralax q12h     #/Bladder:  - Per chart review, Vazquez removed   - Monitor PVR if no void in 8h; SC for >400 cc  [place] HS, voiding in diaper during the day  needs toileting schedule    #Diet / Dysphagia:    - Diet: Regular/DASH   - Ongoing SLP assessment  - Nutrition to follow    #Skin/ Pressure Injury Prevention:  - Assessment on admission: Surgical wound JUAN ALBERTO, staples intact    #DVT prophylaxis:  - Lovenox     #Precautions/ Restrictions  - Falls, Spine    --------------------------------------------  Outpatient Follow up:  Jose Martin Jewell  Neurosurgery  [address] [place] [address]  Phone: [phone]  Fax: [phone]  Follow Up Time: 1 week- rescheduled to 9/19    --------------------------------------------

## 2024-08-28 NOTE — PROGRESS NOTE ADULT - SUBJECTIVE AND OBJECTIVE BOX
HPI:  Mr. Denis Toro is a 74-year-old male patient with past medical history of HTN, melanoma, and a spontaneous T12 intradural hematoma in 2021 s/p T10-L2 laminectomy who presented for elective treatment of cervical AVF w/ C2-5 lami and fistula ligation on 8/12/24. Intraop angiogram showed resolution of C3-4 dAVF w/ L vertebral artery dissection, then underwent formal angiogram the following day, which showed resolution of the fistula, and improving vertebral artery dissection. Post-operatively, patient had urinary retention. However, his Vazquez was removed and the patient voided on 8/19/24. PM&R was consulted and deemed him an appropriate candidate for IRF. He was cleared for discharge to [facility] on 8/19/24.  (19 Aug 2024 14:41)    TDD: 9/10 to Home  ___________________________________________________________________________    SUBJECTIVE/ROS  Patient was seen and evaluated bedside.  Reported further improvement in his LLE pain with less association with weakness. In no acute distress.  Sleeping well on increased dose of gabapentin   moving bowels - senses BMs but minimal time to bathroom- difficulty holding BMs  Still voiding without sensing void- uses condom cath at night  Patient is eager to continue participation on the recommended rehabilitation program.  Denies any CP, SOB, GAMA, palpitations, fever, chills, body aches, cough, congestion, or any other symptoms at this time.   ___________________________________________________________________________    MR SPINE LUMBAR WAW IC (08/23/2024)  IMPRESSION: No abnormal enhancement. Similar abnormal signal within the visualized lower thoracic cord. Similar arachnoid adhesions resulting in similar appearing loculated pockets of CSF within the ventral spinal canal from T11-T12 through L1-L2, and at L2-L3. Similar clumping/thickening of descending lumbosacral nerve roots at multiple levels. Multilevel degenerative changes detailed in the body the report, with significantly worse spinal canal stenosis at L2-L3, currently moderate to severe in degree. New mass effect upon the descending bilateral L3 nerve roots in the lateral recesses.     CT PELVIS ONLY (08/22/2024)  IMPRESSION: No acute fracture. Minimal degenerative changes of both hips. If there remains concern for iliac vein compression, follow-up evaluation with contrast administration is recommended for more definitive characterization.    ___________________________________________________________________________    Vital Signs Last 24 Hrs  T(C): 36.7 (28 Aug 2024 07:28), Max: 36.7 (28 Aug 2024 07:28)  T(F): 98 (28 Aug 2024 07:28), Max: 98 (28 Aug 2024 07:28)  HR: 83 (28 Aug 2024 07:28) (81 - 83)  BP: 124/83 (28 Aug 2024 07:28) (124/83 - 138/81)  BP(mean): --  RR: 15 (28 Aug 2024 07:28) (15 - 15)  SpO2: 97% (28 Aug 2024 07:28) (97% - 97%)    Parameters below as of 28 Aug 2024 07:28  Patient On (Oxygen Delivery Method): room air        ___________________________________________________________________________    LAB                        10.5   5.50  )-----------( 288      ( 26 Aug 2024 05:29 )             30.9     08-26    136  |  101  |  18  ----------------------------<  102<H>  3.8   |  27  |  0.71    Ca    8.8      26 Aug 2024 05:29    TPro  6.3  /  Alb  2.9<L>  /  TBili  1.0  /  DBili  x   /  AST  22  /  ALT  48<H>  /  AlkPhos  71  08-26    LIVER FUNCTIONS - ( 26 Aug 2024 05:29 )  Alb: 2.9 g/dL / Pro: 6.3 g/dL / ALK PHOS: 71 U/L / ALT: 48 U/L / AST: 22 U/L / GGT: x                 Urinalysis Basic - ( 26 Aug 2024 05:29 )    Color: x / Appearance: x / SG: x / pH: x  Gluc: 102 mg/dL / Ketone: x  / Bili: x / Urobili: x   Blood: x / Protein: x / Nitrite: x   Leuk Esterase: x / RBC: x / WBC x   Sq Epi: x / Non Sq Epi: x / Bacteria: x                      ___________________________________________________________________________    MEDICATIONS  (STANDING):  atorvastatin 40 milliGRAM(s) Oral at bedtime  diclofenac sodium 1% Gel 2 Gram(s) Topical two times a day  enoxaparin Injectable 40 milliGRAM(s) SubCutaneous <User Schedule>  gabapentin 800 milliGRAM(s) Oral <User Schedule>  gabapentin 900 milliGRAM(s) Oral at bedtime  lidocaine   4% Patch 1 Patch Transdermal every 24 hours  lidocaine   4% Patch 1 Patch Transdermal every 24 hours  losartan 100 milliGRAM(s) Oral daily  multivitamin 1 Tablet(s) Oral daily  polyethylene glycol 3350 17 Gram(s) Oral every 12 hours  senna 2 Tablet(s) Oral at bedtime  tamsulosin 0.4 milliGRAM(s) Oral at bedtime    MEDICATIONS  (PRN):  acetaminophen     Tablet .. 650 milliGRAM(s) Oral every 6 hours PRN Mild Pain (1 - 3)  methocarbamol 750 milliGRAM(s) Oral three times a day PRN Muscle Spasm  oxyCODONE    IR 10 milliGRAM(s) Oral every 6 hours PRN Severe Pain (7 - 10)  oxyCODONE    IR 5 milliGRAM(s) Oral every 6 hours PRN Moderate Pain (4 - 6)      ___________________________________________________________________________    PHYSICAL EXAM:    Gen - NAD, Comfortable  HEENT - NCAT, EOMI, MMM  Neck - Supple, Limited ROM   Pulm - CTAB, No wheeze, No rhonchi, No crackles  Cardiovascular - RRR, S1S2, No m/r/g  Abdomen - Soft, NT/ND  Extremities - No C/C/E  Neuro-     Cognitive - AAOx3     Sensory - BL LE numbness and tingling (R>L)      Gait- instability noted as he is slightly leaning to the right, knees hyperextended during gait  Psychiatric - Mood stable, Affect WNL  Skin: Intact    ___________________________________________________________________________

## 2024-08-29 LAB
ALBUMIN SERPL ELPH-MCNC: 3 G/DL — LOW (ref 3.3–5)
ALP SERPL-CCNC: 69 U/L — SIGNIFICANT CHANGE UP (ref 40–120)
ALT FLD-CCNC: 44 U/L — SIGNIFICANT CHANGE UP (ref 10–45)
ANION GAP SERPL CALC-SCNC: 5 MMOL/L — SIGNIFICANT CHANGE UP (ref 5–17)
AST SERPL-CCNC: 19 U/L — SIGNIFICANT CHANGE UP (ref 10–40)
BASOPHILS # BLD AUTO: 0.04 K/UL — SIGNIFICANT CHANGE UP (ref 0–0.2)
BASOPHILS NFR BLD AUTO: 0.9 % — SIGNIFICANT CHANGE UP (ref 0–2)
BILIRUB SERPL-MCNC: 0.9 MG/DL — SIGNIFICANT CHANGE UP (ref 0.2–1.2)
BUN SERPL-MCNC: 18 MG/DL — SIGNIFICANT CHANGE UP (ref 7–23)
CALCIUM SERPL-MCNC: 9.1 MG/DL — SIGNIFICANT CHANGE UP (ref 8.4–10.5)
CHLORIDE SERPL-SCNC: 103 MMOL/L — SIGNIFICANT CHANGE UP (ref 96–108)
CO2 SERPL-SCNC: 30 MMOL/L — SIGNIFICANT CHANGE UP (ref 22–31)
CREAT SERPL-MCNC: 0.7 MG/DL — SIGNIFICANT CHANGE UP (ref 0.5–1.3)
EGFR: 96 ML/MIN/1.73M2 — SIGNIFICANT CHANGE UP
EOSINOPHIL # BLD AUTO: 0.14 K/UL — SIGNIFICANT CHANGE UP (ref 0–0.5)
EOSINOPHIL NFR BLD AUTO: 3.2 % — SIGNIFICANT CHANGE UP (ref 0–6)
GLUCOSE SERPL-MCNC: 96 MG/DL — SIGNIFICANT CHANGE UP (ref 70–99)
HCT VFR BLD CALC: 30.6 % — LOW (ref 39–50)
HGB BLD-MCNC: 10.6 G/DL — LOW (ref 13–17)
IMM GRANULOCYTES NFR BLD AUTO: 0.9 % — SIGNIFICANT CHANGE UP (ref 0–0.9)
LYMPHOCYTES # BLD AUTO: 1.27 K/UL — SIGNIFICANT CHANGE UP (ref 1–3.3)
LYMPHOCYTES # BLD AUTO: 28.7 % — SIGNIFICANT CHANGE UP (ref 13–44)
MCHC RBC-ENTMCNC: 31.1 PG — SIGNIFICANT CHANGE UP (ref 27–34)
MCHC RBC-ENTMCNC: 34.6 GM/DL — SIGNIFICANT CHANGE UP (ref 32–36)
MCV RBC AUTO: 89.7 FL — SIGNIFICANT CHANGE UP (ref 80–100)
MONOCYTES # BLD AUTO: 0.43 K/UL — SIGNIFICANT CHANGE UP (ref 0–0.9)
MONOCYTES NFR BLD AUTO: 9.7 % — SIGNIFICANT CHANGE UP (ref 2–14)
NEUTROPHILS # BLD AUTO: 2.5 K/UL — SIGNIFICANT CHANGE UP (ref 1.8–7.4)
NEUTROPHILS NFR BLD AUTO: 56.6 % — SIGNIFICANT CHANGE UP (ref 43–77)
NRBC # BLD: 0 /100 WBCS — SIGNIFICANT CHANGE UP (ref 0–0)
PLATELET # BLD AUTO: 283 K/UL — SIGNIFICANT CHANGE UP (ref 150–400)
POTASSIUM SERPL-MCNC: 4.3 MMOL/L — SIGNIFICANT CHANGE UP (ref 3.5–5.3)
POTASSIUM SERPL-SCNC: 4.3 MMOL/L — SIGNIFICANT CHANGE UP (ref 3.5–5.3)
PROT SERPL-MCNC: 6.2 G/DL — SIGNIFICANT CHANGE UP (ref 6–8.3)
RBC # BLD: 3.41 M/UL — LOW (ref 4.2–5.8)
RBC # FLD: 13.6 % — SIGNIFICANT CHANGE UP (ref 10.3–14.5)
SODIUM SERPL-SCNC: 138 MMOL/L — SIGNIFICANT CHANGE UP (ref 135–145)
WBC # BLD: 4.42 K/UL — SIGNIFICANT CHANGE UP (ref 3.8–10.5)
WBC # FLD AUTO: 4.42 K/UL — SIGNIFICANT CHANGE UP (ref 3.8–10.5)

## 2024-08-29 PROCEDURE — 99232 SBSQ HOSP IP/OBS MODERATE 35: CPT | Mod: GC

## 2024-08-29 RX ADMIN — Medication 800 MILLIGRAM(S): at 06:10

## 2024-08-29 RX ADMIN — ACETAMINOPHEN 650 MILLIGRAM(S): 325 TABLET ORAL at 22:31

## 2024-08-29 RX ADMIN — DICLOFENAC SODIUM 2 GRAM(S): 20 SOLUTION TOPICAL at 06:09

## 2024-08-29 RX ADMIN — Medication 40 MILLIGRAM(S): at 21:31

## 2024-08-29 RX ADMIN — LOSARTAN POTASSIUM 100 MILLIGRAM(S): 50 TABLET ORAL at 06:10

## 2024-08-29 RX ADMIN — TAMSULOSIN HYDROCHLORIDE 0.4 MILLIGRAM(S): 0.4 CAPSULE ORAL at 21:31

## 2024-08-29 RX ADMIN — Medication 900 MILLIGRAM(S): at 21:31

## 2024-08-29 RX ADMIN — ENOXAPARIN SODIUM 40 MILLIGRAM(S): 100 INJECTION SUBCUTANEOUS at 21:32

## 2024-08-29 RX ADMIN — Medication 1 TABLET(S): at 11:49

## 2024-08-29 RX ADMIN — METHOCARBAMOL 750 MILLIGRAM(S): 750 TABLET, FILM COATED ORAL at 21:31

## 2024-08-29 RX ADMIN — ACETAMINOPHEN 650 MILLIGRAM(S): 325 TABLET ORAL at 21:31

## 2024-08-29 RX ADMIN — Medication 800 MILLIGRAM(S): at 14:45

## 2024-08-29 NOTE — PROGRESS NOTE ADULT - ASSESSMENT
Mr. Denis Toro is a 74-year-old male patient with past medical history of HTN, melanoma, and a spontaneous T12 intradural hematoma in 2021 s/p T10-L2 laminectomy who is admitted for Acute Inpatient Rehabilitation with a multidisciplinary rehab program at [facility] with functional impairments in ADLs and mobility secondary to undergoing elective surgery for cervical AVF w/ C2-5 lami and fistula ligation on 8/12/24.       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  - S/P laminectomy for spinal cord AVM excision with ligation (8/12/24)      * Start Aspirin 81 mg daily on 8/26/24      * Vertebral artery dissection      * Post-operative urinary retention  - Vertebral Artery Dissection   - Bilateral lower extremity weakness  - Bilateral lower extremity sensory deficits  - Impaired ADLs and mobility  - Need for assistance with personal care  - Comprehensive Multidisciplinary Rehab Program:      * PT/OT/ SLP 3 hours a day 5 days a week  - new LLE pain  - CT pelvis negative for acute pathology/venous compression  - MRI L spine  with mod-severe spinal stenosis, bilateral L3 nerve root compression     #Pain:  - PRN Scale: Tylenol, Oxycodone 5 mg q6h, Oxycodone 10 mg q6h   - PRN Robaxin 750 mg TID  - Increased gabapentin to 800 BID in day, 900 at night- helping   - Lidocaine patch    #GI/Bowel:  - Senna 2 tabs daily  - Miralax q12h     #/Bladder:  - Per chart review, Vazquez removed   - Monitor PVR if no void in 8h; SC for >400 cc  [place] HS, voiding in diaper during the day  needs toileting schedule    #Diet / Dysphagia:    - Diet: Regular/DASH   - Ongoing SLP assessment  - Nutrition to follow    #Skin/ Pressure Injury Prevention:  - Assessment on admission: Surgical wound JUAN ALBERTO, staples intact    #DVT prophylaxis:  - Lovenox     #Precautions/ Restrictions  - Falls, Spine    --------------------------------------------  Outpatient Follow up:  Jose Martin Jewell  Neurosurgery  [address] [place] [address]  Phone: [phone]  Fax: [phone]  Follow Up Time: 1 week- rescheduled to 9/19    --------------------------------------------

## 2024-08-29 NOTE — PROGRESS NOTE ADULT - SUBJECTIVE AND OBJECTIVE BOX
HPI:  Mr. Denis Toro is a 74-year-old male patient with past medical history of HTN, melanoma, and a spontaneous T12 intradural hematoma in 2021 s/p T10-L2 laminectomy who presented for elective treatment of cervical AVF w/ C2-5 lami and fistula ligation on 8/12/24. Intraop angiogram showed resolution of C3-4 dAVF w/ L vertebral artery dissection, then underwent formal angiogram the following day, which showed resolution of the fistula, and improving vertebral artery dissection. Post-operatively, patient had urinary retention. However, his Vazquez was removed and the patient voided on 8/19/24. PM&R was consulted and deemed him an appropriate candidate for IRF. He was cleared for discharge to [facility] on 8/19/24.  (19 Aug 2024 14:41)    TDD: 9/10 to Home  ___________________________________________________________________________    SUBJECTIVE/ROS  Patient was seen and evaluated bedside.  Reported further improvement in his LLE pain with less association with weakness. In no acute distress.  Sleeping well on increased dose of gabapentin. Says his pain is well-controlled with it.  Still voiding without sensing void- uses condom cath at night  Removed staples today without issue.  Patient is eager to continue participation on the recommended rehabilitation program.  Denies any CP, SOB, GAMA, palpitations, fever, chills, body aches, cough, congestion, or any other symptoms at this time.   ___________________________________________________________________________    MR SPINE LUMBAR WAW IC (08/23/2024)  IMPRESSION: No abnormal enhancement. Similar abnormal signal within the visualized lower thoracic cord. Similar arachnoid adhesions resulting in similar appearing loculated pockets of CSF within the ventral spinal canal from T11-T12 through L1-L2, and at L2-L3. Similar clumping/thickening of descending lumbosacral nerve roots at multiple levels. Multilevel degenerative changes detailed in the body the report, with significantly worse spinal canal stenosis at L2-L3, currently moderate to severe in degree. New mass effect upon the descending bilateral L3 nerve roots in the lateral recesses.     CT PELVIS ONLY (08/22/2024)  IMPRESSION: No acute fracture. Minimal degenerative changes of both hips. If there remains concern for iliac vein compression, follow-up evaluation with contrast administration is recommended for more definitive characterization.    ___________________________________________________________________________    Vital Signs Last 24 Hrs  T(C): 36.6 (08-29-24 @ 08:17), Max: 36.7 (08-28-24 @ 20:30)  T(F): 97.9 (08-29-24 @ 08:17), Max: 98.1 (08-28-24 @ 20:30)  HR: 83 (08-29-24 @ 08:17) (83 - 87)  BP: 126/83 (08-29-24 @ 08:17) (126/80 - 143/88)  ABP: --  ABP(mean): --  RR: 16 (08-29-24 @ 08:17) (16 - 16)  SpO2: 97% (08-29-24 @ 08:17) (96% - 97%)      ___________________________________________________________________________    LAB                        10.6   4.42  )-----------( 283      ( 29 Aug 2024 05:33 )             30.6     08-29    138  |  103  |  18  ----------------------------<  96  4.3   |  30  |  0.70    Ca    9.1      29 Aug 2024 05:33    TPro  6.2  /  Alb  3.0<L>  /  TBili  0.9  /  DBili  x   /  AST  19  /  ALT  44  /  AlkPhos  69  08-29    LIVER FUNCTIONS - ( 29 Aug 2024 05:33 )  Alb: 3.0 g/dL / Pro: 6.2 g/dL / ALK PHOS: 69 U/L / ALT: 44 U/L / AST: 19 U/L / GGT: x                 Urinalysis Basic - ( 29 Aug 2024 05:33 )    Color: x / Appearance: x / SG: x / pH: x  Gluc: 96 mg/dL / Ketone: x  / Bili: x / Urobili: x   Blood: x / Protein: x / Nitrite: x   Leuk Esterase: x / RBC: x / WBC x   Sq Epi: x / Non Sq Epi: x / Bacteria: x    _________________________________________________________________    MEDICATIONS  (STANDING):  atorvastatin 40 milliGRAM(s) Oral at bedtime  diclofenac sodium 1% Gel 2 Gram(s) Topical two times a day  enoxaparin Injectable 40 milliGRAM(s) SubCutaneous <User Schedule>  gabapentin 900 milliGRAM(s) Oral at bedtime  gabapentin 800 milliGRAM(s) Oral <User Schedule>  lidocaine   4% Patch 1 Patch Transdermal every 24 hours  lidocaine   4% Patch 1 Patch Transdermal every 24 hours  losartan 100 milliGRAM(s) Oral daily  multivitamin 1 Tablet(s) Oral daily  polyethylene glycol 3350 17 Gram(s) Oral every 12 hours  senna 2 Tablet(s) Oral at bedtime  tamsulosin 0.4 milliGRAM(s) Oral at bedtime    MEDICATIONS  (PRN):  acetaminophen     Tablet .. 650 milliGRAM(s) Oral every 6 hours PRN Mild Pain (1 - 3)  methocarbamol 750 milliGRAM(s) Oral three times a day PRN Muscle Spasm  oxyCODONE    IR 5 milliGRAM(s) Oral every 6 hours PRN Moderate Pain (4 - 6)  oxyCODONE    IR 10 milliGRAM(s) Oral every 6 hours PRN Severe Pain (7 - 10)      ___________________________________________________________________________    PHYSICAL EXAM:    Gen - NAD, Comfortable  HEENT - NCAT, EOMI, MMM  Neck - Supple, Limited ROM   Pulm - CTAB, No wheeze, No rhonchi, No crackles  Cardiovascular - RRR, S1S2, No m/r/g  Abdomen - Soft, NT/ND  Extremities - No C/C/E  Neuro-     Cognitive - AAOx3     Sensory - BL LE numbness and tingling (R>L)      Gait- instability noted as he is slightly leaning to the right, knees hyperextended during gait  Psychiatric - Mood stable, Affect WNL  Skin: Intact. Incision without erythema, staples removed    ___________________________________________________________________________

## 2024-08-30 DIAGNOSIS — I10 ESSENTIAL (PRIMARY) HYPERTENSION: ICD-10-CM

## 2024-08-30 DIAGNOSIS — I67.1 CEREBRAL ANEURYSM, NONRUPTURED: ICD-10-CM

## 2024-08-30 PROCEDURE — 99232 SBSQ HOSP IP/OBS MODERATE 35: CPT

## 2024-08-30 PROCEDURE — 99232 SBSQ HOSP IP/OBS MODERATE 35: CPT | Mod: GC

## 2024-08-30 RX ADMIN — LOSARTAN POTASSIUM 100 MILLIGRAM(S): 50 TABLET ORAL at 06:09

## 2024-08-30 RX ADMIN — Medication 800 MILLIGRAM(S): at 13:10

## 2024-08-30 RX ADMIN — METHOCARBAMOL 750 MILLIGRAM(S): 750 TABLET, FILM COATED ORAL at 21:10

## 2024-08-30 RX ADMIN — Medication 900 MILLIGRAM(S): at 21:10

## 2024-08-30 RX ADMIN — TAMSULOSIN HYDROCHLORIDE 0.4 MILLIGRAM(S): 0.4 CAPSULE ORAL at 21:10

## 2024-08-30 RX ADMIN — ACETAMINOPHEN 650 MILLIGRAM(S): 325 TABLET ORAL at 21:09

## 2024-08-30 RX ADMIN — Medication 2 TABLET(S): at 21:09

## 2024-08-30 RX ADMIN — Medication 40 MILLIGRAM(S): at 21:10

## 2024-08-30 RX ADMIN — ACETAMINOPHEN 650 MILLIGRAM(S): 325 TABLET ORAL at 22:09

## 2024-08-30 RX ADMIN — Medication 800 MILLIGRAM(S): at 06:07

## 2024-08-30 RX ADMIN — ENOXAPARIN SODIUM 40 MILLIGRAM(S): 100 INJECTION SUBCUTANEOUS at 21:10

## 2024-08-30 RX ADMIN — Medication 1 TABLET(S): at 12:16

## 2024-08-30 NOTE — PROGRESS NOTE ADULT - SUBJECTIVE AND OBJECTIVE BOX
HPI:  Mr. Denis Toro is a 74-year-old male patient with past medical history of HTN, melanoma, and a spontaneous T12 intradural hematoma in 2021 s/p T10-L2 laminectomy who presented for elective treatment of cervical AVF w/ C2-5 lami and fistula ligation on 8/12/24. Intraop angiogram showed resolution of C3-4 dAVF w/ L vertebral artery dissection, then underwent formal angiogram the following day, which showed resolution of the fistula, and improving vertebral artery dissection. Post-operatively, patient had urinary retention. However, his Vazquez was removed and the patient voided on 8/19/24. PM&R was consulted and deemed him an appropriate candidate for IRF. He was cleared for discharge to [facility] on 8/19/24.  (19 Aug 2024 14:41)    TDD: 9/10 to Home  ___________________________________________________________________________    SUBJECTIVE/ROS  Patient was seen and evaluated bedside.  Reported further improvement in his LLE pain with less association with weakness. In no acute distress.  Sleeping well on increased dose of gabapentin. Says his pain is well-controlled with it.  Staples removed 8/29, incision looks great.  Expresses gratitude of the staff and his care here.  Patient is eager to continue participation on the recommended rehabilitation program.  Denies any CP, SOB, GAMA, palpitations, fever, chills, body aches, cough, congestion, or any other symptoms at this time.   ___________________________________________________________________________    MR SPINE LUMBAR WAW IC (08/23/2024)  IMPRESSION: No abnormal enhancement. Similar abnormal signal within the visualized lower thoracic cord. Similar arachnoid adhesions resulting in similar appearing loculated pockets of CSF within the ventral spinal canal from T11-T12 through L1-L2, and at L2-L3. Similar clumping/thickening of descending lumbosacral nerve roots at multiple levels. Multilevel degenerative changes detailed in the body the report, with significantly worse spinal canal stenosis at L2-L3, currently moderate to severe in degree. New mass effect upon the descending bilateral L3 nerve roots in the lateral recesses.     CT PELVIS ONLY (08/22/2024)  IMPRESSION: No acute fracture. Minimal degenerative changes of both hips. If there remains concern for iliac vein compression, follow-up evaluation with contrast administration is recommended for more definitive characterization.    ___________________________________________________________________________    Vital Signs Last 24 Hrs  T(C): 36.6 (08-30-24 @ 08:08), Max: 36.7 (08-29-24 @ 20:28)  T(F): 97.9 (08-30-24 @ 08:08), Max: 98.1 (08-29-24 @ 20:28)  HR: 82 (08-30-24 @ 08:08) (73 - 82)  BP: 121/75 (08-30-24 @ 08:08) (121/75 - 140/77)  ABP: --  ABP(mean): --  RR: 15 (08-30-24 @ 08:08) (15 - 16)  SpO2: 97% (08-30-24 @ 08:08) (96% - 97%)        ___________________________________________________________________________    LAB                        10.6   4.42  )-----------( 283      ( 29 Aug 2024 05:33 )             30.6     08-29    138  |  103  |  18  ----------------------------<  96  4.3   |  30  |  0.70    Ca    9.1      29 Aug 2024 05:33    TPro  6.2  /  Alb  3.0<L>  /  TBili  0.9  /  DBili  x   /  AST  19  /  ALT  44  /  AlkPhos  69  08-29    LIVER FUNCTIONS - ( 29 Aug 2024 05:33 )  Alb: 3.0 g/dL / Pro: 6.2 g/dL / ALK PHOS: 69 U/L / ALT: 44 U/L / AST: 19 U/L / GGT: x                 Urinalysis Basic - ( 29 Aug 2024 05:33 )    Color: x / Appearance: x / SG: x / pH: x  Gluc: 96 mg/dL / Ketone: x  / Bili: x / Urobili: x   Blood: x / Protein: x / Nitrite: x   Leuk Esterase: x / RBC: x / WBC x   Sq Epi: x / Non Sq Epi: x / Bacteria: x    _________________________________________________________________    MEDICATIONS  (STANDING):  atorvastatin 40 milliGRAM(s) Oral at bedtime  diclofenac sodium 1% Gel 2 Gram(s) Topical two times a day  enoxaparin Injectable 40 milliGRAM(s) SubCutaneous <User Schedule>  gabapentin 900 milliGRAM(s) Oral at bedtime  gabapentin 800 milliGRAM(s) Oral <User Schedule>  lidocaine   4% Patch 1 Patch Transdermal every 24 hours  lidocaine   4% Patch 1 Patch Transdermal every 24 hours  losartan 100 milliGRAM(s) Oral daily  multivitamin 1 Tablet(s) Oral daily  polyethylene glycol 3350 17 Gram(s) Oral every 12 hours  senna 2 Tablet(s) Oral at bedtime  tamsulosin 0.4 milliGRAM(s) Oral at bedtime    MEDICATIONS  (PRN):  acetaminophen     Tablet .. 650 milliGRAM(s) Oral every 6 hours PRN Mild Pain (1 - 3)  methocarbamol 750 milliGRAM(s) Oral three times a day PRN Muscle Spasm  oxyCODONE    IR 10 milliGRAM(s) Oral every 6 hours PRN Severe Pain (7 - 10)  oxyCODONE    IR 5 milliGRAM(s) Oral every 6 hours PRN Moderate Pain (4 - 6)        ___________________________________________________________________________    PHYSICAL EXAM:    Gen - NAD, Comfortable  HEENT - NCAT, EOMI, MMM  Neck - Supple, Limited ROM   Pulm - CTAB, No wheeze, No rhonchi, No crackles  Cardiovascular - RRR, S1S2, No m/r/g  Abdomen - Soft, NT/ND  Extremities - No C/C/E  Neuro-     Cognitive - AAOx3     Sensory - BL LE numbness and tingling (R>L)      Gait- instability noted as he is slightly leaning to the right, knees hyperextended during gait  Psychiatric - Mood stable, Affect WNL  Skin: Intact. Incision without erythema, staples removed    ___________________________________________________________________________

## 2024-08-30 NOTE — PROGRESS NOTE ADULT - ASSESSMENT
73 y/o M with PMH HTN, melanoma, and a spontaneous T12 intradural hematoma in 2021 s/p T10-L2 laminectomy who is admitted for Acute Inpatient Rehabilitation with a multidisciplinary rehab program at [facility] with functional impairments in ADLs and mobility secondary to undergoing elective surgery for cervical AVF w/ C2-5 lami and fistula ligation on 8/12/24.     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 New mass effect upon the descending bilateral L3 nerve roots in the lateral recesses  -Spine surgeon recc conservative management per rehab    #HTN  -Continue Losartan 100mg daily  -BP controlled     #Normocytic Anemia  -Likely postoperative blood loss  -Stable H/H  -Monitor and transfuse if Hb<7    #DVT prophylaxis  -Lovenox

## 2024-08-30 NOTE — PROGRESS NOTE ADULT - SUBJECTIVE AND OBJECTIVE BOX
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   He has no new complaints. His back pain is controlled and rt LE numbness/persists  +BMs  sleeping ok  appetite is good    ALLERGIES:  Celebrex (Other)  Shrimp (Angioedema)    MEDICATIONS  (STANDING):  atorvastatin 40 milliGRAM(s) Oral at bedtime  diclofenac sodium 1% Gel 2 Gram(s) Topical two times a day  enoxaparin Injectable 40 milliGRAM(s) SubCutaneous <User Schedule>  gabapentin 900 milliGRAM(s) Oral at bedtime  gabapentin 800 milliGRAM(s) Oral <User Schedule>  lidocaine   4% Patch 1 Patch Transdermal every 24 hours  lidocaine   4% Patch 1 Patch Transdermal every 24 hours  losartan 100 milliGRAM(s) Oral daily  multivitamin 1 Tablet(s) Oral daily  polyethylene glycol 3350 17 Gram(s) Oral every 12 hours  senna 2 Tablet(s) Oral at bedtime  tamsulosin 0.4 milliGRAM(s) Oral at bedtime    MEDICATIONS  (PRN):  acetaminophen     Tablet .. 650 milliGRAM(s) Oral every 6 hours PRN Mild Pain (1 - 3)  methocarbamol 750 milliGRAM(s) Oral three times a day PRN Muscle Spasm  oxyCODONE    IR 5 milliGRAM(s) Oral every 6 hours PRN Moderate Pain (4 - 6)  oxyCODONE    IR 10 milliGRAM(s) Oral every 6 hours PRN Severe Pain (7 - 10)    Vital Signs Last 24 Hrs  T(F): 97.9 (30 Aug 2024 08:08), Max: 98.1 (29 Aug 2024 20:28)  HR: 82 (30 Aug 2024 08:08) (73 - 82)  BP: 121/75 (30 Aug 2024 08:08) (121/75 - 140/77)  RR: 15 (30 Aug 2024 08:08) (15 - 16)  SpO2: 97% (30 Aug 2024 08:08) (96% - 97%)  I&O's Summary    BMI (kg/m2): 25.3 (08-25-24 @ 09:42)          PHYSICAL EXAM:  General: NAD, A/O x 3  ENT: MMM  Neck: Supple, No JVD  Lungs: Clear to auscultation bilaterally  Cardio: RRR, S1/S2, No murmurs  Abdomen: Soft, Nontender, Nondistended; Bowel sounds present  Extremities: No calf tenderness, No pitting edema      LABS:                        10.6   4.42  )-----------( 283      ( 29 Aug 2024 05:33 )             30.6       08-29    138  |  103  |  18  ----------------------------<  96  4.3   |  30  |  0.70    Ca    9.1      29 Aug 2024 05:33    TPro  6.2  /  Alb  3.0  /  TBili  0.9  /  DBili  x   /  AST  19  /  ALT  44  /  AlkPhos  69  08-29                            Urinalysis Basic - ( 29 Aug 2024 05:33 )    Color: x / Appearance: x / SG: x / pH: x  Gluc: 96 mg/dL / Ketone: x  / Bili: x / Urobili: x   Blood: x / Protein: x / Nitrite: x   Leuk Esterase: x / RBC: x / WBC x   Sq Epi: x / Non Sq Epi: x / Bacteria: x        COVID-19 PCR: Raquel (08-19-24 @ 19:48)

## 2024-08-30 NOTE — PROGRESS NOTE ADULT - ATTENDING COMMENTS
Rehab Attending- Patient seen and examined by me - Case discussed, above note reviewed by me with modifications made    patient seen and evaluated bedside  Sensing BMs- two BMs in Bathroom- no accident  Sensed voiding twice today for first time- still unable to hold and control  Left thigh numbness/pain now described as a soreness  continues to show improvement  to continue intensive rehab program

## 2024-08-30 NOTE — PROGRESS NOTE ADULT - ASSESSMENT
Mr. Denis Toro is a 74-year-old male patient with past medical history of HTN, melanoma, and a spontaneous T12 intradural hematoma in 2021 s/p T10-L2 laminectomy who is admitted for Acute Inpatient Rehabilitation with a multidisciplinary rehab program at [facility] with functional impairments in ADLs and mobility secondary to undergoing elective surgery for cervical AVF w/ C2-5 lami and fistula ligation on 8/12/24.       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  - S/P laminectomy for spinal cord AVM excision with ligation (8/12/24)      * Start Aspirin 81 mg daily on 8/26/24      * Vertebral artery dissection      * Post-operative urinary retention  - Vertebral Artery Dissection   - Bilateral lower extremity weakness  - Bilateral lower extremity sensory deficits  - Impaired ADLs and mobility  - Need for assistance with personal care  - Comprehensive Multidisciplinary Rehab Program:      * PT/OT/ SLP 3 hours a day 5 days a week  - new LLE pain  - CT pelvis negative for acute pathology/venous compression  - MRI L spine  with mod-severe spinal stenosis, bilateral L3 nerve root compression     #Pain:  - PRN Scale: Tylenol, Oxycodone 5 mg q6h, Oxycodone 10 mg q6h   - PRN Robaxin 750 mg TID  - Increased gabapentin to 800 BID in day, 900 at night- helping   - Lidocaine patch    #GI/Bowel:  - Senna 2 tabs daily  - Miralax q12h     #/Bladder:  - Per chart review, Vazquez removed   - Monitor PVR if no void in 8h; SC for >400 cc  [place] [facility], voiding in diaper during the day  needs toileting schedule    #Diet / Dysphagia:    - Diet: Regular/DASH   - Ongoing SLP assessment  - Nutrition to follow    #Skin/ Pressure Injury Prevention:  - Assessment on admission: Surgical wound JUAN ALBERTO, staples intact    #DVT prophylaxis:  - Lovenox     #Precautions/ Restrictions  - Falls, Spine    --------------------------------------------  Outpatient Follow up:  [facility]  Neurosurgery  [address] [place] [id number]  Phone: [phone]  Fax: [phone]  Follow Up Time: 1 week- rescheduled to 9/19    --------------------------------------------

## 2024-08-31 PROCEDURE — 99232 SBSQ HOSP IP/OBS MODERATE 35: CPT

## 2024-08-31 RX ADMIN — ACETAMINOPHEN 650 MILLIGRAM(S): 325 TABLET ORAL at 13:55

## 2024-08-31 RX ADMIN — ENOXAPARIN SODIUM 40 MILLIGRAM(S): 100 INJECTION SUBCUTANEOUS at 21:16

## 2024-08-31 RX ADMIN — ACETAMINOPHEN 650 MILLIGRAM(S): 325 TABLET ORAL at 21:15

## 2024-08-31 RX ADMIN — Medication 40 MILLIGRAM(S): at 21:15

## 2024-08-31 RX ADMIN — Medication 1 TABLET(S): at 11:41

## 2024-08-31 RX ADMIN — POLYETHYLENE GLYCOL 3350 17 GRAM(S): 17 POWDER, FOR SOLUTION ORAL at 18:10

## 2024-08-31 RX ADMIN — Medication 900 MILLIGRAM(S): at 21:15

## 2024-08-31 RX ADMIN — ACETAMINOPHEN 650 MILLIGRAM(S): 325 TABLET ORAL at 22:15

## 2024-08-31 RX ADMIN — ACETAMINOPHEN 650 MILLIGRAM(S): 325 TABLET ORAL at 14:55

## 2024-08-31 RX ADMIN — Medication 2 TABLET(S): at 21:16

## 2024-08-31 RX ADMIN — TAMSULOSIN HYDROCHLORIDE 0.4 MILLIGRAM(S): 0.4 CAPSULE ORAL at 21:15

## 2024-08-31 RX ADMIN — Medication 800 MILLIGRAM(S): at 13:57

## 2024-08-31 RX ADMIN — LOSARTAN POTASSIUM 100 MILLIGRAM(S): 50 TABLET ORAL at 06:35

## 2024-08-31 RX ADMIN — Medication 800 MILLIGRAM(S): at 06:35

## 2024-08-31 NOTE — PROGRESS NOTE ADULT - SUBJECTIVE AND OBJECTIVE BOX
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   no new complaints    REVIEW OF SYSTEMS: remaining ROS negative     PHYSICAL EXAM:  VITALS:  Vital Signs Last 24 Hrs  T(C): 36.9 (31 Aug 2024 08:55), Max: 36.9 (31 Aug 2024 08:55)  T(F): 98.4 (31 Aug 2024 08:55), Max: 98.4 (31 Aug 2024 08:55)  HR: 96 (31 Aug 2024 08:55) (89 - 96)  BP: 128/79 (31 Aug 2024 08:55) (128/79 - 148/90)  BP(mean): --  RR: 16 (31 Aug 2024 08:55) (16 - 16)  SpO2: 94% (31 Aug 2024 08:55) (94% - 99%)    Parameters below as of 31 Aug 2024 08:55  Patient On (Oxygen Delivery Method): room air          GENERAL: NAD,  no increased WOB  HEAD:  Atraumatic, Normocephalic  EYES: EOMI, conjunctiva and sclera clear  ENMT: Moist mucous membranes  NECK: Supple, No JVD  NERVOUS SYSTEM:  Alert & Oriented   CHEST/LUNG: Clear to auscultation bilaterally; No rales, rhonchi, wheezing  HEART: Regular rate and rhythm  ABDOMEN: Soft, Nontender, Nondistended; Bowel sounds present  EXTREMITIES:  No clubbing, cyanosis, calf tenderness or edema b/l      MEDICATIONS  (STANDING):  atorvastatin 40 milliGRAM(s) Oral at bedtime  diclofenac sodium 1% Gel 2 Gram(s) Topical two times a day  enoxaparin Injectable 40 milliGRAM(s) SubCutaneous <User Schedule>  gabapentin 900 milliGRAM(s) Oral at bedtime  gabapentin 800 milliGRAM(s) Oral <User Schedule>  lidocaine   4% Patch 1 Patch Transdermal every 24 hours  lidocaine   4% Patch 1 Patch Transdermal every 24 hours  losartan 100 milliGRAM(s) Oral daily  multivitamin 1 Tablet(s) Oral daily  polyethylene glycol 3350 17 Gram(s) Oral every 12 hours  senna 2 Tablet(s) Oral at bedtime  tamsulosin 0.4 milliGRAM(s) Oral at bedtime    MEDICATIONS  (PRN):  acetaminophen     Tablet .. 650 milliGRAM(s) Oral every 6 hours PRN Mild Pain (1 - 3)  methocarbamol 750 milliGRAM(s) Oral three times a day PRN Muscle Spasm  oxyCODONE    IR 5 milliGRAM(s) Oral every 6 hours PRN Moderate Pain (4 - 6)  oxyCODONE    IR 10 milliGRAM(s) Oral every 6 hours PRN Severe Pain (7 - 10)      Allergies    Celebrex (Other)  Shrimp (Angioedema)    Intolerances              LABS:                 CAPILLARY BLOOD GLUCOSE                    RECENT CULTURES:          RADIOLOGY & ADDITIONAL TESTS:

## 2024-08-31 NOTE — PROGRESS NOTE ADULT - ASSESSMENT
73 y/o M with PMH HTN, melanoma, and a spontaneous T12 intradural hematoma in 2021 s/p T10-L2 laminectomy who is admitted for Acute Inpatient Rehabilitation with a multidisciplinary rehab program at [facility] with functional impairments in ADLs and mobility secondary to undergoing elective surgery for cervical AVF w/ C2-5 lami and fistula ligation on 8/12/24.     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 New mass effect upon the descending bilateral L3 nerve roots in the lateral recesses  -Spine surgeon recc conservative management per rehab  -Comprehensive rehab    #HTN  -Continue Losartan 100mg daily    #Normocytic Anemia  -Likely postoperative blood loss  -Stable H/H  -Monitor and transfuse if Hb<7    #DVT prophylaxis  -Lovenox

## 2024-08-31 NOTE — PROGRESS NOTE ADULT - SUBJECTIVE AND OBJECTIVE BOX
Cc: Gait dysfunction    HPI: Patient seen and examined at bedside. No acute events overnight.   Pain controlled, no chest pain, no N/V, no Fevers/Chills. No other new ROS  Has been tolerating rehabilitation program.    acetaminophen     Tablet .. 650 milliGRAM(s) Oral every 6 hours PRN  atorvastatin 40 milliGRAM(s) Oral at bedtime  diclofenac sodium 1% Gel 2 Gram(s) Topical two times a day  enoxaparin Injectable 40 milliGRAM(s) SubCutaneous <User Schedule>  gabapentin 900 milliGRAM(s) Oral at bedtime  gabapentin 800 milliGRAM(s) Oral <User Schedule>  lidocaine   4% Patch 1 Patch Transdermal every 24 hours  lidocaine   4% Patch 1 Patch Transdermal every 24 hours  losartan 100 milliGRAM(s) Oral daily  methocarbamol 750 milliGRAM(s) Oral three times a day PRN  multivitamin 1 Tablet(s) Oral daily  oxyCODONE    IR 5 milliGRAM(s) Oral every 6 hours PRN  oxyCODONE    IR 10 milliGRAM(s) Oral every 6 hours PRN  polyethylene glycol 3350 17 Gram(s) Oral every 12 hours  senna 2 Tablet(s) Oral at bedtime  tamsulosin 0.4 milliGRAM(s) Oral at bedtime      T(C): 36.9 (08-31-24 @ 08:55), Max: 36.9 (08-31-24 @ 08:55)  HR: 96 (08-31-24 @ 08:55) (89 - 96)  BP: 128/79 (08-31-24 @ 08:55) (128/79 - 148/90)  RR: 16 (08-31-24 @ 08:55) (16 - 16)  SpO2: 94% (08-31-24 @ 08:55) (94% - 99%)    In NAD  HEENT- EOMI  Heart- S1S2  Lungs- CTA bl.  Abd- + BS, NT  Ext- No calf pain  Neuro- Exam unchanged    CBC 8/29/24 reviewed  CMP 8/29/24 reviewed  CMP 8/26/24 reviewed        Imp: Patient with diagnosis of s/p laminectomy for spinal cord AVM admitted for comprehensive acute rehabilitation.    Plan:  - Continue PT/OT/SLP as indicated  - DVT prophylaxis - Continue Lovenox subQ  - Skin- Turn q2h, check skin daily  - Continue current medications  -Active issues-   Pain: Continue Oxycodone PRN, Gabapentin, Voltaren Gel, Tylenol PRN, Robaxin PRN, Lidocaine patches  - Patient is stable to continue current rehabilitation program.

## 2024-09-01 PROCEDURE — 99232 SBSQ HOSP IP/OBS MODERATE 35: CPT

## 2024-09-01 RX ADMIN — ENOXAPARIN SODIUM 40 MILLIGRAM(S): 100 INJECTION SUBCUTANEOUS at 21:06

## 2024-09-01 RX ADMIN — Medication 800 MILLIGRAM(S): at 14:42

## 2024-09-01 RX ADMIN — LOSARTAN POTASSIUM 100 MILLIGRAM(S): 50 TABLET ORAL at 06:32

## 2024-09-01 RX ADMIN — ACETAMINOPHEN 650 MILLIGRAM(S): 325 TABLET ORAL at 22:30

## 2024-09-01 RX ADMIN — METHOCARBAMOL 750 MILLIGRAM(S): 750 TABLET, FILM COATED ORAL at 21:07

## 2024-09-01 RX ADMIN — Medication 2 TABLET(S): at 21:06

## 2024-09-01 RX ADMIN — Medication 800 MILLIGRAM(S): at 06:31

## 2024-09-01 RX ADMIN — Medication 900 MILLIGRAM(S): at 21:08

## 2024-09-01 RX ADMIN — TAMSULOSIN HYDROCHLORIDE 0.4 MILLIGRAM(S): 0.4 CAPSULE ORAL at 21:06

## 2024-09-01 RX ADMIN — ACETAMINOPHEN 650 MILLIGRAM(S): 325 TABLET ORAL at 21:07

## 2024-09-01 RX ADMIN — Medication 1 TABLET(S): at 11:31

## 2024-09-01 RX ADMIN — Medication 40 MILLIGRAM(S): at 21:06

## 2024-09-01 NOTE — PROGRESS NOTE ADULT - SUBJECTIVE AND OBJECTIVE BOX
Cc: Gait dysfunction    HPI: Patient seen and examined at bedside. No acute events overnight.   Pain controlled, no chest pain, no N/V, no Fevers/Chills. No other new ROS  Has been tolerating rehabilitation program.    acetaminophen     Tablet .. 650 milliGRAM(s) Oral every 6 hours PRN  atorvastatin 40 milliGRAM(s) Oral at bedtime  diclofenac sodium 1% Gel 2 Gram(s) Topical two times a day  enoxaparin Injectable 40 milliGRAM(s) SubCutaneous <User Schedule>  gabapentin 900 milliGRAM(s) Oral at bedtime  gabapentin 800 milliGRAM(s) Oral <User Schedule>  lidocaine   4% Patch 1 Patch Transdermal every 24 hours  lidocaine   4% Patch 1 Patch Transdermal every 24 hours  losartan 100 milliGRAM(s) Oral daily  methocarbamol 750 milliGRAM(s) Oral three times a day PRN  multivitamin 1 Tablet(s) Oral daily  oxyCODONE    IR 10 milliGRAM(s) Oral every 6 hours PRN  oxyCODONE    IR 5 milliGRAM(s) Oral every 6 hours PRN  polyethylene glycol 3350 17 Gram(s) Oral every 12 hours  senna 2 Tablet(s) Oral at bedtime  tamsulosin 0.4 milliGRAM(s) Oral at bedtime      T(C): 36.9 (09-01-24 @ 09:10), Max: 37.2 (08-31-24 @ 21:12)  HR: 95 (09-01-24 @ 09:10) (94 - 97)  BP: 115/76 (09-01-24 @ 09:10) (115/76 - 135/78)  RR: 16 (09-01-24 @ 09:10) (16 - 16)  SpO2: 94% (09-01-24 @ 09:10) (94% - 97%)    ACC: [id number] EXAM: CT CERVICAL SPINE ORDERED BY: NIMO SAMS    PROCEDURE DATE: 08/12/2024        INTERPRETATION: Clinical indications: Surgical planning      Serial thin sections on a multi slice scanner were obtained through the cervical spine from the C1 to the T2 level in a stacked axial fashion reformatted at 1.25 mm sections with sagittal and coronal computer generated reconstructed views.    Comparison is made with the prior cervical spine MRI of 5/31/2024.    The cervical and upper thoracic vertebral bodies are normal in height and density. Mild degenerative anterolisthesis at C4-5. Anterior osteophytes are present C5-6 C6-7 C7-T1. No acute fractures or dislocations are identified. Uncal vertebral joint and facet degenerative changes are noted. Left-sided facet hypertrophy causes left neural foraminal stenosis at C3-4 and C4-5.    IMPRESSION: Mild degenerative changes.    --- End of Report ---            EDGAR GUZMAN MD; Attending Radiologist  This document has been electronically signed. Aug 12 2024 8:31AM      EXAM: [id number] - MR SPINE CERVICAL WAW IC - ORDERED BY: JEWELS LE      PROCEDURE DATE: 05/31/2024        INTERPRETATION: EXAM: MR Cervical without and with contrast    CLINICAL INFORMATION: Left C1/skull base dural aVF    COMPARISON: 10/6/2021    TECHNIQUE: Multiplanar MR imaging of the cervical spine was performed with and without intravenous contrast.    Contrast: 9 cc of Gadavist administered  Contrast discarded: 1 cc  Oral contrast: None  Complications: None    FINDINGS:    There has been interval resolution of previously identified ventral epidural enhancement along the posterior clivus/upper cervical spine as well as circumferential enhancement throughout the cervical spine, suggesting interval resolution of previously identified intracranial hypotension.    No evidence for dural AV fistula.    There is straightening of the normal cervical lordosis. No subluxation. Vertebral body heights are maintained. Bone marrow signal is unremarkable. No prevertebral soft tissue swelling.    The cervical cord is normal in size and signal characteristics. Visualized portion of the posterior fossa is unremarkable.    C2-C3 level: Moderate left facet arthropathy contributes to mild left neural foraminal narrowing but no significant right neural foraminal narrowing or central canal narrowing.    C3-C4 level: Moderate bilateral facet arthropathy contributes to mild bilateral neural foraminal narrowing but no significant central canal narrowing.    C4-C5 level: Moderate bilateral facet arthropathy contributes to mild to moderate bilateral neural foraminal narrowing but no significant central canal narrowing.    C5-C6 level: Small posterior disc bulge and mild to moderate bilateral facet arthropathy result in mild bilateral neural foraminal narrowing and mild central canal narrowing.    C6-C7 level: Broad-based posterior disc bulge with superimposed small left paracentral protrusion in conjunction with mild bilateral facet arthropathy results in mild left neural foraminal narrowing, mild central canal narrowing but no significant right neural foraminal narrowing.    C7-T1 level: No disc herniation. No central canal or foraminal narrowing.      In the visualized upper thoracic spine there is no disc herniation or central canal narrowing.      IMPRESSION:    There has been interval resolution of previously identified ventral epidural enhancement along the posterior clivus/upper cervical spine as well as circumferential enhancement throughout the cervical spine, suggesting interval resolution of previously identified intracranial hypotension.    Multilevel discogenic degenerative disease and facet arthropathy of the cervical spine, most pronounced at C5-C6 where there is mild bilateral neural foraminal narrowing and mild central canal narrowing. Additional varying degrees of central canal and neural foraminal narrowing, as above.    --- End of Report ---              TAZ VALENTINE-BACILIO BISHOP; Attending Radiologist  This document has been electronically signed. Jun 5 2024 10:15AM    ACC: [id number] EXAM: MR SPINE LUMBAR WAW IC ORDERED BY: JOSÉ LUIS CLAROS    PROCEDURE DATE: 08/23/2024        INTERPRETATION: MRI of the lumbar spine with contrast    CLINICAL INDICATION: Left lower extremity weakness/paresthesias . Status post evacuation of intraspinal hemorrhage.    V to some TECHNIQUE: Multiplanar, multisequence MR images of the lumbar spine were obtained before and after the intravenous administration of 8.6 cc of Gadavist. 1.4 cc discarded    COMPARISON: MRI lumbar spine 6/23/2022. MRI thoracic spine 5/1/2023    FINDINGS:    Lumbarization of the S1 vertebral body, the most inferiorly performed disc space will be named S1-S2 for the purposes of this examination.    T11-L2 total laminectomies are noted.    Vertebral body height, marrow signal homogeneity, and facet alignment are maintained throughout the visualized spinal segments.    Exaggeration of the lumbar lordosis.    Grade 1 anterolistheses of L4 and L5 and L5 on S1. Grade 1 retrolisthesis of L2 on L3.    No abnormal enhancement.    The conus is normal in size, position, and signal characteristics, ending at L2.    Similar abnormal signal within the visualized lower thoracic cord.    Similar arachnoid adhesions resulting in similar appearing loculated pockets of CSF within the ventral spinal canal from T11-T12 through L1-L2, and at L2-L3.    Similar clumping/thickening of descending lumbosacral nerve roots at multiple levels.    L1-L2: No spinal canal stenosis or neural foraminal narrowing. Bilateral facet hypertrophy.    L2-L3: Uncovering of the intervertebral discs/disc bulge, moderate bilateral facet hypertrophy, and marked bilateral ligamentous hypertrophy results in moderate to severe thecal sac compression and mass effect upon the bilateral descending L3 nerve roots in the lateral recesses. Mild right and mild to moderate left neural foraminal narrowing. Spinal canal stenosis has worsened in the interval.    L3-L4: Disc bulge and mild bilateral facet hypertrophy, and marked bilateral ligamentous hypertrophy results in overall mild thecal sac compression and mild bilateral neural foraminal narrowing.    L4-L5: Uncovering of the intervertebral disc and marked bilateral facet hypertrophy results in overall moderate thecal sac compression as well as mass effect upon the descending right L5 nerve root in the right lateral recess. Moderate left and mild right neural foraminal narrowing.    L5-S1: Uncovering of the intervertebral disc and marked bilateral facet hypertrophy. Mild sac compression. Mild bilateral neural foraminal narrowing.    IMPRESSION:    No abnormal enhancement.  Similar abnormal signal within the visualized lower thoracic cord.    Similar arachnoid adhesions resulting in similar appearing loculated pockets of CSF within the ventral spinal canal from T11-T12 through L1-L2, and at L2-L3.    Similar clumping/thickening of descending lumbosacral nerve roots at multiple levels.    Multilevel degenerative changes detailed in the body the report, with significantly worse spinal canal stenosis at L2-L3, currently moderate to severe in degree. New mass effect upon the descending bilateral L3 nerve roots in the lateral recesses.    --- End of Report ---            MARTIN PATEL MD; Attending Radiologist  This document has been electronically signed. Aug 23 2024 4:32PM    In NAD  HEENT- EOMI  Heart- S1S2  Lungs- CTA bl.  Abd- + BS, NT  Ext- No calf pain  Neuro- Exam unchanged      Imp: Patient with diagnosis of s/p laminectomy for spinal cord AVM admitted for comprehensive acute rehabilitation.    Plan:  - Continue PT/OT/SLP as indicated  - DVT prophylaxis - Continue Lovenox subQ  - Skin- Turn q2h, check skin daily  - Continue current medications  -Active issues-   Pain: Continue Oxycodone PRN, Gabapentin, Voltaren Gel, Tylenol PRN, Robaxin PRN, Lidocaine patches  - Patient is stable to continue current rehabilitation program.

## 2024-09-01 NOTE — PROGRESS NOTE ADULT - SUBJECTIVE AND OBJECTIVE BOX
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 There were no acute medical events yesterday or overnight and patient is without new complaints this morning; neck and back pain persists and LE pains migratory but not worse      REVIEW OF SYMPTOMS: patient denies HA's, CP, palpitations, shortness of breath or upper respiratory symptoms, nausea, vomiting, diarrhea, constipation, dysuria, bruising/bleeding and all other systems were reviewed as negative      ALLERGIES:  Celebrex (Other)  Shrimp (Angioedema)    MEDICATIONS  (STANDING):  atorvastatin 40 milliGRAM(s) Oral at bedtime  diclofenac sodium 1% Gel 2 Gram(s) Topical two times a day  enoxaparin Injectable 40 milliGRAM(s) SubCutaneous <User Schedule>  gabapentin 900 milliGRAM(s) Oral at bedtime  gabapentin 800 milliGRAM(s) Oral <User Schedule>  lidocaine   4% Patch 1 Patch Transdermal every 24 hours  lidocaine   4% Patch 1 Patch Transdermal every 24 hours  losartan 100 milliGRAM(s) Oral daily  multivitamin 1 Tablet(s) Oral daily  polyethylene glycol 3350 17 Gram(s) Oral every 12 hours  senna 2 Tablet(s) Oral at bedtime  tamsulosin 0.4 milliGRAM(s) Oral at bedtime    MEDICATIONS  (PRN):  acetaminophen     Tablet .. 650 milliGRAM(s) Oral every 6 hours PRN Mild Pain (1 - 3)  methocarbamol 750 milliGRAM(s) Oral three times a day PRN Muscle Spasm  oxyCODONE    IR 10 milliGRAM(s) Oral every 6 hours PRN Severe Pain (7 - 10)  oxyCODONE    IR 5 milliGRAM(s) Oral every 6 hours PRN Moderate Pain (4 - 6)    Vital Signs Last 24 Hrs  T(F): 99 (31 Aug 2024 21:12), Max: 99 (31 Aug 2024 21:12)  HR: 97 (01 Sep 2024 06:28) (94 - 97)  BP: 126/83 (01 Sep 2024 06:28) (126/83 - 135/78)  RR: 16 (01 Sep 2024 06:28) (16 - 16)  SpO2: 97% (01 Sep 2024 06:28) (94% - 97%)  I&O's Summary      PHYSICAL EXAM:  General: NAD, A/O x 3  ENT: MMM  Neck: Supple, No JVD  Lungs: Clear to auscultation bilaterally  Cardio: RRR, S1/S2, No murmurs  Abdomen: Soft, Nontender, Nondistended; Bowel sounds present  Extremities: No calf tenderness, No pitting edema      LABS: There are no new laboratory or radiologic studies resulted at the time this progress note was authored                                          COVID-19 PCR: Raquel (08-19-24 @ 19:48)

## 2024-09-01 NOTE — PROGRESS NOTE ADULT - ASSESSMENT
75 y/o M with PMH HTN, melanoma, and a spontaneous T12 intradural hematoma in 2021 s/p T10-L2 laminectomy who is admitted for Acute Inpatient Rehabilitation with a multidisciplinary rehab program at [facility] with functional impairments in ADLs and mobility secondary to undergoing elective surgery for cervical AVF w/ C2-5 lami and fistula ligation on 8/12/24.     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 New mass effect upon the descending bilateral L3 nerve roots in the lateral recesses  -Spine surgeon recc conservative management per rehab  -Comprehensive rehab    #HTN  -Continue Losartan 100mg daily    #Normocytic Anemia  -Likely postoperative blood loss  -Stable H/H  -Monitor and transfuse if Hb<7    #DVT prophylaxis  -Lovenox

## 2024-09-02 LAB
ALBUMIN SERPL ELPH-MCNC: 3.1 G/DL — LOW (ref 3.3–5)
ALP SERPL-CCNC: 73 U/L — SIGNIFICANT CHANGE UP (ref 40–120)
ALT FLD-CCNC: 40 U/L — SIGNIFICANT CHANGE UP (ref 10–45)
ANION GAP SERPL CALC-SCNC: 4 MMOL/L — LOW (ref 5–17)
AST SERPL-CCNC: 16 U/L — SIGNIFICANT CHANGE UP (ref 10–40)
BASOPHILS # BLD AUTO: 0.02 K/UL — SIGNIFICANT CHANGE UP (ref 0–0.2)
BASOPHILS NFR BLD AUTO: 0.3 % — SIGNIFICANT CHANGE UP (ref 0–2)
BILIRUB SERPL-MCNC: 1.3 MG/DL — HIGH (ref 0.2–1.2)
BUN SERPL-MCNC: 15 MG/DL — SIGNIFICANT CHANGE UP (ref 7–23)
CALCIUM SERPL-MCNC: 9.2 MG/DL — SIGNIFICANT CHANGE UP (ref 8.4–10.5)
CHLORIDE SERPL-SCNC: 103 MMOL/L — SIGNIFICANT CHANGE UP (ref 96–108)
CO2 SERPL-SCNC: 31 MMOL/L — SIGNIFICANT CHANGE UP (ref 22–31)
CREAT SERPL-MCNC: 0.74 MG/DL — SIGNIFICANT CHANGE UP (ref 0.5–1.3)
EGFR: 94 ML/MIN/1.73M2 — SIGNIFICANT CHANGE UP
EOSINOPHIL # BLD AUTO: 0.17 K/UL — SIGNIFICANT CHANGE UP (ref 0–0.5)
EOSINOPHIL NFR BLD AUTO: 2.3 % — SIGNIFICANT CHANGE UP (ref 0–6)
GLUCOSE SERPL-MCNC: 108 MG/DL — HIGH (ref 70–99)
HCT VFR BLD CALC: 33.7 % — LOW (ref 39–50)
HGB BLD-MCNC: 11.3 G/DL — LOW (ref 13–17)
IMM GRANULOCYTES NFR BLD AUTO: 0.5 % — SIGNIFICANT CHANGE UP (ref 0–0.9)
LYMPHOCYTES # BLD AUTO: 1.22 K/UL — SIGNIFICANT CHANGE UP (ref 1–3.3)
LYMPHOCYTES # BLD AUTO: 16.4 % — SIGNIFICANT CHANGE UP (ref 13–44)
MCHC RBC-ENTMCNC: 30.3 PG — SIGNIFICANT CHANGE UP (ref 27–34)
MCHC RBC-ENTMCNC: 33.5 GM/DL — SIGNIFICANT CHANGE UP (ref 32–36)
MCV RBC AUTO: 90.3 FL — SIGNIFICANT CHANGE UP (ref 80–100)
MONOCYTES # BLD AUTO: 0.57 K/UL — SIGNIFICANT CHANGE UP (ref 0–0.9)
MONOCYTES NFR BLD AUTO: 7.7 % — SIGNIFICANT CHANGE UP (ref 2–14)
NEUTROPHILS # BLD AUTO: 5.43 K/UL — SIGNIFICANT CHANGE UP (ref 1.8–7.4)
NEUTROPHILS NFR BLD AUTO: 72.8 % — SIGNIFICANT CHANGE UP (ref 43–77)
NRBC # BLD: 0 /100 WBCS — SIGNIFICANT CHANGE UP (ref 0–0)
PLATELET # BLD AUTO: 251 K/UL — SIGNIFICANT CHANGE UP (ref 150–400)
POTASSIUM SERPL-MCNC: 4.2 MMOL/L — SIGNIFICANT CHANGE UP (ref 3.5–5.3)
POTASSIUM SERPL-SCNC: 4.2 MMOL/L — SIGNIFICANT CHANGE UP (ref 3.5–5.3)
PROT SERPL-MCNC: 6.5 G/DL — SIGNIFICANT CHANGE UP (ref 6–8.3)
RBC # BLD: 3.73 M/UL — LOW (ref 4.2–5.8)
RBC # FLD: 13.8 % — SIGNIFICANT CHANGE UP (ref 10.3–14.5)
SODIUM SERPL-SCNC: 138 MMOL/L — SIGNIFICANT CHANGE UP (ref 135–145)
WBC # BLD: 7.45 K/UL — SIGNIFICANT CHANGE UP (ref 3.8–10.5)
WBC # FLD AUTO: 7.45 K/UL — SIGNIFICANT CHANGE UP (ref 3.8–10.5)

## 2024-09-02 PROCEDURE — 99232 SBSQ HOSP IP/OBS MODERATE 35: CPT

## 2024-09-02 RX ADMIN — ACETAMINOPHEN 650 MILLIGRAM(S): 325 TABLET ORAL at 22:19

## 2024-09-02 RX ADMIN — METHOCARBAMOL 750 MILLIGRAM(S): 750 TABLET, FILM COATED ORAL at 21:27

## 2024-09-02 RX ADMIN — ACETAMINOPHEN 650 MILLIGRAM(S): 325 TABLET ORAL at 21:28

## 2024-09-02 RX ADMIN — Medication 900 MILLIGRAM(S): at 21:28

## 2024-09-02 RX ADMIN — ENOXAPARIN SODIUM 40 MILLIGRAM(S): 100 INJECTION SUBCUTANEOUS at 21:28

## 2024-09-02 RX ADMIN — Medication 1 TABLET(S): at 11:17

## 2024-09-02 RX ADMIN — Medication 800 MILLIGRAM(S): at 05:58

## 2024-09-02 RX ADMIN — LOSARTAN POTASSIUM 100 MILLIGRAM(S): 50 TABLET ORAL at 05:58

## 2024-09-02 RX ADMIN — DICLOFENAC SODIUM 2 GRAM(S): 20 SOLUTION TOPICAL at 18:22

## 2024-09-02 RX ADMIN — Medication 800 MILLIGRAM(S): at 13:05

## 2024-09-02 RX ADMIN — TAMSULOSIN HYDROCHLORIDE 0.4 MILLIGRAM(S): 0.4 CAPSULE ORAL at 21:27

## 2024-09-02 RX ADMIN — Medication 40 MILLIGRAM(S): at 21:28

## 2024-09-02 NOTE — PROGRESS NOTE ADULT - ASSESSMENT
75 y/o M with PMH HTN, melanoma, and a spontaneous T12 intradural hematoma in 2021 s/p T10-L2 laminectomy who is admitted for Acute Inpatient Rehabilitation with a multidisciplinary rehab program at [facility] with functional impairments in ADLs and mobility secondary to undergoing elective surgery for cervical AVF w/ C2-5 lami and fistula ligation on 8/12/24.     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 New mass effect upon the descending bilateral L3 nerve roots in the lateral recesses  -Continue comprehensive rehab    #HTN  -Continue Losartan 100mg daily    #Normocytic Anemia  -Likely postoperative blood loss  -Stable H/H  -Monitor and transfuse if Hb<7    #DVT prophylaxis  -Lovenox

## 2024-09-02 NOTE — PROGRESS NOTE ADULT - SUBJECTIVE AND OBJECTIVE BOX
Cc: Gait dysfunction    HPI: Patient seen and examined at bedside. No acute events overnight.   Pain controlled. Has been tolerating rehabilitation program.    acetaminophen     Tablet .. 650 milliGRAM(s) Oral every 6 hours PRN  atorvastatin 40 milliGRAM(s) Oral at bedtime  diclofenac sodium 1% Gel 2 Gram(s) Topical two times a day  enoxaparin Injectable 40 milliGRAM(s) SubCutaneous <User Schedule>  gabapentin 900 milliGRAM(s) Oral at bedtime  gabapentin 800 milliGRAM(s) Oral <User Schedule>  lidocaine   4% Patch 1 Patch Transdermal every 24 hours  lidocaine   4% Patch 1 Patch Transdermal every 24 hours  losartan 100 milliGRAM(s) Oral daily  methocarbamol 750 milliGRAM(s) Oral three times a day PRN  multivitamin 1 Tablet(s) Oral daily  oxyCODONE    IR 10 milliGRAM(s) Oral every 6 hours PRN  oxyCODONE    IR 5 milliGRAM(s) Oral every 6 hours PRN  polyethylene glycol 3350 17 Gram(s) Oral every 12 hours  senna 2 Tablet(s) Oral at bedtime  tamsulosin 0.4 milliGRAM(s) Oral at bedtime      T(C): 36.8 (09-02-24 @ 08:47), Max: 36.9 (09-01-24 @ 19:40)  HR: 87 (09-02-24 @ 08:47) (87 - 88)  BP: 130/77 (09-02-24 @ 08:47) (117/77 - 130/77)  RR: 16 (09-02-24 @ 08:47) (16 - 16)  SpO2: 97% (09-02-24 @ 08:47) (96% - 97%)      CBC 9/2/24 reviewed  CMP 9/2/24 reviewed  CBC 8/29/24 reviewed    In NAD  HEENT- EOMI  Heart- S1S2  Lungs- CTA bl.  Abd- + BS, NT  Ext- No calf pain  Neuro- Exam unchanged      Imp: Patient with diagnosis of s/p laminectomy for spinal cord AVM admitted for comprehensive acute rehabilitation.    Plan:  - Continue PT/OT/SLP as indicated  - DVT prophylaxis - Continue Lovenox subQ  - Skin- Turn q2h, check skin daily  - Continue current medications  -Active issues-   Pain: Continue Oxycodone PRN, Gabapentin, Voltaren Gel, Tylenol PRN, Robaxin PRN, Lidocaine patches  - Patient is stable to continue current rehabilitation program.

## 2024-09-02 NOTE — PROGRESS NOTE ADULT - NSPROGADDITIONALINFOA_GEN_ALL_CORE
I have personally interviewed and examined this patient, reviewed pertinent clinical information, and performed the evaluation and management services provided at today's visit for inpatient medical follow up    I am available to discuss any issues related to the medical care of this patient on the unit this morning, through [employer] Teams Chat or by phone at [phone]
I have personally interviewed and examined this patient, reviewed pertinent clinical information, and performed the evaluation and management services provided at today's visit for inpatient medical follow up    I am available to discuss any issues related to the medical care of this patient on the unit this morning, through [employer] Teams Chat or by phone at [phone]
I have personally interviewed and examined this patient, reviewed pertinent clinical information, and performed the evaluation and management services provided at today's visit for inpatient medical follow up    I am available to discuss any issues related to the medical care of this patient on the unit this morning, through [employer] Teams Chat or by phone at [phone]    Will d/w PMR team @  [facility]

## 2024-09-02 NOTE — PROGRESS NOTE ADULT - SUBJECTIVE AND OBJECTIVE BOX
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 There were no acute medical events yesterday or overnight and patient is without complaints this morning.  He has been participating in therapies    REVIEW OF SYMPTOMS: patient denies HA's, CP, palpitations, shortness of breath or upper respiratory symptoms, nausea, vomiting, diarrhea, constipation, dysuria, bruising/bleeding and all other systems were reviewed as negative      ALLERGIES:  Celebrex (Other)  Shrimp (Angioedema)    MEDICATIONS  (STANDING):  atorvastatin 40 milliGRAM(s) Oral at bedtime  diclofenac sodium 1% Gel 2 Gram(s) Topical two times a day  enoxaparin Injectable 40 milliGRAM(s) SubCutaneous <User Schedule>  gabapentin 900 milliGRAM(s) Oral at bedtime  gabapentin 800 milliGRAM(s) Oral <User Schedule>  lidocaine   4% Patch 1 Patch Transdermal every 24 hours  lidocaine   4% Patch 1 Patch Transdermal every 24 hours  losartan 100 milliGRAM(s) Oral daily  multivitamin 1 Tablet(s) Oral daily  polyethylene glycol 3350 17 Gram(s) Oral every 12 hours  senna 2 Tablet(s) Oral at bedtime  tamsulosin 0.4 milliGRAM(s) Oral at bedtime    MEDICATIONS  (PRN):  acetaminophen     Tablet .. 650 milliGRAM(s) Oral every 6 hours PRN Mild Pain (1 - 3)  methocarbamol 750 milliGRAM(s) Oral three times a day PRN Muscle Spasm  oxyCODONE    IR 10 milliGRAM(s) Oral every 6 hours PRN Severe Pain (7 - 10)  oxyCODONE    IR 5 milliGRAM(s) Oral every 6 hours PRN Moderate Pain (4 - 6)    Vital Signs Last 24 Hrs  T(F): 98.3 (02 Sep 2024 08:47), Max: 98.4 (01 Sep 2024 19:40)  HR: 87 (02 Sep 2024 08:47) (87 - 88)  BP: 130/77 (02 Sep 2024 08:47) (117/77 - 130/77)  RR: 16 (02 Sep 2024 08:47) (16 - 16)  SpO2: 97% (02 Sep 2024 08:47) (96% - 97%)  I&O's Summary              PHYSICAL EXAM:  General: NAD, A/O x 3  ENT: MMM  Neck: Supple, No JVD  Lungs: Clear to auscultation bilaterally  Cardio: RRR, S1/S2, No murmurs  Abdomen: Soft, Nontender, Nondistended; Bowel sounds present  Extremities: No calf tenderness, No pitting edema      LABS:                        11.3   7.45  )-----------( 251      ( 02 Sep 2024 06:45 )             33.7       09-02    138  |  103  |  15  ----------------------------<  108  4.2   |  31  |  0.74    Ca    9.2      02 Sep 2024 06:45    TPro  6.5  /  Alb  3.1  /  TBili  1.3  /  DBili  x   /  AST  16  /  ALT  40  /  AlkPhos  73  09-02                            Urinalysis Basic - ( 02 Sep 2024 06:45 )    Color: x / Appearance: x / SG: x / pH: x  Gluc: 108 mg/dL / Ketone: x  / Bili: x / Urobili: x   Blood: x / Protein: x / Nitrite: x   Leuk Esterase: x / RBC: x / WBC x   Sq Epi: x / Non Sq Epi: x / Bacteria: x        COVID-19 PCR: Raquel (08-19-24 @ 19:48)

## 2024-09-03 PROCEDURE — 99232 SBSQ HOSP IP/OBS MODERATE 35: CPT

## 2024-09-03 RX ADMIN — DICLOFENAC SODIUM 2 GRAM(S): 20 SOLUTION TOPICAL at 15:17

## 2024-09-03 RX ADMIN — ACETAMINOPHEN 650 MILLIGRAM(S): 325 TABLET ORAL at 22:09

## 2024-09-03 RX ADMIN — Medication 900 MILLIGRAM(S): at 21:08

## 2024-09-03 RX ADMIN — Medication 1 TABLET(S): at 11:03

## 2024-09-03 RX ADMIN — Medication 800 MILLIGRAM(S): at 14:23

## 2024-09-03 RX ADMIN — Medication 40 MILLIGRAM(S): at 21:09

## 2024-09-03 RX ADMIN — ENOXAPARIN SODIUM 40 MILLIGRAM(S): 100 INJECTION SUBCUTANEOUS at 21:09

## 2024-09-03 RX ADMIN — LOSARTAN POTASSIUM 100 MILLIGRAM(S): 50 TABLET ORAL at 05:07

## 2024-09-03 RX ADMIN — TAMSULOSIN HYDROCHLORIDE 0.4 MILLIGRAM(S): 0.4 CAPSULE ORAL at 21:09

## 2024-09-03 RX ADMIN — Medication 800 MILLIGRAM(S): at 05:07

## 2024-09-03 RX ADMIN — ACETAMINOPHEN 650 MILLIGRAM(S): 325 TABLET ORAL at 21:09

## 2024-09-03 NOTE — PROGRESS NOTE ADULT - SUBJECTIVE AND OBJECTIVE BOX
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 Denis Toro is a 74-year-old male patient with past medical history of HTN, melanoma, and a spontaneous T12 intradural hematoma in 2021 s/p T10-L2 laminectomy who presented for elective treatment of cervical AVF w/ C2-5 lami and fistula ligation on 8/12/24. Intraop angiogram showed resolution of C3-4 dAVF w/ L vertebral artery dissection, then underwent formal angiogram the following day, which showed resolution of the fistula, and improving vertebral artery dissection. Post-operatively, patient had urinary retention. However, his Vazquez was removed and the patient voided on 8/19/24. PM&R was consulted and deemed him an appropriate candidate for IRF. He was cleared for discharge to [facility] on 8/19/24.  (19 Aug 2024 14:41)    TDD: 9/7 to Home    Allergies    Celebrex (Other)  Shrimp (Angioedema)    Intolerances        SUBJECTIVE/ROS: Pt doing well, denies any new symptoms or complaints. Tolerating therapy and progressing well. Reviewed RW use on dc as a safety device for fall prevention, patient expressed understanding and agreed to use. No further complaints or concerns.     MEDICATIONS  (STANDING):  atorvastatin 40 milliGRAM(s) Oral at bedtime  diclofenac sodium 1% Gel 2 Gram(s) Topical two times a day  enoxaparin Injectable 40 milliGRAM(s) SubCutaneous <User Schedule>  gabapentin 900 milliGRAM(s) Oral at bedtime  gabapentin 800 milliGRAM(s) Oral <User Schedule>  lidocaine   4% Patch 1 Patch Transdermal every 24 hours  lidocaine   4% Patch 1 Patch Transdermal every 24 hours  losartan 100 milliGRAM(s) Oral daily  multivitamin 1 Tablet(s) Oral daily  polyethylene glycol 3350 17 Gram(s) Oral every 12 hours  senna 2 Tablet(s) Oral at bedtime  tamsulosin 0.4 milliGRAM(s) Oral at bedtime    MEDICATIONS  (PRN):  acetaminophen     Tablet .. 650 milliGRAM(s) Oral every 6 hours PRN Mild Pain (1 - 3)  methocarbamol 750 milliGRAM(s) Oral three times a day PRN Muscle Spasm  oxyCODONE    IR 10 milliGRAM(s) Oral every 6 hours PRN Severe Pain (7 - 10)  oxyCODONE    IR 5 milliGRAM(s) Oral every 6 hours PRN Moderate Pain (4 - 6)      RECENT LABS:                          11.3   7.45  )-----------( 251      ( 02 Sep 2024 06:45 )             33.7     09-02    138  |  103  |  15  ----------------------------<  108<H>  4.2   |  31  |  0.74    Ca    9.2      02 Sep 2024 06:45    TPro  6.5  /  Alb  3.1<L>  /  TBili  1.3<H>  /  DBili  x   /  AST  16  /  ALT  40  /  AlkPhos  73  09-02    LIVER FUNCTIONS - ( 02 Sep 2024 06:45 )  Alb: 3.1 g/dL / Pro: 6.5 g/dL / ALK PHOS: 73 U/L / ALT: 40 U/L / AST: 16 U/L / GGT: x             Urinalysis Basic - ( 02 Sep 2024 06:45 )    Color: x / Appearance: x / SG: x / pH: x  Gluc: 108 mg/dL / Ketone: x  / Bili: x / Urobili: x   Blood: x / Protein: x / Nitrite: x   Leuk Esterase: x / RBC: x / WBC x   Sq Epi: x / Non Sq Epi: x / Bacteria: x          CAPILLARY BLOOD GLUCOSE            PHYSICAL EXAM  74y  Vital Signs Last 24 Hrs  T(C): 36.5 (03 Sep 2024 08:35), Max: 36.5 (02 Sep 2024 19:57)  T(F): 97.7 (03 Sep 2024 08:35), Max: 97.7 (02 Sep 2024 19:57)  HR: 82 (03 Sep 2024 08:35) (81 - 82)  BP: 104/67 (03 Sep 2024 08:35) (104/67 - 115/73)  BP(mean): --  RR: 15 (03 Sep 2024 08:35) (15 - 16)  SpO2: 97% (03 Sep 2024 08:35) (94% - 97%)    Parameters below as of 03 Sep 2024 08:35  Patient On (Oxygen Delivery Method): room air      Gen - NAD, Comfortable  HEENT - NCAT, EOMI  Neck - Supple, Limited ROM   Pulm - Breathing comfortably on RA   Extremities - No calf tenderness  Neuro-     Cognitive - AAOx3      Gait- instability noted, but able to stand and turn with support.  Psychiatric - Mood stable, Affect WNL  Skin: Intact. Incision without erythema s/p staple removal       HPI:  Mr. Denis Toro is a 74-year-old male patient with past medical history of HTN, melanoma, and a spontaneous T12 intradural hematoma in 2021 s/p T10-L2 laminectomy who presented for elective treatment of cervical AVF w/ C2-5 lami and fistula ligation on 8/12/24. Intraop angiogram showed resolution of C3-4 dAVF w/ L vertebral artery dissection, then underwent formal angiogram the following day, which showed resolution of the fistula, and improving vertebral artery dissection. Post-operatively, patient had urinary retention. However, his Vazquez was removed and the patient voided on 8/19/24. PM&R was consulted and deemed him an appropriate candidate for IRF. He was cleared for discharge to [facility] on 8/19/24.  (19 Aug 2024 14:41)  TDD: 9/7 to Home  ___________________________________________________________________________    SUBJECTIVE/ROS  Patient was seen and evaluated at bedside today.  Reported no overnight events and is in no acute distress.  Tolerating therapy and progressing well.   Reviewed RW use on dc as a safety device for fall prevention, patient expressed understanding and agreed to use.  Case was discussed at Interdisciplinary Team meeting today.  Tentative discharge date updated as outlined above given response to treatment.  Eager to participate on the recommended rehabilitation program.  Denies any CP, SOB, GAMA, palpitations, fever, chills, body aches, cough, congestion, or any other symptoms at this time.     ___________________________________________________________________________    VITALS  T(C): 36.5 (09-03-24 @ 08:35), Max: 36.5 (09-02-24 @ 19:57)  HR: 82 (09-03-24 @ 08:35) (81 - 82)  BP: 104/67 (09-03-24 @ 08:35) (104/67 - 115/73)  RR: 15 (09-03-24 @ 08:35) (15 - 16)  SpO2: 97% (09-03-24 @ 08:35) (94% - 97%)  ___________________________________________________________________________    LABS                          11.3   7.45  )-----------( 251      ( 02 Sep 2024 06:45 )             33.7       09-02    138  |  103  |  15  ----------------------------<  108<H>  4.2   |  31  |  0.74    Ca    9.2      02 Sep 2024 06:45    TPro  6.5  /  Alb  3.1<L>  /  TBili  1.3<H>  /  DBili  x   /  AST  16  /  ALT  40  /  AlkPhos  73  09-02    ___________________________________________________________________________    MEDICATIONS  (STANDING):  atorvastatin 40 milliGRAM(s) Oral at bedtime  diclofenac sodium 1% Gel 2 Gram(s) Topical two times a day  enoxaparin Injectable 40 milliGRAM(s) SubCutaneous <User Schedule>  gabapentin 900 milliGRAM(s) Oral at bedtime  gabapentin 800 milliGRAM(s) Oral <User Schedule>  lidocaine   4% Patch 1 Patch Transdermal every 24 hours  lidocaine   4% Patch 1 Patch Transdermal every 24 hours  losartan 100 milliGRAM(s) Oral daily  multivitamin 1 Tablet(s) Oral daily  polyethylene glycol 3350 17 Gram(s) Oral every 12 hours  senna 2 Tablet(s) Oral at bedtime  tamsulosin 0.4 milliGRAM(s) Oral at bedtime    MEDICATIONS  (PRN):  acetaminophen     Tablet .. 650 milliGRAM(s) Oral every 6 hours PRN Mild Pain (1 - 3)  methocarbamol 750 milliGRAM(s) Oral three times a day PRN Muscle Spasm  oxyCODONE    IR 10 milliGRAM(s) Oral every 6 hours PRN Severe Pain (7 - 10)  oxyCODONE    IR 5 milliGRAM(s) Oral every 6 hours PRN Moderate Pain (4 - 6)    ___________________________________________________________________________    PHYSICAL EXAM:    Gen - NAD, Comfortable  HEENT - NCAT, EOMI  Neck - Supple, Limited ROM   Pulm - Breathing comfortably on RA   Extremities - No calf tenderness  Neuro-     Cognitive - AAOx3      Gait- instability noted, but able to stand and turn with support.  Psychiatric - Mood stable, Affect WNL  Skin - Intact. Incision without erythema, no staples removal  ___________________________________________________________________________

## 2024-09-03 NOTE — PROGRESS NOTE ADULT - SUBJECTIVE AND OBJECTIVE BOX
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 No acute evnts overnight.    REVIEW OF SYMPTOMS: patient denies HA's, CP, palpitations, shortness of breath or upper respiratory symptoms, nausea, vomiting, diarrhea, constipation, dysuria, bruising/bleeding and all other systems were reviewed as negative      ALLERGIES:  Celebrex (Other)  Shrimp (Angioedema)    MEDICATIONS  (STANDING):  atorvastatin 40 milliGRAM(s) Oral at bedtime  diclofenac sodium 1% Gel 2 Gram(s) Topical two times a day  enoxaparin Injectable 40 milliGRAM(s) SubCutaneous <User Schedule>  gabapentin 900 milliGRAM(s) Oral at bedtime  gabapentin 800 milliGRAM(s) Oral <User Schedule>  lidocaine   4% Patch 1 Patch Transdermal every 24 hours  lidocaine   4% Patch 1 Patch Transdermal every 24 hours  losartan 100 milliGRAM(s) Oral daily  multivitamin 1 Tablet(s) Oral daily  polyethylene glycol 3350 17 Gram(s) Oral every 12 hours  senna 2 Tablet(s) Oral at bedtime  tamsulosin 0.4 milliGRAM(s) Oral at bedtime    MEDICATIONS  (PRN):  acetaminophen     Tablet .. 650 milliGRAM(s) Oral every 6 hours PRN Mild Pain (1 - 3)  methocarbamol 750 milliGRAM(s) Oral three times a day PRN Muscle Spasm  oxyCODONE    IR 10 milliGRAM(s) Oral every 6 hours PRN Severe Pain (7 - 10)  oxyCODONE    IR 5 milliGRAM(s) Oral every 6 hours PRN Moderate Pain (4 - 6)    T(C): 36.5 (09-03-24 @ 08:35), Max: 36.5 (09-02-24 @ 19:57)  T(F): 97.7 (09-03-24 @ 08:35), Max: 97.7 (09-02-24 @ 19:57)  HR: 82 (09-03-24 @ 08:35) (81 - 82)  BP: 104/67 (09-03-24 @ 08:35) (104/67 - 115/73)  ABP: --  ABP(mean): --  RR: 15 (09-03-24 @ 08:35) (15 - 16)  SpO2: 97% (09-03-24 @ 08:35) (94% - 97%)    PHYSICAL EXAM:  General: NAD, A/O x 3  ENT: MMM  Neck: Supple, No JVD  Lungs: Clear to auscultation bilaterally  Cardio: RRR, S1/S2, No murmurs  Abdomen: Soft, Nontender, Nondistended; Bowel sounds present  Extremities: No calf tenderness, No pitting edema    LABS:                        11.3   7.45  )-----------( 251      ( 02 Sep 2024 06:45 )             33.7     09-02    138  |  103  |  15  ----------------------------<  108<H>  4.2   |  31  |  0.74    Ca    9.2      02 Sep 2024 06:45    TPro  6.5  /  Alb  3.1<L>  /  TBili  1.3<H>  /  DBili  x   /  AST  16  /  ALT  40  /  AlkPhos  73  09-02      Urinalysis Basic - ( 02 Sep 2024 06:45 )    Color: x / Appearance: x / SG: x / pH: x  Gluc: 108 mg/dL / Ketone: x  / Bili: x / Urobili: x   Blood: x / Protein: x / Nitrite: x   Leuk Esterase: x / RBC: x / WBC x   Sq Epi: x / Non Sq Epi: x / Bacteria: x       CAPILLARY BLOOD GLUCOSE            Urinalysis Basic - ( 02 Sep 2024 06:45 )    Color: x / Appearance: x / SG: x / pH: x  Gluc: 108 mg/dL / Ketone: x  / Bili: x / Urobili: x   Blood: x / Protein: x / Nitrite: x   Leuk Esterase: x / RBC: x / WBC x   Sq Epi: x / Non Sq Epi: x / Bacteria: x        RADIOLOGY & ADDITIONAL TESTS:    Consultant(s) Notes Reviewed:  [x ] YES  [ ] NO  Care Discussed with Consultants/Other Providers [ x] YES  [ ] NO  Imaging Personally Reviewed:  [x ] YES  [ ] NO

## 2024-09-03 NOTE — CHART NOTE - NSCHARTNOTEFT_GEN_A_CORE
Nutrition Follow Up Note  Hospital Course   (Per Electronic Medical Record)    Source:  Patient [X]  Medical Record [X]      Diet:   Regular Diet (IDDSI Level 7) w/ Thin Liquids (IDDSI Level 0)  Tolerates Diet Consistency Well  No Chewing/Swallowing Difficulties   No Recent Nausea, Vomiting, Diarrhea or Constipation (as Per Patient)   Consumes % of Meals (as Per Documentation) - States Fair-Good Intake (Per Patient)   on Ensure Clear 8oz PO Daily (Provides 240kcal & 8grams of Protein)  Patient Takes Nutrition Supplement Well    Enteral/Parenteral Nutrition: Not Applicable    Current Weight: 181.2lb on 8/23  Obtain New Weight  Obtain Weights Weekly     Pertinent Medications: MEDICATIONS  (STANDING):  atorvastatin 40 milliGRAM(s) Oral at bedtime  diclofenac sodium 1% Gel 2 Gram(s) Topical two times a day  enoxaparin Injectable 40 milliGRAM(s) SubCutaneous <User Schedule>  gabapentin 800 milliGRAM(s) Oral <User Schedule>  gabapentin 900 milliGRAM(s) Oral at bedtime  lidocaine   4% Patch 1 Patch Transdermal every 24 hours  lidocaine   4% Patch 1 Patch Transdermal every 24 hours  losartan 100 milliGRAM(s) Oral daily  multivitamin 1 Tablet(s) Oral daily  polyethylene glycol 3350 17 Gram(s) Oral every 12 hours  senna 2 Tablet(s) Oral at bedtime  tamsulosin 0.4 milliGRAM(s) Oral at bedtime    MEDICATIONS  (PRN):  acetaminophen     Tablet .. 650 milliGRAM(s) Oral every 6 hours PRN Mild Pain (1 - 3)  methocarbamol 750 milliGRAM(s) Oral three times a day PRN Muscle Spasm  oxyCODONE    IR 5 milliGRAM(s) Oral every 6 hours PRN Moderate Pain (4 - 6)  oxyCODONE    IR 10 milliGRAM(s) Oral every 6 hours PRN Severe Pain (7 - 10)    Pertinent Labs:  08-26 Na136 mmol/L Glu 102 mg/dL<H> K+ 3.8 mmol/L Cr  0.71 mg/dL BUN 18 mg/dL 08-26 Alb 2.9 g/dL<L>    Skin: No Pressure Ulcers   Surgical Incision on Neck  (as Per Nursing Flow Sheet)     Edema: None Noted (as Per Documentation)     Last Bowel Movement: on 8/26    Estimated Needs:   [X] No Change Since Previous Assessment    Previous Nutrition Diagnosis:   Moderate Malnutrition     Nutrition Diagnosis is [X] Ongoing - Continues on Nutrition Supplement & Patient Takes Nutrition Supplement     New Nutrition Diagnosis: [X] Not Applicable    Interventions:   1. Recommend Continue Nutrition Plan of Care     Monitoring & Evaluation:   [X] Weights   [X] PO Intake   [X] Skin Integrity   [X] Follow Up (Per Protocol)  [X] Tolerance to Diet Prescription   [X] Other: Labs     Registered Dietitian/Nutritionist Remains Available.  Rakesh Wall, TOMIN, CDN    Phone# [phone]
Nutrition Follow Up Note  Hospital Course   (Per Electronic Medical Record)    Source:  Patient [X]  Medical Record [X]      Diet:   Regular Diet (IDDSI Level 7) w/ Thin Liquids (IDDSI Level 0)  Tolerates Diet Consistency Well  No Chewing/Swallowing Difficulties  No Recent Nausea, Vomiting, Diarrhea or Constipation (as Per Patient)  Consumes % of Meals (as Per Documentation) - States Fair Intake (Per Patient)  on Ensure Clear 8oz PO Daily (Provides 240kcal & 8grams of Protein)  Patient Takes Nutrition Supplement Well  Education Provided on Proper Nutrition  Obtained Food Preferences from Patient  Visitors Bring in Food from Outside of Facility     Enteral/Parenteral Nutrition: Not Applicable    Current Weight: 181.2lb on 8/22  Obtain New Weight  Obtain Weights Weekly     Pertinent Medications: MEDICATIONS  (STANDING):  atorvastatin 40 milliGRAM(s) Oral at bedtime  enoxaparin Injectable 40 milliGRAM(s) SubCutaneous <User Schedule>  gabapentin 600 milliGRAM(s) Oral <User Schedule>  gabapentin 600 milliGRAM(s) Oral at bedtime  lidocaine   4% Patch 1 Patch Transdermal every 24 hours  lidocaine   4% Patch 1 Patch Transdermal every 24 hours  losartan 100 milliGRAM(s) Oral daily  multivitamin 1 Tablet(s) Oral daily  polyethylene glycol 3350 17 Gram(s) Oral every 12 hours  senna 2 Tablet(s) Oral at bedtime  tamsulosin 0.4 milliGRAM(s) Oral at bedtime    MEDICATIONS  (PRN):  acetaminophen     Tablet .. 650 milliGRAM(s) Oral every 6 hours PRN Mild Pain (1 - 3)  methocarbamol 750 milliGRAM(s) Oral three times a day PRN Muscle Spasm  oxyCODONE    IR 10 milliGRAM(s) Oral every 6 hours PRN Severe Pain (7 - 10)  oxyCODONE    IR 5 milliGRAM(s) Oral every 6 hours PRN Moderate Pain (4 - 6)    Pertinent Labs:  08-22 Na137 mmol/L Glu 118 mg/dL<H> K+ 4.7 mmol/L Cr  0.66 mg/dL BUN 18 mg/dL 08-19 Phos 3.2 mg/dL 08-22 Alb 2.8 g/dL<L>    Skin: No Pressure Ulcers  Surgical Incision on Neck  (as Per Nursing Flow Sheet)     Edema: None Noted (as Per Documentation)     Last Bowel Movement: on 8/22    Estimated Needs:   [X] No Change Since Previous Assessment    Previous Nutrition Diagnosis:   Moderate Malnutrition     Nutrition Diagnosis is [X] Ongoing - Continues on Nutrition Supplement & Patient Takes Nutrition Supplement     New Nutrition Diagnosis: [X] Not Applicable    Interventions:   1. Education Provided on Proper Nutrition   2. Recommend Continue Nutrition Plan of Care     Monitoring & Evaluation:   [X] Weights   [X] PO Intake   [X] Skin Integrity   [X] Follow Up (Per Protocol)  [X] Tolerance to Diet Prescription   [X] Other: Labs     Registered Dietitian/Nutritionist Remains Available.  Rakesh Wall, TOMIN, CDN    Phone# [phone]
Nutrition Follow Up Note  Hospital Course   (Per Electronic Medical Record)    Source:  Patient [X]  Medical Record [X]      Diet:   Regular Diet (IDDSI Level 7) w/ Thin Liquids (IDDSI Level 0)  Tolerates Diet Consistency Well  No Chewing/Swallowing Difficulties  No Recent Nausea, Vomiting, Diarrhea or Constipation (as Per Patient)  Consumes % of Meals (as Per Documentation) - Staets Good PO Intake/Appetite (Per Patient)  on Ensure Clear 8oz PO Daily (Provides 240kcal & 8grams of Protein)   Patient Takes Nutrition Supplement Well    Enteral/Parenteral Nutrition: Not Applicable    Current Weight: 181.2lb on 8/25  Obtain New Weight  Obtain Weights Weekly     Pertinent Medications: MEDICATIONS  (STANDING):  atorvastatin 40 milliGRAM(s) Oral at bedtime  diclofenac sodium 1% Gel 2 Gram(s) Topical two times a day  enoxaparin Injectable 40 milliGRAM(s) SubCutaneous <User Schedule>  gabapentin 900 milliGRAM(s) Oral at bedtime  gabapentin 800 milliGRAM(s) Oral <User Schedule>  lidocaine   4% Patch 1 Patch Transdermal every 24 hours  lidocaine   4% Patch 1 Patch Transdermal every 24 hours  losartan 100 milliGRAM(s) Oral daily  multivitamin 1 Tablet(s) Oral daily  polyethylene glycol 3350 17 Gram(s) Oral every 12 hours  senna 2 Tablet(s) Oral at bedtime  tamsulosin 0.4 milliGRAM(s) Oral at bedtime    MEDICATIONS  (PRN):  acetaminophen     Tablet .. 650 milliGRAM(s) Oral every 6 hours PRN Mild Pain (1 - 3)  methocarbamol 750 milliGRAM(s) Oral three times a day PRN Muscle Spasm  oxyCODONE    IR 5 milliGRAM(s) Oral every 6 hours PRN Moderate Pain (4 - 6)  oxyCODONE    IR 10 milliGRAM(s) Oral every 6 hours PRN Severe Pain (7 - 10)    Pertinent Labs:  09-02 Na138 mmol/L Glu 108 mg/dL<H> K+ 4.2 mmol/L Cr  0.74 mg/dL BUN 15 mg/dL 09-02 Alb 3.1 g/dL<L>    Skin: No Pressure Ulcers  Surgical Incision on Neck  (as Per Nursing Flow Sheet)     Edema: None Noted (as Per Documentation)     Last Bowel Movement: on 9/2    Estimated Needs:   [X] No Change Since Previous Assessment    Previous Nutrition Diagnosis:   Moderate Malnutrition    Nutrition Diagnosis is [X] Ongoing - Continues on Nutrition Supplement & Patient Takes Nutrition Supplement Well    New Nutrition Diagnosis: [X] Not Applicable    Interventions:   1. Recommend Continue Nutrition Plan of Care     Monitoring & Evaluation:   [X] Weights   [X] PO Intake   [X] Skin Integrity   [X] Follow Up (Per Protocol)  [X] Tolerance to Diet Prescription   [X] Other: Labs     Registered Dietitian/Nutritionist Remains Available.  Rakesh Wall, DAVID, CDN    Phone# [phone]

## 2024-09-03 NOTE — PROGRESS NOTE ADULT - ASSESSMENT
75 y/o M with PMH HTN, melanoma, and a spontaneous T12 intradural hematoma in 2021 s/p T10-L2 laminectomy who is admitted for Acute Inpatient Rehabilitation with a multidisciplinary rehab program at [facility] with functional impairments in ADLs and mobility secondary to undergoing elective surgery for cervical AVF w/ C2-5 lami and fistula ligation on 8/12/24.     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 New mass effect upon the descending bilateral L3 nerve roots in the lateral recesses  -Continue comprehensive rehab    #HTN  -Continue Losartan 100mg daily    #Normocytic Anemia  -Likely postoperative blood loss  -Stable H/H  -Monitor and transfuse if Hb<7    #DVT prophylaxis  -Lovenox

## 2024-09-03 NOTE — PROGRESS NOTE ADULT - ASSESSMENT
Mr. Denis Toro is a 74-year-old male patient with past medical history of HTN, melanoma, and a spontaneous T12 intradural hematoma in 2021 s/p T10-L2 laminectomy who is admitted for Acute Inpatient Rehabilitation with a multidisciplinary rehab program at [facility] with functional impairments in ADLs and mobility secondary to undergoing elective surgery for cervical AVF w/ C2-5 lami and fistula ligation on 8/12/24.       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  - S/P laminectomy for spinal cord AVM excision with ligation (8/12/24)      * Start Aspirin 81 mg daily on 8/26/24      * Vertebral artery dissection      * Post-operative urinary retention  - Vertebral Artery Dissection   - Bilateral lower extremity weakness  - Bilateral lower extremity sensory deficits  - Impaired ADLs and mobility  - Need for assistance with personal care  - Comprehensive Multidisciplinary Rehab Program:      * PT/OT/ SLP 3 hours a day 5 days a week  - new LLE pain  - CT pelvis negative for acute pathology/venous compression  - MRI L spine  with mod-severe spinal stenosis, bilateral L3 nerve root compression     #Pain:  - PRN Scale: Tylenol, Oxycodone 5 mg q6h, Oxycodone 10 mg q6h   - PRN Robaxin 750 mg TID  - Increased gabapentin to 800 BID in day, 900 at night- helping   - Lidocaine patch    #GI/Bowel:  - Senna 2 tabs daily  - Miralax q12h     #/Bladder:  - S/p kate and BS dc'd    #Diet / Dysphagia:    - Diet: Regular/DASH   - Ongoing SLP assessment  - Nutrition to follow    #Skin/ Pressure Injury Prevention:  - Assessment on admission: Surgical wound JUAN ALBERTO, staples intact s/p removal    #DVT prophylaxis:  - Lovenox 40 Qd    #Precautions/ Restrictions  - Falls, Spine    --------------------------------------------  Outpatient Follow up:  Jose Martin Jewell  Neurosurgery  [address] [place] [id number]  Phone: [phone]  Fax: [phone]  Follow Up Time: 1 week- rescheduled to 9/19    -------------------------------------------- Mr. Denis Toro is a 74-year-old male patient with past medical history of HTN, melanoma, and a spontaneous T12 intradural hematoma in 2021 s/p T10-L2 laminectomy who is admitted for Acute Inpatient Rehabilitation with a multidisciplinary rehab program at [facility] with functional impairments in ADLs and mobility secondary to undergoing elective surgery for cervical AVF w/ C2-5 lami and fistula ligation on 8/12/24.       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  - S/P laminectomy for spinal cord AVM excision with ligation (8/12/24)      * Start Aspirin 81 mg daily on 8/26/24      * Vertebral artery dissection      * Post-operative urinary retention  - Vertebral Artery Dissection   - Bilateral lower extremity weakness  - Bilateral lower extremity sensory deficits  - Impaired ADLs and mobility  - Need for assistance with personal care  - Comprehensive Multidisciplinary Rehab Program:      * PT/OT/ SLP 3 hours a day 5 days a week  - new LLE pain  - CT pelvis negative for acute pathology/venous compression  - MRI lumbar spine with mod-severe spinal stenosis, bilateral L3 nerve root compression     #Pain:  - PRN Scale: Tylenol, Oxycodone 5 mg q6h, Oxycodone 10 mg q6h   - PRN Robaxin 750 mg TID  - Increased gabapentin to 800 BID in day, 900 at night- helping   - Lidocaine patch    #GI/Bowel:  - Senna 2 tabs daily  - Miralax q12h     #/Bladder:  - S/p kate and BS dc'd    #Diet / Dysphagia:    - Diet: Regular/DASH   - Ongoing SLP assessment  - Nutrition to follow    #Skin/ Pressure Injury Prevention:  - Assessment on admission: Surgical wound JUAN ALBERTO, staples intact s/p removal    #DVT prophylaxis:  - Lovenox 40 Qd    #Precautions/ Restrictions  - Falls, Spine    --------------------------------------------  Outpatient Follow up:  Jose Martin Jewell  Neurosurgery  [address] [place] [id number]  Phone: [phone]  Fax: [phone]  Follow Up Time: 1 week- rescheduled to 9/19    --------------------------------------------

## 2024-09-04 PROCEDURE — 99232 SBSQ HOSP IP/OBS MODERATE 35: CPT

## 2024-09-04 RX ADMIN — Medication 1 TABLET(S): at 13:01

## 2024-09-04 RX ADMIN — Medication 800 MILLIGRAM(S): at 06:07

## 2024-09-04 RX ADMIN — Medication 40 MILLIGRAM(S): at 21:07

## 2024-09-04 RX ADMIN — DICLOFENAC SODIUM 2 GRAM(S): 20 SOLUTION TOPICAL at 18:05

## 2024-09-04 RX ADMIN — Medication 800 MILLIGRAM(S): at 14:33

## 2024-09-04 RX ADMIN — LOSARTAN POTASSIUM 100 MILLIGRAM(S): 50 TABLET ORAL at 06:07

## 2024-09-04 RX ADMIN — TAMSULOSIN HYDROCHLORIDE 0.4 MILLIGRAM(S): 0.4 CAPSULE ORAL at 21:07

## 2024-09-04 RX ADMIN — ENOXAPARIN SODIUM 40 MILLIGRAM(S): 100 INJECTION SUBCUTANEOUS at 21:07

## 2024-09-04 RX ADMIN — Medication 900 MILLIGRAM(S): at 21:07

## 2024-09-04 RX ADMIN — DICLOFENAC SODIUM 2 GRAM(S): 20 SOLUTION TOPICAL at 06:07

## 2024-09-04 NOTE — PROGRESS NOTE ADULT - SUBJECTIVE AND OBJECTIVE BOX
HPI:  Mr. Denis Toro is a 74-year-old male patient with past medical history of HTN, melanoma, and a spontaneous T12 intradural hematoma in 2021 s/p T10-L2 laminectomy who presented for elective treatment of cervical AVF w/ C2-5 lami and fistula ligation on 8/12/24. Intraop angiogram showed resolution of C3-4 dAVF w/ L vertebral artery dissection, then underwent formal angiogram the following day, which showed resolution of the fistula, and improving vertebral artery dissection. Post-operatively, patient had urinary retention. However, his Vazquez was removed and the patient voided on 8/19/24. PM&R was consulted and deemed him an appropriate candidate for IRF. He was cleared for discharge to [facility] on 8/19/24.  (19 Aug 2024 14:41)  TDD: 9/7 to Home  ___________________________________________________________________________    SUBJECTIVE/ROS  Patient was seen and evaluated at bedside today.  Reported no overnight events and is in no acute distress.  Tolerating therapy and progressing well.   Case was discussed at Interdisciplinary Team meeting yesterday.  Tentative discharge date updated as outlined above given response to treatment.  Eager to participate on the recommended rehabilitation program as outpatient.  Denies any CP, SOB, GAMA, palpitations, fever, chills, body aches, cough, congestion, or any other symptoms at this time.     ___________________________________________________________________________    Vital Signs Last 24 Hrs  T(C): 36.5 (03 Sep 2024 19:38), Max: 36.5 (03 Sep 2024 19:38)  T(F): 97.7 (03 Sep 2024 19:38), Max: 97.7 (03 Sep 2024 19:38)  HR: 78 (04 Sep 2024 06:06) (78 - 84)  BP: 127/77 (04 Sep 2024 06:06) (120/74 - 127/77)  RR: 16 (03 Sep 2024 19:38) (16 - 16)  SpO2: 98% (03 Sep 2024 19:38) (98% - 98%)    ___________________________________________________________________________    LABS                          11.3   7.45  )-----------( 251      ( 02 Sep 2024 06:45 )             33.7       09-02    138  |  103  |  15  ----------------------------<  108<H>  4.2   |  31  |  0.74    Ca    9.2      02 Sep 2024 06:45    TPro  6.5  /  Alb  3.1<L>  /  TBili  1.3<H>  /  DBili  x   /  AST  16  /  ALT  40  /  AlkPhos  73  09-02    ___________________________________________________________________________    MEDICATIONS  (STANDING):  atorvastatin 40 milliGRAM(s) Oral at bedtime  diclofenac sodium 1% Gel 2 Gram(s) Topical two times a day  enoxaparin Injectable 40 milliGRAM(s) SubCutaneous <User Schedule>  gabapentin 900 milliGRAM(s) Oral at bedtime  gabapentin 800 milliGRAM(s) Oral <User Schedule>  lidocaine   4% Patch 1 Patch Transdermal every 24 hours  lidocaine   4% Patch 1 Patch Transdermal every 24 hours  losartan 100 milliGRAM(s) Oral daily  multivitamin 1 Tablet(s) Oral daily  polyethylene glycol 3350 17 Gram(s) Oral every 12 hours  senna 2 Tablet(s) Oral at bedtime  tamsulosin 0.4 milliGRAM(s) Oral at bedtime    MEDICATIONS  (PRN):  acetaminophen     Tablet .. 650 milliGRAM(s) Oral every 6 hours PRN Mild Pain (1 - 3)  methocarbamol 750 milliGRAM(s) Oral three times a day PRN Muscle Spasm    ___________________________________________________________________________    PHYSICAL EXAM:    Gen - NAD, Comfortable  HEENT - NCAT, EOMI  Neck - Supple, Limited ROM   Pulm - Breathing comfortably on RA   Extremities - No calf tenderness  Neuro-     Cognitive - AAOx3      Gait- instability noted, but able to stand and turn with support.  Psychiatric - Mood stable, Affect WNL  Skin - Intact. Incision without erythema, no staples removal  ___________________________________________________________________________

## 2024-09-04 NOTE — PROGRESS NOTE ADULT - SUBJECTIVE AND OBJECTIVE BOX
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 No acute evnts overnight.    REVIEW OF SYMPTOMS: patient denies HA's, CP, palpitations, shortness of breath or upper respiratory symptoms, nausea, vomiting, diarrhea, constipation, dysuria, bruising/bleeding and all other systems were reviewed as negative      ALLERGIES:  Celebrex (Other)  Shrimp (Angioedema)    MEDICATIONS  (STANDING):  atorvastatin 40 milliGRAM(s) Oral at bedtime  diclofenac sodium 1% Gel 2 Gram(s) Topical two times a day  enoxaparin Injectable 40 milliGRAM(s) SubCutaneous <User Schedule>  gabapentin 900 milliGRAM(s) Oral at bedtime  gabapentin 800 milliGRAM(s) Oral <User Schedule>  lidocaine   4% Patch 1 Patch Transdermal every 24 hours  lidocaine   4% Patch 1 Patch Transdermal every 24 hours  losartan 100 milliGRAM(s) Oral daily  multivitamin 1 Tablet(s) Oral daily  polyethylene glycol 3350 17 Gram(s) Oral every 12 hours  senna 2 Tablet(s) Oral at bedtime  tamsulosin 0.4 milliGRAM(s) Oral at bedtime    MEDICATIONS  (PRN):  acetaminophen     Tablet .. 650 milliGRAM(s) Oral every 6 hours PRN Mild Pain (1 - 3)  methocarbamol 750 milliGRAM(s) Oral three times a day PRN Muscle Spasm  oxyCODONE    IR 5 milliGRAM(s) Oral every 6 hours PRN Moderate Pain (4 - 6)  oxyCODONE    IR 10 milliGRAM(s) Oral every 6 hours PRN Severe Pain (7 - 10)      T(C): 36.5 (09-03-24 @ 19:38), Max: 36.5 (09-03-24 @ 19:38)  T(F): 97.7 (09-03-24 @ 19:38), Max: 97.7 (09-03-24 @ 19:38)  HR: 78 (09-04-24 @ 06:06) (78 - 84)  BP: 127/77 (09-04-24 @ 06:06) (120/74 - 127/77)  ABP: --  ABP(mean): --  RR: 16 (09-03-24 @ 19:38) (16 - 16)  SpO2: 98% (09-03-24 @ 19:38) (98% - 98%)    PHYSICAL EXAM:  General: NAD, A/O x 3  ENT: MMM  Neck: Supple, No JVD  Lungs: Clear to auscultation bilaterally  Cardio: RRR, S1/S2, No murmurs  Abdomen: Soft, Nontender, Nondistended; Bowel sounds present  Extremities: No calf tenderness, No pitting edema    LABS:                        11.3   7.45  )-----------( 251      ( 02 Sep 2024 06:45 )             33.7     09-02    138  |  103  |  15  ----------------------------<  108<H>  4.2   |  31  |  0.74    Ca    9.2      02 Sep 2024 06:45    TPro  6.5  /  Alb  3.1<L>  /  TBili  1.3<H>  /  DBili  x   /  AST  16  /  ALT  40  /  AlkPhos  73  09-02      Urinalysis Basic - ( 02 Sep 2024 06:45 )    Color: x / Appearance: x / SG: x / pH: x  Gluc: 108 mg/dL / Ketone: x  / Bili: x / Urobili: x   Blood: x / Protein: x / Nitrite: x   Leuk Esterase: x / RBC: x / WBC x   Sq Epi: x / Non Sq Epi: x / Bacteria: x       CAPILLARY BLOOD GLUCOSE            Urinalysis Basic - ( 02 Sep 2024 06:45 )    Color: x / Appearance: x / SG: x / pH: x  Gluc: 108 mg/dL / Ketone: x  / Bili: x / Urobili: x   Blood: x / Protein: x / Nitrite: x   Leuk Esterase: x / RBC: x / WBC x   Sq Epi: x / Non Sq Epi: x / Bacteria: x        RADIOLOGY & ADDITIONAL TESTS:    Consultant(s) Notes Reviewed:  [x ] YES  [ ] NO  Care Discussed with Consultants/Other Providers [ x] YES  [ ] NO  Imaging Personally Reviewed:  [x ] YES  [ ] NO

## 2024-09-04 NOTE — PROGRESS NOTE ADULT - ASSESSMENT
Mr. Denis Toro is a 74-year-old male patient with past medical history of HTN, melanoma, and a spontaneous T12 intradural hematoma in 2021 s/p T10-L2 laminectomy who is admitted for Acute Inpatient Rehabilitation with a multidisciplinary rehab program at [facility] with functional impairments in ADLs and mobility secondary to undergoing elective surgery for cervical AVF w/ C2-5 lami and fistula ligation on 8/12/24.       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  - S/P laminectomy for spinal cord AVM excision with ligation (8/12/24)      * Start Aspirin 81 mg daily on 8/26/24      * Vertebral artery dissection      * Post-operative urinary retention  - Vertebral Artery Dissection   - Bilateral lower extremity weakness  - Bilateral lower extremity sensory deficits  - Impaired ADLs and mobility  - Need for assistance with personal care  - Comprehensive Multidisciplinary Rehab Program:      * PT/OT/ SLP 3 hours a day 5 days a week  - new LLE pain  - CT pelvis negative for acute pathology/venous compression  - MRI lumbar spine with mod-severe spinal stenosis, bilateral L3 nerve root compression     #Pain:  - PRN Scale: Tylenol, Oxycodone 5 mg q6h, Oxycodone 10 mg q6h (discontinued on 9/4)  - PRN Robaxin 750 mg TID  - Increased gabapentin to 800 BID in day, 900 at night- helping   - Lidocaine patch    #GI/Bowel:  - Senna 2 tabs daily  - Miralax q12h     #/Bladder:  - S/p kate and BS dc'd    #Diet / Dysphagia:    - Diet: Regular/DASH   - Ongoing SLP assessment  - Nutrition to follow    #Skin/ Pressure Injury Prevention:  - Assessment on admission: Surgical wound JUAN ALBERTO, staples intact s/p removal    #DVT prophylaxis:  - Lovenox 40 Qd    #Precautions/ Restrictions  - Falls, Spine    --------------------------------------------  Outpatient Follow up:  Jose Martin Jewell  Neurosurgery  [address] [place] [id number]  Phone: [phone]  Fax: [phone]  Follow Up Time: 1 week- rescheduled to 9/19    --------------------------------------------

## 2024-09-04 NOTE — PROGRESS NOTE ADULT - ASSESSMENT
75 y/o M with PMH HTN, melanoma, and a spontaneous T12 intradural hematoma in 2021 s/p T10-L2 laminectomy who is admitted for Acute Inpatient Rehabilitation with a multidisciplinary rehab program at [facility] with functional impairments in ADLs and mobility secondary to undergoing elective surgery for cervical AVF w/ C2-5 lami and fistula ligation on 8/12/24.     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 New mass effect upon the descending bilateral L3 nerve roots in the lateral recesses  -Continue comprehensive rehab    #HTN  -Continue Losartan 100mg daily    #Normocytic Anemia  -Likely postoperative blood loss  -Stable H/H  -Monitor and transfuse if Hb<7    #DVT prophylaxis  -Lovenox

## 2024-09-05 LAB
ALBUMIN SERPL ELPH-MCNC: 2.9 G/DL — LOW (ref 3.3–5)
ALP SERPL-CCNC: 72 U/L — SIGNIFICANT CHANGE UP (ref 40–120)
ALT FLD-CCNC: 41 U/L — SIGNIFICANT CHANGE UP (ref 10–45)
ANION GAP SERPL CALC-SCNC: 6 MMOL/L — SIGNIFICANT CHANGE UP (ref 5–17)
AST SERPL-CCNC: 18 U/L — SIGNIFICANT CHANGE UP (ref 10–40)
BASOPHILS # BLD AUTO: 0.02 K/UL — SIGNIFICANT CHANGE UP (ref 0–0.2)
BASOPHILS NFR BLD AUTO: 0.4 % — SIGNIFICANT CHANGE UP (ref 0–2)
BILIRUB SERPL-MCNC: 1 MG/DL — SIGNIFICANT CHANGE UP (ref 0.2–1.2)
BUN SERPL-MCNC: 19 MG/DL — SIGNIFICANT CHANGE UP (ref 7–23)
CALCIUM SERPL-MCNC: 9.3 MG/DL — SIGNIFICANT CHANGE UP (ref 8.4–10.5)
CHLORIDE SERPL-SCNC: 104 MMOL/L — SIGNIFICANT CHANGE UP (ref 96–108)
CO2 SERPL-SCNC: 30 MMOL/L — SIGNIFICANT CHANGE UP (ref 22–31)
CREAT SERPL-MCNC: 0.7 MG/DL — SIGNIFICANT CHANGE UP (ref 0.5–1.3)
EGFR: 96 ML/MIN/1.73M2 — SIGNIFICANT CHANGE UP
EOSINOPHIL # BLD AUTO: 0.16 K/UL — SIGNIFICANT CHANGE UP (ref 0–0.5)
EOSINOPHIL NFR BLD AUTO: 3.5 % — SIGNIFICANT CHANGE UP (ref 0–6)
GLUCOSE SERPL-MCNC: 96 MG/DL — SIGNIFICANT CHANGE UP (ref 70–99)
HCT VFR BLD CALC: 32.5 % — LOW (ref 39–50)
HGB BLD-MCNC: 10.6 G/DL — LOW (ref 13–17)
IMM GRANULOCYTES NFR BLD AUTO: 0.4 % — SIGNIFICANT CHANGE UP (ref 0–0.9)
LYMPHOCYTES # BLD AUTO: 1.19 K/UL — SIGNIFICANT CHANGE UP (ref 1–3.3)
LYMPHOCYTES # BLD AUTO: 26 % — SIGNIFICANT CHANGE UP (ref 13–44)
MCHC RBC-ENTMCNC: 29.4 PG — SIGNIFICANT CHANGE UP (ref 27–34)
MCHC RBC-ENTMCNC: 32.6 GM/DL — SIGNIFICANT CHANGE UP (ref 32–36)
MCV RBC AUTO: 90.3 FL — SIGNIFICANT CHANGE UP (ref 80–100)
MONOCYTES # BLD AUTO: 0.48 K/UL — SIGNIFICANT CHANGE UP (ref 0–0.9)
MONOCYTES NFR BLD AUTO: 10.5 % — SIGNIFICANT CHANGE UP (ref 2–14)
NEUTROPHILS # BLD AUTO: 2.71 K/UL — SIGNIFICANT CHANGE UP (ref 1.8–7.4)
NEUTROPHILS NFR BLD AUTO: 59.2 % — SIGNIFICANT CHANGE UP (ref 43–77)
NRBC # BLD: 0 /100 WBCS — SIGNIFICANT CHANGE UP (ref 0–0)
PLATELET # BLD AUTO: 259 K/UL — SIGNIFICANT CHANGE UP (ref 150–400)
POTASSIUM SERPL-MCNC: 4.3 MMOL/L — SIGNIFICANT CHANGE UP (ref 3.5–5.3)
POTASSIUM SERPL-SCNC: 4.3 MMOL/L — SIGNIFICANT CHANGE UP (ref 3.5–5.3)
PROT SERPL-MCNC: 6.2 G/DL — SIGNIFICANT CHANGE UP (ref 6–8.3)
RBC # BLD: 3.6 M/UL — LOW (ref 4.2–5.8)
RBC # FLD: 13.6 % — SIGNIFICANT CHANGE UP (ref 10.3–14.5)
SODIUM SERPL-SCNC: 140 MMOL/L — SIGNIFICANT CHANGE UP (ref 135–145)
WBC # BLD: 4.58 K/UL — SIGNIFICANT CHANGE UP (ref 3.8–10.5)
WBC # FLD AUTO: 4.58 K/UL — SIGNIFICANT CHANGE UP (ref 3.8–10.5)

## 2024-09-05 PROCEDURE — 99232 SBSQ HOSP IP/OBS MODERATE 35: CPT

## 2024-09-05 RX ADMIN — TAMSULOSIN HYDROCHLORIDE 0.4 MILLIGRAM(S): 0.4 CAPSULE ORAL at 21:54

## 2024-09-05 RX ADMIN — Medication 900 MILLIGRAM(S): at 21:58

## 2024-09-05 RX ADMIN — Medication 800 MILLIGRAM(S): at 13:01

## 2024-09-05 RX ADMIN — Medication 40 MILLIGRAM(S): at 21:55

## 2024-09-05 RX ADMIN — ENOXAPARIN SODIUM 40 MILLIGRAM(S): 100 INJECTION SUBCUTANEOUS at 21:55

## 2024-09-05 RX ADMIN — Medication 1 TABLET(S): at 13:00

## 2024-09-05 RX ADMIN — Medication 800 MILLIGRAM(S): at 05:08

## 2024-09-05 RX ADMIN — LOSARTAN POTASSIUM 100 MILLIGRAM(S): 50 TABLET ORAL at 05:08

## 2024-09-05 NOTE — PROGRESS NOTE ADULT - ASSESSMENT
Mr. Denis Toro is a 74-year-old male patient with past medical history of HTN, melanoma, and a spontaneous T12 intradural hematoma in 2021 s/p T10-L2 laminectomy who is admitted for Acute Inpatient Rehabilitation with a multidisciplinary rehab program at [facility] with functional impairments in ADLs and mobility secondary to undergoing elective surgery for cervical AVF w/ C2-5 lami and fistula ligation on 8/12/24.       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  - S/P laminectomy for spinal cord AVM excision with ligation (8/12/24)      * Start Aspirin 81 mg daily on 8/26/24      * Vertebral artery dissection      * Post-operative urinary retention  - Vertebral Artery Dissection   - Bilateral lower extremity weakness  - Bilateral lower extremity sensory deficits  - Impaired ADLs and mobility  - Need for assistance with personal care  - Comprehensive Multidisciplinary Rehab Program:      * PT/OT/ SLP 3 hours a day 5 days a week  - new LLE pain  - CT pelvis negative for acute pathology/venous compression  - MRI lumbar spine with mod-severe spinal stenosis, bilateral L3 nerve root compression     #Pain:  - PRN Scale: Tylenol, Oxycodone 5 mg q6h, Oxycodone 10 mg q6h (discontinued on 9/4)  - PRN Robaxin 750 mg TID  - Increased gabapentin to 800 BID in day, 900 at night- helping   - Lidocaine patch    #GI/Bowel:  - Senna 2 tabs daily  - Miralax q12h     #/Bladder:  - S/p kate and BS dc'd    #Diet / Dysphagia:    - Diet: Regular/DASH   - Ongoing SLP assessment  - Nutrition to follow    #Skin/ Pressure Injury Prevention:  - Assessment on admission: Surgical wound JUAN ALBERTO, staples intact s/p removal    #DVT prophylaxis:  - Lovenox 40 Qd    #Precautions/ Restrictions  - Falls, Spine    --------------------------------------------  Outpatient Follow up:  Jose Martin Jewell  Neurosurgery  [address] [place] [id number]  Phone: [phone]  Fax: [phone]  Follow Up Time: 1 week- rescheduled to 9/19    --------------------------------------------

## 2024-09-05 NOTE — PROGRESS NOTE ADULT - SUBJECTIVE AND OBJECTIVE BOX
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 No acute evnts overnight.    REVIEW OF SYMPTOMS: patient denies HA's, CP, palpitations, shortness of breath or upper respiratory symptoms, nausea, vomiting, diarrhea, constipation, dysuria, bruising/bleeding and all other systems were reviewed as negative      ALLERGIES:  Celebrex (Other)  Shrimp (Angioedema)    MEDICATIONS  (STANDING):  atorvastatin 40 milliGRAM(s) Oral at bedtime  diclofenac sodium 1% Gel 2 Gram(s) Topical two times a day  enoxaparin Injectable 40 milliGRAM(s) SubCutaneous <User Schedule>  gabapentin 900 milliGRAM(s) Oral at bedtime  gabapentin 800 milliGRAM(s) Oral <User Schedule>  lidocaine   4% Patch 1 Patch Transdermal every 24 hours  lidocaine   4% Patch 1 Patch Transdermal every 24 hours  losartan 100 milliGRAM(s) Oral daily  multivitamin 1 Tablet(s) Oral daily  polyethylene glycol 3350 17 Gram(s) Oral every 12 hours  senna 2 Tablet(s) Oral at bedtime  tamsulosin 0.4 milliGRAM(s) Oral at bedtime    MEDICATIONS  (PRN):  acetaminophen     Tablet .. 650 milliGRAM(s) Oral every 6 hours PRN Mild Pain (1 - 3)  methocarbamol 750 milliGRAM(s) Oral three times a day PRN Muscle Spasm    T(C): 36.7 (09-05-24 @ 07:42), Max: 36.7 (09-04-24 @ 19:34)  T(F): 98 (09-05-24 @ 07:42), Max: 98 (09-04-24 @ 19:34)  HR: 79 (09-05-24 @ 07:42) (77 - 84)  BP: 113/75 (09-05-24 @ 07:42) (112/70 - 128/78)  ABP: --  ABP(mean): --  RR: 16 (09-05-24 @ 07:42) (16 - 16)  SpO2: 98% (09-05-24 @ 07:42) (98% - 100%)    PHYSICAL EXAM:  General: NAD, A/O x 3  ENT: MMM  Neck: Supple, No JVD  Lungs: Clear to auscultation bilaterally  Cardio: RRR, S1/S2, No murmurs  Abdomen: Soft, Nontender, Nondistended; Bowel sounds present  Extremities: No calf tenderness, No pitting edema    LABS:                        10.6   4.58  )-----------( 259      ( 05 Sep 2024 06:03 )             32.5     09-05    140  |  104  |  19  ----------------------------<  96  4.3   |  30  |  0.70    Ca    9.3      05 Sep 2024 06:03    TPro  6.2  /  Alb  2.9<L>  /  TBili  1.0  /  DBili  x   /  AST  18  /  ALT  41  /  AlkPhos  72  09-05      Urinalysis Basic - ( 05 Sep 2024 06:03 )    Color: x / Appearance: x / SG: x / pH: x  Gluc: 96 mg/dL / Ketone: x  / Bili: x / Urobili: x   Blood: x / Protein: x / Nitrite: x   Leuk Esterase: x / RBC: x / WBC x   Sq Epi: x / Non Sq Epi: x / Bacteria: x       CAPILLARY BLOOD GLUCOSE            Urinalysis Basic - ( 05 Sep 2024 06:03 )    Color: x / Appearance: x / SG: x / pH: x  Gluc: 96 mg/dL / Ketone: x  / Bili: x / Urobili: x   Blood: x / Protein: x / Nitrite: x   Leuk Esterase: x / RBC: x / WBC x   Sq Epi: x / Non Sq Epi: x / Bacteria: x        RADIOLOGY & ADDITIONAL TESTS:    Consultant(s) Notes Reviewed:  [x ] YES  [ ] NO  Care Discussed with Consultants/Other Providers [ x] YES  [ ] NO  Imaging Personally Reviewed:  [x ] YES  [ ] NO

## 2024-09-05 NOTE — PROGRESS NOTE ADULT - SUBJECTIVE AND OBJECTIVE BOX
HPI:  Mr. Denis Toro is a 74-year-old male patient with past medical history of HTN, melanoma, and a spontaneous T12 intradural hematoma in 2021 s/p T10-L2 laminectomy who presented for elective treatment of cervical AVF w/ C2-5 lami and fistula ligation on 8/12/24. Intraop angiogram showed resolution of C3-4 dAVF w/ L vertebral artery dissection, then underwent formal angiogram the following day, which showed resolution of the fistula, and improving vertebral artery dissection. Post-operatively, patient had urinary retention. However, his Vazquez was removed and the patient voided on 8/19/24. PM&R was consulted and deemed him an appropriate candidate for IRF. He was cleared for discharge to [facility] on 8/19/24.  (19 Aug 2024 14:41)  TDD: 9/7 to Home  ___________________________________________________________________________    SUBJECTIVE/ROS  Patient was seen and evaluated at bedside today.  Reported no overnight events and is in no acute distress.  Tolerating therapy and progressing well.   Eager to participate on the recommended rehabilitation program as outpatient.  Denies any CP, SOB, GAMA, palpitations, fever, chills, body aches, cough, congestion, or any other symptoms at this time.     ___________________________________________________________________________    Vital Signs Last 24 Hrs  T(C): 36.7 (05 Sep 2024 07:42), Max: 36.7 (04 Sep 2024 19:34)  T(F): 98 (05 Sep 2024 07:42), Max: 98 (04 Sep 2024 19:34)  HR: 79 (05 Sep 2024 07:42) (77 - 84)  BP: 113/75 (05 Sep 2024 07:42) (112/70 - 128/78)  RR: 16 (05 Sep 2024 07:42) (16 - 16)  SpO2: 98% (05 Sep 2024 07:42) (98% - 100%)    ___________________________________________________________________________    LAB                        10.6   4.58  )-----------( 259      ( 05 Sep 2024 06:03 )             32.5       09-05    140  |  104  |  19  ----------------------------<  96  4.3   |  30  |  0.70    Ca    9.3      05 Sep 2024 06:03    TPro  6.2  /  Alb  2.9<L>  /  TBili  1.0  /  DBili  x   /  AST  18  /  ALT  41  /  AlkPhos  72  09-05    LIVER FUNCTIONS - ( 05 Sep 2024 06:03 )  Alb: 2.9 g/dL / Pro: 6.2 g/dL / ALK PHOS: 72 U/L / ALT: 41 U/L / AST: 18 U/L / GGT: x           ___________________________________________________________________________    MEDICATIONS  (STANDING):  atorvastatin 40 milliGRAM(s) Oral at bedtime  diclofenac sodium 1% Gel 2 Gram(s) Topical two times a day  enoxaparin Injectable 40 milliGRAM(s) SubCutaneous <User Schedule>  gabapentin 900 milliGRAM(s) Oral at bedtime  gabapentin 800 milliGRAM(s) Oral <User Schedule>  lidocaine   4% Patch 1 Patch Transdermal every 24 hours  lidocaine   4% Patch 1 Patch Transdermal every 24 hours  losartan 100 milliGRAM(s) Oral daily  multivitamin 1 Tablet(s) Oral daily  polyethylene glycol 3350 17 Gram(s) Oral every 12 hours  senna 2 Tablet(s) Oral at bedtime  tamsulosin 0.4 milliGRAM(s) Oral at bedtime    MEDICATIONS  (PRN):  acetaminophen     Tablet .. 650 milliGRAM(s) Oral every 6 hours PRN Mild Pain (1 - 3)  methocarbamol 750 milliGRAM(s) Oral three times a day PRN Muscle Spasm    ___________________________________________________________________________    PHYSICAL EXAM:    Gen - NAD, Comfortable  HEENT - NCAT, EOMI  Neck - Supple, Limited ROM   Pulm - Breathing comfortably on RA   Extremities - No calf tenderness  Neuro-     Cognitive - AAOx3      Gait- instability noted, but able to stand and turn with support.  Psychiatric - Mood stable, Affect WNL  Skin - Intact. Incision without erythema, no staples removal  ___________________________________________________________________________

## 2024-09-06 ENCOUNTER — TRANSCRIPTION ENCOUNTER (OUTPATIENT)
Age: 74
End: 2024-09-06

## 2024-09-06 PROCEDURE — 99232 SBSQ HOSP IP/OBS MODERATE 35: CPT

## 2024-09-06 RX ORDER — GABAPENTIN 100 MG
1 CAPSULE ORAL
Qty: 30 | Refills: 0
Start: 2024-09-06 | End: 2024-10-05

## 2024-09-06 RX ORDER — ACETAMINOPHEN 325 MG/1
2 TABLET ORAL
Qty: 0 | Refills: 0 | DISCHARGE
Start: 2024-09-06

## 2024-09-06 RX ORDER — METHOCARBAMOL 750 MG/1
1 TABLET, FILM COATED ORAL
Qty: 90 | Refills: 0
Start: 2024-09-06 | End: 2024-10-05

## 2024-09-06 RX ORDER — GABAPENTIN 400 MG/1
2 CAPSULE ORAL
Qty: 30 | Refills: 0 | DISCHARGE
Start: 2024-09-06 | End: 2024-10-05

## 2024-09-06 RX ORDER — SENNA 187 MG
2 TABLET ORAL
Qty: 60 | Refills: 0
Start: 2024-09-06 | End: 2024-10-05

## 2024-09-06 RX ORDER — LOSARTAN POTASSIUM 50 MG/1
1 TABLET ORAL
Qty: 30 | Refills: 0
Start: 2024-09-06 | End: 2024-10-05

## 2024-09-06 RX ORDER — LIDOCAINE/BENZALKONIUM/ALCOHOL
1 SOLUTION, NON-ORAL TOPICAL
Qty: 6 | Refills: 0
Start: 2024-09-06 | End: 2024-10-05

## 2024-09-06 RX ORDER — DICLOFENAC SODIUM 20 MG/G
2 SOLUTION TOPICAL
Qty: 1 | Refills: 0
Start: 2024-09-06 | End: 2024-10-05

## 2024-09-06 RX ORDER — POLYETHYLENE GLYCOL 3350 17 G/17G
17 POWDER, FOR SOLUTION ORAL
Qty: 0 | Refills: 0 | DISCHARGE
Start: 2024-09-06

## 2024-09-06 RX ORDER — TAMSULOSIN HYDROCHLORIDE 0.4 MG/1
1 CAPSULE ORAL
Qty: 30 | Refills: 0
Start: 2024-09-06 | End: 2024-10-05

## 2024-09-06 RX ORDER — GABAPENTIN 100 MG
1 CAPSULE ORAL
Qty: 90 | Refills: 0
Start: 2024-09-06 | End: 2024-10-05

## 2024-09-06 RX ORDER — TAMSULOSIN HYDROCHLORIDE 0.4 MG/1
2 CAPSULE ORAL
Qty: 30 | Refills: 0 | DISCHARGE
Start: 2024-09-06 | End: 2024-10-05

## 2024-09-06 RX ADMIN — Medication 900 MILLIGRAM(S): at 21:44

## 2024-09-06 RX ADMIN — Medication 40 MILLIGRAM(S): at 21:44

## 2024-09-06 RX ADMIN — LOSARTAN POTASSIUM 100 MILLIGRAM(S): 50 TABLET ORAL at 05:23

## 2024-09-06 RX ADMIN — TAMSULOSIN HYDROCHLORIDE 0.4 MILLIGRAM(S): 0.4 CAPSULE ORAL at 21:44

## 2024-09-06 RX ADMIN — Medication 800 MILLIGRAM(S): at 05:22

## 2024-09-06 RX ADMIN — ENOXAPARIN SODIUM 40 MILLIGRAM(S): 100 INJECTION SUBCUTANEOUS at 21:44

## 2024-09-06 RX ADMIN — Medication 1 TABLET(S): at 13:02

## 2024-09-06 RX ADMIN — DICLOFENAC SODIUM 2 GRAM(S): 20 SOLUTION TOPICAL at 05:22

## 2024-09-06 RX ADMIN — Medication 800 MILLIGRAM(S): at 13:02

## 2024-09-06 NOTE — DISCHARGE NOTE NURSING/CASE MANAGEMENT/SOCIAL WORK - PATIENT PORTAL LINK FT
You can access the FollowMyHealth Patient Portal offered by [facility] by registering at the following website: [email or web address]. By joining [employer]’s FollowMyHealth portal, you will also be able to view your health information using other applications (apps) compatible with our system.

## 2024-09-06 NOTE — DISCHARGE NOTE PROVIDER - NSDCFUSCHEDAPPT_GEN_ALL_CORE_FT
Jose Martin Jewell Physician [facility]  NEUROSURG [address] [place] [address]  Scheduled Appointment: 09/19/2024

## 2024-09-06 NOTE — DISCHARGE NOTE PROVIDER - CARE PROVIDER_API CALL
Xander Donaldson  Physical/Rehab Medicine  [address] [place] [address]  Phone: [phone]  Fax: [phone]  Follow Up Time:

## 2024-09-06 NOTE — DISCHARGE NOTE PROVIDER - NSDCFUADDAPPT_GEN_ALL_CORE_FT
Jose Martin Jewellul  Neurosurgery  [address] [place] [id number]  Phone: [phone]  Fax: [phone]  Follow Up Time: 1 week- rescheduled to 9/19

## 2024-09-06 NOTE — DISCHARGE NOTE PROVIDER - NSDCMRMEDTOKEN_GEN_ALL_CORE_FT
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  WALESKA: 99  acetaminophen 325 mg oral tablet: 2 tab(s) orally every 6 hours As needed Mild Pain (1 - 3)  enoxaparin: 40 milligram(s) subcutaneous once a day  gabapentin 300 mg oral capsule: 2 cap(s) orally 2 times a day at  6  am and  2  pm  gabapentin 300 mg oral capsule: 1 cap(s) orally once a day (at bedtime)  irbesartan 300 mg oral tablet: 1 tab(s) orally once a day  lidocaine 4% topical film: Apply topically to affected area once a day Please place one patch on each shoulder daily for neck pain  lidocaine 4% topical film: Apply topically to affected area once a day Please place one patch on each shoulder daily to aid in neck / shoulder pain  Lofstrand Crutches: Use as needed for ambulation and fall prevention  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  WALESKA: 99  methocarbamol 750 mg oral tablet: 1 tab(s) orally 3 times a day As needed Muscle Spasm  Multiple Vitamins oral tablet: 1 tab(s) orally once a day  Overbed table: Use as needed for feeding ADLs  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  WALESKA: 99  oxyCODONE 10 mg oral tablet: 1 tab(s) orally every 6 hours As needed Severe Pain (7 - 10)  oxyCODONE 5 mg oral tablet: 1 tab(s) orally every 6 hours As needed Moderate Pain (4 - 6)  polyethylene glycol 3350 oral powder for reconstitution: 17 gram(s) orally every 12 hours  senna leaf extract oral tablet: 2 tab(s) orally once a day (at bedtime)  simvastatin 20 mg oral tablet: 1 tab(s) orally once a day  tamsulosin 0.4 mg oral capsule: 1 cap(s) orally once a day (at bedtime)   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  WALESKA: 99  acetaminophen 325 mg oral tablet: 2 tab(s) orally every 6 hours As needed Mild Pain (1 - 3)  diclofenac 1% topical gel: Apply topically to affected area 2 times a day To affected area  gabapentin 100 mg oral capsule: 1 cap(s) orally once a day (at bedtime) MDD: 2700mg  gabapentin 800 mg oral tablet: 1 tab(s) orally 3 times a day MDD: 2700mg  lidocaine 4% topical film: Apply topically to affected area once a day 12 hours on and 12 hours off to affected area  Lipitor 40 mg oral tablet: 1 tab(s) orally once a day (at bedtime)  Lofstrand Crutches: Use as needed for ambulation and fall prevention  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  WALESKA: 99  losartan 100 mg oral tablet: 1 tab(s) orally once a day  methocarbamol 750 mg oral tablet: 1 tab(s) orally 3 times a day as needed for Muscle Spasm  Multiple Vitamins oral tablet: 1 tab(s) orally once a day  Overbed table: Use as needed for feeding ADLs  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

## 2024-09-06 NOTE — DISCHARGE NOTE PROVIDER - HOSPITAL COURSE
HPI:  Mr. Denis Toro is a 74-year-old male patient with past medical history of HTN, melanoma, and a spontaneous T12 intradural hematoma in 2021 s/p T10-L2 laminectomy who presented for elective treatment of cervical AVF w/ C2-5 lami and fistula ligation on 8/12/24. Intraop angiogram showed resolution of C3-4 dAVF w/ L vertebral artery dissection, then underwent formal angiogram the following day, which showed resolution of the fistula, and improving vertebral artery dissection. Post-operatively, patient had urinary retention. However, his Vazquez was removed and the patient voided on 8/19/24. PM&R was consulted and deemed him an appropriate candidate for IRF and transferred to [facility] on 8/19/24. During acute rehab stay, patient participated in PT for 2 hours and OT for 1 hour for total 3hrs of therapy 5x per 7 day period. Patient progressed well without significant complications or concerns.         Functional Status:  sit to stand cs  wc to mat cs  sit to supine independent  CS with RW    All other medical co-morbidities were stable. Patient tolerated course of inpatient PT/OT/SLP rehab with significant improvements and met rehab goals prior to discharge. Discharge instructions were discussed with patient and family. All questions answered and all concerns addressed. Patient was medically cleared for discharge to home with home care on 9/7.    Outpatient Follow-ups:     Jose Martin Jewellul  Neurosurgery  [address] [place] [id number]  Phone: [phone]  Fax: [phone]  Follow Up Time: 1 week- rescheduled to 9/19

## 2024-09-06 NOTE — DISCHARGE NOTE PROVIDER - NSDCCPCAREPLAN_GEN_ALL_CORE_FT
PRINCIPAL DISCHARGE DIAGNOSIS  Diagnosis: Dural arteriovenous fistula of spinal cord  Assessment and Plan of Treatment: A dural arteriovenous fistula (DAVF) of the spinal cord is an abnormal connection between arteries and veins in the dura mater, the protective tissue that surrounds the spinal cord. In your case it was treated with laminectomy with excision and ligation surgery. You need to follow-up with your Neurosurgeon withi two weeks of discharge.

## 2024-09-06 NOTE — PROGRESS NOTE ADULT - SUBJECTIVE AND OBJECTIVE BOX
HPI:  Mr. Denis Toro is a 74-year-old male patient with past medical history of HTN, melanoma, and a spontaneous T12 intradural hematoma in 2021 s/p T10-L2 laminectomy who presented for elective treatment of cervical AVF w/ C2-5 lami and fistula ligation on 8/12/24. Intraop angiogram showed resolution of C3-4 dAVF w/ L vertebral artery dissection, then underwent formal angiogram the following day, which showed resolution of the fistula, and improving vertebral artery dissection. Post-operatively, patient had urinary retention. However, his Vazquez was removed and the patient voided on 8/19/24. PM&R was consulted and deemed him an appropriate candidate for IRF. He was cleared for discharge to [facility] on 8/19/24.  (19 Aug 2024 14:41)      ___________________________________________________________________________    SUBJECTIVE/ROS  Patient was seen and evaluated at bedside today.  Reported no overnight events and is in no acute distress. Reports feeling ready for dc tomorrow. Patient is tolerating therapy and progressing well.   Reviewed medications with patient and provided reassurance regarding dc and follow up insturctions. All concerns and questions were addressed and answered fully.  Patient remains eager to participate on the recommended rehabilitation program as outpatient.  Denies any CP, SOB, GAMA, palpitations, fever, chills, body aches, cough, congestion, or any other symptoms at this time.     Discharge date: 9/7  ___________________________________________________________________________    VITALS  T(C): 36.9 (09-06-24 @ 08:32), Max: 36.9 (09-05-24 @ 21:53)  HR: 89 (09-06-24 @ 08:32) (79 - 89)  BP: 125/75 (09-06-24 @ 08:32) (125/75 - 139/80)  RR: 16 (09-06-24 @ 08:32) (16 - 16)  SpO2: 95% (09-06-24 @ 08:32) (95% - 96%)  ___________________________________________________________________________    LABS                          10.6   4.58  )-----------( 259      ( 05 Sep 2024 06:03 )             32.5       09-05    140  |  104  |  19  ----------------------------<  96  4.3   |  30  |  0.70    Ca    9.3      05 Sep 2024 06:03    TPro  6.2  /  Alb  2.9<L>  /  TBili  1.0  /  DBili  x   /  AST  18  /  ALT  41  /  AlkPhos  72  09-05      CAPILLARY BLOOD GLUCOSE          ___________________________________________________________________________    MEDICATIONS  (STANDING):  atorvastatin 40 milliGRAM(s) Oral at bedtime  diclofenac sodium 1% Gel 2 Gram(s) Topical two times a day  enoxaparin Injectable 40 milliGRAM(s) SubCutaneous <User Schedule>  gabapentin 900 milliGRAM(s) Oral at bedtime  gabapentin 800 milliGRAM(s) Oral <User Schedule>  lidocaine   4% Patch 1 Patch Transdermal every 24 hours  lidocaine   4% Patch 1 Patch Transdermal every 24 hours  losartan 100 milliGRAM(s) Oral daily  multivitamin 1 Tablet(s) Oral daily  polyethylene glycol 3350 17 Gram(s) Oral every 12 hours  senna 2 Tablet(s) Oral at bedtime  tamsulosin 0.4 milliGRAM(s) Oral at bedtime    MEDICATIONS  (PRN):  acetaminophen     Tablet .. 650 milliGRAM(s) Oral every 6 hours PRN Mild Pain (1 - 3)  methocarbamol 750 milliGRAM(s) Oral three times a day PRN Muscle Spasm    ___________________________________________________________________________    PHYSICAL EXAM:    Gen - NAD, Comfortable  HEENT - NCAT, EOMI  Neck - Supple, Limited ROM   Pulm - Breathing comfortably on RA   Extremities - No calf tenderness  Neuro-     Cognitive - AAOx3      Gait- instability noted, but able to stand, turn, and sit   Psychiatric - Mood stable, Affect WNL  Skin - Intact. Incision without erythema  ___________________________________________________________________________ HPI:  Mr. Denis Toro is a 74-year-old male patient with past medical history of HTN, melanoma, and a spontaneous T12 intradural hematoma in 2021 s/p T10-L2 laminectomy who presented for elective treatment of cervical AVF w/ C2-5 lami and fistula ligation on 8/12/24. Intraop angiogram showed resolution of C3-4 dAVF w/ L vertebral artery dissection, then underwent formal angiogram the following day, which showed resolution of the fistula, and improving vertebral artery dissection. Post-operatively, patient had urinary retention. However, his Vazquez was removed and the patient voided on 8/19/24. PM&R was consulted and deemed him an appropriate candidate for IRF. He was cleared for discharge to [facility] on 8/19/24.  (19 Aug 2024 14:41)  ___________________________________________________________________________    SUBJECTIVE/ROS  Patient was seen and evaluated at bedside today.  Reported no overnight events and is in no acute distress. Reports feeling ready for dc tomorrow. Patient is tolerating therapy and progressing well.   Reviewed medications with patient and provided reassurance regarding dc and follow up instructions All concerns and questions were addressed and answered fully.  Patient remains eager to participate on the recommended rehabilitation program as outpatient.  Denies any CP, SOB, GAMA, palpitations, fever, chills, body aches, cough, congestion, or any other symptoms at this time.     Discharge date: 9/7  ___________________________________________________________________________    VITALS  T(C): 36.9 (09-06-24 @ 08:32), Max: 36.9 (09-05-24 @ 21:53)  HR: 89 (09-06-24 @ 08:32) (79 - 89)  BP: 125/75 (09-06-24 @ 08:32) (125/75 - 139/80)  RR: 16 (09-06-24 @ 08:32) (16 - 16)  SpO2: 95% (09-06-24 @ 08:32) (95% - 96%)  ___________________________________________________________________________    LABS                          10.6   4.58  )-----------( 259      ( 05 Sep 2024 06:03 )             32.5       09-05    140  |  104  |  19  ----------------------------<  96  4.3   |  30  |  0.70    Ca    9.3      05 Sep 2024 06:03    TPro  6.2  /  Alb  2.9<L>  /  TBili  1.0  /  DBili  x   /  AST  18  /  ALT  41  /  AlkPhos  72  09-05    ___________________________________________________________________________    MEDICATIONS  (STANDING):  atorvastatin 40 milliGRAM(s) Oral at bedtime  diclofenac sodium 1% Gel 2 Gram(s) Topical two times a day  enoxaparin Injectable 40 milliGRAM(s) SubCutaneous <User Schedule>  gabapentin 900 milliGRAM(s) Oral at bedtime  gabapentin 800 milliGRAM(s) Oral <User Schedule>  lidocaine   4% Patch 1 Patch Transdermal every 24 hours  lidocaine   4% Patch 1 Patch Transdermal every 24 hours  losartan 100 milliGRAM(s) Oral daily  multivitamin 1 Tablet(s) Oral daily  polyethylene glycol 3350 17 Gram(s) Oral every 12 hours  senna 2 Tablet(s) Oral at bedtime  tamsulosin 0.4 milliGRAM(s) Oral at bedtime    MEDICATIONS  (PRN):  acetaminophen     Tablet .. 650 milliGRAM(s) Oral every 6 hours PRN Mild Pain (1 - 3)  methocarbamol 750 milliGRAM(s) Oral three times a day PRN Muscle Spasm    ___________________________________________________________________________    PHYSICAL EXAM:    Gen - NAD, Comfortable  HEENT - NCAT, EOMI  Neck - Supple, Limited ROM   Pulm - Breathing comfortably on RA   Extremities - No calf tenderness  Neuro-     Cognitive - AAOx3      Gait- instability noted, but able to stand, turn, and sit   Psychiatric - Mood stable, Affect WNL  Skin - Intact. Incision without erythema  ___________________________________________________________________________

## 2024-09-06 NOTE — PROGRESS NOTE ADULT - ASSESSMENT
Mr. Denis Toro is a 74-year-old male patient with past medical history of HTN, melanoma, and a spontaneous T12 intradural hematoma in 2021 s/p T10-L2 laminectomy who is admitted for Acute Inpatient Rehabilitation with a multidisciplinary rehab program at  with functional impairments in ADLs and mobility secondary to undergoing elective surgery for cervical AVF w/ C2-5 lami and fistula ligation on 8/12/24.       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  - S/P laminectomy for spinal cord AVM excision with ligation (8/12/24)      * Start Aspirin 81 mg daily on 8/26/24      * Vertebral artery dissection      * Post-operative urinary retention  - Vertebral Artery Dissection   - Bilateral lower extremity weakness  - Bilateral lower extremity sensory deficits  - Impaired ADLs and mobility  - Need for assistance with personal care  - Comprehensive Multidisciplinary Rehab Program:      * PT/OT/ SLP 3 hours a day 5 days a week  - new LLE pain  - CT pelvis negative for acute pathology/venous compression  - MRI lumbar spine with mod-severe spinal stenosis, bilateral L3 nerve root compression     #Pain:  - PRN Scale: Tylenol, Oxycodone 5 mg q6h, Oxycodone 10 mg q6h (discontinued on 9/4)  - PRN Robaxin 750 mg TID  - Increased gabapentin to 800 BID in day, 900 at night- helping   - Lidocaine patch    #GI/Bowel:  - Senna 2 tabs daily  - Miralax q12h     #/Bladder:  - S/p kate and BS dc'd    #Diet / Dysphagia:    - Diet: Regular/DASH   - Ongoing SLP assessment  - Nutrition to follow    #Skin/ Pressure Injury Prevention:  - Assessment on admission: Surgical wound JUAN ALBERTO, staples intact s/p removal    #DVT prophylaxis:  - Lovenox 40 Qd    #Precautions/ Restrictions  - Falls, Spine    --------------------------------------------  Outpatient Follow up:  Jose Martin Jewell  Neurosurgery  [address] [place] [id number]  Phone: [phone]  Fax: [phone]  Follow Up Time: 1 week- rescheduled to 9/19    -------------------------------------------- Mr. Denis Toro is a 74-year-old male patient with past medical history of HTN, melanoma, and a spontaneous T12 intradural hematoma in 2021 s/p T10-L2 laminectomy who is admitted for Acute Inpatient Rehabilitation with a multidisciplinary rehab program at [facility] with functional impairments in ADLs and mobility secondary to undergoing elective surgery for cervical AVF w/ C2-5 lami and fistula ligation on 8/12/24.       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  - S/P laminectomy for spinal cord AVM excision with ligation (8/12/24)      * Start Aspirin 81 mg daily on 8/26/24      * Vertebral artery dissection      * Post-operative urinary retention  - Vertebral Artery Dissection   - Bilateral lower extremity weakness  - Bilateral lower extremity sensory deficits  - Impaired ADLs and mobility  - Need for assistance with personal care  - Continue Rehab Program as outpatient  - new LLE pain  - CT pelvis negative for acute pathology/venous compression  - MRI lumbar spine with mod-severe spinal stenosis, bilateral L3 nerve root compression   - Scheduled fro discharge home tomorrow    #Pain:  - PRN Scale: Tylenol, Oxycodone 5 mg q6h, Oxycodone 10 mg q6h (discontinued on 9/4)  - PRN Robaxin 750 mg TID  - Increased gabapentin to 800 BID in day, 900 at night- helping   - Lidocaine patch    #GI/Bowel:  - Senna 2 tabs daily  - Miralax q12h     #/Bladder:  - S/p kate and BS dc'd    #Diet / Dysphagia:    - Diet: Regular/DASH   - Ongoing SLP assessment  - Nutrition to follow    #Skin/ Pressure Injury Prevention:  - Assessment on admission: Surgical wound JUAN ALBERTO, staples intact s/p removal    #DVT prophylaxis:  - Lovenox 40 Qd    #Precautions/ Restrictions  - Falls, Spine    --------------------------------------------  Outpatient Follow up:  Jose Martin Jewell  Neurosurgery  [address] [place] [id number]  Phone: [phone]  Fax: [phone]  Follow Up Time: 1 week- rescheduled to 9/19    --------------------------------------------

## 2024-09-06 NOTE — PROGRESS NOTE ADULT - TIME BILLING
Rodger Maharaj calling to report that the patient did not receive his AM dose of the OXcarbazepine (TRILEPTAL) 300 MG tablet on 9/27/19. No call back needed unless questions.
This includes reviewing results/imaging and discussions with specialists, nursing, case management/social work. Further tests, medications, and procedures have been ordered as indicated. Results and the plan of care were communicated to the patient and/or their family member. Supporting documentation was completed and added to the patient's chart.
Time spent includes direct patient care  (interview and examination of patient), discussion with other providers, support staff and/or patient's family members, review of medical records, ordering diagnostic tests and analyzing results, and documentation, excluding time spent in ACP discussions.
Time spent includes direct patient care  (interview and examination of patient), discussion with other providers, support staff and/or patient's family members, review of medical records, ordering diagnostic tests and analyzing results, and documentation, excluding time spent in ACP discussions.

## 2024-09-06 NOTE — DISCHARGE NOTE PROVIDER - NSDCHHNEEDSERVICE_GEN_ALL_CORE
Farxiga 10mg Key: [id number]    PA submitted 1/14/20    [facility]  
Farxiga 10mg approved 1/14/20-1/14/23  
done/on file at home
Rehabilitation services

## 2024-09-06 NOTE — PROGRESS NOTE ADULT - SUBJECTIVE AND OBJECTIVE BOX
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 No acute evnts overnight.    REVIEW OF SYMPTOMS: patient denies HA's, CP, palpitations, shortness of breath or upper respiratory symptoms, nausea, vomiting, diarrhea, constipation, dysuria, bruising/bleeding and all other systems were reviewed as negative      ALLERGIES:  Celebrex (Other)  Shrimp (Angioedema)    MEDICATIONS  (STANDING):  atorvastatin 40 milliGRAM(s) Oral at bedtime  diclofenac sodium 1% Gel 2 Gram(s) Topical two times a day  enoxaparin Injectable 40 milliGRAM(s) SubCutaneous <User Schedule>  gabapentin 900 milliGRAM(s) Oral at bedtime  gabapentin 800 milliGRAM(s) Oral <User Schedule>  lidocaine   4% Patch 1 Patch Transdermal every 24 hours  lidocaine   4% Patch 1 Patch Transdermal every 24 hours  losartan 100 milliGRAM(s) Oral daily  multivitamin 1 Tablet(s) Oral daily  polyethylene glycol 3350 17 Gram(s) Oral every 12 hours  senna 2 Tablet(s) Oral at bedtime  tamsulosin 0.4 milliGRAM(s) Oral at bedtime    MEDICATIONS  (PRN):  acetaminophen     Tablet .. 650 milliGRAM(s) Oral every 6 hours PRN Mild Pain (1 - 3)  methocarbamol 750 milliGRAM(s) Oral three times a day PRN Muscle Spasm    T(C): 36.9 (09-06-24 @ 08:32), Max: 36.9 (09-05-24 @ 21:53)  T(F): 98.4 (09-06-24 @ 08:32), Max: 98.5 (09-05-24 @ 21:53)  HR: 89 (09-06-24 @ 08:32) (79 - 89)  BP: 125/75 (09-06-24 @ 08:32) (125/75 - 139/80)  ABP: --  ABP(mean): --  RR: 16 (09-06-24 @ 08:32) (16 - 16)  SpO2: 95% (09-06-24 @ 08:32) (95% - 96%)    PHYSICAL EXAM:  General: NAD, A/O x 3  ENT: MMM  Neck: Supple, No JVD  Lungs: Clear to auscultation bilaterally  Cardio: RRR, S1/S2, No murmurs  Abdomen: Soft, Nontender, Nondistended; Bowel sounds present  Extremities: No calf tenderness, No pitting edema    LABS:                        10.6   4.58  )-----------( 259      ( 05 Sep 2024 06:03 )             32.5     09-05    140  |  104  |  19  ----------------------------<  96  4.3   |  30  |  0.70    Ca    9.3      05 Sep 2024 06:03    TPro  6.2  /  Alb  2.9<L>  /  TBili  1.0  /  DBili  x   /  AST  18  /  ALT  41  /  AlkPhos  72  09-05      Urinalysis Basic - ( 05 Sep 2024 06:03 )    Color: x / Appearance: x / SG: x / pH: x  Gluc: 96 mg/dL / Ketone: x  / Bili: x / Urobili: x   Blood: x / Protein: x / Nitrite: x   Leuk Esterase: x / RBC: x / WBC x   Sq Epi: x / Non Sq Epi: x / Bacteria: x       CAPILLARY BLOOD GLUCOSE            Urinalysis Basic - ( 05 Sep 2024 06:03 )    Color: x / Appearance: x / SG: x / pH: x  Gluc: 96 mg/dL / Ketone: x  / Bili: x / Urobili: x   Blood: x / Protein: x / Nitrite: x   Leuk Esterase: x / RBC: x / WBC x   Sq Epi: x / Non Sq Epi: x / Bacteria: x        RADIOLOGY & ADDITIONAL TESTS:    Consultant(s) Notes Reviewed:  [x ] YES  [ ] NO  Care Discussed with Consultants/Other Providers [ x] YES  [ ] NO  Imaging Personally Reviewed:  [x ] YES  [ ] NO

## 2024-09-06 NOTE — PROGRESS NOTE ADULT - ASSESSMENT
73 y/o M with PMH HTN, melanoma, and a spontaneous T12 intradural hematoma in 2021 s/p T10-L2 laminectomy who is admitted for Acute Inpatient Rehabilitation with a multidisciplinary rehab program at [facility] with functional impairments in ADLs and mobility secondary to undergoing elective surgery for cervical AVF w/ C2-5 lami and fistula ligation on 8/12/24.     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 New mass effect upon the descending bilateral L3 nerve roots in the lateral recesses  -Continue comprehensive rehab    #HTN  -Continue Losartan 100mg daily    #Normocytic Anemia  -Likely postoperative blood loss  -Stable H/H  -Monitor and transfuse if Hb<7    #DVT prophylaxis  -Lovenox

## 2024-09-07 VITALS
RESPIRATION RATE: 16 BRPM | HEART RATE: 73 BPM | DIASTOLIC BLOOD PRESSURE: 81 MMHG | OXYGEN SATURATION: 96 % | TEMPERATURE: 98 F | SYSTOLIC BLOOD PRESSURE: 128 MMHG

## 2024-09-07 PROCEDURE — 99232 SBSQ HOSP IP/OBS MODERATE 35: CPT

## 2024-09-07 PROCEDURE — 99238 HOSP IP/OBS DSCHRG MGMT 30/<: CPT

## 2024-09-07 RX ADMIN — Medication 1 TABLET(S): at 11:20

## 2024-09-07 RX ADMIN — Medication 800 MILLIGRAM(S): at 05:20

## 2024-09-07 RX ADMIN — ACETAMINOPHEN 650 MILLIGRAM(S): 325 TABLET ORAL at 05:20

## 2024-09-07 RX ADMIN — LOSARTAN POTASSIUM 100 MILLIGRAM(S): 50 TABLET ORAL at 05:20

## 2024-09-07 NOTE — PROGRESS NOTE ADULT - SUBJECTIVE AND OBJECTIVE BOX
Subjective: Seen this AM with pt's wife at bedside.  Discharge home today. No new complaints or overnight issues    VITALS  T(C): 36.6 (09-07-24 @ 07:25), Max: 37.1 (09-06-24 @ 21:41)  HR: 73 (09-07-24 @ 07:25) (73 - 84)  BP: 128/81 (09-07-24 @ 07:25) (127/74 - 128/81)  RR: 16 (09-07-24 @ 07:25) (16 - 16)  SpO2: 96% (09-07-24 @ 07:25) (94% - 96%)  Wt(kg): --    REVIEW OF SYSTEMS  Pertinent in last 24 h: Neurological deficits      Physical Exam:  Constitutional - NAD, Comfortable  HEENT - NCAT, EOMI  Chest - breathing comfortably on RA  Cardiovascular -  Warm well perfused  Abdomen -  Soft, NTND  Extremities - No edema, No calf tenderness   Neurologic Exam -    Stable                Cognitive - Awake, Alert, AAO x3     Cranial Nerves - CN 2-12 intact     Motor - no new deficit     Psychiatric - Mood stable, Affect WNL  -    RECENT LABS/IMAGING                  MEDICATIONS   acetaminophen     Tablet .. 650 milliGRAM(s) every 6 hours PRN  atorvastatin 40 milliGRAM(s) at bedtime  diclofenac sodium 1% Gel 2 Gram(s) two times a day  enoxaparin Injectable 40 milliGRAM(s) <User Schedule>  gabapentin 900 milliGRAM(s) at bedtime  gabapentin 800 milliGRAM(s) <User Schedule>  lidocaine   4% Patch 1 Patch every 24 hours  lidocaine   4% Patch 1 Patch every 24 hours  losartan 100 milliGRAM(s) daily  methocarbamol 750 milliGRAM(s) three times a day PRN  multivitamin 1 Tablet(s) daily  polyethylene glycol 3350 17 Gram(s) every 12 hours  senna 2 Tablet(s) at bedtime  tamsulosin 0.4 milliGRAM(s) at bedtime      --------------------------------------------------------------------

## 2024-09-07 NOTE — PROGRESS NOTE ADULT - ASSESSMENT
73 yo M  admitted to acute Rehabilitation with Cervical AVF s/p C2-5 Laminectomy and fistula ligation 8/12.      Pt. Stable    Patient medically stable for discharge to home today.  Discharge medications and instructions as per primary team.     Pain regimen--  Gabapentin 800mg BID and 900mg qhs  -diclofenac gel BID  --Lidocaine patch  --PRN robaxin    HTN:  Losartan    HLD-- atorvastatin    -- flomax    No acute issues,   Cont. current plan of care and medications as per primary team

## 2024-09-07 NOTE — PROGRESS NOTE ADULT - NUTRITIONAL ASSESSMENT
This patient has been assessed with a concern for Malnutrition and has been determined to have a diagnosis/diagnoses of Moderate protein-calorie malnutrition.    This patient is being managed with:   Diet Regular-  Supplement Feeding Modality:  Oral  Ensure Clear Cans or Servings Per Day:  1       Frequency:  Daily  Entered: Aug 20 2024 11:05AM  

## 2024-09-07 NOTE — PROGRESS NOTE ADULT - REASON FOR ADMISSION
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

## 2024-09-07 NOTE — PROGRESS NOTE ADULT - PROVIDER SPECIALTY LIST ADULT
Hospitalist
Physiatry
Hospitalist
Physiatry
Rehab Medicine
Hospitalist
Physiatry
Rehab Medicine
Rehab Medicine
Hospitalist
Physiatry
Physiatry
Rehab Medicine
Rehab Medicine
Hospitalist
Physiatry
Physiatry
Hospitalist
Physiatry

## 2024-09-07 NOTE — PROGRESS NOTE ADULT - SUBJECTIVE AND OBJECTIVE BOX
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    ALLERGIES:  Celebrex (Other)  Shrimp (Angioedema)    MEDICATIONS  (STANDING):  atorvastatin 40 milliGRAM(s) Oral at bedtime  diclofenac sodium 1% Gel 2 Gram(s) Topical two times a day  enoxaparin Injectable 40 milliGRAM(s) SubCutaneous <User Schedule>  gabapentin 900 milliGRAM(s) Oral at bedtime  gabapentin 800 milliGRAM(s) Oral <User Schedule>  lidocaine   4% Patch 1 Patch Transdermal every 24 hours  lidocaine   4% Patch 1 Patch Transdermal every 24 hours  losartan 100 milliGRAM(s) Oral daily  multivitamin 1 Tablet(s) Oral daily  polyethylene glycol 3350 17 Gram(s) Oral every 12 hours  senna 2 Tablet(s) Oral at bedtime  tamsulosin 0.4 milliGRAM(s) Oral at bedtime    MEDICATIONS  (PRN):  acetaminophen     Tablet .. 650 milliGRAM(s) Oral every 6 hours PRN Mild Pain (1 - 3)  methocarbamol 750 milliGRAM(s) Oral three times a day PRN Muscle Spasm    Vital Signs Last 24 Hrs  T(F): 97.9 (07 Sep 2024 07:25), Max: 98.7 (06 Sep 2024 21:41)  HR: 73 (07 Sep 2024 07:25) (73 - 84)  BP: 128/81 (07 Sep 2024 07:25) (127/74 - 128/81)  RR: 16 (07 Sep 2024 07:25) (16 - 16)  SpO2: 96% (07 Sep 2024 07:25) (94% - 96%)  I&O's Summary    PHYSICAL EXAM:  General: NAD, A/O x 3  ENT: MMM, no scleral icterus  Neck: Supple, No JVD, no thyroidomegaly  Lungs: Clear to auscultation bilaterally, no wheezes, no rales, no rhonchi, good inspiratory effort  Cardio: RRR, S1/S2, No murmurs  Abdomen: Soft, Nontender, Nondistended; Bowel sounds present  Extremities: No calf tenderness, No pitting edema, no skin changes    LABS:                        10.6   4.58  )-----------( 259      ( 05 Sep 2024 06:03 )             32.5       09-05    140  |  104  |  19  ----------------------------<  96  4.3   |  30  |  0.70    Ca    9.3      05 Sep 2024 06:03    TPro  6.2  /  Alb  2.9  /  TBili  1.0  /  DBili  x   /  AST  18  /  ALT  41  /  AlkPhos  72  09-05     Urinalysis Basic - ( 05 Sep 2024 06:03 )    Color: x / Appearance: x / SG: x / pH: x  Gluc: 96 mg/dL / Ketone: x  / Bili: x / Urobili: x   Blood: x / Protein: x / Nitrite: x   Leuk Esterase: x / RBC: x / WBC x   Sq Epi: x / Non Sq Epi: x / Bacteria: x    COVID-19 PCR: NotDetec (08-19-24 @ 19:48)  COVID-19 PCR: NotDetec (08-19-24 @ 19:48)

## 2024-09-07 NOTE — PROGRESS NOTE ADULT - ASSESSMENT
73 y/o M with PMH HTN, melanoma, and a spontaneous T12 intradural hematoma in 2021 s/p T10-L2 laminectomy who is admitted for Acute Inpatient Rehabilitation with a multidisciplinary rehab program at [facility] with functional impairments in ADLs and mobility secondary to undergoing elective surgery for cervical AVF w/ C2-5 lami and fistula ligation on 8/12/24.     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

## 2024-09-19 ENCOUNTER — APPOINTMENT (OUTPATIENT)
Dept: NEUROSURGERY | Facility: CLINIC | Age: 74
End: 2024-09-19
Payer: MEDICARE

## 2024-09-19 VITALS
HEART RATE: 73 BPM | BODY MASS INDEX: 25.23 KG/M2 | DIASTOLIC BLOOD PRESSURE: 80 MMHG | HEIGHT: 71 IN | OXYGEN SATURATION: 96 % | SYSTOLIC BLOOD PRESSURE: 127 MMHG | TEMPERATURE: 97.5 F | WEIGHT: 180.2 LBS

## 2024-09-19 DIAGNOSIS — G95.19 OTHER VASCULAR MYELOPATHIES: ICD-10-CM

## 2024-09-19 DIAGNOSIS — I67.1 CEREBRAL ANEURYSM, NONRUPTURED: ICD-10-CM

## 2024-09-19 DIAGNOSIS — I77.0 ARTERIOVENOUS FISTULA, ACQUIRED: ICD-10-CM

## 2024-09-19 PROCEDURE — 99024 POSTOP FOLLOW-UP VISIT: CPT

## 2024-09-19 RX ORDER — ASPIRIN ENTERIC COATED TABLETS 81 MG 81 MG/1
81 TABLET, DELAYED RELEASE ORAL DAILY
Qty: 90 | Refills: 0 | Status: ACTIVE | COMMUNITY
Start: 2024-09-19 | End: 1900-01-01

## 2024-09-26 NOTE — PHYSICAL EXAM
[FreeTextEntry1] : Constitutional: NAD Neuro * Mental Status:  GCS 15: Awake, alert, oriented to conversation. No aphasia or difficulty speaking. No dysarthria. * Cranial Nerves: Cnii-Cnxii grossly intact. EOMI, tongue midline, no gaze deviation, no facial droop * Motor: b/l UE and LE intact * Sensory: Sensation intact to light touch * Reflexes: not assessed * Gait: mildly impaired balance, walks with cane  Cardiovascular: Regular rate and rhythm. Eyes: See neurologic examination with detailed examination of eyes. Respiratory: non labored breathing Musculoskeletal: No muscle wasting noted Skin: grossly intact

## 2024-09-26 NOTE — HISTORY OF PRESENT ILLNESS
[FreeTextEntry1] : DORIE OROSCO is a 72 year old male with PMH of hyperlipidemia, chronic back pain, who presents for follow up neurosurgical evaluation of MRI results after bilateral decompressive laminectomies T10-L2 for evacuation of intradural hematoma.   TO review: he is status post hematoma Bilateral decompression laminectomies T10-L2 for evacuation of the intradural hematoma.  Surgery Date: 10/7/21. He presents for follow up neurosurgical evaluation of bilateral decompression laminectomies T10-L2, dural opening and evacuation of the intradural hematoma done on 10/7/21.  Hospital course: He presented to [facility]/6/21 with acute onset back pain and progressively worsening bilateral lower extremity weakness. Urgent MR imaging of the spine revealed intradural extramedullary hematoma T12-L1, S/P T10-L2 laminectomy with 1U pRBCs given intraop 10/6/21. Spinal angiogram 10/7/21 showed no vascular abnormalities. Venous dopplers negative for blood clot.   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 There are varying degrees of mass effect upon the cord with stable central cord edema extending from T5 into the conus medullaris. Laminectomies noted at T10 and extending inferiorly. Since prior spinal angiogram was negative, consider coagulopathy as a cause of repetitive intrathecal subarachnoid hemorrhages."   He saw Dr. Porfirio Barrios on 4/1/24 for follow up. He has not had repeat spinal angiograms to r/o vascular source of spontaneous intradural hematoma. 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 He also underwent MRI brain and C spine with and without contrast which read "resolution of previously identified ventral epidural enhancement along the posterior clivus/upper cervical spine as well as circumferential enhancement throughout the cervical spine, suggesting interval resolution of previously identified intracranial hypotension."    He underwent elective treatment of cervical AVF with C2-5 lami with AVF ligation on 8/12/24, intraop angiogram showed resolution of C3-4 dAVF with L vertebral artery dissection. 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 He was ultimately discharged to rehab on 8/19/24. He reports that he was discharged from rehab last week and continues with home PT 3x/week. He reports that he is still off balance when walking and feels like his legs are weak, but otherwise denies headache, new numbness, tingling, dizziness.

## 2024-09-26 NOTE — ASSESSMENT
[FreeTextEntry1] : 74M with PMH HLD, chronic back pain who was found to have spontaneous intradural hematoma T12-L1 and then underwent T10-L2 b/l decompressive laminectomies on 10/7/21. He underwent repeat spinal cerebral angiogram on 5/21/24 which showed L C3-4 AVF and L cranio-cervical AVF. Underwent treatment of cervical AVF with C2-5 lami with AVF ligation on 8/12/24 with L vert dissection, confirmed on formal angiogram 8/13/24 with reduced flow of craniocervical fistula. He presents for follow up.    Plan:  - Start aspirin 81 mg daily   - Repeat MRI w/wo H/N and MRA H/N in 3 months (November 2024)  - Plan for repeat cerebral angiogram in 6 months (February 2024)  - Patient and family in agreement with plan.

## 2024-09-26 NOTE — HISTORY OF PRESENT ILLNESS
[FreeTextEntry1] : DORIE OROSCO is a 72 year old male with PMH of hyperlipidemia, chronic back pain, who presents for follow up neurosurgical evaluation of MRI results after bilateral decompressive laminectomies T10-L2 for evacuation of intradural hematoma.   TO review: he is status post hematoma Bilateral decompression laminectomies T10-L2 for evacuation of the intradural hematoma.  Surgery Date: 10/7/21. He presents for follow up neurosurgical evaluation of bilateral decompression laminectomies T10-L2, dural opening and evacuation of the intradural hematoma done on 10/7/21.  Hospital course: He presented to [facility] 10/6/21 with acute onset back pain and progressively worsening bilateral lower extremity weakness. Urgent MR imaging of the spine revealed intradural extramedullary hematoma T12-L1, S/P T10-L2 laminectomy with 1U pRBCs given intraop 10/6/21. Spinal angiogram 10/7/21 showed no vascular abnormalities. Venous dopplers negative for blood clot.   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 There are varying degrees of mass effect upon the cord with stable central cord edema extending from T5 into the conus medullaris. Laminectomies noted at T10 and extending inferiorly. Since prior spinal angiogram was negative, consider coagulopathy as a cause of repetitive intrathecal subarachnoid hemorrhages."   He saw Dr. Porfirio Barrios on 4/1/24 for follow up. He has not had repeat spinal angiograms to r/o vascular source of spontaneous intradural hematoma. 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 He also underwent MRI brain and C spine with and without contrast which read "resolution of previously identified ventral epidural enhancement along the posterior clivus/upper cervical spine as well as circumferential enhancement throughout the cervical spine, suggesting interval resolution of previously identified intracranial hypotension."    He underwent elective treatment of cervical AVF with C2-5 lami with AVF ligation on 8/12/24, intraop angiogram showed resolution of C3-4 dAVF with L vertebral artery dissection. 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 He was ultimately discharged to rehab on 8/19/24. He reports that he was discharged from rehab last week and continues with home PT 3x/week. He reports that he is still off balance when walking and feels like his legs are weak, but otherwise denies headache, new numbness, tingling, dizziness.

## 2024-09-26 NOTE — HISTORY OF PRESENT ILLNESS
[FreeTextEntry1] : DORIE OROSCO is a 72 year old male with PMH of hyperlipidemia, chronic back pain, who presents for follow up neurosurgical evaluation of MRI results after bilateral decompressive laminectomies T10-L2 for evacuation of intradural hematoma.   TO review: he is status post hematoma Bilateral decompression laminectomies T10-L2 for evacuation of the intradural hematoma.  Surgery Date: 10/7/21. He presents for follow up neurosurgical evaluation of bilateral decompression laminectomies T10-L2, dural opening and evacuation of the intradural hematoma done on 10/7/21.  Hospital course: He presented to [facility] 10/6/21 with acute onset back pain and progressively worsening bilateral lower extremity weakness. Urgent MR imaging of the spine revealed intradural extramedullary hematoma T12-L1, S/P T10-L2 laminectomy with 1U pRBCs given intraop 10/6/21. Spinal angiogram 10/7/21 showed no vascular abnormalities. Venous dopplers negative for blood clot.   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 There are varying degrees of mass effect upon the cord with stable central cord edema extending from T5 into the conus medullaris. Laminectomies noted at T10 and extending inferiorly. Since prior spinal angiogram was negative, consider coagulopathy as a cause of repetitive intrathecal subarachnoid hemorrhages."   He saw Dr. Porfirio Barrios on 4/1/24 for follow up. He has not had repeat spinal angiograms to r/o vascular source of spontaneous intradural hematoma. 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 He also underwent MRI brain and C spine with and without contrast which read "resolution of previously identified ventral epidural enhancement along the posterior clivus/upper cervical spine as well as circumferential enhancement throughout the cervical spine, suggesting interval resolution of previously identified intracranial hypotension."    He underwent elective treatment of cervical AVF with C2-5 lami with AVF ligation on 8/12/24, intraop angiogram showed resolution of C3-4 dAVF with L vertebral artery dissection. 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 He was ultimately discharged to rehab on 8/19/24. He reports that he was discharged from rehab last week and continues with home PT 3x/week. He reports that he is still off balance when walking and feels like his legs are weak, but otherwise denies headache, new numbness, tingling, dizziness.

## 2024-09-26 NOTE — END OF VISIT
[FreeTextEntry3] : Mr. Toro is seen in follow-up today after his surgery for his cervical spinal dural arteriovenous fistula.  He still reports some difficulties with unsteady gait and very mild weakness in his left leg.  He is currently undergoing physical therapy and I expect him to show improvement over the course of several weeks.  I have started him on aspirin 81 mg for his resolving vertebral artery dissection.  He will need an MRI in November followed by repeat cerebral angiography in February 2024.  We will assess his craniocervical junction fistula at that time and discuss the options at that point.

## 2024-09-29 PROCEDURE — 86900 BLOOD TYPING SEROLOGIC ABO: CPT

## 2024-09-29 PROCEDURE — C1889: CPT

## 2024-09-29 PROCEDURE — 85730 THROMBOPLASTIN TIME PARTIAL: CPT

## 2024-09-29 PROCEDURE — 97116 GAIT TRAINING THERAPY: CPT

## 2024-09-29 PROCEDURE — 86850 RBC ANTIBODY SCREEN: CPT

## 2024-09-29 PROCEDURE — 87640 STAPH A DNA AMP PROBE: CPT

## 2024-09-29 PROCEDURE — C9399: CPT

## 2024-09-29 PROCEDURE — 83735 ASSAY OF MAGNESIUM: CPT

## 2024-09-29 PROCEDURE — 72020 X-RAY EXAM OF SPINE 1 VIEW: CPT

## 2024-09-29 PROCEDURE — C1760: CPT

## 2024-09-29 PROCEDURE — 97535 SELF CARE MNGMENT TRAINING: CPT

## 2024-09-29 PROCEDURE — 84100 ASSAY OF PHOSPHORUS: CPT

## 2024-09-29 PROCEDURE — 93005 ELECTROCARDIOGRAM TRACING: CPT

## 2024-09-29 PROCEDURE — 36415 COLL VENOUS BLD VENIPUNCTURE: CPT

## 2024-09-29 PROCEDURE — 36226 PLACE CATH VERTEBRAL ART: CPT

## 2024-09-29 PROCEDURE — 97167 OT EVAL HIGH COMPLEX 60 MIN: CPT

## 2024-09-29 PROCEDURE — 97530 THERAPEUTIC ACTIVITIES: CPT

## 2024-09-29 PROCEDURE — 85610 PROTHROMBIN TIME: CPT

## 2024-09-29 PROCEDURE — C1769: CPT

## 2024-09-29 PROCEDURE — 86923 COMPATIBILITY TEST ELECTRIC: CPT

## 2024-09-29 PROCEDURE — 76000 FLUOROSCOPY <1 HR PHYS/QHP: CPT

## 2024-09-29 PROCEDURE — 72125 CT NECK SPINE W/O DYE: CPT | Mod: MC

## 2024-09-29 PROCEDURE — 85027 COMPLETE CBC AUTOMATED: CPT

## 2024-09-29 PROCEDURE — 85025 COMPLETE CBC W/AUTO DIFF WBC: CPT

## 2024-09-29 PROCEDURE — C1887: CPT

## 2024-09-29 PROCEDURE — 87641 MR-STAPH DNA AMP PROBE: CPT

## 2024-09-29 PROCEDURE — C1894: CPT

## 2024-09-29 PROCEDURE — 86901 BLOOD TYPING SEROLOGIC RH(D): CPT

## 2024-09-29 PROCEDURE — 80048 BASIC METABOLIC PNL TOTAL CA: CPT

## 2024-10-12 PROCEDURE — 80053 COMPREHEN METABOLIC PANEL: CPT

## 2024-10-12 PROCEDURE — 97161 PT EVAL LOW COMPLEX 20 MIN: CPT | Mod: GP

## 2024-10-12 PROCEDURE — 97116 GAIT TRAINING THERAPY: CPT | Mod: GP

## 2024-10-12 PROCEDURE — 92610 EVALUATE SWALLOWING FUNCTION: CPT | Mod: GN

## 2024-10-12 PROCEDURE — 97110 THERAPEUTIC EXERCISES: CPT | Mod: GO

## 2024-10-12 PROCEDURE — 97112 NEUROMUSCULAR REEDUCATION: CPT | Mod: GP

## 2024-10-12 PROCEDURE — 72158 MRI LUMBAR SPINE W/O & W/DYE: CPT | Mod: MC

## 2024-10-12 PROCEDURE — 87635 SARS-COV-2 COVID-19 AMP PRB: CPT

## 2024-10-12 PROCEDURE — 97535 SELF CARE MNGMENT TRAINING: CPT | Mod: GO

## 2024-10-12 PROCEDURE — 36415 COLL VENOUS BLD VENIPUNCTURE: CPT

## 2024-10-12 PROCEDURE — 72192 CT PELVIS W/O DYE: CPT | Mod: MC

## 2024-10-12 PROCEDURE — 97530 THERAPEUTIC ACTIVITIES: CPT | Mod: GO

## 2024-10-12 PROCEDURE — 85025 COMPLETE CBC W/AUTO DIFF WBC: CPT

## 2024-10-12 PROCEDURE — 97165 OT EVAL LOW COMPLEX 30 MIN: CPT | Mod: GO

## 2024-10-12 PROCEDURE — A9585: CPT

## 2024-11-11 ENCOUNTER — APPOINTMENT (OUTPATIENT)
Dept: PHYSICAL MEDICINE AND REHAB | Facility: CLINIC | Age: 74
End: 2024-11-11
Payer: MEDICARE

## 2024-11-11 VITALS
HEART RATE: 66 BPM | BODY MASS INDEX: 24.5 KG/M2 | DIASTOLIC BLOOD PRESSURE: 81 MMHG | SYSTOLIC BLOOD PRESSURE: 124 MMHG | WEIGHT: 175 LBS | HEIGHT: 71 IN | TEMPERATURE: 97.4 F | OXYGEN SATURATION: 95 %

## 2024-11-11 DIAGNOSIS — I67.1 CEREBRAL ANEURYSM, NONRUPTURED: ICD-10-CM

## 2024-11-11 DIAGNOSIS — G89.29 DORSALGIA, UNSPECIFIED: ICD-10-CM

## 2024-11-11 DIAGNOSIS — I62.03 NONTRAUMATIC CHRONIC SUBDURAL HEMORRHAGE: ICD-10-CM

## 2024-11-11 DIAGNOSIS — Z98.890 OTHER SPECIFIED POSTPROCEDURAL STATES: ICD-10-CM

## 2024-11-11 DIAGNOSIS — M54.9 DORSALGIA, UNSPECIFIED: ICD-10-CM

## 2024-11-11 DIAGNOSIS — G62.9 POLYNEUROPATHY, UNSPECIFIED: ICD-10-CM

## 2024-11-11 DIAGNOSIS — Q28.8 OTHER SPECIFIED CONGENITAL MALFORMATIONS OF CIRCULATORY SYSTEM: ICD-10-CM

## 2024-11-11 DIAGNOSIS — R29.898 OTHER SYMPTOMS AND SIGNS INVOLVING THE MUSCULOSKELETAL SYSTEM: ICD-10-CM

## 2024-11-11 DIAGNOSIS — I65.29 OCCLUSION AND STENOSIS OF UNSPECIFIED CAROTID ARTERY: ICD-10-CM

## 2024-11-11 PROCEDURE — 99213 OFFICE O/P EST LOW 20 MIN: CPT

## 2024-11-21 NOTE — HISTORY OF PRESENT ILLNESS
[FreeTextEntry1] : 72y/o M Hx HLD, Chronic back pain with spinal vascular abnormality s/p Laminectomy T10--L2 and intradural hematoma evacuation with resultant spinal cord syndrome\par s/p acute rehab treatment treatment\par \par F/u review, seen with wife\par Last seen 6/17/22\par \par Had recent neurosurgery review and some abnormality noted d/w neurosurgery, surgery was discussed but they want to think about this for now\par \par Report continued functional improvement, now ambulating functional distance without device \par Doing home exercise program\par \par Reports recent urology review, noted to be stable with plan for urodynamic studies in future\par Voiding appriately\par \par Normal bowel function \par \par Only complaint is numbness Rt anterior thigh and perineal area, which is chronic\par \par \par  Detail Level: Zone Photo Preface (Leave Blank If You Do Not Want): Photographs were obtained today Photo Preface (Leave Blank If You Do Not Want): Photographs were obtained

## 2024-12-11 ENCOUNTER — APPOINTMENT (OUTPATIENT)
Dept: MRI IMAGING | Facility: CLINIC | Age: 74
End: 2024-12-11

## 2024-12-11 ENCOUNTER — OUTPATIENT (OUTPATIENT)
Dept: OUTPATIENT SERVICES | Facility: HOSPITAL | Age: 74
LOS: 1 days | End: 2024-12-11

## 2024-12-11 DIAGNOSIS — Z98.890 OTHER SPECIFIED POSTPROCEDURAL STATES: Chronic | ICD-10-CM

## 2024-12-11 DIAGNOSIS — I77.0 ARTERIOVENOUS FISTULA, ACQUIRED: ICD-10-CM

## 2024-12-11 PROCEDURE — 70553 MRI BRAIN STEM W/O & W/DYE: CPT | Mod: 26

## 2024-12-11 PROCEDURE — 70544 MR ANGIOGRAPHY HEAD W/O DYE: CPT | Mod: 26,XU

## 2024-12-11 PROCEDURE — 70549 MR ANGIOGRAPH NECK W/O&W/DYE: CPT | Mod: 26

## 2024-12-11 PROCEDURE — 72156 MRI NECK SPINE W/O & W/DYE: CPT | Mod: 26

## 2024-12-19 ENCOUNTER — APPOINTMENT (OUTPATIENT)
Dept: NEUROSURGERY | Facility: CLINIC | Age: 74
End: 2024-12-19

## 2024-12-19 VITALS
DIASTOLIC BLOOD PRESSURE: 90 MMHG | TEMPERATURE: 97.4 F | SYSTOLIC BLOOD PRESSURE: 149 MMHG | HEIGHT: 71 IN | OXYGEN SATURATION: 98 % | BODY MASS INDEX: 25.2 KG/M2 | WEIGHT: 180 LBS | HEART RATE: 69 BPM

## 2024-12-19 DIAGNOSIS — I77.0 ARTERIOVENOUS FISTULA, ACQUIRED: ICD-10-CM

## 2024-12-19 PROCEDURE — 99214 OFFICE O/P EST MOD 30 MIN: CPT

## 2025-01-06 ENCOUNTER — APPOINTMENT (OUTPATIENT)
Dept: PHYSICAL MEDICINE AND REHAB | Facility: CLINIC | Age: 75
End: 2025-01-06
Payer: MEDICARE

## 2025-01-06 VITALS
SYSTOLIC BLOOD PRESSURE: 127 MMHG | BODY MASS INDEX: 25.2 KG/M2 | TEMPERATURE: 97.6 F | HEIGHT: 71 IN | DIASTOLIC BLOOD PRESSURE: 75 MMHG | OXYGEN SATURATION: 98 % | WEIGHT: 180 LBS | HEART RATE: 73 BPM

## 2025-01-06 DIAGNOSIS — Z98.890 OTHER SPECIFIED POSTPROCEDURAL STATES: ICD-10-CM

## 2025-01-06 DIAGNOSIS — Z74.09 OTHER REDUCED MOBILITY: ICD-10-CM

## 2025-01-06 DIAGNOSIS — I67.1 CEREBRAL ANEURYSM, NONRUPTURED: ICD-10-CM

## 2025-01-06 DIAGNOSIS — R42 DIZZINESS AND GIDDINESS: ICD-10-CM

## 2025-01-06 DIAGNOSIS — R33.9 RETENTION OF URINE, UNSPECIFIED: ICD-10-CM

## 2025-01-06 DIAGNOSIS — G95.19 OTHER VASCULAR MYELOPATHIES: ICD-10-CM

## 2025-01-06 PROCEDURE — 99213 OFFICE O/P EST LOW 20 MIN: CPT

## 2025-01-16 NOTE — DISCHARGE NOTE NURSING/CASE MANAGEMENT/SOCIAL WORK - NSDCPETBCESMAN_GEN_ALL_CORE
Problem: Delirium  Goal: # Symptoms of delirium resolved for 24 hours  Description: Evaluate delirium symptoms under active problem when present  1/16/2025 1650 by Nikki Anguiano RN  Outcome: Monitoring/Evaluating progress  1/16/2025 1042 by Nikki Anguiano RN  Outcome: Monitoring/Evaluating progress  Goal: # Verbalizes understanding of delirium symptoms, management, and follow up (family/patient)  Description: Document on Patient Education Activity   1/16/2025 1650 by Nikki Anguiano RN  Outcome: Monitoring/Evaluating progress  1/16/2025 1042 by Nikki Anguiano RN  Outcome: Monitoring/Evaluating progress     Problem: At Risk for Falls  Goal: Patient does not fall  1/16/2025 1650 by Nikki Anguiano RN  Outcome: Monitoring/Evaluating progress  1/16/2025 1042 by Nikki Anguiano RN  Outcome: Monitoring/Evaluating progress  Goal: Patient takes action to control fall-related risks  1/16/2025 1650 by Nikki Anguiano RN  Outcome: Monitoring/Evaluating progress  1/16/2025 1042 by Nikki Anguiano RN  Outcome: Monitoring/Evaluating progress     Problem: At Risk for Injury Due to Fall  Goal: Patient does not fall  1/16/2025 1650 by Nikki Anguiano RN  Outcome: Monitoring/Evaluating progress  1/16/2025 1042 by Nikki Anguiano RN  Outcome: Monitoring/Evaluating progress  Goal: Takes action to control condition specific risks  1/16/2025 1650 by Nikki Anguiano RN  Outcome: Monitoring/Evaluating progress  1/16/2025 1042 by Nikki Anguiano RN  Outcome: Monitoring/Evaluating progress  Goal: Verbalizes understanding of fall-related injury personal risks  Description: Document education using the patient education activity  1/16/2025 1650 by Nikki Anguiano RN  Outcome: Monitoring/Evaluating progress  1/16/2025 1042 by Nikki Anguiano RN  Outcome: Monitoring/Evaluating progress      If you are a smoker, it is important for your health to stop smoking. Please be aware that second hand smoke is also harmful.

## 2025-03-28 ENCOUNTER — OUTPATIENT (OUTPATIENT)
Dept: OUTPATIENT SERVICES | Facility: HOSPITAL | Age: 75
LOS: 1 days | End: 2025-03-28
Payer: MEDICARE

## 2025-03-28 VITALS
HEIGHT: 71 IN | HEART RATE: 87 BPM | SYSTOLIC BLOOD PRESSURE: 120 MMHG | TEMPERATURE: 97 F | DIASTOLIC BLOOD PRESSURE: 70 MMHG | OXYGEN SATURATION: 98 % | RESPIRATION RATE: 16 BRPM | WEIGHT: 180.78 LBS

## 2025-03-28 DIAGNOSIS — Z98.890 OTHER SPECIFIED POSTPROCEDURAL STATES: Chronic | ICD-10-CM

## 2025-03-28 DIAGNOSIS — Z01.818 ENCOUNTER FOR OTHER PREPROCEDURAL EXAMINATION: ICD-10-CM

## 2025-03-28 DIAGNOSIS — I10 ESSENTIAL (PRIMARY) HYPERTENSION: ICD-10-CM

## 2025-03-28 DIAGNOSIS — Z13.89 ENCOUNTER FOR SCREENING FOR OTHER DISORDER: ICD-10-CM

## 2025-03-28 DIAGNOSIS — Z29.9 ENCOUNTER FOR PROPHYLACTIC MEASURES, UNSPECIFIED: ICD-10-CM

## 2025-03-28 DIAGNOSIS — I77.0 ARTERIOVENOUS FISTULA, ACQUIRED: ICD-10-CM

## 2025-03-28 LAB
A1C WITH ESTIMATED AVERAGE GLUCOSE RESULT: 5.6 % — SIGNIFICANT CHANGE UP (ref 4–5.6)
ANION GAP SERPL CALC-SCNC: 13 MMOL/L — SIGNIFICANT CHANGE UP (ref 5–17)
APTT BLD: 36.6 SEC — HIGH (ref 24.5–35.6)
BASOPHILS # BLD AUTO: 0.03 K/UL — SIGNIFICANT CHANGE UP (ref 0–0.2)
BASOPHILS NFR BLD AUTO: 0.5 % — SIGNIFICANT CHANGE UP (ref 0–2)
BLD GP AB SCN SERPL QL: SIGNIFICANT CHANGE UP
BUN SERPL-MCNC: 22.9 MG/DL — HIGH (ref 8–20)
CALCIUM SERPL-MCNC: 9.4 MG/DL — SIGNIFICANT CHANGE UP (ref 8.4–10.5)
CHLORIDE SERPL-SCNC: 103 MMOL/L — SIGNIFICANT CHANGE UP (ref 96–108)
CO2 SERPL-SCNC: 26 MMOL/L — SIGNIFICANT CHANGE UP (ref 22–29)
CREAT SERPL-MCNC: 0.77 MG/DL — SIGNIFICANT CHANGE UP (ref 0.5–1.3)
EGFR: 93 ML/MIN/1.73M2 — SIGNIFICANT CHANGE UP
EGFR: 93 ML/MIN/1.73M2 — SIGNIFICANT CHANGE UP
EOSINOPHIL # BLD AUTO: 0.16 K/UL — SIGNIFICANT CHANGE UP (ref 0–0.5)
EOSINOPHIL NFR BLD AUTO: 2.7 % — SIGNIFICANT CHANGE UP (ref 0–6)
ESTIMATED AVERAGE GLUCOSE: 114 MG/DL — SIGNIFICANT CHANGE UP (ref 68–114)
GLUCOSE SERPL-MCNC: 115 MG/DL — HIGH (ref 70–99)
HCT VFR BLD CALC: 40.8 % — SIGNIFICANT CHANGE UP (ref 39–50)
HGB BLD-MCNC: 13.4 G/DL — SIGNIFICANT CHANGE UP (ref 13–17)
IMM GRANULOCYTES # BLD AUTO: 0.02 K/UL — SIGNIFICANT CHANGE UP (ref 0–0.07)
IMM GRANULOCYTES NFR BLD AUTO: 0.3 % — SIGNIFICANT CHANGE UP (ref 0–0.9)
INR BLD: 0.97 RATIO — SIGNIFICANT CHANGE UP (ref 0.85–1.16)
LYMPHOCYTES # BLD AUTO: 1.77 K/UL — SIGNIFICANT CHANGE UP (ref 1–3.3)
LYMPHOCYTES NFR BLD AUTO: 30.4 % — SIGNIFICANT CHANGE UP (ref 13–44)
MCHC RBC-ENTMCNC: 29.6 PG — SIGNIFICANT CHANGE UP (ref 27–34)
MCHC RBC-ENTMCNC: 32.8 G/DL — SIGNIFICANT CHANGE UP (ref 32–36)
MCV RBC AUTO: 90.3 FL — SIGNIFICANT CHANGE UP (ref 80–100)
MONOCYTES # BLD AUTO: 0.42 K/UL — SIGNIFICANT CHANGE UP (ref 0–0.9)
MONOCYTES NFR BLD AUTO: 7.2 % — SIGNIFICANT CHANGE UP (ref 2–14)
MRSA PCR RESULT.: SIGNIFICANT CHANGE UP
NEUTROPHILS # BLD AUTO: 3.43 K/UL — SIGNIFICANT CHANGE UP (ref 1.8–7.4)
NEUTROPHILS NFR BLD AUTO: 58.9 % — SIGNIFICANT CHANGE UP (ref 43–77)
NRBC # BLD AUTO: 0 K/UL — SIGNIFICANT CHANGE UP (ref 0–0)
NRBC # FLD: 0 K/UL — SIGNIFICANT CHANGE UP (ref 0–0)
NRBC BLD AUTO-RTO: 0 /100 WBCS — SIGNIFICANT CHANGE UP (ref 0–0)
PLATELET # BLD AUTO: 162 K/UL — SIGNIFICANT CHANGE UP (ref 150–400)
PMV BLD: 9.8 FL — SIGNIFICANT CHANGE UP (ref 7–13)
POTASSIUM SERPL-MCNC: 4.2 MMOL/L — SIGNIFICANT CHANGE UP (ref 3.5–5.3)
POTASSIUM SERPL-SCNC: 4.2 MMOL/L — SIGNIFICANT CHANGE UP (ref 3.5–5.3)
PROTHROM AB SERPL-ACNC: 11.2 SEC — SIGNIFICANT CHANGE UP (ref 9.9–13.4)
RBC # BLD: 4.52 M/UL — SIGNIFICANT CHANGE UP (ref 4.2–5.8)
RBC # FLD: 13.2 % — SIGNIFICANT CHANGE UP (ref 10.3–14.5)
S AUREUS DNA NOSE QL NAA+PROBE: SIGNIFICANT CHANGE UP
SODIUM SERPL-SCNC: 142 MMOL/L — SIGNIFICANT CHANGE UP (ref 135–145)
WBC # BLD: 5.83 K/UL — SIGNIFICANT CHANGE UP (ref 3.8–10.5)
WBC # FLD AUTO: 5.83 K/UL — SIGNIFICANT CHANGE UP (ref 3.8–10.5)

## 2025-03-28 PROCEDURE — 93010 ELECTROCARDIOGRAM REPORT: CPT

## 2025-03-28 PROCEDURE — 86901 BLOOD TYPING SEROLOGIC RH(D): CPT

## 2025-03-28 PROCEDURE — G0463: CPT

## 2025-03-28 PROCEDURE — 85610 PROTHROMBIN TIME: CPT

## 2025-03-28 PROCEDURE — 80048 BASIC METABOLIC PNL TOTAL CA: CPT

## 2025-03-28 PROCEDURE — 71046 X-RAY EXAM CHEST 2 VIEWS: CPT

## 2025-03-28 PROCEDURE — 36415 COLL VENOUS BLD VENIPUNCTURE: CPT

## 2025-03-28 PROCEDURE — 86850 RBC ANTIBODY SCREEN: CPT

## 2025-03-28 PROCEDURE — 86900 BLOOD TYPING SEROLOGIC ABO: CPT

## 2025-03-28 PROCEDURE — 71046 X-RAY EXAM CHEST 2 VIEWS: CPT | Mod: 26

## 2025-03-28 PROCEDURE — 83036 HEMOGLOBIN GLYCOSYLATED A1C: CPT

## 2025-03-28 PROCEDURE — 87641 MR-STAPH DNA AMP PROBE: CPT

## 2025-03-28 PROCEDURE — 85730 THROMBOPLASTIN TIME PARTIAL: CPT

## 2025-03-28 PROCEDURE — 93005 ELECTROCARDIOGRAM TRACING: CPT

## 2025-03-28 PROCEDURE — 85025 COMPLETE CBC W/AUTO DIFF WBC: CPT

## 2025-03-28 PROCEDURE — 87640 STAPH A DNA AMP PROBE: CPT

## 2025-03-28 NOTE — H&P PST ADULT - PROBLEM SELECTOR PLAN 4
C3-C5 Laminoplasty with clipping of spinal dural AVF with intraoperative angiogram by Dr. Jewell on 8/12/24. cardiac clearance pending ORT 0

## 2025-03-28 NOTE — H&P PST ADULT - PROBLEM SELECTOR PLAN 3
continue medications as instructed. Caprini 8 high risk   Surgical team please assess/recommend mechanical/pharmacological measures for VTE prophylaxis

## 2025-03-28 NOTE — H&P PST ADULT - HISTORY OF PRESENT ILLNESS
DORIE OROSCO is a 74 year old male with PMH of hyperlipidemia, chronic back pain, who presents for follow up neurosurgical evaluation of MRI results after bilateral decompressive laminectomies T10-L2 for evacuation of intradural hematoma.  ?  TO review: he is status post hematoma Bilateral decompression laminectomies T10-L2 for evacuation of the intradural hematoma on 10/7/21.  He presented to [facility] 10/6/21 with acute onset back pain and progressively worsening bilateral lower extremity weakness. Urgent MR imaging of the spine revealed intradural extramedullary hematoma T12-L1, S/P T10-L2 laminectomy. Spinal angiogram 10/7/21 showed no vascular abnormalities.  ?  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 There are varying degrees of mass effect upon the cord with stable central cord edema extending from T5 into the conus medullaris. Laminectomies noted at T10 and extending inferiorly. Since prior spinal angiogram was negative, consider coagulopathy as a cause of repetitive intrathecal subarachnoid hemorrhages."  ?  He saw Dr. Porfirio Barrios on 4/1/24 for follow up. He has not had repeat spinal angiograms to r/o vascular source of spontaneous intradural hematoma. 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 He also underwent MRI brain and C spine with and without contrast which read "resolution of previously identified ventral epidural enhancement along the posterior clivus/upper cervical spine as well as circumferential enhancement throughout the cervical spine, suggesting interval resolution of previously identified intracranial hypotension."  ?  He underwent elective treatment of cervical AVF with C2-5 lami with AVF ligation on 8/12/24, intraop angiogram showed resolution of C3-4 dAVF with L vertebral artery dissection. He then underwent formal angiogram on 8/13/24 which again showed resolution of C3-4 dAVF, improvement of L vertebral artery dissection, and improvement in shunting in craniocervical fistula. He was ultimately discharged to rehab on 8/19/24. He underwent follow up MRI head / C spine and MRA which read no acute abnormalities. He presents today for follow up. He reports he continues on aspirin 81 mg daily. He reports that he continues with poor balance and some difficulty walking because of that, also with poor stamina. He also has b/l foot numbness and makes it difficult for him to recognize where his feet are in space. Otherwise he denies headache, weakness, difficulty speaking, dizziness.  ?       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 He underwent repeat spinal cerebral angiogram on 5/21/24 which showed L C3-4 AVF and L cranio-cervical AVF. Underwent treatment of cervical AVF with C2-5 lami with AVF ligation on 8/12/24 with L vert dissection, confirmed on formal angiogram 8/13/24 with reduced flow of craniocervical fistula. He underwent follow up MRI / MRA which showed no acute findings.  ?  Plan:  - Continue aspirin 81 mg  - Plan for repeat cerebral angiogram, tentatively scheduled for Tuesday, April 8, 2024 with general anesthesia  - PST, medical clearance prior to procedure  - Patient and family in agreement with plan. 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 Reports worsening numbness and pain to BLE. Status post hematoma Bilateral decompression laminectomies T10-L2 for evacuation of the intradural hematoma on 10/7/21. Completed cerebral angiogram on 5/21/24 which showed " L C3-4 AVF and L cranio-cervical AVF. Underwent treatment of cervical AVF with C2-5 lami with AVF ligation on 8/12/24 with L vert dissection, confirmed on formal angiogram 8/13/24 with reduced flow of craniocervical fistula" per Dr. Jewell. C/o pain to cervical area current pain level 0/10 highest level of pain 5/10. Denies any headache, N/V, vision issues,  Fever/chills.       who presents for follow up neurosurgical evaluation of MRI results after bilateral decompressive laminectomies T10-L2 for evacuation of intradural hematoma.  ?  TO review: he is status post hematoma Bilateral decompression laminectomies T10-L2 for evacuation of the intradural hematoma on 10/7/21.  He presented to [facility] 10/6/21 with acute onset back pain and progressively worsening bilateral lower extremity weakness. Urgent MR imaging of the spine revealed intradural extramedullary hematoma T12-L1, S/P T10-L2 laminectomy. Spinal angiogram 10/7/21 showed no vascular abnormalities.  ?  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 There are varying degrees of mass effect upon the cord with stable central cord edema extending from T5 into the conus medullaris. Laminectomies noted at T10 and extending inferiorly. Since prior spinal angiogram was negative, consider coagulopathy as a cause of repetitive intrathecal subarachnoid hemorrhages."  ?  He saw Dr. Porfirio Barrios on 4/1/24 for follow up. He has not had repeat spinal angiograms to r/o vascular source of spontaneous intradural hematoma. 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 He also underwent MRI brain and C spine with and without contrast which read "resolution of previously identified ventral epidural enhancement along the posterior clivus/upper cervical spine as well as circumferential enhancement throughout the cervical spine, suggesting interval resolution of previously identified intracranial hypotension."  ?  He underwent elective treatment of cervical AVF with C2-5 lami with AVF ligation on 8/12/24, intraop angiogram showed resolution of C3-4 dAVF with L vertebral artery dissection. He then underwent formal angiogram on 8/13/24 which again showed resolution of C3-4 dAVF, improvement of L vertebral artery dissection, and improvement in shunting in craniocervical fistula. He was ultimately discharged to rehab on 8/19/24. He underwent follow up MRI head / C spine and MRA which read no acute abnormalities. He presents today for follow up. He reports he continues on aspirin 81 mg daily. He reports that he continues with poor balance and some difficulty walking because of that, also with poor stamina. He also has b/l foot numbness and makes it difficult for him to recognize where his feet are in space. Otherwise he denies headache, weakness, difficulty speaking, dizziness.  ?       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 He underwent repeat spinal cerebral angiogram on 5/21/24 which showed L C3-4 AVF and L cranio-cervical AVF. Underwent treatment of cervical AVF with C2-5 lami with AVF ligation on 8/12/24 with L vert dissection, confirmed on formal angiogram 8/13/24 with reduced flow of craniocervical fistula. He underwent follow up MRI / MRA which showed no acute findings.  ?  Plan:  - Continue aspirin 81 mg  - Plan for repeat cerebral angiogram, tentatively scheduled for Tuesday, April 8, 2024 with general anesthesia  - PST, medical clearance prior to procedure  - Patient and family in agreement with plan. 74 year old male with PMH of hyperlipidemia, chronic neck pain,  HTN, neuropathy to RLE and B/l feet, melanoma s/p mohs procedure presents to [facility] today. Reports worsening numbness and pain to BLE. Status post hematoma Bilateral decompression laminectomies T10-L2 for evacuation of the intradural hematoma on 10/7/21.Completed cerebral angiogram on 5/21/24 which showed " L C3-4 AVF and L cranio-cervical AVF. Underwent treatment of cervical AVF with C2-5 lami with AVF ligation on 8/12/24 with L vert dissection, confirmed on formal angiogram 8/13/24 with reduced flow of craniocervical fistula" per Dr. Jewell. C/o pain to cervical area current pain level 0/10 highest level of pain 5/10. Denies any headache, N/V, vision issues,  Fever/chills. Scheduled for cerebral angio on 4/8/2025.

## 2025-03-28 NOTE — H&P PST ADULT - PROBLEM SELECTOR PLAN 1
continue medications as instructed. Asked the patient to take the Blood pressure medication/ heart medication or any other important meds with a sip of water in the AM of surgery 74 year old male with PMH of hyperlipidemia, chronic neck pain,  HTN, neuropathy to RLE and B/l feet, melanoma s/p mohs procedure now scheduled for cerebral angio on 4/8/2025.       -Medical evaluation pending  - Educated on ERP protocol   -Educated on NSAIDS, multivitamins and herbals that increase the risk of bleeding and need to be stopped 5 days before procedure  -Educated on infection prevention  -Tylenol can be taken if needed for pain  -Will continue all other medications as prescribed  -Verbalized understanding of all instructions.

## 2025-03-28 NOTE — H&P PST ADULT - NS MD HP INPLANTS MED DEV
mouth daily      dexamethasone (DECADRON) 4 MG tablet Take 12 mg by mouth daily (with breakfast)      omeprazole (PRILOSEC) 20 MG delayed release capsule Take 20 mg by mouth daily      celecoxib (CELEBREX) 200 MG capsule Take 200 mg by mouth daily PRN      hydrochlorothiazide (HYDRODIURIL) 12.5 MG tablet Take 12.5 mg by mouth daily      amLODIPine (NORVASC) 5 MG tablet Take 10 mg by mouth daily       docusate sodium (COLACE, DULCOLAX) 100 MG CAPS Take 100 mg by mouth 2 times daily 30 capsule 0    metoprolol tartrate (LOPRESSOR) 25 MG tablet TAKE ONE TABLET BY MOUTH TWICE A DAY 60 tablet 4     No current facility-administered medications for this encounter.         Past Medical History:   Diagnosis Date    Anemia     Anxiety     Atrial fibrillation ([address] 3 28 12    dr. Topher Alexander Breast cancer [facility])     lt./chemo 6/2013    Carpal tunnel syndrome     Depression     post partum    GI bleed     Hypertension     Lymphedema     Neurologic cardiac syncope     Obesity     Pain     pelvic    Sleep apnea     ANA PAULA (stress urinary incontinence, female) 3/4/2014    Tachycardia     Varicella     Age 4, severe    Varicella without complication 1/9/9847       Past Surgical History:   Procedure Laterality Date    BLADDER SUSPENSION  4/23/14    midurethral    BREAST BIOPSY Left 3/21/13    COLONOSCOPY  2005    normal    COLONOSCOPY  7/6/2013    normal, fair prep    COLONOSCOPY  10/12/2005    normal    COSMETIC SURGERY      facial due to accident    CYSTOSCOPY  4/23/14    DILATION AND CURETTAGE OF UTERUS      ENTEROCELE REPAIR  4/23/14    FRACTURE SURGERY      facial due to accident    HYSTERECTOMY      ILEOSCOPY  10/12/05    KNEE ARTHROSCOPY      both knees    MASTECTOMY Left 4/8/13    with Left SLN biopsy    OTHER SURGICAL HISTORY  1969    mild inflammation in duodenum, ?diverticulum in probable 3rd part of duodenum, several areas in small bowel multiple erosions    SHOULDER ARTHROSCOPY  2008 None

## 2025-03-28 NOTE — H&P PST ADULT - PATIENT ON (OXYGEN DELIVERY METHOD)
Pt is a 35 y/o single man, no PMH/PPH, recently lost his job and housing after a 4 month period in the housing court in the context of falling behind in paying rent, has been at the [facility], presented to the ER with SI with intent and plan to jump off the [address] [place] into the water and drown after finding that his apartment was already locked and lost access to all his items including his social security card.    Patient seen and evaluated 12/12/24. As per nursing report no acute events. On approach patient calm and cooperative. No agitation or aggression noted. Verbalizes an improved mood since admission. Denies any suicidal/homicidal ideations. Able to contract for safety. Patient visible on the unit engaging with peers and attending groups. Anticipated discharge tomorrow 12/13/24. Compliant with medication. Does not warrant continued Inpatient hospitalization. Patient does not present a risk to self or others at this time. room air

## 2025-03-28 NOTE — H&P PST ADULT - ASSESSMENT
74 year old male with PMH of HLD, HTN, melanoma and chronic back pain presents to [facility]. He initially presented to [facility] on 10/6/24 with complaints of acute onset back pain and progressively worsening bilateral lower extremity weakness. At that time he had an urgent MRI which showed intradural extramedullary hematoma T12-L1, S/P T10-L2 laminectomy on 10/6/21. Spinal angiogram done on 10/7/24 showed no vascular abnormalities and Venous dopplers negative for blood clot. 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 He currently reports B/L feet numbness, and numbness on right leg that he feels is getting progressively worse. Reports intermittent right knee and foot pain, described as sharp, 3/10 in severity, worse with nothing, relieved with nothing. Denies urinary or bowel incontinence. He had another spinal angiogram on 24 with Dr. Jewell which showed Spinal dural AVF, he continue to have dizziness and impaired coordination while walking. These episodes have occurred in the past and resolved after a few hours, but now these episode has lasted longer than usual. He reports no other symptoms including new weakness, nausea, vomiting, headache. Now he is scheduled for C3-C5 Laminoplasty with clipping of spinal dural AVF with intraoperative angiogram by Dr. Jewell on 24. cardiac clearance pending    medications reviewed, instructions given on what medications to take and what not to take.   Asked the patient to take the Blood pressure medication/ heart medication or any other important meds with a sip of water in the AM of surgery ( Irbesartan, Gabapentin )  pt is not taking ASA/Plavix/Anticoagulation medication at this time.  Asked the pt not to take any NSAID's 5-7 days before surgery and told the pt Tylenol is okay to take for pain, pt verbalized understanding.  All other meds can be continued till the night before surgery.   Asked the patient to do the hair shampoo the night before surgery.    CAPRINI SCORE    AGE RELATED RISK FACTORS                                                             [ ] Age 41-60 years                                            (1 Point)  [ ] Age: 61-74 years                                           (2 Points)                 [x ] Age= 75 years                                                (3 Points)             DISEASE RELATED RISK FACTORS                                                       [x ] Edema in the lower extremities                 (1 Point)                     [ ] Varicose veins                                               (1 Point)                                 [x ] BMI > 25 Kg/m2                                            (1 Point)                                  [ ] Serious infection (ie PNA)                            (1 Point)                     [ ] Lung disease ( COPD, Emphysema)            (1 Point)                                                                          [ ] Acute myocardial infarction                         (1 Point)                  [ ] Congestive heart failure (in the previous month)  (1 Point)         [ ] Inflammatory bowel disease                            (1 Point)                  [ ] Central venous access, PICC or Port               (2 points)       (within the last month)                                                                [ ] Stroke (in the previous month)                        (5 Points)    [x ] Previous or present malignancy                       (2 points)                                                                                                                                                         HEMATOLOGY RELATED FACTORS                                                         [ ] Prior episodes of VTE                                     (3 Points)                     [ ] Positive family history for VTE                      (3 Points)                  [ ] Prothrombin 44151 A                                     (3 Points)                     [ ] Factor V Leiden                                                (3 Points)                        [ ] Lupus anticoagulants                                      (3 Points)                                                           [ ] Anticardiolipin antibodies                              (3 Points)                                                       [ ] High homocysteine in the blood                   (3 Points)                                             [ ] Other congenital or acquired thrombophilia      (3 Points)                                                [ ] Heparin induced thrombocytopenia                  (3 Points)                                        MOBILITY RELATED FACTORS  [ ] Bed rest                                                         (1 Point)  [ ] Plaster cast                                                    (2 points)  [ ] Bed bound for more than 72 hours           (2 Points)    GENDER SPECIFIC FACTORS  [ ] Pregnancy or had a baby within the last month   (1 Point)  [ ] Post-partum < 6 weeks                                   (1 Point)  [ ] Hormonal therapy  or oral contraception   (1 Point)  [ ] History of pregnancy complications              (1 point)  [ ] Unexplained or recurrent              (1 Point)    OTHER RISK FACTORS                                           (1 Point)  [ ] BMI >40, smoking, diabetes requiring insulin, chemotherapy  blood transfusions and length of surgery over 2 hours    SURGERY RELATED RISK FACTORS  [ ]  Section within the last month     (1 Point)  [ ] Minor surgery                                                  (1 Point)  [ ] Arthroscopic surgery                                       (2 Points)  [ x] Planned major surgery lasting more            (2 Points)      than 45 minutes     [ ] Elective hip or knee joint replacement       (5 points)       surgery                                                TRAUMA RELATED RISK FACTORS  [ ] Fracture of the hip, pelvis, or leg                       (5 Points)  [ ] Spinal cord injury resulting in paralysis             (5 points)       (in the previous month)    [ ] Paralysis  (less than 1 month)                             (5 Points)  [ ] Multiple Trauma within 1 month                        (5 Points)    Total Score [    9    ]    Caprini Score 0-2: Low Risk, NO VTE prophylaxis required for most patients, encourage ambulation  Caprini Score 3-6: Moderate Risk , pharmacologic VTE prophylaxis is indicated for most patients (in the absence of contraindications)  Caprini Score Greater than or =7: High risk, pharmocologic VTE prophylaxis indicated for most patients (in the absence of contraindications)    OPIOID RISK TOOL    YESSICA EACH BOX THAT APPLIES AND ADD TOTALS AT THE END    FAMILY HISTORY OF SUBSTANCE ABUSE                 FEMALE         MALE                                                Alcohol                             [  ]1 pt          [  ]3pts                                               Illegal Drugs                     [  ]2 pts        [  ]3pts                                               Rx Drugs                           [  ]4 pts        [  ]4 pts    PERSONAL HISTORY OF SUBSTANCE ABUSE                                                                                          Alcohol                             [  ]3 pts       [  ]3 pts                                               Illegal Drugs                     [  ]4 pts        [  ]4 pts                                               Rx Drugs                           [  ]5 pts        [  ]5 pts    AGE BETWEEN 16-45 YEARS                                      [  ]1 pt         [  ]1 pt    HISTORY OF PREADOLESCENT   SEXUAL ABUSE                                                             [  ]3 pts        [  ]0pts    PSYCHOLOGICAL DISEASE                     ADD, OCD, Bipolar, Schizophrenia        [  ]2 pts         [  ]2 pts                      Depression                                               [  ]1 pt           [  ]1 pt           SCORING TOTAL   (add numbers and type here)              ( 0)                                     A score of 3 or lower indicated LOW risk for future opioid abuse  A score of 4 to 7 indicated moderate risk for future opioid abuse  A score of 8 or higher indicates a high risk for opioid abuse                           74 year old male with PMH of hyperlipidemia, chronic neck pain,  HTN, neuropathy to RLE and B/l feet, melanoma s/p mohs procedure now scheduled for cerebral angio on 2025.       -Medical evaluation pending  - Educated on ERP protocol   -Educated on NSAIDS, multivitamins and herbals that increase the risk of bleeding and need to be stopped 5 days before procedure  -Educated on infection prevention  -Tylenol can be taken if needed for pain  -Will continue all other medications as prescribed  -Verbalized understanding of all instructions.    CAPRINI SCORE    AGE RELATED RISK FACTORS                                                             [ ] Age 41-60 years                                            (1 Point)  [ ] Age: 61-74 years                                           (2 Points)                 [x ] Age= 75 years                                                (3 Points)             DISEASE RELATED RISK FACTORS                                                       [ ] Edema in the lower extremities                 (1 Point)                     [ ] Varicose veins                                               (1 Point)                                 [x ] BMI > 25 Kg/m2                                            (1 Point)                                  [ ] Serious infection (ie PNA)                            (1 Point)                     [ ] Lung disease ( COPD, Emphysema)            (1 Point)                                                                          [ ] Acute myocardial infarction                         (1 Point)                  [ ] Congestive heart failure (in the previous month)  (1 Point)         [ ] Inflammatory bowel disease                            (1 Point)                  [ ] Central venous access, PICC or Port               (2 points)       (within the last month)                                                                [ ] Stroke (in the previous month)                        (5 Points)    [x ] Previous or present malignancy                       (2 points)                                                                                                                                                         HEMATOLOGY RELATED FACTORS                                                         [ ] Prior episodes of VTE                                     (3 Points)                     [ ] Positive family history for VTE                      (3 Points)                  [ ] Prothrombin 74142 A                                     (3 Points)                     [ ] Factor V Leiden                                                (3 Points)                        [ ] Lupus anticoagulants                                      (3 Points)                                                           [ ] Anticardiolipin antibodies                              (3 Points)                                                       [ ] High homocysteine in the blood                   (3 Points)                                             [ ] Other congenital or acquired thrombophilia      (3 Points)                                                [ ] Heparin induced thrombocytopenia                  (3 Points)                                        MOBILITY RELATED FACTORS  [ ] Bed rest                                                         (1 Point)  [ ] Plaster cast                                                    (2 points)  [ ] Bed bound for more than 72 hours           (2 Points)    GENDER SPECIFIC FACTORS  [ ] Pregnancy or had a baby within the last month   (1 Point)  [ ] Post-partum < 6 weeks                                   (1 Point)  [ ] Hormonal therapy  or oral contraception   (1 Point)  [ ] History of pregnancy complications              (1 point)  [ ] Unexplained or recurrent              (1 Point)    OTHER RISK FACTORS                                           (1 Point)  [ ] BMI >40, smoking, diabetes requiring insulin, chemotherapy  blood transfusions and length of surgery over 2 hours    SURGERY RELATED RISK FACTORS  [ ]  Section within the last month     (1 Point)  [ ] Minor surgery                                                  (1 Point)  [ ] Arthroscopic surgery                                       (2 Points)  [ x] Planned major surgery lasting more            (2 Points)      than 45 minutes     [ ] Elective hip or knee joint replacement       (5 points)       surgery                                                TRAUMA RELATED RISK FACTORS  [ ] Fracture of the hip, pelvis, or leg                       (5 Points)  [ ] Spinal cord injury resulting in paralysis             (5 points)       (in the previous month)    [ ] Paralysis  (less than 1 month)                             (5 Points)  [ ] Multiple Trauma within 1 month                        (5 Points)    Total Score [    8    ]    Caprini Score 0-2: Low Risk, NO VTE prophylaxis required for most patients, encourage ambulation  Caprini Score 3-6: Moderate Risk , pharmacologic VTE prophylaxis is indicated for most patients (in the absence of contraindications)  Caprini Score Greater than or =7: High risk, pharmocologic VTE prophylaxis indicated for most patients (in the absence of contraindications)    OPIOID RISK TOOL    YESSICA EACH BOX THAT APPLIES AND ADD TOTALS AT THE END    FAMILY HISTORY OF SUBSTANCE ABUSE                 FEMALE         MALE                                                Alcohol                             [  ]1 pt          [  ]3pts                                               Illegal Drugs                     [  ]2 pts        [  ]3pts                                               Rx Drugs                           [  ]4 pts        [  ]4 pts    PERSONAL HISTORY OF SUBSTANCE ABUSE                                                                                          Alcohol                             [  ]3 pts       [  ]3 pts                                               Illegal Drugs                     [  ]4 pts        [  ]4 pts                                               Rx Drugs                           [  ]5 pts        [  ]5 pts    AGE BETWEEN 16-45 YEARS                                      [  ]1 pt         [  ]1 pt    HISTORY OF PREADOLESCENT   SEXUAL ABUSE                                                             [  ]3 pts        [  ]0pts    PSYCHOLOGICAL DISEASE                     ADD, OCD, Bipolar, Schizophrenia        [  ]2 pts         [  ]2 pts                      Depression                                               [  ]1 pt           [  ]1 pt           SCORING TOTAL   (add numbers and type here)              ( 0)                                     A score of 3 or lower indicated LOW risk for future opioid abuse  A score of 4 to 7 indicated moderate risk for future opioid abuse  A score of 8 or higher indicates a high risk for opioid abuse

## 2025-03-28 NOTE — H&P PST ADULT - NSICDXPASTMEDICALHX_GEN_ALL_CORE_FT
PAST MEDICAL HISTORY:  Colitis     High cholesterol     HTN (hypertension)     Melanoma     Neuropathy     Vertigo

## 2025-03-28 NOTE — H&P PST ADULT - PROBLEM SELECTOR PLAN 2
Caprini Score 9 High risk,  Surgical team should assess /Strongly recommend pharmacological and mechanical measures for VTE prophylaxis continue prescribed medications

## 2025-03-28 NOTE — H&P PST ADULT - NSICDXFAMILYHX_GEN_ALL_CORE_FT
FAMILY HISTORY:  Father  Still living? Unknown  FH: lung cancer, Age at diagnosis: Age Unknown    Mother  Still living? Unknown  FH: diabetes mellitus, Age at diagnosis: Age Unknown  FH: Parkinson's disease, Age at diagnosis: Age Unknown    Sibling  Still living? Unknown  FH: kidney cancer, Age at diagnosis: Age Unknown

## 2025-04-08 ENCOUNTER — APPOINTMENT (OUTPATIENT)
Dept: NEUROSURGERY | Facility: HOSPITAL | Age: 75
End: 2025-04-08

## 2025-04-08 ENCOUNTER — RESULT REVIEW (OUTPATIENT)
Age: 75
End: 2025-04-08

## 2025-04-08 ENCOUNTER — OUTPATIENT (OUTPATIENT)
Dept: OUTPATIENT SERVICES | Facility: HOSPITAL | Age: 75
LOS: 1 days | End: 2025-04-08
Payer: MEDICARE

## 2025-04-08 ENCOUNTER — TRANSCRIPTION ENCOUNTER (OUTPATIENT)
Age: 75
End: 2025-04-08

## 2025-04-08 VITALS
SYSTOLIC BLOOD PRESSURE: 128 MMHG | DIASTOLIC BLOOD PRESSURE: 78 MMHG | WEIGHT: 179.9 LBS | HEART RATE: 70 BPM | TEMPERATURE: 98 F | HEIGHT: 71 IN | OXYGEN SATURATION: 96 % | RESPIRATION RATE: 18 BRPM

## 2025-04-08 VITALS
HEART RATE: 74 BPM | RESPIRATION RATE: 16 BRPM | OXYGEN SATURATION: 96 % | SYSTOLIC BLOOD PRESSURE: 130 MMHG | DIASTOLIC BLOOD PRESSURE: 75 MMHG

## 2025-04-08 DIAGNOSIS — Z98.890 OTHER SPECIFIED POSTPROCEDURAL STATES: Chronic | ICD-10-CM

## 2025-04-08 DIAGNOSIS — I77.0 ARTERIOVENOUS FISTULA, ACQUIRED: ICD-10-CM

## 2025-04-08 PROBLEM — G62.9 POLYNEUROPATHY, UNSPECIFIED: Chronic | Status: ACTIVE | Noted: 2025-03-28

## 2025-04-08 PROCEDURE — 36215 PLACE CATHETER IN ARTERY: CPT | Mod: 59

## 2025-04-08 PROCEDURE — 36228 PLACE CATH INTRACRANIAL ART: CPT

## 2025-04-08 PROCEDURE — 36226 PLACE CATH VERTEBRAL ART: CPT

## 2025-04-08 PROCEDURE — 76377 3D RENDER W/INTRP POSTPROCES: CPT | Mod: 26

## 2025-04-08 PROCEDURE — 36228 PLACE CATH INTRACRANIAL ART: CPT | Mod: RT

## 2025-04-08 PROCEDURE — 75705 ARTERY X-RAYS SPINE: CPT | Mod: 26

## 2025-04-08 PROCEDURE — 36226 PLACE CATH VERTEBRAL ART: CPT | Mod: 50

## 2025-04-08 PROCEDURE — C1769: CPT

## 2025-04-08 PROCEDURE — C1887: CPT

## 2025-04-08 PROCEDURE — C1894: CPT

## 2025-04-08 PROCEDURE — C1760: CPT

## 2025-04-08 RX ORDER — IRBESARTAN 75 MG/1
1 TABLET ORAL
Refills: 0 | DISCHARGE

## 2025-04-08 RX ORDER — ASPIRIN 325 MG
0 TABLET ORAL
Refills: 0 | DISCHARGE

## 2025-04-08 RX ORDER — GABAPENTIN 400 MG/1
1 CAPSULE ORAL
Refills: 0 | DISCHARGE

## 2025-04-08 RX ORDER — GABAPENTIN 400 MG/1
2 CAPSULE ORAL
Refills: 0 | DISCHARGE

## 2025-04-08 NOTE — CHART NOTE - NSCHARTNOTEFT_GEN_A_CORE
Neurointerventional Surgery  Pre-Procedure Note     HPI:  Mr. Denis Toro is a 75M who presents today for spinal angiogram. To review, patient had a spontaneous intradural hematoma in 2021, spinal angiogram at that time was negative. He then underwent a delayed MRI which showed chronic SAH. He then underwent a repeat spinal angiogram which showed a C3-4 spinal dural AVF and a craniocervical junction AVF. He underwent a C2-5 lami with AVF ligation on 8/12/24. He presents today for follow up cerebral and spinal angiograms to assess vasculature. He reports that he feels well, denies HA, weakness, numbness, tingling, CP, SOB, N/V.       Allergies: Shrimp (Angioedema)  Celebrex (Other)      PAST MEDICAL & SURGICAL HISTORY:  High cholesterol  Colitis  Vertigo  Melanoma  HTN (hypertension)  Neuropathy  History of lumbar laminectomy for spinal cord decompression  Status post Mohs surgery      FAMILY HISTORY:  FH: Parkinson's disease (Mother)  FH: kidney cancer (Sibling)  FH: diabetes mellitus (Mother)  FH: lung cancer (Father)      Physical Exam:  Constitutional: NAD, lying in bed  Neuro  * Mental Status:  GCS 15: Awake, alert, oriented to conversation. No aphasia or difficulty speaking. No dysarthria. Able to name objects and their function.  * Cranial Nerves: Cnii-Cnxii grossly intact. PERRL, EOMI, tongue midline, no gaze deviation  * Motor: RUE 5/5, LUE 5/5, RLE 5/5, LLE 5/5, no drift or dysmetria  * Sensory: Sensation intact to light touch  * Reflexes: not assessed   Cardiovascular: Regular rate and rhythm.  Eyes: See neurologic examination with detailed examination of eyes.  ENT: No JVD, Trachea Midline  Respiratory: non labored breathing   Gastrointestinal: Soft, nontender, nondistended.  Genitourinary: [ ] Vazquez, [ x ] No Vazquez.   Musculoskeletal: No muscle wasting noted, (See neurologic assessment for full muscle strength assessment) .  Skin:  no wounds, no redness, no abrasions noted  Hematologic / Lymph / Immunologic: No bleeding from IV sites or wounds      NIH SS:  DATE: 4/8/25   TIME:  1A: Level of consciousness (0-3): 0  1B: Questions (0-2): 0    1C: Commands (0-2): 0  2: Gaze (0-2): 0  3: Visual fields (0-3): 0  4: Facial palsy (0-3): 0  MOTOR:  5A: Left arm motor drift (0-4): 0  5B: Right arm motor drift (0-4): 0  6A: Left leg motor drift (0-4): 0  6B: Right leg motor drift (0-4): 0  7: Limb ataxia (0-2): 0  SENSORY:  8: Sensation (0-2): 0  SPEECH:  9: Language (0-3): 0  10: Dysarthria (0-2): 0  EXTINCTION:  11: Extinction/inattention (0-2): 0    TOTAL SCORE:       Labs:   3/28/25: WBC 5.83, hgb 13.4, hct 40.8, platelets 162  3/28/25: PTT 36.6, INR 0.97  3/28/25: Na 142, K 4.2, Cl 103, Co2 26, BUN 22.9, Cr 0.77, glucose 115       Assessment/Plan:   This is a 75 year old Male  presents with 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 Patient presents to neuro-IR for selective cerebral and spinal angiography.     Procedure, goals, risks, benefits and alternatives  were discussed with patient.  All questions were answered.  Risks include but are not limited to stroke, vessel injury, hemorrhage, and or groin hematoma.  Patient demonstrates understanding  of all risks involved with this procedure and wishes to continue.   Appropriate  consent was obtained from patient and consent is in the patient's chart. Neurointerventional Surgery  Pre-Procedure Note     HPI:  Mr. Denis Toro is a 75M who presents today for spinal angiogram. To review, patient had a spontaneous intradural hematoma in 2021, spinal angiogram at that time was negative. He then underwent a delayed MRI which showed chronic SAH. He then underwent a repeat spinal angiogram which showed a C3-4 spinal dural AVF and a craniocervical junction AVF. He underwent a C2-5 lami with AVF ligation on 8/12/24. He presents today for follow up cerebral and spinal angiograms to assess vasculature. He reports that he feels well, denies HA, weakness, numbness, tingling, CP, SOB, N/V.       Allergies: Shrimp (Angioedema)  Celebrex (Other)      PAST MEDICAL & SURGICAL HISTORY:  High cholesterol  Colitis  Vertigo  Melanoma  HTN (hypertension)  Neuropathy  History of lumbar laminectomy for spinal cord decompression  Status post Mohs surgery      FAMILY HISTORY:  FH: Parkinson's disease (Mother)  FH: kidney cancer (Sibling)  FH: diabetes mellitus (Mother)  FH: lung cancer (Father)      Physical Exam:  Constitutional: NAD, lying in bed  Neuro  * Mental Status:  GCS 15: Awake, alert, oriented to conversation. No aphasia or difficulty speaking. No dysarthria. Able to name objects and their function.  * Cranial Nerves: Cnii-Cnxii grossly intact. PERRL, EOMI, tongue midline, no gaze deviation  * Motor: RUE 5/5, LUE 5/5, RLE 4+/5, LLE 5/5, no drift or dysmetria  * Sensory: decreased sensation RLE   * Reflexes: not assessed   Cardiovascular: Regular rate and rhythm.  Eyes: See neurologic examination with detailed examination of eyes.  ENT: No JVD, Trachea Midline  Respiratory: non labored breathing   Gastrointestinal: Soft, nontender, nondistended.  Genitourinary: [ ] Vazquez, [ x ] No Vazquez.   Musculoskeletal: No muscle wasting noted, (See neurologic assessment for full muscle strength assessment) .  Skin:  no wounds, no redness, no abrasions noted  Hematologic / Lymph / Immunologic: No bleeding from IV sites or wounds      NIH SS:  DATE: 4/8/25   TIME: 0810  1A: Level of consciousness (0-3): 0  1B: Questions (0-2): 0    1C: Commands (0-2): 0  2: Gaze (0-2): 0  3: Visual fields (0-3): 0  4: Facial palsy (0-3): 0  MOTOR:  5A: Left arm motor drift (0-4): 0  5B: Right arm motor drift (0-4): 0  6A: Left leg motor drift (0-4): 0  6B: Right leg motor drift (0-4): 0  7: Limb ataxia (0-2): 0  SENSORY:  8: Sensation (0-2): 1  SPEECH:  9: Language (0-3): 0  10: Dysarthria (0-2): 0  EXTINCTION:  11: Extinction/inattention (0-2): 0    TOTAL SCORE: 1      Labs:   3/28/25: WBC 5.83, hgb 13.4, hct 40.8, platelets 162  3/28/25: PTT 36.6, INR 0.97  3/28/25: Na 142, K 4.2, Cl 103, Co2 26, BUN 22.9, Cr 0.77, glucose 115       Assessment/Plan:   This is a 75 year old Male  presents with 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 Patient presents to neuro-IR for selective cerebral and spinal angiography.     Procedure, goals, risks, benefits and alternatives  were discussed with patient.  All questions were answered.  Risks include but are not limited to stroke, vessel injury, hemorrhage, and or groin hematoma.  Patient demonstrates understanding  of all risks involved with this procedure and wishes to continue.   Appropriate  consent was obtained from patient and consent is in the patient's chart.

## 2025-04-08 NOTE — ASU DISCHARGE PLAN (ADULT/PEDIATRIC) - FINANCIAL ASSISTANCE
[facility] provides services at a reduced cost to those who are determined to be eligible through [facility]’s financial assistance program. Information regarding [facility]’s financial assistance program can be found by going to [email or web address] or by calling [phone].

## 2025-04-08 NOTE — DISCHARGE NOTE NURSING/CASE MANAGEMENT/SOCIAL WORK - NSFLUVACAGEDISCH_IMM_ALL_CORE
Report received. Assumed care, assessment complete. Pt is oriented to self only.  Pt denies having any pain at this time. Fall precautions and appropriate signs in place. Pt oriented to unit routine, call light/phone system and RN extension number provided. Pt educated regarding fall precautions. Bed alarm in use. Pt denies any additional needs at this time. Call light within reach.    Adult

## 2025-04-08 NOTE — ASU PATIENT PROFILE, ADULT - TOBACCO USE
Never smoker Patient requesting gel injection    LOV 6/27/23  Order pending for Synvisc one. Patient requesting bilateral knees. Only dx for left knee in order. Please adjust as needed. Per LOV: \"If no significant improvement we can pursue viscosupplementation. \"

## 2025-04-08 NOTE — CHART NOTE - NSCHARTNOTEFT_GEN_A_CORE
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 Jose Martin Jewell MD  Physician Assistant: Tri Pisano PA-C  Nurse:   Anesthesiologist: TJ Garcia  Radiology Tech: Damián Laureanojan    Sheath: 5 Georgian Sheath    I/Os: estimated blood loss less than 10cc,  IV fluids cc, Urine output 0cc, Contrast: Omnipaque 240   cc    Vitals:  BP   HR   Spo2  %    Preliminary Report:  Under a 5 Georgian short sheath via the right groin under general anesthesia via left vertebral artery, left internal carotid artery, left external carotid artery, right vertebral artery, right internal carotid artery, right external carotid artery, and T4-L5 spinal arteries, a selective cerebral and spinal angiography  was performed and reveals       . ( Official note to follow).    Patient tolerated procedure well.  Patient remains hemodynamically stable, no change in neurological status compared to baseline.  Results were discussed with patient, patient's family and Neurosurgery.  Right groin sheath was discontinued using Vascade closure device at __. Hemostasis was obtained with approximately 15 minutes of manual compression.     No active bleeding, no hematoma, no ecchymosis.   Quick clot and safeguard balloon dressing applied at   Patient transferred to recovery room in stable condition. 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 Jose Martin Jewell MD  Physician Assistant: Tri Pisano PA-C  Nurse: Anita Del Rio  Anesthesiologist: TJ Garcia  Radiology Tech: Dana Lopez  Fellow: David Perez     Sheath: 5 Singaporean Sheath    I/Os: estimated blood loss less than 20cc,  IV fluids 1000cc, Urine output 0cc, Contrast: Omnipaque 240  86 cc    Vitals:  /68   HR 66  Spo2  100%    Preliminary Report:  Under a 5 Singaporean short sheath via the right groin under general anesthesia via left vertebral artery, right vertebral artery, a selective cerebral and spinal angiography  was performed and reveals craniocervical leptomeningeal arteriovenous fistula, 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 ( Official note to follow).    Patient tolerated procedure well.  Patient remains hemodynamically stable, no change in neurological status compared to baseline.  Results were discussed with patient, patient's family and Neurosurgery.  Right groin sheath was discontinued using Vascade closure device at 1002. Hemostasis was obtained with approximately 8 minutes of manual compression.     No active bleeding, no hematoma, no ecchymosis.   Quick clot and safeguard balloon dressing applied at 1010  Patient transferred to recovery room in stable condition.

## 2025-04-08 NOTE — DISCHARGE NOTE NURSING/CASE MANAGEMENT/SOCIAL WORK - PATIENT PORTAL LINK FT
You can access the FollowMyHealth Patient Portal offered by [facility] by registering at the following website: [email or web address]. By joining [email or web address] FollowMyHealth portal, you will also be able to view your health information using other applications (apps) compatible with our system.

## 2025-04-08 NOTE — ASU DISCHARGE PLAN (ADULT/PEDIATRIC) - CARE PROVIDER_API CALL
Jose Martin Jewell [facility]  Neurosurgery  [address] [place] [id number]  Phone: [phone]  Fax: [phone]  Follow Up Time: 2 weeks

## 2025-04-08 NOTE — DISCHARGE NOTE NURSING/CASE MANAGEMENT/SOCIAL WORK - FINANCIAL ASSISTANCE
[facility] provides services at a reduced cost to those who are determined to be eligible through [facility]’s financial assistance program. Information regarding [facility]’s financial assistance program can be found by going to [email or web address] or by calling [phone].
room air

## 2025-04-08 NOTE — ASU PATIENT PROFILE, ADULT - FALL HARM RISK - UNIVERSAL INTERVENTIONS
Bed in lowest position, wheels locked, appropriate side rails in place/Call bell, personal items and telephone in reach/Instruct patient to call for assistance before getting out of bed or chair/Non-slip footwear when patient is out of bed/[place] to call system/Physically safe environment - no spills, clutter or unnecessary equipment/Purposeful Proactive Rounding/Room/bathroom lighting operational, light cord in reach

## 2025-04-28 ENCOUNTER — APPOINTMENT (OUTPATIENT)
Dept: RADIATION ONCOLOGY | Facility: CLINIC | Age: 75
End: 2025-04-28
Payer: MEDICARE

## 2025-04-28 VITALS
TEMPERATURE: 97.16 F | RESPIRATION RATE: 17 BRPM | OXYGEN SATURATION: 97 % | DIASTOLIC BLOOD PRESSURE: 84 MMHG | HEART RATE: 70 BPM | SYSTOLIC BLOOD PRESSURE: 136 MMHG | HEIGHT: 71 IN

## 2025-04-28 DIAGNOSIS — I67.1 CEREBRAL ANEURYSM, NONRUPTURED: ICD-10-CM

## 2025-04-28 PROCEDURE — 99204 OFFICE O/P NEW MOD 45 MIN: CPT | Mod: GC

## 2025-04-28 RX ORDER — TAMSULOSIN HYDROCHLORIDE 0.4 MG/1
0.4 CAPSULE ORAL
Refills: 0 | Status: ACTIVE | COMMUNITY
Start: 2025-04-28

## 2025-05-01 ENCOUNTER — APPOINTMENT (OUTPATIENT)
Dept: NEUROSURGERY | Facility: CLINIC | Age: 75
End: 2025-05-01
Payer: MEDICARE

## 2025-05-01 VITALS
HEIGHT: 71 IN | SYSTOLIC BLOOD PRESSURE: 116 MMHG | WEIGHT: 180 LBS | BODY MASS INDEX: 25.2 KG/M2 | HEART RATE: 68 BPM | TEMPERATURE: 98.2 F | DIASTOLIC BLOOD PRESSURE: 72 MMHG | OXYGEN SATURATION: 97 %

## 2025-05-01 DIAGNOSIS — I77.0 ARTERIOVENOUS FISTULA, ACQUIRED: ICD-10-CM

## 2025-05-01 PROCEDURE — 99215 OFFICE O/P EST HI 40 MIN: CPT

## 2025-05-05 ENCOUNTER — NON-APPOINTMENT (OUTPATIENT)
Age: 75
End: 2025-05-05

## 2025-05-19 NOTE — DISCHARGE NOTE NURSING/CASE MANAGEMENT/SOCIAL WORK - NSTRANSFERBELONGINGSDISPO_GEN_A_NUR
not applicable
As certified below, I, or a nurse practitioner or physician assistant working with me, had a face-to-face encounter that meets the physician face-to-face encounter requirements.

## 2025-07-27 ENCOUNTER — EMERGENCY (EMERGENCY)
Facility: HOSPITAL | Age: 75
LOS: 1 days | End: 2025-07-27
Attending: EMERGENCY MEDICINE
Payer: MEDICARE

## 2025-07-27 VITALS
SYSTOLIC BLOOD PRESSURE: 118 MMHG | HEART RATE: 67 BPM | RESPIRATION RATE: 18 BRPM | DIASTOLIC BLOOD PRESSURE: 75 MMHG | TEMPERATURE: 98 F | OXYGEN SATURATION: 97 %

## 2025-07-27 VITALS
HEART RATE: 81 BPM | SYSTOLIC BLOOD PRESSURE: 97 MMHG | DIASTOLIC BLOOD PRESSURE: 56 MMHG | WEIGHT: 177.91 LBS | OXYGEN SATURATION: 97 % | RESPIRATION RATE: 18 BRPM | TEMPERATURE: 98 F

## 2025-07-27 DIAGNOSIS — Z98.890 OTHER SPECIFIED POSTPROCEDURAL STATES: Chronic | ICD-10-CM

## 2025-07-27 DIAGNOSIS — I95.9 HYPOTENSION, UNSPECIFIED: ICD-10-CM

## 2025-07-27 LAB
ALBUMIN SERPL ELPH-MCNC: 3.7 G/DL — SIGNIFICANT CHANGE UP (ref 3.3–5.2)
ALP SERPL-CCNC: 84 U/L — SIGNIFICANT CHANGE UP (ref 40–120)
ALT FLD-CCNC: 24 U/L — SIGNIFICANT CHANGE UP
ANION GAP SERPL CALC-SCNC: 12 MMOL/L — SIGNIFICANT CHANGE UP (ref 5–17)
APTT BLD: 37 SEC — HIGH (ref 26.1–36.8)
AST SERPL-CCNC: 25 U/L — SIGNIFICANT CHANGE UP
BASOPHILS # BLD AUTO: 0.02 K/UL — SIGNIFICANT CHANGE UP (ref 0–0.2)
BASOPHILS # BLD MANUAL: 0 K/UL — SIGNIFICANT CHANGE UP (ref 0–0.2)
BASOPHILS NFR BLD AUTO: 0.6 % — SIGNIFICANT CHANGE UP (ref 0–2)
BASOPHILS NFR BLD MANUAL: 0 % — SIGNIFICANT CHANGE UP (ref 0–2)
BILIRUB SERPL-MCNC: 1.1 MG/DL — SIGNIFICANT CHANGE UP (ref 0.4–2)
BUN SERPL-MCNC: 22.9 MG/DL — HIGH (ref 8–20)
CALCIUM SERPL-MCNC: 8.8 MG/DL — SIGNIFICANT CHANGE UP (ref 8.4–10.5)
CHLORIDE SERPL-SCNC: 105 MMOL/L — SIGNIFICANT CHANGE UP (ref 96–108)
CK SERPL-CCNC: 120 U/L — SIGNIFICANT CHANGE UP (ref 30–200)
CO2 SERPL-SCNC: 24 MMOL/L — SIGNIFICANT CHANGE UP (ref 22–29)
CREAT SERPL-MCNC: 0.83 MG/DL — SIGNIFICANT CHANGE UP (ref 0.5–1.3)
EGFR: 91 ML/MIN/1.73M2 — SIGNIFICANT CHANGE UP
EGFR: 91 ML/MIN/1.73M2 — SIGNIFICANT CHANGE UP
ELLIPTOCYTES BLD QL SMEAR: SLIGHT — SIGNIFICANT CHANGE UP
EOSINOPHIL # BLD AUTO: 0.06 K/UL — SIGNIFICANT CHANGE UP (ref 0–0.5)
EOSINOPHIL # BLD MANUAL: 0.11 K/UL — SIGNIFICANT CHANGE UP (ref 0–0.5)
EOSINOPHIL NFR BLD AUTO: 1.8 % — SIGNIFICANT CHANGE UP (ref 0–6)
EOSINOPHIL NFR BLD MANUAL: 3.4 % — SIGNIFICANT CHANGE UP (ref 0–6)
GIANT PLATELETS BLD QL SMEAR: PRESENT
GLUCOSE SERPL-MCNC: 104 MG/DL — HIGH (ref 70–99)
HCT VFR BLD CALC: 33.4 % — LOW (ref 39–50)
HCT VFR BLD CALC: 33.4 % — LOW (ref 39–50)
HGB BLD-MCNC: 11.1 G/DL — LOW (ref 13–17)
HGB BLD-MCNC: 11.2 G/DL — LOW (ref 13–17)
IMM GRANULOCYTES # BLD AUTO: 0.01 K/UL — SIGNIFICANT CHANGE UP (ref 0–0.07)
IMM GRANULOCYTES NFR BLD AUTO: 0.3 % — SIGNIFICANT CHANGE UP (ref 0–0.9)
INR BLD: 1.05 RATIO — SIGNIFICANT CHANGE UP (ref 0.85–1.16)
LYMPHOCYTES # BLD AUTO: 1.09 K/UL — SIGNIFICANT CHANGE UP (ref 1–3.3)
LYMPHOCYTES # BLD MANUAL: 1.01 K/UL — SIGNIFICANT CHANGE UP (ref 1–3.3)
LYMPHOCYTES NFR BLD AUTO: 32.9 % — SIGNIFICANT CHANGE UP (ref 13–44)
LYMPHOCYTES NFR BLD MANUAL: 30.5 % — SIGNIFICANT CHANGE UP (ref 13–44)
MANUAL REACTIVE LYMPHOCYTES #: 0.03 K/UL — SIGNIFICANT CHANGE UP (ref 0–0.63)
MCHC RBC-ENTMCNC: 29.5 PG — SIGNIFICANT CHANGE UP (ref 27–34)
MCHC RBC-ENTMCNC: 29.7 PG — SIGNIFICANT CHANGE UP (ref 27–34)
MCHC RBC-ENTMCNC: 33.2 G/DL — SIGNIFICANT CHANGE UP (ref 32–36)
MCHC RBC-ENTMCNC: 33.5 G/DL — SIGNIFICANT CHANGE UP (ref 32–36)
MCV RBC AUTO: 88.6 FL — SIGNIFICANT CHANGE UP (ref 80–100)
MCV RBC AUTO: 88.8 FL — SIGNIFICANT CHANGE UP (ref 80–100)
MONOCYTES # BLD AUTO: 0.5 K/UL — SIGNIFICANT CHANGE UP (ref 0–0.9)
MONOCYTES # BLD MANUAL: 0.11 K/UL — SIGNIFICANT CHANGE UP (ref 0–0.9)
MONOCYTES NFR BLD AUTO: 15.1 % — HIGH (ref 2–14)
MONOCYTES NFR BLD MANUAL: 3.4 % — SIGNIFICANT CHANGE UP (ref 2–14)
NEUTROPHILS # BLD AUTO: 1.63 K/UL — LOW (ref 1.8–7.4)
NEUTROPHILS # BLD MANUAL: 2.05 K/UL — SIGNIFICANT CHANGE UP (ref 1.8–7.4)
NEUTROPHILS NFR BLD AUTO: 49.3 % — SIGNIFICANT CHANGE UP (ref 43–77)
NEUTROPHILS NFR BLD MANUAL: 61.9 % — SIGNIFICANT CHANGE UP (ref 43–77)
NRBC # BLD AUTO: 0 K/UL — SIGNIFICANT CHANGE UP (ref 0–0)
NRBC # BLD AUTO: 0 K/UL — SIGNIFICANT CHANGE UP (ref 0–0)
NRBC # FLD: 0 K/UL — SIGNIFICANT CHANGE UP (ref 0–0)
NRBC # FLD: 0 K/UL — SIGNIFICANT CHANGE UP (ref 0–0)
NRBC BLD AUTO-RTO: 0 /100 WBCS — SIGNIFICANT CHANGE UP (ref 0–0)
NRBC BLD AUTO-RTO: 0 /100 WBCS — SIGNIFICANT CHANGE UP (ref 0–0)
PLAT MORPH BLD: NORMAL — SIGNIFICANT CHANGE UP
PLATELET # BLD AUTO: 114 K/UL — LOW (ref 150–400)
PLATELET # BLD AUTO: 124 K/UL — LOW (ref 150–400)
PLATELET COUNT - ESTIMATE: NORMAL — SIGNIFICANT CHANGE UP
PMV BLD: 8.8 FL — SIGNIFICANT CHANGE UP (ref 7–13)
PMV BLD: 9.4 FL — SIGNIFICANT CHANGE UP (ref 7–13)
POTASSIUM SERPL-MCNC: 4.7 MMOL/L — SIGNIFICANT CHANGE UP (ref 3.5–5.3)
POTASSIUM SERPL-SCNC: 4.7 MMOL/L — SIGNIFICANT CHANGE UP (ref 3.5–5.3)
PROT SERPL-MCNC: 6.3 G/DL — LOW (ref 6.6–8.7)
PROTHROM AB SERPL-ACNC: 12.2 SEC — SIGNIFICANT CHANGE UP (ref 9.9–13.4)
RBC # BLD: 3.76 M/UL — LOW (ref 4.2–5.8)
RBC # BLD: 3.77 M/UL — LOW (ref 4.2–5.8)
RBC # FLD: 14 % — SIGNIFICANT CHANGE UP (ref 10.3–14.5)
RBC # FLD: 14.1 % — SIGNIFICANT CHANGE UP (ref 10.3–14.5)
RBC BLD AUTO: ABNORMAL
SMUDGE CELLS # BLD: PRESENT
SODIUM SERPL-SCNC: 141 MMOL/L — SIGNIFICANT CHANGE UP (ref 135–145)
TROPONIN T, HIGH SENSITIVITY RESULT: 12 NG/L — SIGNIFICANT CHANGE UP (ref 0–51)
VARIANT LYMPHS # BLD: 0.8 % — SIGNIFICANT CHANGE UP (ref 0–6)
VARIANT LYMPHS NFR BLD MANUAL: 0.8 % — SIGNIFICANT CHANGE UP (ref 0–6)
WBC # BLD: 3.31 K/UL — LOW (ref 3.8–10.5)
WBC # BLD: 3.67 K/UL — LOW (ref 3.8–10.5)
WBC # FLD AUTO: 3.31 K/UL — LOW (ref 3.8–10.5)
WBC # FLD AUTO: 3.67 K/UL — LOW (ref 3.8–10.5)

## 2025-07-27 PROCEDURE — 93010 ELECTROCARDIOGRAM REPORT: CPT

## 2025-07-27 PROCEDURE — 85610 PROTHROMBIN TIME: CPT

## 2025-07-27 PROCEDURE — 80053 COMPREHEN METABOLIC PANEL: CPT

## 2025-07-27 PROCEDURE — 99285 EMERGENCY DEPT VISIT HI MDM: CPT | Mod: 25

## 2025-07-27 PROCEDURE — 93005 ELECTROCARDIOGRAM TRACING: CPT

## 2025-07-27 PROCEDURE — 71045 X-RAY EXAM CHEST 1 VIEW: CPT

## 2025-07-27 PROCEDURE — 99285 EMERGENCY DEPT VISIT HI MDM: CPT

## 2025-07-27 PROCEDURE — 85025 COMPLETE CBC W/AUTO DIFF WBC: CPT

## 2025-07-27 PROCEDURE — 85027 COMPLETE CBC AUTOMATED: CPT

## 2025-07-27 PROCEDURE — 71045 X-RAY EXAM CHEST 1 VIEW: CPT | Mod: 26

## 2025-07-27 PROCEDURE — 82550 ASSAY OF CK (CPK): CPT

## 2025-07-27 PROCEDURE — 36415 COLL VENOUS BLD VENIPUNCTURE: CPT

## 2025-07-27 PROCEDURE — 84484 ASSAY OF TROPONIN QUANT: CPT

## 2025-07-27 PROCEDURE — 99283 EMERGENCY DEPT VISIT LOW MDM: CPT

## 2025-07-27 PROCEDURE — 85730 THROMBOPLASTIN TIME PARTIAL: CPT

## 2025-07-31 ENCOUNTER — APPOINTMENT (OUTPATIENT)
Dept: NEUROSURGERY | Facility: CLINIC | Age: 75
End: 2025-07-31
Payer: MEDICARE

## 2025-07-31 ENCOUNTER — NON-APPOINTMENT (OUTPATIENT)
Age: 75
End: 2025-07-31

## 2025-07-31 VITALS
WEIGHT: 178 LBS | OXYGEN SATURATION: 96 % | HEART RATE: 74 BPM | DIASTOLIC BLOOD PRESSURE: 76 MMHG | TEMPERATURE: 97.7 F | HEIGHT: 71 IN | SYSTOLIC BLOOD PRESSURE: 127 MMHG | BODY MASS INDEX: 24.92 KG/M2

## 2025-07-31 DIAGNOSIS — I67.1 CEREBRAL ANEURYSM, NONRUPTURED: ICD-10-CM

## 2025-07-31 DIAGNOSIS — G62.9 POLYNEUROPATHY, UNSPECIFIED: ICD-10-CM

## 2025-07-31 DIAGNOSIS — R51.9 HEADACHE, UNSPECIFIED: ICD-10-CM

## 2025-07-31 PROCEDURE — 99213 OFFICE O/P EST LOW 20 MIN: CPT

## 2025-08-06 ENCOUNTER — APPOINTMENT (OUTPATIENT)
Dept: CARDIOLOGY | Facility: CLINIC | Age: 75
End: 2025-08-06
Payer: MEDICARE

## 2025-08-06 VITALS
DIASTOLIC BLOOD PRESSURE: 78 MMHG | HEIGHT: 71 IN | WEIGHT: 178 LBS | BODY MASS INDEX: 24.92 KG/M2 | OXYGEN SATURATION: 98 % | HEART RATE: 52 BPM | SYSTOLIC BLOOD PRESSURE: 132 MMHG

## 2025-08-06 DIAGNOSIS — I45.10 UNSPECIFIED RIGHT BUNDLE-BRANCH BLOCK: ICD-10-CM

## 2025-08-06 DIAGNOSIS — R94.31 ABNORMAL ELECTROCARDIOGRAM [ECG] [EKG]: ICD-10-CM

## 2025-08-06 DIAGNOSIS — R42 DIZZINESS AND GIDDINESS: ICD-10-CM

## 2025-08-06 DIAGNOSIS — R53.83 OTHER FATIGUE: ICD-10-CM

## 2025-08-06 PROCEDURE — 93000 ELECTROCARDIOGRAM COMPLETE: CPT

## 2025-08-06 PROCEDURE — 99214 OFFICE O/P EST MOD 30 MIN: CPT

## 2025-08-21 PROBLEM — D64.9 ANEMIA: Status: ACTIVE | Noted: 2025-08-21

## 2025-08-21 PROBLEM — D72.819 LEUKOPENIA: Status: ACTIVE | Noted: 2025-08-21

## 2025-08-21 PROBLEM — D69.6 THROMBOCYTOPENIA: Status: ACTIVE | Noted: 2025-08-21

## 2025-08-25 ENCOUNTER — APPOINTMENT (OUTPATIENT)
Dept: HEMATOLOGY ONCOLOGY | Facility: CLINIC | Age: 75
End: 2025-08-25
Payer: MEDICARE

## 2025-08-25 ENCOUNTER — RESULT REVIEW (OUTPATIENT)
Age: 75
End: 2025-08-25

## 2025-08-25 VITALS
BODY MASS INDEX: 25.34 KG/M2 | HEART RATE: 74 BPM | DIASTOLIC BLOOD PRESSURE: 70 MMHG | HEIGHT: 71 IN | OXYGEN SATURATION: 97 % | TEMPERATURE: 97.5 F | WEIGHT: 181.03 LBS | SYSTOLIC BLOOD PRESSURE: 116 MMHG

## 2025-08-25 DIAGNOSIS — D72.819 DECREASED WHITE BLOOD CELL COUNT, UNSPECIFIED: ICD-10-CM

## 2025-08-25 DIAGNOSIS — D69.6 THROMBOCYTOPENIA, UNSPECIFIED: ICD-10-CM

## 2025-08-25 DIAGNOSIS — D64.9 ANEMIA, UNSPECIFIED: ICD-10-CM

## 2025-08-25 PROCEDURE — 99203 OFFICE O/P NEW LOW 30 MIN: CPT

## 2025-08-26 LAB
ALBUMIN SERPL ELPH-MCNC: 4.2 G/DL
ALP BLD-CCNC: 72 U/L
ALT SERPL-CCNC: 31 U/L
ANION GAP SERPL CALC-SCNC: 9 MMOL/L
AST SERPL-CCNC: 23 U/L
BILIRUB SERPL-MCNC: 0.9 MG/DL
BUN SERPL-MCNC: 14 MG/DL
CALCIUM SERPL-MCNC: 9.4 MG/DL
CHLORIDE SERPL-SCNC: 104 MMOL/L
CO2 SERPL-SCNC: 28 MMOL/L
CREAT SERPL-MCNC: 0.86 MG/DL
EGFRCR SERPLBLD CKD-EPI 2021: 90 ML/MIN/1.73M2
FERRITIN SERPL-MCNC: 88 NG/ML
FOLATE SERPL-MCNC: >20 NG/ML
GLUCOSE SERPL-MCNC: 125 MG/DL
HAV IGM SER QL: NONREACTIVE
HBV CORE IGM SER QL: NONREACTIVE
HBV SURFACE AG SER QL: NONREACTIVE
HCV AB SER QL: NONREACTIVE
HCV S/CO RATIO: 0.25 S/CO
HIV1+2 AB SPEC QL IA.RAPID: NONREACTIVE
IRON SATN MFR SERPL: 32 %
IRON SERPL-MCNC: 78 UG/DL
POTASSIUM SERPL-SCNC: 4.4 MMOL/L
PROT SERPL-MCNC: 7.3 G/DL
RHEUMATOID FACT SERPL-ACNC: <10 IU/ML
SODIUM SERPL-SCNC: 141 MMOL/L
TIBC SERPL-MCNC: 247 UG/DL
UIBC SERPL-MCNC: 169 UG/DL
VIT B12 SERPL-MCNC: 753 PG/ML

## 2025-08-27 ENCOUNTER — APPOINTMENT (OUTPATIENT)
Dept: CARDIOLOGY | Facility: CLINIC | Age: 75
End: 2025-08-27
Payer: MEDICARE

## 2025-08-27 PROCEDURE — 93306 TTE W/DOPPLER COMPLETE: CPT

## 2025-09-03 LAB
COPPER SERPL-MCNC: 81 UG/DL
EPO SERPL-MCNC: 16.1 MIU/ML
ZINC SERPL-MCNC: 68 UG/DL

## 2025-09-04 ENCOUNTER — APPOINTMENT (OUTPATIENT)
Dept: CARDIOLOGY | Facility: CLINIC | Age: 75
End: 2025-09-04
Payer: MEDICARE

## 2025-09-04 ENCOUNTER — NON-APPOINTMENT (OUTPATIENT)
Age: 75
End: 2025-09-04

## 2025-09-04 LAB
ALBUMIN MFR SERPL ELPH: 58.2 %
ALBUMIN SERPL-MCNC: 4.2 G/DL
ALBUMIN/GLOB SERPL: 1.4 RATIO
ALPHA1 GLOB MFR SERPL ELPH: 3.7 %
ALPHA1 GLOB SERPL ELPH-MCNC: 0.3 G/DL
ALPHA2 GLOB MFR SERPL ELPH: 8.1 %
ALPHA2 GLOB SERPL ELPH-MCNC: 0.6 G/DL
ANA TITR SER: ABNORMAL
ANA TITR SER: ABNORMAL
B-GLOBULIN MFR SERPL ELPH: 10 %
B-GLOBULIN SERPL ELPH-MCNC: 0.7 G/DL
DEPRECATED KAPPA LC FREE/LAMBDA SER: 1.16 RATIO
GAMMA GLOB FLD ELPH-MCNC: 1.5 G/DL
GAMMA GLOB MFR SERPL ELPH: 20 %
IGA SERPL-MCNC: 183 MG/DL
IGG SERPL-MCNC: 1328 MG/DL
IGM SERPL-MCNC: 186 MG/DL
INTERPRETATION SERPL IEP-IMP: NORMAL
KAPPA LC CSF-MCNC: 2.17 MG/DL
KAPPA LC SERPL-MCNC: 2.52 MG/DL
M PROTEIN MFR SERPL ELPH: 3.8 %
M PROTEIN SPEC IFE-MCNC: NORMAL
MONOCLON BAND OBS SERPL: 0.3 G/DL
PROT SERPL-MCNC: 7.3 G/DL
PROT SERPL-MCNC: 7.3 G/DL

## 2025-09-04 PROCEDURE — A9502: CPT

## 2025-09-04 PROCEDURE — 78452 HT MUSCLE IMAGE SPECT MULT: CPT

## 2025-09-04 PROCEDURE — 93015 CV STRESS TEST SUPVJ I&R: CPT

## 2025-09-05 ENCOUNTER — APPOINTMENT (OUTPATIENT)
Dept: NEUROLOGY | Facility: CLINIC | Age: 75
End: 2025-09-05
Payer: MEDICARE

## 2025-09-05 VITALS
DIASTOLIC BLOOD PRESSURE: 75 MMHG | SYSTOLIC BLOOD PRESSURE: 125 MMHG | OXYGEN SATURATION: 95 % | HEIGHT: 71 IN | BODY MASS INDEX: 24.5 KG/M2 | HEART RATE: 79 BPM | WEIGHT: 175 LBS

## 2025-09-05 PROCEDURE — 99204 OFFICE O/P NEW MOD 45 MIN: CPT

## 2025-09-05 RX ORDER — DULOXETINE 20 MG/1
20 CAPSULE, DELAYED RELEASE ORAL
Qty: 30 | Refills: 2 | Status: ACTIVE | COMMUNITY
Start: 2025-09-05 | End: 1900-01-01

## 2025-09-09 DIAGNOSIS — R76.8 OTHER SPECIFIED ABNORMAL IMMUNOLOGICAL FINDINGS IN SERUM: ICD-10-CM

## 2025-09-17 ENCOUNTER — APPOINTMENT (OUTPATIENT)
Dept: CARDIOLOGY | Facility: CLINIC | Age: 75
End: 2025-09-17
Payer: MEDICARE

## 2025-09-17 VITALS
OXYGEN SATURATION: 98 % | BODY MASS INDEX: 24.5 KG/M2 | WEIGHT: 175 LBS | HEART RATE: 73 BPM | SYSTOLIC BLOOD PRESSURE: 118 MMHG | HEIGHT: 71 IN | DIASTOLIC BLOOD PRESSURE: 70 MMHG

## 2025-09-17 DIAGNOSIS — E78.5 HYPERLIPIDEMIA, UNSPECIFIED: ICD-10-CM

## 2025-09-17 DIAGNOSIS — R94.31 ABNORMAL ELECTROCARDIOGRAM [ECG] [EKG]: ICD-10-CM

## 2025-09-17 DIAGNOSIS — R53.83 OTHER FATIGUE: ICD-10-CM

## 2025-09-17 DIAGNOSIS — R42 DIZZINESS AND GIDDINESS: ICD-10-CM

## 2025-09-17 DIAGNOSIS — I10 ESSENTIAL (PRIMARY) HYPERTENSION: ICD-10-CM

## 2025-09-17 PROCEDURE — 99214 OFFICE O/P EST MOD 30 MIN: CPT

## (undated) DEVICE — DRSG MEPILEX 10 X 10CM (4 X 4") AG

## (undated) DEVICE — ARACHNOID BACKCUTTING SUPERFICIAL HANDLE 5"

## (undated) DEVICE — BIPOLAR FORCEP SYMMETRY BAYONET 7" X 1.5MM SMOOTH (SILVER)

## (undated) DEVICE — DRAPE MICROSCOPE ZEISS OPMI VISIONGUARD 154 X 52"

## (undated) DEVICE — DRAPE XL SHEET 77X98"

## (undated) DEVICE — LONE STAR ELASTIC STAY HOOK 12MM BLUNT

## (undated) DEVICE — ELCTR MONOPOLAR STIMULATOR PROBE FLUSH-TIP

## (undated) DEVICE — ZIMMER PULSAVAC PLUS FAN KIT

## (undated) DEVICE — TUBING CONNECTING 6MM 20FT

## (undated) DEVICE — Device

## (undated) DEVICE — GOWN TRIMAX LG

## (undated) DEVICE — SUT ETHILON 3-0 18" FS-1

## (undated) DEVICE — CATH IV SAFE BC 18G X 1.16" (GREEN)

## (undated) DEVICE — SUT VICRYL 0 18" CT-1 UNDYED (POP-OFF)

## (undated) DEVICE — DRSG XEROFORM 1 X 8"

## (undated) DEVICE — MIDAS REX MR8 BALL FLUTED SM BORE 3MM X 10CM

## (undated) DEVICE — SUT NUROLON 4-0 8-18" TF (POP-OFF)

## (undated) DEVICE — VENODYNE/SCD SLEEVE CALF MEDIUM

## (undated) DEVICE — SUT VICRYL 2-0 18" CP-2 UNDYED (POP-OFF)

## (undated) DEVICE — MIDAS REX MR8 MATCH HEAD FLUTED SM BORE 3MM X 10CM

## (undated) DEVICE — DRAPE 3/4 SHEET W REINFORCEMENT 56X77"

## (undated) DEVICE — ELCTR 4-DISC 20MM 49" (RED, BLUE, GREEN, BLACK)

## (undated) DEVICE — ELCTR SUBDERMAL CORKSCREW NDL 1.2MM

## (undated) DEVICE — DRAPE C ARM UNIVERSAL

## (undated) DEVICE — ELCTR BOVIE PENCIL BLADE 10FT

## (undated) DEVICE — ELCTR ROCKER SWITCH PENCIL BLUE 10FT

## (undated) DEVICE — WARMING BLANKET LOWER ADULT

## (undated) DEVICE — SOL IRR POUR NS 0.9% 1000ML

## (undated) DEVICE — TUBING FOR SMOKE EVACUATOR (PURPLE END)

## (undated) DEVICE — PREP DURAPREP 26CC

## (undated) DEVICE — DRAPE SPLIT SHEET 77" X 108"

## (undated) DEVICE — MINI DOPPLER PROBE

## (undated) DEVICE — FOLEY TRAY 16FR 5CC LTX UMETER CLOSED

## (undated) DEVICE — BIOMET BATTERY DISP

## (undated) DEVICE — DRAPE 3/4 SHEET 52X76"

## (undated) DEVICE — SYR LUER LOK 20CC

## (undated) DEVICE — ELCTR SUBDERMAL NDL 27G X 1/2" WITH TWISTED PAIR

## (undated) DEVICE — SUT VICRYL 3-0 18" SH UNDYED (POP-OFF)

## (undated) DEVICE — GLV 7.5 PROTEXIS (WHITE)

## (undated) DEVICE — POSITIONER FOAM EGG CRATE ULNAR 2PCS (PINK)

## (undated) DEVICE — NDL SPINAL 18G X 3.5" (PINK)

## (undated) DEVICE — ELCTR BOVIE TIP BLADE INSULATED 2.75" EDGE WITH SAFETY

## (undated) DEVICE — ELCTR PEDICLE SCREW PROBE 3MM BALL 1.8MM X 100MM

## (undated) DEVICE — DRSG TELFA 3 X 8

## (undated) DEVICE — VENODYNE/SCD SLEEVE CALF LARGE

## (undated) DEVICE — MARKING PEN W RULER

## (undated) DEVICE — GLV 7 PROTEXIS (WHITE)

## (undated) DEVICE — SSH-ESU BOVIE MACHINE T7E14835DX: Type: DURABLE MEDICAL EQUIPMENT

## (undated) DEVICE — SUT ETHILON 3-0 18" PS-1

## (undated) DEVICE — SOL IRR POUR H2O 1000ML

## (undated) DEVICE — ELCTR SUBDERMAL NDL CLASSIC 1.5M X 59" (6 COLOR)

## (undated) DEVICE — STAPLER SKIN PROXIMATE

## (undated) DEVICE — BIPOLAR FORCEP STRYKER STANDARD 8" X 0.5MM (YELLOW)

## (undated) DEVICE — ELCTR BIPOLAR PROBE

## (undated) DEVICE — POSITIONER FOAM LAMINECTOMY ARM CRADLE (PINK)

## (undated) DEVICE — CLIPPER BLADE NEURO (BLUE)

## (undated) DEVICE — DRAPE 1/2 SHEET 40X57"

## (undated) DEVICE — DRAPE INSTRUMENT POUCH 6.75" X 11"

## (undated) DEVICE — PACK NEURO

## (undated) DEVICE — SPONGE PEANUT AUTO COUNT

## (undated) DEVICE — TUBING SUCTION 20FT

## (undated) DEVICE — MIDAS REX MR8 BALL FLUTED SM BORE 5MM X 10CM

## (undated) DEVICE — ELCTR BOVIE TIP BLADE INSULATED 2.75" EDGE

## (undated) DEVICE — DRSG BIOPATCH DISK W CHG 1" W 7.0MM HOLE

## (undated) DEVICE — TUBING JET BIPOLAR

## (undated) DEVICE — DRAPE TOWEL BLUE 17" X 24"

## (undated) DEVICE — DRAPE CRANIOTOMY W POUCH 100X104"

## (undated) DEVICE — DRAIN JACKSON PRATT 7MM FLAT FULL W 15 FR TROCAR